# Patient Record
Sex: MALE | Race: WHITE | NOT HISPANIC OR LATINO | Employment: OTHER | URBAN - METROPOLITAN AREA
[De-identification: names, ages, dates, MRNs, and addresses within clinical notes are randomized per-mention and may not be internally consistent; named-entity substitution may affect disease eponyms.]

---

## 2017-01-02 ENCOUNTER — OFFICE VISIT (OUTPATIENT)
Dept: ORTHOPEDICS | Facility: CLINIC | Age: 67
End: 2017-01-02
Payer: COMMERCIAL

## 2017-01-02 VITALS — RESPIRATION RATE: 12 BRPM | BODY MASS INDEX: 23.82 KG/M2 | HEIGHT: 65 IN | WEIGHT: 143 LBS

## 2017-01-02 DIAGNOSIS — M54.50 CHRONIC LEFT-SIDED LOW BACK PAIN WITHOUT SCIATICA: ICD-10-CM

## 2017-01-02 DIAGNOSIS — M47.816 LUMBAR FACET ARTHROPATHY: Primary | ICD-10-CM

## 2017-01-02 DIAGNOSIS — G89.29 CHRONIC LEFT-SIDED LOW BACK PAIN WITHOUT SCIATICA: ICD-10-CM

## 2017-01-02 PROCEDURE — 99213 OFFICE O/P EST LOW 20 MIN: CPT | Performed by: PHYSICAL MEDICINE & REHABILITATION

## 2017-01-02 NOTE — Clinical Note
Dear Jose Lopez saw me at [facility] on Jan 2, 2017.  Please refer to the visit note at your convenience and feel free to contact me should you have any questions.  Sincerely,  Rodney Galvez DO, [facility]

## 2017-01-02 NOTE — PATIENT INSTRUCTIONS
We addressed the following today:    1. Left low back pain without radiation  2. Lumbar arthritis    Activity modification as discussed    Home exercise program as instructed    Topical Treatments: Ice/heat    Over the counter medication: Acetaminophen (Tylenol) 1000 mg every 6 hours with food (Maximum of 3000 mg/day)    Follow-up as needed for further evaluation/medical care (call direct clinic number [[phone]] at any time with questions or concerns)

## 2017-01-02 NOTE — PROGRESS NOTES
" [facility]   Follow-up Visit s Jan 2, 2017    Subjective:  Jose Moreno is a 66 year old male who is seen in follow up for evaluation of left low back pain without radiation.  Last visit was on 12/15/2016 with left L4-5 and L5-S1 lumbar facet joint corticosteroid injections completed with 95% improvement noted.  Has not been completing home exercise program as previously prescribed during formal physical therapy.    Denies any low back pain presently. Symptoms are generally worse with heavy lifting activities.  Denies any bowel/bladder dysfunction or saddle anesthesia.  Denies any weakness/numbness/tingling of the left lower extremity.  Denies any falls/trauma since last clinical visit.     Patient's past medical, surgical, social, and family histories are reviewed today    Objective:  Resp 12  Ht 5' 5\" (1.651 m)  Wt 143 lb (64.864 kg)  BMI 23.80 kg/m2  General: healthy, alert, and in no distress    Imaging:  No x-rays indicated during today's visit  Outside report from [facility] was reviewed today and results were discussed with the patient    ASSESSMENT:  1. Chronic left low back pain without radiation  2. Lumbar facet arthropathy  3. Lumbar degenerative disc disease    PLAN:  1. Acetaminophen/ice/heat as needed for improved pain control.  2. Activity modification as discussed, including limitation of activities that cause pain/discomfort.  3. Continue with pain-free home exercise program as previously prescribed from formal physical therapy.  4. Follow-up as needed for further evaluation/medical care.  Consider lumbar medial branch blocks with progression to lumbar radiofrequency ablation, spine surgery referral, etc as deemed appropriate moving forward.  Instructed to follow-up if change of symptoms arise.    Patient's conditions were thoroughly discussed during today's visit with greater than 50% of the visit spent counseling the patient with total time spent face-to-face with the " patient being 10 minutes.    Rodney Galvez DO, CAQSM  [facility]    Disclaimer: This note consists of symbols derived from keyboarding, dictation and/or voice recognition software. As a result, there may be errors in the script that have gone undetected. Please consider this when interpreting information found in this chart.    This document serves as a record of the services and decisions personally performed and made by Rodney Galvez DO. It was created on his behalf by Juan Rivas, a trained medical scribe. The creation of this document is based on the provider's statements to the medical scribe.  Juan Rivas January 2, 2017 4:50 PM

## 2017-01-02 NOTE — NURSING NOTE
"Chief Complaint   Patient presents with     Musculoskeletal Problem     low back pain     RECHECK       Initial Resp 12  Ht 5' 5\" (1.651 m)  Wt 143 lb (64.864 kg)  BMI 23.80 kg/m2 Estimated body mass index is 23.8 kg/(m^2) as calculated from the following:    Height as of this encounter: 5' 5\" (1.651 m).    Weight as of this encounter: 143 lb (64.864 kg).  BP completed using cuff size: cary Patterson  ATC/R      "

## 2017-02-10 ENCOUNTER — TELEPHONE (OUTPATIENT)
Dept: ORTHOPEDICS | Facility: CLINIC | Age: 67
End: 2017-02-10

## 2017-02-11 NOTE — TELEPHONE ENCOUNTER
Current symptoms: left sided low back pain, no radiation.  Same symptoms as before injection.    What makes it better:  Rest, getting off feet    What makes it worse: Standing while reaching such as at a counter or work bench, walking, end of the day    Numbness/tingling/weakness: none    Bowel/bladder changes: none    Percentage and duration of relief:  100% relief until the last week.  Rates current pain at worst 7/10, at the end of the day.    Interested in following up to discuss next steps and when a repeat injection will be appropriate.  Appointment scheduled for 2/13.    Sena Ramesh, ATC

## 2017-02-11 NOTE — TELEPHONE ENCOUNTER
Received telephone call from patient wishing to schedule an injection.  Please call patient to obtain additional information regarding current symptoms including location of pain, what makes better/worse, any numbness/tingling/weakness of the lower extremities, any bowel/bladder incontinence, etc to determine if another injection is appropriate as too soon to complete/repeat previous corticosteroid injection.  Please obtain information regarding what percentage and how long patient received improvement from previous injection.  Can also schedule a follow-up clinical visit to discuss further to determine what is the next most appropriate steps of care.    Rodney Galvez DO, CAQSM  [facility]

## 2017-02-13 ENCOUNTER — OFFICE VISIT (OUTPATIENT)
Dept: ORTHOPEDICS | Facility: CLINIC | Age: 67
End: 2017-02-13
Payer: COMMERCIAL

## 2017-02-13 ENCOUNTER — OFFICE VISIT (OUTPATIENT)
Dept: CARDIOLOGY | Facility: CLINIC | Age: 67
End: 2017-02-13
Attending: INTERNAL MEDICINE
Payer: COMMERCIAL

## 2017-02-13 ENCOUNTER — TRANSFERRED RECORDS (OUTPATIENT)
Dept: FAMILY MEDICINE | Facility: CLINIC | Age: 67
End: 2017-02-13

## 2017-02-13 VITALS
OXYGEN SATURATION: 97 % | DIASTOLIC BLOOD PRESSURE: 78 MMHG | SYSTOLIC BLOOD PRESSURE: 138 MMHG | HEIGHT: 65 IN | WEIGHT: 155 LBS | HEART RATE: 84 BPM | BODY MASS INDEX: 25.83 KG/M2

## 2017-02-13 VITALS
SYSTOLIC BLOOD PRESSURE: 138 MMHG | DIASTOLIC BLOOD PRESSURE: 78 MMHG | BODY MASS INDEX: 25.83 KG/M2 | WEIGHT: 155 LBS | HEIGHT: 65 IN

## 2017-02-13 DIAGNOSIS — E78.2 MIXED HYPERLIPIDEMIA: Primary | ICD-10-CM

## 2017-02-13 DIAGNOSIS — Z98.890 STATUS POST MITRAL VALVE REPAIR: ICD-10-CM

## 2017-02-13 DIAGNOSIS — G89.29 CHRONIC LEFT-SIDED LOW BACK PAIN WITHOUT SCIATICA: ICD-10-CM

## 2017-02-13 DIAGNOSIS — I25.10 CORONARY ARTERY DISEASE INVOLVING NATIVE CORONARY ARTERY OF NATIVE HEART WITHOUT ANGINA PECTORIS: ICD-10-CM

## 2017-02-13 DIAGNOSIS — M54.50 CHRONIC LEFT-SIDED LOW BACK PAIN WITHOUT SCIATICA: ICD-10-CM

## 2017-02-13 DIAGNOSIS — M47.816 LUMBAR FACET ARTHROPATHY: Primary | ICD-10-CM

## 2017-02-13 LAB
ANION GAP SERPL CALCULATED.3IONS-SCNC: 5 MMOL/L (ref 3–14)
BUN SERPL-MCNC: 20 MG/DL (ref 7–30)
CALCIUM SERPL-MCNC: 9.2 MG/DL (ref 8.5–10.1)
CHLORIDE SERPL-SCNC: 106 MMOL/L (ref 94–109)
CHOLEST SERPL-MCNC: 171 MG/DL
CO2 SERPL-SCNC: 28 MMOL/L (ref 20–32)
CREAT SERPL-MCNC: 0.89 MG/DL (ref 0.66–1.25)
GFR SERPL CREATININE-BSD FRML MDRD: 85 ML/MIN/1.7M2
GLUCOSE SERPL-MCNC: 90 MG/DL (ref 70–99)
HDLC SERPL-MCNC: 80 MG/DL
LDLC SERPL CALC-MCNC: 69 MG/DL
NONHDLC SERPL-MCNC: 91 MG/DL
POTASSIUM SERPL-SCNC: 4.3 MMOL/L (ref 3.4–5.3)
SODIUM SERPL-SCNC: 139 MMOL/L (ref 133–144)
TRIGL SERPL-MCNC: 112 MG/DL

## 2017-02-13 PROCEDURE — 36415 COLL VENOUS BLD VENIPUNCTURE: CPT | Performed by: INTERNAL MEDICINE

## 2017-02-13 PROCEDURE — 80048 BASIC METABOLIC PNL TOTAL CA: CPT | Performed by: INTERNAL MEDICINE

## 2017-02-13 PROCEDURE — 99213 OFFICE O/P EST LOW 20 MIN: CPT | Performed by: NURSE PRACTITIONER

## 2017-02-13 PROCEDURE — 99214 OFFICE O/P EST MOD 30 MIN: CPT | Performed by: PHYSICAL MEDICINE & REHABILITATION

## 2017-02-13 PROCEDURE — 80061 LIPID PANEL: CPT | Performed by: INTERNAL MEDICINE

## 2017-02-13 NOTE — PROGRESS NOTES
" [facility]   Follow-up Visit s Feb 13, 2017    Subjective:  Jose Moreno is a 66 year old male who is seen in follow up for evaluation of left sided low back pain without radiation.  Last visit was on 1/2/2017.  Symptoms had been improved by 100% from left L4-5 and L5-S1 lumbar facet joint corticosteroid injections on 12/15/2016, but returned over the last couple of weeks.  Has not been taking any oral pain medications for improvement of pain/discomfort.  Has not been completing home exercise program as previously prescribed during formal physical therapy.    Reports low back pain that is located left sided with radiation absent.  Symptoms are generally worse with prolonged standing activities and better with sitting activities.  Denies any weakness/numbness/tingling of the lower extremities.  Denies any falls/trauma since last clinical visit.     Patient's past medical, surgical, social, and family histories are reviewed today    This document serves as a record of the services and decisions personally performed and made by Rodney Galvez DO. It was created on his behalf by Juan Rivas, a trained medical scribe. The creation of this document is based on the provider's statements to the medical scribe.  Juan Rivas February 13, 2017 1:23 PM      Objective:  /78  Ht 1.651 m (5' 5\")  Wt 70.3 kg (155 lb)  BMI 25.79 kg/m2  General: healthy, alert, and in no distress    Imaging:  No x-rays indicated during today's visit  Outside films from [facility] were reviewed today and results were discussed with the patient     ASSESSMENT:  1. Lumbar facet arthropathy  2. Chronic left-sided low back pain without sciatica    PLAN:  1. Acetaminophen/ice as needed for improved pain control.  2. Activity modification as discussed, including limitation of activities that cause pain/discomfort.  3. Return to pain-free home exercise program as previously prescribed from formal physical therapy.  4. Discussed " potential side effects and complications of diagnostic left L4-5 and left L5-S1 lumbar facet nerve blocks including but not necessarily limited to infection, bleeding, allergic reaction, post-injection flare, local tissue breakdown, injury to soft tissue and/or nerves and seizure.  Patient indicated their understanding and agreed to proceed.    5. Follow-up for left sided lumbar medial branch blocks with progression to lumbar radiofrequency ablation as deemed appropriate moving forward.  Consider repeat left L4-5 and L5-S1 lumbar facet joint corticosteroid injections, etc as deemed appropriate moving forward.  Instructed to follow-up if change of symptoms arise.    Patient's conditions were thoroughly discussed during today's visit with greater than 50% of the visit spent counseling the patient with total time spent face-to-face with the patient being 15 minutes.    Disclaimer: This note consists of symbols derived from keyboarding, dictation and/or voice recognition software. As a result, there may be errors in the script that have gone undetected. Please consider this when interpreting information found in this chart.    The information in this document, created by a scribe for me, accurately reflects the services I personally performed and the decisions made by me.  I have reviewed and approved this document for accuracy.    Rodney Galvez DO, CAQSM  [facility]

## 2017-02-13 NOTE — PROGRESS NOTES
HPI and Plan:   See dictation  9:24 AM    [id number]  No orders of the defined types were placed in this encounter.      No orders of the defined types were placed in this encounter.      There are no discontinued medications.      Encounter Diagnoses   Name Primary?     Coronary artery disease involving native coronary artery of native heart without angina pectoris      Mixed hyperlipidemia Yes     Status post mitral valve repair        CURRENT MEDICATIONS:  Current Outpatient Prescriptions   Medication Sig Dispense Refill     FLUoxetine (PROZAC) 40 MG capsule Take 1 capsule (40 mg) by mouth daily 90 capsule 3     traZODone (DESYREL) 100 MG tablet Take 1.5 tablets (150 mg) by mouth At Bedtime 135 tablet 3     omeprazole (PRILOSEC) 20 MG capsule TAKE ONE CAPSULE BY MOUTH ONCE DAILY 90 capsule 3     atorvastatin (LIPITOR) 40 MG tablet Take 1 tablet (40 mg) by mouth daily 90 tablet 1     losartan (COZAAR) 25 MG tablet Take 1 tablet (25 mg) by mouth daily 90 tablet 1     levothyroxine (SYNTHROID, LEVOTHROID) 50 MCG tablet Take 1 tablet (50 mcg) by mouth daily 90 tablet 3     nitroglycerin (NITROSTAT) 0.4 MG SL tablet Place 1 tablet (0.4 mg) under the tongue every 5 minutes as needed for chest pain 25 tablet 3     aspirin EC 81 MG EC tablet Take 1 tablet (81 mg) by mouth daily 60 tablet 0       ALLERGIES     Allergies   Allergen Reactions     Ace Inhibitors Cough     Dry cough       PAST MEDICAL HISTORY:  Past Medical History   Diagnosis Date     ACS (acute coronary syndrome) (H) 01-23-15     ADHD (attention deficit hyperactivity disorder)      CAD (coronary artery disease)      cardiac cath 1/23/15: SHERRY to 1st diagonal, SHERRY x2 to LAD, cath 2009: no intervention     Esophageal reflux      History of hyperthyroidism 4/9/2014     Hyperlipidemia      Hypertension      Mitral regurgitation 2009     moderately severe MVP, s/p robotic repair at [place]     NSTEMI (non-ST elevated myocardial infarction) (H) 01-23-15     Pulmonary  nodules 2009     CT-recommend repeat in 6-12 months     Rotator cuff rupture 11/3/2011       PAST SURGICAL HISTORY:  Past Surgical History   Procedure Laterality Date     Removal of sperm duct(s)       Vasectomy     Removal of sperm duct(s)       reversal of vasectomy     Eye exam established pt  1/14/06     Hcl psa, diagnostic (tumor marker)  2003     Hc colonoscopy thru stoma, diagnostic  7/00     GI     Repair valve mitral  2009     [facility] robotic repair      Decompression lumbar minimally invasive one level  1/11/2012     Procedure:DECOMPRESSION LUMBAR MINIMALLY INVASIVE ONE LEVEL; L4-5 Decompression Minimally Invasive; Surgeon:MESSI HORAN; Location:[facility] OR     Heart cath right and left heart cath  01-23-15     See report, pt transfered to [facility] for complex bifurcation PCI of LAD diagonal vessel.      Heart cath right and left heart cath  01-26-15     PTCA and implantation of SHERRY to 1st diagonal, SHERRY to mid LAD, SHERRY to proximal LAD     Orthopedic surgery       Hx of rotator cuff rupture and repair       FAMILY HISTORY:  Family History   Problem Relation Age of Onset     CANCER Mother      breast, lung     Depression Father      alcohlism     DIABETES No family hx of      Cardiovascular No family hx of      Cancer - colorectal No family hx of      Prostate Cancer No family hx of        SOCIAL HISTORY:  Social History     Social History     Marital status:      Spouse name: Chastity     Number of children: 4     Years of education: 14     Occupational History      [employer]     RETIRED     Social History Main Topics     Smoking status: Never Smoker     Smokeless tobacco: Never Used     Alcohol use 4.2 oz/week     7 Standard drinks or equivalent per week      Comment: 1 beer daily     Drug use: No     Sexual activity: Yes     Partners: Female     Birth control/ protection: Condom     Other Topics Concern     Caffeine Concern No     2-3 daily     Sleep Concern No     Special Diet No      "low sodium     Exercise Yes     walking daily     Seat Belt Yes     Self-Exams No     Social History Narrative       Review of Systems:  Skin:  Negative       Eyes:  Positive for glasses    ENT:  Negative      Respiratory:  Negative       Cardiovascular:  Negative Positive for;chest pain;fatigue;lightheadedness;dizziness    Gastroenterology: Positive for reflux takes Omeprazole - under control , Hiatel Hernia  Genitourinary:  Negative      Musculoskeletal:  Positive for back pain;arthritis;joint pain;joint stiffness come and goes ,  arthritis of fingers  Neurologic:  Negative   has trouble with his memory yesterday 1/12/15 - trouble thinking of words and names  Psychiatric:  Negative sleep disturbances    Heme/Lymph/Imm:  Negative      Endocrine:  Positive for cold intolerance;thyroid disorder fingers     Physical Exam:  Vitals: /78  Pulse 84  Ht 1.651 m (5' 5\")  Wt 70.3 kg (155 lb)  SpO2 97%  BMI 25.79 kg/m2    Constitutional:           Skin:           Head:           Eyes:           ENT:           Neck:           Chest:             Cardiac:                    Abdomen:           Vascular:                                          Extremities and Back:                 Neurological:                 CC  Clint Alejandra MD  [facility]  [address]  [place] [id number]              "

## 2017-02-13 NOTE — LETTER
2/13/2017    Jerri Tavera MD  [facility]   [address]  [facility] [address]    RE: Jose Moreno       Dear Colleague,    I had the pleasure of seeing Jose Moreno in the [facility].    CHIEF COMPLAINT:  Followup.      Mr. Moreno is a pleasant 66-year-old patient who follows with Dr. Alejandra.  He has a history of mitral valve disease, having undergone robotic mitral valve repair at the [facility] in 2009.  He carries a diagnosis of mitral valve prolapse and severe mitral regurgitation.  A preoperative angiogram showed a 50% mid LAD stenosis.      In 01/2015, he presented with a non-ST elevation myocardial infarction.  Angiogram was done and he was found to have a chronically occluded right coronary artery that could not be crossed with a wire.  He was sent to [facility] where he underwent a complex LAD diagonal intervention with drug-eluting stents placed in both vessels.  He tolerated a year of dual antiplatelet therapy and at this time is on aspirin only.  He has been on beta blocker previously, but was complaining of cold hands and cold feet and hence that was discontinued.  He remains on a statin and ARB.  He underwent his last stress test in 01/2016.  Results showed a small to moderate-sized slightly reversible inferior/inferoseptal/inferolateral defect extending from the base towards the apex, felt likely consistent with diaphragm attenuation with bowel uptake artifact.  There were no significant areas of ischemia or infarct noted.  His ejection fraction was 62%.  His last echocardiogram was in 02/2016.  Results showed ejection fraction of 55-60%.  There was grade III left ventricular diastolic dysfunction.  There was trace to mild tricuspid regurgitation.  Right ventricular systolic pressure was approximated at 21 mmHg plus right atrial pressure.  There was no evidence of mitral valve prolapse.  There was trace mitral regurgitation.   There was no mitral valve stenosis.      Mr. Moreno visited with Dr. Alejandra a year ago.  He presents today in followup.  He tells me the last year he has been doing well.  He has been remodeling a house and has been quite active doing that.  He also walks a half hour a day.  He has been trying to incorporate more fruit into his diet.  His weight remains stable.  He has had no complaints of anginal chest pain, shortness of breath, lightheadedness, dizziness, palpitations, orthopnea or paroxysmal nocturnal dyspnea.  He tells me after a long day working on the house remodel job he will occasionally have a fleeting chest discomfort that lasts only seconds, likely not consistent with anginal pain.        LABORATORY DATA:  We looked at laboratories today including fasting lipid panel.  His total cholesterol was 171, his HDL is 80 which is up from 66 in 11/2016, LDL is 69 down from 81 and triglycerides are 112.  We did a basic metabolic panel.  His sodium is 139, potassium 4.3, BUN 20, creatinine 0.89, GFR 85.      PHYSICAL EXAMINATION:   GENERAL:  On exam, this is a well-developed, well-nourished male who appears his stated age.  He is cooperative and appropriate.   VITAL SIGNS:  Stable.   NEUROLOGIC:  Intact.   HEENT:  Normocephalic, atraumatic without tenderness or involuntary movements.  Face appears symmetric.  Visual fields are full.  EOMs intact.  Conjunctivae clear.  Oral mucosa is pink and moist.   NECK:  Supple, full range of motion, trachea appears midline, no JVD, no carotid bruit.   CARDIOVASCULAR:  S1, S2, regular rate and rhythm.   CHEST:  Chest expansion appears symmetric.  Breath sounds are clear.   ABDOMEN:  Soft.  Bowel sounds present.   EXTREMITIES:  Warm and dry without edema, cyanosis or clubbing.     Outpatient Encounter Prescriptions as of 2/13/2017   Medication Sig Dispense Refill     FLUoxetine (PROZAC) 40 MG capsule Take 1 capsule (40 mg) by mouth daily 90 capsule 3     traZODone (DESYREL) 100  MG tablet Take 1.5 tablets (150 mg) by mouth At Bedtime 135 tablet 3     omeprazole (PRILOSEC) 20 MG capsule TAKE ONE CAPSULE BY MOUTH ONCE DAILY 90 capsule 3     atorvastatin (LIPITOR) 40 MG tablet Take 1 tablet (40 mg) by mouth daily 90 tablet 1     losartan (COZAAR) 25 MG tablet Take 1 tablet (25 mg) by mouth daily 90 tablet 1     levothyroxine (SYNTHROID, LEVOTHROID) 50 MCG tablet Take 1 tablet (50 mcg) by mouth daily 90 tablet 3     nitroglycerin (NITROSTAT) 0.4 MG SL tablet Place 1 tablet (0.4 mg) under the tongue every 5 minutes as needed for chest pain 25 tablet 3     aspirin EC 81 MG EC tablet Take 1 tablet (81 mg) by mouth daily 60 tablet 0     No facility-administered encounter medications on file as of 2/13/2017.       IMPRESSION AND PLAN:   1.  History of coronary artery disease with stent placement to an LAD and diagonal bifurcation lesion.  Mr. Moreno continues to do well.  He is without anginal symptoms.  He is tolerating activity well.  He is on aspirin and statin therapy.  No beta blocker due to issues of cold hands and cold feet which he is not sure necessarily cleared up.  Regardless, I have asked him to continue his present medication management, continue with regular exercise and healthy dietary choices.   2.  History of mixed dyslipidemia.  Again, on atorvastatin with a well-controlled cholesterol.   3.  History of mitral valve repair.      Mr. Moreno will visit again with Dr. Alejandra in a year.  We will look at his cholesterol again at that time.  He is aware if there are questions or concerns prior to then, he can contact the office.      Thank you for allowing me to participate in the care of this patient.     Sincerely,    TACOS Humphries [facility]

## 2017-02-13 NOTE — MR AVS SNAPSHOT
After Visit Summary   2/13/2017    Jose Moreno    MRN: [id number]           Patient Information     Date Of Birth          1950        Visit Information        Provider Department      2/13/2017 8:50 AM Jerri Almonte APRN CNP [facility] [place]        Today's Diagnoses     Coronary artery disease involving native coronary artery of native heart without angina pectoris           Follow-ups after your visit        Your next 10 appointments already scheduled     Feb 13, 2017  1:00 PM CST   Return Visit with Rodney Galvez DO   [facility] ([facility])    [address]  [address]  [place] [address]   [phone]              Who to contact     If you have questions or need follow up information about today's clinic visit or your schedule please contact [facility] [place] directly at [phone].  Normal or non-critical lab and imaging results will be communicated to you by MyChart, letter or phone within 4 business days after the clinic has received the results. If you do not hear from us within 7 days, please contact the clinic through Wiz Mapshart or phone. If you have a critical or abnormal lab result, we will notify you by phone as soon as possible.  Submit refill requests through Zettaset or call your pharmacy and they will forward the refill request to us. Please allow 3 business days for your refill to be completed.          Additional Information About Your Visit        MyChart Information     Zettaset gives you secure access to your electronic health record. If you see a primary care provider, you can also send messages to your care team and make appointments. If you have questions, please call your primary care clinic.  If you do not have a primary care provider, please call [phone] and they will assist you.        Care EveryWhere ID     This is your Care  "EveryWhere ID. This could be used by other organizations to access your [place] medical records  [id number]        Your Vitals Were     Pulse Height Pulse Oximetry BMI (Body Mass Index)          84 1.651 m (5' 5\") 97% 25.79 kg/m2         Blood Pressure from Last 3 Encounters:   02/13/17 138/78   12/15/16 (!) 164/95   12/05/16 118/74    Weight from Last 3 Encounters:   02/13/17 70.3 kg (155 lb)   01/02/17 64.9 kg (143 lb)   12/05/16 69.4 kg (153 lb)              We Performed the Following     Follow-Up with Cardiac Advanced Practice Provider        Primary Care Provider Office Phone # Fax #    Jerri Tavera [phone]       [facility] [address] [facility] [address]  [facility] [address]        Thank you!     Thank you for choosing [facility] [place]  for your care. Our goal is always to provide you with excellent care. Hearing back from our patients is one way we can continue to improve our services. Please take a few minutes to complete the written survey that you may receive in the mail after your visit with us. Thank you!             Your Updated Medication List - Protect others around you: Learn how to safely use, store and throw away your medicines at www.disposemymeds.org.          This list is accurate as of: 2/13/17  9:23 AM.  Always use your most recent med list.                   Brand Name Dispense Instructions for use    aspirin 81 MG EC tablet     60 tablet    Take 1 tablet (81 mg) by mouth daily       atorvastatin 40 MG tablet    LIPITOR    90 tablet    Take 1 tablet (40 mg) by mouth daily       FLUoxetine 40 MG capsule    PROzac    90 capsule    Take 1 capsule (40 mg) by mouth daily       levothyroxine 50 MCG tablet    SYNTHROID/LEVOTHROID    90 tablet    Take 1 tablet (50 mcg) by mouth daily       losartan 25 MG tablet    COZAAR    90 tablet    Take 1 tablet (25 mg) by mouth daily       nitroglycerin 0.4 MG sublingual tablet    " NITROSTAT    25 tablet    Place 1 tablet (0.4 mg) under the tongue every 5 minutes as needed for chest pain       omeprazole 20 MG CR capsule    priLOSEC    90 capsule    TAKE ONE CAPSULE BY MOUTH ONCE DAILY       traZODone 100 MG tablet    DESYREL    135 tablet    Take 1.5 tablets (150 mg) by mouth At Bedtime

## 2017-02-13 NOTE — MR AVS SNAPSHOT
After Visit Summary   2/13/2017    Jose Moreno    MRN: [id number]           Patient Information     Date Of Birth          1950        Visit Information        Provider Department      2/13/2017 1:00 PM Rodney Galvez DO [facility]        Care Instructions    We addressed the following today:    1. Lumbar arthritis    Activity modification as discussed    Home exercises as instructed    Topical Treatments: Ice    Over the counter medication: Acetaminophen (Tylenol) 1000 mg every 6 hours with food (Maximum of 3000 mg/day)    Follow-up for left sided medial branch blocks as discussed (call direct clinic number [[phone]] at any time with questions or concerns)            Follow-ups after your visit        Future tests that were ordered for you today     Open Future Orders        Priority Expected Expires Ordered    Basic metabolic panel Routine 2/13/2018 3/20/2018 2/13/2017    Lipid Profile Routine 2/13/2018 3/20/2018 2/13/2017    ALT Routine 2/13/2018 3/20/2018 2/13/2017    Follow-Up with Cardiologist Routine 2/13/2018 6/28/2018 2/13/2017            Who to contact     If you have questions or need follow up information about today's clinic visit or your schedule please contact [facility] directly at [phone].  Normal or non-critical lab and imaging results will be communicated to you by MyChart, letter or phone within 4 business days after the clinic has received the results. If you do not hear from us within 7 days, please contact the clinic through Nextinithart or phone. If you have a critical or abnormal lab result, we will notify you by phone as soon as possible.  Submit refill requests through RivalSoft or call your pharmacy and they will forward the refill request to us. Please allow 3 business days for your refill to be completed.          Additional Information About Your Visit        NextinitharApprova Information     RivalSoft gives you secure access to  "your electronic health record. If you see a primary care provider, you can also send messages to your care team and make appointments. If you have questions, please call your primary care clinic.  If you do not have a primary care provider, please call [phone] and they will assist you.        Care EveryWhere ID     This is your Care EveryWhere ID. This could be used by other organizations to access your [place] medical records  [id number]        Your Vitals Were     Height BMI (Body Mass Index)                1.651 m (5' 5\") 25.79 kg/m2           Blood Pressure from Last 3 Encounters:   02/13/17 138/78   02/13/17 138/78   12/15/16 (!) 164/95    Weight from Last 3 Encounters:   02/13/17 70.3 kg (155 lb)   02/13/17 70.3 kg (155 lb)   01/02/17 64.9 kg (143 lb)              Today, you had the following     No orders found for display       Primary Care Provider Office Phone # Fax #    Jerri Tavera [phone]       [facility] [address]  [address]  [facility] [address]        Thank you!     Thank you for choosing [facility]  for your care. Our goal is always to provide you with excellent care. Hearing back from our patients is one way we can continue to improve our services. Please take a few minutes to complete the written survey that you may receive in the mail after your visit with us. Thank you!             Your Updated Medication List - Protect others around you: Learn how to safely use, store and throw away your medicines at www.disposemymeds.org.          This list is accurate as of: 2/13/17  1:39 PM.  Always use your most recent med list.                   Brand Name Dispense Instructions for use    aspirin 81 MG EC tablet     60 tablet    Take 1 tablet (81 mg) by mouth daily       atorvastatin 40 MG tablet    LIPITOR    90 tablet    Take 1 tablet (40 mg) by mouth daily       FLUoxetine 40 MG capsule    PROzac    90 capsule    Take 1 capsule (40 " mg) by mouth daily       levothyroxine 50 MCG tablet    SYNTHROID/LEVOTHROID    90 tablet    Take 1 tablet (50 mcg) by mouth daily       losartan 25 MG tablet    COZAAR    90 tablet    Take 1 tablet (25 mg) by mouth daily       nitroglycerin 0.4 MG sublingual tablet    NITROSTAT    25 tablet    Place 1 tablet (0.4 mg) under the tongue every 5 minutes as needed for chest pain       omeprazole 20 MG CR capsule    priLOSEC    90 capsule    TAKE ONE CAPSULE BY MOUTH ONCE DAILY       traZODone 100 MG tablet    DESYREL    135 tablet    Take 1.5 tablets (150 mg) by mouth At Bedtime

## 2017-02-13 NOTE — PROGRESS NOTES
CHIEF COMPLAINT:  Followup.      HISTORY OF PRESENT ILLNESS:  Mr. Moreno is a pleasant 66-year-old patient who follows with Dr. Alejandra.  He has a history of mitral valve disease, having undergone robotic mitral valve repair at the [facility] in 2009.  He carries a diagnosis of mitral valve prolapse and severe mitral regurgitation.  A preoperative angiogram showed a 50% mid LAD stenosis.      In 01/2015, he presented with a non-ST elevation myocardial infarction.  Angiogram was done and he was found to have a chronically occluded right coronary artery that could not be crossed with a wire.  He was sent to [facility] where he underwent a complex LAD diagonal intervention with drug-eluting stents placed in both vessels.  He tolerated a year of dual antiplatelet therapy and at this time is on aspirin only.  He has been on beta blocker previously, but was complaining of cold hands and cold feet and hence that was discontinued.  He remains on a statin and ARB.  He underwent his last stress test in 01/2016.  Results showed a small to moderate-sized slightly reversible inferior/inferoseptal/inferolateral defect extending from the base towards the apex, felt likely consistent with diaphragm attenuation with bowel uptake artifact.  There were no significant areas of ischemia or infarct noted.  His ejection fraction was 62%.  His last echocardiogram was in 02/2016.  Results showed ejection fraction of 55-60%.  There was grade III left ventricular diastolic dysfunction.  There was trace to mild tricuspid regurgitation.  Right ventricular systolic pressure was approximated at 21 mmHg plus right atrial pressure.  There was no evidence of mitral valve prolapse.  There was trace mitral regurgitation.  There was no mitral valve stenosis.      Mr. Moreno visited with Dr. Alejandra a year ago.  He presents today in followup.  He tells me the last year he has been doing well.  He has been remodeling a house and has been quite  active doing that.  He also walks a half hour a day.  He has been trying to incorporate more fruit into his diet.  His weight remains stable.  He has had no complaints of anginal chest pain, shortness of breath, lightheadedness, dizziness, palpitations, orthopnea or paroxysmal nocturnal dyspnea.  He tells me after a long day working on the house remodel job he will occasionally have a fleeting chest discomfort that lasts only seconds, likely not consistent with anginal pain.        LABORATORY DATA:  We looked at laboratories today including fasting lipid panel.  His total cholesterol was 171, his HDL is 80 which is up from 66 in 11/2016, LDL is 69 down from 81 and triglycerides are 112.  We did a basic metabolic panel.  His sodium is 139, potassium 4.3, BUN 20, creatinine 0.89, GFR 85.      PHYSICAL EXAMINATION:   GENERAL:  On exam, this is a well-developed, well-nourished male who appears his stated age.  He is cooperative and appropriate.   VITAL SIGNS:  Stable.   NEUROLOGIC:  Intact.   HEENT:  Normocephalic, atraumatic without tenderness or involuntary movements.  Face appears symmetric.  Visual fields are full.  EOMs intact.  Conjunctivae clear.  Oral mucosa is pink and moist.   NECK:  Supple, full range of motion, trachea appears midline, no JVD, no carotid bruit.   CARDIOVASCULAR:  S1, S2, regular rate and rhythm.   CHEST:  Chest expansion appears symmetric.  Breath sounds are clear.   ABDOMEN:  Soft.  Bowel sounds present.   EXTREMITIES:  Warm and dry without edema, cyanosis or clubbing.      IMPRESSION AND PLAN:   1.  History of coronary artery disease with stent placement to an LAD and diagonal bifurcation lesion.  Mr. Moreno continues to do well.  He is without anginal symptoms.  He is tolerating activity well.  He is on aspirin and statin therapy.  No beta blocker due to issues of cold hands and cold feet which he is not sure necessarily cleared up.  Regardless, I have asked him to continue his present  medication management, continue with regular exercise and healthy dietary choices.   2.  History of mixed dyslipidemia.  Again, on atorvastatin with a well-controlled cholesterol.   3.  History of mitral valve repair.      Mr. Ovalle will visit again with Dr. Alejandra in a year.  We will look at his cholesterol again at that time.  He is aware if there are questions or concerns prior to then, he can contact the office.      Thank you for allowing me to participate in the care of this patient.         TACOS SUMMERS CNP             D: 2017 09:30   T: 2017 11:31   MT: HERMAN      Name:     DANIELLA OVALLE   MRN:      [id number]        Account:      [id number]   :      1950           Service Date: 2017      Document: [id number]

## 2017-02-13 NOTE — Clinical Note
Dear Jose Lopez saw me at [facility] on Feb 13, 2017.  Please refer to the visit note at your convenience and feel free to contact me should you have any questions.  Sincerely,  Rodney Galvez DO, [facility]

## 2017-02-16 ENCOUNTER — TRANSFERRED RECORDS (OUTPATIENT)
Dept: FAMILY MEDICINE | Facility: CLINIC | Age: 67
End: 2017-02-16

## 2017-03-02 ENCOUNTER — OFFICE VISIT (OUTPATIENT)
Dept: ORTHOPEDICS | Facility: CLINIC | Age: 67
End: 2017-03-02
Payer: COMMERCIAL

## 2017-03-02 ENCOUNTER — RADIANT APPOINTMENT (OUTPATIENT)
Dept: GENERAL RADIOLOGY | Facility: CLINIC | Age: 67
End: 2017-03-02
Attending: PHYSICAL MEDICINE & REHABILITATION
Payer: COMMERCIAL

## 2017-03-02 VITALS — OXYGEN SATURATION: 96 % | DIASTOLIC BLOOD PRESSURE: 84 MMHG | HEART RATE: 64 BPM | SYSTOLIC BLOOD PRESSURE: 152 MMHG

## 2017-03-02 DIAGNOSIS — M47.816 LUMBAR FACET ARTHROPATHY: Primary | ICD-10-CM

## 2017-03-02 DIAGNOSIS — M47.816 LUMBAR FACET ARTHROPATHY: ICD-10-CM

## 2017-03-02 PROCEDURE — 64493 INJ PARAVERT F JNT L/S 1 LEV: CPT | Mod: LT | Performed by: PHYSICAL MEDICINE & REHABILITATION

## 2017-03-02 PROCEDURE — 64494 INJ PARAVERT F JNT L/S 2 LEV: CPT | Mod: LT | Performed by: PHYSICAL MEDICINE & REHABILITATION

## 2017-03-02 ASSESSMENT — PAIN SCALES - GENERAL: PAINLEVEL: MODERATE PAIN (4)

## 2017-03-02 NOTE — MR AVS SNAPSHOT
After Visit Summary   3/2/2017    Jose Moreno    MRN: [id number]           Patient Information     Date Of Birth          1950        Visit Information        Provider Department      3/2/2017 8:30 AM Rodney Galvez DO [facility]        Today's Diagnoses     Spondylosis of lumbar region without myelopathy or radiculopathy    -  1    Lumbar facet arthropathy (H)          Care Instructions    [facility]   Discharge Instructions  Provider: Dr. Rodney Galvez  Phone: [phone], option #2    Your Procedure: Lumbar Facet Joint Injection  Meds used:  Lidocaine (anesthetic)   DepoMedrol (steroid)   Omnipaque (contrast dye)         Avoid strenuous activity for the first 24 hours. You may resume your regular activities after that.     You may shower, however no swimming, tub baths, or hot tubs for 24 hours following your procedure    Be cautious with walking as numbness and/or weakness in the lower extremities  may occur up to 6-8 hours due to effect of local anesthetic    Mild to moderate increase in pain for one to several days following the injection is not uncommon    You may use ice packs 10-15 minutes three to four times a day at the injection site for comfort    You may use anti-inflammatory medications (such as Ibuprofen or Aleve or Advil) or Tylenol for pain control if necessary    Steroid injections may take several days to start working and you may see more effect from the procedure after 10-14 days      If you experience any of the following,  Call [facility] during work hours at [phone], option #2 or on call physician after hours at [phone], option #2:  -Fever over 100 degree F  -Swelling, bleeding, redness, drainage, warmth at the injection site  -Progressive weakness or numbness of your legs   -Loss of bowel or bladder function  -Unusual new onset of pain that is not improving          Follow-ups after  your visit        Who to contact     If you have questions or need follow up information about today's clinic visit or your schedule please contact [facility] directly at [phone].  Normal or non-critical lab and imaging results will be communicated to you by MyChart, letter or phone within 4 business days after the clinic has received the results. If you do not hear from us within 7 days, please contact the clinic through MyChart or phone. If you have a critical or abnormal lab result, we will notify you by phone as soon as possible.  Submit refill requests through Maven or call your pharmacy and they will forward the refill request to us. Please allow 3 business days for your refill to be completed.          Additional Information About Your Visit        MyChart Information     Maven gives you secure access to your electronic health record. If you see a primary care provider, you can also send messages to your care team and make appointments. If you have questions, please call your primary care clinic.  If you do not have a primary care provider, please call [phone] and they will assist you.        Care EveryWhere ID     This is your Care EveryWhere ID. This could be used by other organizations to access your [place] medical records  [id number]         Blood Pressure from Last 3 Encounters:   03/02/17 144/78   02/13/17 138/78   02/13/17 138/78    Weight from Last 3 Encounters:   02/13/17 70.3 kg (155 lb)   02/13/17 70.3 kg (155 lb)   01/02/17 64.9 kg (143 lb)              Today, you had the following     No orders found for display       Primary Care Provider Office Phone # Fax #    Jerri Tavera [phone]       [facility] [address]  [address]  [facility] [address]        Thank you!     Thank you for choosing [facility]  for your care. Our goal is always to provide you with excellent care. Hearing back from our  patients is one way we can continue to improve our services. Please take a few minutes to complete the written survey that you may receive in the mail after your visit with us. Thank you!             Your Updated Medication List - Protect others around you: Learn how to safely use, store and throw away your medicines at www.disposemymeds.org.          This list is accurate as of: 3/2/17  9:01 AM.  Always use your most recent med list.                   Brand Name Dispense Instructions for use    aspirin 81 MG EC tablet     60 tablet    Take 1 tablet (81 mg) by mouth daily       atorvastatin 40 MG tablet    LIPITOR    90 tablet    Take 1 tablet (40 mg) by mouth daily       FLUoxetine 40 MG capsule    PROzac    90 capsule    Take 1 capsule (40 mg) by mouth daily       levothyroxine 50 MCG tablet    SYNTHROID/LEVOTHROID    90 tablet    Take 1 tablet (50 mcg) by mouth daily       losartan 25 MG tablet    COZAAR    90 tablet    Take 1 tablet (25 mg) by mouth daily       nitroglycerin 0.4 MG sublingual tablet    NITROSTAT    25 tablet    Place 1 tablet (0.4 mg) under the tongue every 5 minutes as needed for chest pain       omeprazole 20 MG CR capsule    priLOSEC    90 capsule    TAKE ONE CAPSULE BY MOUTH ONCE DAILY       traZODone 100 MG tablet    DESYREL    135 tablet    Take 1.5 tablets (150 mg) by mouth At Bedtime

## 2017-03-02 NOTE — PATIENT INSTRUCTIONS
[facility]   Discharge Instructions  Provider: Dr. Rodney Galvez  Phone: [phone], option #2    Your Procedure: Lumbar Facet Joint Injection  Meds used:  Lidocaine (anesthetic)   DepoMedrol (steroid)   Omnipaque (contrast dye)         Avoid strenuous activity for the first 24 hours. You may resume your regular activities after that.     You may shower, however no swimming, tub baths, or hot tubs for 24 hours following your procedure    Be cautious with walking as numbness and/or weakness in the lower extremities  may occur up to 6-8 hours due to effect of local anesthetic    Mild to moderate increase in pain for one to several days following the injection is not uncommon    You may use ice packs 10-15 minutes three to four times a day at the injection site for comfort    You may use anti-inflammatory medications (such as Ibuprofen or Aleve or Advil) or Tylenol for pain control if necessary    Steroid injections may take several days to start working and you may see more effect from the procedure after 10-14 days      If you experience any of the following,  Call [facility] during work hours at [phone], option #2 or on call physician after hours at [phone], option #2:  -Fever over 100 degree F  -Swelling, bleeding, redness, drainage, warmth at the injection site  -Progressive weakness or numbness of your legs   -Loss of bowel or bladder function  -Unusual new onset of pain that is not improving

## 2017-03-02 NOTE — PROGRESS NOTES
Lumbar Facet Joint Corticosteroid Injections     Patient is a 66 year old male with a history of left low back pain with radiation secondary to lumbar facet arthropathy presents for repeat left L4-5 and L5-S1 lumbar facet joint corticosteroid injections. Patient has been suffering from pain of the left low back without radiation affecting most regular activities of daily living and interfering with active rehabilitation modalities.      PREOPERATIVE DIAGNOSIS: Lumbar facet arthropathy     POSTOPERATIVE DIAGNOSIS: Same     OPERATION PERFORMED: Left L4-5 and L5-S1 lumbar facet joint corticosteroid injections with fluoroscopic guidance and localization     ATTENDING INTERVENTIONALIST: Rodney Galvez DO, CAQSM     A pause for the cause was completed, verifying correct patient, procedure, site, positioning, and implants or special equipment. Written informed consent was obtained.      After the risks and benefits of the procedure were explained to the patient, a standard lumbar facet joint injection procedure supply package was utilized. Patient was brought to the procedure room and was sterilely prepped and draped in the usual fashion in the prone position. No preprocedure medication was utilized.      PROCEDURE:  1. 4.5 cc's of 1% Lidocaine and 0.5 cc's of 8.4% Sodium Bicarbonate was injected into the skin creating a wheal and into the underlying tissues in the direction of the facet joint at each level.  2. Under image intensifier control, a 22-gauge 3.5-inch spinal needle was successfully directed into the left L4-5 and L5-S1 lumbar facet joints employing a posterior oblique approach.  3. Instillation of 0.6 cc's of Omnipaque contrast medium demonstrated intra-articular flow pattern and no vascular uptake with 9.4 cc's of Omnipaque contrast medium discarded. Patient was monitored for any untoward reaction to contrast medium before proceeding onward.  4. Following needle position verification, a solution of 0.5 cc of 0.5%  Marcaine and 0.5 cc of Depo Medrol (80 milligrams per milliliter) was instilled at each level. Needles were then removed at each level.      MONITORING:  Vital signs and cardiac monitoring was performed at all times (see nurse's notes).  Patient was observed for 15 minutes following the procedure and was then discharged fully alert and oriented in good and stable condition.     TOTAL FLUOROSCOPY TIME: 16 seconds     PAIN RESPONSE: At the time of discharge, the patient noted 75% improvement of pain from 4/10 pre-procedure to 1/10 post-procedure.     PLAN:   1. Instructed to avoid baths and swimming activities for a 24 hour period of time.  2. Acetaminophen/ice as needed for improved pain control post-procedure.  3. Continue with pain-free home exercise program as previously prescribed from formal physical therapy.  4. Follow-up in 2 weeks if no improvement of symptoms for further evaluation/medical care.  Consider spine surgery referral, lumbar medial branch blocks (left L4-5 and L5-S1 lumbar facet nerve blocks) with progression to lumbar radiofrequency ablation, etc as deemed appropriate moving forward.  Instructed to follow-up if change of symptoms arise.     Rodney Galvez DO, [facility]  [facility]     Disclaimer: This note consists of symbols derived from keyboarding, dictation and/or voice recognition software. As a result, there may be errors in the script that have gone undetected. Please consider this when interpreting information found in this chart.

## 2017-03-02 NOTE — NURSING NOTE
Discharge Information    IV Discontiued Time:  NA    Amount of Fluid Infused:  NA    Discharge Criteria = When patient returns to baseline or as per MD order    Consciousness:  Pt is fully awake    Circulation:  BP +/- 20% of pre-procedure level    Respiration:  Patient is able to breathe deeply    O2 Sat:  Patient is able to maintain O2 Sat >92% on room air    Activity:  Moves 4 extremities on command    Ambulation:  Patient is able to stand and walk or stand     Dressing:  Clean/dry or No Dressing    Notes:   Discharge instructions and AVS given to patient    Patient meets criteria for discharge?  YES    Admitted to PCU?  No    Responsible adult present to accompany patient home?  Yes    Signature/Title:    Ermelinda Escobedo RN Care Coordinator  [facility]

## 2017-03-02 NOTE — NURSING NOTE
Injection intake:    If this procedure is requiring IV sedation has patient been NPO for 6  Hours? NA    Is patient on coumadin, plavix or other prescribed blood thinner?   No    If patient is on coumadin was it held for 5 days?   NA    If patient is on plavix was it held for 7 days?    NA     Does patient take aspirin?  Yes -   ASA    If this is for a cervical procedure and patient is on aspirin has it been held for 6 days?   NA    Any allergies to contrast dye, iodine, steroid and/or numbing medications?  NO    Is patient currently taking antibiotics or have an active infection?  NO    Does patient have a ? Yes       Is patient pregnant or breastfeeding?  Not Applicable    Are the vital signs normal?  Yes

## 2017-03-02 NOTE — Clinical Note
Dear Jose Lopez [place] [facility] [place].  Please refer to the visit note at your convenience and feel free to contact me should you have any questions.  Sincerely,  Rodney Galvez DO, [facility]

## 2017-03-23 ENCOUNTER — HOSPITAL ENCOUNTER (EMERGENCY)
Facility: CLINIC | Age: 67
Discharge: HOME | End: 2017-03-23
Attending: EMERGENCY MEDICINE | Admitting: EMERGENCY MEDICINE
Payer: COMMERCIAL

## 2017-03-23 VITALS
DIASTOLIC BLOOD PRESSURE: 96 MMHG | HEIGHT: 66 IN | BODY MASS INDEX: 24.11 KG/M2 | TEMPERATURE: 98 F | HEART RATE: 71 BPM | OXYGEN SATURATION: 98 % | SYSTOLIC BLOOD PRESSURE: 142 MMHG | WEIGHT: 150 LBS | RESPIRATION RATE: 18 BRPM

## 2017-03-23 DIAGNOSIS — S05.02XA CORNEAL ABRASION, LEFT, INITIAL ENCOUNTER: ICD-10-CM

## 2017-03-23 PROCEDURE — 99283 EMERGENCY DEPT VISIT LOW MDM: CPT

## 2017-03-23 RX ORDER — CYCLOPENTOLATE HYDROCHLORIDE 10 MG/ML
1 SOLUTION/ DROPS OPHTHALMIC ONCE
Qty: 1 BOTTLE | Refills: 0 | Status: SHIPPED | OUTPATIENT
Start: 2017-03-23 | End: 2017-03-23

## 2017-03-23 RX ORDER — OFLOXACIN 3 MG/ML
1 SOLUTION/ DROPS OPHTHALMIC EVERY 4 HOURS
Qty: 1 BOTTLE | Refills: 0 | Status: SHIPPED | OUTPATIENT
Start: 2017-03-23 | End: 2018-01-05

## 2017-03-23 RX ORDER — KETOROLAC TROMETHAMINE 5 MG/ML
1 SOLUTION OPHTHALMIC 4 TIMES DAILY
Qty: 5 ML | Refills: 0 | Status: SHIPPED | OUTPATIENT
Start: 2017-03-23 | End: 2018-01-05

## 2017-03-23 RX ORDER — PROPARACAINE HYDROCHLORIDE 5 MG/ML
SOLUTION/ DROPS OPHTHALMIC
Status: DISCONTINUED
Start: 2017-03-23 | End: 2017-03-23 | Stop reason: HOSPADM

## 2017-03-23 ASSESSMENT — ENCOUNTER SYMPTOMS
EYE PAIN: 1
EYE REDNESS: 1

## 2017-03-23 NOTE — ED AVS SNAPSHOT
[facility] Department    [address] [place] [address]    Phone:  [phone]    Fax:  [phone]                                       Jose Moreno   MRN: [id number]    Department:  [facility]   Date of Visit:  3/23/2017           Patient Information     Date Of Birth          1950        Your diagnoses for this visit were:     Corneal abrasion, left, initial encounter        You were seen by Abram Rosa MD.      Follow-up Information     Follow up with Opthamology, [facility] In 1 week.    Why:  As needed        Discharge Instructions         Corneal Abrasion    You have received a scratch or scrape (abrasion) to your cornea. The cornea is the clear part in the front of the eye. This sensitive area is very painful when injured. You may make tears frequently, and your vision may be blurry until the injury heals. You may be sensitive to light.  This part of the body heals quickly. You can expect the pain to go away within 24 to 48 hours. If the abrasion is large or deep, your doctor may apply an eye patch, although this is not always done. An antibiotic ointment or eye drops may also be used to prevent infection.  Numbing drops may be used to relieve the pain temporarily so that your eyes can be examined. However, these drops cannot be prescribed for home use because that would prevent healing and lead to more serious problems. Also, if you can t feel your eye, there is a chance of accidentally injuring it further without knowing it.  Home care     A cold pack (ice in a plastic bag, wrapped in a towel) may be applied over the eye (or eye patch) for 20 minutes at a time, to reduce pain.    You may use acetaminophen or ibuprofen to control pain, unless another pain medicine was prescribed. Note: If you have chronic liver or kidney disease or ever had a stomach ulcer or GI bleeding, talk with your doctor before using  these medicines.    Rest your eyes and do not read until symptoms are gone.    If you use contact lenses, do not wear them until all symptoms are gone.    If your vision is affected by the corneal abrasion or if an eye patch was applied, do not drive a motor vehicle or operate machinery until all symptoms are gone. You may have trouble judging distances using only one eye.    If your eyes are sensitive to light, try wearing sunglasses, or stay indoors until symptoms go away.  Follow-up care  Follow up with your health care provider, or as advised.    If no patch was put on your eye, and used but the pain continues for more than 48 hours, you should have another exam. Return to this facility or contact your health care provider to arrange this.    If your eye was patched and you were asked to remove the patch yourself, see your health care provider. You may also return to this facility if you still have pain after the patch is removed.    If you were given a return appointment for patch removal and re-examination, be sure to keep the appointment. Leaving the patch in place longer than advised could be harmful.  When to seek medical advice  Call your health care provider right away if any of these occur.    Increasing eye pain or pain that does not improve after 24 hours    Discharge from the eye    Increasing redness of the eye or swelling of the eyelids    Worsening vision     Symptoms that worsen after the abrasion has healed     3963-6043 [employer]. [address] [place] [address]. All rights reserved. This information is not intended as a substitute for professional medical care. Always follow your healthcare professional's instructions.          24 Hour Appointment Hotline       To make an appointment at any [facility], call [phone]). If you don't have a family doctor or clinic, we will help you find one. [facility] are conveniently located to serve the  needs of you and your family.             Review of your medicines      START taking        Dose / Directions Last dose taken    cyclopentolate 1 % ophthalmic solution   Commonly known as:  CYCLOGYL   Dose:  1 drop   Quantity:  1 Bottle        Place 1 drop into both eyes once for 1 dose   Refills:  0        ketorolac 0.5 % ophthalmic solution   Commonly known as:  ACULAR   Dose:  1 drop   Quantity:  5 mL        Place 1 drop Into the left eye 4 times daily To affected eye   Refills:  0        ofloxacin 0.3 % ophthalmic solution   Commonly known as:  OCUFLOX   Dose:  1 drop   Quantity:  1 Bottle        Apply 1 drop to eye every 4 hours   Refills:  0          Our records show that you are taking the medicines listed below. If these are incorrect, please call your family doctor or clinic.        Dose / Directions Last dose taken    aspirin 81 MG EC tablet   Dose:  81 mg   Quantity:  60 tablet        Take 1 tablet (81 mg) by mouth daily   Refills:  0        atorvastatin 40 MG tablet   Commonly known as:  LIPITOR   Dose:  40 mg   Quantity:  90 tablet        Take 1 tablet (40 mg) by mouth daily   Refills:  1        FLUoxetine 40 MG capsule   Commonly known as:  PROzac   Dose:  40 mg   Quantity:  90 capsule        Take 1 capsule (40 mg) by mouth daily   Refills:  3        levothyroxine 50 MCG tablet   Commonly known as:  SYNTHROID/LEVOTHROID   Dose:  50 mcg   Quantity:  90 tablet        Take 1 tablet (50 mcg) by mouth daily   Refills:  3        losartan 25 MG tablet   Commonly known as:  COZAAR   Dose:  25 mg   Quantity:  90 tablet        Take 1 tablet (25 mg) by mouth daily   Refills:  1        nitroglycerin 0.4 MG sublingual tablet   Commonly known as:  NITROSTAT   Dose:  0.4 mg   Quantity:  25 tablet        Place 1 tablet (0.4 mg) under the tongue every 5 minutes as needed for chest pain   Refills:  3        omeprazole 20 MG CR capsule   Commonly known as:  priLOSEC   Quantity:  90 capsule        TAKE ONE CAPSULE BY  MOUTH ONCE DAILY   Refills:  3        traZODone 100 MG tablet   Commonly known as:  DESYREL   Dose:  150 mg   Quantity:  135 tablet        Take 1.5 tablets (150 mg) by mouth At Bedtime   Refills:  3                Prescriptions were sent or printed at these locations (3 Prescriptions)                   Other Prescriptions                Printed at Department/Unit printer (3 of 3)         ketorolac (ACULAR) 0.5 % ophthalmic solution               ofloxacin (OCUFLOX) 0.3 % ophthalmic solution               cyclopentolate (CYCLOGYL) 1 % ophthalmic solution                Orders Needing Specimen Collection     None      Pending Results     No orders found from 3/21/2017 to 3/24/2017.            Pending Culture Results     No orders found from 3/21/2017 to 3/24/2017.             Test Results from your hospital stay            Clinical Quality Measure: Blood Pressure Screening     Your blood pressure was checked while you were in the emergency department today. The last reading we obtained was  BP: (!) 142/96 . Please read the guidelines below about what these numbers mean and what you should do about them.  If your systolic blood pressure (the top number) is less than 120 and your diastolic blood pressure (the bottom number) is less than 80, then your blood pressure is normal. There is nothing more that you need to do about it.  If your systolic blood pressure (the top number) is 120-139 or your diastolic blood pressure (the bottom number) is 80-89, your blood pressure may be higher than it should be. You should have your blood pressure rechecked within a year by a primary care provider.  If your systolic blood pressure (the top number) is 140 or greater or your diastolic blood pressure (the bottom number) is 90 or greater, you may have high blood pressure. High blood pressure is treatable, but if left untreated over time it can put you at risk for heart attack, stroke, or kidney failure. You should have your blood  pressure rechecked by a primary care provider within the next 4 weeks.  If your provider in the emergency department today gave you specific instructions to follow-up with your doctor or provider even sooner than that, you should follow that instruction and not wait for up to 4 weeks for your follow-up visit.        Thank you for choosing [facility]       Thank you for choosing [facility] for your care. Our goal is always to provide you with excellent care. Hearing back from our patients is one way we can continue to improve our services. Please take a few minutes to complete the written survey that you may receive in the mail after you visit with us. Thank you!        [facility] Information     [facility] gives you secure access to your electronic health record. If you see a primary care provider, you can also send messages to your care team and make appointments. If you have questions, please call your primary care clinic.  If you do not have a primary care provider, please call [phone] and they will assist you.        Care EveryWhere ID     This is your Care EveryWhere ID. This could be used by other organizations to access your [facility] medical records  [id number]        After Visit Summary       This is your record. Keep this with you and show to your community pharmacist(s) and doctor(s) at your next visit.

## 2017-03-23 NOTE — ED AVS SNAPSHOT
[facility] Emergency Department    [address]    [facility] [address]    Phone:  [phone]    Fax:  [phone]                                       Jose Moreno   MRN: [id number]    Department:  [facility] Emergency Department   Date of Visit:  3/23/2017           After Visit Summary Signature Page     I have received my discharge instructions, and my questions have been answered. I have discussed any challenges I see with this plan with the nurse or doctor.    ..........................................................................................................................................  Patient/Patient Representative Signature      ..........................................................................................................................................  Patient Representative Print Name and Relationship to Patient    ..................................................               ................................................  Date                                            Time    ..........................................................................................................................................  Reviewed by Signature/Title    ...................................................              ..............................................  Date                                                            Time

## 2017-03-24 NOTE — ED PROVIDER NOTES
"  History     Chief Complaint:  Foreign Body in Eye      HPI   Jose Moreno is a 66 year old male who presents to the emergency department today with foreign body in eye. Tonight the patient was at home installing a fiberglass panel ceiling and some of the fiberglass fell from the panel and he had acute onset of left eye pain and redness. He believes that a piece of the fiberglass became lodged in his eye. Denies visual disturbance. Tetanus is up to date.     Allergies:  Ace inhibitors       Medications:    Prozac  Desyrel   Prilosec   Lipitor   Cozaar  Aspirin   Synthroid   Nitrostat     Past Medical History:    ACS  ADHD  CAD  Esophageal reflux   Hyperthyroidism   Hyperlipidemia   Hypertension   Mitral regurgitation  NSTEMI   Pulmonary nodules     Past Surgical History:    Decompression lumbar   Colonoscopy   Heart cath   Rotator cuff repair   Vasectomy   Mitral valve repair     Family History:    Cancer - Mother   Depression - Father     Social History:  Smoking Status: Never smoker   Smokeless Tobacco: Never used  Alcohol Use: Yes - beer daily     Marital Status:      Review of Systems   Eyes: Positive for pain and redness. Negative for visual disturbance.   All other systems reviewed and are negative.    Physical Exam   First Vitals:  BP: (!) 142/96  Pulse: 71  Temp: 98  F (36.7  C)  Resp: 17  Height: 167.6 cm (5' 6\")  Weight: 68 kg (150 lb)  SpO2: 98 %    Physical Exam   Constitutional: He appears well-developed.   Eyes: Lids are normal. Left conjunctiva is injected.       Cardiovascular: Normal rate.    Pulmonary/Chest: Effort normal.   Nursing note and vitals reviewed.        Emergency Department Course     Interventions:  Proparacaine eye drop     Emergency Department Course:  Nursing notes and vitals reviewed.  I performed an exam of the patient as documented above.   I discussed the treatment plan with the patient. They expressed understanding of this plan and consented to discharge. They will be " discharged home with instructions for care and follow up. In addition, the patient will return to the emergency department if their symptoms persist, worsen, if new symptoms arise or if there is any concern.  All questions were answered.     Impression & Plan      Medical Decision Making:  Jose Moreno is a 66 year old male who presents to the emergency department today with something that fell into his eye. He has a punctate corneal abrasion on the left mid eye. Slit lamp exam shows no signs of retained foreign body. The patient was discharged with prophylactic antibiotics, NSAID, and follow up as needed.     Diagnosis:  1. (S05.02XA) Corneal abrasion, left, initial encounter    Discharge Medications:  Discharge Medication List as of 3/23/2017  9:16 PM      START taking these medications    Details   ketorolac (ACULAR) 0.5 % ophthalmic solution Place 1 drop Into the left eye 4 times daily To affected eye, Disp-5 mL, R-0, Local Print      ofloxacin (OCUFLOX) 0.3 % ophthalmic solution Apply 1 drop to eye every 4 hours, Disp-1 Bottle, R-0, Local Print      cyclopentolate (CYCLOGYL) 1 % ophthalmic solution Place 1 drop into both eyes once for 1 dose, Disp-1 Bottle, R-0, Local PrintFor pain , use uv protection             Scribe Disclosure:  I, Mann Hayward, am serving as a scribe at 8:35 PM on 3/23/2017 to document services personally performed by Abram Rosa MD, based on my observations and the provider's statements to me.   3/23/2017   [facility] EMERGENCY DEPARTMENT       Abram Rosa MD  03/25/17 1538

## 2017-03-24 NOTE — DISCHARGE INSTRUCTIONS
Corneal Abrasion    You have received a scratch or scrape (abrasion) to your cornea. The cornea is the clear part in the front of the eye. This sensitive area is very painful when injured. You may make tears frequently, and your vision may be blurry until the injury heals. You may be sensitive to light.  This part of the body heals quickly. You can expect the pain to go away within 24 to 48 hours. If the abrasion is large or deep, your doctor may apply an eye patch, although this is not always done. An antibiotic ointment or eye drops may also be used to prevent infection.  Numbing drops may be used to relieve the pain temporarily so that your eyes can be examined. However, these drops cannot be prescribed for home use because that would prevent healing and lead to more serious problems. Also, if you can t feel your eye, there is a chance of accidentally injuring it further without knowing it.  Home care     A cold pack (ice in a plastic bag, wrapped in a towel) may be applied over the eye (or eye patch) for 20 minutes at a time, to reduce pain.    You may use acetaminophen or ibuprofen to control pain, unless another pain medicine was prescribed. Note: If you have chronic liver or kidney disease or ever had a stomach ulcer or GI bleeding, talk with your doctor before using these medicines.    Rest your eyes and do not read until symptoms are gone.    If you use contact lenses, do not wear them until all symptoms are gone.    If your vision is affected by the corneal abrasion or if an eye patch was applied, do not drive a motor vehicle or operate machinery until all symptoms are gone. You may have trouble judging distances using only one eye.    If your eyes are sensitive to light, try wearing sunglasses, or stay indoors until symptoms go away.  Follow-up care  Follow up with your health care provider, or as advised.    If no patch was put on your eye, and used but the pain continues for more than 48 hours, you  should have another exam. Return to this facility or contact your health care provider to arrange this.    If your eye was patched and you were asked to remove the patch yourself, see your health care provider. You may also return to this facility if you still have pain after the patch is removed.    If you were given a return appointment for patch removal and re-examination, be sure to keep the appointment. Leaving the patch in place longer than advised could be harmful.  When to seek medical advice  Call your health care provider right away if any of these occur.    Increasing eye pain or pain that does not improve after 24 hours    Discharge from the eye    Increasing redness of the eye or swelling of the eyelids    Worsening vision     Symptoms that worsen after the abrasion has healed     [id number] [employer]. [address] [place] [address], [place] [address]. All rights reserved. This information is not intended as a substitute for professional medical care. Always follow your healthcare professional's instructions.

## 2017-03-24 NOTE — ED NOTES
At work today felt like he got fiberglass into left eye  Irritated  No blurred or double vision  Here for franca

## 2017-03-24 NOTE — ED NOTES
A/O. VSS. PIV removed. Pt verbalized understanding of d/c instructions and ambulated to lobby steady gait.   Script rx x3 given to pt at time of d/c

## 2017-04-26 ENCOUNTER — TELEPHONE (OUTPATIENT)
Dept: ORTHOPEDICS | Facility: CLINIC | Age: 67
End: 2017-04-26

## 2017-04-26 NOTE — TELEPHONE ENCOUNTER
Please call patient to obtain additional information as see that currently is scheduled for lumbar medial branch blocks on 5/4/2017. Recently completed lumbar facet joint corticosteroid injections in March 2017. Please obtain information regarding improvement of symptoms noted with completion of previous injections along with current symptoms to determine if appropriate to proceed with completion of lumbar medial branch blocks as previously discussed.    Rodney Galvez DO, CAM  [facility]

## 2017-05-02 NOTE — TELEPHONE ENCOUNTER
Spoke with patient, he feels he has had quite a bit of improvement with the last injection.  Pt states back pain is on the left side low back, denies having any radiating symptoms.  Pt states pain is not always present, often comes on later in the afternoon, evening.  Pt voiced concern that as he is scheduled for the morning he won't have symptoms at the time of procedure, he is wondering if it is still needed or if it should perhaps be delayed at this time as he is not having symptoms all the time.

## 2017-05-02 NOTE — TELEPHONE ENCOUNTER
Please call this patient today (5/2) to obtain information as requested below.    Rodney Galvez DO, LISA  [facility]

## 2017-05-02 NOTE — TELEPHONE ENCOUNTER
Please attempt to call the patient again to obtain the information requested below.    Rodney Galvez DO, RODDYM  [facility]

## 2017-05-03 NOTE — TELEPHONE ENCOUNTER
Spoke with patient, he is still feeling some relief from the cortisone injection that he had in March and is interested in canceling his branch blocks scheduled for tomorrow and will call back when symptoms worsen or if he desires having them completed in the afternoon.    Christopher Torres, ATC

## 2017-05-03 NOTE — TELEPHONE ENCOUNTER
Please inform patient that based on information below, would be best to schedule the lumbar medial branch blocks in the afternoon with another provider instead of with myself on 5/4.  Please cancel the patient's appointment on 5/4 for completion of lumbar medial branch blocks and have our pain management service reach out to the patient to reschedule this in the afternoon if the patient is interested in proceeding with this procedure presently, as because since he has noticed quite a bit of improvement with completion of previous corticosteroid injection he does not need to proceed necessarily with this at this time unless he would like to.    Rodney Galvez DO, CAQSM  [facility] [place]

## 2017-06-05 ENCOUNTER — RADIANT APPOINTMENT (OUTPATIENT)
Dept: GENERAL RADIOLOGY | Facility: CLINIC | Age: 67
End: 2017-06-05
Attending: PAIN MEDICINE

## 2017-06-05 ENCOUNTER — RADIOLOGY INJECTION OFFICE VISIT (OUTPATIENT)
Dept: PALLIATIVE MEDICINE | Facility: CLINIC | Age: 67
End: 2017-06-05
Payer: COMMERCIAL

## 2017-06-05 VITALS — DIASTOLIC BLOOD PRESSURE: 97 MMHG | SYSTOLIC BLOOD PRESSURE: 147 MMHG | OXYGEN SATURATION: 96 % | HEART RATE: 64 BPM

## 2017-06-05 DIAGNOSIS — M47.817 LUMBOSACRAL SPONDYLOSIS WITHOUT MYELOPATHY: ICD-10-CM

## 2017-06-05 DIAGNOSIS — M47.819 FACET ARTHROPATHY: Primary | ICD-10-CM

## 2017-06-05 DIAGNOSIS — M47.816 LUMBAR FACET ARTHROPATHY: ICD-10-CM

## 2017-06-05 PROCEDURE — 64493 INJ PARAVERT F JNT L/S 1 LEV: CPT | Mod: LT | Performed by: PAIN MEDICINE

## 2017-06-05 PROCEDURE — 64494 INJ PARAVERT F JNT L/S 2 LEV: CPT | Mod: LT | Performed by: PAIN MEDICINE

## 2017-06-05 NOTE — PATIENT INSTRUCTIONS
[facility] Procedure Discharge Instructions    Today you saw: Dr. Yudi Chappell    Your procedure:  Epidural steroid injection     Medications used:  Lidocaine (anesthetic) Kenalog (steroid)  Omnipaque (contrast)       Be cautious when walking as numbness and/or weakness in the legs may occur up to 6-8 hours after the procedure due to effect of the local anesthetic    Do not drive for 6 hours. The effect of the local anesthetic could slow your reflexes.     Avoid strenuous activity for the first 24 hours. You may resume your regular activities after that.     You may shower, however avoid swimming, tub baths or hot tubs for 24 hours following your procedure    If you were fasting, you can resume your regular diet and medications    You may have a mild to moderate increase in pain for several days following the injection.      You may use ice packs for 10-15 minutes, 3 to 4 times a day at the injection site for comfort    Do not use heat to painful areas for 6 to 8 hours. This will give the local anesthetic time to wear off and prevent you from accidentally burning your skin.    You may use anti-inflammatory medications (such as Ibuprofen/Advil or Aleve) or Tylenol for pain control if necessary    If you have diabetes, check your blood sugar more frequently than usual as your blood sugar may be higher than normal for 10-14 days following a steroid injection. Contact your doctor who manages your diabetes if your blood sugar is higher than usual    It may take up to 14 days for the steroid medication to start working although you may feel the effect as early as a few days after the procedure.       If you experience any of the following, call the pain center nursing line during work hours at [phone] or on-call physician after hours at [phone]:  -Fever over 100 degree F  -Swelling, bleeding, redness, drainage, warmth at the injection site  -Progressive weakness or numbness in your legs or  arms  -Loss of bowel or bladder function  -Unusual headache that is not relieved by Tylenol  -Unusual new onset of pain that is not improving    Phone #s:  Appointment scheduling line: [phone]

## 2017-06-05 NOTE — MR AVS SNAPSHOT
After Visit Summary   6/5/2017    Jose Moreno    MRN: [id number]           Patient Information     Date Of Birth          1950        Visit Information        Provider Department      6/5/2017 3:45 PM Yudi Chappell MD [facility]        Care Instructions    [facility] Procedure Discharge Instructions    Today you saw: Dr. Yudi Chappell    Your procedure:  Epidural steroid injection     Medications used:  Lidocaine (anesthetic) Kenalog (steroid)  Omnipaque (contrast)       Be cautious when walking as numbness and/or weakness in the legs may occur up to 6-8 hours after the procedure due to effect of the local anesthetic    Do not drive for 6 hours. The effect of the local anesthetic could slow your reflexes.     Avoid strenuous activity for the first 24 hours. You may resume your regular activities after that.     You may shower, however avoid swimming, tub baths or hot tubs for 24 hours following your procedure    If you were fasting, you can resume your regular diet and medications    You may have a mild to moderate increase in pain for several days following the injection.      You may use ice packs for 10-15 minutes, 3 to 4 times a day at the injection site for comfort    Do not use heat to painful areas for 6 to 8 hours. This will give the local anesthetic time to wear off and prevent you from accidentally burning your skin.    You may use anti-inflammatory medications (such as Ibuprofen/Advil or Aleve) or Tylenol for pain control if necessary    If you have diabetes, check your blood sugar more frequently than usual as your blood sugar may be higher than normal for 10-14 days following a steroid injection. Contact your doctor who manages your diabetes if your blood sugar is higher than usual    It may take up to 14 days for the steroid medication to start working although you may feel the effect as early as a few days after the procedure.       If you experience  any of the following, call the pain center nursing line during work hours at [phone] or on-call physician after hours at [phone]:  -Fever over 100 degree F  -Swelling, bleeding, redness, drainage, warmth at the injection site  -Progressive weakness or numbness in your legs or arms  -Loss of bowel or bladder function  -Unusual headache that is not relieved by Tylenol  -Unusual new onset of pain that is not improving    Phone #s:  Appointment scheduling line: [phone]            Follow-ups after your visit        Who to contact     If you have questions or need follow up information about today's clinic visit or your schedule please contact [facility] directly at [phone].  Normal or non-critical lab and imaging results will be communicated to you by [employer], letter or phone within 4 business days after the clinic has received the results. If you do not hear from us within 7 days, please contact the clinic through [employer] or phone. If you have a critical or abnormal lab result, we will notify you by phone as soon as possible.  Submit refill requests through [employer] or call your pharmacy and they will forward the refill request to us. Please allow 3 business days for your refill to be completed.          Additional Information About Your Visit        [employer] Information     [employer] gives you secure access to your electronic health record. If you see a primary care provider, you can also send messages to your care team and make appointments. If you have questions, please call your primary care clinic.  If you do not have a primary care provider, please call [phone] and they will assist you.        Care EveryWhere ID     This is your Care EveryWhere ID. This could be used by other organizations to access your [facility] medical records  [id number]         Blood Pressure from Last 3 Encounters:   03/23/17 (!) 142/96   03/02/17 152/84   02/13/17 138/78    Weight from Last 3  Encounters:   03/23/17 68 kg (150 lb)   02/13/17 70.3 kg (155 lb)   02/13/17 70.3 kg (155 lb)              Today, you had the following     No orders found for display       Primary Care Provider Office Phone # Fax #    Jerri Tavera [phone]       [facility] [address]  [facility] [address]        Thank you!     Thank you for choosing [facility]  for your care. Our goal is always to provide you with excellent care. Hearing back from our patients is one way we can continue to improve our services. Please take a few minutes to complete the written survey that you may receive in the mail after your visit with us. Thank you!             Your Updated Medication List - Protect others around you: Learn how to safely use, store and throw away your medicines at www.disposemymeds.org.          This list is accurate as of: 6/5/17  4:18 PM.  Always use your most recent med list.                   Brand Name Dispense Instructions for use    aspirin 81 MG EC tablet     60 tablet    Take 1 tablet (81 mg) by mouth daily       atorvastatin 40 MG tablet    LIPITOR    90 tablet    Take 1 tablet (40 mg) by mouth daily       FLUoxetine 40 MG capsule    PROzac    90 capsule    Take 1 capsule (40 mg) by mouth daily       ketorolac 0.5 % ophthalmic solution    ACULAR    5 mL    Place 1 drop Into the left eye 4 times daily To affected eye       levothyroxine 50 MCG tablet    SYNTHROID/LEVOTHROID    90 tablet    Take 1 tablet (50 mcg) by mouth daily       losartan 25 MG tablet    COZAAR    90 tablet    Take 1 tablet (25 mg) by mouth daily       nitroglycerin 0.4 MG sublingual tablet    NITROSTAT    25 tablet    Place 1 tablet (0.4 mg) under the tongue every 5 minutes as needed for chest pain       ofloxacin 0.3 % ophthalmic solution    OCUFLOX    1 Bottle    Apply 1 drop to eye every 4 hours       omeprazole 20 MG CR capsule    priLOSEC    90 capsule    TAKE ONE CAPSULE BY  MOUTH ONCE DAILY       traZODone 100 MG tablet    DESYREL    135 tablet    Take 1.5 tablets (150 mg) by mouth At Bedtime

## 2017-06-05 NOTE — NURSING NOTE
Injection intake:    If this procedure is requiring IV sedation has patient been NPO for 6  Hours? NA    Is patient on coumadin, plavix or other prescribed blood thinner?   No    If patient is on coumadin was it held for 5 days?   NA    If patient is on plavix was it held for 7 days?    NA     Does patient take aspirin?  Yes -   ASA    If this is for a cervical procedure and patient is on aspirin has it been held for 6 days?   NA    Any allergies to contrast dye, iodine, steroid and/or numbing medications?  NO    Is patient currently taking antibiotics or have an active infection?  NO    Does patient have a ? Yes       Is patient pregnant or breastfeeding?  Not Applicable    Are the vital signs normal?  No: 146/98

## 2017-06-18 NOTE — PROGRESS NOTES
[facility] - Procedure Note    Date of Visit: 2017    Indications: Jose Moreno is a 67-year-old male who is seen at the referral of Dr. Rodney Galvez for lumbar facet injection. The patient describes pain of the low back, just left of center. He denies significant radicular pain, numbness, or paresthesias. He denies focal weakness. He denies saddle anesthesia or bowel/bladder incontinence. He reports modest relief with previous left L4-5, L5-S1 intra-articular facet injection, last performed on 17. He was sent by his provider today for lumbar medial branch block, although the patient states that he prefers to repeat intra-articular injection, given the fact that he will be going on vacation in upcoming weeks. This was discussed with Dr. Galvez, who agrees with plan.    Electronic Chart Review: Patient history, pertinent diagnostic studies, vitals, allergies, and medications were reviewed.    Review of Systems: He reports use of Aspirin 81 mg daily. He reports recent sore throat, although he denies fever, chills, or use of antibiotics. No allergy to local anesthetic or steroid.    Physical Examination:  BP (!) 147/97  Pulse 64  SpO2 96%  GEN: Alert. Well developed, well nourished.  CV/Resp: Symmetric chest wall excursion. Non-labored breathing. No audible wheezing.  Skin: No rashes/lesions of posterior torso.   Extremities: Distal extremities warm, well perfused.  Neuro/MSK: Power 5/5 throughout bilateral hip flexors, hip abductors/adductors, knee flexors/extensors, ADF, APF, EHL.     Imagin/10/16 MRI lumbar spine (CDI): Comparison MRI lumbar spine 2011. Dextroscoliosis centered at L2-3 with straightened lumbar lordosis, 1 mm retrolisthesis of L2 on L3 and 1 to 2 mm retrolisthesis of L3 on L4. No acute fracture or spondylolysis, although edematous degenerative endplate changes are demonstrated at L5-S1 and L4-5. Conus is normal and terminates at L1. Imaged upper sacrum is  notable for mild right ventral sacroiliac joint osteophytosis. Imaged paraspinal soft tissue structures are unremarkable. Disc desiccation and annular bulging are demonstrated at L5-S1 through L1-2 and T11-12. Disc space narrowing is severe left laterally at L5-S1, severe right laterally at L4-5, severe left laterally at L2-3 and mild to moderate at L1-2. L5-S1: Unchanged annular bulging without central stenosis or traversing neural compression. Mild right facet arthrosis with chronic, mild to moderate right and moderate lateral left foraminal stenosis. There is unchanged chronic foraminal encroachment upon the exiting left L5 ganglion and post ganglionic nerve. L4-5: Interval left hemilaminectomy with unchanged annular bulging more prominent in the midline, but no central stenosis or traversing neural compression. Mild bilateral facet arthrosis with moderate right and mild left chronic foraminal narrowing and chronic foraminal encroachment upon the exiting right L4 ganglion. L3-4: New right posterolateral annular fissure with slightly progressed annular bulging more prominent in this distribution, mild right subarticular recess narrowing, but no traversing neural compression or central stenosis. Mild bilateral facet arthrosis with mild to moderate right and mild left chronic foraminal narrowing. L2-3: Unchanged annular bulging without central stenosis or traversing neural compression. Mild right facet arthrosis with mild left chronic foraminal narrowing. L1-2: Unchanged annular bulging without central stenosis or traversing neural compression. Mild left facet arthrosis and right facet hypertrophy with patent foramina. T12-L1: Normal posterior disc margins and facets. Foramina are patent. No significant central stenosis or cord compression is evident at the imaged portions of T11-12. Conclusion: Multilevel degenerative thoracolumbar spondylosis, dextroscoliosis centered at L2-3, interval left L4-5 hemilaminectomy  "and the following specific notable findings: 1. New right posterolateral L3-4 annular fissure without overlying disc herniation. 2. No central stenosis, acute fracture, spondylolysis or evidence of arachnoidal adhesive disease. 3. Notable chronic moderate lateral left L5-S1 and moderate right L4-5 foraminal stenosis with chronic foraminal encroachment upon the exiting left L5 and within/post ganglionic nerve and exiting right L4 ganglion. 4. Mild right L5-S1, bilateral L4-5 and L3-4, right L2-3 and left L1-2 facet arthrosis. 5. Edematous degenerative endplate changes are demonstrated at L5-S1 and L4-5.    Procedure Description:    Pre-procedure Diagnosis: Lumbar spondylosis; Lumbar facet arthropathy  Post-procedure Diagnosis: Lumbar spondylosis; Lumbar facet arthropathy  Procedure performed: Lumbar intra-articular facet injection  : Yudi Chappell MD    Side/Level: Left L4-5, L5-S1  Local anesthetic: 6 mL 1% lidocaine (preservative free)  Medication solution (injectate), total: 1.5 mL of 40 mg/mL triamcinolone + 1 mL of 1% lidocaine (preservative free)  Sterile prep/cleansing solution: ChloraPrep    The procedure and risks were explained, and informed written consent was obtained from the patient. Risks include but are not limited to: increased pain, infection, bleeding, damage to soft tissue, nerve, muscle, and vasculature structures, paralysis, and stroke. Risks of steroid include but are not limited to: medication reaction, mood/sleep disturbance, increased blood pressure, increased blood glucose, effects on eye conditions (glaucoma/cataracts), effects on bone/tissue structure/health. The procedure site was marked using a disposable pen. Immediately prior to the start of the procedure, a \"time out\" safety check was conducted to confirm correct patient, procedure, and laterality. The patient's heart rate and oxygen saturation were monitored throughout the procedure.    The patient was positioned prone on " the fluoroscopy table with a pillow under the abdomen to help reduce the lumbar lordosis. The appropriate vertebral levels were identified with use of fluoroscopy in AP view. The skin was prepped and draped in aseptic fashion. The C-arm was rotated obliquely to visualize the target facet joints. A 25-gauge, 1.5 inch needle was used to anesthetize the skin and subcutaneous tissue entry site with local anesthetic. Under fluoroscopic visualization, a 22-gauge, 5 inch Quincke spinal needle was advanced parallel to the x-ray beam towards the left L5-S1 facet joint. The needle was advanced slowly until the tip made contact with periosteum. The needle position was then adjusted to penetrate the facet joint capsule. The same technique was repeated for intra-articular needle placement at the left L4-5 facet joint. Lateral fluoroscopic imaging was taken to confirm appropriate needle placement and depth. After negative aspiration for heme and cerebrospinal fluid, 1.25 mL of medication solution was injected into each facet joint. The stylets were then reinserted, and the needles were removed.     The patient tolerated the procedure well, and there was no evidence of procedural complications. No new sensory or motor deficits were noted following the procedure. The patient was stable and able to ambulate on discharge home. Post-procedure instructions were provided.     Pre-procedure pain score: 5/10  Post-procedure pain score: 0/10    Assessment/Plan: Jose Moreno is a 67-year-old male s/p left L4-5, L5-S1 intra-articular facet joint injection today.     Following today's procedure, the patient was advised to contact the [facility] for any of the following:   Fever, chills, night sweats, or other signs of infection   New onset of pain, numbness, or weakness   Any questions/concerns regarding the procedure  If unable to contact the Pain Center, the patient was instructed to go to a local Emergency Room for  any complications.     Yudi Chappell MD  [facility] [place]

## 2017-06-22 ENCOUNTER — OFFICE VISIT (OUTPATIENT)
Dept: FAMILY MEDICINE | Facility: CLINIC | Age: 67
End: 2017-06-22

## 2017-06-22 VITALS
HEART RATE: 78 BPM | SYSTOLIC BLOOD PRESSURE: 136 MMHG | OXYGEN SATURATION: 97 % | DIASTOLIC BLOOD PRESSURE: 74 MMHG | HEIGHT: 65 IN | TEMPERATURE: 97.2 F | BODY MASS INDEX: 25.02 KG/M2 | WEIGHT: 150.2 LBS

## 2017-06-22 DIAGNOSIS — I21.4 NSTEMI (NON-ST ELEVATED MYOCARDIAL INFARCTION) (H): ICD-10-CM

## 2017-06-22 DIAGNOSIS — I24.9 ACS (ACUTE CORONARY SYNDROME) (H): ICD-10-CM

## 2017-06-22 DIAGNOSIS — E78.2 MIXED HYPERLIPIDEMIA: ICD-10-CM

## 2017-06-22 DIAGNOSIS — I10 ESSENTIAL HYPERTENSION, BENIGN: ICD-10-CM
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
## 2017-06-22 DIAGNOSIS — K21.9 GASTROESOPHAGEAL REFLUX DISEASE WITHOUT ESOPHAGITIS: ICD-10-CM

## 2017-06-22 PROCEDURE — 99213 OFFICE O/P EST LOW 20 MIN: CPT | Performed by: FAMILY MEDICINE

## 2017-06-22 RX ORDER — ATORVASTATIN CALCIUM 40 MG/1
40 TABLET, FILM COATED ORAL DAILY
Qty: 90 TABLET | Refills: 0 | Status: SHIPPED | OUTPATIENT
Start: 2017-06-22 | End: 2017-10-09

## 2017-06-22 RX ORDER — DOXYCYCLINE 100 MG/1
200 CAPSULE ORAL ONCE
Qty: 2 CAPSULE | Refills: 0 | Status: SHIPPED | OUTPATIENT
Start: 2017-06-22 | End: 2017-06-22

## 2017-06-22 RX ORDER — LOSARTAN POTASSIUM 25 MG/1
25 TABLET ORAL DAILY
Qty: 90 TABLET | Refills: 0 | Status: SHIPPED | OUTPATIENT
Start: 2017-06-22 | End: 2017-10-09

## 2017-06-22 RX ORDER — NITROGLYCERIN 0.4 MG/1
0.4 TABLET SUBLINGUAL EVERY 5 MIN PRN
Qty: 25 TABLET | Refills: 3 | Status: SHIPPED | OUTPATIENT
Start: 2017-06-22 | End: 2018-09-18

## 2017-06-22 ASSESSMENT — ANXIETY QUESTIONNAIRES
7. FEELING AFRAID AS IF SOMETHING AWFUL MIGHT HAPPEN: NOT AT ALL
IF YOU CHECKED OFF ANY PROBLEMS ON THIS QUESTIONNAIRE, HOW DIFFICULT HAVE THESE PROBLEMS MADE IT FOR YOU TO DO YOUR WORK, TAKE CARE OF THINGS AT HOME, OR GET ALONG WITH OTHER PEOPLE: NOT DIFFICULT AT ALL
3. WORRYING TOO MUCH ABOUT DIFFERENT THINGS: NOT AT ALL
6. BECOMING EASILY ANNOYED OR IRRITABLE: NOT AT ALL
2. NOT BEING ABLE TO STOP OR CONTROL WORRYING: NOT AT ALL
GAD7 TOTAL SCORE: 0
1. FEELING NERVOUS, ANXIOUS, OR ON EDGE: NOT AT ALL
5. BEING SO RESTLESS THAT IT IS HARD TO SIT STILL: NOT AT ALL

## 2017-06-22 ASSESSMENT — PATIENT HEALTH QUESTIONNAIRE - PHQ9: 5. POOR APPETITE OR OVEREATING: NOT AT ALL

## 2017-06-22 NOTE — PROGRESS NOTES
SUBJECTIVE:                                                    Jose Moreno is a 67 year old male who presents to clinic today for the following health issues:      Hypertension Follow-up      Outpatient blood pressures no recorded blood pressures to review     Vascular Disease Follow-up:  Coronary Artery Disease (CAD)      Chest pain or pressure, left side neck or arm pain: No    Shortness of breath/increased sweats/nausea with exertion: No    Pain in calves walking 1-2 blocks: No    Worsened or new symptoms since last visit: No    Nitroglycerin use: last used a couple of years ago on plane    Daily aspirin use: Yes       Amount of exercise or physical activity: 30 minutes of daily exercise encouraged    Problems taking medications regularly: No    Medication side effects: none      PROBLEMS TO ADD ON...    Erythematous rash on his right forearm- appears to be related to a bite of some sort  Tick exposure- he is concerned that it could be a tick bite- pos exposure    Problem list and histories reviewed & adjusted, as indicated.  Additional history: as documented    Patient Active Problem List   Diagnosis     Major depressive disorder, single episode, moderate (H)     Essential hypertension, benign     Nonspecific (abnormal) findings on radiological and other examination of lung field     Status post mitral valve repair     Pulmonary nodules     Mixed hyperlipidemia     Spinal stenosis, lumbar     Health Care Home     CAD (coronary artery disease)     NSTEMI (non-ST elevated myocardial infarction) (H)     Mitral regurgitation     ACP (advance care planning)     Hypothyroidism due to acquired atrophy of thyroid     Gastroesophageal reflux disease without esophagitis     Chronic left-sided low back pain without sciatica     Hiatal hernia     Past Surgical History:   Procedure Laterality Date     DECOMPRESSION LUMBAR MINIMALLY INVASIVE ONE LEVEL  1/11/2012    Procedure:DECOMPRESSION LUMBAR MINIMALLY INVASIVE ONE  LEVEL; L4-5 Decompression Minimally Invasive; Surgeon:MESSI HORAN; Location:[facility]     EYE EXAM ESTABLISHED PT  1/14/06     HC COLONOSCOPY THRU STOMA, DIAGNOSTIC  7/00    GI     HCL PSA, DIAGNOSTIC (TUMOR MARKER)  2003     HEART CATH RIGHT AND LEFT HEART CATH  01-23-15    See report, pt transfered to [facility] for complex bifurcation PCI of LAD diagonal vessel.      HEART CATH RIGHT AND LEFT HEART CATH  01-26-15    PTCA and implantation of SHERRY to 1st diagonal, SHERRY to mid LAD, SHERRY to proximal LAD     ORTHOPEDIC SURGERY      Hx of rotator cuff rupture and repair     REMOVAL OF SPERM DUCT(S)      Vasectomy     REMOVAL OF SPERM DUCT(S)      reversal of vasectomy     REPAIR VALVE MITRAL  2009    [facility] robotic repair        Social History   Substance Use Topics     Smoking status: Never Smoker     Smokeless tobacco: Never Used     Alcohol use 4.2 oz/week     7 Standard drinks or equivalent per week      Comment: 1 beer daily     Family History   Problem Relation Age of Onset     CANCER Mother      breast, lung     Depression Father      alcohlism     DIABETES No family hx of      Cardiovascular No family hx of      Cancer - colorectal No family hx of      Prostate Cancer No family hx of          Current Outpatient Prescriptions   Medication Sig Dispense Refill     losartan (COZAAR) 25 MG tablet Take 1 tablet (25 mg) by mouth daily 90 tablet 0     omeprazole (PRILOSEC) 20 MG CR capsule TAKE ONE CAPSULE BY MOUTH ONCE DAILY 90 capsule 0     atorvastatin (LIPITOR) 40 MG tablet Take 1 tablet (40 mg) by mouth daily 90 tablet 0     nitroglycerin (NITROSTAT) 0.4 MG sublingual tablet Place 1 tablet (0.4 mg) under the tongue every 5 minutes as needed for chest pain 25 tablet 3     ketorolac (ACULAR) 0.5 % ophthalmic solution Place 1 drop Into the left eye 4 times daily To affected eye 5 mL 0     ofloxacin (OCUFLOX) 0.3 % ophthalmic solution Apply 1 drop to eye every 4 hours 1 Bottle 0     FLUoxetine (PROZAC) 40 MG  "capsule Take 1 capsule (40 mg) by mouth daily 90 capsule 3     traZODone (DESYREL) 100 MG tablet Take 1.5 tablets (150 mg) by mouth At Bedtime 135 tablet 3     levothyroxine (SYNTHROID, LEVOTHROID) 50 MCG tablet Take 1 tablet (50 mcg) by mouth daily 90 tablet 3     aspirin EC 81 MG EC tablet Take 1 tablet (81 mg) by mouth daily 60 tablet 0       Reviewed and updated as needed this visit by clinical staff  Tobacco  Allergies  Meds  Problems       Reviewed and updated as needed this visit by Provider         ROS:  C: NEGATIVE for fever, chills, change in weight  E/M: NEGATIVE for ear, mouth and throat problems  R: NEGATIVE for significant cough or SOB  CV: NEGATIVE for chest pain, palpitations or peripheral edema    OBJECTIVE:                                                    /74 (BP Location: Right arm, Patient Position: Chair, Cuff Size: Adult Regular)  Pulse 78  Temp 97.2  F (36.2  C) (Oral)  Ht 1.657 m (5' 5.25\")  Wt 68.1 kg (150 lb 3.2 oz)  SpO2 97%  BMI 24.8 kg/m2  Body mass index is 24.8 kg/(m^2).  Regular rate and  rhythm.   no murmurs, clicks, gallops or rubs. No edema or JVD. Chest is clear; no wheezes or rales.  erythematous rash on forearm- central puncture- no target    Diagnostic Test Results:no odors  Results for orders placed or performed in visit on 06/05/17   XR Lumb/Sac Facet Anes/Ster Inj    Narrative    This exam was marked as non-reportable because it will not be read by a   radiologist or a [place] non-radiologist provider.                  ASSESSMENT/PLAN:                                                      Problem List Items Addressed This Visit     Essential hypertension, benign    Gastroesophageal reflux disease without esophagitis    Mixed hyperlipidemia    NSTEMI (non-ST elevated myocardial infarction) (H)      Other Visit Diagnoses     Tick bite, initial encounter    -  Primary           MEDICATIONS:  Continue current medications without change (blood pressure at " goal)  Prophylactic tx for lyme disease  Patient is going out of town- desires refills of his medications- no blood work today  FUTURE APPOINTMENTS:       - Follow-up - encouraged patient to follow up with Dr Tavera for a physical exam and fasting lab work       Ermelinda Rojas MD  [facility]

## 2017-06-22 NOTE — NURSING NOTE
Jose is here for a insect bite on his RT lower arm - and medication recheck.       Pre-Visit Screening :  Immunizations : up to date  Colon Screening : Postponed  Asthma Action Test/Plan : NA  PHQ9/GAD7 :  Done today    Pulse - regular  My Chart - accepts    CLASSIFICATION OF OVERWEIGHT AND OBESITY BY BMI                         Obesity Class           BMI(kg/m2)  Underweight                                    < 18.5  Normal                                         18.5-24.9  Overweight                                     25.0-29.9  OBESITY                     I                  30.0-34.9                              II                 35.0-39.9  EXTREME OBESITY             III                >40                             Patient's  BMI Body mass index is 24.8 kg/(m^2).  http://hin.nhlbi.nih.gov/menuplanner/menu.cgi  Questioned patient about current smoking habits.  Pt. has never smoked.    LAURA Cisneros ([facility])

## 2017-06-22 NOTE — MR AVS SNAPSHOT
After Visit Summary   6/22/2017    Jose Moreno    MRN: [id number]           Patient Information     Date Of Birth          1950        Visit Information        Provider Department      6/22/2017 12:00 PM Ermelinda Rojas MD [facility]        Today's Diagnoses     Tick bite, initial encounter    -  1    Essential hypertension, benign        NSTEMI (non-ST elevated myocardial infarction) (H)        Gastroesophageal reflux disease without esophagitis        Mixed hyperlipidemia        ACS (acute coronary syndrome) (H)           Follow-ups after your visit        Who to contact     If you have questions or need follow up information about today's clinic visit or your schedule please contact [facility] directly at [phone].  Normal or non-critical lab and imaging results will be communicated to you by DOOMOROhart, letter or phone within 4 business days after the clinic has received the results. If you do not hear from us within 7 days, please contact the clinic through DOOMOROhart or phone. If you have a critical or abnormal lab result, we will notify you by phone as soon as possible.  Submit refill requests through "[employer]" or call your pharmacy and they will forward the refill request to us. Please allow 3 business days for your refill to be completed.          Additional Information About Your Visit        MyChart Information     "[employer]" gives you secure access to your electronic health record. If you see a primary care provider, you can also send messages to your care team and make appointments. If you have questions, please call your primary care clinic.  If you do not have a primary care provider, please call [phone] and they will assist you.        Care EveryWhere ID     This is your Care EveryWhere ID. This could be used by other organizations to access your [place] medical records  [id number]        Your Vitals Were     Pulse Temperature  "Height Pulse Oximetry BMI (Body Mass Index)       78 97.2  F (36.2  C) (Oral) 1.657 m (5' 5.25\") 97% 24.8 kg/m2        Blood Pressure from Last 3 Encounters:   06/22/17 136/74   06/05/17 (!) 147/97   03/23/17 (!) 142/96    Weight from Last 3 Encounters:   06/22/17 68.1 kg (150 lb 3.2 oz)   03/23/17 68 kg (150 lb)   02/13/17 70.3 kg (155 lb)              Today, you had the following     No orders found for display         Today's Medication Changes          These changes are accurate as of: 6/22/17 11:59 PM.  If you have any questions, ask your nurse or doctor.               Start taking these medicines.        Dose/Directions    doxycycline 100 MG capsule   Commonly known as:  VIBRAMYCIN   Used for:  Tick bite, initial encounter   Started by:  Ermelinda Rojas MD        Dose:  200 mg   Take 2 capsules (200 mg) by mouth once for 1 dose   Quantity:  2 capsule   Refills:  0            Where to get your medicines      These medications were sent to [facility]/pharmacy #[id number] - [place], [place] - [address] [address]  [address] [address] [address]    Hours:  [facility] converted to [facility] Phone:  [phone]     atorvastatin 40 MG tablet    doxycycline 100 MG capsule    losartan 25 MG tablet    nitroGLYcerin 0.4 MG sublingual tablet    omeprazole 20 MG CR capsule                Primary Care Provider Office Phone # Fax #    Jerri Tavera [phone]       [place] [address]  [facility] [address]        Equal Access to Services     [facility] AH: Hadii akash friend hadasho Soomaali, waaxda luqadaha, qaybta kaalmada adeegyabebeto, jayjay valdes. So [facility] [phone].    ATENCIÓN: Si habla español, tiene a weber disposición servicios gratuitos de asistencia lingüística. Llame al [phone].    We comply with applicable federal civil rights laws and [place] laws. We do not discriminate on the basis of race, color, national origin, age, disability sex, " sexual orientation or gender identity.            Thank you!     Thank you for choosing [facility]  for your care. Our goal is always to provide you with excellent care. Hearing back from our patients is one way we can continue to improve our services. Please take a few minutes to complete the written survey that you may receive in the mail after your visit with us. Thank you!             Your Updated Medication List - Protect others around you: Learn how to safely use, store and throw away your medicines at www.disposemymeds.org.          This list is accurate as of: 6/22/17 11:59 PM.  Always use your most recent med list.                   Brand Name Dispense Instructions for use Diagnosis    aspirin 81 MG EC tablet     60 tablet    Take 1 tablet (81 mg) by mouth daily    ACS (acute coronary syndrome) (H)       atorvastatin 40 MG tablet    LIPITOR    90 tablet    Take 1 tablet (40 mg) by mouth daily    Mixed hyperlipidemia, NSTEMI (non-ST elevated myocardial infarction) (H)       doxycycline 100 MG capsule    VIBRAMYCIN    2 capsule    Take 2 capsules (200 mg) by mouth once for 1 dose    Tick bite, initial encounter       FLUoxetine 40 MG capsule    PROzac    90 capsule    Take 1 capsule (40 mg) by mouth daily    Major depressive disorder, single episode, moderate (H)       ketorolac 0.5 % ophthalmic solution    ACULAR    5 mL    Place 1 drop Into the left eye 4 times daily To affected eye        levothyroxine 50 MCG tablet    SYNTHROID/LEVOTHROID    90 tablet    Take 1 tablet (50 mcg) by mouth daily    Hypothyroidism due to acquired atrophy of thyroid       losartan 25 MG tablet    COZAAR    90 tablet    Take 1 tablet (25 mg) by mouth daily    Essential hypertension, benign       nitroGLYcerin 0.4 MG sublingual tablet    NITROSTAT    25 tablet    Place 1 tablet (0.4 mg) under the tongue every 5 minutes as needed for chest pain    ACS (acute coronary syndrome) (H)       ofloxacin 0.3 %  ophthalmic solution    OCUFLOX    1 Bottle    Apply 1 drop to eye every 4 hours        omeprazole 20 MG CR capsule    priLOSEC    90 capsule    TAKE ONE CAPSULE BY MOUTH ONCE DAILY    Gastroesophageal reflux disease without esophagitis       traZODone 100 MG tablet    DESYREL    135 tablet    Take 1.5 tablets (150 mg) by mouth At Bedtime    Major depressive disorder, single episode, moderate (H)

## 2017-06-23 ASSESSMENT — ANXIETY QUESTIONNAIRES: GAD7 TOTAL SCORE: 0

## 2017-07-31 ENCOUNTER — TELEPHONE (OUTPATIENT)
Dept: ORTHOPEDICS | Facility: CLINIC | Age: 67
End: 2017-07-31

## 2017-07-31 NOTE — TELEPHONE ENCOUNTER
I called and he already changed his mind to follow up with Dr. Galvez and made an appointment for tomorrow.    Tu Barr, ATC

## 2017-07-31 NOTE — TELEPHONE ENCOUNTER
He called and left voicemail that he was trying to schedule a medial branch block.    I called him back and he reports that on 6/5/17 he was supposed to have a medial branch block performed, however, since he was going on vacation, the injection was changed to a lumbar cortisone injection. He received about 7 weeks of 100% relief from that injection. He reports that the pain has returned over the last few days and he is in a lot of pain.     Is it too soon for a medical branch block? Is that still the best course of treatment for him?     Please advise.    Tu Barr, ATC

## 2017-07-31 NOTE — TELEPHONE ENCOUNTER
Please call patient to inform that would recommend follow-up clinical visit to discuss current symptoms/previous treatment to determine further medical care at this time.    Rodney Galvez DO, CAQSM  [facility]

## 2017-08-01 ENCOUNTER — OFFICE VISIT (OUTPATIENT)
Dept: ORTHOPEDICS | Facility: CLINIC | Age: 67
End: 2017-08-01
Payer: COMMERCIAL

## 2017-08-01 ENCOUNTER — TELEPHONE (OUTPATIENT)
Dept: PALLIATIVE MEDICINE | Facility: CLINIC | Age: 67
End: 2017-08-01

## 2017-08-01 VITALS
SYSTOLIC BLOOD PRESSURE: 142 MMHG | BODY MASS INDEX: 24.99 KG/M2 | DIASTOLIC BLOOD PRESSURE: 88 MMHG | HEIGHT: 65 IN | WEIGHT: 150 LBS

## 2017-08-01 DIAGNOSIS — M47.816 LUMBAR FACET ARTHROPATHY: Primary | ICD-10-CM

## 2017-08-01 PROCEDURE — 99213 OFFICE O/P EST LOW 20 MIN: CPT | Performed by: PHYSICAL MEDICINE & REHABILITATION

## 2017-08-01 NOTE — MR AVS SNAPSHOT
After Visit Summary   8/1/2017    Jose Moreno    MRN: [id number]           Patient Information     Date Of Birth          1950        Visit Information        Provider Department      8/1/2017 10:20 AM Rodney Galvez DO [facility]        Today's Diagnoses     Lumbar facet arthropathy    -  1      Care Instructions    We addressed the following today:    1. Lumbar facet arthritis    Activity modification as discussed  Home exercise program as instructed  Topical Treatments: Ice or heat  Over the counter medication: Acetaminophen (Tylenol) 1000 mg every 6 hours with food (Maximum of 3000 mg/day)  Initiate left-sided lumbar medial branch blocks with progression to lumbar radiofrequency ablation as deemed appropriate moving forward as discussed  Follow up as needed for further evaluation/medical care (sooner if needed; call direct clinic number [[phone]] at any time with questions or concerns)              Follow-ups after your visit        Additional Services     PAIN MANAGEMENT CENTER ([place]) REFERRAL       Your provider has referred you to the [facility].    Reason for Referral: Procedure or injection only - patient will be contacted within 24 hrs to schedule: Diagnostic Medial Branch Block: Lumbar - left L4-5 and L5-S1 lumbar facet nerve blocks  Please complete the following questions:    What is your diagnosis for the patient's pain? Lumbar facet arthropathy    Do you have any specific questions for the pain specialist? No    Are there any red flags that may impact the assessment or management of the patient? None    **ANY DIAGNOSTIC TESTS THAT ARE NOT IN EPIC SHOULD BE SENT TO THE PAIN CENTER**    Please note the Pre-Op Pain Consult must be scheduled 2-3 weeks prior to the patient's surgery.  Patient's trying to schedule within 2 weeks of surgery may not be accommodated.     Pre-Op Pain Consults are only good for 30 days.    REGARDING  OPIOID MEDICATIONS:  We will always address appropriateness of opioid pain medications, but we generally will not automatically take on a prescribing role. When we do take on prescribing of opioids for chronic pain, it is in collaboration with the referring physician for an intermediate period of time (months), with an expectation that the primary physician or provider will assume the prescribing role if medications are effective at stable doses with demonstrated compliance.  Therefore, please do not assume that your prescribing responsibilities end on the day of pain clinic consultation.  Is this agreeable to you? YES    For any questions, contact the [facility] at [phone].    Please be aware that coverage of these services is subject to the terms and limitations of your health insurance plan.  Call member services at your health plan with any benefit or coverage questions.      Please bring the following with you to your appointment:    (1) Any X-Rays, CTs or MRIs which have been performed.  Contact the facility where they were done to arrange for  prior to your scheduled appointment.    (2) List of current medications   (3) This referral request   (4) Any documents/labs given to you for this referral                  Follow-up notes from your care team     Return if symptoms worsen or fail to improve.      Who to contact     If you have questions or need follow up information about today's clinic visit or your schedule please contact [facility] directly at [phone].  Normal or non-critical lab and imaging results will be communicated to you by MyChart, letter or phone within 4 business days after the clinic has received the results. If you do not hear from us within 7 days, please contact the clinic through MyChart or phone. If you have a critical or abnormal lab result, we will notify you by phone as soon as possible.  Submit refill requests through  "MyChart or call your pharmacy and they will forward the refill request to us. Please allow 3 business days for your refill to be completed.          Additional Information About Your Visit        MyChart Information     [employer] gives you secure access to your electronic health record. If you see a primary care provider, you can also send messages to your care team and make appointments. If you have questions, please call your primary care clinic.  If you do not have a primary care provider, please call [phone] and they will assist you.        Care EveryWhere ID     This is your Care EveryWhere ID. This could be used by other organizations to access your [place] medical records  [id number]        Your Vitals Were     Height BMI (Body Mass Index)                5' 5.25\" (1.657 m) 24.77 kg/m2           Blood Pressure from Last 3 Encounters:   08/01/17 142/88   06/22/17 136/74   06/05/17 (!) 147/97    Weight from Last 3 Encounters:   08/01/17 150 lb (68 kg)   06/22/17 150 lb 3.2 oz (68.1 kg)   03/23/17 150 lb (68 kg)              We Performed the Following     PAIN MANAGEMENT CENTER ([place]) REFERRAL        Primary Care Provider Office Phone # Fax #    Jerri Tavera [phone]       [place] [address] [address]        Equal Access to Services     [facility] : Hadii akash friend hadkileyo Soashley, waaxda luqadaha, qaybta kaalmada jules, jayjay nicolas . So [facility] [phone].    ATENCIÓN: Si habla español, tiene a weber disposición servicios gratuitos de asistencia lingüística. Waqas cordon [phone].    We comply with applicable federal civil rights laws and [place] laws. We do not discriminate on the basis of race, color, national origin, age, disability sex, sexual orientation or gender identity.            Thank you!     Thank you for choosing [facility]  for your care. Our goal is always to provide " you with excellent care. Hearing back from our patients is one way we can continue to improve our services. Please take a few minutes to complete the written survey that you may receive in the mail after your visit with us. Thank you!             Your Updated Medication List - Protect others around you: Learn how to safely use, store and throw away your medicines at www.disposemymeds.org.          This list is accurate as of: 8/1/17 11:08 AM.  Always use your most recent med list.                   Brand Name Dispense Instructions for use Diagnosis    aspirin 81 MG EC tablet     60 tablet    Take 1 tablet (81 mg) by mouth daily    ACS (acute coronary syndrome) (H)       atorvastatin 40 MG tablet    LIPITOR    90 tablet    Take 1 tablet (40 mg) by mouth daily    Mixed hyperlipidemia, NSTEMI (non-ST elevated myocardial infarction) (H)       FLUoxetine 40 MG capsule    PROzac    90 capsule    Take 1 capsule (40 mg) by mouth daily    Major depressive disorder, single episode, moderate (H)       ketorolac 0.5 % ophthalmic solution    ACULAR    5 mL    Place 1 drop Into the left eye 4 times daily To affected eye        levothyroxine 50 MCG tablet    SYNTHROID/LEVOTHROID    90 tablet    Take 1 tablet (50 mcg) by mouth daily    Hypothyroidism due to acquired atrophy of thyroid       losartan 25 MG tablet    COZAAR    90 tablet    Take 1 tablet (25 mg) by mouth daily    Essential hypertension, benign       nitroGLYcerin 0.4 MG sublingual tablet    NITROSTAT    25 tablet    Place 1 tablet (0.4 mg) under the tongue every 5 minutes as needed for chest pain    ACS (acute coronary syndrome) (H)       ofloxacin 0.3 % ophthalmic solution    OCUFLOX    1 Bottle    Apply 1 drop to eye every 4 hours        omeprazole 20 MG CR capsule    priLOSEC    90 capsule    TAKE ONE CAPSULE BY MOUTH ONCE DAILY    Gastroesophageal reflux disease without esophagitis       traZODone 100 MG tablet    DESYREL    135 tablet    Take 1.5 tablets (150 mg)  by mouth At Bedtime    Major depressive disorder, single episode, moderate (H)

## 2017-08-01 NOTE — PROGRESS NOTES
"[facility]   Follow-up Visit s Aug 1, 2017    Subjective:  Jose Moreno is a 67 year old male who is seen in follow up for evaluation of chronic left-sided low back pain without radiation.  Last visit was on 3/2/2017 with left L4-5 and L5-S1 lumbar facet joint corticosteroid injections completed at that time with 100% relief that lasted for approximately 2.5 months. Had repeat left L4-5 and L5-S1 intra-articular facet joint injections on 6/5/2017 with 100% relief of symptoms that lasted for approximately 7 weeks. Has been completing his home exercises as previously prescribed during formal physical therapy. Been using Ibuprofen and Naproxen as needed for improvement of pain/discomfort.    Notes left low back pain without radiation. Symptoms are worse with activity and with prolonged standing activities. Denies any numbness/tingling/weakness of the left lower extremity. Denies any bowel/bladder incontinence. Denies any saddle anesthesia. Denies any trauma/falls since last clinical visit.    Patient's past medical, surgical, social, and family histories are reviewed today    Objective:  /88  Ht 5' 5.25\" (1.657 m)  Wt 150 lb (68 kg)  BMI 24.77 kg/m2  General: healthy, alert, and in no distress  Skin: no suspicious lesions or rashes  Neuro: sensory and motor exam grossly normal with no focal neurologic deficits appreciated    MSK:    THORACIC/LUMBAR SPINE  Range of Motion:     Lumbar extension within normal limits  Strength:    Quadriceps 5/5, hamstrings 5/5, gastrocsoleus 5/5, tibialis anterior 5/5, and extensor hallicus longus 5/5  Special Tests:    Positive: Facet compression test (left)    Negative: None    Imaging:  No x-rays indicated during today's visit  Outside report from [facility] was reviewed today    ASSESSMENT:  1. Lumbar facet arthropathy    PLAN:  1. Continue with pain-free home exercise program as previously prescribed during formal physical therapy.  2. Activity " modification as discussed, including limitation of activities that cause pain/discomfort.  3. Acetaminophen/ice/heat as needed for improved pain control.  4. Discussed potential side effects and complications of left L4-5 and L5-S1 lumbar facet nerve blocks with progression to lumbar radiofrequency ablation as deemed appropriate including but not necessarily limited to infection, bleeding, allergic reaction, post-injection flare, local tissue breakdown, injury to soft tissue and/or nerves and seizure.  Patient indicated their understanding and agreed to proceed.    5. Follow-up as needed for further evaluation/medical care.  Instructed to follow-up if change of symptoms arise.    Patient's conditions were thoroughly discussed during today's visit with greater than 50% of the visit spent counseling the patient with total time spent face-to-face with the patient being 15 minutes.    Rodney Galvez DO, [facility]  [facility]    Disclaimer: This note consists of symbols derived from keyboarding, dictation and/or voice recognition software. As a result, there may be errors in the script that have gone undetected. Please consider this when interpreting information found in this chart.

## 2017-08-01 NOTE — NURSING NOTE
"Chief Complaint   Patient presents with     RECHECK     chronic low back pain       Initial /88  Ht 5' 5.25\" (1.657 m)  Wt 150 lb (68 kg)  BMI 24.77 kg/m2 Estimated body mass index is 24.77 kg/(m^2) as calculated from the following:    Height as of this encounter: 5' 5.25\" (1.657 m).    Weight as of this encounter: 150 lb (68 kg).  Medication Reconciliation: complete     Christopher Torres, ATC    "

## 2017-08-01 NOTE — Clinical Note
Dear Jose Lopez saw me at [facility] on Aug 1, 2017.  Please refer to the visit note at your convenience and feel free to contact me should you have any questions.  Sincerely,  Rodney Galvez DO, [facility]

## 2017-08-01 NOTE — PATIENT INSTRUCTIONS
We addressed the following today:    1. Lumbar facet arthritis    Activity modification as discussed  Home exercise program as instructed  Topical Treatments: Ice or heat  Over the counter medication: Acetaminophen (Tylenol) 1000 mg every 6 hours with food (Maximum of 3000 mg/day)  Initiate left-sided lumbar medial branch blocks with progression to lumbar radiofrequency ablation as deemed appropriate moving forward as discussed  Follow up as needed for further evaluation/medical care (sooner if needed; call direct clinic number [[phone]] at any time with questions or concerns)

## 2017-08-02 NOTE — TELEPHONE ENCOUNTER
Pre-screening Questions for Radiology Injections:    Injection to be done at which interventional clinic site? [facility]    Procedure ordered by Dr. Yudi Chappell    Procedure ordered? Lumbar Epidural Steroid Injection    What insurance would patient like us to bill for this procedure? [employer]      Worker's comp-Any injection DO NOT SCHEDULE and route to Mare Tao.      [employer] insurance - For SI joint injections, DO NOT SCHEDULE and route Ursula Park.      [employer]- MBB's must be scheduled at LEAST two weeks apart      [employer] - Any injection besides hip/shoulder/knee joint DO NOT SCHEDULE and route to Ursula Park. She will obtain PA and call pt back to schedule procedure or notify pt of denial.     HP [employer]-PA REQUIRED FOR NON-RAMSES OR Joint injections    Any chance of pregnancy? Not Applicable   If YES, do NOT schedule and route to RN pool    Is an  needed? No     Patient has a drive home? (mandatory) YES: INFORMED    Is patient taking any blood thinners (plavix, coumadin, jantoven, warfarin, heparin, pradaxa or dabigatran )? No   If hold needed, do NOT schedule, route to RN pool     Is patient taking any aspirin products? YES, 81 MG, 0 DAY HOLD    If more than 325mg/day do NOT schedule; route to RN pool     For CERVICAL procedures, hold all aspirin products for 6 days.      Does the patient have a bleeding or clotting disorder? No     If yes, okay to schedule AND route to RN nurse pool    **For any patients with platelet count <100, must be forwarded to provider**    Is patient diabetic?  No  If YES, have them bring their glucometer.    Does patient have an active infection or treated for one within the past week? No     Is patient currently taking any antibiotics?  No     For patients on chronic, preventative, or prophylactic antibiotics, procedures may be scheduled.     For patients on antibiotics for active or recent infection:    Trinity Chen,  Diego Viera-antibiotic course must have been completed for 4 days    Giorgi Sharma-antibiotic course must have been completed for 7 days    Is patient currently taking any steroid medications? (i.e. Prednisone, Medrol)  No     For patients on steroid medications:    Trinity Chen [place], [place]-steroid course must have been completed for 4 days    Giorgi Sharma-steroid course must have been completed for 7 days    Reviewed with patient:  If you are started on any steroids or antibiotics between now and your appointment, you must contact us because it may affect our ability to perform your procedure.  Yes    Is patient actively being treated for cancer or immunocompromised, including the spleen having been removed? No    If YES, do NOT schedule and route to RN pool     **For Dr. Chappell patients without spleens should have the chart sent to her**    Are you able to get on and off an exam table with minimal or no assistance? Yes  If NO, do NOT schedule and route to RN pool    Are you able to roll over and lay on your stomach with minimal or no assistance? Yes  If NO, do NOT schedule and route to RN pool     Any allergies to contrast dye, iodine, shellfish, or numbing and steroid medications? No  If YES, route to RN pool AND add allergy information to appointment notes    Allergies: Ace inhibitors        Has the patient had a flu shot or any other vaccinations within 7 days before or after the procedure.  No       Does patient have an MRI/CT?  YES: 11/2016  (SI joint, hip injections, lumbar sympathetic blocks, and stellate ganglion blocks do not require an MRI)    Was the MRI done w/in the last 3 years?  Yes    Was MRI done at [place]? Yes      If not, where was it done? N/A       If MRI was not done at [place], [facility] or [facility] do NOT schedule and route to nursing.  If pt has an imaging disc, the injection may be scheduled but pt has to bring disc to appt. If they show up w/out  disc the injection cannot be done    Reminders (please tell patient if applicable):       Instructed pt to arrive 30 minutes early for IV start if this is for a cervical procedure, ALL sympathetic (stellate ganglion, hypogastric, or lumbar sympathetic block) and all sedation procedures (RFA, spinal cord stimulation trials).  Not Applicable    -IVs are not routinely placed for [place] and [place] cervical case       If NPO for sedation, informed patient that it is okay to take medications with sips of water (except if they are to hold blood thinners).  Not Applicable   *DO take blood pressure medication if it is prescribed*      If this is for a cervical RAMSES, informed patient that aspirin needs to be held for 6 days.   Not Applicable      For all patients not having spinal cord stimulator (SCS) trials or radiofrequency ablations (RFAs), informed patient:  IV sedation is not provided for this procedure.  If you feel that an oral anti-anxiety medication is needed, you can discuss this further with your referring provider or primary care provider.  The Pain Clinic provider will discuss specifics of what the procedure includes at your appointment.  Most procedures last 10-20 minutes.  We use numbing medications to help with any discomfort during the procedure.  NO      Do not schedule procedures requiring IV placement in the first appointment of the day or first appointment after lunch.       For patients 85 or older we recommend having an adult stay w/ them for the remainder of the day.       Does the patient have any questions?  NO  Jayla Ayers  [facility]

## 2017-10-06 ENCOUNTER — TELEPHONE (OUTPATIENT)
Dept: PALLIATIVE MEDICINE | Facility: CLINIC | Age: 67
End: 2017-10-06

## 2017-10-06 DIAGNOSIS — M47.816 LUMBAR FACET ARTHROPATHY: Primary | ICD-10-CM

## 2017-10-06 NOTE — TELEPHONE ENCOUNTER
Please call patient back to obtain information regarding symptomatic relief from completion of lumbar facet joint corticosteroid injections in June 2017 to determine further medical care if can proceed with additional corticosteroid injections or if will need to follow-up in clinic for further evaluation/medical care.    Rodney Galvez DO, CAQSM  [facility]

## 2017-10-06 NOTE — TELEPHONE ENCOUNTER
Routing to provider to review request below. Patient has no showed to several injection appointments.     Katia Medeiros  BSN-RN Care Coordinator  [facility]

## 2017-10-06 NOTE — TELEPHONE ENCOUNTER
Pt called to see if Dr. Galvez could approve another Lumbar injection.   He states he needs relief now and is not interested in doing anymore blocks to reach the RFA.       Jayla JENNINGS    [facility]

## 2017-10-09 DIAGNOSIS — K21.9 GASTROESOPHAGEAL REFLUX DISEASE WITHOUT ESOPHAGITIS: ICD-10-CM

## 2017-10-09 DIAGNOSIS — I21.4 NSTEMI (NON-ST ELEVATED MYOCARDIAL INFARCTION) (H): ICD-10-CM

## 2017-10-09 DIAGNOSIS — I10 ESSENTIAL HYPERTENSION, BENIGN: ICD-10-CM

## 2017-10-09 DIAGNOSIS — E78.2 MIXED HYPERLIPIDEMIA: ICD-10-CM

## 2017-10-09 RX ORDER — ATORVASTATIN CALCIUM 40 MG/1
40 TABLET, FILM COATED ORAL DAILY
Qty: 30 TABLET | Refills: 0 | Status: SHIPPED | OUTPATIENT
Start: 2017-10-09 | End: 2017-11-02

## 2017-10-09 RX ORDER — LOSARTAN POTASSIUM 25 MG/1
25 TABLET ORAL DAILY
Qty: 30 TABLET | Refills: 0 | Status: SHIPPED | OUTPATIENT
Start: 2017-10-09 | End: 2017-11-02

## 2017-10-09 NOTE — TELEPHONE ENCOUNTER
Dr. Tavera,     Pt is requesting a refill on the following prescriptions.     Pending Prescriptions:                       Disp   Refills    omeprazole (PRILOSEC) 20 MG CR capsule    90 cap*0            Sig: TAKE ONE CAPSULE BY MOUTH ONCE DAILY    atorvastatin (LIPITOR) 40 MG tablet       90 tab*0            Sig: Take 1 tablet (40 mg) by mouth daily    losartan (COZAAR) 25 MG tablet            90 tab*0            Sig: Take 1 tablet (25 mg) by mouth daily    Pt saw Dr. Rojas on 6/22/17.   Last set of Labs was done on:2/13/17    Please advise.     LAURA Cisneros ([facility])

## 2017-10-09 NOTE — TELEPHONE ENCOUNTER
Pt calling back again in regards to getting repeat Lumbar RAMSES.   Please advise.     Jayla JENNINGS    [facility]

## 2017-10-10 NOTE — TELEPHONE ENCOUNTER
Patient requests to have procedure done with [facility]. Please place appropriate order.     Patient requests to have cell phone number included in referral for scheduling...[phone].

## 2017-10-10 NOTE — TELEPHONE ENCOUNTER
Patient had 100% relief that lasted for 3 months after injection in June 2017.    Patient is currently experiencing lower left back pain without radiation. He is not able to stand for prolonged periods. He feels this is an urgent need; as he is not able to do much per report from patient.

## 2017-10-10 NOTE — TELEPHONE ENCOUNTER
Order placed for injection to be completed at [facility]. Please inform patient to follow-up 2 weeks after completion of injection if no improvement of symptoms for further evaluation/medical care.    Rodney Galvez DO, CAQSM  [facility]

## 2017-10-10 NOTE — TELEPHONE ENCOUNTER
Can order repeat left L4-5 and L5-S1 intra-articular facet joint injections by myself, another [place] pain management provider, or at [facility] per patient preference. Please call patient to inform.    Rodney Galvez DO, CAQSM  [facility]

## 2017-10-12 ENCOUNTER — TRANSFERRED RECORDS (OUTPATIENT)
Dept: HEALTH INFORMATION MANAGEMENT | Facility: CLINIC | Age: 67
End: 2017-10-12

## 2017-10-27 ENCOUNTER — OFFICE VISIT (OUTPATIENT)
Dept: FAMILY MEDICINE | Facility: CLINIC | Age: 67
End: 2017-10-27

## 2017-10-27 VITALS
TEMPERATURE: 98 F | DIASTOLIC BLOOD PRESSURE: 84 MMHG | OXYGEN SATURATION: 98 % | HEIGHT: 66 IN | WEIGHT: 146 LBS | RESPIRATION RATE: 14 BRPM | BODY MASS INDEX: 23.46 KG/M2 | SYSTOLIC BLOOD PRESSURE: 128 MMHG | HEART RATE: 76 BPM

## 2017-10-27 DIAGNOSIS — F33.42 RECURRENT MAJOR DEPRESSION IN COMPLETE REMISSION (H): Primary | ICD-10-CM

## 2017-10-27 DIAGNOSIS — Z76.0 ENCOUNTER FOR MEDICATION REFILL: ICD-10-CM

## 2017-10-27 DIAGNOSIS — E78.2 MIXED HYPERLIPIDEMIA: ICD-10-CM

## 2017-10-27 DIAGNOSIS — I10 ESSENTIAL HYPERTENSION, BENIGN: ICD-10-CM

## 2017-10-27 DIAGNOSIS — I21.4 NSTEMI (NON-ST ELEVATED MYOCARDIAL INFARCTION) (H): ICD-10-CM

## 2017-10-27 DIAGNOSIS — F32.1 MAJOR DEPRESSIVE DISORDER, SINGLE EPISODE, MODERATE (H): ICD-10-CM

## 2017-10-27 DIAGNOSIS — Z23 NEED FOR VACCINATION: ICD-10-CM

## 2017-10-27 DIAGNOSIS — E03.4 HYPOTHYROIDISM DUE TO ACQUIRED ATROPHY OF THYROID: ICD-10-CM

## 2017-10-27 DIAGNOSIS — K21.9 GASTROESOPHAGEAL REFLUX DISEASE WITHOUT ESOPHAGITIS: ICD-10-CM

## 2017-10-27 LAB
% GRANULOCYTES: 54.3 %
HCT VFR BLD AUTO: 47.1 % (ref 40–53)
HEMOGLOBIN: 15.6 G/DL (ref 13.3–17.7)
LYMPHOCYTES NFR BLD AUTO: 35.2 %
MCH RBC QN AUTO: 34.1 PG (ref 26–33)
MCHC RBC AUTO-ENTMCNC: 33.1 G/DL (ref 31–36)
MCV RBC AUTO: 103.1 FL (ref 78–100)
MONOCYTES NFR BLD AUTO: 10.5 %
PLATELET COUNT - QUEST: 278 10^9/L (ref 150–375)
RBC # BLD AUTO: 4.57 10*12/L (ref 4.4–5.9)
WBC # BLD AUTO: 5.3 10*9/L (ref 4–11)

## 2017-10-27 PROCEDURE — 80050 GENERAL HEALTH PANEL: CPT | Mod: 90 | Performed by: FAMILY MEDICINE

## 2017-10-27 PROCEDURE — 36415 COLL VENOUS BLD VENIPUNCTURE: CPT | Performed by: FAMILY MEDICINE

## 2017-10-27 PROCEDURE — 80061 LIPID PANEL: CPT | Mod: 90 | Performed by: FAMILY MEDICINE

## 2017-10-27 PROCEDURE — 90686 IIV4 VACC NO PRSV 0.5 ML IM: CPT | Performed by: FAMILY MEDICINE

## 2017-10-27 PROCEDURE — 99214 OFFICE O/P EST MOD 30 MIN: CPT | Mod: 25 | Performed by: FAMILY MEDICINE

## 2017-10-27 PROCEDURE — 90471 IMMUNIZATION ADMIN: CPT | Performed by: FAMILY MEDICINE

## 2017-10-27 RX ORDER — LEVOTHYROXINE SODIUM 50 UG/1
50 TABLET ORAL DAILY
Qty: 90 TABLET | Refills: 3 | Status: SHIPPED | OUTPATIENT
Start: 2017-10-27 | End: 2018-10-01

## 2017-10-27 RX ORDER — TRAZODONE HYDROCHLORIDE 100 MG/1
150 TABLET ORAL AT BEDTIME
Qty: 135 TABLET | Refills: 3 | Status: SHIPPED | OUTPATIENT
Start: 2017-10-27 | End: 2018-10-01 | Stop reason: DRUGHIGH

## 2017-10-27 RX ORDER — FLUOXETINE 40 MG/1
40 CAPSULE ORAL DAILY
Qty: 90 CAPSULE | Refills: 3 | Status: SHIPPED | OUTPATIENT
Start: 2017-10-27 | End: 2018-10-01 | Stop reason: DRUGHIGH

## 2017-10-27 ASSESSMENT — PATIENT HEALTH QUESTIONNAIRE - PHQ9: SUM OF ALL RESPONSES TO PHQ QUESTIONS 1-9: 0

## 2017-10-27 NOTE — NURSING NOTE
Patient is here for a recheck of their medication.  Pre-Visit Screening :  Immunizations : up to date    Colonoscopy : is due and to be scheduled by patient for later completion  Mammogram : na  Asthma Action Test/Plan : na  PHQ9/GAD7 :  PHQ9  Pulse - regular    Medication Reconciliation: complete      CLASSIFICATION OF OVERWEIGHT AND OBESITY BY BMI                         Obesity Class           BMI(kg/m2)  Underweight                                    < 18.5  Normal                                         18.5-24.9  Overweight                                     25.0-29.9  OBESITY                     I                  30.0-34.9                              II                 35.0-39.9  EXTREME OBESITY             III                >40                             Patient's  BMI Body mass index is 23.93 kg/(m^2).  http://hin.nhlbi.nih.gov/menuplanner/menu.cgi  Questioned patient about current smoking habits.  Pt. has never smoked.

## 2017-10-27 NOTE — PATIENT INSTRUCTIONS
1)  Medication: continue current medication regimen unchanged  2)  Low fat, low cholesterol diet  3)  Regular aerobic exercise  4)  Recheck in 6 months, sooner should new symptoms or   problems arise.    Patient Education: Reviewed risks of elevated lipids and principles   of treatment.

## 2017-10-27 NOTE — MR AVS SNAPSHOT
After Visit Summary   10/27/2017    Jose Moreno    MRN: [id number]           Patient Information     Date Of Birth          1950        Visit Information        Provider Department      10/27/2017 9:15 AM Jerri Tavera MD [facility]        Today's Diagnoses     Recurrent major depression in complete remission (H)    -  1    NSTEMI (non-ST elevated myocardial infarction) (H)        Mixed hyperlipidemia        Essential hypertension, benign        Hypothyroidism due to acquired atrophy of thyroid        Gastroesophageal reflux disease without esophagitis        Encounter for medication refill        Need for vaccination        Major depressive disorder, single episode, moderate (H)          Care Instructions    1)  Medication: continue current medication regimen unchanged  2)  Low fat, low cholesterol diet  3)  Regular aerobic exercise  4)  Recheck in 6 months, sooner should new symptoms or   problems arise.    Patient Education: Reviewed risks of elevated lipids and principles   of treatment.              Follow-ups after your visit        Follow-up notes from your care team     Return in about 6 months (around 4/27/2018).      Who to contact     If you have questions or need follow up information about today's clinic visit or your schedule please contact [facility] directly at [phone].  Normal or non-critical lab and imaging results will be communicated to you by MyChart, letter or phone within 4 business days after the clinic has received the results. If you do not hear from us within 7 days, please contact the clinic through MyChart or phone. If you have a critical or abnormal lab result, we will notify you by phone as soon as possible.  Submit refill requests through [email or web address] or call your pharmacy and they will forward the refill request to us. Please allow 3 business days for your refill to be completed.          Additional Information  "About Your Visit        MyChart Information     [employer] gives you secure access to your electronic health record. If you see a primary care provider, you can also send messages to your care team and make appointments. If you have questions, please call your primary care clinic.  If you do not have a primary care provider, please call [phone] and they will assist you.        Care EveryWhere ID     This is your Care EveryWhere ID. This could be used by other organizations to access your [facility] medical records  [id number]        Your Vitals Were     Pulse Temperature Respirations Height Pulse Oximetry BMI (Body Mass Index)    76 98  F (36.7  C) (Oral) 14 1.664 m (5' 5.5\") 98% 23.93 kg/m2       Blood Pressure from Last 3 Encounters:   10/27/17 128/84   08/01/17 142/88   06/22/17 136/74    Weight from Last 3 Encounters:   10/27/17 66.2 kg (146 lb)   08/01/17 68 kg (150 lb)   06/22/17 68.1 kg (150 lb 3.2 oz)              We Performed the Following     AUTO HEMOGRAM/PLATE/DIFF [16709.000]     COMPREHENSIVE METABOLIC PANEL     HC FLU VAC PRESRV FREE QUAD SPLIT VIR 3+YRS IM     LIPID PANEL     TSH     VACCINE ADMINISTRATION, INITIAL     VENIPUNC FNGR,HEEL,EAR [[id number]]          Where to get your medicines      These medications were sent to [facility]/pharmacy #[id number] - [place], [place] - [address]  [address] [address] [address]    Hours:  Old [facility] drug converted to [facility] Phone:  [phone]     FLUoxetine 40 MG capsule    levothyroxine 50 MCG tablet    traZODone 100 MG tablet          Primary Care Provider Office Phone # Fax #    Jerri Tavera [phone]       [facility] [address] [address]        Equal Access to Services     CHAPINCITO CAIN : Oli Clark, wacheri savage, qaybta kaaljayjay valle. So [facility] [phone].    ATENCIÓN: Si habla español, tiene a weber disposición servicios gratuitos de asistencia " lingüística. Waqas al [phone].    We comply with applicable federal civil rights laws and [place] laws. We do not discriminate on the basis of race, color, national origin, age, disability, sex, sexual orientation, or gender identity.            Thank you!     Thank you for choosing [facility]  for your care. Our goal is always to provide you with excellent care. Hearing back from our patients is one way we can continue to improve our services. Please take a few minutes to complete the written survey that you may receive in the mail after your visit with us. Thank you!             Your Updated Medication List - Protect others around you: Learn how to safely use, store and throw away your medicines at www.disposemymeds.org.          This list is accurate as of: 10/27/17  2:13 PM.  Always use your most recent med list.                   Brand Name Dispense Instructions for use Diagnosis    aspirin 81 MG EC tablet     60 tablet    Take 1 tablet (81 mg) by mouth daily    ACS (acute coronary syndrome) (H)       atorvastatin 40 MG tablet    LIPITOR    30 tablet    Take 1 tablet (40 mg) by mouth daily    Mixed hyperlipidemia, NSTEMI (non-ST elevated myocardial infarction) (H)       FLUoxetine 40 MG capsule    PROzac    90 capsule    Take 1 capsule (40 mg) by mouth daily    Major depressive disorder, single episode, moderate (H)       ketorolac 0.5 % ophthalmic solution    ACULAR    5 mL    Place 1 drop Into the left eye 4 times daily To affected eye        levothyroxine 50 MCG tablet    SYNTHROID/LEVOTHROID    90 tablet    Take 1 tablet (50 mcg) by mouth daily    Hypothyroidism due to acquired atrophy of thyroid       losartan 25 MG tablet    COZAAR    30 tablet    Take 1 tablet (25 mg) by mouth daily    Essential hypertension, benign       nitroGLYcerin 0.4 MG sublingual tablet    NITROSTAT    25 tablet    Place 1 tablet (0.4 mg) under the tongue every 5 minutes as needed for chest pain     ACS (acute coronary syndrome) (H)       ofloxacin 0.3 % ophthalmic solution    OCUFLOX    1 Bottle    Apply 1 drop to eye every 4 hours        omeprazole 20 MG CR capsule    priLOSEC    30 capsule    TAKE ONE CAPSULE BY MOUTH ONCE DAILY    Gastroesophageal reflux disease without esophagitis       traZODone 100 MG tablet    DESYREL    135 tablet    Take 1.5 tablets (150 mg) by mouth At Bedtime    Major depressive disorder, single episode, moderate (H)

## 2017-10-27 NOTE — PROGRESS NOTES
SUBJECTIVE:  Jose Moreno is an 67 year old male who presents for evaluation of   Hyperlipidemia/hypertension and chronic depression.See second note below.  Has been following with pain specialists with injections in his back/ considering an ablation.     He has been diagnosed in the past as having   combined hyperlipidemia. He indicates that he is feeling well   and denies any symptoms of cardiovascular disease. Specifically   denies chest pain, palpitations, dyspnea, orthopnea, PND,   claudication or peripheral edema. Treatment modalities employed to   this point include diet, regular aerobic exercise and Lipitor. Current medication   regimen is as listed below. Patient denies any side effects of   medication.    Family history: positive for hypertension  Age at diagnosis of hyperlipidemia: late 40's with hypertension  Cardiovascular risk factors: family history, lipids, hypertension, sedentary life style, previous Ischemic Heart Disease, previous angioplasty and previous MI, Mitral valve repair 2009    Current Outpatient Prescriptions   Medication     omeprazole (PRILOSEC) 20 MG CR capsule     atorvastatin (LIPITOR) 40 MG tablet     losartan (COZAAR) 25 MG tablet     nitroglycerin (NITROSTAT) 0.4 MG sublingual tablet     ketorolac (ACULAR) 0.5 % ophthalmic solution     ofloxacin (OCUFLOX) 0.3 % ophthalmic solution     FLUoxetine (PROZAC) 40 MG capsule     traZODone (DESYREL) 100 MG tablet     levothyroxine (SYNTHROID, LEVOTHROID) 50 MCG tablet     aspirin EC 81 MG EC tablet     No current facility-administered medications for this visit.      Allergies   Allergen Reactions     Ace Inhibitors Cough     Dry cough       Social History   Substance Use Topics     Smoking status: Never Smoker     Smokeless tobacco: Never Used     Alcohol use 4.2 oz/week     7 Standard drinks or equivalent per week      Comment: 1 beer daily       OBJECTIVE:  /84 (BP Location: Left arm, Cuff Size: Adult Large)  Pulse 76  Temp  "98  F (36.7  C) (Oral)  Resp 14  Ht 1.664 m (5' 5.5\")  Wt 66.2 kg (146 lb)  SpO2 98%  BMI 23.93 kg/m2  Repeat BP R arm seated = 128/84  with regular size cuff.  Skin: negative  Fundi: deferred  Lungs: negative, Percussion normal. Good diaphragmatic excursion. Lungs clear  Heart: negative, PMI normal. No lifts, heaves, or thrills. RRR. No murmurs, clicks gallops or rub  Peripheral pulses: radial=4/4, femoral=4/4, popliteal=4/4, dorsalis pedis=4/4,  Abd; The abdomen is soft without tenderness, guarding, mass or organomegaly. Bowel sounds are normal. No CVA tenderness or inguinal adenopathy noted.  BMI : Body mass index is 23.93 kg/(m^2).    ASSESSMENT:(F33.42) Recurrent major depression in complete remission (H)  (primary encounter diagnosis)  Plan: VENIPUNC FNGR,HEEL,EAR [85746], AUTO         HEMOGRAM/PLATE/DIFF [04171.000], TSH        I've explained to him that drugs of the SSRI class can have side effects such as weight gain, sexual dysfunction, insomnia, headache, nausea. These medications are generally effective at alleviating symptoms of anxiety and/or depression. Let me know if significant side effects do occur.  Recheck 1 year    (I21.4) NSTEMI (non-ST elevated myocardial infarction) (H)  Plan: VENIPUNC FNGR,HEEL,EAR [[id number]], COMPREHENSIVE         METABOLIC PANEL            (E78.2) Mixed hyperlipidemia  Plan: VENIPUNC FNGR,HEEL,EAR [[id number]], LIPID PANEL        1)  Medication: continue current medication regimen unchanged  2)  Low fat, low cholesterol diet  3)  Regular aerobic exercise  4)  Recheck in 6 months, sooner should new symptoms or   problems arise.    Patient Education: Reviewed risks of elevated lipids and principles   of treatment.        (I10) Essential hypertension, benign  Plan: VENIPUNC FNGR,HEEL,EAR [[id number]], COMPREHENSIVE         METABOLIC PANEL        1)  Medication: continue current medication regimen unchanged  2)  Dietary sodium restriction  3)  Regular aerobic exercise  4)  Recheck in " 6 months, sooner should new symptoms or   problems arise.    Patient Education: Reviewed risks of hypertension and principles of   treatment.        (E03.4) Hypothyroidism due to acquired atrophy of thyroid  Plan: VENIPUNC FNGR,HEEL,EAR [[id number]], TSH        Refilled one year    (K21.9) Gastroesophageal reflux disease without esophagitis  Plan: VENIPUNC FNGR,HEEL,EAR [[id number]], AUTO         HEMOGRAM/PLATE/DIFF [25040.000]        Potential medication side effects were discussed with the patient; let me know if any occur.      (Z76.0) Encounter for medication refill  Plan: VENIPUNC FNGR,HEEL,EAR [[id number]], AUTO         HEMOGRAM/PLATE/DIFF [83446.000], TSH,         COMPREHENSIVE METABOLIC PANEL, LIPID PANEL            (Z23) Need for vaccination  Plan: HC FLU VAC PRESRV FREE QUAD SPLIT VIR 3+YRS IM,        VACCINE ADMINISTRATION, INITIAL                              SUBJECTIVE:  Jose Moreno is an 67 year old male who presents for follow-up   of depressive symptoms.  Initially evaluated 50's.  Notes from that visit reviewed.  Current symptoms include   irritablility. Symptoms that have subjectively improved include depressed mood, hopelessness, diminished interest or pleasure in activities, psychomotor agitation (restless and /or feeling on edge), fatigue, feelings of worthlessness, feelings of guilt, difficulty with concentration, anxiety, sleep disturbance, difficulty falling asleep.  Previous and current treatment modalities   employed include individual therapy and medication(s) Prozac (fluoxetine) and Trazodone.     Organic causes of depression present: hypothyroidism    Current Outpatient Prescriptions   Medication     omeprazole (PRILOSEC) 20 MG CR capsule     atorvastatin (LIPITOR) 40 MG tablet     losartan (COZAAR) 25 MG tablet     nitroglycerin (NITROSTAT) 0.4 MG sublingual tablet     ketorolac (ACULAR) 0.5 % ophthalmic solution     ofloxacin (OCUFLOX) 0.3 % ophthalmic solution     FLUoxetine (PROZAC) 40 MG  "capsule     traZODone (DESYREL) 100 MG tablet     levothyroxine (SYNTHROID, LEVOTHROID) 50 MCG tablet     aspirin EC 81 MG EC tablet     No current facility-administered medications for this visit.      Allergies   Allergen Reactions     Ace Inhibitors Cough     Dry cough     Side effects of medication: none    Social History   Substance Use Topics     Smoking status: Never Smoker     Smokeless tobacco: Never Used     Alcohol use 4.2 oz/week     7 Standard drinks or equivalent per week      Comment: 1 beer daily         Neurologic: chronic back pain/trazodone helpful  Psychiatric: excessive stress-low back pain  Endocrine: thyroid disorder    OBJECTIVE:  /84 (BP Location: Left arm, Cuff Size: Adult Large)  Pulse 76  Temp 98  F (36.7  C) (Oral)  Resp 14  Ht 1.664 m (5' 5.5\")  Wt 66.2 kg (146 lb)  SpO2 98%  BMI 23.93 kg/m2  Mental Status Examination  Posture and motor behavior: negative  Dress, grooming, personal hygiene: negative  Facial expression: negative  Speech: negative  Mood: negative  Coherency and relevance of thought: negative  Thought content: negative  Perceptions: negative  Orientation: negative  Attention and concentration: negative  Memory: : negative  Information: negative  Vocabulary: negative  Abstract reasoning: negative  Judgment: negative    General appearance: healthy, alert, no distress, cooperative and smiling  Eyes: conjunctivae/corneas clear. PERRL, EOM's intact. Fundi benign  Ears: negative  Oropharynx: Lips, mucosa, and tongue normal. Teeth and gums normal.  Neck: Neck supple. No adenopathy. Thyroid symmetric, normal size,, Carotids without bruits.  Lungs: negative, Percussion normal. Good diaphragmatic excursion. Lungs clear  Heart: negative, PMI normal. No lifts, heaves, or thrills. RRR. No murmurs, clicks gallops or rub  Abdomen: Abdomen soft, non-tender. BS normal. No masses, organomegaly  Neuro: Gait normal. Reflexes normal and symmetric. Sensation grossly WNL.      "

## 2017-10-28 LAB
ALBUMIN SERPL-MCNC: 4.3 G/DL (ref 3.6–5.1)
ALBUMIN/GLOB SERPL: 1.4 (CALC) (ref 1–2.5)
ALP SERPL-CCNC: 56 U/L (ref 40–115)
ALT SERPL-CCNC: 30 U/L (ref 9–46)
AST SERPL-CCNC: 30 U/L (ref 10–35)
BILIRUB SERPL-MCNC: 0.6 MG/DL (ref 0.2–1.2)
BUN SERPL-MCNC: 18 MG/DL (ref 7–25)
BUN/CREATININE RATIO: NORMAL (CALC) (ref 6–22)
CALCIUM SERPL-MCNC: 9.8 MG/DL (ref 8.6–10.3)
CHLORIDE SERPLBLD-SCNC: 102 MMOL/L (ref 98–110)
CHOLEST SERPL-MCNC: 173 MG/DL
CHOLEST/HDLC SERPL: 2.1 (CALC)
CO2 SERPL-SCNC: 26 MMOL/L (ref 20–31)
CREAT SERPL-MCNC: 0.77 MG/DL (ref 0.7–1.25)
EGFR AFRICAN AMERICAN - QUEST: 107 ML/MIN/1.73M2
GFR SERPL CREATININE-BSD FRML MDRD: 93 ML/MIN/1.73M2
GLOBULIN, CALCULATED - QUEST: 3.1 G/DL (CALC) (ref 1.9–3.7)
GLUCOSE - QUEST: 87 MG/DL (ref 65–99)
HDLC SERPL-MCNC: 84 MG/DL
LDLC SERPL CALC-MCNC: 75 MG/DL (CALC)
NONHDLC SERPL-MCNC: 89 MG/DL (CALC)
POTASSIUM SERPL-SCNC: 4.4 MMOL/L (ref 3.5–5.3)
PROT SERPL-MCNC: 7.4 G/DL (ref 6.1–8.1)
SODIUM SERPL-SCNC: 139 MMOL/L (ref 135–146)
TRIGL SERPL-MCNC: 61 MG/DL
TSH SERPL-ACNC: 2.47 MIU/L (ref 0.4–4.5)

## 2017-11-02 DIAGNOSIS — K21.9 GASTROESOPHAGEAL REFLUX DISEASE WITHOUT ESOPHAGITIS: ICD-10-CM

## 2017-11-02 DIAGNOSIS — I21.4 NSTEMI (NON-ST ELEVATED MYOCARDIAL INFARCTION) (H): ICD-10-CM

## 2017-11-02 DIAGNOSIS — I10 ESSENTIAL HYPERTENSION, BENIGN: ICD-10-CM

## 2017-11-02 DIAGNOSIS — E78.2 MIXED HYPERLIPIDEMIA: ICD-10-CM

## 2017-11-02 RX ORDER — ATORVASTATIN CALCIUM 40 MG/1
40 TABLET, FILM COATED ORAL DAILY
Qty: 90 TABLET | Refills: 1 | Status: SHIPPED | OUTPATIENT
Start: 2017-11-02 | End: 2018-04-27

## 2017-11-02 RX ORDER — LOSARTAN POTASSIUM 25 MG/1
25 TABLET ORAL DAILY
Qty: 90 TABLET | Refills: 1 | Status: SHIPPED | OUTPATIENT
Start: 2017-11-02 | End: 2018-04-27

## 2017-11-02 NOTE — TELEPHONE ENCOUNTER
Patient had fasting labs done.    Pending Prescriptions:                       Disp   Refills    losartan (COZAAR) 25 MG tablet            90 tab*1            Sig: Take 1 tablet (25 mg) by mouth daily    atorvastatin (LIPITOR) 40 MG tablet       90 tab*1            Sig: Take 1 tablet (40 mg) by mouth daily    omeprazole (PRILOSEC) 20 MG CR capsule    90 cap*1            Sig: TAKE ONE CAPSULE BY MOUTH ONCE DAILY    Ready to be faxed when Rx is approved or denied.  Please close encounter when finished. Thanks, Aislinn

## 2017-11-06 ENCOUNTER — TELEPHONE (OUTPATIENT)
Dept: PALLIATIVE MEDICINE | Facility: CLINIC | Age: 67
End: 2017-11-06

## 2017-11-06 NOTE — TELEPHONE ENCOUNTER
Pre-screening Questions for Radiology Injections:     Injection to be done at which interventional clinic site? [facility]     Procedure ordered by Dr. Rodney Galvez     Procedure ordered? LMBB     What insurance would patient like us to bill for this procedure? [employer]       Worker's comp-Any injection DO NOT SCHEDULE and route to Mare Tao.       [employer] insurance - For SI joint injections, DO NOT SCHEDULE and route Ursula Park.       [employer]- MBB's must be scheduled at LEAST two weeks apart       [employer] - Any injection besides hip/shoulder/knee joint DO NOT SCHEDULE and route to Ursula Park. She will obtain PA and call pt back to schedule procedure or notify pt of denial.     HP [employer]-PA REQUIRED FOR NON-RAMSES OR Joint injections     Any chance of pregnancy? Not Applicable   If YES, do NOT schedule and route to RN pool     Is an  needed? No     Patient has a drive home? (mandatory) YES: INFORMED     Is patient taking any blood thinners (plavix, coumadin, jantoven, warfarin, heparin, pradaxa or dabigatran )? No   If hold needed, do NOT schedule, route to RN pool     Is patient taking any aspirin products? YES, 81 MG, 0 DAY HOLD    If more than 325mg/day do NOT schedule; route to RN pool     For CERVICAL procedures, hold all aspirin products for 6 days.      Does the patient have a bleeding or clotting disorder? No     If yes, okay to schedule AND route to RN nurse pool    **For any patients with platelet count <100, must be forwarded to provider**     Is patient diabetic?  No  If YES, have them bring their glucometer.     Does patient have an active infection or treated for one within the past week? No     Is patient currently taking any antibiotics?  No     For patients on chronic, preventative, or prophylactic antibiotics, procedures may be scheduled.     For patients on antibiotics for active or recent infection:    Trinity Chen, Diego Viera-antibiotic  course must have been completed for 4 days    Giorgi Sharma-antibiotic course must have been completed for 7 days     Is patient currently taking any steroid medications? (i.e. Prednisone, Medrol)  No     For patients on steroid medications:    Trinity Chen [place], [place]-steroid course must have been completed for 4 days    Giorgi Sharma-steroid course must have been completed for 7 days     Reviewed with patient:  If you are started on any steroids or antibiotics between now and your appointment, you must contact us because it may affect our ability to perform your procedure.  Yes     Is patient actively being treated for cancer or immunocompromised, including the spleen having been removed? No    If YES, do NOT schedule and route to RN pool     **For Dr. Chappell patients without spleens should have the chart sent to her**     Are you able to get on and off an exam table with minimal or no assistance? Yes  If NO, do NOT schedule and route to RN pool     Are you able to roll over and lay on your stomach with minimal or no assistance? Yes  If NO, do NOT schedule and route to RN pool     Any allergies to contrast dye, iodine, shellfish, or numbing and steroid medications? No  If YES, route to RN pool AND add allergy information to appointment notes     Allergies: Ace inhibitors         Has the patient had a flu shot or any other vaccinations within 7 days before or after the procedure.  No        Does patient have an MRI/CT?  YES: 11/2016  (SI joint, hip injections, lumbar sympathetic blocks, and stellate ganglion blocks do not require an MRI)    Was the MRI done w/in the last 3 years?  Yes    Was MRI done at [facility]? Yes      If not, where was it done? N/A        If MRI was not done at [facility], [facility] or [facility] do NOT schedule and route to nursing.  If pt has an imaging disc, the injection may be scheduled but pt has to bring disc to appt. If they show up w/out disc the injection  cannot be done     Reminders (please tell patient if applicable):       Instructed pt to arrive 30 minutes early for IV start if this is for a cervical procedure, ALL sympathetic (stellate ganglion, hypogastric, or lumbar sympathetic block) and all sedation procedures (RFA, spinal cord stimulation trials).  Not Applicable    -IVs are not routinely placed for [place] and [place] cervical case       If NPO for sedation, informed patient that it is okay to take medications with sips of water (except if they are to hold blood thinners).  Not Applicable                         *DO take blood pressure medication if it is prescribed*       If this is for a cervical RAMSES, informed patient that aspirin needs to be held for 6 days.   Not Applicable       For all patients not having spinal cord stimulator (SCS) trials or radiofrequency ablations (RFAs), informed patient:  IV sedation is not provided for this procedure.  If you feel that an oral anti-anxiety medication is needed, you can discuss this further with your referring provider or primary care provider.  The Pain Clinic provider will discuss specifics of what the procedure includes at your appointment.  Most procedures last 10-20 minutes.  We use numbing medications to help with any discomfort during the procedure.  NO       Do not schedule procedures requiring IV placement in the first appointment of the day or first appointment after lunch.        For patients 85 or older we recommend having an adult stay w/ them for the remainder of the day.        Does the patient have any questions?  NO

## 2017-11-14 NOTE — PROGRESS NOTES
[facility] - Procedure Note    Date of Service: 11/16/2017    Procedure performed: Left L3,4,5 medial branch blocks  Diagnosis: Lumbar spondylosis; Lumbar facet arthropathy  : Nancy Cortez MD & Arabella Taylor MD (pain fellow)     Indications: Jose Moreno is a 67 year old male who is seen at the request of Dr. Rodney Galvez for lumbar medial branch blocks. The patient describes pain with extension/rotation. The patient reports minimal improvement with conservative treatment, including previous injections, PT and medications.    The patient had Left L4-5 and L5-S1 facet joint injections on 6/05/2017 which provided good pain relief for 7 weeks.  This was a repeat injection.  Original injection on 3/2/2017 provided pain relief for 2.5 months.    MRI was done on 12/15/2016 at [facility] which showed         Allergies:      Allergies   Allergen Reactions     Ace Inhibitors Cough     Dry cough        Vitals:  BP (!) 133/93  Pulse 87  SpO2 96%    Review of Systems: The patient denies recent fever, chills, illness, use of antibiotics or anticoagulants. All other 10-point review of systems negative.     Procedure:   Options/alternatives, benefits and risks were discussed with the patient including bleeding, infection, tissue trauma, exposure to radiation, reaction to medications, spinal cord injury, weakness, numbness and paralysis.  Questions were answered to his satisfaction and he agrees to proceed. Voluntary informed consent was obtained and signed.     After getting informed consent, patient was brought into the procedure suite and was placed in a prone position on the procedure table.   A Pause for the Cause was performed.  Patient was prepped and draped in sterile fashion.     Under AP fluoroscopic guidance the L3, L4, L5 vertebral bodies were identified. The C-arm was rotated to the oblique view to afford optimal visualization the pedicles.  Lidocaine 1% was used to anesthetize the skin at each  level.  Under intermittent fluoroscopy, 22 gauge 3.5 inch Quinke spinal needles were positioned inferior and lateral to the intersection of the transverse process and pedicle at the Left L4 & L5 levels, as well as the corresponding sacral alar notch. The needle positions were verified and optimized from the AP and lateral views.    The anatomic targets for the L3 & L4 medial nerve and L5 dorsal ramus (which functionally incorporates the medial branch) were the  L4 & L5 transverse processes and sacral alar notch, with laterality as described above, resulting in blockade of the L4/5 and L5/S1 facet joints.    After negative aspiration, 1cc's of Omnipaque 300 was injected to rule out intravascular injection.  9cc's of omnipaque 300 was wasted.  Bupivacaine 0.5% 0.5 ml was injected at each location. The needles were removed. Hemostasis was achieved, the area was cleaned, and bandaids were placed when appropriate. The patient tolerated the procedure well, and was taken to the recovery room. Images were saved to PACS.    Assessment/Plan: Jose Moreno is a 67 year old male s/p Left L3,4,5 medial branch block today for lumbar spondylosis, facet arthropathy.     Pre procecedure pain score: 7/10   Post procedure pain score: 2/10.   The patient will continue to monitor progress, and they were given a pain diary to complete at home.  They will either fax or mail this back to us or bring it to their next appointment. We will determine the treatment plan after we review the diary.      1. The patient was advised to contact the [facility] for any of the following:   Fever, chills, or night sweats   New onset of pain, numbness, or weakness   Any questions/concerns regarding the procedure  If unable to contact the Pain Center, the patient was instructed to go to a local Emergency Room for any complications.   2. The patient will receive a follow-up call in 1 week.  3. We will await the pain diary to determent  next step of the treatment plan and call patient to schedule.    [facility]

## 2017-11-16 ENCOUNTER — RADIANT APPOINTMENT (OUTPATIENT)
Dept: GENERAL RADIOLOGY | Facility: CLINIC | Age: 67
End: 2017-11-16
Attending: ANESTHESIOLOGY

## 2017-11-16 ENCOUNTER — RADIOLOGY INJECTION OFFICE VISIT (OUTPATIENT)
Dept: PALLIATIVE MEDICINE | Facility: CLINIC | Age: 67
End: 2017-11-16
Payer: COMMERCIAL

## 2017-11-16 VITALS — HEART RATE: 87 BPM | OXYGEN SATURATION: 96 % | DIASTOLIC BLOOD PRESSURE: 90 MMHG | SYSTOLIC BLOOD PRESSURE: 142 MMHG

## 2017-11-16 DIAGNOSIS — M47.816 LUMBAR FACET ARTHROPATHY: ICD-10-CM

## 2017-11-16 DIAGNOSIS — M47.817 LUMBOSACRAL SPONDYLOSIS WITHOUT MYELOPATHY: Primary | ICD-10-CM

## 2017-11-16 PROCEDURE — 64493 INJ PARAVERT F JNT L/S 1 LEV: CPT | Mod: LT | Performed by: ANESTHESIOLOGY

## 2017-11-16 PROCEDURE — 64494 INJ PARAVERT F JNT L/S 2 LEV: CPT | Mod: LT | Performed by: ANESTHESIOLOGY

## 2017-11-16 NOTE — NURSING NOTE
Pre-procedure Intake    Have you been fasting? NA    If yes, for how long?     Are you taking a prescribed blood thinner such as coumadin, Plavix, Xarelto?    No    If yes, when did you take your last dose?     Do you take aspirin?  Yes -   ASA    If cervical procedure, have you held aspirin for 6 days?   NA    Do you have any allergies to contrast dye, iodine, steroid and/or numbing medications?  NO    Are you currently taking antibiotics or have an active infection?  NO    Have you had a fever/elevated temperature within the past week? NO    Are you currently taking oral steroids? NO    Do you have a ? Yes       Are you pregnant or breastfeeding?  Not Applicable    Are the vital signs normal?  No: /93

## 2017-11-16 NOTE — MR AVS SNAPSHOT
After Visit Summary   11/16/2017    Jose Moreno    MRN: [id number]           Patient Information     Date Of Birth          1950        Visit Information        Provider Department      11/16/2017 11:15 AM Nancy Cortez MD [facility]        Today's Diagnoses     Lumbosacral spondylosis without myelopathy    -  1      Care Instructions    [facility]   Medial Branch Block Discharge Instructions  Nurse line:  [phone]  Appointment line:  [phone]    Your procedure was performed by:  Dr. Nancy Cortez     Medications used: lidocaine (local anesthetic), bupivacaine (anesthetic) omnipaque    You will need to complete the Pain Scale Log form and return it to us as soon as possible.  Once we have received the form, we will review it and call you to determine the next steps.     The form can be faxed to [phone] or mailed to:   [facility] - [address], [facility] [address] [id number]      You may resume your regular activity after the injection.    Be cautious with walking since you may have numbness and/or weakness in the lower extremities for up to 6-8 hours after the procedure due to the effects of the local anesthetic.    Avoid driving for 6 hours. The local anesthetic could slow your reflexes    You may shower, however no swimming or tub baths or hot tubs for 24 hours following your procedure.    Your pain will return after the numbing medications have worn off.  You may use your current pain medications as needed.    You may use anti-inflammatory medications (such as ibuprofen, aleve, or advil) or Tylenol for pain control if necessary.  Some people find it helpful to alternate ibuprofen and Tylenol every 3 hours for a couple of days.    You may use ice packs 10-15 minutes three to four times a day at the injection site for comfort.     Do not use heat to painful areas for 6 to 8  hours. This will give the local anesthetic time to wear off and prevent you from accidentally burning your skin.     If you experience any of the following, call the pain center nursing line during work hours at [phone] or the on-call after hours physician line is [phone]:  -Fever over 100 degree F  -Swelling, bleeding, redness, drainage, warmth at the injection site  -Progressive weakness or numbness on your legs or arms  -If lumbar, call if you have a loss of bowel or bladder function  -If cervical, call if you have any unusual headache that is not relieved by Tylenol  -Unusual new onset of pain that is not improving              Follow-ups after your visit        Your next 10 appointments already scheduled     Dec 08, 2017  2:15 PM CST   New Patient Visit with Clint Blankenship MD   [facility] [place] ([facility] [place] [facility])    [address]  [address]  [facility] [address] [phone]              Who to contact     If you have questions or need follow up information about today's clinic visit or your schedule please contact [facility] directly at [phone].  Normal or non-critical lab and imaging results will be communicated to you by MyChart, letter or phone within 4 business days after the clinic has received the results. If you do not hear from us within 7 days, please contact the clinic through [employer] or phone. If you have a critical or abnormal lab result, we will notify you by phone as soon as possible.  Submit refill requests through [employer] or call your pharmacy and they will forward the refill request to us. Please allow 3 business days for your refill to be completed.          Additional Information About Your Visit        [employer] Information     [employer] gives you secure access to your electronic health record. If you see a primary care provider, you can also send messages to your care team and make  appointments. If you have questions, please call your primary care clinic.  If you do not have a primary care provider, please call [phone] and they will assist you.        Care EveryWhere ID     This is your Care EveryWhere ID. This could be used by other organizations to access your [place] medical records  [id number]        Your Vitals Were     Pulse Pulse Oximetry                87 96%           Blood Pressure from Last 3 Encounters:   11/16/17 (!) 133/93   10/27/17 128/84   08/01/17 142/88    Weight from Last 3 Encounters:   10/27/17 66.2 kg (146 lb)   08/01/17 68 kg (150 lb)   06/22/17 68.1 kg (150 lb 3.2 oz)              Today, you had the following     No orders found for display       Primary Care Provider Office Phone # Fax #    Jerri Tavera [phone] [address] [address]        Equal Access to Services     [facility]: Hadii aad ku hadasho Soomaali, waaxda luqadaha, qaybta kaalmada adeegyada, waxay idiin haypanfilon juwan nicolas . So [facility] [phone].    ATENCIÓN: Si habla español, tiene a weber disposición servicios gratuitos de asistencia lingüística. [facility] [phone].    We comply with applicable federal civil rights laws and [place] laws. We do not discriminate on the basis of race, color, national origin, age, disability, sex, sexual orientation, or gender identity.            Thank you!     Thank you for choosing [facility]  for your care. Our goal is always to provide you with excellent care. Hearing back from our patients is one way we can continue to improve our services. Please take a few minutes to complete the written survey that you may receive in the mail after your visit with us. Thank you!             Your Updated Medication List - Protect others around you: Learn how to safely use, store and throw away your medicines at www.disposemymeds.org.          This list is accurate as of: 11/16/17 11:34  AM.  Always use your most recent med list.                   Brand Name Dispense Instructions for use Diagnosis    aspirin 81 MG EC tablet     60 tablet    Take 1 tablet (81 mg) by mouth daily    ACS (acute coronary syndrome) (H)       atorvastatin 40 MG tablet    LIPITOR    90 tablet    Take 1 tablet (40 mg) by mouth daily    Mixed hyperlipidemia, NSTEMI (non-ST elevated myocardial infarction) (H)       FLUoxetine 40 MG capsule    PROzac    90 capsule    Take 1 capsule (40 mg) by mouth daily    Major depressive disorder, single episode, moderate (H)       ketorolac 0.5 % ophthalmic solution    ACULAR    5 mL    Place 1 drop Into the left eye 4 times daily To affected eye        levothyroxine 50 MCG tablet    SYNTHROID/LEVOTHROID    90 tablet    Take 1 tablet (50 mcg) by mouth daily    Hypothyroidism due to acquired atrophy of thyroid       losartan 25 MG tablet    COZAAR    90 tablet    Take 1 tablet (25 mg) by mouth daily    Essential hypertension, benign       nitroGLYcerin 0.4 MG sublingual tablet    NITROSTAT    25 tablet    Place 1 tablet (0.4 mg) under the tongue every 5 minutes as needed for chest pain    ACS (acute coronary syndrome) (H)       ofloxacin 0.3 % ophthalmic solution    OCUFLOX    1 Bottle    Apply 1 drop to eye every 4 hours        omeprazole 20 MG CR capsule    priLOSEC    90 capsule    TAKE ONE CAPSULE BY MOUTH ONCE DAILY    Gastroesophageal reflux disease without esophagitis       traZODone 100 MG tablet    DESYREL    135 tablet    Take 1.5 tablets (150 mg) by mouth At Bedtime    Major depressive disorder, single episode, moderate (H)

## 2017-11-16 NOTE — PATIENT INSTRUCTIONS
[facility]   Medial Branch Block Discharge Instructions  Nurse line:  [phone]  Appointment line:  [phone]    Your procedure was performed by:  Dr. Nancy Cortez     Medications used: lidocaine (local anesthetic), bupivacaine (anesthetic) omnipaque    You will need to complete the Pain Scale Log form and return it to us as soon as possible.  Once we have received the form, we will review it and call you to determine the next steps.     The form can be faxed to [phone] or mailed to:   [facility]    [address], [address] [address]      You may resume your regular activity after the injection.    Be cautious with walking since you may have numbness and/or weakness in the lower extremities for up to 6-8 hours after the procedure due to the effects of the local anesthetic.    Avoid driving for 6 hours. The local anesthetic could slow your reflexes    You may shower, however no swimming or tub baths or hot tubs for 24 hours following your procedure.    Your pain will return after the numbing medications have worn off.  You may use your current pain medications as needed.    You may use anti-inflammatory medications (such as ibuprofen, aleve, or advil) or Tylenol for pain control if necessary.  Some people find it helpful to alternate ibuprofen and Tylenol every 3 hours for a couple of days.    You may use ice packs 10-15 minutes three to four times a day at the injection site for comfort.     Do not use heat to painful areas for 6 to 8 hours. This will give the local anesthetic time to wear off and prevent you from accidentally burning your skin.     If you experience any of the following, call the pain center nursing line during work hours at [phone] or the on-call after hours physician line is [phone]:  -Fever over 100 degree F  -Swelling, bleeding, redness, drainage, warmth at the injection  site  -Progressive weakness or numbness on your legs or arms  -If lumbar, call if you have a loss of bowel or bladder function  -If cervical, call if you have any unusual headache that is not relieved by Tylenol  -Unusual new onset of pain that is not improving

## 2017-11-16 NOTE — NURSING NOTE
Discharge Information    IV Discontiued Time:  NA    Amount of Fluid Infused:  NA    Discharge Criteria = When patient returns to baseline or as per MD order    Consciousness:  Pt is fully awake    Circulation:  BP +/- 20% of pre-procedure level    Respiration:  Patient is able to breathe deeply    O2 Sat:  Patient is able to maintain O2 Sat >92% on room air    Activity:  Moves 4 extremities on command    Ambulation:  Patient is able to stand and walk or stand and pivot into wheelchair    Dressing:  Clean/dry or No Dressing    Notes:   Discharge instructions and AVS given to patient    Patient meets criteria for discharge?  YES    Admitted to PCU?  No    Responsible adult present to accompany patient home?  Yes    Signature/Title:    Katia Medeiros  RN Care Coordinator  [facility]

## 2017-11-20 ENCOUNTER — TELEPHONE (OUTPATIENT)
Dept: PALLIATIVE MEDICINE | Facility: CLINIC | Age: 67
End: 2017-11-20

## 2017-11-20 NOTE — TELEPHONE ENCOUNTER
Routing to  to reach out for scheduling of MBB#2    Katia Medeiros  BSN-RN Care Coordinator  [facility]

## 2017-11-20 NOTE — TELEPHONE ENCOUNTER
LMBB#1 pain data reviewed. Max relief obtained:     At rest:                    100%  Right facet load:      NA  Left facet load:        100%  Normal activity:       100%     Routing to provider to review and  to schedule LMBB#2.

## 2017-11-20 NOTE — TELEPHONE ENCOUNTER
Reviewed pre-screening questions. No changes. Scheduled 11/30.    Flori Valenzuela    [facility]

## 2017-11-22 ENCOUNTER — TRANSFERRED RECORDS (OUTPATIENT)
Dept: FAMILY MEDICINE | Facility: CLINIC | Age: 67
End: 2017-11-22

## 2017-12-05 ENCOUNTER — TRANSFERRED RECORDS (OUTPATIENT)
Dept: FAMILY MEDICINE | Facility: CLINIC | Age: 67
End: 2017-12-05

## 2017-12-08 ENCOUNTER — OFFICE VISIT (OUTPATIENT)
Dept: UROLOGY | Facility: CLINIC | Age: 67
End: 2017-12-08
Payer: COMMERCIAL

## 2017-12-08 VITALS — WEIGHT: 146 LBS | OXYGEN SATURATION: 97 % | BODY MASS INDEX: 24.32 KG/M2 | HEART RATE: 82 BPM | HEIGHT: 65 IN

## 2017-12-08 DIAGNOSIS — R97.20 ELEVATED PROSTATE SPECIFIC ANTIGEN (PSA): Primary | ICD-10-CM

## 2017-12-08 LAB
ALBUMIN UR-MCNC: 100 MG/DL
APPEARANCE UR: CLEAR
BILIRUB UR QL STRIP: NEGATIVE
COLOR UR AUTO: YELLOW
GLUCOSE UR STRIP-MCNC: NEGATIVE MG/DL
HGB UR QL STRIP: NEGATIVE
KETONES UR STRIP-MCNC: NEGATIVE MG/DL
LEUKOCYTE ESTERASE UR QL STRIP: NEGATIVE
NITRATE UR QL: NEGATIVE
PH UR STRIP: 6.5 PH (ref 5–7)
RESIDUAL VOLUME (RV) (EXTERNAL): 0
SOURCE: ABNORMAL
SP GR UR STRIP: 1.02 (ref 1–1.03)
UROBILINOGEN UR STRIP-ACNC: 0.2 EU/DL (ref 0.2–1)

## 2017-12-08 PROCEDURE — 81003 URINALYSIS AUTO W/O SCOPE: CPT | Mod: QW | Performed by: UROLOGY

## 2017-12-08 PROCEDURE — 99203 OFFICE O/P NEW LOW 30 MIN: CPT | Mod: 25 | Performed by: UROLOGY

## 2017-12-08 PROCEDURE — 51798 US URINE CAPACITY MEASURE: CPT | Performed by: UROLOGY

## 2017-12-08 ASSESSMENT — PAIN SCALES - GENERAL: PAINLEVEL: NO PAIN (0)

## 2017-12-08 NOTE — LETTER
12/8/2017       RE: Jose Moreno  [address] [facility] [address]     Dear Colleague,    Thank you for referring your patient, Jose Mroeno, to the [facility] [place] at [facility]. Please see a copy of my visit note below.    [facility]  Main Office: [address]  [address]  [place] [address]       CHIEF COMPLAINT:  Elevated PSA    HISTORY:   This is a 67-year-old gentleman with no prior urologic history who presents with an elevated PSA of 9.4. He does not know of any previous PSA values. He has no bothersome urinary symptoms. He does take saw palmetto daily. He has no history of gross hematuria or infections. No family history of prostate cancer. His mother did have breast cancer but he knows of no BRCA mutation history. He is here today for evaluation of the prostate.      PAST MEDICAL HISTORY:   Past Medical History:   Diagnosis Date     ACS (acute coronary syndrome) (H) 01-23-15     ADHD (attention deficit hyperactivity disorder)      CAD (coronary artery disease)     cardiac cath 1/23/15: SHERRY to 1st diagonal, SHERRY x2 to LAD, cath 2009: no intervention     Esophageal reflux      History of hyperthyroidism 4/9/2014     Hyperlipidemia      Hypertension      Mitral regurgitation 2009    moderately severe MVP, s/p robotic repair at [facility]     NSTEMI (non-ST elevated myocardial infarction) (H) 01-23-15     Pulmonary nodules 2009    CT-recommend repeat in 6-12 months     Rotator cuff rupture 11/3/2011       PAST SURGICAL HISTORY:   Past Surgical History:   Procedure Laterality Date     DECOMPRESSION LUMBAR MINIMALLY INVASIVE ONE LEVEL  1/11/2012    Procedure:DECOMPRESSION LUMBAR MINIMALLY INVASIVE ONE LEVEL; L4-5 Decompression Minimally Invasive; Surgeon:MESSI HORAN; Location:[facility] OR     EYE EXAM ESTABLISHED PT  1/14/06     HC COLONOSCOPY THRU STOMA, DIAGNOSTIC  7/00    GI     HCL PSA, DIAGNOSTIC (TUMOR MARKER)   2003     HEART CATH RIGHT AND LEFT HEART CATH  01-23-15    See report, pt transfered to [facility] for complex bifurcation PCI of LAD diagonal vessel.      HEART CATH RIGHT AND LEFT HEART CATH  01-26-15    PTCA and implantation of SHERRY to 1st diagonal, SHERRY to mid LAD, SHERRY to proximal LAD     HERNIA REPAIR       ORTHOPEDIC SURGERY      Hx of rotator cuff rupture and repair     REMOVAL OF SPERM DUCT(S)      Vasectomy     REMOVAL OF SPERM DUCT(S)      reversal of vasectomy     REPAIR VALVE MITRAL  2009    [facility] robotic repair      TESTICLE SURGERY       VASECTOMY       VASOVASOSTOMY         FAMILY HISTORY:   Family History   Problem Relation Age of Onset     CANCER Mother      breast, lung     Depression Father      alcohlism     DIABETES No family hx of      Cardiovascular No family hx of      Cancer - colorectal No family hx of      Prostate Cancer No family hx of        SOCIAL HISTORY:   Social History   Substance Use Topics     Smoking status: Never Smoker     Smokeless tobacco: Never Used     Alcohol use 4.2 oz/week     7 Standard drinks or equivalent per week      Comment: 1 beer daily          Allergies   Allergen Reactions     Ace Inhibitors Cough     Dry cough         Current Outpatient Prescriptions:      losartan (COZAAR) 25 MG tablet, Take 1 tablet (25 mg) by mouth daily, Disp: 90 tablet, Rfl: 1     atorvastatin (LIPITOR) 40 MG tablet, Take 1 tablet (40 mg) by mouth daily, Disp: 90 tablet, Rfl: 1     omeprazole (PRILOSEC) 20 MG CR capsule, TAKE ONE CAPSULE BY MOUTH ONCE DAILY, Disp: 90 capsule, Rfl: 3     FLUoxetine (PROZAC) 40 MG capsule, Take 1 capsule (40 mg) by mouth daily, Disp: 90 capsule, Rfl: 3     traZODone (DESYREL) 100 MG tablet, Take 1.5 tablets (150 mg) by mouth At Bedtime, Disp: 135 tablet, Rfl: 3     levothyroxine (SYNTHROID/LEVOTHROID) 50 MCG tablet, Take 1 tablet (50 mcg) by mouth daily, Disp: 90 tablet, Rfl: 3     aspirin EC 81 MG EC tablet, Take 1 tablet (81 mg) by mouth daily, Disp:  "60 tablet, Rfl: 0     nitroglycerin (NITROSTAT) 0.4 MG sublingual tablet, Place 1 tablet (0.4 mg) under the tongue every 5 minutes as needed for chest pain, Disp: 25 tablet, Rfl: 3     ketorolac (ACULAR) 0.5 % ophthalmic solution, Place 1 drop Into the left eye 4 times daily To affected eye (Patient not taking: Reported on 12/8/2017), Disp: 5 mL, Rfl: 0     ofloxacin (OCUFLOX) 0.3 % ophthalmic solution, Apply 1 drop to eye every 4 hours (Patient not taking: Reported on 12/8/2017), Disp: 1 Bottle, Rfl: 0    Review Of Systems:  Skin: negative  Eyes: negative  Ears/Nose/Throat: negative  Respiratory: No shortness of breath, dyspnea on exertion, cough, or hemoptysis  Cardiovascular: negative  Gastrointestinal: negative  Genitourinary: negative  Musculoskeletal: negative  Neurologic: negative  Psychiatric: negative  Hematologic/Lymphatic/Immunologic: negative  Endocrine: negative      PHYSICAL EXAM:    Pulse 82  Ht 1.651 m (5' 5\")  Wt 66.2 kg (146 lb)  SpO2 97%  BMI 24.3 kg/m2  General appearance: In NAD, conversant  HEENT: Normocephalic and atraumatic, anicteric sclera  Cardiovascular: Not examined  Respiratory: normal, non-labored breathing  Gastrointestinal: negative, Abdomen soft, non-tender, and non-distended.   Musculoskeletal: Not Examined  Peripheral Vascular/extremity: No peripheral edema  Skin: Normal temperature, turgor, and texture. No rash  Psychiatric: Appropriate affect, alert and oriented to person, place, and time    Penis: Normal  Scrotal skin: Normal, no lesions  Testicles: Normal to palpation bilaterally  Epididymis: Normal to palpation bilaterally  Lymphatic: Normal inguinal lymph nodes  Digital Rectal Exam: His prostate is moderately enlarged with benign and symmetric to palpation    Cystoscopy: Not done      PSA: 9.4    UA RESULTS:  Recent Labs   Lab Test  06/13/13   1307   COLOR  Yellow   URINEGLC  Neg   URINEBILI  Small*   URINEKETONE  Neg   SG  Other*   UBLD  Neg   UROBILINOGEN  0.2 "   NITRITE  Neg       Bladder Scan:     Other Labs:      Imaging Studies: None      CLINICAL IMPRESSION:   Elevated PSA    PLAN:   He has an elevated PSA. I think it is likely that he will require a prostate biopsy given the elevation in his PSA, but I would like to recheck the number first to confirm it before proceeding with biopsy. We did discuss biopsy in some detail today. I will have him come back to the clinic in the near future to recheck the PSA and we can schedule a biopsy if necessary from there.      Again, thank you for allowing me to participate in the care of your patient.      Sincerely,    Clint Blankenship MD

## 2017-12-08 NOTE — MR AVS SNAPSHOT
After Visit Summary   12/8/2017    Jose Moreno    MRN: [id number]           Patient Information     Date Of Birth          1950        Visit Information        Provider Department      12/8/2017 2:15 PM Clint Blankenship MD [facility]        Today's Diagnoses     Elevated prostate specific antigen (PSA)    -  1       Follow-ups after your visit        Follow-up notes from your care team     Return in about 1 month (around 1/8/2018) for PSA.      Your next 10 appointments already scheduled     Dec 13, 2017  2:15 PM CST   Radiology Injections with Nancy Cortez MD   [facility] ([facility])    [address] [place] [address]  [address]  [facility] [address]   [phone]            Jan 04, 2018 12:30 PM CST   Pre-Op physical with Maurice Briscoe MD   [facility] ([facility]    [address] [facility].  [address]  [facility] [id number] [phone]            Mynor 10, 2018  9:45 AM CST   Return Visit with Clint Blankenship MD   [facility] ([facility])    [address]  [address]  [facility] [address] [phone]              Future tests that were ordered for you today     Open Future Orders        Priority Expected Expires Ordered    PSA tumor marker Routine  12/8/2018 12/8/2017            Who to contact     If you have questions or need follow up information about today's clinic visit or your schedule please contact [facility] directly at [phone].  Normal or non-critical lab and imaging results will be communicated to you by MyChart, letter or phone within 4 business days after the clinic has received the results. If you do not hear from us within 7 days, please contact the clinic through MyChart or phone. If you have a critical  "or abnormal lab result, we will notify you by phone as soon as possible.  Submit refill requests through [employer] or call your pharmacy and they will forward the refill request to us. Please allow 3 business days for your refill to be completed.          Additional Information About Your Visit        [employer] Information     [employer] gives you secure access to your electronic health record. If you see a primary care provider, you can also send messages to your care team and make appointments. If you have questions, please call your primary care clinic.  If you do not have a primary care provider, please call [phone] and they will assist you.        Care EveryWhere ID     This is your Care EveryWhere ID. This could be used by other organizations to access your [place] medical records  [id number]        Your Vitals Were     Pulse Height Pulse Oximetry BMI (Body Mass Index)          82 1.651 m (5' 5\") 97% 24.3 kg/m2         Blood Pressure from Last 3 Encounters:   11/16/17 142/90   10/27/17 128/84   08/01/17 142/88    Weight from Last 3 Encounters:   12/08/17 66.2 kg (146 lb)   10/27/17 66.2 kg (146 lb)   08/01/17 68 kg (150 lb)              We Performed the Following     Bladder scan     UA without Microscopic        Primary Care Provider Office Phone # Fax #    Jerri Tavera [phone] [address] [address]        Equal Access to Services     [facility]: Hadii aad ku hadasho Soomaali, waaxda luqadaha, qaybta kaalmada adeegyada, jayjay nicolas . So [facility] [phone].    ATENCIÓN: Si habla español, tiene a weber disposición servicios gratuitos de asistencia lingüística. Llame al [phone].    We comply with applicable federal civil rights laws and [place] laws. We do not discriminate on the basis of race, color, national origin, age, disability, sex, sexual orientation, or gender identity.            Thank you!     Thank you for " choosing [facility] [place]  for your care. Our goal is always to provide you with excellent care. Hearing back from our patients is one way we can continue to improve our services. Please take a few minutes to complete the written survey that you may receive in the mail after your visit with us. Thank you!             Your Updated Medication List - Protect others around you: Learn how to safely use, store and throw away your medicines at www.disposemymeds.org.          This list is accurate as of: 12/8/17  3:30 PM.  Always use your most recent med list.                   Brand Name Dispense Instructions for use Diagnosis    aspirin 81 MG EC tablet     60 tablet    Take 1 tablet (81 mg) by mouth daily    ACS (acute coronary syndrome) (H)       atorvastatin 40 MG tablet    LIPITOR    90 tablet    Take 1 tablet (40 mg) by mouth daily    Mixed hyperlipidemia, NSTEMI (non-ST elevated myocardial infarction) (H)       FLUoxetine 40 MG capsule    PROzac    90 capsule    Take 1 capsule (40 mg) by mouth daily    Major depressive disorder, single episode, moderate (H)       ketorolac 0.5 % ophthalmic solution    ACULAR    5 mL    Place 1 drop Into the left eye 4 times daily To affected eye        levothyroxine 50 MCG tablet    SYNTHROID/LEVOTHROID    90 tablet    Take 1 tablet (50 mcg) by mouth daily    Hypothyroidism due to acquired atrophy of thyroid       losartan 25 MG tablet    COZAAR    90 tablet    Take 1 tablet (25 mg) by mouth daily    Essential hypertension, benign       nitroGLYcerin 0.4 MG sublingual tablet    NITROSTAT    25 tablet    Place 1 tablet (0.4 mg) under the tongue every 5 minutes as needed for chest pain    ACS (acute coronary syndrome) (H)       ofloxacin 0.3 % ophthalmic solution    OCUFLOX    1 Bottle    Apply 1 drop to eye every 4 hours        omeprazole 20 MG CR capsule    priLOSEC    90 capsule    TAKE ONE CAPSULE BY MOUTH ONCE DAILY    Gastroesophageal  reflux disease without esophagitis       traZODone 100 MG tablet    DESYREL    135 tablet    Take 1.5 tablets (150 mg) by mouth At Bedtime    Major depressive disorder, single episode, moderate (H)

## 2017-12-08 NOTE — PROGRESS NOTES
[facility]  Main Office: [address]  [address]  [place] [address]       CHIEF COMPLAINT:  Elevated PSA    HISTORY:   This is a 67-year-old gentleman with no prior urologic history who presents with an elevated PSA of 9.4. He does not know of any previous PSA values. He has no bothersome urinary symptoms. He does take saw palmetto daily. He has no history of gross hematuria or infections. No family history of prostate cancer. His mother did have breast cancer but he knows of no BRCA mutation history. He is here today for evaluation of the prostate.      PAST MEDICAL HISTORY:   Past Medical History:   Diagnosis Date     ACS (acute coronary syndrome) (H) 01-23-15     ADHD (attention deficit hyperactivity disorder)      CAD (coronary artery disease)     cardiac cath 1/23/15: SHERRY to 1st diagonal, SHERRY x2 to LAD, cath 2009: no intervention     Esophageal reflux      History of hyperthyroidism 4/9/2014     Hyperlipidemia      Hypertension      Mitral regurgitation 2009    moderately severe MVP, s/p robotic repair at [place]     NSTEMI (non-ST elevated myocardial infarction) (H) 01-23-15     Pulmonary nodules 2009    CT-recommend repeat in 6-12 months     Rotator cuff rupture 11/3/2011       PAST SURGICAL HISTORY:   Past Surgical History:   Procedure Laterality Date     DECOMPRESSION LUMBAR MINIMALLY INVASIVE ONE LEVEL  1/11/2012    Procedure:DECOMPRESSION LUMBAR MINIMALLY INVASIVE ONE LEVEL; L4-5 Decompression Minimally Invasive; Surgeon:MESSI HORAN; Location:[facility] OR     EYE EXAM ESTABLISHED PT  1/14/06     HC COLONOSCOPY THRU STOMA, DIAGNOSTIC  7/00    GI     HCL PSA, DIAGNOSTIC (TUMOR MARKER)  2003     HEART CATH RIGHT AND LEFT HEART CATH  01-23-15    See report, pt transfered to [facility] for complex bifurcation PCI of LAD diagonal vessel.      HEART CATH RIGHT AND LEFT HEART CATH  01-26-15    PTCA and implantation of SHERRY to 1st diagonal, SHERRY to mid LAD, SHERRY to proximal LAD     HERNIA REPAIR        ORTHOPEDIC SURGERY      Hx of rotator cuff rupture and repair     REMOVAL OF SPERM DUCT(S)      Vasectomy     REMOVAL OF SPERM DUCT(S)      reversal of vasectomy     REPAIR VALVE MITRAL  2009    [facility] robotic repair      TESTICLE SURGERY       VASECTOMY       VASOVASOSTOMY         FAMILY HISTORY:   Family History   Problem Relation Age of Onset     CANCER Mother      breast, lung     Depression Father      alcohlism     DIABETES No family hx of      Cardiovascular No family hx of      Cancer - colorectal No family hx of      Prostate Cancer No family hx of        SOCIAL HISTORY:   Social History   Substance Use Topics     Smoking status: Never Smoker     Smokeless tobacco: Never Used     Alcohol use 4.2 oz/week     7 Standard drinks or equivalent per week      Comment: 1 beer daily          Allergies   Allergen Reactions     Ace Inhibitors Cough     Dry cough         Current Outpatient Prescriptions:      losartan (COZAAR) 25 MG tablet, Take 1 tablet (25 mg) by mouth daily, Disp: 90 tablet, Rfl: 1     atorvastatin (LIPITOR) 40 MG tablet, Take 1 tablet (40 mg) by mouth daily, Disp: 90 tablet, Rfl: 1     omeprazole (PRILOSEC) 20 MG CR capsule, TAKE ONE CAPSULE BY MOUTH ONCE DAILY, Disp: 90 capsule, Rfl: 3     FLUoxetine (PROZAC) 40 MG capsule, Take 1 capsule (40 mg) by mouth daily, Disp: 90 capsule, Rfl: 3     traZODone (DESYREL) 100 MG tablet, Take 1.5 tablets (150 mg) by mouth At Bedtime, Disp: 135 tablet, Rfl: 3     levothyroxine (SYNTHROID/LEVOTHROID) 50 MCG tablet, Take 1 tablet (50 mcg) by mouth daily, Disp: 90 tablet, Rfl: 3     aspirin EC 81 MG EC tablet, Take 1 tablet (81 mg) by mouth daily, Disp: 60 tablet, Rfl: 0     nitroglycerin (NITROSTAT) 0.4 MG sublingual tablet, Place 1 tablet (0.4 mg) under the tongue every 5 minutes as needed for chest pain, Disp: 25 tablet, Rfl: 3     ketorolac (ACULAR) 0.5 % ophthalmic solution, Place 1 drop Into the left eye 4 times daily To affected eye (Patient not  "taking: Reported on 12/8/2017), Disp: 5 mL, Rfl: 0     ofloxacin (OCUFLOX) 0.3 % ophthalmic solution, Apply 1 drop to eye every 4 hours (Patient not taking: Reported on 12/8/2017), Disp: 1 Bottle, Rfl: 0    Review Of Systems:  Skin: negative  Eyes: negative  Ears/Nose/Throat: negative  Respiratory: No shortness of breath, dyspnea on exertion, cough, or hemoptysis  Cardiovascular: negative  Gastrointestinal: negative  Genitourinary: negative  Musculoskeletal: negative  Neurologic: negative  Psychiatric: negative  Hematologic/Lymphatic/Immunologic: negative  Endocrine: negative      PHYSICAL EXAM:    Pulse 82  Ht 1.651 m (5' 5\")  Wt 66.2 kg (146 lb)  SpO2 97%  BMI 24.3 kg/m2  General appearance: In NAD, conversant  HEENT: Normocephalic and atraumatic, anicteric sclera  Cardiovascular: Not examined  Respiratory: normal, non-labored breathing  Gastrointestinal: negative, Abdomen soft, non-tender, and non-distended.   Musculoskeletal: Not Examined  Peripheral Vascular/extremity: No peripheral edema  Skin: Normal temperature, turgor, and texture. No rash  Psychiatric: Appropriate affect, alert and oriented to person, place, and time    Penis: Normal  Scrotal skin: Normal, no lesions  Testicles: Normal to palpation bilaterally  Epididymis: Normal to palpation bilaterally  Lymphatic: Normal inguinal lymph nodes  Digital Rectal Exam: His prostate is moderately enlarged with benign and symmetric to palpation    Cystoscopy: Not done      PSA: 9.4    UA RESULTS:  Recent Labs   Lab Test  06/13/13   1307   COLOR  Yellow   URINEGLC  Neg   URINEBILI  Small*   URINEKETONE  Neg   SG  Other*   UBLD  Neg   UROBILINOGEN  0.2   NITRITE  Neg       Bladder Scan:     Other Labs:      Imaging Studies: None      CLINICAL IMPRESSION:   Elevated PSA    PLAN:   He has an elevated PSA. I think it is likely that he will require a prostate biopsy given the elevation in his PSA, but I would like to recheck the number first to confirm it before " proceeding with biopsy. We did discuss biopsy in some detail today. I will have him come back to the clinic in the near future to recheck the PSA and we can schedule a biopsy if necessary from there.      Clint Blankenship MD

## 2017-12-12 NOTE — PROGRESS NOTES
[facility] - Procedure Note    Date of Service: 12/13/2017    Procedure performed: Left L3,4,5 medial branch block #2 (1st done on 11/16/2017)  Diagnosis: Lumbar spondylosis; Lumbar facet arthropathy  : Nancy Cortez MD & Padma Bear MD (pain fellow)      Indications: Jose Moreno is a 67 year old male who is seen at the request of Dr. Rodney Galvez for lumbar medial branch blocks. The patient describes pain with extension/rotation. The patient reports minimal improvement with conservative treatment, including previous injections, PT and medications.    The patient had Left L4-5 and L5-S1 facet joint injections on 6/05/2017 which provided good pain relief for 7 weeks.      MRI was done on 12/15/2016 at [facility] which showed         Allergies:      Allergies   Allergen Reactions     Ace Inhibitors Cough     Dry cough        Vitals:  /88  Pulse 71  SpO2 96%    Review of Systems: The patient denies recent fever, chills, illness, use of antibiotics or anticoagulants. All other 10-point review of systems negative.     Procedure:   Options/alternatives, benefits and risks were discussed with the patient including bleeding, infection, tissue trauma, exposure to radiation, reaction to medications, spinal cord injury, weakness, numbness and paralysis.  Questions were answered to his satisfaction and he agrees to proceed. Voluntary informed consent was obtained and signed.     After getting informed consent, patient was brought into the procedure suite and was placed in a prone position on the procedure table.   A Pause for the Cause was performed.  Patient was prepped and draped in sterile fashion.     Under AP fluoroscopic guidance the L3, L4, L5 vertebral bodies were identified. The C-arm was rotated to the oblique view to afford optimal visualization the pedicles.  Lidocaine 1% was used to anesthetize the skin at each level.  Under intermittent fluoroscopy, 22 gauge 3.5 inch Quinke spinal  needles were positioned inferior and lateral to the intersection of the transverse process and pedicle at the Left L4 & L5 levels, as well as the corresponding sacral alar notch. The needle positions were verified and optimized from the AP and lateral views.    The anatomic targets for the L3 & L4 medial nerve and L5 dorsal ramus (which functionally incorporates the medial branch) were the  L4 & L5 transverse processes and sacral alar notch, with laterality as described above, resulting in blockade of the L4/5 and L5/S1 facet joints.    After negative aspiration, 1cc's of Omnipaque 300 was injected to rule out intravascular injection.  9cc's of omnipaque 300 was wasted.  Bupivacaine 0.5% 0.5 ml was injected at each location. The needles were removed. Hemostasis was achieved, the area was cleaned, and bandaids were placed when appropriate. The patient tolerated the procedure well, and was taken to the recovery room. Images were saved to PACS.    Assessment/Plan: Jose Moreno is a 67 year old male s/p Left L3,4,5 medial branch block today for lumbar spondylosis, facet arthropathy.     Pre procecedure pain score: 7/10   Post procedure pain score: 2/10.   The patient will continue to monitor progress, and they were given a pain diary to complete at home.  They will either fax or mail this back to us or bring it to their next appointment. We will determine the treatment plan after we review the diary.      1. The patient was advised to contact the [facility] for any of the following:   Fever, chills, or night sweats   New onset of pain, numbness, or weakness   Any questions/concerns regarding the procedure  If unable to contact the Pain Center, the patient was instructed to go to a local Emergency Room for any complications.   2. The patient will receive a follow-up call in 1 week.  3. We will await the pain diary to determent next step of the treatment plan and call patient to schedule.    Nancy  [facility]

## 2017-12-13 ENCOUNTER — RADIANT APPOINTMENT (OUTPATIENT)
Dept: GENERAL RADIOLOGY | Facility: CLINIC | Age: 67
End: 2017-12-13
Attending: ANESTHESIOLOGY
Payer: COMMERCIAL

## 2017-12-13 ENCOUNTER — RADIOLOGY INJECTION OFFICE VISIT (OUTPATIENT)
Dept: PALLIATIVE MEDICINE | Facility: CLINIC | Age: 67
End: 2017-12-13
Payer: COMMERCIAL

## 2017-12-13 VITALS — SYSTOLIC BLOOD PRESSURE: 169 MMHG | DIASTOLIC BLOOD PRESSURE: 96 MMHG | OXYGEN SATURATION: 96 % | HEART RATE: 63 BPM

## 2017-12-13 DIAGNOSIS — M47.816 LUMBAR FACET ARTHROPATHY: ICD-10-CM

## 2017-12-13 DIAGNOSIS — M47.817 LUMBOSACRAL SPONDYLOSIS WITHOUT MYELOPATHY: Primary | ICD-10-CM

## 2017-12-13 PROCEDURE — 64493 INJ PARAVERT F JNT L/S 1 LEV: CPT | Mod: LT | Performed by: ANESTHESIOLOGY

## 2017-12-13 PROCEDURE — 64494 INJ PARAVERT F JNT L/S 2 LEV: CPT | Mod: LT | Performed by: ANESTHESIOLOGY

## 2017-12-13 NOTE — PATIENT INSTRUCTIONS
[facility]   Medial Branch Block Discharge Instructions  Nurse line:  [phone]  Appointment line:  [phone]    Your procedure was performed by:  Dr. Nancy Cortez     Medications used: lidocaine    You will need to complete the Pain Scale Log form and return it to us as soon as possible.  Once we have received the form, we will review it and call you to determine the next steps.     The form can be faxed to [phone] or mailed to:   [facility] - [facility]    [address] [address] [address] [id number]      You may resume your regular activity after the injection.    Be cautious with walking since you may have numbness and/or weakness in the lower extremities for up to 6-8 hours after the procedure due to the effects of the local anesthetic.    Avoid driving for 6 hours. The local anesthetic could slow your reflexes    You may shower, however no swimming or tub baths or hot tubs for 24 hours following your procedure.    Your pain will return after the numbing medications have worn off.  You may use your current pain medications as needed.    You may use anti-inflammatory medications (such as ibuprofen, aleve, or advil) or Tylenol for pain control if necessary.  Some people find it helpful to alternate ibuprofen and Tylenol every 3 hours for a couple of days.    You may use ice packs 10-15 minutes three to four times a day at the injection site for comfort.     Do not use heat to painful areas for 6 to 8 hours. This will give the local anesthetic time to wear off and prevent you from accidentally burning your skin.     If you experience any of the following, call the pain center nursing line during work hours at [phone] or the on-call after hours physician line is [phone]:  -Fever over 100 degree F  -Swelling, bleeding, redness, drainage, warmth at the injection site  -Progressive weakness or numbness on your legs or  arms  -If lumbar, call if you have a loss of bowel or bladder function  -If cervical, call if you have any unusual headache that is not relieved by Tylenol  -Unusual new onset of pain that is not improving

## 2017-12-13 NOTE — MR AVS SNAPSHOT
After Visit Summary   12/13/2017    Jose Moreno    MRN: [id number]           Patient Information     Date Of Birth          1950        Visit Information        Provider Department      12/13/2017 2:15 PM Nancy Cortez MD [facility]        Care Instructions    [facility]   Medial Branch Block Discharge Instructions  Nurse line:  [phone]  Appointment line:  [phone]    Your procedure was performed by:  Dr. Nancy Cortez     Medications used: lidocaine    You will need to complete the Pain Scale Log form and return it to us as soon as possible.  Once we have received the form, we will review it and call you to determine the next steps.     The form can be faxed to [phone] or mailed to:   [facility] - [address], [facility] [address] [id number]      You may resume your regular activity after the injection.    Be cautious with walking since you may have numbness and/or weakness in the lower extremities for up to 6-8 hours after the procedure due to the effects of the local anesthetic.    Avoid driving for 6 hours. The local anesthetic could slow your reflexes    You may shower, however no swimming or tub baths or hot tubs for 24 hours following your procedure.    Your pain will return after the numbing medications have worn off.  You may use your current pain medications as needed.    You may use anti-inflammatory medications (such as ibuprofen, aleve, or advil) or Tylenol for pain control if necessary.  Some people find it helpful to alternate ibuprofen and Tylenol every 3 hours for a couple of days.    You may use ice packs 10-15 minutes three to four times a day at the injection site for comfort.     Do not use heat to painful areas for 6 to 8 hours. This will give the local anesthetic time to wear off and prevent you from accidentally burning your skin.     If you experience  any of the following, call the pain center nursing line during work hours at [phone] or the on-call after hours physician line is [phone]:  -Fever over 100 degree F  -Swelling, bleeding, redness, drainage, warmth at the injection site  -Progressive weakness or numbness on your legs or arms  -If lumbar, call if you have a loss of bowel or bladder function  -If cervical, call if you have any unusual headache that is not relieved by Tylenol  -Unusual new onset of pain that is not improving              Follow-ups after your visit        Your next 10 appointments already scheduled     Jan 04, 2018 12:30 PM CST   Pre-Op physical with Maurice Briscoe MD   [facility] ([facility])    [address]  [address]  [facility] [address] [phone]            Mynor 10, 2018  9:45 AM CST   Return Visit with Clint Blankenship MD   [facility] ([facility])    [address]  [address]  [facility] [address] [phone]              Who to contact     If you have questions or need follow up information about today's clinic visit or your schedule please contact [facility] directly at [phone].  Normal or non-critical lab and imaging results will be communicated to you by Sambazonhart, letter or phone within 4 business days after the clinic has received the results. If you do not hear from us within 7 days, please contact the clinic through MyChart or phone. If you have a critical or abnormal lab result, we will notify you by phone as soon as possible.  Submit refill requests through [employer] or call your pharmacy and they will forward the refill request to us. Please allow 3 business days for your refill to be completed.          Additional Information About Your Visit        SambazonharSNTMNT Information     [employer] gives you secure access to your electronic health record. If  you see a primary care provider, you can also send messages to your care team and make appointments. If you have questions, please call your primary care clinic.  If you do not have a primary care provider, please call [phone] and they will assist you.        Care EveryWhere ID     This is your Care EveryWhere ID. This could be used by other organizations to access your [facility] medical records  [id number]        Your Vitals Were     Pulse Pulse Oximetry                71 96%           Blood Pressure from Last 3 Encounters:   12/13/17 136/88   11/16/17 142/90   10/27/17 128/84    Weight from Last 3 Encounters:   12/08/17 66.2 kg (146 lb)   10/27/17 66.2 kg (146 lb)   08/01/17 68 kg (150 lb)              Today, you had the following     No orders found for display       Primary Care Provider Office Phone # Fax #    Jerri Tavera [phone] [address] [address]        Equal Access to Services     [facility]: Hadii aad ku hadasho Soomaali, waaxda luqadaha, qaybta kaalmada adeegyada, waxay limain hayhenry nicolas . So [facility] [phone].    ATENCIÓN: Si habla español, tiene a weber disposición servicios gratuitos de asistencia lingüística. [facility] [phone].    We comply with applicable federal civil rights laws and [place] laws. We do not discriminate on the basis of race, color, national origin, age, disability, sex, sexual orientation, or gender identity.            Thank you!     Thank you for choosing [facility]  for your care. Our goal is always to provide you with excellent care. Hearing back from our patients is one way we can continue to improve our services. Please take a few minutes to complete the written survey that you may receive in the mail after your visit with us. Thank you!             Your Updated Medication List - Protect others around you: Learn how to safely use, store and throw away your medicines at  www.disposemymeds.org.          This list is accurate as of: 12/13/17  3:01 PM.  Always use your most recent med list.                   Brand Name Dispense Instructions for use Diagnosis    aspirin 81 MG EC tablet     60 tablet    Take 1 tablet (81 mg) by mouth daily    ACS (acute coronary syndrome) (H)       atorvastatin 40 MG tablet    LIPITOR    90 tablet    Take 1 tablet (40 mg) by mouth daily    Mixed hyperlipidemia, NSTEMI (non-ST elevated myocardial infarction) (H)       FLUoxetine 40 MG capsule    PROzac    90 capsule    Take 1 capsule (40 mg) by mouth daily    Major depressive disorder, single episode, moderate (H)       ketorolac 0.5 % ophthalmic solution    ACULAR    5 mL    Place 1 drop Into the left eye 4 times daily To affected eye        levothyroxine 50 MCG tablet    SYNTHROID/LEVOTHROID    90 tablet    Take 1 tablet (50 mcg) by mouth daily    Hypothyroidism due to acquired atrophy of thyroid       losartan 25 MG tablet    COZAAR    90 tablet    Take 1 tablet (25 mg) by mouth daily    Essential hypertension, benign       nitroGLYcerin 0.4 MG sublingual tablet    NITROSTAT    25 tablet    Place 1 tablet (0.4 mg) under the tongue every 5 minutes as needed for chest pain    ACS (acute coronary syndrome) (H)       ofloxacin 0.3 % ophthalmic solution    OCUFLOX    1 Bottle    Apply 1 drop to eye every 4 hours        omeprazole 20 MG CR capsule    priLOSEC    90 capsule    TAKE ONE CAPSULE BY MOUTH ONCE DAILY    Gastroesophageal reflux disease without esophagitis       traZODone 100 MG tablet    DESYREL    135 tablet    Take 1.5 tablets (150 mg) by mouth At Bedtime    Major depressive disorder, single episode, moderate (H)

## 2017-12-15 ENCOUNTER — TELEPHONE (OUTPATIENT)
Dept: PALLIATIVE MEDICINE | Facility: CLINIC | Age: 67
End: 2017-12-15

## 2017-12-15 ENCOUNTER — MYC MEDICAL ADVICE (OUTPATIENT)
Dept: PALLIATIVE MEDICINE | Facility: CLINIC | Age: 67
End: 2017-12-15

## 2017-12-15 NOTE — TELEPHONE ENCOUNTER
Lumbar MBB#2 pain data reviewed. Max relief obtained:100%     At rest:                    66% for first ten minutes, no other data collected  Right facet load:        NA  Left facet load:        86% for first 10,no other data collected  Normal activity:       100% for hour 1-2, then 86% for hour 3     Routing to provider to review if OK to proceed with RFA    PT completed one year ago and physicans neck and back, approx 25 sessions.      Katia Medeiros  BSN-RN Care Coordinator  [facility]

## 2017-12-19 NOTE — TELEPHONE ENCOUNTER
Faxed completed PA form and supporting documentation for [facility] to [employer]. Right fax confirmation 12/19 at  7:10 am.          Ursula HUFFMAN    [facility]

## 2017-12-27 NOTE — TELEPHONE ENCOUNTER
PA approved.  Effective date: 12/19/17-6/18/18  PA reference #: [id number]  Pt. notified:   Yes   Pt. informed directly.        Ursula HUFFMAN    [facility]

## 2017-12-27 NOTE — TELEPHONE ENCOUNTER
Pre-screening Questions for RFA Procedure      Procedure ordered? Lumbar RFA    What insurance are we billing for this procedure?  [employer]    Has patient had this injection before? No  Any chance of pregnancy? Not Applicable   If YES, do NOT schedule and route to RN pool    Is  Needed?: No  Will patient have a ?  Yes   If pt is given sedation meds, no driving for 24 hours.  Is pt taking a cab or transportation service? NO        If so will need to be accompanied by an adult too (friend/family member) in order for IV sedation to be given.      Per [facility] Policy:  Outpatients are to have responsible adult or family member to accompany them at discharge and drive them home. A service providing medically trained drivers or attendants would be acceptable. Public transportation would not be acceptable unless the patient is accompanied by a responsible adult or family member.    Is patient taking any blood thinners (plavix, coumadin, jantoven, warfarin, heparin, pradaxa or dabigatran )? No   If YES, do NOT schedule, and route to RN pool    Is patient taking any aspirin products? Yes - Pt takes 81 mg daily; instructed to hold 0 day(s) prior to procedure.      If more than 325mg/day do NOT schedule and route to RN pool     For CERVICAL procedures, hold all aspirin products for 6 days.     Does the patient have a bleeding or clotting disorder? No     If YES, it it OKAY to schedule AND route to RN pool    **For any patients with platelet count <100, must be forwarded to provider**    Does patient have an active infection or treated for one within the past week? No   If YES, do NOT schedule and route to RN nurse pool     Is patient currently taking any antibiotics?  No    For patients on chronic, preventative, or prophylactic antibiotics, procedures may be scheduled.     For patients on antibiotics for active or recent infection:    Aylin Chen Burton, Snitzer-antibiotic course must have  been completed for 4 days    Giorgi Galvez-antibiotic course must have been completed for 7 days    Is patient currently taking any steroid medications? (i.e. Prednisone, Medrol)  No     For patients on steroid medications:    Aylin Chen Burton, Snitzer-steroid course must have been completed for 4 days    Dr. Galvez-steroid course must have been completed for 7 days    Reviewed with patient:  If you are started on any steroids or antibiotics between now and your appointment, you must contact us because it may affect our ability to perform your procedure.  Yes    Is patient actively being treated for cancer or immunocompromised, including the spleen having been removed? No  If YES, do NOT schedule and route to RN pool     Any history of complications with sedation medications?  NO   If YES, OK to schedule AND route to RN pool     Any history of sleep apnea?  NO   If YES, OK to schedule AND route to RN pool     Any cardiac history?  YES, mild heart attack 3 years ago with some stints   If YES, OK to schedule AND route to RN pool     Do you have an implanted pacemaker, ICD (implanted cardiac device) or AICD (automatic implanted cardiac device)?  NO    If YES, do NOT schedule AND route to RN pool.     Obtain name of device :       Obtain name of cardiologist:       Do you have an implanted stimulator?  NO    If YES, OK to schedule AND route to nursing.     Instruct patient to bring in the remote to the appointment and it will need to be turned off.  reviewed      Does patient have an allergy to contrast dye, iodine or shellfish?  No   If YES, OK to schedule. Route to RN pool AND add allergy information to appointment notes    Are you able to get on and off an exam table with minimal or no assistance? Yes   If NO, do NOT schedule and route to RN pool    Are you able to roll over and lay on your stomach with minimal or no assistance? Yes   If NO, do NOT schedule and route to RN pool    Informed patient  that s/he needs to be NPO for 6 hours before procedure?  YES   Informed patient that it is OK to take normal medications with sips of water, especially blood pressure medications, before the procedure and must hold blood thinners as instructed.  Yes  Informed patient to arrive 30 minutes before procedure time to have an IV inserted.  reviewed   Do NOT schedule at 0745, 0815 or 1245.  reviewed   All radiofrequency ablations are in a 90 minute time slot except genicular radiofrequency ablation those are 60 minute time slot.  reviewed     When you schedule an RFA for [place], send an e-mail to Sunny Wright ([email or web address]) with the type of RFA (cervical, lumbar, etc), provider, scheduled date and time of the procedure, and location (ie. [facility]. Also Cc DAVID Matos RN and the provider.     In [place] there are only 6 probes for each area (lumbar and cervical) with a 72 hour autoclave.  Genicular and TON RFA s use the cervical probes.  Make sure that there are at least 3 days between LRFA s and 3 days between procedures needing cervical probes.       Jayla JENNINGS    [facility]

## 2017-12-27 NOTE — TELEPHONE ENCOUNTER
Left voicemail for patient to schedule Lumbar RFA.        Jayla JENNINGS    [facility] [place]

## 2018-01-05 ENCOUNTER — OFFICE VISIT (OUTPATIENT)
Dept: FAMILY MEDICINE | Facility: CLINIC | Age: 68
End: 2018-01-05

## 2018-01-05 VITALS
TEMPERATURE: 97.5 F | BODY MASS INDEX: 24.14 KG/M2 | SYSTOLIC BLOOD PRESSURE: 126 MMHG | DIASTOLIC BLOOD PRESSURE: 80 MMHG | OXYGEN SATURATION: 97 % | HEIGHT: 66 IN | WEIGHT: 150.2 LBS | HEART RATE: 77 BPM

## 2018-01-05 DIAGNOSIS — E78.2 MIXED HYPERLIPIDEMIA: ICD-10-CM

## 2018-01-05 DIAGNOSIS — Z01.818 PRE-OP EXAM: Primary | ICD-10-CM

## 2018-01-05 DIAGNOSIS — I25.2 HISTORY OF NON-ST ELEVATION MYOCARDIAL INFARCTION (NSTEMI): ICD-10-CM

## 2018-01-05 DIAGNOSIS — Z71.89 ACP (ADVANCE CARE PLANNING): ICD-10-CM

## 2018-01-05 LAB
ERYTHROCYTE [DISTWIDTH] IN BLOOD BY AUTOMATED COUNT: 11.4 %
HCT VFR BLD AUTO: 47.2 % (ref 40–53)
HEMOGLOBIN: 15.2 G/DL (ref 13.3–17.7)
MCH RBC QN AUTO: 33.1 PG (ref 26–33)
MCHC RBC AUTO-ENTMCNC: 32.2 G/DL (ref 31–36)
MCV RBC AUTO: 102.9 FL (ref 78–100)
PLATELET COUNT - QUEST: 282 10^9/L (ref 150–375)
RBC # BLD AUTO: 4.59 10*12/L (ref 4.4–5.9)
WBC # BLD AUTO: 5.4 10*9/L (ref 4–11)

## 2018-01-05 PROCEDURE — 93000 ELECTROCARDIOGRAM COMPLETE: CPT | Performed by: PHYSICIAN ASSISTANT

## 2018-01-05 PROCEDURE — 80053 COMPREHEN METABOLIC PANEL: CPT | Mod: 90 | Performed by: PHYSICIAN ASSISTANT

## 2018-01-05 PROCEDURE — 85027 COMPLETE CBC AUTOMATED: CPT | Performed by: PHYSICIAN ASSISTANT

## 2018-01-05 PROCEDURE — 99214 OFFICE O/P EST MOD 30 MIN: CPT | Performed by: PHYSICIAN ASSISTANT

## 2018-01-05 PROCEDURE — 36415 COLL VENOUS BLD VENIPUNCTURE: CPT | Performed by: PHYSICIAN ASSISTANT

## 2018-01-05 NOTE — MR AVS SNAPSHOT
After Visit Summary   1/5/2018    Jose Moreno    MRN: [id number]           Patient Information     Date Of Birth          1950        Visit Information        Provider Department      1/5/2018 11:00 AM Raysa Singh PA-C [facility]        Today's Diagnoses     Pre-op exam    -  1    History of non-ST elevation myocardial infarction (NSTEMI)        Mixed hyperlipidemia        ACP (advance care planning)           Follow-ups after your visit        Follow-up notes from your care team     Return if symptoms worsen or fail to improve.      Your next 10 appointments already scheduled     Mynor 10, 2018  9:45 AM CST   Return Visit with Clint Blankenship MD   [facility] [place] ([facility])    [address]  [address]  [facility] [address] [phone]            Jan 11, 2018  9:30 AM CST   (Arrive by 9:15 AM)   XR LUMBAR RADIOFREQUENCY ABLATION with BUPAINCARM1   [facility] ([facility])    [address]  [address]  [facility] [address] [phone]           For nerve root injection, please send or bring copies of any MRIs or other scans you have had.  Bring a list of your current medicines to your exam. (Include vitamins, minerals and over-the-counter medicines.) Leave your valuables at home.  Plan to have someone drive you home afterward.  Stop taking the following medicines (but talk to your doctor first):   If you take blood thinners, you may need to stop taking them a few days before treatment. Talk to your doctor before stopping these medicines.Stop taking Coumadin (warfarin) 3 days before treatment. Restart the day after treatment.   If you take Plavix, Ticlid, Pletal or Persantine, please ask your doctor if you should stop these medicines. You may need extra tests on the morning of your scan.   If you take Xarelto (Rivaroxaban), you  may need to stop taking it 24 hours before treatment. Talk to your doctor before stopping this medicine. Restart the day after treatment.  You may take your other medicines as normal.  Stop all food and drink (including water) 3 hours before your test or treatment.  Please tell the doctor:   If you are allergic to X-ray dye (contrast fluid).   If you may be pregnant.  Injections take about 30 to 45 minutes. Most people spend up to 2 hours in the clinic or hospital.  Please call the Imaging Department at your exam site with any questions.            Jan 11, 2018  9:45 AM CST   Radiology Injections with Nancy Cortez MD   [facility] ([facility])    [address]  [address]  [facility] [address]   [phone]              Future tests that were ordered for you today     Open Future Orders        Priority Expected Expires Ordered    XR Lumbar Radiofrequency Ablation Routine 1/8/2018 1/8/2019 1/8/2018            Who to contact     If you have questions or need follow up information about today's clinic visit or your schedule please contact [facility] directly at [phone].  Normal or non-critical lab and imaging results will be communicated to you by 121nexushart, letter or phone within 4 business days after the clinic has received the results. If you do not hear from us within 7 days, please contact the clinic through PANOSOLt or phone. If you have a critical or abnormal lab result, we will notify you by phone as soon as possible.  Submit refill requests through Clari or call your pharmacy and they will forward the refill request to us. Please allow 3 business days for your refill to be completed.          Additional Information About Your Visit        Clari Information     Clari gives you secure access to your electronic health record. If you see a primary care provider, you can also send messages to your care team and make appointments. If  "you have questions, please call your primary care clinic.  If you do not have a primary care provider, please call [phone] and they will assist you.        Care EveryWhere ID     This is your Care EveryWhere ID. This could be used by other organizations to access your [facility] medical records  [id number]        Your Vitals Were     Pulse Temperature Height Pulse Oximetry BMI (Body Mass Index)       77 97.5  F (36.4  C) (Oral) 1.664 m (5' 5.5\") 97% 24.61 kg/m2        Blood Pressure from Last 3 Encounters:   01/05/18 126/80   12/13/17 (!) 169/96   11/16/17 142/90    Weight from Last 3 Encounters:   01/05/18 68.1 kg (150 lb 3.2 oz)   12/08/17 66.2 kg (146 lb)   10/27/17 66.2 kg (146 lb)              We Performed the Following     Comprehensive metabolic panel     EKG 12-lead complete w/read - Clinics     HEMOGRAM/PLATELET (BFP)     VENOUS COLLECTION        Primary Care Provider Office Phone # Fax #    Jerri Tavera [phone]       [address] [address]        Equal Access to Services     DEON CAIN : Hadii aksah friend hadasho Soomaali, waaxda luqadaha, qaybta kaalmada adeegyada, jayjay valdes. So [facility] [phone].    ATENCIÓN: Si habla español, tiene a weber disposición servicios gratuitos de asistencia lingüística. Llame al [phone].    We comply with applicable federal civil rights laws and [place] laws. We do not discriminate on the basis of race, color, national origin, age, disability, sex, sexual orientation, or gender identity.            Thank you!     Thank you for choosing [facility]  for your care. Our goal is always to provide you with excellent care. Hearing back from our patients is one way we can continue to improve our services. Please take a few minutes to complete the written survey that you may receive in the mail after your visit with us. Thank you!             Your Updated Medication List - " Protect others around you: Learn how to safely use, store and throw away your medicines at www.disposemymeds.org.          This list is accurate as of: 1/5/18 11:59 PM.  Always use your most recent med list.                   Brand Name Dispense Instructions for use Diagnosis    aspirin 81 MG EC tablet     60 tablet    Take 1 tablet (81 mg) by mouth daily    ACS (acute coronary syndrome) (H)       atorvastatin 40 MG tablet    LIPITOR    90 tablet    Take 1 tablet (40 mg) by mouth daily    Mixed hyperlipidemia, NSTEMI (non-ST elevated myocardial infarction) (H)       FLUoxetine 40 MG capsule    PROzac    90 capsule    Take 1 capsule (40 mg) by mouth daily    Major depressive disorder, single episode, moderate (H)       levothyroxine 50 MCG tablet    SYNTHROID/LEVOTHROID    90 tablet    Take 1 tablet (50 mcg) by mouth daily    Hypothyroidism due to acquired atrophy of thyroid       losartan 25 MG tablet    COZAAR    90 tablet    Take 1 tablet (25 mg) by mouth daily    Essential hypertension, benign       nitroGLYcerin 0.4 MG sublingual tablet    NITROSTAT    25 tablet    Place 1 tablet (0.4 mg) under the tongue every 5 minutes as needed for chest pain    ACS (acute coronary syndrome) (H)       omeprazole 20 MG CR capsule    priLOSEC    90 capsule    TAKE ONE CAPSULE BY MOUTH ONCE DAILY    Gastroesophageal reflux disease without esophagitis       traZODone 100 MG tablet    DESYREL    135 tablet    Take 1.5 tablets (150 mg) by mouth At Bedtime    Major depressive disorder, single episode, moderate (H)

## 2018-01-06 LAB
ALBUMIN SERPL-MCNC: 4.2 G/DL (ref 3.6–5.1)
ALBUMIN/GLOB SERPL: 1.5 (CALC) (ref 1–2.5)
ALP SERPL-CCNC: 66 U/L (ref 40–115)
ALT SERPL-CCNC: 26 U/L (ref 9–46)
AST SERPL-CCNC: 24 U/L (ref 10–35)
BILIRUB SERPL-MCNC: 0.5 MG/DL (ref 0.2–1.2)
BUN SERPL-MCNC: 17 MG/DL (ref 7–25)
BUN/CREATININE RATIO: NORMAL (CALC) (ref 6–22)
CALCIUM SERPL-MCNC: 9.5 MG/DL (ref 8.6–10.3)
CHLORIDE SERPLBLD-SCNC: 101 MMOL/L (ref 98–110)
CO2 SERPL-SCNC: 25 MMOL/L (ref 20–31)
CREAT SERPL-MCNC: 0.83 MG/DL (ref 0.7–1.25)
EGFR AFRICAN AMERICAN - QUEST: 104 ML/MIN/1.73M2
GFR SERPL CREATININE-BSD FRML MDRD: 90 ML/MIN/1.73M2
GLOBULIN, CALCULATED - QUEST: 2.8 G/DL (CALC) (ref 1.9–3.7)
GLUCOSE - QUEST: 91 MG/DL (ref 65–99)
POTASSIUM SERPL-SCNC: 4.2 MMOL/L (ref 3.5–5.3)
PROT SERPL-MCNC: 7 G/DL (ref 6.1–8.1)
SODIUM SERPL-SCNC: 137 MMOL/L (ref 135–146)

## 2018-01-08 ENCOUNTER — TELEPHONE (OUTPATIENT)
Dept: FAMILY MEDICINE | Facility: CLINIC | Age: 68
End: 2018-01-08

## 2018-01-08 PROBLEM — I25.2 HISTORY OF NON-ST ELEVATION MYOCARDIAL INFARCTION (NSTEMI): Status: ACTIVE | Noted: 2018-01-08

## 2018-01-08 NOTE — TELEPHONE ENCOUNTER
Michelle from [facility] is waiting for the pre-op can you please sign so we can faxt to them at [phone]

## 2018-01-10 ENCOUNTER — OFFICE VISIT (OUTPATIENT)
Dept: UROLOGY | Facility: CLINIC | Age: 68
End: 2018-01-10
Payer: COMMERCIAL

## 2018-01-10 VITALS — HEIGHT: 65 IN | OXYGEN SATURATION: 96 % | HEART RATE: 78 BPM | WEIGHT: 150 LBS | BODY MASS INDEX: 24.99 KG/M2

## 2018-01-10 DIAGNOSIS — R97.20 ELEVATED PROSTATE SPECIFIC ANTIGEN (PSA): ICD-10-CM

## 2018-01-10 DIAGNOSIS — R97.20 ELEVATED PROSTATE SPECIFIC ANTIGEN (PSA): Primary | ICD-10-CM

## 2018-01-10 PROCEDURE — 36415 COLL VENOUS BLD VENIPUNCTURE: CPT | Performed by: UROLOGY

## 2018-01-10 PROCEDURE — 99213 OFFICE O/P EST LOW 20 MIN: CPT | Performed by: UROLOGY

## 2018-01-10 PROCEDURE — 84153 ASSAY OF PSA TOTAL: CPT | Performed by: UROLOGY

## 2018-01-10 ASSESSMENT — PAIN SCALES - GENERAL: PAINLEVEL: NO PAIN (0)

## 2018-01-10 NOTE — MR AVS SNAPSHOT
After Visit Summary   1/10/2018    Jose Moreno    MRN: [id number]           Patient Information     Date Of Birth          1950        Visit Information        Provider Department      1/10/2018 9:45 AM Clint Blankenship MD [facility] [place]        Care Instructions    Get PSA with one month appt          Follow-ups after your visit        Your next 10 appointments already scheduled     Mynor 10, 2018 10:30 AM CST   LAB with RI LAB   [facility] ([facility])    [address]  [facility] [id number] [phone]           Please do not eat 10-12 hours before your appointment if you are coming in fasting for labs on lipids, cholesterol, or glucose (sugar). This does not apply to pregnant women. Water, hot tea and black coffee (with nothing added) are okay. Do not drink other fluids, diet soda or chew gum.            Jan 11, 2018  9:30 AM CST   (Arrive by 9:15 AM)   XR LUMBAR RADIOFREQUENCY ABLATION with BUPAINCARM1   [facility] ([facility])    [address]  [address]  [facility] [address]   [phone]           For nerve root injection, please send or bring copies of any MRIs or other scans you have had.  Bring a list of your current medicines to your exam. (Include vitamins, minerals and over-the-counter medicines.) Leave your valuables at home.  Plan to have someone drive you home afterward.  Stop taking the following medicines (but talk to your doctor first):   If you take blood thinners, you may need to stop taking them a few days before treatment. Talk to your doctor before stopping these medicines.Stop taking Coumadin (warfarin) 3 days before treatment. Restart the day after treatment.   If you take Plavix, Ticlid, Pletal or Persantine, please ask your doctor if you should stop these medicines. You may need extra tests on the morning of your scan.    If you take Xarelto (Rivaroxaban), you may need to stop taking it 24 hours before treatment. Talk to your doctor before stopping this medicine. Restart the day after treatment.  You may take your other medicines as normal.  Stop all food and drink (including water) 3 hours before your test or treatment.  Please tell the doctor:   If you are allergic to X-ray dye (contrast fluid).   If you may be pregnant.  Injections take about 30 to 45 minutes. Most people spend up to 2 hours in the clinic or hospital.  Please call the Imaging Department at your exam site with any questions.            Jan 11, 2018  9:45 AM CST   Radiology Injections with Nancy Cortez MD   [facility] ([facility])    [address]  [address]  [place] [address]   [phone]              Who to contact     If you have questions or need follow up information about today's clinic visit or your schedule please contact [facility] [place] directly at [phone].  Normal or non-critical lab and imaging results will be communicated to you by Stumpwisehart, letter or phone within 4 business days after the clinic has received the results. If you do not hear from us within 7 days, please contact the clinic through Spreedlyt or phone. If you have a critical or abnormal lab result, we will notify you by phone as soon as possible.  Submit refill requests through Given.to or call your pharmacy and they will forward the refill request to us. Please allow 3 business days for your refill to be completed.          Additional Information About Your Visit        Given.to Information     Given.to gives you secure access to your electronic health record. If you see a primary care provider, you can also send messages to your care team and make appointments. If you have questions, please call your primary care clinic.  If you do not have a primary care provider, please call [phone]  "and they will assist you.        Care EveryWhere ID     This is your Care EveryWhere ID. This could be used by other organizations to access your [place] medical records  [id number]        Your Vitals Were     Pulse Height Pulse Oximetry BMI (Body Mass Index)          78 1.651 m (5' 5\") 96% 24.96 kg/m2         Blood Pressure from Last 3 Encounters:   01/05/18 126/80   12/13/17 (!) 169/96   11/16/17 142/90    Weight from Last 3 Encounters:   01/10/18 68 kg (150 lb)   01/05/18 68.1 kg (150 lb 3.2 oz)   12/08/17 66.2 kg (146 lb)              Today, you had the following     No orders found for display       Primary Care Provider Office Phone # Fax #    Jerri Tavera [phone] [address] [address]        Equal Access to Services     DEON CAIN : Hadii aad ku hadasho Soomaali, waaxda luqadaha, qaybta kaalmada adeegyada, waxay limain haypanfilon juwan nicolas . So [facility] [phone].    ATENCIÓN: Si karley espkirsten, tiene a weber disposición servicios gratuitos de asistencia lingüística. Waqas al [phone].    We comply with applicable federal civil rights laws and [place] laws. We do not discriminate on the basis of race, color, national origin, age, disability, sex, sexual orientation, or gender identity.            Thank you!     Thank you for choosing [facility] [place]  for your care. Our goal is always to provide you with excellent care. Hearing back from our patients is one way we can continue to improve our services. Please take a few minutes to complete the written survey that you may receive in the mail after your visit with us. Thank you!             Your Updated Medication List - Protect others around you: Learn how to safely use, store and throw away your medicines at www.disposemymeds.org.          This list is accurate as of: 1/10/18 10:02 AM.  Always use your most recent med list.                   Brand " Name Dispense Instructions for use Diagnosis    aspirin 81 MG EC tablet     60 tablet    Take 1 tablet (81 mg) by mouth daily    ACS (acute coronary syndrome) (H)       atorvastatin 40 MG tablet    LIPITOR    90 tablet    Take 1 tablet (40 mg) by mouth daily    Mixed hyperlipidemia, NSTEMI (non-ST elevated myocardial infarction) (H)       FLUoxetine 40 MG capsule    PROzac    90 capsule    Take 1 capsule (40 mg) by mouth daily    Major depressive disorder, single episode, moderate (H)       levothyroxine 50 MCG tablet    SYNTHROID/LEVOTHROID    90 tablet    Take 1 tablet (50 mcg) by mouth daily    Hypothyroidism due to acquired atrophy of thyroid       losartan 25 MG tablet    COZAAR    90 tablet    Take 1 tablet (25 mg) by mouth daily    Essential hypertension, benign       nitroGLYcerin 0.4 MG sublingual tablet    NITROSTAT    25 tablet    Place 1 tablet (0.4 mg) under the tongue every 5 minutes as needed for chest pain    ACS (acute coronary syndrome) (H)       omeprazole 20 MG CR capsule    priLOSEC    90 capsule    TAKE ONE CAPSULE BY MOUTH ONCE DAILY    Gastroesophageal reflux disease without esophagitis       traZODone 100 MG tablet    DESYREL    135 tablet    Take 1.5 tablets (150 mg) by mouth At Bedtime    Major depressive disorder, single episode, moderate (H)

## 2018-01-10 NOTE — LETTER
1/10/2018       RE: Jose Moreno  [id number] [facility] [address]     Dear Colleague,    Thank you for referring your patient, Jose Moreno, to the [facility] [place] at [facility]. Please see a copy of my visit note below.    Office Visit Note  [facility]  [phone]    UROLOGIC DIAGNOSES:   Elevated PSA    CURRENT INTERVENTIONS:       HISTORY:   Jose returns to clinic today for PSA recheck. The PSA is higher on recheck, now at 12.2      PAST MEDICAL HISTORY:   Past Medical History:   Diagnosis Date     ACS (acute coronary syndrome) (H) 01-23-15     ADHD (attention deficit hyperactivity disorder)      CAD (coronary artery disease)     cardiac cath 1/23/15: SHERRY to 1st diagonal, SHERRY x2 to LAD, cath 2009: no intervention     Esophageal reflux      History of hyperthyroidism 4/9/2014     Hyperlipidemia      Hypertension      Mitral regurgitation 2009    moderately severe MVP, s/p robotic repair at [facility]     NSTEMI (non-ST elevated myocardial infarction) (H) 01-23-15     Pulmonary nodules 2009    CT-recommend repeat in 6-12 months     Rotator cuff rupture 11/3/2011       PAST SURGICAL HISTORY:   Past Surgical History:   Procedure Laterality Date     DECOMPRESSION LUMBAR MINIMALLY INVASIVE ONE LEVEL  1/11/2012    Procedure:DECOMPRESSION LUMBAR MINIMALLY INVASIVE ONE LEVEL; L4-5 Decompression Minimally Invasive; Surgeon:MESSI HORAN; Location:[facility] OR     EYE EXAM ESTABLISHED PT  1/14/06     HC COLONOSCOPY THRU STOMA, DIAGNOSTIC  7/00    GI     HCL PSA, DIAGNOSTIC (TUMOR MARKER)  2003     HEART CATH RIGHT AND LEFT HEART CATH  01-23-15    See report, pt transfered to [facility] for complex bifurcation PCI of LAD diagonal vessel.      HEART CATH RIGHT AND LEFT HEART CATH  01-26-15    PTCA and implantation of SHERRY to 1st diagonal, SHERRY to mid LAD, SHERRY to proximal LAD     HERNIA REPAIR       ORTHOPEDIC SURGERY      Hx of  "rotator cuff rupture and repair     REMOVAL OF SPERM DUCT(S)      Vasectomy     REMOVAL OF SPERM DUCT(S)      reversal of vasectomy     REPAIR VALVE MITRAL  2009    [facility] robotic repair      TESTICLE SURGERY       VASECTOMY       VASOVASOSTOMY         FAMILY HISTORY:   Family History   Problem Relation Age of Onset     CANCER Mother      breast, lung     Depression Father      alcohlism     DIABETES No family hx of      Cardiovascular No family hx of      Cancer - colorectal No family hx of      Prostate Cancer No family hx of        SOCIAL HISTORY:   Social History   Substance Use Topics     Smoking status: Never Smoker     Smokeless tobacco: Never Used     Alcohol use 4.2 oz/week     7 Standard drinks or equivalent per week      Comment: 1 beer daily       Current Outpatient Prescriptions   Medication     losartan (COZAAR) 25 MG tablet     atorvastatin (LIPITOR) 40 MG tablet     omeprazole (PRILOSEC) 20 MG CR capsule     FLUoxetine (PROZAC) 40 MG capsule     traZODone (DESYREL) 100 MG tablet     levothyroxine (SYNTHROID/LEVOTHROID) 50 MCG tablet     aspirin EC 81 MG EC tablet     nitroglycerin (NITROSTAT) 0.4 MG sublingual tablet     No current facility-administered medications for this visit.          PHYSICAL EXAM:    Pulse 78  Ht 1.651 m (5' 5\")  Wt 68 kg (150 lb)  SpO2 96%  BMI 24.96 kg/m2    HEENT: Normocephalic and atraumatic   Cardiac: Not done  Back/Flank: Not done  CNS/PNS: Not done  Respiratory: Normal non-labored breathing  Abdomen: Soft nontender and nondistended  Peripheral Vascular: Not done  Mental Status: Not done    Penis: Not done  Scrotal Skin: Not done  Testicles: Not done  Epididymis: Not done  Digital Rectal Exam:     Cystoscopy: Not done    Imaging: None    Urinalysis: UA RESULTS:  Recent Labs   Lab Test  12/08/17   1512   COLOR  Yellow   APPEARANCE  Clear   URINEGLC  Negative   URINEBILI  Negative   URINEKETONE  Negative   SG  1.020   UBLD  Negative   URINEPH  6.5   PROTEIN  100* "   UROBILINOGEN  0.2   NITRITE  Negative   LEUKEST  Negative       PSA: 12.2    Post Void Residual:     Other labs: None today      IMPRESSION:  Elevated PSA    PLAN:  He has a persistently elevated PSA. I recommended prostate biops. We discussed the procedure in detail today along with its risks, including bleeding and infection. He wishes to proceed. He will be scheduled for prostate biopsy in the office in the near future    Total Time: 15 min                                      Total in Consultation: 15 min    Again, thank you for allowing me to participate in the care of your patient.      Sincerely,    Clint Blankenship MD

## 2018-01-11 LAB — PSA SERPL-MCNC: 12.2 UG/L (ref 0–4)

## 2018-01-11 RX ORDER — CIPROFLOXACIN 500 MG/1
500 TABLET, FILM COATED ORAL 2 TIMES DAILY
Qty: 6 TABLET | Refills: 0 | Status: SHIPPED | OUTPATIENT
Start: 2018-01-11 | End: 2018-05-02

## 2018-01-11 NOTE — PROGRESS NOTES
Office Visit Note  [facility]  [phone]    UROLOGIC DIAGNOSES:   Elevated PSA    CURRENT INTERVENTIONS:       HISTORY:   Jose returns to clinic today for PSA recheck. The PSA is higher on recheck, now at 12.2      PAST MEDICAL HISTORY:   Past Medical History:   Diagnosis Date     ACS (acute coronary syndrome) (H) 01-23-15     ADHD (attention deficit hyperactivity disorder)      CAD (coronary artery disease)     cardiac cath 1/23/15: SHERRY to 1st diagonal, SHERRY x2 to LAD, cath 2009: no intervention     Esophageal reflux      History of hyperthyroidism 4/9/2014     Hyperlipidemia      Hypertension      Mitral regurgitation 2009    moderately severe MVP, s/p robotic repair at [facility]     NSTEMI (non-ST elevated myocardial infarction) (H) 01-23-15     Pulmonary nodules 2009    CT-recommend repeat in 6-12 months     Rotator cuff rupture 11/3/2011       PAST SURGICAL HISTORY:   Past Surgical History:   Procedure Laterality Date     DECOMPRESSION LUMBAR MINIMALLY INVASIVE ONE LEVEL  1/11/2012    Procedure:DECOMPRESSION LUMBAR MINIMALLY INVASIVE ONE LEVEL; L4-5 Decompression Minimally Invasive; Surgeon:MESSI HORAN; Location:[facility] OR     EYE EXAM ESTABLISHED PT  1/14/06     HC COLONOSCOPY THRU STOMA, DIAGNOSTIC  7/00    GI     HCL PSA, DIAGNOSTIC (TUMOR MARKER)  2003     HEART CATH RIGHT AND LEFT HEART CATH  01-23-15    See report, pt transfered to [facility] for complex bifurcation PCI of LAD diagonal vessel.      HEART CATH RIGHT AND LEFT HEART CATH  01-26-15    PTCA and implantation of SHERRY to 1st diagonal, SHERRY to mid LAD, SHERRY to proximal LAD     HERNIA REPAIR       ORTHOPEDIC SURGERY      Hx of rotator cuff rupture and repair     REMOVAL OF SPERM DUCT(S)      Vasectomy     REMOVAL OF SPERM DUCT(S)      reversal of vasectomy     REPAIR VALVE MITRAL  2009    [facility] robotic repair      TESTICLE SURGERY       VASECTOMY       VASOVASOSTOMY         FAMILY HISTORY:   Family History   Problem Relation Age  "of Onset     CANCER Mother      breast, lung     Depression Father      alcohlism     DIABETES No family hx of      Cardiovascular No family hx of      Cancer - colorectal No family hx of      Prostate Cancer No family hx of        SOCIAL HISTORY:   Social History   Substance Use Topics     Smoking status: Never Smoker     Smokeless tobacco: Never Used     Alcohol use 4.2 oz/week     7 Standard drinks or equivalent per week      Comment: 1 beer daily       Current Outpatient Prescriptions   Medication     losartan (COZAAR) 25 MG tablet     atorvastatin (LIPITOR) 40 MG tablet     omeprazole (PRILOSEC) 20 MG CR capsule     FLUoxetine (PROZAC) 40 MG capsule     traZODone (DESYREL) 100 MG tablet     levothyroxine (SYNTHROID/LEVOTHROID) 50 MCG tablet     aspirin EC 81 MG EC tablet     nitroglycerin (NITROSTAT) 0.4 MG sublingual tablet     No current facility-administered medications for this visit.          PHYSICAL EXAM:    Pulse 78  Ht 1.651 m (5' 5\")  Wt 68 kg (150 lb)  SpO2 96%  BMI 24.96 kg/m2    HEENT: Normocephalic and atraumatic   Cardiac: Not done  Back/Flank: Not done  CNS/PNS: Not done  Respiratory: Normal non-labored breathing  Abdomen: Soft nontender and nondistended  Peripheral Vascular: Not done  Mental Status: Not done    Penis: Not done  Scrotal Skin: Not done  Testicles: Not done  Epididymis: Not done  Digital Rectal Exam:     Cystoscopy: Not done    Imaging: None    Urinalysis: UA RESULTS:  Recent Labs   Lab Test  12/08/17   1512   COLOR  Yellow   APPEARANCE  Clear   URINEGLC  Negative   URINEBILI  Negative   URINEKETONE  Negative   SG  1.020   UBLD  Negative   URINEPH  6.5   PROTEIN  100*   UROBILINOGEN  0.2   NITRITE  Negative   LEUKEST  Negative       PSA: 12.2    Post Void Residual:     Other labs: None today      IMPRESSION:  Elevated PSA    PLAN:  He has a persistently elevated PSA. I recommended prostate biops. We discussed the procedure in detail today along with its risks, including " bleeding and infection. He wishes to proceed. He will be scheduled for prostate biopsy in the office in the near future    Total Time: 15 min                                      Total in Consultation: 15 min      Clint Blankenship M.D.

## 2018-01-15 NOTE — PROGRESS NOTES
[facility] - Procedure Note    Date of Visit: 1/17/2018    Pre procedure Diagnosis: facet arthropathy   Post procedure Diagnosis: Same  Procedure performed: Left L3,4,5 radiofrequency ablation   Anesthesia: moderate sedation with 2mg versed & 100mcg fentanyl  Complications: NONE  Operators: Nancy Cortez MD & Padma Bear MD (pain fellow)     Indications:   Jose Moreno is a 67 year old male was sent by Dr. Rodney Galvez for Left L3,4,5 radiofrequency ablation.  They have a history of low back pain that does not radiate.  Exam shows increased discomfort with extension and rotation and they have tried conservative treatment including previous injections, PT and medications.    Physical Exam:   Resp: CTA bilaterally  CV: RRR no murmurs, rubs or gallops  Airway:  Mallampati Class 1    Jose Moreno had medial branch blocks showing appropriate pain relief on 12/13/2017 and 11/16/2017, therefore radiofrequency ablation will be done.     Options/alternatives, benefits and risks were discussed with the patient including bleeding, infection, no pain relief, tissue trauma, exposure to radiation, reaction to medications including seizure, spinal cord injury,increased pain after the procedure, weakness, numbness or sensory changes and headache.   We also discussed risks of sedation, including reaction to medications and cardiovascular collapse.    Questions were answered to his satisfaction and he agrees to proceed. Voluntary informed consent was obtained and signed.     Vitals were reviewed: Yes  Allergies were reviewed:  Yes   Medications were reviewed:  Yes   Pre-procedure pain score: 4/10    MRI was done on 12/15/2016 at [facility] which showed            Procedure:  After getting informed consent, patient was brought into the procedure suite and was placed in a prone position on the procedure table.   A Pause for the Cause was performed.  Patient was prepped and draped in sterile fashion.     Jose Moreno  had an IV line placed prior the procedure.  The C-arm was positioned in the 2 oblique view to afford optimal view of the L4-L5 vertebral bodies. Lidocaine 1% was used to anesthetize the skin at each level.  At each level, a 150mm, 20G curved radiofrequency cannula with a 10mm active tip was positioned, overlying the intersection of the transverse process and pedicle at L4 & L5, and was advanced under intermittent fluoroscopy until it contacted the transverse process and notch, and the tip slightly overran that process, just lateral to the mamillary process.  The position of each cannula was verified and optimized in the oblique view and AP views.    In the AP view, another cannula was placed at the sacral alar notch.      Each position was tested for motor and sensory stimulation, and was positioned so that stimulation was negative for stimuli outside the immediate area of the desired lesion.  Sensory stimulation was completed at 50 Hz, with max stimulation up to 0.8V.  Motor stimulation was completed at 2Hz, up to 2.5V.  Lidocaine 1% 0.5 ml was injected at each level, and a 90 second, 80 degree Centigrade lesion was generated.    The needles were then rotated within the pathway of the medial branch, and locations were evaluated with repeat imaging.  Motor testing was again completed, and showed appropriate stimulation.  A second lesion was then generated at each location.    The needles were withdrawn. Hemostasis was achieved, the area was cleaned, and bandaids were placed when appropriate.  The patient tolerated the procedure well, and was taken to the recovery room.    Images were saved to PACS.    Start sedation time: 0923  End sedation time: 1010    Post-procedure pain score: 1/10  Follow-up includes:   -f/u phone call in one week  -post-procedure pain medications: none  -f/u with Dr. Cortez in 2 weeks

## 2018-01-16 ENCOUNTER — TELEPHONE (OUTPATIENT)
Dept: PALLIATIVE MEDICINE | Facility: CLINIC | Age: 68
End: 2018-01-16

## 2018-01-16 NOTE — TELEPHONE ENCOUNTER
Information noted. Closing.     Katia ARCOSN-RN Care Coordinator  [facility]

## 2018-01-16 NOTE — TELEPHONE ENCOUNTER
Pt has additional questions about their injection scheduled for tomorrow. Pt is requesting a call back. # [phone]

## 2018-01-16 NOTE — TELEPHONE ENCOUNTER
Received call from patient stating that his questions were in regards to a shoulder surgery that he had done last week. Patient states that he spoke with his surgeon and his surgeon told him he could proceed with the RFA tomorrow. Patent states he doesn't have any other questions and doesn't need a call back.      Flori Valenzuela    [facility]

## 2018-01-17 ENCOUNTER — RADIOLOGY INJECTION OFFICE VISIT (OUTPATIENT)
Dept: PALLIATIVE MEDICINE | Facility: CLINIC | Age: 68
End: 2018-01-17
Payer: COMMERCIAL

## 2018-01-17 ENCOUNTER — RADIANT APPOINTMENT (OUTPATIENT)
Dept: GENERAL RADIOLOGY | Facility: CLINIC | Age: 68
End: 2018-01-17
Attending: ANESTHESIOLOGY
Payer: COMMERCIAL

## 2018-01-17 VITALS — SYSTOLIC BLOOD PRESSURE: 142 MMHG | HEART RATE: 80 BPM | OXYGEN SATURATION: 94 % | DIASTOLIC BLOOD PRESSURE: 84 MMHG

## 2018-01-17 DIAGNOSIS — M47.817 LUMBOSACRAL SPONDYLOSIS WITHOUT MYELOPATHY: Primary | ICD-10-CM

## 2018-01-17 DIAGNOSIS — M47.817 FACET ARTHROPATHY, LUMBOSACRAL: ICD-10-CM

## 2018-01-17 DIAGNOSIS — M47.816 LUMBAR FACET ARTHROPATHY: ICD-10-CM

## 2018-01-17 PROCEDURE — 99152 MOD SED SAME PHYS/QHP 5/>YRS: CPT | Performed by: ANESTHESIOLOGY

## 2018-01-17 PROCEDURE — 64635 DESTROY LUMB/SAC FACET JNT: CPT | Mod: LT | Performed by: ANESTHESIOLOGY

## 2018-01-17 PROCEDURE — 64636 DESTROY L/S FACET JNT ADDL: CPT | Mod: LT | Performed by: ANESTHESIOLOGY

## 2018-01-17 PROCEDURE — 99153 MOD SED SAME PHYS/QHP EA: CPT | Performed by: ANESTHESIOLOGY

## 2018-01-17 NOTE — MR AVS SNAPSHOT
After Visit Summary   1/17/2018    Jose Moreno    MRN: [id number]           Patient Information     Date Of Birth          1950        Visit Information        Provider Department      1/17/2018 8:45 AM Nancy Cortez MD [facility]        Care Instructions    [facility]   Radiofrequency Ablation (RFA) Discharge Instructions     Anticipate procedural pain for up to 2 weeks.   It may take 4 to 6 weeks to receive relief from the RFA  If you received sedation before, during or after your procedure, for the next 24 hours you shall NOT:    -Drive    -Operate machinery    -Drink alcohol    -Sign any legal documents   You may resume your normal diet   You may resume your regular medications after the procedure   Be cautious with walking. Numbness and/or weakness in the lower extremities may occur for up to 6-8 hours due to effect of local anesthetic  Avoid strenuous activity for the first 24 hours   You may resume your regular activities after 24 hours   You may shower, however no swimming, tub baths or hot tubs for 24 hours following your procedure   You may use ice packs 10-15 minutes three to four times a day at the injection site for comfort   Do not use heat to painful areas for 6 to 8 hours. This will give the local anesthetic time to wear off and prevent you from accidentally burning your skin.   You may use anti-inflammatory medications (such as Ibuprofen or Aleve or Advil) or Tylenol for pain control if necessary   If you experience any of the following, call the pain center nursing line during work hours at [phone] or call our after hours provider line at at [phone]:   -Fever over 100 degree F    -Swelling, bleeding, redness, drainage, warmth at the injection site    -Progressive weakness or numbness on your legs    -Loss of bowel or bladder function    -Unusual headache that is not relieved by Tylenol or other pain reliever   -Unusual new  onset of pain that is not improving              Follow-ups after your visit        Your next 10 appointments already scheduled     Jan 23, 2018  4:00 PM CST   Sonography/Biopsy with Clint Blankenship MD, UA BX ROOM   [facility] [place] ([facility])    [address]  [address]  [facility] [id number] [phone]            Feb 07, 2018  9:45 AM CST   Return Visit with Clint Blankenship MD   [facility] [place] ([facility])    [address]  [address]  [facility] [address] [phone]              Who to contact     If you have questions or need follow up information about today's clinic visit or your schedule please contact [facility] directly at [phone].  Normal or non-critical lab and imaging results will be communicated to you by MyChart, letter or phone within 4 business days after the clinic has received the results. If you do not hear from us within 7 days, please contact the clinic through [employer] or phone. If you have a critical or abnormal lab result, we will notify you by phone as soon as possible.  Submit refill requests through [employer] or call your pharmacy and they will forward the refill request to us. Please allow 3 business days for your refill to be completed.          Additional Information About Your Visit        MyChart Information     [employer] gives you secure access to your electronic health record. If you see a primary care provider, you can also send messages to your care team and make appointments. If you have questions, please call your primary care clinic.  If you do not have a primary care provider, please call [phone] and they will assist you.        Care EveryWhere ID     This is your Care EveryWhere ID. This could be used by other organizations to access your [facility] medical records  [id number]        Your Vitals Were     Pulse  Pulse Oximetry                80 94%           Blood Pressure from Last 3 Encounters:   01/17/18 142/84   01/05/18 126/80   12/13/17 (!) 169/96    Weight from Last 3 Encounters:   01/10/18 68 kg (150 lb)   01/05/18 68.1 kg (150 lb 3.2 oz)   12/08/17 66.2 kg (146 lb)              Today, you had the following     No orders found for display       Primary Care Provider Office Phone # Fax #    Jerri Tavera [phone]       [address] [facility]  [address]  [facility] [address]        Equal Access to Services     [facility]: Hadii akash Clark, waparveenda luelver, qaybta kaalmada jules, jayjay nicolas . So [facility] [phone].    ATENCIÓN: Si habla español, tiene a weber disposición servicios gratuitos de asistencia lingüística. Ll[facility] [phone].    We comply with applicable federal civil rights laws and [place] laws. We do not discriminate on the basis of race, color, national origin, age, disability, sex, sexual orientation, or gender identity.            Thank you!     Thank you for choosing [facility]  for your care. Our goal is always to provide you with excellent care. Hearing back from our patients is one way we can continue to improve our services. Please take a few minutes to complete the written survey that you may receive in the mail after your visit with us. Thank you!             Your Updated Medication List - Protect others around you: Learn how to safely use, store and throw away your medicines at www.disposemymeds.org.          This list is accurate as of: 1/17/18 10:07 AM.  Always use your most recent med list.                   Brand Name Dispense Instructions for use Diagnosis    aspirin 81 MG EC tablet     60 tablet    Take 1 tablet (81 mg) by mouth daily    ACS (acute coronary syndrome) (H)       atorvastatin 40 MG tablet    LIPITOR    90 tablet    Take 1 tablet (40 mg) by mouth daily    Mixed hyperlipidemia, NSTEMI (non-ST  elevated myocardial infarction) (H)       ciprofloxacin 500 MG tablet    CIPRO    6 tablet    Take 1 tablet (500 mg) by mouth 2 times daily    Elevated prostate specific antigen (PSA)       FLUoxetine 40 MG capsule    PROzac    90 capsule    Take 1 capsule (40 mg) by mouth daily    Major depressive disorder, single episode, moderate (H)       levothyroxine 50 MCG tablet    SYNTHROID/LEVOTHROID    90 tablet    Take 1 tablet (50 mcg) by mouth daily    Hypothyroidism due to acquired atrophy of thyroid       losartan 25 MG tablet    COZAAR    90 tablet    Take 1 tablet (25 mg) by mouth daily    Essential hypertension, benign       nitroGLYcerin 0.4 MG sublingual tablet    NITROSTAT    25 tablet    Place 1 tablet (0.4 mg) under the tongue every 5 minutes as needed for chest pain    ACS (acute coronary syndrome) (H)       omeprazole 20 MG CR capsule    priLOSEC    90 capsule    TAKE ONE CAPSULE BY MOUTH ONCE DAILY    Gastroesophageal reflux disease without esophagitis       traZODone 100 MG tablet    DESYREL    135 tablet    Take 1.5 tablets (150 mg) by mouth At Bedtime    Major depressive disorder, single episode, moderate (H)

## 2018-01-17 NOTE — NURSING NOTE
Per patient, he received #60 oxycodone 5 mg tabs for his shoulder surgery. States that he is not taking the medications everyday; mostly at night; takes 2 tabs at night because of the pain with positioning. States that he may have only used 12-14 total.  Informed provider.      ISRRAEL AsifN, RN-BC  Patient Care Supervisor/Care Coordinator  [facility] [place]

## 2018-01-17 NOTE — NURSING NOTE
Pre-procedure Intake    Have you been fasting? No had coffee    If yes, for how long? 6 hours with coffee this morning    Are you taking a prescribed blood thinner such as coumadin, Plavix, Xarelto?    No    If yes, when did you take your last dose?     Do you take aspirin?  Yes -   ASA    If cervical procedure, have you held aspirin for 6 days?   NA    Do you have any allergies to contrast dye, iodine, steroid and/or numbing medications?  NO    Are you currently taking antibiotics or have an active infection?  NO    Have you had a fever/elevated temperature within the past week? NO    Are you currently taking oral steroids? NO    Do you have a ? Yes       Are you pregnant or breastfeeding?  Not Applicable    Are the vital signs normal?  No

## 2018-01-17 NOTE — NURSING NOTE
22 gauge Peripheral IV inserted into right arm - attempts: 2    ISRRAEL AsifN, RN-BC  Care Coordinator  [facility]

## 2018-01-17 NOTE — NURSING NOTE
Discharge Information    IV Discontiued Time:  1015    Amount of Fluid Infused:  200 ccs    Discharge Criteria = When patient returns to baseline or as per MD order    Consciousness:  Pt is fully awake    Circulation:  BP +/- 20% of pre-procedure level    Respiration:  Patient is able to breathe deeply    O2 Sat:  Patient is able to maintain O2 Sat >92% on room air    Activity:  Moves 4 extremities on command    Ambulation:  Patient is able to stand and walk or stand and pivot into wheelchair    Dressing:  Clean/dry or No Dressing    Notes:   Discharge instructions and AVS given to patient    Patient meets criteria for discharge?  YES    Admitted to PCU?  No    Responsible adult present to accompany patient home?  Yes    Signature/Title:    LEONEL CHRISTOPHER RN Care Coordinator  [facility]

## 2018-01-17 NOTE — NURSING NOTE
MD Time IN:0920  Sedation start time:  0922  MD Time OUT:      Provider aware of high blood pressure during procedure.  It was determined that it was related to the arm position during the procedure; arm was below the heart    O2 at 2L started at 0920    Medications given:   0923: fentanyl 50 mcg IV;   0941 fentanyl 50 mcg IV  0922: versed 1 mg IV  0940 versed 1 mg IV    Total Fentanyl 100 mcg  Total Versed 2 mg    Intravenous fluids were administered, normal saline 200 cc's  Sedation Level Achieved:  Minimal sedation    EVELINE Asif, RN-BC  Patient Care Supervisor/Care Coordinator  [facility]

## 2018-01-17 NOTE — PATIENT INSTRUCTIONS
[facility]   Radiofrequency Ablation (RFA) Discharge Instructions     Anticipate procedural pain for up to 2 weeks.   It may take 4 to 6 weeks to receive relief from the RFA  If you received sedation before, during or after your procedure, for the next 24 hours you shall NOT:    -Drive    -Operate machinery    -Drink alcohol    -Sign any legal documents   You may resume your normal diet   You may resume your regular medications after the procedure   Be cautious with walking. Numbness and/or weakness in the lower extremities may occur for up to 6-8 hours due to effect of local anesthetic  Avoid strenuous activity for the first 24 hours   You may resume your regular activities after 24 hours   You may shower, however no swimming, tub baths or hot tubs for 24 hours following your procedure   You may use ice packs 10-15 minutes three to four times a day at the injection site for comfort   Do not use heat to painful areas for 6 to 8 hours. This will give the local anesthetic time to wear off and prevent you from accidentally burning your skin.   You may use anti-inflammatory medications (such as Ibuprofen or Aleve or Advil) or Tylenol for pain control if necessary   If you experience any of the following, call the pain center nursing line during work hours at [phone] or call our after hours provider line at at [phone]:   -Fever over 100 degree F    -Swelling, bleeding, redness, drainage, warmth at the injection site    -Progressive weakness or numbness on your legs    -Loss of bowel or bladder function    -Unusual headache that is not relieved by Tylenol or other pain reliever   -Unusual new onset of pain that is not improving

## 2018-01-23 ENCOUNTER — OFFICE VISIT (OUTPATIENT)
Dept: UROLOGY | Facility: CLINIC | Age: 68
End: 2018-01-23
Payer: COMMERCIAL

## 2018-01-23 VITALS
HEART RATE: 92 BPM | HEIGHT: 65 IN | BODY MASS INDEX: 24.16 KG/M2 | SYSTOLIC BLOOD PRESSURE: 126 MMHG | DIASTOLIC BLOOD PRESSURE: 82 MMHG | WEIGHT: 145 LBS | OXYGEN SATURATION: 97 %

## 2018-01-23 DIAGNOSIS — R97.20 ELEVATED PROSTATE SPECIFIC ANTIGEN (PSA): Primary | ICD-10-CM

## 2018-01-23 PROCEDURE — 76872 US TRANSRECTAL: CPT | Performed by: UROLOGY

## 2018-01-23 PROCEDURE — 55700 ZZHC BIOPSY PROSTATE NEEDLE/PUNCH: CPT | Performed by: UROLOGY

## 2018-01-23 PROCEDURE — 88305 TISSUE EXAM BY PATHOLOGIST: CPT | Performed by: UROLOGY

## 2018-01-23 ASSESSMENT — PAIN SCALES - GENERAL
PAINLEVEL: NO PAIN (0)
PAINLEVEL: NO PAIN (0)

## 2018-01-23 NOTE — NURSING NOTE
Pre-Operative    Consent read and signed: Yes     Allergies   Allergen Reactions     Ace Inhibitors Cough     Dry cough     Pre-operative antibiotics taken: Yes  Aspirin or other blood thinning medications not taken in 7-10 days:  Yes  Time of Fleet's enema: Patient didn't take his Fleet's Enema    Talk to Dr Blankenship he is ok with it and would like to do the TRUS with BX    Margaret Holley MA

## 2018-01-23 NOTE — MR AVS SNAPSHOT
After Visit Summary   1/23/2018    Jose Moreno    MRN: [id number]           Patient Information     Date Of Birth          1950        Visit Information        Provider Department      1/23/2018 4:00 PM Clint Blankenship MD; UA BX ROOM [facility]        Today's Diagnoses     Elevated prostate specific antigen (PSA)    -  1      Care Instructions         [facility]  Transrectal Ultrasound  Post Operative Information    The physician who performed your Transrectal Ultrasound is  (telephone number [phone]).  Please contact this doctor if you have any problems or questions.  If unable to reach your doctor, please return to the Emergency Department.      Take one antibiotic the evening of the procedure and then as directed on your prescription.    Drink at least 6-8 glasses of fluids for the first 48 hours.    Avoid heavy lifting and strenuous activity for 48 hours.    Avoid sexual intercourse for the first 24 hours.    No aspirin or ibuprofen products (Motrin, Advil, Nuprin, ect.) for one week.  You may take acetaminophen (Tylenol) for pain.    You may notice a small amount of blood on the tissue after a bowel movement.    You may pass blood with clots in your urine following the procedure.  The amount will decrease with time but may be visible for up to two weeks.     You make have blood in your semen for 4 weeks after the procedure.    You may experience mild perineal (groin area) discomfort after the procedure.    Please call you doctor if you have any of the follow symptoms:  Fever  Increase in the amount of blood passed  Severe discomfort or pain            Follow-ups after your visit        Your next 10 appointments already scheduled     Feb 07, 2018  9:45 AM CST   Return Visit with Clint Blankenship MD   [facility] [place] ([facility])    [address]  [address]  [facility] [address] [phone]              Who to contact     If you have questions or need follow up information about today's clinic visit or your schedule please contact [facility] directly at [phone].  Normal or non-critical lab and imaging results will be communicated to you by MyChart, letter or phone within 4 business days after the clinic has received the results. If you do not hear from us within 7 days, please contact the clinic through [employer] or phone. If you have a critical or abnormal lab result, we will notify you by phone as soon as possible.  Submit refill requests through [employer] or call your pharmacy and they will forward the refill request to us. Please allow 3 business days for your refill to be completed.          Additional Information About Your Visit        MyChart Information     [employer] gives you secure access to your electronic health record. If you see a primary care provider, you can also send messages to your care team and make appointments. If you have questions, please call your primary care clinic.  If you do not have a primary care provider, please call [phone] and they will assist you.        Care EveryWhere ID     This is your Care EveryWhere ID. This could be used by other organizations to access your [place] medical records  [id number]        Your Vitals Were     Pulse Height Pulse Oximetry BMI (Body Mass Index)          92 1.651 m (5' 5\") 97% 24.13 kg/m2         Blood Pressure from Last 3 Encounters:   01/23/18 126/82   01/17/18 142/84   01/05/18 126/80    Weight from Last 3 Encounters:   01/23/18 65.8 kg (145 lb)   01/10/18 68 kg (150 lb)   01/05/18 68.1 kg (150 lb 3.2 oz)              We Performed the Following     Surgical pathology exam [[id number]]        Primary Care Provider Office Phone # Fax #    Jerri Tavera [phone]       [address]  [facility] " [address]        Equal Access to Services     CHAPINCITO ANT : Hadii aad ku hadkileybeto Alessioali, waparveenda carloschanningha, qadenista karlbenijayjay husain. So [facility] [phone].    ATENCIÓN: Si habla español, tiene a weber disposición servicios gratuitos de asistencia lingüística. Coltame al [phone].    We comply with applicable federal civil rights laws and [place] laws. We do not discriminate on the basis of race, color, national origin, age, disability, sex, sexual orientation, or gender identity.            Thank you!     Thank you for choosing [facility] [place]  for your care. Our goal is always to provide you with excellent care. Hearing back from our patients is one way we can continue to improve our services. Please take a few minutes to complete the written survey that you may receive in the mail after your visit with us. Thank you!             Your Updated Medication List - Protect others around you: Learn how to safely use, store and throw away your medicines at www.disposemymeds.org.          This list is accurate as of: 1/23/18  4:46 PM.  Always use your most recent med list.                   Brand Name Dispense Instructions for use Diagnosis    aspirin 81 MG EC tablet     60 tablet    Take 1 tablet (81 mg) by mouth daily    ACS (acute coronary syndrome) (H)       atorvastatin 40 MG tablet    LIPITOR    90 tablet    Take 1 tablet (40 mg) by mouth daily    Mixed hyperlipidemia, NSTEMI (non-ST elevated myocardial infarction) (H)       ciprofloxacin 500 MG tablet    CIPRO    6 tablet    Take 1 tablet (500 mg) by mouth 2 times daily    Elevated prostate specific antigen (PSA)       FLUoxetine 40 MG capsule    PROzac    90 capsule    Take 1 capsule (40 mg) by mouth daily    Major depressive disorder, single episode, moderate (H)       levothyroxine 50 MCG tablet    SYNTHROID/LEVOTHROID    90 tablet    Take 1 tablet (50 mcg) by mouth daily     Hypothyroidism due to acquired atrophy of thyroid       losartan 25 MG tablet    COZAAR    90 tablet    Take 1 tablet (25 mg) by mouth daily    Essential hypertension, benign       nitroGLYcerin 0.4 MG sublingual tablet    NITROSTAT    25 tablet    Place 1 tablet (0.4 mg) under the tongue every 5 minutes as needed for chest pain    ACS (acute coronary syndrome) (H)       omeprazole 20 MG CR capsule    priLOSEC    90 capsule    TAKE ONE CAPSULE BY MOUTH ONCE DAILY    Gastroesophageal reflux disease without esophagitis       oxyCODONE HCl 5 MG Taba    OXECTA     Take 1-2 tablets by mouth every 4 hours as needed        traZODone 100 MG tablet    DESYREL    135 tablet    Take 1.5 tablets (150 mg) by mouth At Bedtime    Major depressive disorder, single episode, moderate (H)

## 2018-01-23 NOTE — PATIENT INSTRUCTIONS
[facility]  Transrectal Ultrasound  Post Operative Information    The physician who performed your Transrectal Ultrasound is  (telephone number [phone]).  Please contact this doctor if you have any problems or questions.  If unable to reach your doctor, please return to the Emergency Department.      Take one antibiotic the evening of the procedure and then as directed on your prescription.    Drink at least 6-8 glasses of fluids for the first 48 hours.    Avoid heavy lifting and strenuous activity for 48 hours.    Avoid sexual intercourse for the first 24 hours.    No aspirin or ibuprofen products (Motrin, Advil, Nuprin, ect.) for one week.  You may take acetaminophen (Tylenol) for pain.    You may notice a small amount of blood on the tissue after a bowel movement.    You may pass blood with clots in your urine following the procedure.  The amount will decrease with time but may be visible for up to two weeks.     You make have blood in your semen for 4 weeks after the procedure.    You may experience mild perineal (groin area) discomfort after the procedure.    Please call you doctor if you have any of the follow symptoms:  Fever  Increase in the amount of blood passed  Severe discomfort or pain

## 2018-01-23 NOTE — PROGRESS NOTES
Jose Moreno is here for a transrectal altrasound guided needle biopsy of the prostate for a significant risk of potentially lethal prostate cancer.    The risks, benefits, of the procudure were discussed.  All questions were answered.  A written informed consent was obtained.      Ron PSA   Date Value Ref Range Status   06/13/2013 4.0 < OR = 4.0 ng/mL Final     Comment:        This test was performed using the Siemens  chemiluminescent method. Values obtained from  different assay methods cannot be used  interchangeably. PSA levels, regardless of  value, should not be interpreted as absolute  evidence of the presence or absence of disease.      06/16/2011 2.9 < OR = 4.0 ng/mL Final     Comment:        This test was performed using the Siemens  chemiluminescent method. Values obtained from  different assay methods cannot be used  interchangeably. PSA levels, regardless of  value, should not be interpreted as absolute  evidence of the presence or absence of disease.     NO COLLECTION DATE RECEIVED. WE HAVE USED  THE DATE THE SPECIMEN WAS RECEIVED BY THIS  LABORATORY AS THE COLLECTION DATE. IF THIS  IS INCORRECT, PLEASE CONTACT CLIENT SERVICES.  PHONE NUMBER: [phone]  Test Performed at:  [facility]  [address] [place]  [address]  TIFFANI CROWE MD   05/27/2010 2.5 < OR = 4.0 ng/mL Final     Comment:        This test was performed using the Siemens  chemiluminescent method. Values obtained from  different assay methods cannot be used  interchangeably. PSA levels, regardless of  value, should not be interpreted as absolute  evidence of the presence or absence of disease.     Test Performed at:  [facility]  [address] [place]  [address]  TIFFANI CROWE MD   02/17/2009 2.6 < OR = 4.0 ng/mL Final     Comment:     THIS TEST WAS PERFORMED USING THE SIEMENS ([employer])  CHEMILUMINESCENT METHOD. VALUES OBTAINED FROM  DIFFERENT ASSAY METHODS CANNOT  BE USED  INTERCHANGEABLY. PSA LEVELS, REGARDLESS OF  VALUE, SHOULD NOT BE INTERPRETED AS ABSOLUTE  EVIDENCE OF THE PRESENCE OR ABSENCE OF DISEASE.     NO COLLECTION DATE RECEIVED. WE HAVE USED  THE DATE THE SPECIMEN WAS RECEIVED BY THIS  LABORATORY AS THE COLLECTION DATE. IF THIS  IS INCORRECT, PLEASE CONTACT CLIENT SERVICES.  PHONE NUMBER: [phone]     Test performed at [facility] [address]  [address]  Director: TIFFANI CROWE M.D.     PSA   Date Value Ref Range Status   01/10/2018 12.20 (H) 0 - 4 ug/L Final     Comment:     Assay Method:  Chemiluminescence using Siemens Vista analyzer       An enema was completed and 500 mg of Cipro twice daily was started prior to the biopsy.  After obtaining informed consent patient was placed in lateral decubitus position.  The ultrasound probe was placed in the rectum.  The prostate was numbed using ultrasound guidance with 1% lidocaine 5 mls along each nerve bundle.      The volume was measured and estimated to be 22 cubic centimeters.      US images were used to guide the biopsies of the prostate.  12 cores were taken with 6 on each side, 2 at the base,  2 at the midgland and  2 at the apex.  The patient tolerated the procedure well.      We will follow up with the results in 7-10 days and contact patient with these results.

## 2018-01-24 ENCOUNTER — TELEPHONE (OUTPATIENT)
Dept: PALLIATIVE MEDICINE | Facility: CLINIC | Age: 68
End: 2018-01-24

## 2018-01-24 NOTE — TELEPHONE ENCOUNTER
Patient had a Left L3,4,5 radiofrequency ablation on  1/17/18.  Called patient for an update.      Pt reported the following details:  He estimates his pain has improved 30-40%. Pt aware that it can take some time for RFA benefits to be fully achieved.   Told patient that the information will be forwarded to Dr. Cortez, and if pt has any further questions or concerns pt should call the nurse line at [phone].

## 2018-01-25 ENCOUNTER — TRANSFERRED RECORDS (OUTPATIENT)
Dept: FAMILY MEDICINE | Facility: CLINIC | Age: 68
End: 2018-01-25

## 2018-01-25 LAB — COPATH REPORT: NORMAL

## 2018-01-26 ENCOUNTER — TELEPHONE (OUTPATIENT)
Dept: UROLOGY | Facility: CLINIC | Age: 68
End: 2018-01-26

## 2018-01-26 DIAGNOSIS — R97.20 ELEVATED PROSTATE SPECIFIC ANTIGEN (PSA): Primary | ICD-10-CM

## 2018-01-26 NOTE — TELEPHONE ENCOUNTER
Spoke on phone re: negative prostate biopsy results  He will come to see me in 6 months for a PSA and exam

## 2018-02-06 NOTE — TELEPHONE ENCOUNTER
Pt has additional concerns and would like a call back. Pt states they have felt no relief. Ph # [phone]

## 2018-02-07 NOTE — TELEPHONE ENCOUNTER
Called patient and he reports he is feeling significantly better then before his procedure. He had no further questions.    ISRRAEL GrimaldoN, RN  Care Coordinator  [facility]

## 2018-02-28 ENCOUNTER — TELEPHONE (OUTPATIENT)
Dept: UROLOGY | Facility: CLINIC | Age: 68
End: 2018-02-28

## 2018-02-28 NOTE — TELEPHONE ENCOUNTER
Jose would like to have an MRI of the prostate done . He just had recent negative biopsy however a friend had cancer dx found on MRI recently. He is requesting to talk to you about if he can get this set up if his insurance will cover it. Annabelle Corey LPN

## 2018-03-01 ENCOUNTER — TELEPHONE (OUTPATIENT)
Dept: UROLOGY | Facility: CLINIC | Age: 68
End: 2018-03-01

## 2018-03-01 NOTE — TELEPHONE ENCOUNTER
Jose is calling with questions. He had a TRUS with prostate biopsy about a month ago and this am he noticed some hematuria at beginning of stream then cleared. Instructed him to drink water and hold ASA today . If urine would be total gross hematuria or dose not clear he is to call back. If urine clears most likely still related to the biopsy . He is having no other symptoms. He will keep us updated. Annabelle Corey LPN

## 2018-03-02 ENCOUNTER — TRANSFERRED RECORDS (OUTPATIENT)
Dept: FAMILY MEDICINE | Facility: CLINIC | Age: 68
End: 2018-03-02

## 2018-03-02 DIAGNOSIS — R97.20 ELEVATED PROSTATE SPECIFIC ANTIGEN (PSA): Primary | ICD-10-CM

## 2018-03-14 ENCOUNTER — TELEPHONE (OUTPATIENT)
Dept: PALLIATIVE MEDICINE | Facility: CLINIC | Age: 68
End: 2018-03-14

## 2018-03-14 NOTE — TELEPHONE ENCOUNTER
Patient states his last RFA wore off and wants to know what steps he cant take to get another one.           Jayla JENNINGS    [facility]

## 2018-03-15 NOTE — TELEPHONE ENCOUNTER
"Called patient to address concerns below:    Patient doesn't necessarily want another RFA and he knows one can't be done this soon. (last RFA was 1/17/18) He is just looking for other options he may have. Patient states that his pain is back to where it was before the RFA. Still on the left side. Pt used to see Dr. Galvez in [facility] referred here for injection. Patient states that he rarely sees his primary and with his insurance \"he doesn't need a referral\" Advised patient that our providers work both in clinic and injections and if they don't follow you clinically, then they need a referral/order for injection from an outside source because out providers don't know about your pain/medical history. Encouraged patient to touch base with primary to discuss this and if needed PCP can write and interventional eval to see patient and discuss other options.     Naomi ARCOSN-RN Care Coordinator  [facility]        "

## 2018-04-11 ENCOUNTER — TRANSFERRED RECORDS (OUTPATIENT)
Dept: FAMILY MEDICINE | Facility: CLINIC | Age: 68
End: 2018-04-11

## 2018-04-17 ENCOUNTER — TELEPHONE (OUTPATIENT)
Dept: ORTHOPEDICS | Facility: CLINIC | Age: 68
End: 2018-04-17

## 2018-04-17 DIAGNOSIS — M54.50 CHRONIC MIDLINE LOW BACK PAIN WITHOUT SCIATICA: Primary | ICD-10-CM

## 2018-04-17 DIAGNOSIS — G89.29 CHRONIC MIDLINE LOW BACK PAIN WITHOUT SCIATICA: Primary | ICD-10-CM

## 2018-04-17 DIAGNOSIS — M47.819 FACET ARTHROPATHY OF SPINE: ICD-10-CM

## 2018-04-17 NOTE — TELEPHONE ENCOUNTER
Received call from patient who is requesting a repeat RFA or other RAMSES. Advised patient that since his last order came from Dr. Galvez in [facility] that the order for a repeat would need to come from his PCP or he could see someone in [facility] to reassess his pain as he was seeing Dr. Galvez there.       Flori Valenzuela    [facility]

## 2018-04-18 NOTE — TELEPHONE ENCOUNTER
Chart reviewed  He is patient of Dr. Galvez's with history of chronic back pain and has received MBB and RFA ×1 done by pain management.  Has had a resumption in his pain.    Given the chronic nature will place referral to neurosurgery/[facility] for evaluation and management.  They can subsequently determine if RAMSES versus repeat RFA versus surgery is indicated    Aston August DO, CAM  [facility]

## 2018-04-27 ENCOUNTER — TELEPHONE (OUTPATIENT)
Dept: FAMILY MEDICINE | Facility: CLINIC | Age: 68
End: 2018-04-27

## 2018-04-27 DIAGNOSIS — I21.4 NSTEMI (NON-ST ELEVATED MYOCARDIAL INFARCTION) (H): ICD-10-CM

## 2018-04-27 DIAGNOSIS — E78.2 MIXED HYPERLIPIDEMIA: ICD-10-CM

## 2018-04-27 DIAGNOSIS — I10 ESSENTIAL HYPERTENSION, BENIGN: ICD-10-CM

## 2018-04-27 RX ORDER — ATORVASTATIN CALCIUM 40 MG/1
40 TABLET, FILM COATED ORAL DAILY
Qty: 90 TABLET | Refills: 1 | COMMUNITY
Start: 2018-04-27 | End: 2018-05-02

## 2018-04-27 RX ORDER — LOSARTAN POTASSIUM 25 MG/1
25 TABLET ORAL DAILY
Qty: 30 TABLET | Refills: 0 | COMMUNITY
Start: 2018-04-27 | End: 2018-05-02

## 2018-04-27 NOTE — TELEPHONE ENCOUNTER
Ok refill of losartan and atorvastatin for one month only called into [facility]. Pt needs fating OV for further refills.     Thanks,Paty

## 2018-04-30 ENCOUNTER — OFFICE VISIT (OUTPATIENT)
Dept: NEUROSURGERY | Facility: CLINIC | Age: 68
End: 2018-04-30
Attending: NURSE PRACTITIONER
Payer: COMMERCIAL

## 2018-04-30 ENCOUNTER — TELEPHONE (OUTPATIENT)
Dept: PALLIATIVE MEDICINE | Facility: CLINIC | Age: 68
End: 2018-04-30

## 2018-04-30 VITALS
BODY MASS INDEX: 24.16 KG/M2 | HEART RATE: 71 BPM | SYSTOLIC BLOOD PRESSURE: 134 MMHG | HEIGHT: 65 IN | WEIGHT: 145 LBS | DIASTOLIC BLOOD PRESSURE: 81 MMHG | OXYGEN SATURATION: 96 % | TEMPERATURE: 97 F

## 2018-04-30 DIAGNOSIS — M47.819 FACET ARTHROPATHY: Primary | ICD-10-CM

## 2018-04-30 PROCEDURE — 99203 OFFICE O/P NEW LOW 30 MIN: CPT | Performed by: NURSE PRACTITIONER

## 2018-04-30 ASSESSMENT — PAIN SCALES - GENERAL: PAINLEVEL: MILD PAIN (3)

## 2018-04-30 NOTE — NURSING NOTE
"Jose Moreno is a 68 year old male who presents for:  Chief Complaint   Patient presents with     Neurologic Problem     Chronic midline low back pain without sciatica,Facet arthropathy of spine/ PERES, SIATTA LESLI/ XR/ HP/ Fv Con (xr lumbar radiofrequency ablations, medial branch blocks, facet injections 2017/18, MRI 2016 LBS 2012 in [facility] No recent PT JM)        Initial Vitals:  /81 (BP Location: Right arm, Patient Position: Chair, Cuff Size: Adult Large)  Pulse 71  Temp 97  F (36.1  C) (Oral)  Ht 5' 5\" (1.651 m)  Wt 145 lb (65.8 kg)  SpO2 96%  BMI 24.13 kg/m2 Estimated body mass index is 24.13 kg/(m^2) as calculated from the following:    Height as of this encounter: 5' 5\" (1.651 m).    Weight as of this encounter: 145 lb (65.8 kg).. Body surface area is 1.74 meters squared. BP completed using cuff size: large  Mild Pain (3)    Do you feel safe in your environment?  Yes  Do you need any refills today? No    Nursing Comments: Chronic midline low back pain.  Patient rates his pain today as 3      5 min. nursing intake time  Jasmyne Peters CMA      Discharge plan: 1. Please schedule your injection. Someone will contact you from the pain clinic within 24 hours to schedule.      2. Please contact the clinic if pain persists at [phone]. Then we will order an update lumbar MRI     2 min. nursing discharge time  Jasmyne Peters CMA       "

## 2018-04-30 NOTE — PATIENT INSTRUCTIONS
1. Please schedule your injection. Someone will contact you from the pain clinic within 24 hours to schedule.      2. Please contact the clinic if pain persists at [phone]. Then we will order an update lumbar MRI

## 2018-04-30 NOTE — PROGRESS NOTES
Dr. Willy Cruz  [facility]  Neurosurgery Clinic Visit        CC:  Low back pain     Primary care Provider: Jerri Tavera      Reason For Visit:   I was asked by Dr. Aston August to consult on the patient for low back pain.      HPI: Jose Moreno is a 68 year old male with low back pain. He denies any radicular symptoms or leg weakness.  He reports that he has had this pain for over 50 years.  He states that he was seeing Dr. Rodney Galvez but he is now gone. He had a RFA with Dr. Cortez in January of 2018 which was very helpful. He states that the pain is now slowly returning. He states that he has been pain free for 3 months.  He states that the pain is gradual and worse at the end of the day.  He states that standing and walking can make it worse.  He works as contractor.     Pain at its worst 10  Pain right now:  3    Past Medical History:   Diagnosis Date     ACS (acute coronary syndrome) (H) 01-23-15     ADHD (attention deficit hyperactivity disorder)      CAD (coronary artery disease)     cardiac cath 1/23/15: SHERRY to 1st diagonal, SHERRY x2 to LAD, cath 2009: no intervention     Esophageal reflux      History of hyperthyroidism 4/9/2014     Hyperlipidemia      Hypertension      Mitral regurgitation 2009    moderately severe MVP, s/p robotic repair at [facility]     NSTEMI (non-ST elevated myocardial infarction) (H) 01-23-15     Pulmonary nodules 2009    CT-recommend repeat in 6-12 months     Rotator cuff rupture 11/3/2011       Past Medical History reviewed with patient during visit.    Past Surgical History:   Procedure Laterality Date     DECOMPRESSION LUMBAR MINIMALLY INVASIVE ONE LEVEL  1/11/2012    Procedure:DECOMPRESSION LUMBAR MINIMALLY INVASIVE ONE LEVEL; L4-5 Decompression Minimally Invasive; Surgeon:MESSI HORAN; Location:[facility] OR     EYE EXAM ESTABLISHED PT  1/14/06     HC COLONOSCOPY THRU STOMA, DIAGNOSTIC  7/00    GI     HCL PSA, DIAGNOSTIC (TUMOR MARKER)  2003     HEART CATH  RIGHT AND LEFT HEART CATH  01-23-15    See report, pt transfered to [facility] for complex bifurcation PCI of LAD diagonal vessel.      HEART CATH RIGHT AND LEFT HEART CATH  01-26-15    PTCA and implantation of SHERRY to 1st diagonal, SHERRY to mid LAD, SHERRY to proximal LAD     HERNIA REPAIR       ORTHOPEDIC SURGERY      Hx of rotator cuff rupture and repair     REMOVAL OF SPERM DUCT(S)      Vasectomy     REMOVAL OF SPERM DUCT(S)      reversal of vasectomy     REPAIR VALVE MITRAL  2009    [facility] robotic repair      TESTICLE SURGERY       VASECTOMY       VASOVASOSTOMY       Past Surgical History reviewed with patient during visit.    Current Outpatient Prescriptions   Medication     aspirin EC 81 MG EC tablet     atorvastatin (LIPITOR) 40 MG tablet     ciprofloxacin (CIPRO) 500 MG tablet     FLUoxetine (PROZAC) 40 MG capsule     levothyroxine (SYNTHROID/LEVOTHROID) 50 MCG tablet     losartan (COZAAR) 25 MG tablet     nitroglycerin (NITROSTAT) 0.4 MG sublingual tablet     omeprazole (PRILOSEC) 20 MG CR capsule     oxyCODONE HCl (OXECTA) 5 MG TABA     traZODone (DESYREL) 100 MG tablet     No current facility-administered medications for this visit.        Allergies   Allergen Reactions     Ace Inhibitors Cough     Dry cough       Social History     Social History     Marital status:      Spouse name: Chastity     Number of children: 4     Years of education: 14     Occupational History      [employer]     RETIRED     Social History Main Topics     Smoking status: Never Smoker     Smokeless tobacco: Never Used     Alcohol use 4.2 oz/week     7 Standard drinks or equivalent per week      Comment: 1 beer daily     Drug use: No     Sexual activity: Yes     Partners: Female     Birth control/ protection: Condom, Post-menopausal     Other Topics Concern     Caffeine Concern No     2-3 daily     Sleep Concern No     Special Diet No     low sodium     Exercise Yes     walking daily     Seat Belt Yes     Self-Exams  "No     Social History Narrative       Family History   Problem Relation Age of Onset     CANCER Mother      breast, lung     Depression Father      alcohlism     DIABETES No family hx of      Cardiovascular No family hx of      Cancer - colorectal No family hx of      Prostate Cancer No family hx of          Review Of Systems  Skin: negative  Eyes: negative  Ears/Nose/Throat: negative  Respiratory: No shortness of breath, dyspnea on exertion, cough, or hemoptysis  Cardiovascular: HTN/HLD/ CAD  Gastrointestinal: reflux  Genitourinary: negative  Musculoskeletal: back pain  Neurologic: negative  Psychiatric: negative  Hematologic/Lymphatic/Immunologic: negative  Endocrine: negative     ROS: 10 point ROS neg other than the symptoms noted above in the HPI.      Vital Signs: /81 (BP Location: Right arm, Patient Position: Chair, Cuff Size: Adult Large)  Pulse 71  Temp 97  F (36.1  C) (Oral)  Ht 5' 5\" (1.651 m)  Wt 145 lb (65.8 kg)  SpO2 96%  BMI 24.13 kg/m2    Examination:  Constitutional:  Alert, well nourished, NAD.  Memory: recent and remote memory intact  HEENT: Normocephalic, atraumatic.   Pulm:  Without shortness of breath   CV:  No pitting edema of BLE.    Neurological:  Awake  Alert  Oriented x 3  Speech clear  Cranial nerves II - XII intact  PERRL  EOMI  Face symmetric  Tongue midline  Motor exam   Shoulder Abduction:  Right:  5/5   Left:  5/5  Biceps:                      Right:  5/5   Left:  5/5  Triceps:                     Right:  5/5   Left:  5/5  Wrist Extensors:       Right:  5/5   Left:  5/5  Wrist Flexors:           Right:  5/5   Left:  5/5  Intrinsics:                   Right:  5/5   Left:  5/5   Hip Flexor:                Right: 5/5  Left:  5/5  Hip Adductor:             Right:  5/5  Left:  5/5  Hip Abductor:             Right:  5/5  Left:  5/5  Gastroc Soleus:        Right:  5/5  Left:  5/5  Tib/Ant:                      Right:  5/5  Left:  5/5  EHL:                          Right:  5/5  " Left:  5/5   Sensation normal to bilateral upper and lower extremities  Muscle tone to bilateral upper and lower extremities normal   Gait: Able to stand from a seated position. Normal non-antalgic, non-myelopathic gait.      Lumbar examination reveals tenderness of the spine and  paraspinous muscles. Pain with lumbar extension and palpation of the lumbar facet joints.  Hip height is symmetrical. Negative SI joint, sciatic notch or greater trochanteric tenderness to palpation bilaterally.      Imaging:  No new imaging.     Assessment/Plan:   Jose Moreno is a 68 year old male with low back pain. He denies any radicular symptoms or leg weakness.  He reports that he has had this pain for over 50 years.  He states that he was seeing Dr. Rodney Galvez but he is now gone. He had a RFA with Dr. Cortez in January of 2018 which was very helpful. He states that the pain is now slowly returning. He states that he has been pain free for 3 months.  He states that the pain is gradual and worse at the end of the day.  He states that standing and walking can make it worse.  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 He had excellent relief with the RFA and was pain free.  It was explained that he may benefit from a repeat RFA. He is open to this.  If the pain persists then I would recommend he undergo an updated MRI.  He agreed.     Patient Instructions   1. Please schedule your injection. Someone will contact you from the pain clinic within 24 hours to schedule.      2. Please contact the clinic if pain persists at [phone]. Then we will order an update lumbar MRI      Zenobia An [facility]  Spine and Brain Clinic  [address] [address]    Tel [phone]  Pager [phone]

## 2018-04-30 NOTE — TELEPHONE ENCOUNTER
Received order from Zenobia An requesting repeat Lumbar RFA with Dr. Cortez. Routing to check insurance coverage.       Flori Valenzuela    [facility]

## 2018-04-30 NOTE — LETTER
4/30/2018         RE: Jose Moreno  [address] [facility] [address]        Dear Colleague,    Thank you for referring your patient, Jose Moreno, to the [facility]. Please see a copy of my visit note below.    Dr. Willy Cruz  [facility]  Neurosurgery Clinic Visit        CC:  Low back pain     Primary care Provider: Jerri Tavera      Reason For Visit:   I was asked by Dr. Aston August to consult on the patient for low back pain.      HPI: Jose Moreno is a 68 year old male with low back pain. He denies any radicular symptoms or leg weakness.  He reports that he has had this pain for over 50 years.  He states that he was seeing Dr. Rodney Galvez but he is now gone. He had a RFA with Dr. Cortez in January of 2018 which was very helpful. He states that the pain is now slowly returning. He states that he has been pain free for 3 months.  He states that the pain is gradual and worse at the end of the day.  He states that standing and walking can make it worse.  He works as contractor.     Pain at its worst 10  Pain right now:  3    Past Medical History:   Diagnosis Date     ACS (acute coronary syndrome) (H) 01-23-15     ADHD (attention deficit hyperactivity disorder)      CAD (coronary artery disease)     cardiac cath 1/23/15: SHERRY to 1st diagonal, SHERRY x2 to LAD, cath 2009: no intervention     Esophageal reflux      History of hyperthyroidism 4/9/2014     Hyperlipidemia      Hypertension      Mitral regurgitation 2009    moderately severe MVP, s/p robotic repair at [place]     NSTEMI (non-ST elevated myocardial infarction) (H) 01-23-15     Pulmonary nodules 2009    CT-recommend repeat in 6-12 months     Rotator cuff rupture 11/3/2011       Past Medical History reviewed with patient during visit.    Past Surgical History:   Procedure Laterality Date     DECOMPRESSION LUMBAR MINIMALLY INVASIVE ONE LEVEL  1/11/2012    Procedure:DECOMPRESSION LUMBAR MINIMALLY  INVASIVE ONE LEVEL; L4-5 Decompression Minimally Invasive; Surgeon:MESSI HORAN; Location:[facility] OR     EYE EXAM ESTABLISHED PT  1/14/06     HC COLONOSCOPY THRU STOMA, DIAGNOSTIC  7/00    GI     HCL PSA, DIAGNOSTIC (TUMOR MARKER)  2003     HEART CATH RIGHT AND LEFT HEART CATH  01-23-15    See report, pt transfered to [facility] for complex bifurcation PCI of LAD diagonal vessel.      HEART CATH RIGHT AND LEFT HEART CATH  01-26-15    PTCA and implantation of SHERRY to 1st diagonal, SHERRY to mid LAD, SHERRY to proximal LAD     HERNIA REPAIR       ORTHOPEDIC SURGERY      Hx of rotator cuff rupture and repair     REMOVAL OF SPERM DUCT(S)      Vasectomy     REMOVAL OF SPERM DUCT(S)      reversal of vasectomy     REPAIR VALVE MITRAL  2009    [facility] robotic repair      TESTICLE SURGERY       VASECTOMY       VASOVASOSTOMY       Past Surgical History reviewed with patient during visit.    Current Outpatient Prescriptions   Medication     aspirin EC 81 MG EC tablet     atorvastatin (LIPITOR) 40 MG tablet     ciprofloxacin (CIPRO) 500 MG tablet     FLUoxetine (PROZAC) 40 MG capsule     levothyroxine (SYNTHROID/LEVOTHROID) 50 MCG tablet     losartan (COZAAR) 25 MG tablet     nitroglycerin (NITROSTAT) 0.4 MG sublingual tablet     omeprazole (PRILOSEC) 20 MG CR capsule     oxyCODONE HCl (OXECTA) 5 MG TABA     traZODone (DESYREL) 100 MG tablet     No current facility-administered medications for this visit.        Allergies   Allergen Reactions     Ace Inhibitors Cough     Dry cough       Social History     Social History     Marital status:      Spouse name: Chastity     Number of children: 4     Years of education: 14     Occupational History      [employer]     RETIRED     Social History Main Topics     Smoking status: Never Smoker     Smokeless tobacco: Never Used     Alcohol use 4.2 oz/week     7 Standard drinks or equivalent per week      Comment: 1 beer daily     Drug use: No     Sexual activity: Yes      "Partners: Female     Birth control/ protection: Condom, Post-menopausal     Other Topics Concern     Caffeine Concern No     2-3 daily     Sleep Concern No     Special Diet No     low sodium     Exercise Yes     walking daily     Seat Belt Yes     Self-Exams No     Social History Narrative       Family History   Problem Relation Age of Onset     CANCER Mother      breast, lung     Depression Father      alcohlism     DIABETES No family hx of      Cardiovascular No family hx of      Cancer - colorectal No family hx of      Prostate Cancer No family hx of          Review Of Systems  Skin: negative  Eyes: negative  Ears/Nose/Throat: negative  Respiratory: No shortness of breath, dyspnea on exertion, cough, or hemoptysis  Cardiovascular: HTN/HLD/ CAD  Gastrointestinal: reflux  Genitourinary: negative  Musculoskeletal: back pain  Neurologic: negative  Psychiatric: negative  Hematologic/Lymphatic/Immunologic: negative  Endocrine: negative     ROS: 10 point ROS neg other than the symptoms noted above in the HPI.      Vital Signs: /81 (BP Location: Right arm, Patient Position: Chair, Cuff Size: Adult Large)  Pulse 71  Temp 97  F (36.1  C) (Oral)  Ht 5' 5\" (1.651 m)  Wt 145 lb (65.8 kg)  SpO2 96%  BMI 24.13 kg/m2    Examination:  Constitutional:  Alert, well nourished, NAD.  Memory: recent and remote memory intact  HEENT: Normocephalic, atraumatic.   Pulm:  Without shortness of breath   CV:  No pitting edema of BLE.    Neurological:  Awake  Alert  Oriented x 3  Speech clear  Cranial nerves II - XII intact  PERRL  EOMI  Face symmetric  Tongue midline  Motor exam   Shoulder Abduction:  Right:  5/5   Left:  5/5  Biceps:                      Right:  5/5   Left:  5/5  Triceps:                     Right:  5/5   Left:  5/5  Wrist Extensors:       Right:  5/5   Left:  5/5  Wrist Flexors:           Right:  5/5   Left:  5/5  Intrinsics:                   Right:  5/5   Left:  5/5   Hip Flexor:                Right: 5/5  " Left:  5/5  Hip Adductor:             Right:  5/5  Left:  5/5  Hip Abductor:             Right:  5/5  Left:  5/5  Gastroc Soleus:        Right:  5/5  Left:  5/5  Tib/Ant:                      Right:  5/5  Left:  5/5  EHL:                          Right:  5/5  Left:  5/5   Sensation normal to bilateral upper and lower extremities  Muscle tone to bilateral upper and lower extremities normal   Gait: Able to stand from a seated position. Normal non-antalgic, non-myelopathic gait.      Lumbar examination reveals tenderness of the spine and  paraspinous muscles. Pain with lumbar extension and palpation of the lumbar facet joints.  Hip height is symmetrical. Negative SI joint, sciatic notch or greater trochanteric tenderness to palpation bilaterally.      Imaging:  No new imaging.     Assessment/Plan:   Jose Moreno is a 68 year old male with low back pain. He denies any radicular symptoms or leg weakness.  He reports that he has had this pain for over 50 years.  He states that he was seeing Dr. Rodney Galvez but he is now gone. He had a RFA with Dr. Cortez in January of 2018 which was very helpful. He states that the pain is now slowly returning. He states that he has been pain free for 3 months.  He states that the pain is gradual and worse at the end of the day.  He states that standing and walking can make it worse.  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 He had excellent relief with the RFA and was pain free.  It was explained that he may benefit from a repeat RFA. He is open to this.  If the pain persists then I would recommend he undergo an updated MRI.  He agreed.     Patient Instructions   1. Please schedule your injection. Someone will contact you from the pain clinic within 24 hours to schedule.      2. Please contact the clinic if pain persists at [phone]. Then we will order an update lumbar MRI      Zenobia An [facility]  Spine and Brain Clinic  [facility]  [address] [facility]  [address] [address]    Tel [phone]  Pager [phone]      Again, thank you for allowing me to participate in the care of your patient.        Sincerely,        TACOS Rogers CNP

## 2018-04-30 NOTE — MR AVS SNAPSHOT
After Visit Summary   4/30/2018    Jose Moreno    MRN: [id number]           Patient Information     Date Of Birth          1950        Visit Information        Provider Department      4/30/2018 10:40 AM Zenobia An APRN CNP [facility]        Today's Diagnoses     Facet arthropathy    -  1      Care Instructions    1. Please schedule your injection. Someone will contact you from the pain clinic within 24 hours to schedule.      2. Please contact the clinic if pain persists at [phone]. Then we will order an update lumbar MRI           Follow-ups after your visit        Additional Services     PAIN MANAGEMENT REFERRAL       Your provider has referred you to: [facility]: [facility] -  Dr. Cortez  Reason for Referral: Procedure Order Facet Procedure:  RFA:  Lumbar - Nerve Blocks may be required. Repeat. Had 100% pain relief since last one. Now pain coming back      What is your diagnosis for the patient's pain? Lumbar facet pain       For any questions, contact the [facility] at [phone].     **ANY DIAGNOSTIC TESTS THAT ARE NOT IN EPIC SHOULD BE SENT TO THE PAIN CENTER**    REGARDING OPIOID MEDICATIONS:  The discussion of opioids management, appropriateness of therapy, and dosing will be discussed in patients being seen for evaluation.  The pain management clinics are not long-term prescribing clinics, with transition of prescribing of medications ultimately going back to the referring provider/PCP.  If prescribing is taken over at the pain clinic, it is in actively involved patients whom are appropriate for opioids, urine drug screening is completed, and long-term prescribing plan has been determined.  Therefore, we will not be automatically taking over prescribing at the patient's first visit.  Is this agreeable to you? agrees.     Please be aware that coverage of these services is subject to the terms and limitations of  your health insurance plan.  Call member services at your health plan with any benefit or coverage questions.      Please bring the following with you to your appointment:    (1) Any X-Rays, CTs or MRIs which have been performed.  Contact the facility where they were done to arrange for  prior to your scheduled appointment.    (2) List of current medications   (3) This referral request   (4) Any documents/labs given to you for this referral                  Your next 10 appointments already scheduled     May 02, 2018  8:50 AM CDT   Office Visit with Jerri Tavera MD   [facility] ([facility])    [address]  [address]  [facility] [address]   [phone]            Jun 06, 2018  9:30 AM CDT   Return Visit with Clint Alejandra MD   [facility] ([facility])    [address]  [facility] [id number] [phone]            Jul 26, 2018 10:00 AM CDT   Return Visit with Clint Blankenship MD   [facility] ([facility])    [address]  [address]  [facility] [id number] [phone]              Who to contact     If you have questions or need follow up information about today's clinic visit or your schedule please contact [facility] directly at [phone].  Normal or non-critical lab and imaging results will be communicated to you by MyChart, letter or phone within 4 business days after the clinic has received the results. If you do not hear from us within 7 days, please contact the clinic through MyChart or phone. If you have a critical or abnormal lab result, we will notify you by phone as soon as possible.  Submit refill requests through [employer] or call your pharmacy and they will forward the refill request to us. Please allow 3 business days for your refill to be completed.          Additional  "Information About Your Visit        MyChart Information     [employer] gives you secure access to your electronic health record. If you see a primary care provider, you can also send messages to your care team and make appointments. If you have questions, please call your primary care clinic.  If you do not have a primary care provider, please call [phone] and they will assist you.        Care EveryWhere ID     This is your Care EveryWhere ID. This could be used by other organizations to access your [facility] medical records  [id number]        Your Vitals Were     Pulse Temperature Height Pulse Oximetry BMI (Body Mass Index)       71 97  F (36.1  C) (Oral) 5' 5\" (1.651 m) 96% 24.13 kg/m2        Blood Pressure from Last 3 Encounters:   04/30/18 134/81   01/23/18 126/82   01/17/18 142/84    Weight from Last 3 Encounters:   04/30/18 145 lb (65.8 kg)   01/23/18 145 lb (65.8 kg)   01/10/18 150 lb (68 kg)              We Performed the Following     PAIN MANAGEMENT REFERRAL        Primary Care Provider Office Phone # Fax #    Jerri Tavera [phone]       [facility] [address] [address]        Equal Access to Services     CHAPINCITO CAIN : Hadii aad ku hadasho Soomaali, waaxda luqadaha, qaybta kaalmada adeegyada, waxay idiin haypanfilon juwan carrizales lahawa . So [facility] [phone].    ATENCIÓN: Si habla español, tiene a weber disposición servicios gratuitos de asistencia lingüística. Llame al [phone].    We comply with applicable federal civil rights laws and [place] laws. We do not discriminate on the basis of race, color, national origin, age, disability, sex, sexual orientation, or gender identity.            Thank you!     Thank you for choosing [facility]  for your care. Our goal is always to provide you with excellent care. Hearing back from our patients is one way we can continue to improve our services. Please take a few minutes to complete the written " survey that you may receive in the mail after your visit with us. Thank you!             Your Updated Medication List - Protect others around you: Learn how to safely use, store and throw away your medicines at www.disposemymeds.org.          This list is accurate as of 4/30/18 11:01 AM.  Always use your most recent med list.                   Brand Name Dispense Instructions for use Diagnosis    aspirin 81 MG EC tablet     60 tablet    Take 1 tablet (81 mg) by mouth daily    ACS (acute coronary syndrome) (H)       atorvastatin 40 MG tablet    LIPITOR    90 tablet    Take 1 tablet (40 mg) by mouth daily    Mixed hyperlipidemia, NSTEMI (non-ST elevated myocardial infarction) (H)       ciprofloxacin 500 MG tablet    CIPRO    6 tablet    Take 1 tablet (500 mg) by mouth 2 times daily    Elevated prostate specific antigen (PSA)       FLUoxetine 40 MG capsule    PROzac    90 capsule    Take 1 capsule (40 mg) by mouth daily    Major depressive disorder, single episode, moderate (H)       levothyroxine 50 MCG tablet    SYNTHROID/LEVOTHROID    90 tablet    Take 1 tablet (50 mcg) by mouth daily    Hypothyroidism due to acquired atrophy of thyroid       losartan 25 MG tablet    COZAAR    30 tablet    Take 1 tablet (25 mg) by mouth daily    Essential hypertension, benign       nitroGLYcerin 0.4 MG sublingual tablet    NITROSTAT    25 tablet    Place 1 tablet (0.4 mg) under the tongue every 5 minutes as needed for chest pain    ACS (acute coronary syndrome) (H)       omeprazole 20 MG CR capsule    priLOSEC    90 capsule    TAKE ONE CAPSULE BY MOUTH ONCE DAILY    Gastroesophageal reflux disease without esophagitis       oxyCODONE HCl 5 MG Taba    OXAYDO     Take 1-2 tablets by mouth every 4 hours as needed        traZODone 100 MG tablet    DESYREL    135 tablet    Take 1.5 tablets (150 mg) by mouth At Bedtime    Major depressive disorder, single episode, moderate (H)

## 2018-05-02 ENCOUNTER — OFFICE VISIT (OUTPATIENT)
Dept: FAMILY MEDICINE | Facility: CLINIC | Age: 68
End: 2018-05-02

## 2018-05-02 VITALS
RESPIRATION RATE: 12 BRPM | DIASTOLIC BLOOD PRESSURE: 80 MMHG | WEIGHT: 147.6 LBS | SYSTOLIC BLOOD PRESSURE: 150 MMHG | BODY MASS INDEX: 23.72 KG/M2 | HEART RATE: 59 BPM | TEMPERATURE: 97.9 F | OXYGEN SATURATION: 97 % | HEIGHT: 66 IN

## 2018-05-02 DIAGNOSIS — I10 ESSENTIAL HYPERTENSION, BENIGN: ICD-10-CM

## 2018-05-02 DIAGNOSIS — E78.2 MIXED HYPERLIPIDEMIA: Primary | ICD-10-CM

## 2018-05-02 DIAGNOSIS — F33.42 MAJOR DEPRESSIVE DISORDER, RECURRENT EPISODE, IN FULL REMISSION (H): ICD-10-CM

## 2018-05-02 DIAGNOSIS — I25.2 HISTORY OF NON-ST ELEVATION MYOCARDIAL INFARCTION (NSTEMI): ICD-10-CM

## 2018-05-02 DIAGNOSIS — Z13.6 SCREENING FOR AAA (ABDOMINAL AORTIC ANEURYSM): ICD-10-CM

## 2018-05-02 DIAGNOSIS — Z12.11 SPECIAL SCREENING FOR MALIGNANT NEOPLASMS, COLON: ICD-10-CM

## 2018-05-02 DIAGNOSIS — Z76.0 ENCOUNTER FOR MEDICATION REFILL: ICD-10-CM

## 2018-05-02 DIAGNOSIS — Z98.890 STATUS POST MITRAL VALVE REPAIR: ICD-10-CM

## 2018-05-02 PROCEDURE — 80053 COMPREHEN METABOLIC PANEL: CPT | Mod: 90 | Performed by: FAMILY MEDICINE

## 2018-05-02 PROCEDURE — 80061 LIPID PANEL: CPT | Mod: 90 | Performed by: FAMILY MEDICINE

## 2018-05-02 PROCEDURE — 99214 OFFICE O/P EST MOD 30 MIN: CPT | Performed by: FAMILY MEDICINE

## 2018-05-02 PROCEDURE — 36415 COLL VENOUS BLD VENIPUNCTURE: CPT | Performed by: FAMILY MEDICINE

## 2018-05-02 RX ORDER — LOSARTAN POTASSIUM 25 MG/1
50 TABLET ORAL DAILY
Qty: 180 TABLET | Refills: 0 | Status: SHIPPED | OUTPATIENT
Start: 2018-05-02 | End: 2018-07-19 | Stop reason: DRUGHIGH

## 2018-05-02 RX ORDER — ATORVASTATIN CALCIUM 40 MG/1
40 TABLET, FILM COATED ORAL DAILY
Qty: 90 TABLET | Status: CANCELLED | OUTPATIENT
Start: 2018-05-02

## 2018-05-02 RX ORDER — ATORVASTATIN CALCIUM 40 MG/1
40 TABLET, FILM COATED ORAL DAILY
Qty: 90 TABLET | Refills: 1 | Status: SHIPPED | OUTPATIENT
Start: 2018-05-02 | End: 2018-10-01

## 2018-05-02 RX ORDER — LOSARTAN POTASSIUM 25 MG/1
25 TABLET ORAL DAILY
Qty: 90 TABLET | Refills: 1 | Status: SHIPPED | OUTPATIENT
Start: 2018-05-02 | End: 2018-05-02

## 2018-05-02 RX ORDER — LOSARTAN POTASSIUM 25 MG/1
25 TABLET ORAL DAILY
Qty: 90 TABLET | Status: CANCELLED | OUTPATIENT
Start: 2018-05-02

## 2018-05-02 NOTE — TELEPHONE ENCOUNTER
[facility] RE[facility]      [facility]  o REPEAT RFA  o 1 successful workup resulting in >70% pain relief.  Repeat Radiofrequency ablative denervation (RFA) at the same level is covered when the following criteria are met:  o A minimum of six months and no more than 12 months has elapsed since the initial RFA; AND  o The initial RFA relieved at least 70% of the pain within 3 months of the procedure date as reported by the patient; AND  o Severe pain limiting activities of daily living for at least 3 months despite conservative treatments (structured exercise, physical therapy, activity modification, pharmacological management), - these must be documented on the request for prior authorization;      Routing to nursing to review, it has not been 6 months

## 2018-05-02 NOTE — PROGRESS NOTES
"SUBJECTIVE:  Jose Moreno is an 68 year old male who presents for evaluation of   Hyperlipidemia and hypertension. He has been diagnosed in the past as having   combined hyperlipidemia. He indicates that he is feeling well   and denies any symptoms of cardiovascular disease. Specifically   denies chest pain, palpitations, dyspnea, orthopnea, PND,   claudication or peripheral edema. Treatment modalities employed to   this point include diet, regular aerobic exercise and Lipitor. Current medication   regimen is as listed below. Patient denies any side effects of   medication.    Family history: positive for hypertension  Age at diagnosis of hyperlipidemia: late 40's with both conditions  Cardiovascular risk factors: family history, lipids, hypertension, sedentary life style, previous Ischemic Heart Disease, previous angioplasty, previous MI and mitral valve repair/ chronic low back issues with lumbar decompression surgery 2012    Current Outpatient Prescriptions   Medication     aspirin EC 81 MG EC tablet     atorvastatin (LIPITOR) 40 MG tablet     FLUoxetine (PROZAC) 40 MG capsule     levothyroxine (SYNTHROID/LEVOTHROID) 50 MCG tablet     losartan (COZAAR) 25 MG tablet     omeprazole (PRILOSEC) 20 MG CR capsule     traZODone (DESYREL) 100 MG tablet     nitroglycerin (NITROSTAT) 0.4 MG sublingual tablet     No current facility-administered medications for this visit.      Allergies   Allergen Reactions     Ace Inhibitors Cough     Dry cough       Social History   Substance Use Topics     Smoking status: Never Smoker     Smokeless tobacco: Never Used     Alcohol use 4.2 oz/week     7 Standard drinks or equivalent per week      Comment: 1 beer daily       OBJECTIVE:  /80 (BP Location: Right arm, Patient Position: Chair, Cuff Size: Adult Large)  Pulse 59  Temp 97.9  F (36.6  C) (Oral)  Resp 12  Ht 1.67 m (5' 5.75\")  Wt 67 kg (147 lb 9.6 oz)  SpO2 97%  BMI 24 kg/m2  Repeat BP R arm seated = 150/78 repeated " with regular size cuff.  Skin: negative  Fundi: deferred  Lungs: negative, Percussion normal. Good diaphragmatic excursion. Lungs clear  Heart: negative, PMI normal. No lifts, heaves, or thrills. RRR. No murmurs, clicks gallops or rub  Peripheral pulses: radial=4/4, femoral=4/4, popliteal=4/4, dorsalis pedis=4/4,  Abd; The abdomen is soft without tenderness, guarding, mass or organomegaly. Bowel sounds are normal. No CVA tenderness or inguinal adenopathy noted.  BMI : Body mass index is 24 kg/(m^2).    ASSESSMENT:(E78.2) Mixed hyperlipidemia  (primary encounter diagnosis)  Plan: VENIPUNC FNGR,HEEL,EAR [77049], LIPID PANEL,         atorvastatin (LIPITOR) 40 MG tablet        1)  Medication: continue same dose  2)  Low fat, low cholesterol diet  3)  Regular aerobic exercise  4)  Recheck in  month, sooner should new symptoms or   problems arise.    Patient Education: Reviewed risks of elevated lipids and principles   of treatment.        (I10) Essential hypertension, benign  Plan: VENIPUNC FNGR,HEEL,EAR [04229], LIPID PANEL,         COMPREHENSIVE METABOLIC PANEL, losartan         (COZAAR) 25 MG tablet, DISCONTINUED: losartan         (COZAAR) 25 MG tablet        1)  Medication: dosage change: 25 to 50 mgm losartan/ monitor BP  2)  Dietary sodium restriction  3)  Regular aerobic exercise  4)  Recheck in 2 months with cardiology appointment and recheck, sooner should new symptoms or   problems arise.    Patient Education: Reviewed risks of hypertension and principles of   treatment.        (I25.2) History of non-ST elevation myocardial infarction (NSTEMI)  Plan: VENIPUNC FNGR,HEEL,EAR [11034], LIPID PANEL,         COMPREHENSIVE METABOLIC PANEL, atorvastatin         (LIPITOR) 40 MG tablet, CARDIOLOGY EVAL ADULT         REFERRAL, losartan (COZAAR) 25 MG tablet,         DISCONTINUED: losartan (COZAAR) 25 MG tablet        I have reviewed the patient's medical history in detail and updated the computerized patient  record.      (Z98.890) Status post mitral valve repair  Plan: CARDIOLOGY EVAL ADULT REFERRAL        I have reviewed the patient's medical history in detail and updated the computerized patient record.      (F33.42) Major depressive disorder, recurrent episode, in full remission (H)  Plan: stable/ see PHQ9    (Z76.0) Encounter for medication refill  Plan: VENIPUNC FNGR,HEEL,EAR [74713], LIPID PANEL,         COMPREHENSIVE METABOLIC PANEL            (Z13.6) Screening for AAA (abdominal aortic aneurysm)  Plan: Abdominal Aortic Aneurysm Screening/Tracking        Await chart screen    (Z12.11) Special screening for malignant neoplasms, colon  Plan: GASTROENTEROLOGY ADULT REF PROCEDURE ONLY         Other; [facility] [phone]        Schedule colonoscopy

## 2018-05-02 NOTE — MR AVS SNAPSHOT
After Visit Summary   5/2/2018    Jose Moreno    MRN: [id number]           Patient Information     Date Of Birth          1950        Visit Information        Provider Department      5/2/2018 8:50 AM Jerri Tavera MD [facility]        Today's Diagnoses     Mixed hyperlipidemia    -  1    Essential hypertension, benign        History of non-ST elevation myocardial infarction (NSTEMI)        Status post mitral valve repair        Major depressive disorder, recurrent episode, in full remission (H)        Encounter for medication refill        Screening for AAA (abdominal aortic aneurysm)        Special screening for malignant neoplasms, colon          Care Instructions    Colonoscopy    Schedule cardiology consultation    Increase losartan to 50 mgm dose/ monitor BP          Follow-ups after your visit        Additional Services     CARDIOLOGY EVAL ADULT REFERRAL       Preferred location:  [facility] [phone]   [email or web address]    Please be aware that coverage of these services is subject to the terms and limitations of your health insurance plan.  Call member services at your health plan with any benefit or coverage questions.      Please bring the following to your appointment:  Any x-rays, CTs or MRIs which have been performed. Contact the facility where they were done to arrange for  prior to your scheduled appointment.    List of current medications  This referral request   Any documents/labs given to you for this referral            GASTROENTEROLOGY ADULT REF PROCEDURE ONLY Other; [facility] [phone]       Last Lab Result: Creatinine (mg/dL)       Date                     Value                 01/05/2018               0.83             ----------  Body mass index is 24 kg/(m^2).     Needed:  No  Language:  English    Patient will be contacted to schedule  procedure.     Please be aware that coverage of these services is subject to the terms and limitations of your health insurance plan.  Call member services at your health plan with any benefit or coverage questions.  Any procedures must be performed at a [facility] facility OR coordinated by your clinic's referral office.    Please bring the following with you to your appointment:    (1) Any X-Rays, CTs or MRIs which have been performed.  Contact the facility where they were done to arrange for  prior to your scheduled appointment.    (2) List of current medications   (3) This referral request   (4) Any documents/labs given to you for this referral                  Follow-up notes from your care team     Return in about 6 months (around 11/2/2018), or if symptoms worsen or fail to improve.      Your next 10 appointments already scheduled     Jun 06, 2018  9:30 AM CDT   Return Visit with Clint Alejandra MD   [facility] ([facility])    [address]  [place] [id number] [phone]            Jul 26, 2018 10:00 AM CDT   Return Visit with Clint Blankenship MD   [facility] [place] ([facility])    [address]  [address]  [place] [address] [phone]              Who to contact     If you have questions or need follow up information about today's clinic visit or your schedule please contact [facility] directly at [phone].  Normal or non-critical lab and imaging results will be communicated to you by MyChart, letter or phone within 4 business days after the clinic has received the results. If you do not hear from us within 7 days, please contact the clinic through MyChart or phone. If you have a critical or abnormal lab result, we will notify you by phone as soon as possible.  Submit refill requests through [employer] or call your pharmacy and they  "will forward the refill request to us. Please allow 3 business days for your refill to be completed.          Additional Information About Your Visit        [employer] Information     [facility] gives you secure access to your electronic health record. If you see a primary care provider, you can also send messages to your care team and make appointments. If you have questions, please call your primary care clinic.  If you do not have a primary care provider, please call [phone] and they will assist you.        Care EveryWhere ID     This is your Care EveryWhere ID. This could be used by other organizations to access your [facility] medical records  [id number]        Your Vitals Were     Pulse Temperature Respirations Height Pulse Oximetry BMI (Body Mass Index)    59 97.9  F (36.6  C) (Oral) 12 1.67 m (5' 5.75\") 97% 24 kg/m2       Blood Pressure from Last 3 Encounters:   05/02/18 150/80   04/30/18 134/81   01/23/18 126/82    Weight from Last 3 Encounters:   05/02/18 67 kg (147 lb 9.6 oz)   04/30/18 65.8 kg (145 lb)   01/23/18 65.8 kg (145 lb)              We Performed the Following     Abdominal Aortic Aneurysm Screening/Tracking     CARDIOLOGY EVAL ADULT REFERRAL     COMPREHENSIVE METABOLIC PANEL     GASTROENTEROLOGY ADULT REF PROCEDURE ONLY Other; [facility] [phone]     LIPID PANEL     VENIPUNC FNGR,HEEL,EAR [33142]          Today's Medication Changes          These changes are accurate as of 5/2/18  9:58 AM.  If you have any questions, ask your nurse or doctor.               Start taking these medicines.        Dose/Directions    losartan 25 MG tablet   Commonly known as:  COZAAR   Used for:  Essential hypertension, benign, History of non-ST elevation myocardial infarction (NSTEMI)   Started by:  Jerri Tavera MD        Dose:  50 mg   Take 2 tablets (50 mg) by mouth daily   Quantity:  180 tablet   Refills:  0            Where to get your medicines      These medications were sent to [facility]/pharmacy #[id number] - " [place], [place] - [address]  [address] [place] [address]    Hours:  [facility] Phone:  [phone]     atorvastatin 40 MG tablet    losartan 25 MG tablet                Primary Care Provider Office Phone # Fax #    Jerri Tavera [phone] [address]  [facility] [address]        Equal Access to Services     DEON CAIN : Hadii aad ku hadasho Soomaali, waaxda luqadaha, qaybta kaalmada adeegyada, waxay idiin hayaan adeeg kharash la'panfilon . So [facility] [phone].    ATENCIÓN: Si habla español, tiene a weber disposición servicios gratuitos de asistencia lingüística. Llame al [phone].    We comply with applicable federal civil rights laws and [place] laws. We do not discriminate on the basis of race, color, national origin, age, disability, sex, sexual orientation, or gender identity.            Thank you!     Thank you for choosing [facility]  for your care. Our goal is always to provide you with excellent care. Hearing back from our patients is one way we can continue to improve our services. Please take a few minutes to complete the written survey that you may receive in the mail after your visit with us. Thank you!             Your Updated Medication List - Protect others around you: Learn how to safely use, store and throw away your medicines at www.disposemymeds.org.          This list is accurate as of 5/2/18  9:58 AM.  Always use your most recent med list.                   Brand Name Dispense Instructions for use Diagnosis    aspirin 81 MG EC tablet     60 tablet    Take 1 tablet (81 mg) by mouth daily    ACS (acute coronary syndrome) (H)       atorvastatin 40 MG tablet    LIPITOR    90 tablet    Take 1 tablet (40 mg) by mouth daily    Mixed hyperlipidemia, History of non-ST elevation myocardial infarction (NSTEMI)       FLUoxetine 40 MG capsule    PROzac    90 capsule    Take 1 capsule (40 mg) by mouth  daily    Major depressive disorder, single episode, moderate (H)       levothyroxine 50 MCG tablet    SYNTHROID/LEVOTHROID    90 tablet    Take 1 tablet (50 mcg) by mouth daily    Hypothyroidism due to acquired atrophy of thyroid       losartan 25 MG tablet    COZAAR    180 tablet    Take 2 tablets (50 mg) by mouth daily    Essential hypertension, benign, History of non-ST elevation myocardial infarction (NSTEMI)       nitroGLYcerin 0.4 MG sublingual tablet    NITROSTAT    25 tablet    Place 1 tablet (0.4 mg) under the tongue every 5 minutes as needed for chest pain    ACS (acute coronary syndrome) (H)       omeprazole 20 MG CR capsule    priLOSEC    90 capsule    TAKE ONE CAPSULE BY MOUTH ONCE DAILY    Gastroesophageal reflux disease without esophagitis       traZODone 100 MG tablet    DESYREL    135 tablet    Take 1.5 tablets (150 mg) by mouth At Bedtime    Major depressive disorder, single episode, moderate (H)

## 2018-05-02 NOTE — NURSING NOTE
Jose Moreno is here today for a fasting recheck of his medications.    Pre-visit Screening:    Immunizations:  up to date  Colonoscopy:  is due and ordered today  Asthma Action Test/Plan:  NA  PHQ9:  is completed today  GAD7:  is completed today    Questioned patient about current smoking habits Pt. has never smoked.    Is it ok to leave a detailed message on home phone's voice mail for today's visit only? Yes     Phone # [phone] (home)      Sanjana Padilla CMA

## 2018-05-03 LAB
ALBUMIN SERPL-MCNC: 4.1 G/DL (ref 3.6–5.1)
ALBUMIN/GLOB SERPL: 1.4 (CALC) (ref 1–2.5)
ALP SERPL-CCNC: 59 U/L (ref 40–115)
ALT SERPL-CCNC: 22 U/L (ref 9–46)
AST SERPL-CCNC: 23 U/L (ref 10–35)
BILIRUB SERPL-MCNC: 0.5 MG/DL (ref 0.2–1.2)
BUN SERPL-MCNC: 17 MG/DL (ref 7–25)
BUN/CREATININE RATIO: NORMAL (CALC) (ref 6–22)
CALCIUM SERPL-MCNC: 9.3 MG/DL (ref 8.6–10.3)
CHLORIDE SERPLBLD-SCNC: 105 MMOL/L (ref 98–110)
CHOLEST SERPL-MCNC: 167 MG/DL
CHOLEST/HDLC SERPL: 2.2 (CALC)
CO2 SERPL-SCNC: 25 MMOL/L (ref 20–31)
CREAT SERPL-MCNC: 0.78 MG/DL (ref 0.7–1.18)
EGFR AFRICAN AMERICAN - QUEST: 106 ML/MIN/1.73M2
GFR SERPL CREATININE-BSD FRML MDRD: 91 ML/MIN/1.73M2
GLOBULIN, CALCULATED - QUEST: 2.9 G/DL (CALC) (ref 1.9–3.7)
GLUCOSE - QUEST: 95 MG/DL (ref 65–99)
HDLC SERPL-MCNC: 76 MG/DL
LDLC SERPL CALC-MCNC: 76 MG/DL (CALC)
NONHDLC SERPL-MCNC: 91 MG/DL (CALC)
POTASSIUM SERPL-SCNC: 4.3 MMOL/L (ref 3.5–5.3)
PROT SERPL-MCNC: 7 G/DL (ref 6.1–8.1)
SODIUM SERPL-SCNC: 139 MMOL/L (ref 135–146)
TRIGL SERPL-MCNC: 69 MG/DL

## 2018-05-03 ASSESSMENT — PATIENT HEALTH QUESTIONNAIRE - PHQ9: SUM OF ALL RESPONSES TO PHQ QUESTIONS 1-9: 0

## 2018-05-04 ENCOUNTER — TELEPHONE (OUTPATIENT)
Dept: FAMILY MEDICINE | Facility: CLINIC | Age: 68
End: 2018-05-04

## 2018-05-04 NOTE — TELEPHONE ENCOUNTER
Called patient to discuss the timeline between RFAs. No answer, LVM for patient to call triage with a good time to reach him.    Naomi Carrera   BSN-RN Care Coordinator  [facility] [place]

## 2018-05-04 NOTE — TELEPHONE ENCOUNTER
He needs to coordinate pain medications and spinal injections/procedures through his neurosurgereon office and [facility].

## 2018-05-04 NOTE — TELEPHONE ENCOUNTER
Spoke with patient and let him know about the 6 months in between RFAs. Pt wondering if he can get a steroid shot in the mean time. Pt not followed by us clinically advised him to follow up with PCP or referring provider. Pt agreeable to plan.    Naomi ARCOSN-RN Care Coordinator  [facility] [place]

## 2018-05-04 NOTE — TELEPHONE ENCOUNTER
Jose called clinic support and LM stating that he was just in and said he was going to get a injection RFA from his spinal Dr but when he went in they said that they only do those every 6 months so he still has a month left to wait. He said that he needs some kind of relief from the pain so he was requesting that Dr Tavera order a cortisone injection for him.    Please advise,  Pt can be reached at [phone]

## 2018-05-07 ENCOUNTER — TELEPHONE (OUTPATIENT)
Dept: NEUROSURGERY | Facility: CLINIC | Age: 68
End: 2018-05-07

## 2018-05-07 DIAGNOSIS — M47.819 FACET ARTHROPATHY: Primary | ICD-10-CM

## 2018-05-07 NOTE — TELEPHONE ENCOUNTER
Spoke with patient. Pain Management is recommending 6 months between RFA. Last RFA performed in Jan 2018. Patient would like to get RAMSES in the mean time. Will send to patient's care team for approval.

## 2018-05-07 NOTE — TELEPHONE ENCOUNTER
Jose called in and left a message inquiring on getting the ok from Dr. Tavera to have a cortisone injection done through [facility]. He was called back and given message from Dr. Tavera. Patient states that he has not had a neurosurgeon in ten years or so but that he will call the [facility] again to try and get it figured out. He was told to call us back with any further questions or concerns.

## 2018-05-07 NOTE — TELEPHONE ENCOUNTER
REASON FOR CALL:  Pt would like new order placed for a cortisone injection. Cannot do prev recommended procedure due to insurance coverage.     Detailed message can be left:  YES

## 2018-05-08 NOTE — TELEPHONE ENCOUNTER
Per Anjelica HERNANDEZ, Dr. Cruz approves RAMSES for now since pain management is recommending 6 months between RFA. Placed order. Patient notified. Advised to call back with further questions.

## 2018-05-09 ENCOUNTER — TELEPHONE (OUTPATIENT)
Dept: PALLIATIVE MEDICINE | Facility: CLINIC | Age: 68
End: 2018-05-09

## 2018-05-09 NOTE — TELEPHONE ENCOUNTER
Called patient to schedule, no answer and no VM. Mychart letter sent to patient to call and schedule amy HUFFMAN    [facility]

## 2018-05-10 NOTE — TELEPHONE ENCOUNTER
Pre-screening Questions for Radiology Injections:    Injection to be done at which interventional clinic site? [facility], instruct patient to arrive 30 minutes before the scheduled appointment time.     Procedure ordered by Dr. Cruz    Procedure ordered? Lumbar Epidural Steroid Injection    What insurance would patient like us to bill for this procedure? [employer]      Worker's comp or MVA (motor vehicle accident) -Any injection DO NOT SCHEDULE and route to Jayla Ayers.      [employer] insurance - For SI joint injections, DO NOT SCHEDULE and route Ursula Xavier. [employer] FREEDOM NO PA REQUIRED EFFECTIVE 11/1/2017      [employer]- MBB's must be scheduled at LEAST two weeks apart      [employer] - Any injection besides hip/shoulder/knee joint DO NOT SCHEDULE and route to Ursula Xavier. She will obtain PA and call pt back to schedule procedure or notify pt of denial.       [employer]-Route to Ursula for review    Any chance of pregnancy? NO   If YES, do NOT schedule and route to RN pool    Is an  needed? No     Patient has a drive home? (mandatory) YES: INFORMED    Is patient taking any blood thinners (plavix, coumadin, jantoven, warfarin, heparin, pradaxa or dabigatran )? No   If hold needed, do NOT schedule, route to RN pool     Is patient taking any aspirin products? Yes - Pt takes 81mg daily; instructed to hold 0 day(s) prior to procedure.      If more than 325mg/day do NOT schedule; route to RN pool     For CERVICAL procedures, hold all aspirin products for 6 days.      Does the patient have a bleeding or clotting disorder? No     If YES, okay to schedule AND route to RN nurse pool    **For any patients with platelet count <100, must be forwarded to provider**    Is patient diabetic?  No  If YES, have them bring their glucometer.    Does patient have an active infection or treated for one within the past week? No     Is patient currently taking any antibiotics?   No     For patients on chronic, preventative, or prophylactic antibiotics, procedures may be scheduled.     For patients on antibiotics for active or recent infection:    Aylin Chen Burton, Snitzer-antibiotic course must have been completed for 4 days    Is patient currently taking any steroid medications? (i.e. Prednisone, Medrol)  No     For patients on steroid medications:    Aylin Chen Burton, Snitzer-steroid course must have been completed for 4 days    Reviewed with patient:  If you are started on any steroids or antibiotics between now and your appointment, you must contact us because it may affect our ability to perform your procedure.  Yes    Is patient actively being treated for cancer or immunocompromised? No  If YES, do NOT schedule and route to RN pool     Are you able to get on and off an exam table with minimal or no assistance? Yes  If NO, do NOT schedule and route to RN pool    Are you able to roll over and lay on your stomach with minimal or no assistance? Yes  If NO, do NOT schedule and route to RN pool     Any allergies to contrast dye, iodine, shellfish, or numbing and steroid medications? No  If YES, route to RN pool AND add allergy information to appointment notes    Allergies: Ace inhibitors      Has the patient had a flu shot or any other vaccinations within 7 days before or after the procedure.  No     Does patient have an MRI/CT?  YES: MRI  (SI joint, hip injections, lumbar sympathetic blocks, and stellate ganglion blocks do not require an MRI)    Was the MRI done w/in the last 3 years?  Yes    Was MRI done at [facility]? No      If not, [facility] it [facility]       If MRI was not done at [facility], [facility] or [facility] do NOT schedule and route to nursing.  If pt has an imaging disc, the injection may be scheduled but pt has to bring disc to appt. If they show up w/out disc the injection cannot be done    Reminders (please tell patient if applicable):        Instructed pt to arrive 30 minutes early for IV start if this is for a cervical procedure, ALL sympathetic (stellate ganglion, hypogastric, or lumbar sympathetic block) and all sedation procedures (RFA, spinal cord stimulation trials).  Not Applicable   -IVs are not routinely placed for Dr. Cortez cervical cases   -Dr. Loyola: IVs for cervical ESIs and cervical TBDs (not CMBBs/facet inj)      If NPO for sedation, informed patient that it is okay to take medications with sips of water (except if they are to hold blood thinners).  Not Applicable   *DO take blood pressure medication if it is prescribed*      If this is for a cervical RAMSES, informed patient that aspirin needs to be held for 6 days.   Not Applicable      For all patients not having spinal cord stimulator (SCS) trials or radiofrequency ablations (RFAs), informed patient:    IV sedation is not provided for this procedure.  If you feel that an oral anti-anxiety medication is needed, you can discuss this further with your referring provider or primary care provider.  The Pain Clinic provider will discuss specifics of what the procedure includes at your appointment.  Most procedures last 10-20 minutes.  We use numbing medications to help with any discomfort during the procedure.  Not Applicable      Do not schedule procedures requiring IV placement in the first appointment of the day or first appointment after lunch. Do NOT schedule at 0745, 0815 or 1245. N/A      For patients 85 or older we recommend having an adult stay w/ them for the remainder of the day.   N/A    Does the patient have any questions?  NO  Flori Valenzuela  [facility]

## 2018-05-15 ENCOUNTER — RADIOLOGY INJECTION OFFICE VISIT (OUTPATIENT)
Dept: PALLIATIVE MEDICINE | Facility: CLINIC | Age: 68
End: 2018-05-15
Payer: COMMERCIAL

## 2018-05-15 ENCOUNTER — RADIANT APPOINTMENT (OUTPATIENT)
Dept: GENERAL RADIOLOGY | Facility: CLINIC | Age: 68
End: 2018-05-15
Attending: PAIN MEDICINE
Payer: COMMERCIAL

## 2018-05-15 VITALS — HEART RATE: 65 BPM | DIASTOLIC BLOOD PRESSURE: 89 MMHG | SYSTOLIC BLOOD PRESSURE: 153 MMHG | OXYGEN SATURATION: 100 %

## 2018-05-15 DIAGNOSIS — M47.819 FACET ARTHROPATHY: ICD-10-CM

## 2018-05-15 DIAGNOSIS — M47.817 LUMBOSACRAL SPONDYLOSIS WITHOUT MYELOPATHY: Primary | ICD-10-CM

## 2018-05-15 PROCEDURE — 64494 INJ PARAVERT F JNT L/S 2 LEV: CPT | Mod: LT | Performed by: PAIN MEDICINE

## 2018-05-15 PROCEDURE — 64493 INJ PARAVERT F JNT L/S 1 LEV: CPT | Mod: LT | Performed by: PAIN MEDICINE

## 2018-05-15 NOTE — MR AVS SNAPSHOT
After Visit Summary   5/15/2018    Jose Moreno    MRN: [id number]           Patient Information     Date Of Birth          1950        Visit Information        Provider Department      5/15/2018 11:15 AM Deonte Loyola MD [facility]        Care Instructions    [facility] Procedure Discharge Instructions    Today you saw:    Dr. Deonte Loyola    Your procedure:   Facet joint injection     Medications used:  Lidocaine (anesthetic)  Bupivacaine (anesthetic) Dexamethasone (steroid) Omnipaque (contrast)            Be cautious when walking as numbness and/or weakness in the legs may occur up to 6-8 hours after the procedure due to effect of the local anesthetic    Do not drive for 6 hours. The effect of the local anesthetic could slow your reflexes.     Avoid strenuous activity for the first 24 hours. You may resume your regular activities after that.     You may shower, however avoid swimming, tub baths or hot tubs for 24 hours following your procedure    You may have a mild to moderate increase in pain for several days following the injection.      You may use ice packs for 10-15 minutes, 3 to 4 times a day at the injection site for comfort    Do not use heat to painful areas for 6 to 8 hours. This will give the local anesthetic time to wear off and prevent you from accidentally burning your skin.    You may use anti-inflammatory medications (such as Ibuprofen/Advil or Aleve) or Tylenol for pain control if necessary    With diabetes, check your blood sugar more frequently than usual as your blood sugar may be higher than normal for 10-14 days following a steroid injection. Contact your doctor who manages your diabetes if your blood sugar is higher than usual    It may take up to 14 days for the steroid medication to start working although you may feel the effect as early as a few days after the procedure.     Follow up with your referring provider in 2-3  weeks      If you experience any of the following, call the pain center line during work hours at [phone] or on-call physician after hours at [phone]:  -Fever over 100 degree F  -Swelling, bleeding, redness, drainage, warmth at the injection site  -Progressive weakness or numbness in your legs or arms  -Loss of bowel or bladder function  -Unusual headache that is not relieved by Tylenol or your regular headache medication  -Unusual new onset of pain that is not improving    Phone #s:  Nurse triage line for general questions: [phone]            Follow-ups after your visit        Your next 10 appointments already scheduled     Jun 06, 2018  9:30 AM CDT   Return Visit with Clint Alejandra MD   [facility]    [address]  [facility] [address] [phone]            Jul 26, 2018 10:00 AM CDT   Return Visit with Clint Blankenship MD   [facility] ([facility])    [address]  [address]  [facility] [address] [phone]              Who to contact     If you have questions or need follow up information about today's clinic visit or your schedule please contact [facility] directly at [phone].  Normal or non-critical lab and imaging results will be communicated to you by [employer], letter or phone within 4 business days after the clinic has received the results. If you do not hear from us within 7 days, please contact the clinic through Vungle[employer] or phone. If you have a critical or abnormal lab result, we will notify you by phone as soon as possible.  Submit refill requests through [employer] or call your pharmacy and they will forward the refill request to us. Please allow 3 business days for your refill to be completed.          Additional Information About Your Visit        [employer] Information     [employer] gives you secure access to your  electronic health record. If you see a primary care provider, you can also send messages to your care team and make appointments. If you have questions, please call your primary care clinic.  If you do not have a primary care provider, please call [phone] and they will assist you.        Care EveryWhere ID     This is your Care EveryWhere ID. This could be used by other organizations to access your [facility] medical records  [id number]        Your Vitals Were     Pulse Pulse Oximetry                65 96%           Blood Pressure from Last 3 Encounters:   05/15/18 153/89   05/02/18 150/80   04/30/18 134/81    Weight from Last 3 Encounters:   05/02/18 67 kg (147 lb 9.6 oz)   04/30/18 65.8 kg (145 lb)   01/23/18 65.8 kg (145 lb)              Today, you had the following     No orders found for display       Primary Care Provider Office Phone # Fax #    Jerri Tavera [phone] [address]  [address] [place] [address]        Equal Access to Services     [facility]: Hadii aad ku hadasho Soomaali, waaxda luqadaha, qaybta kaalmada adeegyada, jayjay nicolas . So [facility] [phone].    ATENCIÓN: Si habla español, tiene a weber disposición servicios gratuitos de asistencia lingüística. [facility] [phone].    We comply with applicable federal civil rights laws and [place] laws. We do not discriminate on the basis of race, color, national origin, age, disability, sex, sexual orientation, or gender identity.            Thank you!     Thank you for choosing [facility]  for your care. Our goal is always to provide you with excellent care. Hearing back from our patients is one way we can continue to improve our services. Please take a few minutes to complete the written survey that you may receive in the mail after your visit with us. Thank you!             Your Updated Medication List - Protect others around you: Learn how to safely use, store  and throw away your medicines at www.disposemymeds.org.          This list is accurate as of 5/15/18 11:32 AM.  Always use your most recent med list.                   Brand Name Dispense Instructions for use Diagnosis    aspirin 81 MG EC tablet     60 tablet    Take 1 tablet (81 mg) by mouth daily    ACS (acute coronary syndrome) (H)       atorvastatin 40 MG tablet    LIPITOR    90 tablet    Take 1 tablet (40 mg) by mouth daily    Mixed hyperlipidemia, History of non-ST elevation myocardial infarction (NSTEMI)       FLUoxetine 40 MG capsule    PROzac    90 capsule    Take 1 capsule (40 mg) by mouth daily    Major depressive disorder, single episode, moderate (H)       levothyroxine 50 MCG tablet    SYNTHROID/LEVOTHROID    90 tablet    Take 1 tablet (50 mcg) by mouth daily    Hypothyroidism due to acquired atrophy of thyroid       losartan 25 MG tablet    COZAAR    180 tablet    Take 2 tablets (50 mg) by mouth daily    Essential hypertension, benign, History of non-ST elevation myocardial infarction (NSTEMI)       nitroGLYcerin 0.4 MG sublingual tablet    NITROSTAT    25 tablet    Place 1 tablet (0.4 mg) under the tongue every 5 minutes as needed for chest pain    ACS (acute coronary syndrome) (H)       omeprazole 20 MG CR capsule    priLOSEC    90 capsule    TAKE ONE CAPSULE BY MOUTH ONCE DAILY    Gastroesophageal reflux disease without esophagitis       traZODone 100 MG tablet    DESYREL    135 tablet    Take 1.5 tablets (150 mg) by mouth At Bedtime    Major depressive disorder, single episode, moderate (H)

## 2018-05-15 NOTE — NURSING NOTE
Pre-procedure Intake    Have you been fasting? No     If yes, for how long?     Are you taking a prescribed blood thinner such as coumadin, Plavix, Xarelto?    No    If yes, when did you take your last dose?     Do you take aspirin?  No    If cervical procedure, have you held aspirin for 6 days?   NA    Do you have any allergies to contrast dye, iodine, steroid and/or numbing medications?  NO    Are you currently taking antibiotics or have an active infection?  NO    Have you had a fever/elevated temperature within the past week? NO    Are you currently taking oral steroids? NO    Do you have a ? Yes       Are you pregnant or breastfeeding?  Not Applicable    Are the vital signs normal?  Yes

## 2018-05-15 NOTE — NURSING NOTE
Discharge Information    IV Discontiued Time:  NA    Amount of Fluid Infused:  NA    Discharge Criteria = When patient returns to baseline or as per MD order    Consciousness:  Pt is fully awake    Circulation:  BP +/- 20% of pre-procedure level    Respiration:  Patient is able to breathe deeply    O2 Sat:  Patient is able to maintain O2 Sat >92% on room air    Activity:  Moves 4 extremities on command    Ambulation:  Patient is able to stand and walk or stand and pivot into wheelchair    Dressing:  Clean/dry or No Dressing    Notes:   Discharge instructions and AVS given to patient    Patient meets criteria for discharge?  YES    Admitted to PCU?  No    Responsible adult present to accompany patient home?  Yes    Signature/Title:    Katia Medeiros  RN Care Coordinator  [facility]

## 2018-05-15 NOTE — PATIENT INSTRUCTIONS
[facility] Procedure Discharge Instructions    Today you saw:    Dr. Deonte Loyola    Your procedure:   Facet joint injection     Medications used:  Lidocaine (anesthetic)  Bupivacaine (anesthetic) Dexamethasone (steroid) Omnipaque (contrast)            Be cautious when walking as numbness and/or weakness in the legs may occur up to 6-8 hours after the procedure due to effect of the local anesthetic    Do not drive for 6 hours. The effect of the local anesthetic could slow your reflexes.     Avoid strenuous activity for the first 24 hours. You may resume your regular activities after that.     You may shower, however avoid swimming, tub baths or hot tubs for 24 hours following your procedure    You may have a mild to moderate increase in pain for several days following the injection.      You may use ice packs for 10-15 minutes, 3 to 4 times a day at the injection site for comfort    Do not use heat to painful areas for 6 to 8 hours. This will give the local anesthetic time to wear off and prevent you from accidentally burning your skin.    You may use anti-inflammatory medications (such as Ibuprofen/Advil or Aleve) or Tylenol for pain control if necessary    With diabetes, check your blood sugar more frequently than usual as your blood sugar may be higher than normal for 10-14 days following a steroid injection. Contact your doctor who manages your diabetes if your blood sugar is higher than usual    It may take up to 14 days for the steroid medication to start working although you may feel the effect as early as a few days after the procedure.     Follow up with your referring provider in 2-3 weeks      If you experience any of the following, call the pain center line during work hours at [phone] or on-call physician after hours at [phone]:  -Fever over 100 degree F  -Swelling, bleeding, redness, drainage, warmth at the injection site  -Progressive weakness or numbness in your legs or  arms  -Loss of bowel or bladder function  -Unusual headache that is not relieved by Tylenol or your regular headache medication  -Unusual new onset of pain that is not improving    Phone #s:  Nurse triage line for general questions: [phone]

## 2018-05-15 NOTE — PROGRESS NOTES
Pre procedure Diagnosis: lumbar facet arthropathy,  spondylosis   Post procedure Diagnosis: Same  Procedure performed: Left L4-5 and L5-S1 facet joint injections, fluoroscopically guided, contrast controlled  Indication:  Therapeutic for pain  Anesthesia: none  Complication:  none  Operators: Deonte Loyola MD    Indications:   Jose Moreno is a 68 year old male.  The patient has a history of Lumbar spondylosis. The pt s/p RFA with cont to relief. He has noticed over the last couple of weeks a flair in his pain.  Exam shows reproduction of pain with extension and lateral rotation.  They have tried conservative treatment including pt/meds/rfa.    Options/alternatives, benefits and risks were discussed with the patient including bleeding, infection, flared pain, tissue trauma, exposure to radiation, reaction to medications including seizure, spinal cord injury, paralysis, weakness, numbness and headache.     Questions were answered to his satisfaction and he wishes to proceed. Voluntary informed consent was obtained and signed.     Vitals were reviewed: Yes  Allergies were reviewed:  Yes   Medications were reviewed:  Yes   Pre-procedure pain score: 5/10    Procedure:  After obtaining signed informed consent, the patient was brought into the procedure suite and was placed in a prone position on the procedure table.   A Pause for the Cause was performed.  The patient was prepped and draped in the usual sterile fashion.     Under AP fluoroscopic guidance the Left L4-5, L5-S1 facet joints on the Left side were identified, and the C-arm was rotated obliquely to the affected side to open the joint space. A total of 5 ml of 1% lidocaine was injected at the needle entry point and needle tract. Then a 22 gauge 3.5 inch spinal needle was inserted and advanced under fluoroscopic guidance targeting the superior articular pillar of each joint. Once the needle made a contact with SAP, it was rotated and was then advanced into  the joint.    AP fluoroscopic views were obtained to confirm the needle placement. Then,  Omnipaque 0.5 contrast dye was injected after negative aspiration for heme and CSF in each joint, confirming appropriate placement.  A total of 9ml of Omnipaque was used.  Omnipaque wasted:  9 ml.    Then, each joint was injected with 1.5 ml of a combination of kenalog 40 mg and 0.25% bupivacaine 1 ml and the needles were flushed with local anesthetic as they were withdrawn.       Hemostasis was achieved, the area was cleaned, and bandaids were placed when appropriate.  The patient tolerated the procedure well, and was taken to the recovery room.    Images were saved to PACS.    Post-procedure pain score: 0/10  Follow-up includes:   -f/u phone call in one week  -f/u with referring provider    Deonte Loyola MD  [facility] [place]

## 2018-06-05 ENCOUNTER — PRE VISIT (OUTPATIENT)
Dept: CARDIOLOGY | Facility: CLINIC | Age: 68
End: 2018-06-05

## 2018-07-04 ENCOUNTER — NURSE TRIAGE (OUTPATIENT)
Dept: NURSING | Facility: CLINIC | Age: 68
End: 2018-07-04

## 2018-07-04 NOTE — TELEPHONE ENCOUNTER
Patient calling reporting 15 minutes ago he went for a walk. Stating he went from air conditioning walking for 2 minutes when he became lightheaded and dizzy.   Patient stating he went back inside and took a nitro due to previous history of heart attack. Patient denied having any chest pain or difficulty breathing.   Denies other symptoms. Dizziness improved. Ongoing lightheaded feeling. Patient is laying down, reporting mild headache that started after Ibuprofen. Stating he did not feel symptoms were heat related as symptoms started with in 2 minutes of going outside.  Encouraged fluids, laying down and elevating legs over next hour. Advised if symptoms continue after 1 hour or new symptoms develop prior to call back or be seen in the Emergency Room. Reviewed for any chest pain, difficulty breathing to call 911. Caller verbalized understanding. Denies further questions.  Spouse present with patient.    Flori Simmons RN  [facility]      Reason for Disposition    [1] MODERATE dizziness (e.g., interferes with normal activities) AND [2] has NOT been evaluated by physician for this  (Exception: dizziness caused by heat exposure, sudden standing, or poor fluid intake)    Additional Information    Negative: Severe difficulty breathing (e.g., struggling for each breath, speaks in single words)    Negative: [1] Difficulty breathing or swallowing AND [2] started suddenly after medicine, an allergic food or bee sting    Negative: Shock suspected (e.g., cold/pale/clammy skin, too weak to stand, low BP, rapid pulse)    Negative: Difficult to awaken or acting confused  (e.g., disoriented, slurred speech)    Negative: [1] Weakness (i.e., paralysis, loss of muscle strength) of the face, arm or leg on one side of the body AND [2] sudden onset AND [3] present now    Negative: [1] Numbness (i.e., loss of sensation) of the face, arm or leg on one side of the body AND [2] sudden onset AND [3] present now    Negative: [1]  "Loss of speech or garbled speech AND [2] sudden onset AND [3] present now    Negative: Overdose (accidental or intentional) of medications    Negative: [1] Fainted > 15 minutes ago AND [2] still feels too weak or dizzy to stand    Negative: Heart beating < 50 beats per minute OR > 140 beats per minute    Negative: Sounds like a life-threatening emergency to the triager    Negative: Chest pain    Negative: Rectal bleeding, bloody stool, or tarry-black stool    Negative: [1] Vomiting AND [2] contains red blood or black (\"coffee ground\") material    Negative: Vomiting is main symptom    Negative: Diarrhea is main symptom    Negative: Headache is main symptom    Negative: Patient states that he/she is having an anxiety/panic attack    Negative: Dizziness from low blood sugar (i.e., < 60 mg/dl or 3.5 mmol/l)    Negative: Dizziness is described as a spinning sensation (i.e., vertigo)    Negative: Heat exhaustion suspected    Negative: Difficulty breathing    Negative: SEVERE dizziness (e.g., unable to stand, requires support to walk, feels like passing out now)    Negative: Extra heart beats OR irregular heart beating  (i.e., \"palpitations\")    Negative: [1] Drinking very little AND [2] dehydration suspected (e.g., no urine > 12 hours, very dry mouth, very lightheaded)    Negative: Patient sounds very sick or weak to the triager    Negative: [1] Dizziness caused by heat exposure, sudden standing, or poor fluid intake AND [2] no improvement after 2 hours of rest and fluids    Negative: [1] Fever > 103 F (39.4 C) AND [2] not able to get the fever down using Fever Care Advice    Negative: [1] Fever > 101 F (38.3 C) AND [2] age > 60    Negative: [1] Fever > 101 F (38.3 C) AND [2] bedridden (e.g., nursing home patient, CVA, chronic illness, recovering from surgery)    Negative: [1] Fever > 100.5 F (38.1 C) AND [2] diabetes mellitus or weak immune system (e.g., HIV positive, cancer chemo, splenectomy, organ transplant, chronic " steroids)    Protocols used: DIZZINESS - LIGHTHEADEDNESS-ADULT-AH

## 2018-07-10 ENCOUNTER — OFFICE VISIT (OUTPATIENT)
Dept: CARDIOLOGY | Facility: CLINIC | Age: 68
End: 2018-07-10
Payer: COMMERCIAL

## 2018-07-10 VITALS
BODY MASS INDEX: 24.43 KG/M2 | WEIGHT: 146.6 LBS | SYSTOLIC BLOOD PRESSURE: 132 MMHG | HEIGHT: 65 IN | HEART RATE: 64 BPM | DIASTOLIC BLOOD PRESSURE: 80 MMHG

## 2018-07-10 DIAGNOSIS — I10 ESSENTIAL HYPERTENSION, BENIGN: Primary | ICD-10-CM

## 2018-07-10 DIAGNOSIS — I25.10 CORONARY ARTERY DISEASE INVOLVING NATIVE CORONARY ARTERY OF NATIVE HEART WITHOUT ANGINA PECTORIS: Chronic | ICD-10-CM

## 2018-07-10 DIAGNOSIS — E78.2 MIXED HYPERLIPIDEMIA: ICD-10-CM

## 2018-07-10 DIAGNOSIS — I34.0 NON-RHEUMATIC MITRAL REGURGITATION: ICD-10-CM

## 2018-07-10 DIAGNOSIS — R06.09 DOE (DYSPNEA ON EXERTION): ICD-10-CM

## 2018-07-10 DIAGNOSIS — Z98.890 STATUS POST MITRAL VALVE REPAIR: ICD-10-CM

## 2018-07-10 DIAGNOSIS — R53.82 CHRONIC FATIGUE: ICD-10-CM

## 2018-07-10 PROCEDURE — 99214 OFFICE O/P EST MOD 30 MIN: CPT | Performed by: INTERNAL MEDICINE

## 2018-07-10 NOTE — LETTER
7/10/2018    Jerri Tavera MD  [address] [facility]  [address]  [facility] [address]    RE: Jose Moreno       Dear Colleague,    I had the pleasure of seeing Jose Moreno in the [facility].    HPI and Plan:   See dictation    Orders Placed This Encounter   Procedures     Basic metabolic panel     Lipid Profile     ALT     Follow-Up with Cardiac Advanced Practice Provider     Follow-Up with Cardiologist     Exercise Stress Echocardiogram     Echocardiogram       No orders of the defined types were placed in this encounter.      There are no discontinued medications.      Encounter Diagnoses   Name Primary?     Essential hypertension, benign Yes     Mixed hyperlipidemia      Coronary artery disease involving native coronary artery of native heart without angina pectoris      FAUTSIN (dyspnea on exertion)      Status post mitral valve repair      Non-rheumatic mitral regurgitation      Chronic fatigue        CURRENT MEDICATIONS:  Current Outpatient Prescriptions   Medication Sig Dispense Refill     aspirin EC 81 MG EC tablet Take 1 tablet (81 mg) by mouth daily 60 tablet 0     atorvastatin (LIPITOR) 40 MG tablet Take 1 tablet (40 mg) by mouth daily 90 tablet 1     FLUoxetine (PROZAC) 40 MG capsule Take 1 capsule (40 mg) by mouth daily 90 capsule 3     levothyroxine (SYNTHROID/LEVOTHROID) 50 MCG tablet Take 1 tablet (50 mcg) by mouth daily 90 tablet 3     losartan (COZAAR) 25 MG tablet Take 2 tablets (50 mg) by mouth daily 180 tablet 0     nitroglycerin (NITROSTAT) 0.4 MG sublingual tablet Place 1 tablet (0.4 mg) under the tongue every 5 minutes as needed for chest pain 25 tablet 3     omeprazole (PRILOSEC) 20 MG CR capsule TAKE ONE CAPSULE BY MOUTH ONCE DAILY 90 capsule 3     traZODone (DESYREL) 100 MG tablet Take 1.5 tablets (150 mg) by mouth At Bedtime 135 tablet 3       ALLERGIES     Allergies   Allergen Reactions     Ace Inhibitors Cough     Dry cough       PAST MEDICAL  HISTORY:  Past Medical History:   Diagnosis Date     ACS (acute coronary syndrome) (H) 01-23-15     ADHD (attention deficit hyperactivity disorder)      CAD (coronary artery disease)     cardiac cath 1/23/15: SHERRY to 1st diagonal, SHERRY x2 to LAD, cath 2009: no intervention     Esophageal reflux      History of hyperthyroidism 4/9/2014     Hyperlipidemia      Hypertension      Mitral regurgitation 2009    moderately severe MVP, s/p robotic repair at [place]     NSTEMI (non-ST elevated myocardial infarction) (H) 01-23-15     Pulmonary nodules 2009    CT-recommend repeat in 6-12 months     Rotator cuff rupture 11/3/2011       PAST SURGICAL HISTORY:  Past Surgical History:   Procedure Laterality Date     DECOMPRESSION LUMBAR MINIMALLY INVASIVE ONE LEVEL  1/11/2012    Procedure:DECOMPRESSION LUMBAR MINIMALLY INVASIVE ONE LEVEL; L4-5 Decompression Minimally Invasive; Surgeon:MESSI HORAN; Location:[facility]     EYE EXAM ESTABLISHED PT  1/14/06     HC COLONOSCOPY THRU STOMA, DIAGNOSTIC  7/00    GI     HCL PSA, DIAGNOSTIC (TUMOR MARKER)  2003     HEART CATH RIGHT AND LEFT HEART CATH  01-23-15    See report, pt transfered to [facility] for complex bifurcation PCI of LAD diagonal vessel.      HEART CATH RIGHT AND LEFT HEART CATH  01-26-15    PTCA and implantation of SHERRY to 1st diagonal, SHERRY to mid LAD, SHERRY to proximal LAD     HERNIA REPAIR       ORTHOPEDIC SURGERY      Hx of rotator cuff rupture and repair     REMOVAL OF SPERM DUCT(S)      Vasectomy     REMOVAL OF SPERM DUCT(S)      reversal of vasectomy     REPAIR VALVE MITRAL  2009    [facility] robotic repair      TESTICLE SURGERY       VASECTOMY       VASOVASOSTOMY       XR LUMBAR RADIOFREQ ABLATION UNILATERAL  01/11/2018    lumbar area/ ? level       FAMILY HISTORY:  Family History   Problem Relation Age of Onset     Cancer Mother      breast, lung     Depression Father      alcohlism     Diabetes No family hx of      Cardiovascular No family hx of      Cancer - colorectal  "No family hx of      Prostate Cancer No family hx of        SOCIAL HISTORY:  Social History     Social History     Marital status:      Spouse name: Chastity     Number of children: 4     Years of education: 14     Occupational History      [employer]     RETIRED     Social History Main Topics     Smoking status: Never Smoker     Smokeless tobacco: Never Used     Alcohol use 4.2 oz/week     7 Standard drinks or equivalent per week      Comment: 1 beer daily     Drug use: No     Sexual activity: Yes     Partners: Female     Birth control/ protection: Condom, Post-menopausal     Other Topics Concern     Caffeine Concern No     2-3 daily     Sleep Concern No     Special Diet No     low sodium     Exercise Yes     walking daily     Seat Belt Yes     Self-Exams No     Social History Narrative       Review of Systems:  Skin:  Negative       Eyes:  Positive for glasses    ENT:  Negative      Respiratory:  Negative       Cardiovascular:    Positive for;dizziness since increasing the Losartan  Gastroenterology: Positive for heartburn treated  Genitourinary:  Negative      Musculoskeletal:  Positive for arthritis    Neurologic:  Negative      Psychiatric:  Positive for depression treated  Heme/Lymph/Imm:  Negative      Endocrine:  Positive for thyroid disorder      Physical Exam:  Vitals: /80  Pulse 64  Ht 1.651 m (5' 5\")  Wt 66.5 kg (146 lb 9.6 oz)  BMI 24.4 kg/m2    Constitutional:  cooperative, alert and oriented, well developed, well nourished, in no acute distress        Skin:  warm and dry to the touch, no apparent skin lesions or masses noted          Head:  normocephalic, no masses or lesions        Eyes:  pupils equal and round, conjunctivae and lids unremarkable, sclera white, no xanthalasma, EOMS intact, no nystagmus        Lymph:      ENT:  no pallor or cyanosis, dentition good        Neck:  carotid pulses are full and equal bilaterally;no carotid bruit        Respiratory:  normal breath " sounds, clear to auscultation, normal A-P diameter, normal symmetry, normal respiratory excursion, no use of accessory muscles         Cardiac: regular rhythm;normal S1 and S2;no murmurs, gallops or rubs detected                pulses full and equal                                   right radial site well healed, good pulse    GI:           Extremities and Muscular Skeletal:  no edema;no spinal abnormalities noted;normal muscle strength and tone              Neurological:  no gross motor deficits        Psych:  affect appropriate, oriented to time, person and place        CC  No referring provider defined for this encounter.                Thank you for allowing me to participate in the care of your patient.      Sincerely,     Clint Alejandra MD     [facility]    cc:   No referring provider defined for this encounter.

## 2018-07-10 NOTE — LETTER
"7/10/2018      Jerri Tavera MD  [address] [address]      RE: Jose Moreno       Dear Colleague,    I had the pleasure of seeing Jose Moreno in the [facility].    Service Date: 07/10/2018      HISTORY OF PRESENT ILLNESS:  Mr. Moreno is a very nice 68-year-old gentleman with past medical history significant for a robotic mitral valve repair at the [facility] in 2009 for mitral valve prolapse and severe mitral regurgitation.  Preoperative angiography demonstrated only a 50% mid LAD stenosis.      I met Mr. Moreno in 01/2015 when he presented with a non-ST segment elevation myocardial infarction to the [facility].  We took him to Cath Lab where he was found to have what appeared to be a chronically occluded right coronary artery that could not be crossed with a wire.  He subsequently was transferred to [facility], underwent complex LAD diagonal intervention with drug-eluting stents placed in both vessels.  He tolerated the procedure quite well.      He was subsequently seen in our clinic because he woke up with a \"spell.\"  He states he was not thinking clearly, he has trouble getting his words out.  He did not seek medical attention, and ultimately we referred him back to his primary care doctor for neurologic evaluation.      Subsequent followup echocardiogram demonstrated a structurally normal heart.  His valve repair appeared to be functioning appropriately.  He has a normal ejection fraction.  A ZIO Patch showed some brief runs of SVT lasting 4 beats, but no atrial fibrillation.  Stress nuclear scan appeared to be consistent with a small to moderate size reversible inferior, inferoseptal and inferolateral defect consistent with his known anatomy.      In followup, he appeared to be doing well from a cardiac standpoint without exertional chest, arm, neck, jaw or shoulder discomfort.  He did have a low-grade discomfort that was " there all the time, did not change with exercise and we felt this was not cardiac in origin.  We talked about options including medical management versus coronary artery bypass grafting versus a  and decided to continue with medical management.  He was having problems with fatigue, tiredness, cold hands, cold feet and we ultimately discontinued his metoprolol, of which he was only on 12.5 mg at that time.      Jose now returns to clinic with his wife and has many questions and she accompanies him because when he comes back for his visits, he reportedly just says everything is fine without details and she wants to know details.  She is also concerned because of her recent trip to [place], where there was quite a bit of walking.  He was not able to keep up, he became fatigued, he needed to take naps and she thinks he is just not functioning as well as he should be.        Jose states he only had to take nitroglycerin once in the last year and it was not for any exertional discomfort; it was for a vague discomfort.  He does exercise intermittently and states he does not think he has any limitations, although his wife does think he was limited in his trip to [place].  It appeared that their they hotel was about a mile from the .  They walked it several times a day, but in the course a day, he would become pooped out and would not go on later in the day excursions.  He did not precipitate any clear symptoms, but did have a generalized decreased exercise tolerance, shortness of breath.  It is unclear whether this is a new finding over the last 2 weeks, or just became noticeable because he was walking in the [place] area.      ASSESSMENT AND PLAN:  Jose his wife and I reviewed his coronary angiogram results, his echocardiogram of 2016.  He has missed followup appointments and so was late coming back.  He had no evaluation at all last year.  We talked about the fact that stress test accuracy is about 85%, but I would  start there.  I would do a stress echocardiogram.  This all about allow us to look structurally at his heart, we can look at his valve.  We may need a full echocardiogram to better evaluate the valve, but also see what his exercise tolerance is, heart rate and blood pressure response to exercise.      He states his primary care doctor increased his losartan from 25 mg to 50 mg a day.  He has had 1 episode of lightheadedness since that time and he is concerned about that.      Of note, Jose does appear to be either forgetful or in denial.  He clearly has a different view of what is going out of his health compared to his wife.  I have told him the stress echo will help sort that out.  Depending on the results of the stress echo, we will decide whether he is a full echo to reevaluate his valve to see if there is any pathology going out of the valve or whether we need a coronary angiogram to relook at his coronaries.  We again talked about , coronary bypass grafting, restenosis rates of stents, stent thrombosis rates and a wide variety of topics.  At this time, the plan is to proceed with stress echo and we will see where it leads us.      Blood pressure is well controlled at 132/80.  Again, I told them we will get some insight to his blood pressure control.  At this time, I will continue him on his losartan.      Fasting lipid profile is okay at this time.  Total cholesterol is 167.  His HDL is excellent at 76, LDL is 76 and triglycerides are 69.  We will continue his atorvastatin 40 mg daily.  Electrolytes are within normal limits.      I emphasized the importance of a regular exercise regimen and a healthy diet.  His weight is 146 pounds, giving a body mass index of 24.5.      He and his wife both state he sleeps well at nighttime.  He does not have snoring problems.  He is not on a beta blocker as we have already stopped that because of fatigue.  He does have a history of depression and psychiatric issues,  which he states he thinks he is doing well right now, but this could also be a factor.      Thank you for allowing me to participate in his care.        Over 45 minutes was spent with a Daniella and his wife today sorting out and reviewing his issues.         CLINT MORA MD, [facility]             D: 07/10/2018   T: 07/10/2018   MT: JH      Name:     DANIELLA OVALLE   MRN:      [id number]        Account:      [id number]   :      1950           Service Date: 07/10/2018      Document: [id number]         Outpatient Encounter Prescriptions as of 7/10/2018   Medication Sig Dispense Refill     aspirin EC 81 MG EC tablet Take 1 tablet (81 mg) by mouth daily 60 tablet 0     atorvastatin (LIPITOR) 40 MG tablet Take 1 tablet (40 mg) by mouth daily 90 tablet 1     FLUoxetine (PROZAC) 40 MG capsule Take 1 capsule (40 mg) by mouth daily 90 capsule 3     levothyroxine (SYNTHROID/LEVOTHROID) 50 MCG tablet Take 1 tablet (50 mcg) by mouth daily 90 tablet 3     losartan (COZAAR) 25 MG tablet Take 2 tablets (50 mg) by mouth daily 180 tablet 0     nitroglycerin (NITROSTAT) 0.4 MG sublingual tablet Place 1 tablet (0.4 mg) under the tongue every 5 minutes as needed for chest pain 25 tablet 3     omeprazole (PRILOSEC) 20 MG CR capsule TAKE ONE CAPSULE BY MOUTH ONCE DAILY 90 capsule 3     traZODone (DESYREL) 100 MG tablet Take 1.5 tablets (150 mg) by mouth At Bedtime 135 tablet 3     No facility-administered encounter medications on file as of 7/10/2018.        Again, thank you for allowing me to participate in the care of your patient.      Sincerely,    Clint Mora MD     [facility]

## 2018-07-10 NOTE — PROGRESS NOTES
HPI and Plan:   See dictation    Orders Placed This Encounter   Procedures     Basic metabolic panel     Lipid Profile     ALT     Follow-Up with Cardiac Advanced Practice Provider     Follow-Up with Cardiologist     Exercise Stress Echocardiogram     Echocardiogram       No orders of the defined types were placed in this encounter.      There are no discontinued medications.      Encounter Diagnoses   Name Primary?     Essential hypertension, benign Yes     Mixed hyperlipidemia      Coronary artery disease involving native coronary artery of native heart without angina pectoris      FAUSTIN (dyspnea on exertion)      Status post mitral valve repair      Non-rheumatic mitral regurgitation      Chronic fatigue        CURRENT MEDICATIONS:  Current Outpatient Prescriptions   Medication Sig Dispense Refill     aspirin EC 81 MG EC tablet Take 1 tablet (81 mg) by mouth daily 60 tablet 0     atorvastatin (LIPITOR) 40 MG tablet Take 1 tablet (40 mg) by mouth daily 90 tablet 1     FLUoxetine (PROZAC) 40 MG capsule Take 1 capsule (40 mg) by mouth daily 90 capsule 3     levothyroxine (SYNTHROID/LEVOTHROID) 50 MCG tablet Take 1 tablet (50 mcg) by mouth daily 90 tablet 3     losartan (COZAAR) 25 MG tablet Take 2 tablets (50 mg) by mouth daily 180 tablet 0     nitroglycerin (NITROSTAT) 0.4 MG sublingual tablet Place 1 tablet (0.4 mg) under the tongue every 5 minutes as needed for chest pain 25 tablet 3     omeprazole (PRILOSEC) 20 MG CR capsule TAKE ONE CAPSULE BY MOUTH ONCE DAILY 90 capsule 3     traZODone (DESYREL) 100 MG tablet Take 1.5 tablets (150 mg) by mouth At Bedtime 135 tablet 3       ALLERGIES     Allergies   Allergen Reactions     Ace Inhibitors Cough     Dry cough       PAST MEDICAL HISTORY:  Past Medical History:   Diagnosis Date     ACS (acute coronary syndrome) (H) 01-23-15     ADHD (attention deficit hyperactivity disorder)      CAD (coronary artery disease)     cardiac cath 1/23/15: SHERRY to 1st diagonal, SHERRY x2 to  LAD, cath 2009: no intervention     Esophageal reflux      History of hyperthyroidism 4/9/2014     Hyperlipidemia      Hypertension      Mitral regurgitation 2009    moderately severe MVP, s/p robotic repair at [place]     NSTEMI (non-ST elevated myocardial infarction) (H) 01-23-15     Pulmonary nodules 2009    CT-recommend repeat in 6-12 months     Rotator cuff rupture 11/3/2011       PAST SURGICAL HISTORY:  Past Surgical History:   Procedure Laterality Date     DECOMPRESSION LUMBAR MINIMALLY INVASIVE ONE LEVEL  1/11/2012    Procedure:DECOMPRESSION LUMBAR MINIMALLY INVASIVE ONE LEVEL; L4-5 Decompression Minimally Invasive; Surgeon:MESSI HORAN; Location:[facility]     EYE EXAM ESTABLISHED PT  1/14/06     HC COLONOSCOPY THRU STOMA, DIAGNOSTIC  7/00    GI     HCL PSA, DIAGNOSTIC (TUMOR MARKER)  2003     HEART CATH RIGHT AND LEFT HEART CATH  01-23-15    See report, pt transfered to [facility] for complex bifurcation PCI of LAD diagonal vessel.      HEART CATH RIGHT AND LEFT HEART CATH  01-26-15    PTCA and implantation of SHERRY to 1st diagonal, SHERRY to mid LAD, SHERRY to proximal LAD     HERNIA REPAIR       ORTHOPEDIC SURGERY      Hx of rotator cuff rupture and repair     REMOVAL OF SPERM DUCT(S)      Vasectomy     REMOVAL OF SPERM DUCT(S)      reversal of vasectomy     REPAIR VALVE MITRAL  2009    [facility] robotic repair      TESTICLE SURGERY       VASECTOMY       VASOVASOSTOMY       XR LUMBAR RADIOFREQ ABLATION UNILATERAL  01/11/2018    lumbar area/ ? level       FAMILY HISTORY:  Family History   Problem Relation Age of Onset     Cancer Mother      breast, lung     Depression Father      alcohlism     Diabetes No family hx of      Cardiovascular No family hx of      Cancer - colorectal No family hx of      Prostate Cancer No family hx of        SOCIAL HISTORY:  Social History     Social History     Marital status:      Spouse name: Chastity     Number of children: 4     Years of education: 14     Occupational History  "     [employer]     RETIRED     Social History Main Topics     Smoking status: Never Smoker     Smokeless tobacco: Never Used     Alcohol use 4.2 oz/week     7 Standard drinks or equivalent per week      Comment: 1 beer daily     Drug use: No     Sexual activity: Yes     Partners: Female     Birth control/ protection: Condom, Post-menopausal     Other Topics Concern     Caffeine Concern No     2-3 daily     Sleep Concern No     Special Diet No     low sodium     Exercise Yes     walking daily     Seat Belt Yes     Self-Exams No     Social History Narrative       Review of Systems:  Skin:  Negative       Eyes:  Positive for glasses    ENT:  Negative      Respiratory:  Negative       Cardiovascular:    Positive for;dizziness since increasing the Losartan  Gastroenterology: Positive for heartburn treated  Genitourinary:  Negative      Musculoskeletal:  Positive for arthritis    Neurologic:  Negative      Psychiatric:  Positive for depression treated  Heme/Lymph/Imm:  Negative      Endocrine:  Positive for thyroid disorder      Physical Exam:  Vitals: /80  Pulse 64  Ht 1.651 m (5' 5\")  Wt 66.5 kg (146 lb 9.6 oz)  BMI 24.4 kg/m2    Constitutional:  cooperative, alert and oriented, well developed, well nourished, in no acute distress        Skin:  warm and dry to the touch, no apparent skin lesions or masses noted          Head:  normocephalic, no masses or lesions        Eyes:  pupils equal and round, conjunctivae and lids unremarkable, sclera white, no xanthalasma, EOMS intact, no nystagmus        Lymph:      ENT:  no pallor or cyanosis, dentition good        Neck:  carotid pulses are full and equal bilaterally;no carotid bruit        Respiratory:  normal breath sounds, clear to auscultation, normal A-P diameter, normal symmetry, normal respiratory excursion, no use of accessory muscles         Cardiac: regular rhythm;normal S1 and S2;no murmurs, gallops or rubs detected                pulses full " and equal                                   right radial site well healed, good pulse    GI:           Extremities and Muscular Skeletal:  no edema;no spinal abnormalities noted;normal muscle strength and tone              Neurological:  no gross motor deficits        Psych:  affect appropriate, oriented to time, person and place        CC  No referring provider defined for this encounter.

## 2018-07-10 NOTE — PROGRESS NOTES
"Service Date: 07/10/2018      HISTORY OF PRESENT ILLNESS:  Mr. Moreno is a very nice 68-year-old gentleman with past medical history significant for a robotic mitral valve repair at the [facility] in 2009 for mitral valve prolapse and severe mitral regurgitation.  Preoperative angiography demonstrated only a 50% mid LAD stenosis.      I met Mr. Moreno in 01/2015 when he presented with a non-ST segment elevation myocardial infarction to the [facility].  We took him to Cath Lab where he was found to have what appeared to be a chronically occluded right coronary artery that could not be crossed with a wire.  He subsequently was transferred to [facility], underwent complex LAD diagonal intervention with drug-eluting stents placed in both vessels.  He tolerated the procedure quite well.      He was subsequently seen in our clinic because he woke up with a \"spell.\"  He states he was not thinking clearly, he has trouble getting his words out.  He did not seek medical attention, and ultimately we referred him back to his primary care doctor for neurologic evaluation.      Subsequent followup echocardiogram demonstrated a structurally normal heart.  His valve repair appeared to be functioning appropriately.  He has a normal ejection fraction.  A ZIO Patch showed some brief runs of SVT lasting 4 beats, but no atrial fibrillation.  Stress nuclear scan appeared to be consistent with a small to moderate size reversible inferior, inferoseptal and inferolateral defect consistent with his known anatomy.      In followup, he appeared to be doing well from a cardiac standpoint without exertional chest, arm, neck, jaw or shoulder discomfort.  He did have a low-grade discomfort that was there all the time, did not change with exercise and we felt this was not cardiac in origin.  We talked about options including medical management versus coronary artery bypass grafting versus a  and decided to continue with " medical management.  He was having problems with fatigue, tiredness, cold hands, cold feet and we ultimately discontinued his metoprolol, of which he was only on 12.5 mg at that time.      Jose now returns to clinic with his wife and has many questions and she accompanies him because when he comes back for his visits, he reportedly just says everything is fine without details and she wants to know details.  She is also concerned because of her recent trip to [place], where there was quite a bit of walking.  He was not able to keep up, he became fatigued, he needed to take naps and she thinks he is just not functioning as well as he should be.        Jose states he only had to take nitroglycerin once in the last year and it was not for any exertional discomfort; it was for a vague discomfort.  He does exercise intermittently and states he does not think he has any limitations, although his wife does think he was limited in his trip to [place].  It appeared that their they hotel was about a mile from the .  They walked it several times a day, but in the course a day, he would become pooped out and would not go on later in the day excursions.  He did not precipitate any clear symptoms, but did have a generalized decreased exercise tolerance, shortness of breath.  It is unclear whether this is a new finding over the last 2 weeks, or just became noticeable because he was walking in the [place] area.      ASSESSMENT AND PLAN:  Jose his wife and I reviewed his coronary angiogram results, his echocardiogram of 2016.  He has missed followup appointments and so was late coming back.  He had no evaluation at all last year.  We talked about the fact that stress test accuracy is about 85%, but I would start there.  I would do a stress echocardiogram.  This all about allow us to look structurally at his heart, we can look at his valve.  We may need a full echocardiogram to better evaluate the valve, but also see what his  exercise tolerance is, heart rate and blood pressure response to exercise.      He states his primary care doctor increased his losartan from 25 mg to 50 mg a day.  He has had 1 episode of lightheadedness since that time and he is concerned about that.      Of note, Jose does appear to be either forgetful or in denial.  He clearly has a different view of what is going out of his health compared to his wife.  I have told him the stress echo will help sort that out.  Depending on the results of the stress echo, we will decide whether he is a full echo to reevaluate his valve to see if there is any pathology going out of the valve or whether we need a coronary angiogram to relook at his coronaries.  We again talked about , coronary bypass grafting, restenosis rates of stents, stent thrombosis rates and a wide variety of topics.  At this time, the plan is to proceed with stress echo and we will see where it leads us.      Blood pressure is well controlled at 132/80.  Again, I told them we will get some insight to his blood pressure control.  At this time, I will continue him on his losartan.      Fasting lipid profile is okay at this time.  Total cholesterol is 167.  His HDL is excellent at 76, LDL is 76 and triglycerides are 69.  We will continue his atorvastatin 40 mg daily.  Electrolytes are within normal limits.      I emphasized the importance of a regular exercise regimen and a healthy diet.  His weight is 146 pounds, giving a body mass index of 24.5.      He and his wife both state he sleeps well at nighttime.  He does not have snoring problems.  He is not on a beta blocker as we have already stopped that because of fatigue.  He does have a history of depression and psychiatric issues, which he states he thinks he is doing well right now, but this could also be a factor.      Thank you for allowing me to participate in his care.        Over 45 minutes was spent with amy Garcia and his wife today sorting out  and reviewing his issues.         IDALIA MORA MD, [facility]             D: 07/10/2018   T: 07/10/2018   MT: CYDNEY      Name:     DANIELLA OVALLE   MRN:      [id number]        Account:      [id number]   :      1950           Service Date: 07/10/2018      Document: [id number]

## 2018-07-10 NOTE — MR AVS SNAPSHOT
After Visit Summary   7/10/2018    Jose Moreno    MRN: [id number]           Patient Information     Date Of Birth          1950        Visit Information        Provider Department      7/10/2018 9:15 AM Clint Alejandra MD [facility]        Today's Diagnoses     Essential hypertension, benign    -  1    Mixed hyperlipidemia        Coronary artery disease involving native coronary artery of native heart without angina pectoris        FAUSTIN (dyspnea on exertion)        Status post mitral valve repair        Non-rheumatic mitral regurgitation        Chronic fatigue           Follow-ups after your visit        Additional Services     Follow-Up with Cardiac Advanced Practice Provider           Follow-Up with Cardiologist       Echocardiogram/FLP/ALT/BMP for coronary artery disease and mitral valve disease                  Your next 10 appointments already scheduled     Jul 26, 2018  9:45 AM CDT   Return Visit with Clint Blankenship MD   [facility] ([facility])    [address]  [address]  [place] [place] [address]   [phone]              Future tests that were ordered for you today     Open Future Orders        Priority Expected Expires Ordered    Echocardiogram Routine 7/10/2019 7/11/2019 7/10/2018    Basic metabolic panel Routine 7/10/2019 7/11/2019 7/10/2018    Lipid Profile Routine 7/10/2019 7/10/2019 7/10/2018    ALT Routine 7/10/2019 7/10/2019 7/10/2018    Follow-Up with Cardiac Advanced Practice Provider Routine 7/10/2019 7/11/2019 7/10/2018    Follow-Up with Cardiologist Routine 7/9/2020 7/29/2020 7/10/2018    Exercise Stress Echocardiogram Routine 7/17/2018 7/10/2019 7/10/2018            Who to contact     If you have questions or need follow up information about today's clinic visit or your schedule please contact [facility]   JOSEPH directly at  "[phone].  Normal or non-critical lab and imaging results will be communicated to you by MyChart, letter or phone within 4 business days after the clinic has received the results. If you do not hear from us within 7 days, please contact the clinic through [employer]hart or phone. If you have a critical or abnormal lab result, we will notify you by phone as soon as possible.  Submit refill requests through [employer] or call your pharmacy and they will forward the refill request to us. Please allow 3 business days for your refill to be completed.          Additional Information About Your Visit        [employer] Information     [employer] gives you secure access to your electronic health record. If you see a primary care provider, you can also send messages to your care team and make appointments. If you have questions, please call your primary care clinic.  If you do not have a primary care provider, please call [phone] and they will assist you.        Care EveryWhere ID     This is your Care EveryWhere ID. This could be used by other organizations to access your [place] medical records  [id number]        Your Vitals Were     Pulse Height BMI (Body Mass Index)             64 1.651 m (5' 5\") 24.4 kg/m2          Blood Pressure from Last 3 Encounters:   07/10/18 132/80   05/15/18 153/89   05/02/18 150/80    Weight from Last 3 Encounters:   07/10/18 66.5 kg (146 lb 9.6 oz)   05/02/18 67 kg (147 lb 9.6 oz)   04/30/18 65.8 kg (145 lb)               Primary Care Provider Office Phone # Fax #    Jerri Tavera [phone]       [address] [address]        Equal Access to Services     [place] Valley[place] AH: Hadii akash Clark, waaxda luqadaha, qaybta kaalmada jules, jayjay valdes. So [facility] [phone].    ATENCIÓN: Si habla español, tiene a weber disposición servicios gratuitos de asistencia lingüística. Llame al [phone].    We comply with " applicable federal civil rights laws and [place] laws. We do not discriminate on the basis of race, color, national origin, age, disability, sex, sexual orientation, or gender identity.            Thank you!     Thank you for choosing [facility]  for your care. Our goal is always to provide you with excellent care. Hearing back from our patients is one way we can continue to improve our services. Please take a few minutes to complete the written survey that you may receive in the mail after your visit with us. Thank you!             Your Updated Medication List - Protect others around you: Learn how to safely use, store and throw away your medicines at www.disposemymeds.org.          This list is accurate as of 7/10/18 10:40 AM.  Always use your most recent med list.                   Brand Name Dispense Instructions for use Diagnosis    aspirin 81 MG EC tablet     60 tablet    Take 1 tablet (81 mg) by mouth daily    ACS (acute coronary syndrome) (H)       atorvastatin 40 MG tablet    LIPITOR    90 tablet    Take 1 tablet (40 mg) by mouth daily    Mixed hyperlipidemia, History of non-ST elevation myocardial infarction (NSTEMI)       FLUoxetine 40 MG capsule    PROzac    90 capsule    Take 1 capsule (40 mg) by mouth daily    Major depressive disorder, single episode, moderate (H)       levothyroxine 50 MCG tablet    SYNTHROID/LEVOTHROID    90 tablet    Take 1 tablet (50 mcg) by mouth daily    Hypothyroidism due to acquired atrophy of thyroid       losartan 25 MG tablet    COZAAR    180 tablet    Take 2 tablets (50 mg) by mouth daily    Essential hypertension, benign, History of non-ST elevation myocardial infarction (NSTEMI)       nitroGLYcerin 0.4 MG sublingual tablet    NITROSTAT    25 tablet    Place 1 tablet (0.4 mg) under the tongue every 5 minutes as needed for chest pain    ACS (acute coronary syndrome) (H)       omeprazole 20 MG CR capsule    priLOSEC    90 capsule     TAKE ONE CAPSULE BY MOUTH ONCE DAILY    Gastroesophageal reflux disease without esophagitis       traZODone 100 MG tablet    DESYREL    135 tablet    Take 1.5 tablets (150 mg) by mouth At Bedtime    Major depressive disorder, single episode, moderate (H)

## 2018-07-16 ENCOUNTER — DOCUMENTATION ONLY (OUTPATIENT)
Dept: VASCULAR SURGERY | Facility: CLINIC | Age: 68
End: 2018-07-16

## 2018-07-17 ENCOUNTER — TELEPHONE (OUTPATIENT)
Dept: NEUROSURGERY | Facility: CLINIC | Age: 68
End: 2018-07-17

## 2018-07-17 ENCOUNTER — TELEPHONE (OUTPATIENT)
Dept: PALLIATIVE MEDICINE | Facility: CLINIC | Age: 68
End: 2018-07-17

## 2018-07-17 DIAGNOSIS — M47.819 FACET ARTHROPATHY: Primary | ICD-10-CM

## 2018-07-17 NOTE — TELEPHONE ENCOUNTER
Pre-screening Questions for Radiology Injections:    Injection to be done at which interventional clinic site? [facility], instruct patient to arrive 30 minutes before the scheduled appointment time.     Procedure ordered by Zenobia An    Procedure ordered? Lumbar Epidural Steroid Injection    What insurance would patient like us to bill for this procedure? [employer]      Worker's comp or MVA (motor vehicle accident) -Any injection DO NOT SCHEDULE and route to Jayla Ayers.      [employer] insurance - For SI joint injections, DO NOT SCHEDULE and route Ursula Park. [employer] NO PA REQUIRED EFFECTIVE 11/1/2017      [employer]- MBB's must be scheduled at LEAST two weeks apart      [employer] - Any injection besides hip/shoulder/knee joint DO NOT SCHEDULE and route to Ursula Park. She will obtain PA and call pt back to schedule procedure or notify pt of denial.       [employer]-Route to Ursula for review    Any chance of pregnancy? NO   If YES, do NOT schedule and route to RN pool    Is an  needed? No     Patient has a drive home? (mandatory) YES: INFORMED    Is patient taking any blood thinners (plavix, coumadin, jantoven, warfarin, heparin, pradaxa or dabigatran )? No   If hold needed, do NOT schedule, route to RN pool     Is patient taking any aspirin products? Yes - Pt takes 81mg daily; instructed to hold 0 day(s) prior to procedure.      If more than 325mg/day do NOT schedule; route to RN pool     For CERVICAL procedures, hold all aspirin products for 6 days.      Does the patient have a bleeding or clotting disorder? No     If YES, okay to schedule AND route to RN nurse pool    **For any patients with platelet count <100, must be forwarded to provider**    Is patient diabetic?  No  If YES, have them bring their glucometer.    Does patient have an active infection or treated for one within the past week? No     Is patient currently taking any  antibiotics?  No     For patients on chronic, preventative, or prophylactic antibiotics, procedures may be scheduled.     For patients on antibiotics for active or recent infection:    Aylin Chen Burton, Snitzer-antibiotic course must have been completed for 4 days    Is patient currently taking any steroid medications? (i.e. Prednisone, Medrol)  No     For patients on steroid medications:    Aylin Chen Burton, Snitzer-steroid course must have been completed for 4 days    Reviewed with patient:  If you are started on any steroids or antibiotics between now and your appointment, you must contact us because it may affect our ability to perform your procedure.  Yes    Is patient actively being treated for cancer or immunocompromised? No  If YES, do NOT schedule and route to RN pool     Are you able to get on and off an exam table with minimal or no assistance? Yes  If NO, do NOT schedule and route to RN pool    Are you able to roll over and lay on your stomach with minimal or no assistance? Yes  If NO, do NOT schedule and route to RN pool     Any allergies to contrast dye, iodine, shellfish, or numbing and steroid medications? No  If YES, route to RN pool AND add allergy information to appointment notes    Allergies: Ace inhibitors      Has the patient had a flu shot or any other vaccinations within 7 days before or after the procedure.  No     Does patient have an MRI/CT?  YES: MRI  (SI joint, hip injections, lumbar sympathetic blocks, and stellate ganglion blocks do not require an MRI)    Was the MRI done w/in the last 3 years?  Yes    Was MRI done at [facility]? No      If not, [facility] was it done[facility]       [facility] do NOT schedule and route to nursing.  If pt has an imaging disc, the injection may be scheduled but pt has to bring disc to appt. If they show up w/out disc the injection cannot be done    Reminders (please tell patient if  applicable):       Instructed pt to arrive 30 minutes early for IV start if this is for a cervical procedure, ALL sympathetic (stellate ganglion, hypogastric, or lumbar sympathetic block) and all sedation procedures (RFA, spinal cord stimulation trials).  Not Applicable   -IVs are not routinely placed for Dr. Cortez cervical cases   -Dr. Loyola: IVs for cervical ESIs and cervical TBDs (not CMBBs/facet inj)      If NPO for sedation, informed patient that it is okay to take medications with sips of water (except if they are to hold blood thinners).  Not Applicable   *DO take blood pressure medication if it is prescribed*      If this is for a cervical RAMSES, informed patient that aspirin needs to be held for 6 days.   Not Applicable      For all patients not having spinal cord stimulator (SCS) trials or radiofrequency ablations (RFAs), informed patient:    IV sedation is not provided for this procedure.  If you feel that an oral anti-anxiety medication is needed, you can discuss this further with your referring provider or primary care provider.  The Pain Clinic provider will discuss specifics of what the procedure includes at your appointment.  Most procedures last 10-20 minutes.  We use numbing medications to help with any discomfort during the procedure.  Not Applicable      Do not schedule procedures requiring IV placement in the first appointment of the day or first appointment after lunch. Do NOT schedule at 0745, 0815 or 1245. N/A      For patients 85 or older we recommend having an adult stay w/ them for the remainder of the day.   N/A    Does the patient have any questions?  NO  Flori Valenzuela  [facility]

## 2018-07-17 NOTE — TELEPHONE ENCOUNTER
Returned call to patient. He's requesting repeat RAMSES at this time. Recently had RAMSES on 5/15/18 that provide relief for almost 2 months. Same symptoms as prior. He had RFA in Jan 2018 as well. Last RAMSES 6/5/17.    Will send to Zenobia PANG for approval of repeat RAMSES. Patient would like order sent to  Pain Mgmt in [place].

## 2018-07-17 NOTE — TELEPHONE ENCOUNTER
REASON FOR CALL:  Pt requesting injection order for [facility] in [place].    Detailed message can be left:  YES

## 2018-07-18 ENCOUNTER — DOCUMENTATION ONLY (OUTPATIENT)
Dept: CARDIOLOGY | Facility: CLINIC | Age: 68
End: 2018-07-18

## 2018-07-18 ENCOUNTER — HOSPITAL ENCOUNTER (OUTPATIENT)
Dept: CARDIOLOGY | Facility: CLINIC | Age: 68
Discharge: HOME | End: 2018-07-18
Attending: INTERNAL MEDICINE | Admitting: INTERNAL MEDICINE
Payer: COMMERCIAL

## 2018-07-18 DIAGNOSIS — I25.10 CORONARY ARTERY DISEASE INVOLVING NATIVE CORONARY ARTERY OF NATIVE HEART WITHOUT ANGINA PECTORIS: Chronic | ICD-10-CM

## 2018-07-18 DIAGNOSIS — I25.10 CORONARY ARTERY DISEASE INVOLVING NATIVE CORONARY ARTERY OF NATIVE HEART WITHOUT ANGINA PECTORIS: Primary | ICD-10-CM

## 2018-07-18 DIAGNOSIS — R06.09 DOE (DYSPNEA ON EXERTION): ICD-10-CM

## 2018-07-18 PROCEDURE — 93018 CV STRESS TEST I&R ONLY: CPT | Performed by: INTERNAL MEDICINE

## 2018-07-18 PROCEDURE — 93016 CV STRESS TEST SUPVJ ONLY: CPT | Performed by: INTERNAL MEDICINE

## 2018-07-18 PROCEDURE — 93325 DOPPLER ECHO COLOR FLOW MAPG: CPT | Mod: 26 | Performed by: INTERNAL MEDICINE

## 2018-07-18 PROCEDURE — 93350 STRESS TTE ONLY: CPT | Mod: 26 | Performed by: INTERNAL MEDICINE

## 2018-07-18 PROCEDURE — 93325 DOPPLER ECHO COLOR FLOW MAPG: CPT | Mod: TC

## 2018-07-18 PROCEDURE — 93321 DOPPLER ECHO F-UP/LMTD STD: CPT | Mod: 26 | Performed by: INTERNAL MEDICINE

## 2018-07-18 NOTE — PROGRESS NOTES
"Stress echo 7/18/18 noted. Ordered post-OV for f/u of c/o decreased energy. Hx CAD and MVR.  Will message Dr. Alejandra to review    Per Dr. Alejandra's review of stress echo \"Stress test looks good. He exercised to an acceptable level. Heart rate response was normal. BP a little high. Valve repair looks good. Pulmonary pressures look good.     He needs to exercise daily and get into better shape. I would increase losartan to 100 mgs daily and have an MOHSEN apt in one month with BMP. \"    Spoke with patient to discuss Dr. Alejandra's review of stress echo and recommendation for losartan increase to 100mg daily and to be seen in 1 month for MOHSEN visit and bmp. Patient verbalized understanding and agreed with plan. Connected patient to scheduling team. Orders place. Rx escripted.   "

## 2018-07-19 RX ORDER — LOSARTAN POTASSIUM 100 MG/1
100 TABLET ORAL DAILY
Qty: 90 TABLET | Refills: 3 | Status: SHIPPED | OUTPATIENT
Start: 2018-07-19 | End: 2018-10-01

## 2018-07-19 NOTE — TELEPHONE ENCOUNTER
Stress test looks good. He exercised to an acceptable level. Heart rate response was normal. BP a little high. Valve repair looks good. Pulmonary pressures look good.    He needs to exercise daily and get into better shape. I would increase losartan to 100 mgs daily and have an MOHSEN apt in one month with BMP.

## 2018-07-20 DIAGNOSIS — R97.20 ELEVATED PROSTATE SPECIFIC ANTIGEN (PSA): Primary | ICD-10-CM

## 2018-07-24 NOTE — PROGRESS NOTES
[facility] - Procedure Note    Date of Visit: 7/25/2018    Pre procedure Diagnosis: facet arthropathy   Post procedure Diagnosis: Same  Procedure performed: Left L4-5, L5-S1 facet joint injections  Anesthesia: none  Complications: none  Operators: Nancy Cortez MD & Sarah Whitt MD (pain fellow)     Indications:   Jose Moreno is a 68 year old male was sent by Zenobia An CNP for lumbar facet joint injections.  They have a history of left sided low back pain.  Exam shows pain with extension and rotation and they have tried conservative treatment including ongoing physical therapy, previous injections and medications.    He is S/P lumbar RFA Left L3,4,5 1/17/2018 and Left L4-5, L5-S1 facet joint injections on 5/15/2018    Options/alternatives, benefits and risks were discussed with the patient including bleeding, infection, flared pain, tissue trauma, exposure to radiation, reaction to medications including seizure, spinal cord injury, paralysis, weakness, numbness and headache.    Questions were answered to his satisfaction and he agrees to proceed. Voluntary informed consent was obtained and signed.     Vitals were reviewed: Yes  Allergies were reviewed:  Yes   Medications were reviewed:  Yes   Pre-procedure pain score: 2/10    Imaging:  Lumbar MRI done at [facility] on 12/15/2016      Procedure:  After getting informed consent, patient was brought into the procedure suite and was placed in a prone position on the procedure table.   A Pause for the Cause was performed.  Patient was prepped and draped in sterile fashion.     Under AP fluoroscopic guidance the L4-5 & L5-S1 facet joints on the left side were identified, and the C-arm was rotated obliquely to the affected side to open the joint space. A total of 4 ml of 1% lidocaine was injected at the needle entry point and needle tract. Then a 22 gauge 3.5 inch quincke type spinal needle was inserted and advanced under fluoroscopic guidance  targeting the superior articular pillar of each joint. Once the needle made a contact with SAP, it was rotated and was then advanced into the joint.    AP fluoroscopic views were obtained to confirm the needle placement. Then,  Omnipaque 300 contrast dye was injected after negative aspiration for heme and CSF in each joint, confirming appropriate placement.  A total of 1mL of Omnipaque was used and 9mL was wasted.    The injection was then accomplished using a solution containing 1ml of 0.5% bupivacaine mixed with 1ml of 1% Lidocaine and 40mg of kenolog, divided between the 2 joints. The needles were removed..     Hemostasis was achieved, the area was cleaned, and bandaids were placed when appropriate.  The patient tolerated the procedure well, and was taken to the recovery room.    Images were saved to PACS.    Post-procedure pain score: 0/10  Follow-up includes:   -f/u phone call in one week  -f/u with the referring provider      Nacny Cortez MD   [facility]

## 2018-07-25 ENCOUNTER — TELEPHONE (OUTPATIENT)
Dept: CARDIOLOGY | Facility: CLINIC | Age: 68
End: 2018-07-25

## 2018-07-25 ENCOUNTER — RADIANT APPOINTMENT (OUTPATIENT)
Dept: GENERAL RADIOLOGY | Facility: CLINIC | Age: 68
End: 2018-07-25
Attending: ANESTHESIOLOGY
Payer: COMMERCIAL

## 2018-07-25 ENCOUNTER — RADIOLOGY INJECTION OFFICE VISIT (OUTPATIENT)
Dept: PALLIATIVE MEDICINE | Facility: CLINIC | Age: 68
End: 2018-07-25
Payer: COMMERCIAL

## 2018-07-25 VITALS — OXYGEN SATURATION: 98 % | HEART RATE: 60 BPM | SYSTOLIC BLOOD PRESSURE: 149 MMHG | DIASTOLIC BLOOD PRESSURE: 87 MMHG

## 2018-07-25 DIAGNOSIS — M47.816 FACET ARTHROPATHY, LUMBAR: Primary | ICD-10-CM

## 2018-07-25 DIAGNOSIS — M47.819 FACET ARTHROPATHY: ICD-10-CM

## 2018-07-25 PROCEDURE — 64494 INJ PARAVERT F JNT L/S 2 LEV: CPT | Mod: LT | Performed by: ANESTHESIOLOGY

## 2018-07-25 PROCEDURE — 64493 INJ PARAVERT F JNT L/S 1 LEV: CPT | Mod: LT | Performed by: ANESTHESIOLOGY

## 2018-07-25 NOTE — TELEPHONE ENCOUNTER
Note received from [facility]. Requesting clearance to participate in strength building program.   Note in Dr. Alejandra's in box.

## 2018-07-25 NOTE — MR AVS SNAPSHOT
After Visit Summary   7/25/2018    Jose Moreno    MRN: [id number]           Patient Information     Date Of Birth          1950        Visit Information        Provider Department      7/25/2018 7:45 AM Nancy Cortez MD [facility]        Care Instructions    [facility] Procedure Discharge Instructions    Today you saw:   Dr. Nancy Cortez     Your procedure:  Facet joint injection     Medications used:  Lidocaine (anesthetic)  Bupivacaine (anesthetic)  Kenalog (steroid)  Omnipaque (contrast)            Be cautious when walking as numbness and/or weakness in the legs may occur up to 6-8 hours after the procedure due to effect of the local anesthetic         The effect of the local anesthetic could slow your reflexes.     Avoid strenuous activity for the first 24 hours. You may resume your regular activities after that.     You may shower, however avoid swimming, tub baths or hot tubs for 24 hours following your procedure    You may have a mild to moderate increase in pain for several days following the injection.      You may use ice packs for 10-15 minutes, 3 to 4 times a day at the injection site for comfort    Do not use heat to painful areas for 6 to 8 hours. This will give the local anesthetic time to wear off and prevent you from accidentally burning your skin.    You may use anti-inflammatory medications (such as Ibuprofen/Advil or Aleve) or Tylenol for pain control if necessary    With diabetes, check your blood sugar more frequently than usual as your blood sugar may be higher than normal for 10-14 days following a steroid injection. Contact your doctor who manages your diabetes if your blood sugar is higher than usual    It may take up to 14 days for the steroid medication to start working although you may feel the effect as early as a few days after the procedure.     Follow up with your referring provider in 2-3 weeks      If you experience any of the  following, call the pain center line during work hours at [phone] or on-call physician after hours at [phone]:  -Fever over 100 degree F  -Swelling, bleeding, redness, drainage, warmth at the injection site  -Progressive weakness or numbness in your legs   -Unusual new onset of pain that is not improving    Phone #s:  Nurse triage line for general questions: [phone]          Follow-ups after your visit        Your next 10 appointments already scheduled     Jul 26, 2018  9:45 AM CDT   Return Visit with Clint Blankenship MD   [facility] ([facility])    [address]  [address]  [facility] [address] [phone]            Aug 17, 2018 10:00 AM CDT   LAB with RU LAB   [facility] ([facility])    [address]  [facility] [id number] [phone]           Please do not eat 10-12 hours before your appointment if you are coming in fasting for labs on lipids, cholesterol, or glucose (sugar). This does not apply to pregnant women. Water, hot tea and black coffee (with nothing added) are okay. Do not drink other fluids, diet soda or chew gum.            Aug 17, 2018 11:00 AM CDT   Return Visit with Cornell De Anda PA-C   [facility] ([facility])    [address]  [facility] [place] [id number] [phone]              Who to contact     If you have questions or need follow up information about today's clinic visit or your schedule please contact [facility] directly at [phone].  Normal or non-critical lab and imaging results will be communicated to you by MyChart, letter or phone within 4 business days after the clinic has received the results. If you do not hear from us within 7 days, please contact the clinic through MyChart or phone. If you have a critical or abnormal lab result, we will notify  you by phone as soon as possible.  Submit refill requests through [employer] or call your pharmacy and they will forward the refill request to us. Please allow 3 business days for your refill to be completed.          Additional Information About Your Visit        [employer] Information     [employer] gives you secure access to your electronic health record. If you see a primary care provider, you can also send messages to your care team and make appointments. If you have questions, please call your primary care clinic.  If you do not have a primary care provider, please call [phone] and they will assist you.        Care EveryWhere ID     This is your Care EveryWhere ID. This could be used by other organizations to access your [facility] medical records  [id number]        Your Vitals Were     Pulse Pulse Oximetry                62 97%           Blood Pressure from Last 3 Encounters:   07/25/18 145/89   07/10/18 132/80   05/15/18 153/89    Weight from Last 3 Encounters:   07/10/18 66.5 kg (146 lb 9.6 oz)   05/02/18 67 kg (147 lb 9.6 oz)   04/30/18 65.8 kg (145 lb)              Today, you had the following     No orders found for display       Primary Care Provider Office Phone # Fax #    Jerri Tavera [phone]       [address]  [address] [place] [id number]        Equal Access to Services     [facility] : Hadii aad ku hadasho Soomaali, waaxda luqadaha, qaybta kaalmada adeegyada, jayjay nicolas . So [facility] [phone].    ATENCIÓN: Si habla español, tiene a weber disposición servicios gratuitos de asistencia lingüística. Waqas al [phone].    We comply with applicable federal civil rights laws and [place] laws. We do not discriminate on the basis of race, color, national origin, age, disability, sex, sexual orientation, or gender identity.            Thank you!     Thank you for choosing [facility]  for your care. Our goal is always to provide  you with excellent care. Hearing back from our patients is one way we can continue to improve our services. Please take a few minutes to complete the written survey that you may receive in the mail after your visit with us. Thank you!             Your Updated Medication List - Protect others around you: Learn how to safely use, store and throw away your medicines at www.disposemymeds.org.          This list is accurate as of 7/25/18  8:11 AM.  Always use your most recent med list.                   Brand Name Dispense Instructions for use Diagnosis    aspirin 81 MG EC tablet     60 tablet    Take 1 tablet (81 mg) by mouth daily    ACS (acute coronary syndrome) (H)       atorvastatin 40 MG tablet    LIPITOR    90 tablet    Take 1 tablet (40 mg) by mouth daily    Mixed hyperlipidemia, History of non-ST elevation myocardial infarction (NSTEMI)       FLUoxetine 40 MG capsule    PROzac    90 capsule    Take 1 capsule (40 mg) by mouth daily    Major depressive disorder, single episode, moderate (H)       levothyroxine 50 MCG tablet    SYNTHROID/LEVOTHROID    90 tablet    Take 1 tablet (50 mcg) by mouth daily    Hypothyroidism due to acquired atrophy of thyroid       losartan 100 MG tablet    COZAAR    90 tablet    Take 1 tablet (100 mg) by mouth daily    Coronary artery disease involving native coronary artery of native heart without angina pectoris       nitroGLYcerin 0.4 MG sublingual tablet    NITROSTAT    25 tablet    Place 1 tablet (0.4 mg) under the tongue every 5 minutes as needed for chest pain    ACS (acute coronary syndrome) (H)       omeprazole 20 MG CR capsule    priLOSEC    90 capsule    TAKE ONE CAPSULE BY MOUTH ONCE DAILY    Gastroesophageal reflux disease without esophagitis       traZODone 100 MG tablet    DESYREL    135 tablet    Take 1.5 tablets (150 mg) by mouth At Bedtime    Major depressive disorder, single episode, moderate (H)

## 2018-07-25 NOTE — PATIENT INSTRUCTIONS
[facility] Procedure Discharge Instructions    Today you saw:   Dr. Nancy Cortez     Your procedure:  Facet joint injection     Medications used:  Lidocaine (anesthetic)  Bupivacaine (anesthetic)  Kenalog (steroid)  Omnipaque (contrast)            Be cautious when walking as numbness and/or weakness in the legs may occur up to 6-8 hours after the procedure due to effect of the local anesthetic         The effect of the local anesthetic could slow your reflexes.     Avoid strenuous activity for the first 24 hours. You may resume your regular activities after that.     You may shower, however avoid swimming, tub baths or hot tubs for 24 hours following your procedure    You may have a mild to moderate increase in pain for several days following the injection.      You may use ice packs for 10-15 minutes, 3 to 4 times a day at the injection site for comfort    Do not use heat to painful areas for 6 to 8 hours. This will give the local anesthetic time to wear off and prevent you from accidentally burning your skin.    You may use anti-inflammatory medications (such as Ibuprofen/Advil or Aleve) or Tylenol for pain control if necessary    With diabetes, check your blood sugar more frequently than usual as your blood sugar may be higher than normal for 10-14 days following a steroid injection. Contact your doctor who manages your diabetes if your blood sugar is higher than usual    It may take up to 14 days for the steroid medication to start working although you may feel the effect as early as a few days after the procedure.     Follow up with your referring provider in 2-3 weeks      If you experience any of the following, call the pain center line during work hours at [phone] or on-call physician after hours at [phone]:  -Fever over 100 degree F  -Swelling, bleeding, redness, drainage, warmth at the injection site  -Progressive weakness or numbness in your legs   -Unusual new onset of pain that is  not improving    Phone #s:  Nurse triage line for general questions: [phone]

## 2018-07-25 NOTE — NURSING NOTE
Discharge Information    IV Discontiued Time:  NA    Amount of Fluid Infused:  NA    Discharge Criteria = When patient returns to baseline or as per MD order    Consciousness:  Pt is fully awake    Circulation:  BP +/- 20% of pre-procedure level    Respiration:  Patient is able to breathe deeply    O2 Sat:  Patient is able to maintain O2 Sat >92% on room air    Activity:  Moves 4 extremities on command    Ambulation:  Patient is able to stand and walk or stand and pivot into wheelchair    Dressing:  Clean/dry or No Dressing    Notes:   Discharge instructions and AVS given to patient    Patient meets criteria for discharge?  YES    Admitted to PCU?  No    Responsible adult present to accompany patient home?  Yes    Signature/Title:    Naomi Carrera RN Care Coordinator  [facility] [place]

## 2018-07-26 ENCOUNTER — OFFICE VISIT (OUTPATIENT)
Dept: UROLOGY | Facility: CLINIC | Age: 68
End: 2018-07-26
Payer: COMMERCIAL

## 2018-07-26 ENCOUNTER — TRANSFERRED RECORDS (OUTPATIENT)
Dept: FAMILY MEDICINE | Facility: CLINIC | Age: 68
End: 2018-07-26

## 2018-07-26 VITALS
OXYGEN SATURATION: 97 % | HEIGHT: 66 IN | BODY MASS INDEX: 23.3 KG/M2 | HEART RATE: 58 BPM | WEIGHT: 145 LBS | SYSTOLIC BLOOD PRESSURE: 138 MMHG | DIASTOLIC BLOOD PRESSURE: 86 MMHG

## 2018-07-26 DIAGNOSIS — R97.20 ELEVATED PROSTATE SPECIFIC ANTIGEN (PSA): ICD-10-CM

## 2018-07-26 LAB
ALBUMIN UR-MCNC: 100 MG/DL
APPEARANCE UR: CLEAR
BILIRUB UR QL STRIP: NEGATIVE
COLOR UR AUTO: YELLOW
GLUCOSE UR STRIP-MCNC: NEGATIVE MG/DL
HGB UR QL STRIP: NEGATIVE
KETONES UR STRIP-MCNC: NEGATIVE MG/DL
LEUKOCYTE ESTERASE UR QL STRIP: NEGATIVE
NITRATE UR QL: NEGATIVE
PH UR STRIP: 7 PH (ref 5–7)
PSA SERPL-MCNC: 12.3 NG/ML (ref 0–4)
SOURCE: ABNORMAL
SP GR UR STRIP: 1.02 (ref 1–1.03)
UROBILINOGEN UR STRIP-ACNC: 0.2 EU/DL (ref 0.2–1)

## 2018-07-26 PROCEDURE — 81003 URINALYSIS AUTO W/O SCOPE: CPT | Performed by: UROLOGY

## 2018-07-26 PROCEDURE — 36415 COLL VENOUS BLD VENIPUNCTURE: CPT | Performed by: UROLOGY

## 2018-07-26 PROCEDURE — 84153 ASSAY OF PSA TOTAL: CPT | Performed by: UROLOGY

## 2018-07-26 PROCEDURE — 99213 OFFICE O/P EST LOW 20 MIN: CPT | Performed by: UROLOGY

## 2018-07-26 ASSESSMENT — PAIN SCALES - GENERAL: PAINLEVEL: NO PAIN (0)

## 2018-07-26 NOTE — PROGRESS NOTES
Office Visit Note  [facility]  [phone]    UROLOGIC DIAGNOSES:   Elevated PSA    CURRENT INTERVENTIONS:   Negative prostate biopsy January 2018    HISTORY:   Jose returns to clinic today for PSA recheck. His prostate biopsy in January was negative, prostate was not enlarged at 22cc. His PSA is stable at 12.3. He continues to have no urinary complaints.       PAST MEDICAL HISTORY:   Past Medical History:   Diagnosis Date     ACS (acute coronary syndrome) (H) 01-23-15     ADHD (attention deficit hyperactivity disorder)      CAD (coronary artery disease)     cardiac cath 1/23/15: SHERRY to 1st diagonal, SHERRY x2 to LAD, cath 2009: no intervention     Esophageal reflux      History of hyperthyroidism 4/9/2014     Hyperlipidemia      Hypertension      Mitral regurgitation 2009    moderately severe MVP, s/p robotic repair at [place]     NSTEMI (non-ST elevated myocardial infarction) (H) 01-23-15     Pulmonary nodules 2009    CT-recommend repeat in 6-12 months     Rotator cuff rupture 11/3/2011       PAST SURGICAL HISTORY:   Past Surgical History:   Procedure Laterality Date     DECOMPRESSION LUMBAR MINIMALLY INVASIVE ONE LEVEL  1/11/2012    Procedure:DECOMPRESSION LUMBAR MINIMALLY INVASIVE ONE LEVEL; L4-5 Decompression Minimally Invasive; Surgeon:MESSI HORAN; Location:[facility] OR     EYE EXAM ESTABLISHED PT  1/14/06     HC COLONOSCOPY THRU STOMA, DIAGNOSTIC  7/00    GI     HCL PSA, DIAGNOSTIC (TUMOR MARKER)  2003     HEART CATH RIGHT AND LEFT HEART CATH  01-23-15    See report, pt transfered to [facility] for complex bifurcation PCI of LAD diagonal vessel.      HEART CATH RIGHT AND LEFT HEART CATH  01-26-15    PTCA and implantation of SHERRY to 1st diagonal, SHERRY to mid LAD, SHERRY to proximal LAD     HERNIA REPAIR       ORTHOPEDIC SURGERY      Hx of rotator cuff rupture and repair     REMOVAL OF SPERM DUCT(S)      Vasectomy     REMOVAL OF SPERM DUCT(S)      reversal of vasectomy     REPAIR VALVE MITRAL  2009     "[facility] robotic repair      TESTICLE SURGERY       VASECTOMY       VASOVASOSTOMY       XR LUMBAR RADIOFREQ ABLATION UNILATERAL  01/11/2018    lumbar area/ ? level       FAMILY HISTORY:   Family History   Problem Relation Age of Onset     Cancer Mother      breast, lung     Depression Father      alcohlism     Diabetes No family hx of      Cardiovascular No family hx of      Cancer - colorectal No family hx of      Prostate Cancer No family hx of        SOCIAL HISTORY:   Social History   Substance Use Topics     Smoking status: Never Smoker     Smokeless tobacco: Never Used     Alcohol use 4.2 oz/week     7 Standard drinks or equivalent per week      Comment: 1 beer daily       Current Outpatient Prescriptions   Medication     aspirin EC 81 MG EC tablet     atorvastatin (LIPITOR) 40 MG tablet     FLUoxetine (PROZAC) 40 MG capsule     levothyroxine (SYNTHROID/LEVOTHROID) 50 MCG tablet     losartan (COZAAR) 100 MG tablet     nitroglycerin (NITROSTAT) 0.4 MG sublingual tablet     omeprazole (PRILOSEC) 20 MG CR capsule     traZODone (DESYREL) 100 MG tablet     No current facility-administered medications for this visit.          PHYSICAL EXAM:    /86 (BP Location: Left arm, Patient Position: Sitting, Cuff Size: Adult Regular)  Pulse 58  Ht 1.676 m (5' 6\")  Wt 65.8 kg (145 lb)  SpO2 97%  BMI 23.4 kg/m2    HEENT: Normocephalic and atraumatic   Cardiac: Not done  Back/Flank: Not done  CNS/PNS: Not done  Respiratory: Normal non-labored breathing  Abdomen: Soft nontender and nondistended  Peripheral Vascular: Not done  Mental Status: Not done    Penis: Not done  Scrotal Skin: Not done  Testicles: Not done  Epididymis: Not done  Digital Rectal Exam:     Cystoscopy: Not done    Imaging: None    Urinalysis: UA RESULTS:  Recent Labs   Lab Test  12/08/17   1512   COLOR  Yellow   APPEARANCE  Clear   URINEGLC  Negative   URINEBILI  Negative   URINEKETONE  Negative   SG  1.020   UBLD  Negative   URINEPH  6.5   PROTEIN  " 100*   UROBILINOGEN  0.2   NITRITE  Negative   LEUKEST  Negative       PSA: 12.3    Post Void Residual:     Other labs: None today      IMPRESSION:  Elevated PSA    PLAN:  His PSA remains significantly elevated. His PSA density is quite high at 0.56. I discussed my concerns. There is a risk of false negative with his prostate biopsy. I recommended he consider further evaluation with an MRI of the prostate. He agrees to this. MRI prostate ordered for him and I will call him when the results are available.    Total Time: 15 min                                      Total in Consultation: 15 min      Clint Blankenship M.D.

## 2018-07-26 NOTE — LETTER
7/26/2018       RE: Jose Moreno  [address] [place] [facility] [address]     Dear Colleague,    Thank you for referring your patient, Jose Moreno, to the [facility]. Please see a copy of my visit note below.    Office Visit Note  [facility]  [phone]    UROLOGIC DIAGNOSES:   Elevated PSA    CURRENT INTERVENTIONS:   Negative prostate biopsy January 2018    HISTORY:   Jose returns to clinic today for PSA recheck. His prostate biopsy in January was negative, prostate was not enlarged at 22cc. His PSA is stable at 12.3. He continues to have no urinary complaints.       PAST MEDICAL HISTORY:   Past Medical History:   Diagnosis Date     ACS (acute coronary syndrome) (H) 01-23-15     ADHD (attention deficit hyperactivity disorder)      CAD (coronary artery disease)     cardiac cath 1/23/15: SHERRY to 1st diagonal, SHERRY x2 to LAD, cath 2009: no intervention     Esophageal reflux      History of hyperthyroidism 4/9/2014     Hyperlipidemia      Hypertension      Mitral regurgitation 2009    moderately severe MVP, s/p robotic repair at [place]     NSTEMI (non-ST elevated myocardial infarction) (H) 01-23-15     Pulmonary nodules 2009    CT-recommend repeat in 6-12 months     Rotator cuff rupture 11/3/2011       PAST SURGICAL HISTORY:   Past Surgical History:   Procedure Laterality Date     DECOMPRESSION LUMBAR MINIMALLY INVASIVE ONE LEVEL  1/11/2012    Procedure:DECOMPRESSION LUMBAR MINIMALLY INVASIVE ONE LEVEL; L4-5 Decompression Minimally Invasive; Surgeon:MESSI HORAN; Location:[facility] OR     EYE EXAM ESTABLISHED PT  1/14/06     HC COLONOSCOPY THRU STOMA, DIAGNOSTIC  7/00    GI     HCL PSA, DIAGNOSTIC (TUMOR MARKER)  2003     HEART CATH RIGHT AND LEFT HEART CATH  01-23-15    See report, pt transfered to [facility] for complex bifurcation PCI of LAD diagonal vessel.      HEART CATH RIGHT AND LEFT HEART CATH   "01-26-15    PTCA and implantation of SHERRY to 1st diagonal, SHERRY to mid LAD, SHERRY to proximal LAD     HERNIA REPAIR       ORTHOPEDIC SURGERY      Hx of rotator cuff rupture and repair     REMOVAL OF SPERM DUCT(S)      Vasectomy     REMOVAL OF SPERM DUCT(S)      reversal of vasectomy     REPAIR VALVE MITRAL  2009    [facility] robotic repair      TESTICLE SURGERY       VASECTOMY       VASOVASOSTOMY       XR LUMBAR RADIOFREQ ABLATION UNILATERAL  01/11/2018    lumbar area/ ? level       FAMILY HISTORY:   Family History   Problem Relation Age of Onset     Cancer Mother      breast, lung     Depression Father      alcohlism     Diabetes No family hx of      Cardiovascular No family hx of      Cancer - colorectal No family hx of      Prostate Cancer No family hx of        SOCIAL HISTORY:   Social History   Substance Use Topics     Smoking status: Never Smoker     Smokeless tobacco: Never Used     Alcohol use 4.2 oz/week     7 Standard drinks or equivalent per week      Comment: 1 beer daily       Current Outpatient Prescriptions   Medication     aspirin EC 81 MG EC tablet     atorvastatin (LIPITOR) 40 MG tablet     FLUoxetine (PROZAC) 40 MG capsule     levothyroxine (SYNTHROID/LEVOTHROID) 50 MCG tablet     losartan (COZAAR) 100 MG tablet     nitroglycerin (NITROSTAT) 0.4 MG sublingual tablet     omeprazole (PRILOSEC) 20 MG CR capsule     traZODone (DESYREL) 100 MG tablet     No current facility-administered medications for this visit.          PHYSICAL EXAM:    /86 (BP Location: Left arm, Patient Position: Sitting, Cuff Size: Adult Regular)  Pulse 58  Ht 1.676 m (5' 6\")  Wt 65.8 kg (145 lb)  SpO2 97%  BMI 23.4 kg/m2    HEENT: Normocephalic and atraumatic   Cardiac: Not done  Back/Flank: Not done  CNS/PNS: Not done  Respiratory: Normal non-labored breathing  Abdomen: Soft nontender and nondistended  Peripheral Vascular: Not done  Mental Status: Not done    Penis: Not done  Scrotal Skin: Not done  Testicles: Not " done  Epididymis: Not done  Digital Rectal Exam:     Cystoscopy: Not done    Imaging: None    Urinalysis: UA RESULTS:  Recent Labs   Lab Test  12/08/17   1512   COLOR  Yellow   APPEARANCE  Clear   URINEGLC  Negative   URINEBILI  Negative   URINEKETONE  Negative   SG  1.020   UBLD  Negative   URINEPH  6.5   PROTEIN  100*   UROBILINOGEN  0.2   NITRITE  Negative   LEUKEST  Negative       PSA: 12.3    Post Void Residual:     Other labs: None today      IMPRESSION:  Elevated PSA    PLAN:  His PSA remains significantly elevated. His PSA density is quite high at 0.56. I discussed my concerns. There is a risk of false negative with his prostate biopsy. I recommended he consider further evaluation with an MRI of the prostate. He agrees to this. MRI prostate ordered for him and I will call him when the results are available.    Total Time: 15 min                                      Total in Consultation: 15 min        Again, thank you for allowing me to participate in the care of your patient.      Sincerely,    Clint Blankenship MD

## 2018-07-26 NOTE — MR AVS SNAPSHOT
After Visit Summary   7/26/2018    Jose Moreno    MRN: [id number]           Patient Information     Date Of Birth          1950        Visit Information        Provider Department      7/26/2018 9:45 AM Clint Blankenship MD [facility]        Today's Diagnoses     Elevated prostate specific antigen (PSA)           Follow-ups after your visit        Follow-up notes from your care team     Return for MRI prostate, I will call with results.      Your next 10 appointments already scheduled     Aug 01, 2018  7:00 PM CDT   MR PROSTATE  WO & W CONTRAST with [id number]   [facility] MRI ([facility])    [address] [address] [phone]           Take your medicines as usual, unless your doctor tells you not to. Bring a list of your current medicines to your exam (including vitamins, minerals and over-the-counter drugs).  You may or may not receive intravenous (IV) contrast for this exam pending the discretion of the Radiologist.  You do not need to do anything special to prepare.  The MRI machine uses a strong magnet. Please wear clothes without metal (snaps, zippers). A sweatsuit works well, or we may give you a hospital gown.  Please remove any body piercings and hair extensions before you arrive. You will also remove watches, jewelry, hairpins, wallets, dentures, partial dental plates and hearing aids. You may wear contact lenses, and you may be able to wear your rings. We have a safe place to keep your personal items, but it is safer to leave them at home.  **IMPORTANT** THE INSTRUCTIONS BELOW ARE ONLY FOR THOSE PATIENTS WHO HAVE BEEN PRESCRIBED SEDATION OR GENERAL ANESTHESIA DURING THEIR MRI PROCEDURE:  IF YOUR DOCTOR PRESCRIBED ORAL SEDATION (take medicine to help you relax during your exam):   You must get the medicine from your doctor (oral medication) before you arrive. Bring  the medicine to the exam. Do not take it at home. You ll be told when to take it upon arriving for your exam.   Arrive one hour early. Bring someone who can take you home after the test. Your medicine will make you sleepy. After the exam, you may not drive, take a bus or take a taxi by yourself.  IF YOUR DOCTOR PRESCRIBED IV SEDATION:   Arrive one hour early. Bring someone who can take you home after the test. Your medicine will make you sleepy. After the exam, you may not drive, take a bus or take a taxi by yourself.   No eating 6 hours before your exam. You may have clear liquids up until 4 hours before your exam. (Clear liquids include water, clear tea, black coffee and fruit juice without pulp.)  IF YOUR DOCTOR PRESCRIBED ANESTHESIA (be asleep for your exam):   Arrive 1 1/2 hours early. Bring someone who can take you home after the test. You may not drive, take a bus or take a taxi by yourself.   No eating 8 hours before your exam. You may have clear liquids up until 4 hours before your exam. (Clear liquids include water, clear tea, black coffee and fruit juice without pulp.)   You will spend four to five hours in the recovery room.  Please call the Imaging Department at your exam site with any questions.            Aug 17, 2018 10:00 AM CDT   LAB with RU LAB   [facility] ([facility])    [address] [place] [address] [phone]           Please do not eat 10-12 hours before your appointment if you are coming in fasting for labs on lipids, cholesterol, or glucose (sugar). This does not apply to pregnant women. Water, hot tea and black coffee (with nothing added) are okay. Do not drink other fluids, diet soda or chew gum.            Aug 17, 2018 11:00 AM CDT   Return Visit with Cornell De Anda PA-C   [facility] ([facility])    [address] [place] [address] [address]  [facility] [address]  "  [phone]              Future tests that were ordered for you today     Open Future Orders        Priority Expected Expires Ordered    MR Prostate wo & w Conrast Routine  7/26/2019 7/26/2018            Who to contact     If you have questions or need follow up information about today's clinic visit or your schedule please contact [facility] directly at [phone].  Normal or non-critical lab and imaging results will be communicated to you by Alignent Softwarehart, letter or phone within 4 business days after the clinic has received the results. If you do not hear from us within 7 days, please contact the clinic through Apmetrixt or phone. If you have a critical or abnormal lab result, we will notify you by phone as soon as possible.  Submit refill requests through Luxola or call your pharmacy and they will forward the refill request to us. Please allow 3 business days for your refill to be completed.          Additional Information About Your Visit        Alignent SoftwareharPennant Information     Luxola gives you secure access to your electronic health record. If you see a primary care provider, you can also send messages to your care team and make appointments. If you have questions, please call your primary care clinic.  If you do not have a primary care provider, please call [phone] and they will assist you.        Care EveryWhere ID     This is your Care EveryWhere ID. This could be used by other organizations to access your [facility] medical records  [id number]        Your Vitals Were     Pulse Height Pulse Oximetry BMI (Body Mass Index)          58 1.676 m (5' 6\") 97% 23.4 kg/m2         Blood Pressure from Last 3 Encounters:   07/26/18 138/86   07/25/18 149/87   07/10/18 132/80    Weight from Last 3 Encounters:   07/26/18 65.8 kg (145 lb)   07/10/18 66.5 kg (146 lb 9.6 oz)   05/02/18 67 kg (147 lb 9.6 oz)              We Performed the Following     PSA Diag Urologic Phys     UA without " [facility]        Primary Care Provider Office Phone # Fax #    Jerri Tavera [phone] [address] [address]        Equal Access to Services     DEON CAIN : Oli friend alvino Soernieali, waaxda luqadaha, qaybta kaalmada adeegyada, jayjay birdchris juwan carrizales fidencio valdes. So [facility] [phone].    ATENCIÓN: Si habla español, tiene a weber disposición servicios gratuitos de asistencia lingüística. Llame al [phone].    We comply with applicable federal civil rights laws and [place] laws. We do not discriminate on the basis of race, color, national origin, age, disability, sex, sexual orientation, or gender identity.            Thank you!     Thank you for choosing [facility] [place]  for your care. Our goal is always to provide you with excellent care. Hearing back from our patients is one way we can continue to improve our services. Please take a few minutes to complete the written survey that you may receive in the mail after your visit with us. Thank you!             Your Updated Medication List - Protect others around you: Learn how to safely use, store and throw away your medicines at www.disposemymeds.org.          This list is accurate as of 7/26/18 11:19 AM.  Always use your most recent med list.                   Brand Name Dispense Instructions for use Diagnosis    aspirin 81 MG EC tablet     60 tablet    Take 1 tablet (81 mg) by mouth daily    ACS (acute coronary syndrome) (H)       atorvastatin 40 MG tablet    LIPITOR    90 tablet    Take 1 tablet (40 mg) by mouth daily    Mixed hyperlipidemia, History of non-ST elevation myocardial infarction (NSTEMI)       FLUoxetine 40 MG capsule    PROzac    90 capsule    Take 1 capsule (40 mg) by mouth daily    Major depressive disorder, single episode, moderate (H)       levothyroxine 50 MCG tablet    SYNTHROID/LEVOTHROID    90 tablet    Take 1 tablet (50 mcg) by mouth  daily    Hypothyroidism due to acquired atrophy of thyroid       losartan 100 MG tablet    COZAAR    90 tablet    Take 1 tablet (100 mg) by mouth daily    Coronary artery disease involving native coronary artery of native heart without angina pectoris       nitroGLYcerin 0.4 MG sublingual tablet    NITROSTAT    25 tablet    Place 1 tablet (0.4 mg) under the tongue every 5 minutes as needed for chest pain    ACS (acute coronary syndrome) (H)       omeprazole 20 MG CR capsule    priLOSEC    90 capsule    TAKE ONE CAPSULE BY MOUTH ONCE DAILY    Gastroesophageal reflux disease without esophagitis       traZODone 100 MG tablet    DESYREL    135 tablet    Take 1.5 tablets (150 mg) by mouth At Bedtime    Major depressive disorder, single episode, moderate (H)

## 2018-07-26 NOTE — NURSING NOTE
Chief Complaint   Patient presents with     Clinic Care Coordination - Follow-up     Pt here for same day PSA     Melissa Winslow, LAURA

## 2018-08-01 ENCOUNTER — RADIANT APPOINTMENT (OUTPATIENT)
Dept: MRI IMAGING | Facility: CLINIC | Age: 68
End: 2018-08-01
Attending: UROLOGY
Payer: COMMERCIAL

## 2018-08-01 DIAGNOSIS — R97.20 ELEVATED PROSTATE SPECIFIC ANTIGEN (PSA): ICD-10-CM

## 2018-08-01 RX ORDER — GADOBUTROL 604.72 MG/ML
7.5 INJECTION INTRAVENOUS ONCE
Status: COMPLETED | OUTPATIENT
Start: 2018-08-01 | End: 2018-08-01

## 2018-08-01 RX ADMIN — GADOBUTROL 7.5 ML: 604.72 INJECTION INTRAVENOUS at 19:19

## 2018-08-03 DIAGNOSIS — I25.2 HISTORY OF NON-ST ELEVATION MYOCARDIAL INFARCTION (NSTEMI): ICD-10-CM

## 2018-08-03 DIAGNOSIS — I10 ESSENTIAL HYPERTENSION, BENIGN: ICD-10-CM

## 2018-08-03 RX ORDER — LOSARTAN POTASSIUM 25 MG/1
TABLET ORAL
Qty: 180 TABLET | OUTPATIENT
Start: 2018-08-03

## 2018-08-03 NOTE — TELEPHONE ENCOUNTER
Pending Prescriptions:                       Disp   Refills    losartan (COZAAR) 25 MG tablet [Pharmacy *180 ta*             Sig: TAKE 2 TABLETS BY MOUTH EVERY DAY    LES please review:    Pt request above is for 25mg  In pt chart it is ordered losartan (COZAAR) 100 MG tablet 7- 90 3 refills by Dr. Alejandra  Please deny and close encounter if appropriate   Thank you  Yovsany  [phone] (home)

## 2018-08-06 ENCOUNTER — TELEPHONE (OUTPATIENT)
Dept: UROLOGY | Facility: CLINIC | Age: 68
End: 2018-08-06

## 2018-08-06 ENCOUNTER — TELEPHONE (OUTPATIENT)
Dept: FAMILY MEDICINE | Facility: CLINIC | Age: 68
End: 2018-08-06

## 2018-08-06 ENCOUNTER — MYC MEDICAL ADVICE (OUTPATIENT)
Dept: FAMILY MEDICINE | Facility: CLINIC | Age: 68
End: 2018-08-06

## 2018-08-06 DIAGNOSIS — R97.20 ELEVATED PROSTATE SPECIFIC ANTIGEN (PSA): Primary | ICD-10-CM

## 2018-08-06 RX ORDER — CIPROFLOXACIN 500 MG/1
500 TABLET, FILM COATED ORAL 2 TIMES DAILY
Qty: 6 TABLET | Refills: 0 | Status: SHIPPED | OUTPATIENT
Start: 2018-08-06 | End: 2018-10-01

## 2018-08-06 NOTE — TELEPHONE ENCOUNTER
Patient called in and left a message returning a call. I returned his call and confirmed that he is now taking the 100 mg tablets so we can cancel the request for the 25 mg. He had no further questions or concerns.

## 2018-08-08 NOTE — PATIENT INSTRUCTIONS
[facility]  Transrectal Ultrasound  Post Operative Information    The physician who performed your Transrectal Ultrasound is Dr. Blankenship (telephone number [phone]).  Please contact this doctor if you have any problems or questions.  If unable to reach your doctor, please return to the Emergency Department.      Take one antibiotic the evening of the procedure and then as directed on your prescription.    Drink at least 6-8 glasses of fluids for the first 48 hours.    Avoid heavy lifting and strenuous activity for 48 hours.    Avoid sexual intercourse for the first 24 hours.    No aspirin or ibuprofen products (Motrin, Advil, Nuprin, ect.) for one week.  You may take acetaminophen (Tylenol) for pain.    You may notice a small amount of blood on the tissue after a bowel movement.    You may pass blood with clots in your urine following the procedure.  The amount will decrease with time but may be visible for up to two weeks.     You make have blood in your semen for 4 weeks after the procedure.    You may experience mild perineal (groin area) discomfort after the procedure.    Please call you doctor if you have any of the follow symptoms:  Fever  Increase in the amount of blood passed  Severe discomfort or pain

## 2018-08-15 ENCOUNTER — TELEPHONE (OUTPATIENT)
Dept: FAMILY MEDICINE | Facility: CLINIC | Age: 68
End: 2018-08-15

## 2018-08-15 ENCOUNTER — TELEPHONE (OUTPATIENT)
Dept: CARDIOLOGY | Facility: CLINIC | Age: 68
End: 2018-08-15

## 2018-08-15 NOTE — TELEPHONE ENCOUNTER
Jose Moreno called the clinic support line with the following:    States that she spoke to Dr Tavera's assistant and he is wondering if Dr Tavera is willing to write him a note.    Please advise - [phone] (home)

## 2018-08-15 NOTE — TELEPHONE ENCOUNTER
Dr. Tavera the patient is requesting that you review his EKG and Echo and write him a letter stating that they are both normal. He had his DOT Physical yesterday and would like to get working as soon as possible.    Please Review and Advise    Patient Call Back Info:  [phone] (home)     Thank You,  Sanjana Padilla CMA

## 2018-08-15 NOTE — TELEPHONE ENCOUNTER
Call from patient, he is hoping he can get his DOT approval letter from Dr. Alejandra by Friday if not sooner. 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  Will message Dr. Alejandra to review

## 2018-08-15 NOTE — TELEPHONE ENCOUNTER
Notify the patient that his results are not NORMAL but do not seem to be a problem for a DOT exam. However cardiology will have to write this letter due to the changes seen.

## 2018-08-15 NOTE — LETTER
August 16, 2018       RE: Jose Moreno  [id number] [facility] [address]       To Whom It May Concern:    Mr. Moreno was seen for cardiac follow-up on July 10, 2018. Mr. Moreno is seen annually for a history of coronary artery disease and mitral valve replacement. He completed a stress echo on 7/18/18 showing a normal stress echocardiogram with no evidence of stress-induced ischemia. The visual ejection fraction is estimated at 65-70%.  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      Thank you,        Clint Alejandra MD, [facility]

## 2018-08-15 NOTE — TELEPHONE ENCOUNTER
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  Patient had normal stress echo 7-18-18.   Last Dr. Alejandra OV 7-10-18.   Reviewed call with Dr. Alejandra..DOT letter can possible be wrote by Dr. Alejandra after MOHSEN OV   Patient bringing letter from DOT to MOHSEN OV.

## 2018-08-16 NOTE — TELEPHONE ENCOUNTER
Preliminary letter to Dr. Alejandra for review    8/17/18 faxed signed letter to BV office for visit today with MOHSEN Cornell Fang.

## 2018-08-17 ENCOUNTER — OFFICE VISIT (OUTPATIENT)
Dept: CARDIOLOGY | Facility: CLINIC | Age: 68
End: 2018-08-17
Payer: COMMERCIAL

## 2018-08-17 VITALS
SYSTOLIC BLOOD PRESSURE: 132 MMHG | WEIGHT: 142.2 LBS | OXYGEN SATURATION: 99 % | DIASTOLIC BLOOD PRESSURE: 72 MMHG | HEART RATE: 72 BPM | HEIGHT: 66 IN | BODY MASS INDEX: 22.85 KG/M2

## 2018-08-17 DIAGNOSIS — I25.10 CORONARY ARTERY DISEASE INVOLVING NATIVE CORONARY ARTERY OF NATIVE HEART WITHOUT ANGINA PECTORIS: ICD-10-CM

## 2018-08-17 LAB
ANION GAP SERPL CALCULATED.3IONS-SCNC: 6 MMOL/L (ref 3–14)
BUN SERPL-MCNC: 15 MG/DL (ref 7–30)
CALCIUM SERPL-MCNC: 9 MG/DL (ref 8.5–10.1)
CHLORIDE SERPL-SCNC: 103 MMOL/L (ref 94–109)
CO2 SERPL-SCNC: 30 MMOL/L (ref 20–32)
CREAT SERPL-MCNC: 0.86 MG/DL (ref 0.66–1.25)
GFR SERPL CREATININE-BSD FRML MDRD: 88 ML/MIN/1.7M2
GLUCOSE SERPL-MCNC: 101 MG/DL (ref 70–99)
POTASSIUM SERPL-SCNC: 4.2 MMOL/L (ref 3.4–5.3)
SODIUM SERPL-SCNC: 139 MMOL/L (ref 133–144)

## 2018-08-17 PROCEDURE — 80048 BASIC METABOLIC PNL TOTAL CA: CPT | Performed by: INTERNAL MEDICINE

## 2018-08-17 PROCEDURE — 99214 OFFICE O/P EST MOD 30 MIN: CPT | Performed by: PHYSICIAN ASSISTANT

## 2018-08-17 PROCEDURE — 36415 COLL VENOUS BLD VENIPUNCTURE: CPT | Performed by: INTERNAL MEDICINE

## 2018-08-17 NOTE — MR AVS SNAPSHOT
After Visit Summary   8/17/2018    Jose Moreno    MRN: [id number]           Patient Information     Date Of Birth          1950        Visit Information        Provider Department      8/17/2018 11:00 AM Cornell De Anda PA-C [facility]        Today's Diagnoses     Coronary artery disease involving native coronary artery of native heart without angina pectoris           Follow-ups after your visit        Your next 10 appointments already scheduled     Aug 28, 2018  1:30 PM CDT   Nurse Visit with UA NURSE   [facility] ([facility])    [address]  [address]  [facility] [address] [phone]            Aug 28, 2018  2:00 PM CDT   (Arrive by 1:30 PM)   Sonography/Biopsy with Clint Blankenship MD,  BX ROOM   [facility] ([facility])    [address]  [address]  [facility] [address] [phone]            Sep 05, 2018  9:00 AM CDT   Return Visit with Clint Blankenship MD   [facility] ([facility])    [address]  [address]  [facility] [address] [phone]              Who to contact     If you have questions or need follow up information about today's clinic visit or your schedule please contact [facility] directly at [phone].  Normal or non-critical lab and imaging results will be communicated to you by MyChart, letter or phone within 4 business days after the clinic has received the results. If you do not hear from us within 7 days, please contact the clinic through MyChart or phone. If you have a critical or abnormal lab result, we will notify you by phone as soon as possible.  Submit refill requests through Infusion Resource or call your pharmacy and they will forward the refill request to us. Please  "allow 3 business days for your refill to be completed.          Additional Information About Your Visit        MyChart Information     CareinSynchart gives you secure access to your electronic health record. If you see a primary care provider, you can also send messages to your care team and make appointments. If you have questions, please call your primary care clinic.  If you do not have a primary care provider, please call [phone] and they will assist you.        Care EveryWhere ID     This is your Care EveryWhere ID. This could be used by other organizations to access your [facility] medical records  [id number]        Your Vitals Were     Pulse Height Pulse Oximetry BMI (Body Mass Index)          72 1.676 m (5' 6\") 99% 22.95 kg/m2         Blood Pressure from Last 3 Encounters:   08/17/18 132/72   07/26/18 138/86   07/25/18 149/87    Weight from Last 3 Encounters:   08/17/18 64.5 kg (142 lb 3.2 oz)   07/26/18 65.8 kg (145 lb)   07/10/18 66.5 kg (146 lb 9.6 oz)              We Performed the Following     Follow-Up with Cardiac Advanced Practice Provider        Primary Care Provider Office Phone # Fax #    Jerri Tavera [phone]       [facility] [address] [address]        Equal Access to Services     DEON CAIN : Hadii aad ku hadasho Soomaali, waaxda luqadaha, qaybta kaalmada adeegyada, waxay galo hayhenry nicolas . So [facility] [phone].    ATENCIÓN: Si habla español, tiene a weber disposición servicios gratuitos de asistencia lingüística. Llame al [phone].    We comply with applicable federal civil rights laws and [place] laws. We do not discriminate on the basis of race, color, national origin, age, disability, sex, sexual orientation, or gender identity.            Thank you!     Thank you for choosing [facility]  for your care. Our goal is always to provide you with excellent care. Hearing back from our " patients is one way we can continue to improve our services. Please take a few minutes to complete the written survey that you may receive in the mail after your visit with us. Thank you!             Your Updated Medication List - Protect others around you: Learn how to safely use, store and throw away your medicines at www.disposemymeds.org.          This list is accurate as of 8/17/18 11:34 AM.  Always use your most recent med list.                   Brand Name Dispense Instructions for use Diagnosis    aspirin 81 MG EC tablet     60 tablet    Take 1 tablet (81 mg) by mouth daily    ACS (acute coronary syndrome) (H)       atorvastatin 40 MG tablet    LIPITOR    90 tablet    Take 1 tablet (40 mg) by mouth daily    Mixed hyperlipidemia, History of non-ST elevation myocardial infarction (NSTEMI)       ciprofloxacin 500 MG tablet    CIPRO    6 tablet    Take 1 tablet (500 mg) by mouth 2 times daily    Elevated prostate specific antigen (PSA)       FLUoxetine 40 MG capsule    PROzac    90 capsule    Take 1 capsule (40 mg) by mouth daily    Major depressive disorder, single episode, moderate (H)       levothyroxine 50 MCG tablet    SYNTHROID/LEVOTHROID    90 tablet    Take 1 tablet (50 mcg) by mouth daily    Hypothyroidism due to acquired atrophy of thyroid       losartan 100 MG tablet    COZAAR    90 tablet    Take 1 tablet (100 mg) by mouth daily    Coronary artery disease involving native coronary artery of native heart without angina pectoris       nitroGLYcerin 0.4 MG sublingual tablet    NITROSTAT    25 tablet    Place 1 tablet (0.4 mg) under the tongue every 5 minutes as needed for chest pain    ACS (acute coronary syndrome) (H)       omeprazole 20 MG CR capsule    priLOSEC    90 capsule    TAKE ONE CAPSULE BY MOUTH ONCE DAILY    Gastroesophageal reflux disease without esophagitis       traZODone 100 MG tablet    DESYREL    135 tablet    Take 1.5 tablets (150 mg) by mouth At Bedtime    Major depressive disorder,  single episode, moderate (H)

## 2018-08-17 NOTE — PROGRESS NOTES
"CARDIOLOGY CLINIC PROGRESS NOTE    DOS: 2018      Jose Moreno  : 1950, 68 year old  MRN: [id number]      History:  I had the pleasure of meeting Jose Moreno today in the cardiology clinic for follow up.  He follows with Dr. Alejandra.     Jose is a pleasant 68-year-old gentleman with past medical history significant for a robotic mitral valve repair at the [facility] in  for mitral valve prolapse and severe mitral regurgitation.  Also CAD, HTN, dyslipidemia, rare and brief runs of SVT.      Preoperative coronary angiography  demonstrated only a 50% mid LAD stenosis.       2015 Jose presented with a NSTEMI. He was brought to the cath lab where he was found to have what appeared to be a chronically occluded right coronary artery that could not be crossed with a wire.  He subsequently was transferred to [facility], underwent complex LAD diagonal intervention with drug-eluting stents placed in both vessels.  He tolerated the procedure quite well.       He was subsequently seen in our clinic because he woke up with a \"spell.\"  He was sent back to his primary care doctor for neurologic evaluation.  Subsequent echocardiogram 2/15/16 demonstrated a structurally normal heart.  His valve repair appeared to be functioning appropriately.  He has a normal ejection fraction.  A ZIO Patch showed some brief runs of SVT lasting 4 beats, but no atrial fibrillation.  Stress nuclear scan appeared to be consistent with a small to moderate size reversible inferior, inferoseptal and inferolateral defect consistent with his known anatomy.       In followup, he appeared to be doing well from a cardiac standpoint without exertional chest, arm, neck, jaw or shoulder discomfort.  He did have a low-grade discomfort that was there all the time, did not change with exercise and we felt this was not cardiac in origin.  Options were discussed, including medical management versus coronary artery " bypass grafting versus a  and it was decided to continue with medical management.  He was having problems with fatigue, tiredness, cold hands, cold feet and we ultimately discontinued his metoprolol, of which he was only on 12.5 mg at that time.       Jose saw Dr. Alejandra 7/10/18.  He had some more fatigue with walking.  Dr. Alejandra ordered a stress echo.  This was done 7/18/18.  He exercised to an acceptable level.  Heart rate response was normal.  BP was a little high.  LVEF at rest was 55-60%.  There was no evidence of stress-induced ischemia.    Dr. Alejandra recommended he increase losartan from 50 mg to 100 mg.   He presents today for follow up.   He is tolerating the increase in losartan.  The first day he had some LH but this has resolved.  His BP is improved at home and here in the clinic.  His BMP today is WNL.   No chest pain.   He is exercising more, and actually feeling better.  He is also working on dietary changes - more fruits and vegetables. Fewer bad carbs.     He had an elevated PSA earlier this year and had prostate biopsy which was negative.  Repeat PSA still elevated but stable. He underwent a prostate MRI that was abnormal.  He is scheduled for repeat biopsy 8/28/18.       ROS:  Skin:  Negative     Eyes:  Positive for glasses  ENT:  Negative    Respiratory:  Negative    Cardiovascular:  Negative    Gastroenterology: Positive for heartburn  Genitourinary:  Negative    Musculoskeletal:  Positive for arthritis  Neurologic:  Negative    Psychiatric:  Positive for depression  Heme/Lymph/Imm:  Negative    Endocrine:  Positive for thyroid disorder    PAST MEDICAL HISTORY:  Past Medical History:   Diagnosis Date     ACS (acute coronary syndrome) (H) 01-23-15     ADHD (attention deficit hyperactivity disorder)      CAD (coronary artery disease)     cardiac cath 1/23/15: SHERRY to 1st diagonal, SHERRY x2 to LAD, cath 2009: no intervention     Esophageal reflux      History of hyperthyroidism 4/9/2014      Hyperlipidemia      Hypertension      Mitral regurgitation 2009    moderately severe MVP, s/p robotic repair at [place]     NSTEMI (non-ST elevated myocardial infarction) (H) 01-23-15     Pulmonary nodules 2009    CT-recommend repeat in 6-12 months     Rotator cuff rupture 11/3/2011       PAST SURGICAL HISTORY:  Past Surgical History:   Procedure Laterality Date     DECOMPRESSION LUMBAR MINIMALLY INVASIVE ONE LEVEL  1/11/2012    Procedure:DECOMPRESSION LUMBAR MINIMALLY INVASIVE ONE LEVEL; L4-5 Decompression Minimally Invasive; Surgeon:MESSI HORAN; Location:[facility]     EYE EXAM ESTABLISHED PT  1/14/06     HC COLONOSCOPY THRU STOMA, DIAGNOSTIC  7/00    GI     HCL PSA, DIAGNOSTIC (TUMOR MARKER)  2003     HEART CATH RIGHT AND LEFT HEART CATH  01-23-15    See report, pt transfered to [facility] for complex bifurcation PCI of LAD diagonal vessel.      HEART CATH RIGHT AND LEFT HEART CATH  01-26-15    PTCA and implantation of SHERRY to 1st diagonal, SHERRY to mid LAD, HSERRY to proximal LAD     HERNIA REPAIR       ORTHOPEDIC SURGERY      Hx of rotator cuff rupture and repair     REMOVAL OF SPERM DUCT(S)      Vasectomy     REMOVAL OF SPERM DUCT(S)      reversal of vasectomy     REPAIR VALVE MITRAL  2009    [facility] robotic repair      TESTICLE SURGERY       VASECTOMY       VASOVASOSTOMY       XR LUMBAR RADIOFREQ ABLATION UNILATERAL  01/11/2018    lumbar area/ ? level       SOCIAL HISTORY:  Social History     Social History     Marital status:      Spouse name: Chastity     Number of children: 4     Years of education: 14     Occupational History      [employer]     RETIRED     Social History Main Topics     Smoking status: Never Smoker     Smokeless tobacco: Never Used     Alcohol use 4.2 oz/week     7 Standard drinks or equivalent per week      Comment: 1 beer daily     Drug use: No     Sexual activity: Yes     Partners: Female     Birth control/ protection: Condom, Post-menopausal     Other Topics Concern  "    Caffeine Concern No     2-3 daily     Sleep Concern No     Special Diet No     low sodium     Exercise Yes     walking daily     Seat Belt Yes     Self-Exams No     Social History Narrative       FAMILY HISTORY:  Family History   Problem Relation Age of Onset     Cancer Mother      breast, lung     Depression Father      alcohlism     Diabetes No family hx of      Cardiovascular No family hx of      Cancer - colorectal No family hx of      Prostate Cancer No family hx of        MEDS:   Current Outpatient Prescriptions on File Prior to Visit:  aspirin EC 81 MG EC tablet Take 1 tablet (81 mg) by mouth daily   atorvastatin (LIPITOR) 40 MG tablet Take 1 tablet (40 mg) by mouth daily   FLUoxetine (PROZAC) 40 MG capsule Take 1 capsule (40 mg) by mouth daily   levothyroxine (SYNTHROID/LEVOTHROID) 50 MCG tablet Take 1 tablet (50 mcg) by mouth daily   losartan (COZAAR) 100 MG tablet Take 1 tablet (100 mg) by mouth daily   nitroglycerin (NITROSTAT) 0.4 MG sublingual tablet Place 1 tablet (0.4 mg) under the tongue every 5 minutes as needed for chest pain   omeprazole (PRILOSEC) 20 MG CR capsule TAKE ONE CAPSULE BY MOUTH ONCE DAILY   traZODone (DESYREL) 100 MG tablet Take 1.5 tablets (150 mg) by mouth At Bedtime   ciprofloxacin (CIPRO) 500 MG tablet Take 1 tablet (500 mg) by mouth 2 times daily (Patient not taking: Reported on 8/17/2018)     No current facility-administered medications on file prior to visit.     ALLERGIES:   Allergies   Allergen Reactions     Ace Inhibitors Cough     Dry cough       PHYSICAL EXAM:  Vitals: /72 (BP Location: Right arm, Patient Position: Chair, Cuff Size: Adult Regular)  Pulse 72  Ht 1.676 m (5' 6\")  Wt 64.5 kg (142 lb 3.2 oz)  SpO2 99%  BMI 22.95 kg/m2  Constitutional:           Skin:           Head:           Eyes:           ENT:           Neck:           Respiratory:           Cardiac:                    GI:           Vascular:                                        Extremities " and Musculoskeletal:           Neurological:             LABS/DATA:  I reviewed the following:  Stress echo 7/18/18:  Interpretation Summary  1. Average exercise capacity, target HR achieved.  2. The patient exhibited no chest pain during exercise.  3. This was a normal stress EKG with no evidence of stress-induced ischemia.  4. Rest echo: Normal left ventricular function and wall motion at rest. The  visual ejection fraction is estimated at 55-60%.  5. Stress echo: This was a normal stress echocardiogram with no evidence of  stress-induced ischemia. The visual ejection fraction is estimated at 65-70%.     Limited TTE views:  1. s/p posterior mitral leaflet repair with some restricted motion. Mean  5mmHg.  2. There is mild (1+) mitral regurgitation.  3. There is mild (1+) aortic regurgitation.     Stress echo was normal in 1/2013.  No changes on TTE views compared to 2/2016.      Component      Latest Ref Rng & Units 8/17/2018   Sodium      133 - 144 mmol/L 139   Potassium      3.4 - 5.3 mmol/L 4.2   Chloride      94 - 109 mmol/L 103   Carbon Dioxide      20 - 32 mmol/L 30   Anion Gap      3 - 14 mmol/L 6   Glucose      70 - 99 mg/dL 101 (H)   Urea Nitrogen      7 - 30 mg/dL 15   Creatinine      0.66 - 1.25 mg/dL 0.86   GFR Estimate      >60 mL/min/1.7m2 88   GFR Estimate If Black      >60 mL/min/1.7m2 >90   Calcium      8.5 - 10.1 mg/dL 9.0         ASSESSMENT/PLAN:  Mitral valve prolapse and severe mitral regurgitation s/p robotic mitral valve repair at the [facility] in 2009  - Stress echo 7/2018 shows valve working well, MG 5 mmHg, mild MR    CAD  - Preoperative coronary angiography 2009 demonstrated only a 50% mid LAD stenosis.   - 1/2015 Jose presented with a NSTEMI.  Cardiac cath:  RCA, underwent complex LAD diagonal intervention with drug-eluting stents placed in both vessels.   - Negative stress echo 7/2018  - Continue ASA, statin. Off BB due to side effects.     HTN  - Better controlled with increase  in losartan from 50 mg to 100 mg    Dyslipidemia  - On atorvastatin 40 mg, LDL 5/2/18: 76     Rare and brief runs of SVT  - Asymptomatic     Elevated PSA  - Initial prostate biopsy earlier in 2018 negative  - Repeat PSA elevated but stable. Prostate MRI abnormal. Repeat biopsy planned 8/28/18      Follow up:  MOHSEN in 7/2019 with labs, sooner if concerns        Cornell De Anda PA-C

## 2018-08-17 NOTE — LETTER
"2018    Jerri Tavera MD  [address] [address]    RE: Jose Moreno       Dear Colleague,    I had the pleasure of seeing Jose Moreno in the [facility].    CARDIOLOGY CLINIC PROGRESS NOTE    DOS: 2018      Jose Moreno  : 1950, 68 year old  MRN: [id number]      History:  I had the pleasure of meeting Jose Moreno today in the cardiology clinic for follow up.  He follows with Dr. Alejandra.     Jose is a pleasant 68-year-old gentleman with past medical history significant for a robotic mitral valve repair at the [facility] in  for mitral valve prolapse and severe mitral regurgitation.  Also CAD, HTN, dyslipidemia, rare and brief runs of SVT.      Preoperative coronary angiography  demonstrated only a 50% mid LAD stenosis.       2015 Jose presented with a NSTEMI. He was brought to the cath lab where he was found to have what appeared to be a chronically occluded right coronary artery that could not be crossed with a wire.  He subsequently was transferred to [facility], underwent complex LAD diagonal intervention with drug-eluting stents placed in both vessels.  He tolerated the procedure quite well.       He was subsequently seen in our clinic because he woke up with a \"spell.\"   He was sent back to his primary care doctor for neurologic evaluation.  Subsequent echocardiogram 2/15/16 demonstrated a structurally normal heart.  His valve repair appeared to be functioning appropriately.  He has a normal ejection fraction.  A ZIO Patch showed some brief runs of SVT lasting 4 beats, but no atrial fibrillation.  Stress nuclear scan appeared to be consistent with a small to moderate size reversible inferior, inferoseptal and inferolateral defect consistent with his known anatomy.       In followup, he appeared to be doing well from a cardiac standpoint without exertional chest, arm, neck, jaw or shoulder " discomfort.  He did have a low-grade discomfort that was there all the time, did not change with exercise and we felt this was not cardiac in origin.   Options were discussed, including medical management versus coronary artery bypass grafting versus a  and it was decided to continue with medical management.  He was having problems with fatigue, tiredness, cold hands, cold feet and we ultimately discontinued his metoprolol, of which he was only on 12.5 mg at that time.       Jose saw Dr. Alejandra 7/10/18.  He had some more fatigue with walking.  Dr. Alejandra ordered a stress echo.  This was done 7/18/18.  He exercised to an acceptable level.  Heart rate response was normal.  BP was a little high.  LVEF at rest was 55-60%.  There was no evidence of stress-induced ischemia.    Dr. Alejandra recommended he increase losartan from 50 mg to 100 mg.   He presents today for follow up.   He is tolerating the increase in losartan.  The first day he had some LH but this has resolved.  His BP is improved at home and here in the clinic.  His BMP today is WNL.   No chest pain.   He is exercising more, and actually feeling better.  He is also working on dietary changes - more fruits and vegetables. Fewer bad carbs.     He had an elevated PSA earlier this year and had prostate biopsy which was negative.  Repeat PSA still elevated but stable. He underwent a prostate MRI that was abnormal.  He is scheduled for repeat biopsy 8/28/18.       ROS:  Skin:  Negative     Eyes:  Positive for glasses  ENT:  Negative    Respiratory:  Negative    Cardiovascular:  Negative    Gastroenterology: Positive for heartburn  Genitourinary:  Negative    Musculoskeletal:  Positive for arthritis  Neurologic:  Negative    Psychiatric:  Positive for depression  Heme/Lymph/Imm:  Negative    Endocrine:  Positive for thyroid disorder    PAST MEDICAL HISTORY:  Past Medical History:   Diagnosis Date     ACS (acute coronary syndrome) (H) 01-23-15     ADHD  (attention deficit hyperactivity disorder)      CAD (coronary artery disease)     cardiac cath 1/23/15: SHERRY to 1st diagonal, SHERRY x2 to LAD, cath 2009: no intervention     Esophageal reflux      History of hyperthyroidism 4/9/2014     Hyperlipidemia      Hypertension      Mitral regurgitation 2009    moderately severe MVP, s/p robotic repair at [place]     NSTEMI (non-ST elevated myocardial infarction) (H) 01-23-15     Pulmonary nodules 2009    CT-recommend repeat in 6-12 months     Rotator cuff rupture 11/3/2011       PAST SURGICAL HISTORY:  Past Surgical History:   Procedure Laterality Date     DECOMPRESSION LUMBAR MINIMALLY INVASIVE ONE LEVEL  1/11/2012    Procedure:DECOMPRESSION LUMBAR MINIMALLY INVASIVE ONE LEVEL; L4-5 Decompression Minimally Invasive; Surgeon:MESSI HORAN; Location:[facility]     EYE EXAM ESTABLISHED PT  1/14/06     HC COLONOSCOPY THRU STOMA, DIAGNOSTIC  7/00    GI     HCL PSA, DIAGNOSTIC (TUMOR MARKER)  2003     HEART CATH RIGHT AND LEFT HEART CATH  01-23-15    See report, pt transfered to [facility] for complex bifurcation PCI of LAD diagonal vessel.      HEART CATH RIGHT AND LEFT HEART CATH  01-26-15    PTCA and implantation of SHERRY to 1st diagonal, SHERRY to mid LAD, SHERRY to proximal LAD     HERNIA REPAIR       ORTHOPEDIC SURGERY      Hx of rotator cuff rupture and repair     REMOVAL OF SPERM DUCT(S)      Vasectomy     REMOVAL OF SPERM DUCT(S)      reversal of vasectomy     REPAIR VALVE MITRAL  2009    [facility] robotic repair      TESTICLE SURGERY       VASECTOMY       VASOVASOSTOMY       XR LUMBAR RADIOFREQ ABLATION UNILATERAL  01/11/2018    lumbar area/ ? level       SOCIAL HISTORY:  Social History     Social History     Marital status:      Spouse name: Chastity     Number of children: 4     Years of education: 14     Occupational History      [employer]     RETIRED     Social History Main Topics     Smoking status: Never Smoker     Smokeless tobacco: Never Used     Alcohol use  "4.2 oz/week     7 Standard drinks or equivalent per week      Comment: 1 beer daily     Drug use: No     Sexual activity: Yes     Partners: Female     Birth control/ protection: Condom, Post-menopausal     Other Topics Concern     Caffeine Concern No     2-3 daily     Sleep Concern No     Special Diet No     low sodium     Exercise Yes     walking daily     Seat Belt Yes     Self-Exams No     Social History Narrative       FAMILY HISTORY:  Family History   Problem Relation Age of Onset     Cancer Mother      breast, lung     Depression Father      alcohlism     Diabetes No family hx of      Cardiovascular No family hx of      Cancer - colorectal No family hx of      Prostate Cancer No family hx of        MEDS:   Current Outpatient Prescriptions on File Prior to Visit:  aspirin EC 81 MG EC tablet Take 1 tablet (81 mg) by mouth daily   atorvastatin (LIPITOR) 40 MG tablet Take 1 tablet (40 mg) by mouth daily   FLUoxetine (PROZAC) 40 MG capsule Take 1 capsule (40 mg) by mouth daily   levothyroxine (SYNTHROID/LEVOTHROID) 50 MCG tablet Take 1 tablet (50 mcg) by mouth daily   losartan (COZAAR) 100 MG tablet Take 1 tablet (100 mg) by mouth daily   nitroglycerin (NITROSTAT) 0.4 MG sublingual tablet Place 1 tablet (0.4 mg) under the tongue every 5 minutes as needed for chest pain   omeprazole (PRILOSEC) 20 MG CR capsule TAKE ONE CAPSULE BY MOUTH ONCE DAILY   traZODone (DESYREL) 100 MG tablet Take 1.5 tablets (150 mg) by mouth At Bedtime   ciprofloxacin (CIPRO) 500 MG tablet Take 1 tablet (500 mg) by mouth 2 times daily (Patient not taking: Reported on 8/17/2018)     No current facility-administered medications on file prior to visit.     ALLERGIES:   Allergies   Allergen Reactions     Ace Inhibitors Cough     Dry cough       PHYSICAL EXAM:  Vitals: /72 (BP Location: Right arm, Patient Position: Chair, Cuff Size: Adult Regular)  Pulse 72  Ht 1.676 m (5' 6\")  Wt 64.5 kg (142 lb 3.2 oz)  SpO2 99%  BMI 22.95 " kg/m2  Constitutional:           Skin:           Head:           Eyes:           ENT:           Neck:           Respiratory:           Cardiac:                    GI:           Vascular:                                        Extremities and Musculoskeletal:           Neurological:             LABS/DATA:  I reviewed the following:  Stress echo 7/18/18:  Interpretation Summary  1. Average exercise capacity, target HR achieved.  2. The patient exhibited no chest pain during exercise.  3. This was a normal stress EKG with no evidence of stress-induced ischemia.  4. Rest echo: Normal left ventricular function and wall motion at rest. The  visual ejection fraction is estimated at 55-60%.  5. Stress echo: This was a normal stress echocardiogram with no evidence of  stress-induced ischemia. The visual ejection fraction is estimated at 65-70%.     Limited TTE views:  1. s/p posterior mitral leaflet repair with some restricted motion. Mean  5mmHg.  2. There is mild (1+) mitral regurgitation.  3. There is mild (1+) aortic regurgitation.     Stress echo was normal in 1/2013.  No changes on TTE views compared to 2/2016.      Component      Latest Ref Rng & Units 8/17/2018   Sodium      133 - 144 mmol/L 139   Potassium      3.4 - 5.3 mmol/L 4.2   Chloride      94 - 109 mmol/L 103   Carbon Dioxide      20 - 32 mmol/L 30   Anion Gap      3 - 14 mmol/L 6   Glucose      70 - 99 mg/dL 101 (H)   Urea Nitrogen      7 - 30 mg/dL 15   Creatinine      0.66 - 1.25 mg/dL 0.86   GFR Estimate      >60 mL/min/1.7m2 88   GFR Estimate If Black      >60 mL/min/1.7m2 >90   Calcium      8.5 - 10.1 mg/dL 9.0         ASSESSMENT/PLAN:  Mitral valve prolapse and severe mitral regurgitation s/p robotic mitral valve repair at the [facility] in 2009  - Stress echo 7/2018 shows valve working well, MG 5 mmHg, mild MR    CAD  - Preoperative coronary angiography 2009 demonstrated only a 50% mid LAD stenosis.   - 1/2015 Jose presented with a NSTEMI.   Cardiac cath:  RCA, underwent complex LAD diagonal intervention with drug-eluting stents placed in both vessels.   - Negative stress echo 7/2018  - Continue ASA, statin. Off BB due to side effects.     HTN  - Better controlled with increase in losartan from 50 mg to 100 mg    Dyslipidemia  - On atorvastatin 40 mg, LDL 5/2/18: 76     Rare and brief runs of SVT  - Asymptomatic     Elevated PSA  - Initial prostate biopsy earlier in 2018 negative  - Repeat PSA elevated but stable. Prostate MRI abnormal. Repeat biopsy planned 8/28/18      Follow up:  MOHSEN in 7/2019 with labs, sooner if concerns        Cornell De Anda PA-C    Thank you for allowing me to participate in the care of your patient.      Sincerely,     Cornell De Anda PA-C     [facility]

## 2018-08-28 ENCOUNTER — OFFICE VISIT (OUTPATIENT)
Dept: UROLOGY | Facility: CLINIC | Age: 68
End: 2018-08-28
Payer: COMMERCIAL

## 2018-08-28 ENCOUNTER — ALLIED HEALTH/NURSE VISIT (OUTPATIENT)
Dept: UROLOGY | Facility: CLINIC | Age: 68
End: 2018-08-28
Payer: COMMERCIAL

## 2018-08-28 VITALS
DIASTOLIC BLOOD PRESSURE: 80 MMHG | BODY MASS INDEX: 22.82 KG/M2 | HEIGHT: 66 IN | OXYGEN SATURATION: 97 % | SYSTOLIC BLOOD PRESSURE: 130 MMHG | HEART RATE: 70 BPM | WEIGHT: 142 LBS

## 2018-08-28 DIAGNOSIS — R97.20 ELEVATED PROSTATE SPECIFIC ANTIGEN (PSA): Primary | ICD-10-CM

## 2018-08-28 PROCEDURE — 55700 ZZHC BIOPSY PROSTATE NEEDLE/PUNCH: CPT | Performed by: UROLOGY

## 2018-08-28 PROCEDURE — 88305 TISSUE EXAM BY PATHOLOGIST: CPT | Performed by: UROLOGY

## 2018-08-28 PROCEDURE — 76872 US TRANSRECTAL: CPT | Performed by: UROLOGY

## 2018-08-28 ASSESSMENT — PAIN SCALES - GENERAL
PAINLEVEL: NO PAIN (0)
PAINLEVEL: NO PAIN (0)

## 2018-08-28 NOTE — NURSING NOTE
Chief Complaint   Patient presents with     Elevated PSA     Patient her today for second Trus with Bx         Pre-Operative    Consent read and signed: Yes     Allergies   Allergen Reactions     Ace Inhibitors Cough     Dry cough     Pre-operative antibiotics taken: Yes  Aspirin or other blood thinning medications not taken in 7-10 days:  Yes  Time of Fleet's enema: 12:00PM      The following medication was given:     MEDICATION:  Lidocaine 2% Soln  ROUTE: Rectal  SITE: Prostate  DOSE: 20ml  LOT #: [id number]  : Hospira  EXPIRATION DATE: 04/01/2020  NDC#: 4972-8637-34  Was there drug waste? No  Multi-dose vial: No    The following medication was given:     The following medication was given:     MEDICATION:  Gentamicin  ROUTE: IM  SITE: RUQ - Gluteus  DOSE: 2ml  LOT #: [id number]  : SHADO  EXPIRATION DATE: 09/19  NDC#: 90792-371-58  Was there drug waste? No  Multi-dose vial: No    Margaret Holley CMA  August 28, 2018

## 2018-08-28 NOTE — PROGRESS NOTES
Jose Moreno is here for a transrectal altrasound guided needle biopsy of the prostate for a significant risk of potentially lethal prostate cancer.    The risks, benefits, of the procudure were discussed.  All questions were answered.  A written informed consent was obtained.      Ron PSA   Date Value Ref Range Status   06/13/2013 4.0 < OR = 4.0 ng/mL Final     Comment:        This test was performed using the Siemens  chemiluminescent method. Values obtained from  different assay methods cannot be used  interchangeably. PSA levels, regardless of  value, should not be interpreted as absolute  evidence of the presence or absence of disease.      06/16/2011 2.9 < OR = 4.0 ng/mL Final     Comment:        This test was performed using the Siemens  chemiluminescent method. Values obtained from  different assay methods cannot be used  interchangeably. PSA levels, regardless of  value, should not be interpreted as absolute  evidence of the presence or absence of disease.     NO COLLECTION DATE RECEIVED. WE HAVE USED  THE DATE THE SPECIMEN WAS RECEIVED BY THIS  LABORATORY AS THE COLLECTION DATE. IF THIS  IS INCORRECT, PLEASE CONTACT CLIENT SERVICES.  PHONE NUMBER: [phone]  Test Performed at:  [facility]  [address] [place]  [address]  TIFFANI CROWE MD   05/27/2010 2.5 < OR = 4.0 ng/mL Final     Comment:        This test was performed using the Siemens  chemiluminescent method. Values obtained from  different assay methods cannot be used  interchangeably. PSA levels, regardless of  value, should not be interpreted as absolute  evidence of the presence or absence of disease.     Test Performed at:  [facility]  [address] [place]  [address]  TIFFANI CROWE MD   02/17/2009 2.6 < OR = 4.0 ng/mL Final     Comment:     THIS TEST WAS PERFORMED USING THE SIEMENS ([facility])  CHEMILUMINESCENT METHOD. VALUES OBTAINED FROM  DIFFERENT ASSAY METHODS CANNOT  BE USED  INTERCHANGEABLY. PSA LEVELS, REGARDLESS OF  VALUE, SHOULD NOT BE INTERPRETED AS ABSOLUTE  EVIDENCE OF THE PRESENCE OR ABSENCE OF DISEASE.     NO COLLECTION DATE RECEIVED. WE HAVE USED  THE DATE THE SPECIMEN WAS RECEIVED BY THIS  LABORATORY AS THE COLLECTION DATE. IF THIS  IS INCORRECT, PLEASE CONTACT CLIENT SERVICES.  PHONE NUMBER: [phone]     Test performed at [facility] [address]  [address]  Director: TIFFANI CROWE M.D.     PSA   Date Value Ref Range Status   01/10/2018 12.20 (H) 0 - 4 ug/L Final     Comment:     Assay Method:  Chemiluminescence using Siemens Vista analyzer       An enema was completed and 500 mg of Cipro twice daily was started prior to the biopsy.  After obtaining informed consent patient was placed in lateral decubitus position.  The ultrasound probe was placed in the rectum.  The prostate was numbed using ultrasound guidance with 1% lidocaine 5 mls along each nerve bundle.      The volume had [previously been measured on MRI and estimated to be 38 cubic centimeters.      US images were used to guide the biopsies of the prostate.  12 cores were taken with 6 on each side, 2 at the base,  2 at the midgland and  2 at the apex. I took additional cores from the area of concern seen on the MRI,, which was located midline at the apex of the prostate anteriorly. The patient tolerated the procedure well.      We will follow up with the results in 7-10 days and contact patient with these results.

## 2018-08-28 NOTE — LETTER
8/28/2018       RE: Jose Moreno  [address] [facility] [address]     Dear Colleague,    Thank you for referring your patient, Jose Moreno, to the [facility] [place] at [facility]. Please see a copy of my visit note below.    Jose Moreno is here for a transrectal altrasound guided needle biopsy of the prostate for a significant risk of potentially lethal prostate cancer.    The risks, benefits, of the procudure were discussed.  All questions were answered.  A written informed consent was obtained.      Ron PSA   Date Value Ref Range Status   06/13/2013 4.0 < OR = 4.0 ng/mL Final     Comment:        This test was performed using the Siemens  chemiluminescent method. Values obtained from  different assay methods cannot be used  interchangeably. PSA levels, regardless of  value, should not be interpreted as absolute  evidence of the presence or absence of disease.      06/16/2011 2.9 < OR = 4.0 ng/mL Final     Comment:        This test was performed using the Siemens  chemiluminescent method. Values obtained from  different assay methods cannot be used  interchangeably. PSA levels, regardless of  value, should not be interpreted as absolute  evidence of the presence or absence of disease.     NO COLLECTION DATE RECEIVED. WE HAVE USED  THE DATE THE SPECIMEN WAS RECEIVED BY THIS  LABORATORY AS THE COLLECTION DATE. IF THIS  IS INCORRECT, PLEASE CONTACT CLIENT SERVICES.  PHONE NUMBER: [phone]  Test Performed at:  [facility] [address]  [address]  TIFFANI CROWE MD   05/27/2010 2.5 < OR = 4.0 ng/mL Final     Comment:        This test was performed using the Siemens  chemiluminescent method. Values obtained from  different assay methods cannot be used  interchangeably. PSA levels, regardless of  value, should not be interpreted as absolute  evidence of the presence or absence of  disease.     Test Performed at:  [facility] [address]  [address]  TIFFANI CROWE MD   02/17/2009 2.6 < OR = 4.0 ng/mL Final     Comment:     THIS TEST WAS PERFORMED USING THE SIEMENS ([facility])  CHEMILUMINESCENT METHOD. VALUES OBTAINED FROM  DIFFERENT ASSAY METHODS CANNOT BE USED  INTERCHANGEABLY. PSA LEVELS, REGARDLESS OF  VALUE, SHOULD NOT BE INTERPRETED AS ABSOLUTE  EVIDENCE OF THE PRESENCE OR ABSENCE OF DISEASE.     NO COLLECTION DATE RECEIVED. WE HAVE USED  THE DATE THE SPECIMEN WAS RECEIVED BY THIS  LABORATORY AS THE COLLECTION DATE. IF THIS  IS INCORRECT, PLEASE CONTACT CLIENT SERVICES.  PHONE NUMBER: [phone]     Test performed at [facility] [address]  [address]  Director: TIFFANI CROWE M.D.     PSA   Date Value Ref Range Status   01/10/2018 12.20 (H) 0 - 4 ug/L Final     Comment:     Assay Method:  Chemiluminescence using Siemens Vista analyzer       An enema was completed and 500 mg of Cipro twice daily was started prior to the biopsy.  After obtaining informed consent patient was placed in lateral decubitus position.  The ultrasound probe was placed in the rectum.  The prostate was numbed using ultrasound guidance with 1% lidocaine 5 mls along each nerve bundle.      The volume had [previously been measured on MRI and estimated to be 38 cubic centimeters.      US images were used to guide the biopsies of the prostate.  12 cores were taken with 6 on each side, 2 at the base,  2 at the midgland and  2 at the apex. I took additional cores from the area of concern seen on the MRI,, which was located midline at the apex of the prostate anteriorly. The patient tolerated the procedure well.      We will follow up with the results in 7-10 days and contact patient with these results.        Again, thank you for allowing me to participate in the care of your patient.      Sincerely,    Clint Blankenship MD

## 2018-08-28 NOTE — MR AVS SNAPSHOT
After Visit Summary   8/28/2018    Jose Moreno    MRN: [id number]           Patient Information     Date Of Birth          1950        Visit Information        Provider Department      8/28/2018 2:00 PM Clint Blankenship MD; UA BX ROOM [facility] [place]        Today's Diagnoses     Elevated prostate specific antigen (PSA)    -  1      Care Instructions    [facility]  Transrectal Ultrasound  Post Operative Information    The physician who performed your Transrectal Ultrasound is Dr. Blankenship (telephone number [phone]).  Please contact this doctor if you have any problems or questions.  If unable to reach your doctor, please return to the Emergency Department.      Take one antibiotic the evening of the procedure and then as directed on your prescription.    Drink at least 6-8 glasses of fluids for the first 48 hours.    Avoid heavy lifting and strenuous activity for 48 hours.    Avoid sexual intercourse for the first 24 hours.    No aspirin or ibuprofen products (Motrin, Advil, Nuprin, ect.) for one week.  You may take acetaminophen (Tylenol) for pain.    You may notice a small amount of blood on the tissue after a bowel movement.    You may pass blood with clots in your urine following the procedure.  The amount will decrease with time but may be visible for up to two weeks.     You make have blood in your semen for 4 weeks after the procedure.    You may experience mild perineal (groin area) discomfort after the procedure.    Please call you doctor if you have any of the follow symptoms:  Fever  Increase in the amount of blood passed  Severe discomfort or pain            Follow-ups after your visit        Your next 10 appointments already scheduled     Sep 05, 2018  9:00 AM CDT   Return Visit with Clint Blankenship MD   [facility] [place] ([facility])    [address]  [address]  [facility] [address] [phone]              Who to contact     If you have questions or need follow up information about today's clinic visit or your schedule please contact [facility] [place] directly at [phone].  Normal or non-critical lab and imaging results will be communicated to you by MyChart, letter or phone within 4 business days after the clinic has received the results. If you do not hear from us within 7 days, please contact the clinic through [employer] or phone. If you have a critical or abnormal lab result, we will notify you by phone as soon as possible.  Submit refill requests through [employer] or call your pharmacy and they will forward the refill request to us. Please allow 3 business days for your refill to be completed.          Additional Information About Your Visit        MyChart Information     [employer] gives you secure access to your electronic health record. If you see a primary care provider, you can also send messages to your care team and make appointments. If you have questions, please call your primary care clinic.  If you do not have a primary care provider, please call [phone] and they will assist you.        Care EveryWhere ID     This is your Care EveryWhere ID. This could be used by other organizations to access your [facility] medical records  [id number]        Your Vitals Were     Pulse Pulse Oximetry                66 96%           Blood Pressure from Last 3 Encounters:   08/28/18 156/84   08/17/18 132/72   07/26/18 138/86    Weight from Last 3 Encounters:   08/17/18 64.5 kg (142 lb 3.2 oz)   07/26/18 65.8 kg (145 lb)   07/10/18 66.5 kg (146 lb 9.6 oz)              Today, you had the following     No orders found for display       Primary Care Provider Office Phone # Fax #    Jerri Tavera [phone]       [address] [facility]  [address]  [facility] [address]        Equal Access to Services      DEON CAIN : Hadii aad ku pipo Clark, waaxda luqadaha, qaybta kaalmada adediegobebeto, jayjay galo hayhenry sowevelinsamson nicolas . So [facility] [phone].    ATENCIÓN: Si habla earnest, tiene a weber disposición servicios gratuitos de asistencia lingüística. Llame al [phone].    We comply with applicable federal civil rights laws and [place] laws. We do not discriminate on the basis of race, color, national origin, age, disability, sex, sexual orientation, or gender identity.            Thank you!     Thank you for choosing [facility] [place]  for your care. Our goal is always to provide you with excellent care. Hearing back from our patients is one way we can continue to improve our services. Please take a few minutes to complete the written survey that you may receive in the mail after your visit with us. Thank you!             Your Updated Medication List - Protect others around you: Learn how to safely use, store and throw away your medicines at www.disposemymeds.org.          This list is accurate as of 8/28/18  2:31 PM.  Always use your most recent med list.                   Brand Name Dispense Instructions for use Diagnosis    aspirin 81 MG EC tablet     60 tablet    Take 1 tablet (81 mg) by mouth daily    ACS (acute coronary syndrome) (H)       atorvastatin 40 MG tablet    LIPITOR    90 tablet    Take 1 tablet (40 mg) by mouth daily    Mixed hyperlipidemia, History of non-ST elevation myocardial infarction (NSTEMI)       ciprofloxacin 500 MG tablet    CIPRO    6 tablet    Take 1 tablet (500 mg) by mouth 2 times daily    Elevated prostate specific antigen (PSA)       FLUoxetine 40 MG capsule    PROzac    90 capsule    Take 1 capsule (40 mg) by mouth daily    Major depressive disorder, single episode, moderate (H)       levothyroxine 50 MCG tablet    SYNTHROID/LEVOTHROID    90 tablet    Take 1 tablet (50 mcg) by mouth daily    Hypothyroidism due to acquired atrophy of  thyroid       losartan 100 MG tablet    COZAAR    90 tablet    Take 1 tablet (100 mg) by mouth daily    Coronary artery disease involving native coronary artery of native heart without angina pectoris       nitroGLYcerin 0.4 MG sublingual tablet    NITROSTAT    25 tablet    Place 1 tablet (0.4 mg) under the tongue every 5 minutes as needed for chest pain    ACS (acute coronary syndrome) (H)       omeprazole 20 MG CR capsule    priLOSEC    90 capsule    TAKE ONE CAPSULE BY MOUTH ONCE DAILY    Gastroesophageal reflux disease without esophagitis       traZODone 100 MG tablet    DESYREL    135 tablet    Take 1.5 tablets (150 mg) by mouth At Bedtime    Major depressive disorder, single episode, moderate (H)

## 2018-08-30 LAB — COPATH REPORT: NORMAL

## 2018-08-31 NOTE — TELEPHONE ENCOUNTER
Spoke on phone re: prostate biopsy results  Will get CT and bone scan after his appointment with me next week

## 2018-09-05 ENCOUNTER — OFFICE VISIT (OUTPATIENT)
Dept: UROLOGY | Facility: CLINIC | Age: 68
End: 2018-09-05
Payer: COMMERCIAL

## 2018-09-05 VITALS — HEIGHT: 66 IN | WEIGHT: 142 LBS | HEART RATE: 74 BPM | BODY MASS INDEX: 22.82 KG/M2 | OXYGEN SATURATION: 98 %

## 2018-09-05 DIAGNOSIS — C61 MALIGNANT NEOPLASM OF PROSTATE (H): Primary | ICD-10-CM

## 2018-09-05 PROCEDURE — 99215 OFFICE O/P EST HI 40 MIN: CPT | Performed by: UROLOGY

## 2018-09-05 RX ORDER — CEFAZOLIN SODIUM 1 G
1 VIAL (EA) INJECTION SEE ADMIN INSTRUCTIONS
Status: CANCELLED | OUTPATIENT
Start: 2018-09-05 | End: 2019-09-05

## 2018-09-05 ASSESSMENT — PAIN SCALES - GENERAL: PAINLEVEL: NO PAIN (0)

## 2018-09-05 NOTE — LETTER
9/5/2018     RE: Jose Moreno  [address] [facility] [address]     Dear Colleague,    Thank you for referring your patient, Jose Moreno, to the [facility] [place] at [facility]. Please see a copy of my visit note below.    Office Visit Note  [facility]  [phone]    UROLOGIC DIAGNOSES:   T1C Shane 4+5=9 prostate cancer    CURRENT INTERVENTIONS:       HISTORY:   Morgan returns to clinic today to discuss his recent prostate biopsy results. A single core at the left apex showed [place] 4+5 = 9 prostate cancer. This also corresponds to the area of concern on the MRI. The other additional cores taken from this area of concern showed a [place] 4+3 = 7 prostate cancer. For other cores on the standard template biopsy were positive showing [place] 3+4 = 7 in 2 and Shane grade 6 in 2 more. He has felt well since his biopsy. He has no urinary complaints. He has had no prior abdominal surgery. He has had prior coronary artery stenting but is now stable from a cardiac standpoint. He does not take any blood thinning medications beyond aspirin daily. He is here today along with his wife in order to discuss his prostate cancer treatment options.      PAST MEDICAL HISTORY:   Past Medical History:   Diagnosis Date     ACS (acute coronary syndrome) (H) 01-23-15     ADHD (attention deficit hyperactivity disorder)      CAD (coronary artery disease)     cardiac cath 1/23/15: SHERRY to 1st diagonal, SHERRY x2 to LAD, cath 2009: no intervention     Esophageal reflux      History of hyperthyroidism 4/9/2014     Hyperlipidemia      Hypertension      Mitral regurgitation 2009    moderately severe MVP, s/p robotic repair at [facility]     NSTEMI (non-ST elevated myocardial infarction) (H) 01-23-15     Pulmonary nodules 2009    CT-recommend repeat in 6-12 months     Rotator cuff rupture 11/3/2011       PAST SURGICAL HISTORY:   Past Surgical  History:   Procedure Laterality Date     DECOMPRESSION LUMBAR MINIMALLY INVASIVE ONE LEVEL  1/11/2012    Procedure:DECOMPRESSION LUMBAR MINIMALLY INVASIVE ONE LEVEL; L4-5 Decompression Minimally Invasive; Surgeon:MESSI HORAN; Location:[facility] OR     EYE EXAM ESTABLISHED PT  1/14/06     HC COLONOSCOPY THRU STOMA, DIAGNOSTIC  7/00    GI     HCL PSA, DIAGNOSTIC (TUMOR MARKER)  2003     HEART CATH RIGHT AND LEFT HEART CATH  01-23-15    See report, pt transfered to [facility] for complex bifurcation PCI of LAD diagonal vessel.      HEART CATH RIGHT AND LEFT HEART CATH  01-26-15    PTCA and implantation of SHERRY to 1st diagonal, SHERRY to mid LAD, SHERRY to proximal LAD     HERNIA REPAIR       ORTHOPEDIC SURGERY      Hx of rotator cuff rupture and repair     REMOVAL OF SPERM DUCT(S)      Vasectomy     REMOVAL OF SPERM DUCT(S)      reversal of vasectomy     REPAIR VALVE MITRAL  2009    [facility] robotic repair      TESTICLE SURGERY       VASECTOMY       VASOVASOSTOMY       XR LUMBAR RADIOFREQ ABLATION UNILATERAL  01/11/2018    lumbar area/ ? level       FAMILY HISTORY:   Family History   Problem Relation Age of Onset     Cancer Mother      breast, lung     Depression Father      alcohlism     Diabetes No family hx of      Cardiovascular No family hx of      Cancer - colorectal No family hx of      Prostate Cancer No family hx of        SOCIAL HISTORY:   Social History   Substance Use Topics     Smoking status: Never Smoker     Smokeless tobacco: Never Used     Alcohol use 4.2 oz/week     7 Standard drinks or equivalent per week      Comment: 1 beer daily       Current Outpatient Prescriptions   Medication     aspirin EC 81 MG EC tablet     atorvastatin (LIPITOR) 40 MG tablet     ciprofloxacin (CIPRO) 500 MG tablet     FLUoxetine (PROZAC) 40 MG capsule     levothyroxine (SYNTHROID/LEVOTHROID) 50 MCG tablet     losartan (COZAAR) 100 MG tablet     nitroglycerin (NITROSTAT) 0.4 MG sublingual tablet     omeprazole (PRILOSEC) 20 MG  CR capsule     traZODone (DESYREL) 100 MG tablet     No current facility-administered medications for this visit.          PHYSICAL EXAM:    There were no vitals taken for this visit.    HEENT: Normocephalic and atraumatic   Cardiac: Not done  Back/Flank: Not done  CNS/PNS: Not done  Respiratory: Normal non-labored breathing  Abdomen: Soft nontender and nondistended  Peripheral Vascular: Not done  Mental Status: Not done    Penis: Not done  Scrotal Skin: Not done  Testicles: Not done  Epididymis: Not done  Digital Rectal Exam:     Cystoscopy: Not done    Imaging: None    Urinalysis: UA RESULTS:  Recent Labs   Lab Test  07/26/18   1103   COLOR  Yellow   APPEARANCE  Clear   URINEGLC  Negative   URINEBILI  Negative   URINEKETONE  Negative   SG  1.020   UBLD  Negative   URINEPH  7.0   PROTEIN  100*   UROBILINOGEN  0.2   NITRITE  Negative   LEUKEST  Negative       PSA: 12.2    Post Void Residual:     Other labs: None today      IMPRESSION:  High risk prostate cancer    PLAN:  We had a long discussion today about his prostate biopsy results and treatment options. He has been diagnosed with a high-grade prostate cancer. He does need to have a CT scan and bone scan performed in order to rule out metastatic disease. If these scans are negative he was still counseled on the risks of micrometastatic disease. His metastatic workup is negative we discussed potential localized treatment options. In particular, for his high-grade cancer we discussed radical prostatectomy surgery versus radiotherapy combined with hormonal therapy. We discussed both options in detail today. He will like to proceed with robotic-assisted laparoscopic radical prostatectomy with bilateral pelvic lymph node node dissection if possible. We discussed the surgery in detail today along with its risks and expected recovery. We also discussed the risks of requiring radiotherapy or further therapies after surgery, especially given his high-grade cancer. He will  have a CT scan and bone scan performed next week. If these are negative we will get him scheduled for surgery.    Total Time: 45 minutes                                      Total in Consultation: 45 minutes      Clint Blankenship M.D.

## 2018-09-05 NOTE — NURSING NOTE
Pt here for CTRU.  Pt sometimes still getting some blood in his urine.  Pt states doing well after Bxs.  ANGÉLICA Ledesma, CMA

## 2018-09-05 NOTE — PROGRESS NOTES
Office Visit Note  [facility]  [phone]    UROLOGIC DIAGNOSES:   T1C Shane 4+5=9 prostate cancer    CURRENT INTERVENTIONS:       HISTORY:   Morgan returns to clinic today to discuss his recent prostate biopsy results. A single core at the left apex showed [place] 4+5 = 9 prostate cancer. This also corresponds to the area of concern on the MRI. The other additional cores taken from this area of concern showed a Shane 4+3 = 7 prostate cancer. For other cores on the standard template biopsy were positive showing [place] 3+4 = 7 in 2 and Shane grade 6 in 2 more. He has felt well since his biopsy. He has no urinary complaints. He has had no prior abdominal surgery. He has had prior coronary artery stenting but is now stable from a cardiac standpoint. He does not take any blood thinning medications beyond aspirin daily. He is here today along with his wife in order to discuss his prostate cancer treatment options.      PAST MEDICAL HISTORY:   Past Medical History:   Diagnosis Date     ACS (acute coronary syndrome) (H) 01-23-15     ADHD (attention deficit hyperactivity disorder)      CAD (coronary artery disease)     cardiac cath 1/23/15: SHERRY to 1st diagonal, SHERRY x2 to LAD, cath 2009: no intervention     Esophageal reflux      History of hyperthyroidism 4/9/2014     Hyperlipidemia      Hypertension      Mitral regurgitation 2009    moderately severe MVP, s/p robotic repair at [place]     NSTEMI (non-ST elevated myocardial infarction) (H) 01-23-15     Pulmonary nodules 2009    CT-recommend repeat in 6-12 months     Rotator cuff rupture 11/3/2011       PAST SURGICAL HISTORY:   Past Surgical History:   Procedure Laterality Date     DECOMPRESSION LUMBAR MINIMALLY INVASIVE ONE LEVEL  1/11/2012    Procedure:DECOMPRESSION LUMBAR MINIMALLY INVASIVE ONE LEVEL; L4-5 Decompression Minimally Invasive; Surgeon:MESSI HORAN; Location:[facility] OR     EYE EXAM ESTABLISHED PT  1/14/06     HC COLONOSCOPY THRU STOMA,  DIAGNOSTIC  7/00    GI     HCL PSA, DIAGNOSTIC (TUMOR MARKER)  2003     HEART CATH RIGHT AND LEFT HEART CATH  01-23-15    See report, pt transfered to [facility] for complex bifurcation PCI of LAD diagonal vessel.      HEART CATH RIGHT AND LEFT HEART CATH  01-26-15    PTCA and implantation of SHERRY to 1st diagonal, SHERRY to mid LAD, SHERRY to proximal LAD     HERNIA REPAIR       ORTHOPEDIC SURGERY      Hx of rotator cuff rupture and repair     REMOVAL OF SPERM DUCT(S)      Vasectomy     REMOVAL OF SPERM DUCT(S)      reversal of vasectomy     REPAIR VALVE MITRAL  2009    [facility] robotic repair      TESTICLE SURGERY       VASECTOMY       VASOVASOSTOMY       XR LUMBAR RADIOFREQ ABLATION UNILATERAL  01/11/2018    lumbar area/ ? level       FAMILY HISTORY:   Family History   Problem Relation Age of Onset     Cancer Mother      breast, lung     Depression Father      alcohlism     Diabetes No family hx of      Cardiovascular No family hx of      Cancer - colorectal No family hx of      Prostate Cancer No family hx of        SOCIAL HISTORY:   Social History   Substance Use Topics     Smoking status: Never Smoker     Smokeless tobacco: Never Used     Alcohol use 4.2 oz/week     7 Standard drinks or equivalent per week      Comment: 1 beer daily       Current Outpatient Prescriptions   Medication     aspirin EC 81 MG EC tablet     atorvastatin (LIPITOR) 40 MG tablet     ciprofloxacin (CIPRO) 500 MG tablet     FLUoxetine (PROZAC) 40 MG capsule     levothyroxine (SYNTHROID/LEVOTHROID) 50 MCG tablet     losartan (COZAAR) 100 MG tablet     nitroglycerin (NITROSTAT) 0.4 MG sublingual tablet     omeprazole (PRILOSEC) 20 MG CR capsule     traZODone (DESYREL) 100 MG tablet     No current facility-administered medications for this visit.          PHYSICAL EXAM:    There were no vitals taken for this visit.    HEENT: Normocephalic and atraumatic   Cardiac: Not done  Back/Flank: Not done  CNS/PNS: Not done  Respiratory: Normal non-labored  breathing  Abdomen: Soft nontender and nondistended  Peripheral Vascular: Not done  Mental Status: Not done    Penis: Not done  Scrotal Skin: Not done  Testicles: Not done  Epididymis: Not done  Digital Rectal Exam:     Cystoscopy: Not done    Imaging: None    Urinalysis: UA RESULTS:  Recent Labs   Lab Test  07/26/18   1103   COLOR  Yellow   APPEARANCE  Clear   URINEGLC  Negative   URINEBILI  Negative   URINEKETONE  Negative   SG  1.020   UBLD  Negative   URINEPH  7.0   PROTEIN  100*   UROBILINOGEN  0.2   NITRITE  Negative   LEUKEST  Negative       PSA: 12.2    Post Void Residual:     Other labs: None today      IMPRESSION:  High risk prostate cancer    PLAN:  We had a long discussion today about his prostate biopsy results and treatment options. He has been diagnosed with a high-grade prostate cancer. He does need to have a CT scan and bone scan performed in order to rule out metastatic disease. If these scans are negative he was still counseled on the risks of micrometastatic disease. His metastatic workup is negative we discussed potential localized treatment options. In particular, for his high-grade cancer we discussed radical prostatectomy surgery versus radiotherapy combined with hormonal therapy. We discussed both options in detail today. He will like to proceed with robotic-assisted laparoscopic radical prostatectomy with bilateral pelvic lymph node node dissection if possible. We discussed the surgery in detail today along with its risks and expected recovery. We also discussed the risks of requiring radiotherapy or further therapies after surgery, especially given his high-grade cancer. He will have a CT scan and bone scan performed next week. If these are negative we will get him scheduled for surgery.    Total Time: 45 minutes                                      Total in Consultation: 45 minutes      Clint Blankenship M.D.

## 2018-09-05 NOTE — MR AVS SNAPSHOT
After Visit Summary   9/5/2018    Jose Moreno    MRN: [id number]           Patient Information     Date Of Birth          1950        Visit Information        Provider Department      9/5/2018 10:00 AM Clint Blankenship MD [facility] [place]        Today's Diagnoses     Malignant neoplasm of prostate (H)    -  1       Follow-ups after your visit        Follow-up notes from your care team     Return for CT and bone scan, also schedule surgery.      Your next 10 appointments already scheduled     Sep 13, 2018  9:00 AM CDT   NM INJECTION with RSCCINJ   [facility] ([facility])    [address]  [place] [address]   [phone]            Sep 13, 2018  9:15 AM CDT   CT ABDOMEN PELVIS W CONTRAST with [address] ([facility])    [address]  [place] [address]   [phone]           Please bring any scans or X-rays taken at other hospitals, if similar tests were done. Also bring a list of your medicines, including vitamins, minerals and over-the-counter drugs. It is safest to leave personal items at home.  Be sure to tell your doctor:   If you have any allergies.   If there s any chance you are pregnant.   If you are breastfeeding.  How to prepare:   Do not eat or drink for 2 hours before your exam. If you need to take medicine, you may take it with small sips of water. (We may ask you to take liquid medicine as well.)   Please wear loose clothing, such as a sweat suit or jogging clothes. Avoid snaps, zippers and other metal. We may ask you to undress and put on a hospital gown.  Please arrive 30 minutes early for your CT. Once in the department you might be asked to drink water 15-20 minutes prior to your exam.  If indicated you may be asked to drink an oral contrast in advance of your CT.  If this is the  case, the imaging team will let you know or be in contact with you prior to your appointment  Patients over 70 or patients with diabetes or kidney problems:   If you haven t had a blood test (creatinine test) within the last 30 days, the Cardiologist/Radiologist may require you to get this test prior to your exam.  If you have diabetes:   Continue to take your metformin medication on the day of your exam  If you have any questions, please call the Imaging Department where you will have your exam.            Sep 13, 2018 11:00 AM CDT   NM BONE SCAN WHOLE BODY with RSCCNM1   [facility] [place] ([facility])    [address]  [place] [address] [phone]           Please bring a list of your medicines to the exam. (Include vitamins, minerals and over-the-counter drugs.) You should wear comfortable clothes. Leave your valuables at home. Please bring related prior results and films.  Tell your doctor:   If you are breastfeeding or may be pregnant.   If you have had a barium test within the past 48 hours. Barium may change the results of certain exams.   If you think you may need sedation (medicine to help you relax).  You may eat and drink as normal.  Please call your Imaging Department at your exam site with any questions.              Future tests that were ordered for you today     Open Future Orders        Priority Expected Expires Ordered    NM Bone Scan Whole Body Routine  9/5/2019 9/5/2018    CT Abdomen Pelvis w Contrast Routine  9/5/2019 9/5/2018            Who to contact     If you have questions or need follow up information about today's clinic visit or your schedule please contact [facility] [place] directly at [phone].  Normal or non-critical lab and imaging results will be communicated to you by MyChart, letter or phone within 4 business days after the clinic has received the results. If you do not  "hear from us within 7 days, please contact the clinic through ItsMyURLs or phone. If you have a critical or abnormal lab result, we will notify you by phone as soon as possible.  Submit refill requests through ItsMyURLs or call your pharmacy and they will forward the refill request to us. Please allow 3 business days for your refill to be completed.          Additional Information About Your Visit        Fanbasehart Information     ItsMyURLs gives you secure access to your electronic health record. If you see a primary care provider, you can also send messages to your care team and make appointments. If you have questions, please call your primary care clinic.  If you do not have a primary care provider, please call [phone] and they will assist you.        Care EveryWhere ID     This is your Care EveryWhere ID. This could be used by other organizations to access your [place] medical records  [id number]        Your Vitals Were     Pulse Height Pulse Oximetry BMI (Body Mass Index)          74 1.676 m (5' 6\") 98% 22.92 kg/m2         Blood Pressure from Last 3 Encounters:   08/28/18 130/80   08/17/18 132/72   07/26/18 138/86    Weight from Last 3 Encounters:   09/05/18 64.4 kg (142 lb)   08/28/18 64.4 kg (142 lb)   08/17/18 64.5 kg (142 lb 3.2 oz)              We Performed the Following     Darlene-Operative Worksheet  (Urology General)        Primary Care Provider Office Phone # Fax #    Jerri Tavera [phone]       [facility] [address] [address]        Equal Access to Services     [facility]: Hadii aad ku hadasho Soomaali, waaxda luqadaha, qaybta kaalmada adeegyabebeto, jayjay nicolas . So [facility] [phone].    ATENCIÓN: Si habla español, tiene a weber disposición servicios gratuitos de asistencia lingüística. Llame al [phone].    We comply with applicable federal civil rights laws and [place] laws. We do not discriminate on the basis of race, color, " national origin, age, disability, sex, sexual orientation, or gender identity.            Thank you!     Thank you for choosing [facility] [place]  for your care. Our goal is always to provide you with excellent care. Hearing back from our patients is one way we can continue to improve our services. Please take a few minutes to complete the written survey that you may receive in the mail after your visit with us. Thank you!             Your Updated Medication List - Protect others around you: Learn how to safely use, store and throw away your medicines at www.disposemymeds.org.          This list is accurate as of 9/5/18 10:48 AM.  Always use your most recent med list.                   Brand Name Dispense Instructions for use Diagnosis    aspirin 81 MG EC tablet     60 tablet    Take 1 tablet (81 mg) by mouth daily    ACS (acute coronary syndrome) (H)       atorvastatin 40 MG tablet    LIPITOR    90 tablet    Take 1 tablet (40 mg) by mouth daily    Mixed hyperlipidemia, History of non-ST elevation myocardial infarction (NSTEMI)       ciprofloxacin 500 MG tablet    CIPRO    6 tablet    Take 1 tablet (500 mg) by mouth 2 times daily    Elevated prostate specific antigen (PSA)       FLUoxetine 40 MG capsule    PROzac    90 capsule    Take 1 capsule (40 mg) by mouth daily    Major depressive disorder, single episode, moderate (H)       levothyroxine 50 MCG tablet    SYNTHROID/LEVOTHROID    90 tablet    Take 1 tablet (50 mcg) by mouth daily    Hypothyroidism due to acquired atrophy of thyroid       losartan 100 MG tablet    COZAAR    90 tablet    Take 1 tablet (100 mg) by mouth daily    Coronary artery disease involving native coronary artery of native heart without angina pectoris       nitroGLYcerin 0.4 MG sublingual tablet    NITROSTAT    25 tablet    Place 1 tablet (0.4 mg) under the tongue every 5 minutes as needed for chest pain    ACS (acute coronary syndrome) (H)        omeprazole 20 MG CR capsule    priLOSEC    90 capsule    TAKE ONE CAPSULE BY MOUTH ONCE DAILY    Gastroesophageal reflux disease without esophagitis       traZODone 100 MG tablet    DESYREL    135 tablet    Take 1.5 tablets (150 mg) by mouth At Bedtime    Major depressive disorder, single episode, moderate (H)

## 2018-09-13 ENCOUNTER — HOSPITAL ENCOUNTER (OUTPATIENT)
Dept: NUCLEAR MEDICINE | Facility: CLINIC | Age: 68
Setting detail: NUCLEAR MEDICINE
End: 2018-09-13
Attending: UROLOGY
Payer: COMMERCIAL

## 2018-09-13 ENCOUNTER — HOSPITAL ENCOUNTER (OUTPATIENT)
Dept: CT IMAGING | Facility: CLINIC | Age: 68
Discharge: HOME | End: 2018-09-13
Attending: UROLOGY | Admitting: UROLOGY
Payer: COMMERCIAL

## 2018-09-13 ENCOUNTER — HOSPITAL ENCOUNTER (OUTPATIENT)
Dept: NUCLEAR MEDICINE | Facility: CLINIC | Age: 68
Setting detail: NUCLEAR MEDICINE
Discharge: HOME | End: 2018-09-13
Attending: UROLOGY | Admitting: UROLOGY
Payer: COMMERCIAL

## 2018-09-13 DIAGNOSIS — C61 MALIGNANT NEOPLASM OF PROSTATE (H): ICD-10-CM

## 2018-09-13 PROCEDURE — 74177 CT ABD & PELVIS W/CONTRAST: CPT

## 2018-09-13 PROCEDURE — 34300033 ZZH RX 343: Performed by: UROLOGY

## 2018-09-13 PROCEDURE — A9561 TC99M OXIDRONATE: HCPCS | Performed by: UROLOGY

## 2018-09-13 PROCEDURE — 78306 BONE IMAGING WHOLE BODY: CPT

## 2018-09-13 PROCEDURE — 25000128 H RX IP 250 OP 636: Performed by: RADIOLOGY

## 2018-09-13 RX ORDER — IOPAMIDOL 755 MG/ML
500 INJECTION, SOLUTION INTRAVASCULAR ONCE
Status: COMPLETED | OUTPATIENT
Start: 2018-09-13 | End: 2018-09-13

## 2018-09-13 RX ADMIN — SODIUM CHLORIDE 48 ML: 900 INJECTION, SOLUTION INTRAVENOUS at 09:31

## 2018-09-13 RX ADMIN — Medication 25 MCI.: at 09:00

## 2018-09-13 RX ADMIN — IOPAMIDOL 71 ML: 755 INJECTION, SOLUTION INTRAVENOUS at 09:31

## 2018-09-18 DIAGNOSIS — I24.9 ACS (ACUTE CORONARY SYNDROME) (H): ICD-10-CM

## 2018-09-18 RX ORDER — NITROGLYCERIN 0.4 MG/1
0.4 TABLET SUBLINGUAL EVERY 5 MIN PRN
Qty: 25 TABLET | Refills: 1 | COMMUNITY
Start: 2018-09-18 | End: 2019-01-08

## 2018-09-18 NOTE — TELEPHONE ENCOUNTER
Refilled Nitroglycerin tabs today for 25 tabs with 1 refill and patient needs to know he is due in November for recheck.

## 2018-10-01 ENCOUNTER — OFFICE VISIT (OUTPATIENT)
Dept: FAMILY MEDICINE | Facility: CLINIC | Age: 68
End: 2018-10-01

## 2018-10-01 VITALS
OXYGEN SATURATION: 97 % | WEIGHT: 142.6 LBS | SYSTOLIC BLOOD PRESSURE: 124 MMHG | HEIGHT: 65 IN | RESPIRATION RATE: 16 BRPM | TEMPERATURE: 96.4 F | HEART RATE: 52 BPM | BODY MASS INDEX: 23.76 KG/M2 | DIASTOLIC BLOOD PRESSURE: 80 MMHG

## 2018-10-01 DIAGNOSIS — I10 ESSENTIAL HYPERTENSION, BENIGN: ICD-10-CM

## 2018-10-01 DIAGNOSIS — I25.2 HISTORY OF NON-ST ELEVATION MYOCARDIAL INFARCTION (NSTEMI): ICD-10-CM

## 2018-10-01 DIAGNOSIS — Z23 NEED FOR VACCINATION: ICD-10-CM

## 2018-10-01 DIAGNOSIS — Z00.00 ENCOUNTER FOR ROUTINE HISTORY AND PHYSICAL EXAMINATION OF ADULT: Primary | ICD-10-CM

## 2018-10-01 DIAGNOSIS — E03.4 HYPOTHYROIDISM DUE TO ACQUIRED ATROPHY OF THYROID: ICD-10-CM

## 2018-10-01 DIAGNOSIS — Z76.0 ENCOUNTER FOR MEDICATION REFILL: ICD-10-CM

## 2018-10-01 DIAGNOSIS — E78.2 MIXED HYPERLIPIDEMIA: ICD-10-CM

## 2018-10-01 DIAGNOSIS — F33.42 MAJOR DEPRESSIVE DISORDER, RECURRENT EPISODE, IN FULL REMISSION (H): ICD-10-CM

## 2018-10-01 DIAGNOSIS — I25.10 CORONARY ARTERY DISEASE INVOLVING NATIVE CORONARY ARTERY OF NATIVE HEART WITHOUT ANGINA PECTORIS: Chronic | ICD-10-CM

## 2018-10-01 DIAGNOSIS — K21.9 GASTROESOPHAGEAL REFLUX DISEASE WITHOUT ESOPHAGITIS: ICD-10-CM

## 2018-10-01 LAB
% GRANULOCYTES: 57.9 %
HCT VFR BLD AUTO: 43.6 % (ref 40–53)
HEMOGLOBIN: 14.8 G/DL (ref 13.3–17.7)
LYMPHOCYTES NFR BLD AUTO: 31.9 %
MCH RBC QN AUTO: 33.9 PG (ref 26–33)
MCHC RBC AUTO-ENTMCNC: 33.9 G/DL (ref 31–36)
MCV RBC AUTO: 99.9 FL (ref 78–100)
MONOCYTES NFR BLD AUTO: 10.2 %
PLATELET COUNT - QUEST: 245 10^9/L (ref 150–375)
RBC # BLD AUTO: 4.36 10*12/L (ref 4.4–5.9)
WBC # BLD AUTO: 5 10*9/L (ref 4–11)

## 2018-10-01 PROCEDURE — 90471 IMMUNIZATION ADMIN: CPT | Performed by: FAMILY MEDICINE

## 2018-10-01 PROCEDURE — 90686 IIV4 VACC NO PRSV 0.5 ML IM: CPT | Performed by: FAMILY MEDICINE

## 2018-10-01 PROCEDURE — 36415 COLL VENOUS BLD VENIPUNCTURE: CPT | Performed by: FAMILY MEDICINE

## 2018-10-01 PROCEDURE — 80050 GENERAL HEALTH PANEL: CPT | Mod: 90 | Performed by: FAMILY MEDICINE

## 2018-10-01 PROCEDURE — 99397 PER PM REEVAL EST PAT 65+ YR: CPT | Mod: 25 | Performed by: FAMILY MEDICINE

## 2018-10-01 PROCEDURE — 99213 OFFICE O/P EST LOW 20 MIN: CPT | Mod: 25 | Performed by: FAMILY MEDICINE

## 2018-10-01 PROCEDURE — 90472 IMMUNIZATION ADMIN EACH ADD: CPT | Performed by: FAMILY MEDICINE

## 2018-10-01 PROCEDURE — 80061 LIPID PANEL: CPT | Mod: 90 | Performed by: FAMILY MEDICINE

## 2018-10-01 PROCEDURE — 90750 HZV VACC RECOMBINANT IM: CPT | Performed by: FAMILY MEDICINE

## 2018-10-01 RX ORDER — LEVOTHYROXINE SODIUM 50 UG/1
50 TABLET ORAL DAILY
Qty: 90 TABLET | Refills: 3 | Status: SHIPPED | OUTPATIENT
Start: 2018-10-01 | End: 2019-09-19

## 2018-10-01 RX ORDER — TRAZODONE HYDROCHLORIDE 100 MG/1
100 TABLET ORAL
Qty: 90 TABLET | Refills: 0 | Status: SHIPPED | OUTPATIENT
Start: 2018-10-01 | End: 2019-01-16

## 2018-10-01 RX ORDER — LOSARTAN POTASSIUM 100 MG/1
100 TABLET ORAL DAILY
Qty: 90 TABLET | Refills: 3 | Status: SHIPPED | OUTPATIENT
Start: 2018-10-01 | End: 2019-08-02

## 2018-10-01 RX ORDER — ATORVASTATIN CALCIUM 40 MG/1
40 TABLET, FILM COATED ORAL DAILY
Qty: 90 TABLET | Refills: 1 | Status: SHIPPED | OUTPATIENT
Start: 2018-10-01 | End: 2019-03-25 | Stop reason: DRUGHIGH

## 2018-10-01 RX ORDER — MULTIPLE VITAMINS W/ MINERALS TAB 9MG-400MCG
1 TAB ORAL DAILY
COMMUNITY
Start: 2018-10-01

## 2018-10-01 ASSESSMENT — ANXIETY QUESTIONNAIRES
IF YOU CHECKED OFF ANY PROBLEMS ON THIS QUESTIONNAIRE, HOW DIFFICULT HAVE THESE PROBLEMS MADE IT FOR YOU TO DO YOUR WORK, TAKE CARE OF THINGS AT HOME, OR GET ALONG WITH OTHER PEOPLE: NOT DIFFICULT AT ALL
7. FEELING AFRAID AS IF SOMETHING AWFUL MIGHT HAPPEN: NOT AT ALL
3. WORRYING TOO MUCH ABOUT DIFFERENT THINGS: NOT AT ALL
2. NOT BEING ABLE TO STOP OR CONTROL WORRYING: NOT AT ALL
5. BEING SO RESTLESS THAT IT IS HARD TO SIT STILL: NOT AT ALL
6. BECOMING EASILY ANNOYED OR IRRITABLE: NOT AT ALL
1. FEELING NERVOUS, ANXIOUS, OR ON EDGE: NOT AT ALL
GAD7 TOTAL SCORE: 0

## 2018-10-01 ASSESSMENT — PATIENT HEALTH QUESTIONNAIRE - PHQ9: 5. POOR APPETITE OR OVEREATING: NOT AT ALL

## 2018-10-01 NOTE — NURSING NOTE
Jose Moreno is here for a full physical exam.     FASTING: Yes HOURS: 12+    Pre-Visit Screening :    Immunizations : up to date - Flu Shot Today  Colon Screening : is up to date  Asthma Action Test/Plan : NA  PHQ9 :  is completed today  GAD7 :  is completed today    Patient's  BMI Body mass index is 23.73 kg/(m^2).    Questioned patient about current smoking habits.  Pt. has never smoked.    ETOH screening:    Questions:  1-How often do you have a drink containing alcohol?                             7 times per week(s)  2-How many drinks containing alcohol do you have on a typical day when you are         Drinking?                              1   3- How often do you have 5 or more drinks on one occasion?                              Never       Is it okay to leave a detailed message on home phone's voicemail regarding today's visit? Yes    Phone # [phone] (home)        Roomed By: Sanjana Padilla CMA

## 2018-10-01 NOTE — PATIENT INSTRUCTIONS
Await labs    Start multivitamin, B12 and Vitamin D    Cut back fluoxetine to 20 mgm daily, Trazodone to 100 and use as needed  Monitor response    Recheck 6 months fasting

## 2018-10-01 NOTE — PROGRESS NOTES
SUBJECTIVE:  The patient is in for an annual physical.  Patient is complaining of the following:  Wants to address his depression, hypothyroidism, GERD, hyperlipidemia and hypertension knowing that this is not a part of a preventative exam. See 2 separate notes below    PAST MEDICAL HISTORY:  Past Medical History:  01-23-15: ACS (acute coronary syndrome) (H)  No date: ADHD (attention deficit hyperactivity disorder)  No date: CAD (coronary artery disease)     Comment: cardiac cath 1/23/15: SHERRY to 1st diagonal, SHERRY              x2 to LAD, cath 2009: no intervention  No date: Esophageal reflux  4/9/2014: History of hyperthyroidism  No date: Hyperlipidemia  No date: Hypertension  2009: Mitral regurgitation     Comment: moderately severe MVP, s/p robotic repair at               [place]  01-23-15: NSTEMI (non-ST elevated myocardial infarction)*  2009: Pulmonary nodules     Comment: CT-recommend repeat in 6-12 months  11/3/2011: Rotator cuff rupture    CURRENT MEDICATIONS:  Current Outpatient Prescriptions:    aspirin EC 81 MG EC tablet, Take 1 tablet (81 mg) by mouth daily, Disp: 60 tablet, Rfl: 0   atorvastatin (LIPITOR) 40 MG tablet, Take 1 tablet (40 mg) by mouth daily, Disp: 90 tablet, Rfl: 1   FLUoxetine (PROZAC) 40 MG capsule, Take 1 capsule (40 mg) by mouth daily, Disp: 90 capsule, Rfl: 3   levothyroxine (SYNTHROID/LEVOTHROID) 50 MCG tablet, Take 1 tablet (50 mcg) by mouth daily, Disp: 90 tablet, Rfl: 3   losartan (COZAAR) 100 MG tablet, Take 1 tablet (100 mg) by mouth daily, Disp: 90 tablet, Rfl: 3   omeprazole (PRILOSEC) 20 MG CR capsule, TAKE ONE CAPSULE BY MOUTH ONCE DAILY, Disp: 90 capsule, Rfl: 3   traZODone (DESYREL) 100 MG tablet, Take 1.5 tablets (150 mg) by mouth At Bedtime, Disp: 135 tablet, Rfl: 3   nitroGLYcerin (NITROSTAT) 0.4 MG sublingual tablet, Place 1 tablet (0.4 mg) under the tongue every 5 minutes as needed for chest pain, Disp: 25 tablet, Rfl: 1    DRUG ALLERGIES:  Ace inhibitors    FAMILY  HISTORY:    Family History    Cancer Mother     Comment: breast, lung    Depression Father     Comment: alcohlism    Diabetes No family hx of     Cardiovascular No family hx of     Cancer - colorectal No family hx of     Prostate Cancer No family hx of        SOCIAL HISTORY:  Social History   Marital status:   Spouse name: Chastity    Years of education: 14  Number of children: 4   Occupational History   [employer] RETIRED   Social History Main Topics   Smoking status: Never Smoker    Smokeless tobacco: Never Used    Alcohol use Yes  4.2 oz/week    7 Standard drinks or equivalent per week         Comment: 1 beer daily    Drug use: No    Sexual activity: Yes    Partners: Female    Birth control/ protection: Condom, Post-menopausal   Other Topics Concern    Caffeine Concern No    Comment: 2-3 daily    Sleep Concern No    Special Diet No    Comment: low sodium    Exercise Yes    Comment: walking daily    Seat Belt Yes    Self-Exams No   Social History Narrative       IMMUNIZATIONS:    Immunization History  Administered            Date(s) Administered   Influenza (IIV3) PF   12/18/2002  09/01/2003  10/03/2007                           09/29/2009  01/12/2012     Influenza Vaccine IM 3yrs+ 4 Valent IIV4                         10/03/2013  11/05/2014  10/20/2015                           10/27/2017     Pneumo Conj 13-V (2010&after)                         07/01/2015     Pneumococcal 23 valent                         01/12/2012  10/03/2013     TD (ADULT, 7+)        01/21/1997 06/13/2016     Tdap (Adacel,Boostrix)                         08/03/2006     Varicella             08/03/2006     Zoster vaccine, live  06/14/2012      REVIEW OF SYSTEMS:   Ears/Nose/Throat: negative   Eyes: Negative   Respiratory: negative   Cardiovascular: ASCVD with MI/ cholesterol and BP medications   Gastrointestinal: heartburn on daily omeprazole   Genitourinary: erectile dysfunction   Musculoskeletal: negative     Neurologic:  "negative    Skin: negative    Psychiatric: negative    Hematologic/Lymphatic/Immunologic:  negative     Endocrine:  negative     OBJECTIVE:  VITALS:  /80 (BP Location: Left arm, Patient Position: Chair, Cuff Size: Adult Regular)  Pulse 52  Temp 96.4  F (35.8  C) (Oral)  Ht 1.651 m (5' 5\")  Wt 64.7 kg (142 lb 9.6 oz)  SpO2 97%  BMI 23.73 kg/m2  In general, this is a well developed, well nourished appearing male. Animated.  HEENT:  Unremarkable; fundi are within normal limits.  NECK: Supple; no adenopathy or thyromegaly.   LUNGS:  Clear to auscultation bilaterally.  HEART:  Regular rhythm and rate, normal S1 and S2, no evidence of a murmur.  ABDOMEN:  Bowel sounds are present throughout. The abdomen is soft and nontender, no hepatosplenomegaly or masses appreciated.  GENITALIA:  Deferred/ see Urology evaluation  NEUROLOGICAL:  The exam is nonfocal. Deep tendon reflexes are symmetric.    LAB: See Orders      ASSESSMENT:(Z00.00) Encounter for routine history and physical examination of adult  (primary encounter diagnosis)  Plan: VENIPUNC FNGR,HEEL,EAR [[id number]], AUTO         HEMOGRAM/PLATE/DIFF [50397.000]        ASA daily recommended  Exercise encouraged  Fasting lipid profile obtained  Flu shot recommended  High fiber, low fat diet encouraged  Increase fluid intake  Seat belt use encouraged  Sunscreen recommended      (E78.2) Mixed hyperlipidemia  Plan: VENIPUNC FNGR,HEEL,EAR [[id number]], LIPID PANEL,         atorvastatin (LIPITOR) 40 MG tablet,         OFFICE/OUTPT VISIT,EST,LEVL III        1)  Medication: continue current medication regimen unchanged  2)  Low fat, low cholesterol diet  3)  Regular aerobic exercise  4)  Recheck in 6 months, sooner should new symptoms or   problems arise.    Patient Education: Reviewed risks of elevated lipids and principles   of treatment.        (I10) Essential hypertension, benign  Plan: VENIPUNC FNGR,HEEL,EAR [[id number]], COMPREHENSIVE         METABOLIC PANEL, losartan (COZAAR) " 100 MG         tablet, OFFICE/OUTPT VISIT,EST,LEVL III        1)  Medication: continue current medication regimen unchanged  2)  Dietary sodium restriction  3)  Regular aerobic exercise  4)  Recheck in 6 months, sooner should new symptoms or   problems arise.    Patient Education: Reviewed risks of hypertension and principles of   treatment.        (I25.10) Coronary artery disease involving native coronary artery of native heart without angina pectoris  Plan: VENIPUNC FNGR,HEEL,EAR [[id number]], atorvastatin         (LIPITOR) 40 MG tablet, losartan (COZAAR) 100         MG tablet, OFFICE/OUTPT VISIT,EST,LEVL III        I have reviewed the patient's medical history in detail and updated the computerized patient record.      (I25.2) History of non-ST elevation myocardial infarction (NSTEMI)  Plan: atorvastatin (LIPITOR) 40 MG tablet, losartan         (COZAAR) 100 MG tablet, OFFICE/OUTPT         VISIT,EST,LEVL III        I have reviewed the patient's medical history in detail and updated the computerized patient record.      (E03.4) Hypothyroidism due to acquired atrophy of thyroid  Comment: await labs  Plan: VENIPUNC FNGR,HEEL,EAR [37589], TSH,         levothyroxine (SYNTHROID/LEVOTHROID) 50 MCG         tablet, OFFICE/OUTPT VISIT,EST,LEVL III        refilled    (K21.9) Gastroesophageal reflux disease without esophagitis  Comment: Potential medication side effects were discussed with the patient; let me know if any occur.    Plan: VENIPUNC FNGR,HEEL,EAR [[id number]], AUTO         HEMOGRAM/PLATE/DIFF [84504.000], multivitamin,         therapeutic with minerals (MULTI-VITAMIN) TABS         tablet, Cyanocobalamin (B-12) 1000 MCG TBCR,         cholecalciferol (VITAMIN D3) 1000 UNIT tablet,         omeprazole (PRILOSEC) 20 MG CR capsule,         OFFICE/OUTPT VISIT,EST,LEVL III        Add vitamins and monitor    (F33.42) Major depressive disorder, recurrent episode, in full remission (H)  Plan: FLUoxetine (PROZAC) 20 MG capsule,  traZODone         (DESYREL) 100 MG tablet, OFFICE/OUTPT         VISIT,EST,LEVL III        See below. I've explained to him that drugs of the SSRI class can have side effects such as weight gain, sexual dysfunction, insomnia, headache, nausea. These medications are generally effective at alleviating symptoms of anxiety and/or depression. Let me know if significant side effects do occur.  Cut back to 20 mgm dosing and monitor    (Z76.0) Encounter for medication refill  Plan: VENIPUNC FNGR,HEEL,EAR [[id number]], AUTO         HEMOGRAM/PLATE/DIFF [87211.000], LIPID PANEL,         COMPREHENSIVE METABOLIC PANEL, TSH,         OFFICE/OUTPT VISIT,EST,LEVL III            (Z23) Need for vaccination  Plan: VACCINE ADMINISTRATION, INITIAL, HC FLU VAC         PRESRV FREE QUAD SPLIT VIR 3+YRS IM                  2.   SUBJECTIVE:  Jose Moreno is an 68 year old male who presents for follow-up   of depressive symptoms.  Initially evaluated age47.  Notes from that visit reviewed.  Current symptoms include   none. Symptoms that have subjectively improved include depressed mood, hopelessness, diminished interest or pleasure in activities, insomnia, psychomotor agitation (restless and /or feeling on edge), feelings of guilt, difficulty with concentration, anxiety, irritablility, sleep disturbance, difficulty falling asleep.  Previous and current treatment modalities   employed include individual therapy and medication(s) Prozac (fluoxetine) and Trazodone. Ready to lower dosing    Organic causes of depression present: hypothyroidism    Current Outpatient Prescriptions   Medication     aspirin EC 81 MG EC tablet     atorvastatin (LIPITOR) 40 MG tablet     FLUoxetine (PROZAC) 20 MG capsule     levothyroxine (SYNTHROID/LEVOTHROID) 50 MCG tablet     losartan (COZAAR) 100 MG tablet     omeprazole (PRILOSEC) 20 MG CR capsule     traZODone (DESYREL) 100 MG tablet     nitroGLYcerin (NITROSTAT) 0.4 MG sublingual tablet     [DISCONTINUED] atorvastatin  "(LIPITOR) 40 MG tablet     [DISCONTINUED] FLUoxetine (PROZAC) 40 MG capsule     [DISCONTINUED] levothyroxine (SYNTHROID/LEVOTHROID) 50 MCG tablet     [DISCONTINUED] losartan (COZAAR) 100 MG tablet     [DISCONTINUED] traZODone (DESYREL) 100 MG tablet     No current facility-administered medications for this visit.      Allergies   Allergen Reactions     Ace Inhibitors Cough     Dry cough     Side effects of medication: none    Social History   Substance Use Topics     Smoking status: Never Smoker     Smokeless tobacco: Never Used     Alcohol use 4.2 oz/week     7 Standard drinks or equivalent per week      Comment: 1 beer daily         Neurologic: chronic back pain having had surgery  Psychiatric: excessive stress-upcoming prostate surgery  Endocrine: thyroid disorder    OBJECTIVE:  /80 (BP Location: Left arm, Patient Position: Chair, Cuff Size: Adult Regular)  Pulse 52  Temp 96.4  F (35.8  C) (Oral)  Resp 16  Ht 1.651 m (5' 5\")  Wt 64.7 kg (142 lb 9.6 oz)  SpO2 97%  BMI 23.73 kg/m2  Mental Status Examination  Posture and motor behavior: negative  Dress, grooming, personal hygiene: negative  Facial expression: negative  Speech: negative  Mood: negative  Coherency and relevance of thought: negative  Thought content: negative  Perceptions: negative  Orientation: negative  Attention and concentration: negative  Memory: : negative  Information: negative  Vocabulary: negative  Abstract reasoning: negative  Judgment: negative    General appearance: healthy, alert, no distress, cooperative and smiling  Eyes: conjunctivae/corneas clear. PERRL, EOM's intact. Fundi benign  Ears: negative  Oropharynx: Lips, mucosa, and tongue normal. Teeth and gums normal.  Neck: Neck supple. No adenopathy. Thyroid symmetric, normal size,, Carotids without bruits.  Lungs: negative, Percussion normal. Good diaphragmatic excursion. Lungs clear  Heart: negative, PMI normal. No lifts, heaves, or thrills. RRR. No murmurs, clicks gallops " or rub  Abdomen: Abdomen soft, non-tender. BS normal. No masses, organomegaly  Neuro: Gait normal. Reflexes normal and symmetric. Sensation grossly WNL.    3.  SUBJECTIVE:  Jose Moreno is an 68 year old male who presents for evaluation of   Hyperlipidemia and hypertension. He has been diagnosed in the past as having   combined hyperlipidemia. He indicates that he is feeling well   and denies any symptoms of cardiovascular disease. Specifically   denies chest pain, palpitations, dyspnea, orthopnea, PND,   claudication or peripheral edema. Treatment modalities employed to   this point include diet, regular aerobic exercise and Lipitor. Current medication   regimen is as listed below. Patient denies any side effects of   medication.    Family history: positive for hypertension  Age at diagnosis of hyperlipidemia: late 40's along with hypertension  Cardiovascular risk factors: family history, lipids, hypertension, sedentary life style, previous Ischemic Heart Disease, previous angioplasty, previous MI and Mitral valve repair    Current Outpatient Prescriptions   Medication     aspirin EC 81 MG EC tablet     atorvastatin (LIPITOR) 40 MG tablet     cholecalciferol (VITAMIN D3) 1000 UNIT tablet     Cyanocobalamin (B-12) 1000 MCG TBCR     FLUoxetine (PROZAC) 20 MG capsule     levothyroxine (SYNTHROID/LEVOTHROID) 50 MCG tablet     losartan (COZAAR) 100 MG tablet     multivitamin, therapeutic with minerals (MULTI-VITAMIN) TABS tablet     omeprazole (PRILOSEC) 20 MG CR capsule     traZODone (DESYREL) 100 MG tablet     nitroGLYcerin (NITROSTAT) 0.4 MG sublingual tablet     [DISCONTINUED] atorvastatin (LIPITOR) 40 MG tablet     [DISCONTINUED] FLUoxetine (PROZAC) 40 MG capsule     [DISCONTINUED] levothyroxine (SYNTHROID/LEVOTHROID) 50 MCG tablet     [DISCONTINUED] losartan (COZAAR) 100 MG tablet     [DISCONTINUED] omeprazole (PRILOSEC) 20 MG CR capsule     [DISCONTINUED] traZODone (DESYREL) 100 MG tablet     No current  "facility-administered medications for this visit.      Allergies   Allergen Reactions     Ace Inhibitors Cough     Dry cough       Social History   Substance Use Topics     Smoking status: Never Smoker     Smokeless tobacco: Never Used     Alcohol use 4.2 oz/week     7 Standard drinks or equivalent per week      Comment: 1 beer daily       OBJECTIVE:  /80 (BP Location: Left arm, Patient Position: Chair, Cuff Size: Adult Regular)  Pulse 52  Temp 96.4  F (35.8  C) (Oral)  Resp 16  Ht 1.651 m (5' 5\")  Wt 64.7 kg (142 lb 9.6 oz)  SpO2 97%  BMI 23.73 kg/m2  Repeat BP R arm seated = 124/80  with regular size cuff.  Skin: negative  Fundi: deferred  Lungs: negative, Percussion normal. Good diaphragmatic excursion. Lungs clear  Heart: negative, PMI normal. No lifts, heaves, or thrills. RRR. No murmurs, clicks gallops or rub  Peripheral pulses: radial=4/4, femoral=4/4, popliteal=4/4, dorsalis pedis=4/4,      "

## 2018-10-01 NOTE — MR AVS SNAPSHOT
After Visit Summary   10/1/2018    Jose Moreno    MRN: [id number]           Patient Information     Date Of Birth          1950        Visit Information        Provider Department      10/1/2018 10:30 AM Jerri Tavera MD [facility]        Today's Diagnoses     Encounter for routine history and physical examination of adult    -  1    Mixed hyperlipidemia        Essential hypertension, benign        Coronary artery disease involving native coronary artery of native heart without angina pectoris        History of non-ST elevation myocardial infarction (NSTEMI)        Hypothyroidism due to acquired atrophy of thyroid        Gastroesophageal reflux disease without esophagitis        Major depressive disorder, recurrent episode, in full remission (H)        Encounter for medication refill        Need for vaccination          Care Instructions    Await labs    Start multivitamin, B12 and Vitamin D    Cut back fluoxetine to 20 mgm daily, Trazodone to 100 and use as needed  Monitor response    Recheck 6 months fasting              Follow-ups after your visit        Follow-up notes from your care team     Return in about 6 months (around 4/1/2019), or if symptoms worsen or fail to improve.      Your next 10 appointments already scheduled     Oct 22, 2018 10:30 AM CDT   Pre-Op physical with Jerri Tavera MD   [facility] ([facility])    [address]  [address]  [facility] [address]   [phone]            Nov 01, 2018   Procedure with Clint Blankenship MD   [facility] (--)    [address]  [facility] [address]   [phone]            Nov 14, 2018  1:30 PM CST   Return Visit with Summer Dover PA-C   [facility] ([facility])    [address] [facility]  [address]  [facility] [id number] [phone]             "  Who to contact     If you have questions or need follow up information about today's clinic visit or your schedule please contact [facility] directly at [phone].  Normal or non-critical lab and imaging results will be communicated to you by MyChart, letter or phone within 4 business days after the clinic has received the results. If you do not hear from us within 7 days, please contact the clinic through MyChart or phone. If you have a critical or abnormal lab result, we will notify you by phone as soon as possible.  Submit refill requests through [employer] or call your pharmacy and they will forward the refill request to us. Please allow 3 business days for your refill to be completed.          Additional Information About Your Visit        [employer] Information     [employer] gives you secure access to your electronic health record. If you see a primary care provider, you can also send messages to your care team and make appointments. If you have questions, please call your primary care clinic.  If you do not have a primary care provider, please call [phone] and they will assist you.        Care EveryWhere ID     This is your Care EveryWhere ID. This could be used by other organizations to access your [place] medical records  [id number]        Your Vitals Were     Pulse Temperature Respirations Height Pulse Oximetry BMI (Body Mass Index)    52 96.4  F (35.8  C) (Oral) 16 1.651 m (5' 5\") 97% 23.73 kg/m2       Blood Pressure from Last 3 Encounters:   10/01/18 124/80   08/28/18 130/80   08/17/18 132/72    Weight from Last 3 Encounters:   10/01/18 64.7 kg (142 lb 9.6 oz)   09/05/18 64.4 kg (142 lb)   08/28/18 64.4 kg (142 lb)              We Performed the Following     AUTO HEMOGRAM/PLATE/DIFF [93132.000]     COMPREHENSIVE METABOLIC PANEL     HC FLU VAC PRESRV FREE QUAD SPLIT VIR 3+YRS IM     LIPID PANEL     OFFICE/OUTPT VISIT,EST,LEVL III     TSH     VACCINE ADMINISTRATION, INITIAL     " CHRIS FNGR,HEEL,EAR [85071]          Today's Medication Changes          These changes are accurate as of 10/1/18 11:19 AM.  If you have any questions, ask your nurse or doctor.               Start taking these medicines.        Dose/Directions    B-12 1000 MCG Tbcr   Used for:  Gastroesophageal reflux disease without esophagitis   Started by:  Jerri Tavera MD        Dose:  1000 mcg   Take 1,000 mcg by mouth daily   Refills:  0       cholecalciferol 1000 UNIT tablet   Commonly known as:  vitamin D3   Used for:  Gastroesophageal reflux disease without esophagitis   Started by:  Jerri Tavera MD        Dose:  1000 Units   Take 1 tablet (1,000 Units) by mouth daily   Refills:  0       multivitamin, therapeutic with minerals Tabs tablet   Used for:  Gastroesophageal reflux disease without esophagitis   Started by:  Jerri Tavera MD        Dose:  1 tablet   Take 1 tablet by mouth daily   Refills:  0         These medicines have changed or have updated prescriptions.        Dose/Directions    FLUoxetine 20 MG capsule   Commonly known as:  PROzac   This may have changed:    - medication strength  - how much to take   Used for:  Major depressive disorder, recurrent episode, in full remission (H)   Changed by:  Jerri Tavera MD        Dose:  20 mg   Take 1 capsule (20 mg) by mouth daily   Quantity:  90 capsule   Refills:  1       traZODone 100 MG tablet   Commonly known as:  DESYREL   This may have changed:    - how much to take  - when to take this  - reasons to take this   Used for:  Major depressive disorder, recurrent episode, in full remission (H)   Changed by:  Jerri Tavera MD        Dose:  100 mg   Take 1 tablet (100 mg) by mouth nightly as needed for sleep   Quantity:  90 tablet   Refills:  0            Where to get your medicines      These medications were sent to [facility]/pharmacy #[id number] - [place], [place] - [address] [facility]  [address] [facility] [address]    Hours:  Old [facility] drug converted to  [facility] Phone:  [phone]     atorvastatin 40 MG tablet    FLUoxetine 20 MG capsule    levothyroxine 50 MCG tablet    losartan 100 MG tablet    omeprazole 20 MG CR capsule    traZODone 100 MG tablet         Some of these will need a paper prescription and others can be bought over the counter.  Ask your nurse if you have questions.     You don't need a prescription for these medications     B-12 1000 MCG Tbcr    cholecalciferol 1000 UNIT tablet    multivitamin, therapeutic with minerals Tabs tablet                Primary Care Provider Office Phone # Fax #    Jerri Tavera [phone] [address] [facility]  [address]  [facility] [address]        Equal Access to Services     [facility] Center[facility] : Hadii akash Clark, waparveenda hussein, qadenista kaalmabebeto vicente, jayjay nicolas . So [facility] [phone].    ATENCIÓN: Si habla español, tiene a weber disposición servicios gratuitos de asistencia lingüística. LlK[facility] [phone].    We comply with applicable federal civil rights laws and [place] laws. We do not discriminate on the basis of race, color, national origin, age, disability, sex, sexual orientation, or gender identity.            Thank you!     Thank you for choosing [facility]  for your care. Our goal is always to provide you with excellent care. Hearing back from our patients is one way we can continue to improve our services. Please take a few minutes to complete the written survey that you may receive in the mail after your visit with us. Thank you!             Your Updated Medication List - Protect others around you: Learn how to safely use, store and throw away your medicines at www.disposemymeds.org.          This list is accurate as of 10/1/18 11:19 AM.  Always use your most recent med list.                   Brand Name Dispense Instructions for use Diagnosis    aspirin 81 MG EC tablet     60 tablet    Take 1 tablet (81 mg) by mouth  daily    ACS (acute coronary syndrome) (H)       atorvastatin 40 MG tablet    LIPITOR    90 tablet    Take 1 tablet (40 mg) by mouth daily    Mixed hyperlipidemia, History of non-ST elevation myocardial infarction (NSTEMI), Coronary artery disease involving native coronary artery of native heart without angina pectoris       B-12 1000 MCG Tbcr      Take 1,000 mcg by mouth daily    Gastroesophageal reflux disease without esophagitis       cholecalciferol 1000 UNIT tablet    vitamin D3     Take 1 tablet (1,000 Units) by mouth daily    Gastroesophageal reflux disease without esophagitis       FLUoxetine 20 MG capsule    PROzac    90 capsule    Take 1 capsule (20 mg) by mouth daily    Major depressive disorder, recurrent episode, in full remission (H)       levothyroxine 50 MCG tablet    SYNTHROID/LEVOTHROID    90 tablet    Take 1 tablet (50 mcg) by mouth daily    Hypothyroidism due to acquired atrophy of thyroid       losartan 100 MG tablet    COZAAR    90 tablet    Take 1 tablet (100 mg) by mouth daily    Coronary artery disease involving native coronary artery of native heart without angina pectoris, Essential hypertension, benign, History of non-ST elevation myocardial infarction (NSTEMI)       multivitamin, therapeutic with minerals Tabs tablet      Take 1 tablet by mouth daily    Gastroesophageal reflux disease without esophagitis       nitroGLYcerin 0.4 MG sublingual tablet    NITROSTAT    25 tablet    Place 1 tablet (0.4 mg) under the tongue every 5 minutes as needed for chest pain    ACS (acute coronary syndrome) (H)       omeprazole 20 MG CR capsule    priLOSEC    90 capsule    TAKE ONE CAPSULE BY MOUTH ONCE DAILY    Gastroesophageal reflux disease without esophagitis       traZODone 100 MG tablet    DESYREL    90 tablet    Take 1 tablet (100 mg) by mouth nightly as needed for sleep    Major depressive disorder, recurrent episode, in full remission (H)

## 2018-10-02 LAB
ALBUMIN SERPL-MCNC: 4.4 G/DL (ref 3.6–5.1)
ALBUMIN/GLOB SERPL: 1.5 (CALC) (ref 1–2.5)
ALP SERPL-CCNC: 51 U/L (ref 40–115)
ALT SERPL-CCNC: 22 U/L (ref 9–46)
AST SERPL-CCNC: 24 U/L (ref 10–35)
BILIRUB SERPL-MCNC: 0.5 MG/DL (ref 0.2–1.2)
BUN SERPL-MCNC: 20 MG/DL (ref 7–25)
BUN/CREATININE RATIO: 30 (CALC) (ref 6–22)
CALCIUM SERPL-MCNC: 9.5 MG/DL (ref 8.6–10.3)
CHLORIDE SERPLBLD-SCNC: 103 MMOL/L (ref 98–110)
CHOLEST SERPL-MCNC: 196 MG/DL
CHOLEST/HDLC SERPL: 2.2 (CALC)
CO2 SERPL-SCNC: 27 MMOL/L (ref 20–32)
CREAT SERPL-MCNC: 0.66 MG/DL (ref 0.7–1.18)
EGFR AFRICAN AMERICAN - QUEST: 114 ML/MIN/1.73M2
GFR SERPL CREATININE-BSD FRML MDRD: 98 ML/MIN/1.73M2
GLOBULIN, CALCULATED - QUEST: 2.9 G/DL (CALC) (ref 1.9–3.7)
GLUCOSE - QUEST: 96 MG/DL (ref 65–99)
HDLC SERPL-MCNC: 89 MG/DL
LDLC SERPL CALC-MCNC: 91 MG/DL (CALC)
NONHDLC SERPL-MCNC: 107 MG/DL (CALC)
POTASSIUM SERPL-SCNC: 4.2 MMOL/L (ref 3.5–5.3)
PROT SERPL-MCNC: 7.3 G/DL (ref 6.1–8.1)
SODIUM SERPL-SCNC: 140 MMOL/L (ref 135–146)
TRIGL SERPL-MCNC: 72 MG/DL
TSH SERPL-ACNC: 2.56 MIU/L (ref 0.4–4.5)

## 2018-10-02 ASSESSMENT — PATIENT HEALTH QUESTIONNAIRE - PHQ9: SUM OF ALL RESPONSES TO PHQ QUESTIONS 1-9: 0

## 2018-10-02 ASSESSMENT — ANXIETY QUESTIONNAIRES: GAD7 TOTAL SCORE: 0

## 2018-10-22 ENCOUNTER — OFFICE VISIT (OUTPATIENT)
Dept: FAMILY MEDICINE | Facility: CLINIC | Age: 68
End: 2018-10-22

## 2018-10-22 VITALS
HEIGHT: 65 IN | WEIGHT: 140 LBS | HEART RATE: 72 BPM | RESPIRATION RATE: 16 BRPM | BODY MASS INDEX: 23.32 KG/M2 | OXYGEN SATURATION: 96 % | TEMPERATURE: 98.4 F | DIASTOLIC BLOOD PRESSURE: 80 MMHG | SYSTOLIC BLOOD PRESSURE: 130 MMHG

## 2018-10-22 DIAGNOSIS — C61 PROSTATE CANCER (H): Primary | ICD-10-CM

## 2018-10-22 DIAGNOSIS — Z01.818 PRE-OP EXAM: ICD-10-CM

## 2018-10-22 PROCEDURE — 99214 OFFICE O/P EST MOD 30 MIN: CPT | Performed by: FAMILY MEDICINE

## 2018-10-22 NOTE — PROGRESS NOTES
[facility]  [address]  [address]  [facility] [address]  [phone]  Dept: [phone]    PRE-OP EVALUATION:  Today's date: 10/22/2018    Jose Moreno (: 1950) presents for pre-operative evaluation assessment as requested by Dr. Blankenship.  He requires evaluation and anesthesia risk assessment prior to undergoing surgery/procedure for treatment of removal of prostate .    Proposed Surgery/ Procedure: Prostatectomy  Date of Surgery/ Procedure: 18  Time of Surgery/ Procedure: AM  Hospital/Surgical Facility: [facility]  Primary Physician: Jerri Tavera  Type of Anesthesia Anticipated: to be determined    Patient has a Health Care Directive or Living Will:  YES     1. NO - Do you have a history of heart attack, stroke, stent, bypass or surgery on an artery in the head, neck, heart or legs?  2. NO - Do you ever have any pain or discomfort in your chest?  3. NO - Do you have a history of  Heart Failure?  4. NO - Are you troubled by shortness of breath when: walking on the level, up a slight hill or at night?  5. NO - Do you currently have a cold, bronchitis or other respiratory infection?  6. NO - Do you have a cough, shortness of breath or wheezing?  7. NO - Do you sometimes get pains in the calves of your legs when you walk?  8. NO - Do you or anyone in your family have previous history of blood clots?  9. NO - Do you or does anyone in your family have a serious bleeding problem such as prolonged bleeding following surgeries or cuts?  10. NO - Have you ever had problems with anemia or been told to take iron pills?  11. NO - Have you had any abnormal blood loss such as black, tarry or bloody stools, or abnormal vaginal bleeding?  12. NO - Have you ever had a blood transfusion?  13. NO - Have you or any of your relatives ever had problems with anesthesia?  14. NO - Do you have sleep apnea, excessive snoring or daytime drowsiness?  15. NO - Do you have any  prosthetic heart valves?  16. NO - Do you have prosthetic joints?  17. NO - Is there any chance that you may be pregnant?      HPI:     HPI related to upcoming procedure: Proctectomy      See problem list for active medical problems.  Problems all longstanding and stable, except as noted/documented.  See ROS for pertinent symptoms related to these conditions.                                                                                                                                                          .    MEDICAL HISTORY:     Patient Active Problem List    Diagnosis Date Noted     FAUSTIN (dyspnea on exertion) 07/10/2018     Priority: Medium     Chronic fatigue 07/10/2018     Priority: Medium     Major depressive disorder, recurrent episode, in full remission (H) 05/02/2018     Priority: Medium     Lumbosacral spondylosis without myelopathy 01/17/2018     Priority: Medium     Facet arthropathy, lumbosacral 01/17/2018     Priority: Medium     History of non-ST elevation myocardial infarction (NSTEMI) 01/08/2018     Priority: Medium     Hiatal hernia 11/23/2016     Priority: Medium     Gastroesophageal reflux disease without esophagitis 11/02/2015     Priority: Medium     ACP (advance care planning) 10/20/2015     Priority: Medium     Advance Care Planning 10/20/2015: ACP Review and Resources Provided:  Reviewed chart for advance care plan.  Jose Moreno has no plan or code status on file. Discussed available resources and provided with information. Confirmed code status reflects current choices pending further ACP discussions.  Confirmed/documented legally designated decision maker(s). Added by Paty Downing    Advance Care Planning 1/5/2018: ACP Review of Chart / Resources Provided:  Reviewed chart for advance care plan.  Jose Moreno has been provided information and resources to begin or update their advance care plan.  Added by Sanjana Padilla             Hypothyroidism due to acquired atrophy of  thyroid 10/20/2015     Priority: Medium     Was hyperthyroid first, then got better on his own, was not radiated, and eventually gland pooped out        Non-rheumatic mitral regurgitation      Priority: Medium     Overview:   Moderately severe MVP, S/P robotic repair at [place] 2009  moderately severe MVP, s/p robotic repair at [place] [facility]       Coronary artery disease involving native coronary artery of native heart without angina pectoris      Priority: Medium     cardiac cath 1/23/15: SHERRY to 1st diagonal, SHERRY x2 to LAD, cath 2009: no intervention       Spinal stenosis, lumbar 12/29/2011     Priority: Medium     Mixed hyperlipidemia 10/27/2009     Priority: Medium     Pulmonary nodules      Priority: Medium     CT-recommend repeat in 6-12 months       Status post mitral valve repair 05/27/2009     Priority: Medium     Heart: hx of mitral valve repair, rather sudden chordae tendonae tear, fixed at [place], not symptomatic,        Nonspecific (abnormal) findings on radiological and other examination of lung field 12/04/2006     Priority: Medium     Problem list name updated by automated process. Provider to review and confirm       Essential hypertension, benign 07/01/2004     Priority: Medium     Health Care Home 12/11/2012     Priority: Low     State Tier Level:  Tier 2  Status: na  Care Coordinator:     See Letters for [facility] Care Plan            Past Medical History:   Diagnosis Date     ACS (acute coronary syndrome) (H) 01-23-15     ADHD (attention deficit hyperactivity disorder)      CAD (coronary artery disease)     cardiac cath 1/23/15: SHERRY to 1st diagonal, SHERRY x2 to LAD, cath 2009: no intervention     Esophageal reflux      History of hyperthyroidism 4/9/2014     Hyperlipidemia      Hypertension      Mitral regurgitation 2009    moderately severe MVP, s/p robotic repair at [place]     NSTEMI (non-ST elevated myocardial infarction) (H) 01-23-15     Pulmonary nodules 2009    CT-recommend repeat in 6-12 months     Rotator cuff  rupture 11/3/2011     Past Surgical History:   Procedure Laterality Date     DECOMPRESSION LUMBAR MINIMALLY INVASIVE ONE LEVEL  1/11/2012    Procedure:DECOMPRESSION LUMBAR MINIMALLY INVASIVE ONE LEVEL; L4-5 Decompression Minimally Invasive; Surgeon:MESSI HORAN; Location:[facility]     EYE EXAM ESTABLISHED PT  1/14/06     HC COLONOSCOPY THRU STOMA, DIAGNOSTIC  7/00    GI     HCL PSA, DIAGNOSTIC (TUMOR MARKER)  2003     HEART CATH RIGHT AND LEFT HEART CATH  01-23-15    See report, pt transfered to [facility] for complex bifurcation PCI of LAD diagonal vessel.      HEART CATH RIGHT AND LEFT HEART CATH  01-26-15    PTCA and implantation of SHERRY to 1st diagonal, SHERRY to mid LAD, SHERRY to proximal LAD     ORTHOPEDIC SURGERY      Hx of rotator cuff rupture and repair     REMOVAL OF SPERM DUCT(S)      Vasectomy     REMOVAL OF SPERM DUCT(S)      reversal of vasectomy     REPAIR VALVE MITRAL  2009    [facility] robotic repair      XR LUMBAR RADIOFREQ ABLATION UNILATERAL  01/11/2018    lumbar area/ ? level     Current Outpatient Prescriptions   Medication Sig Dispense Refill     aspirin EC 81 MG EC tablet Take 1 tablet (81 mg) by mouth daily 60 tablet 0     atorvastatin (LIPITOR) 40 MG tablet Take 1 tablet (40 mg) by mouth daily 90 tablet 1     cholecalciferol (VITAMIN D3) 1000 UNIT tablet Take 1 tablet (1,000 Units) by mouth daily       Cyanocobalamin (B-12) 1000 MCG TBCR Take 1,000 mcg by mouth daily       FLUoxetine (PROZAC) 20 MG capsule Take 1 capsule (20 mg) by mouth daily 90 capsule 1     levothyroxine (SYNTHROID/LEVOTHROID) 50 MCG tablet Take 1 tablet (50 mcg) by mouth daily 90 tablet 3     losartan (COZAAR) 100 MG tablet Take 1 tablet (100 mg) by mouth daily 90 tablet 3     multivitamin, therapeutic with minerals (MULTI-VITAMIN) TABS tablet Take 1 tablet by mouth daily       nitroGLYcerin (NITROSTAT) 0.4 MG sublingual tablet Place 1 tablet (0.4 mg) under the tongue every 5 minutes as needed for chest pain 25 tablet 1  "    omeprazole (PRILOSEC) 20 MG CR capsule TAKE ONE CAPSULE BY MOUTH ONCE DAILY 90 capsule 3     traZODone (DESYREL) 100 MG tablet Take 1 tablet (100 mg) by mouth nightly as needed for sleep 90 tablet 0     OTC products: None, except as noted above    Allergies   Allergen Reactions     Ace Inhibitors Cough     Dry cough      Latex Allergy: NO    Social History   Substance Use Topics     Smoking status: Never Smoker     Smokeless tobacco: Never Used     Alcohol use 4.2 oz/week     7 Standard drinks or equivalent per week      Comment: 1 beer daily     History   Drug Use No       REVIEW OF SYSTEMS:   CONSTITUTIONAL: NEGATIVE for fever, chills, change in weight  ENT/MOUTH: NEGATIVE for ear, mouth and throat problems  RESP: NEGATIVE for significant cough or SOB  CV: NEGATIVE for chest pain, palpitations or peripheral edema    EXAM:   /80 (BP Location: Right arm, Patient Position: Sitting, Cuff Size: Adult Large)  Pulse 72  Temp 98.4  F (36.9  C) (Oral)  Resp 16  Ht 1.651 m (5' 5\")  Wt 63.5 kg (140 lb)  SpO2 96%  BMI 23.3 kg/m2  GENERAL APPEARANCE: healthy, alert and no distress  HENT: ear canals and TM's normal and nose and mouth without ulcers or lesions  RESP: lungs clear to auscultation - no rales, rhonchi or wheezes  CV: regular rate and rhythm, normal S1 S2, no S3 or S4 and no murmur, click or rub   ABDOMEN: soft, nontender, no HSM or masses and bowel sounds normal  NEURO: Normal strength and tone, sensory exam grossly normal, mentation intact and speech normal    DIAGNOSTICS:   See recent labs and stress ECHO    Recent Labs   Lab Test  10/01/18   1131  10/01/18   1108  08/17/18   0953   01/05/18   1149   01/23/15   0018   01/09/13   1600   HGB   --   14.8   --    --   15.2   < >  15.9   < >  12.2*   PLT   --   245   --    --   282   < >  259   < >  358   INR   --    --    --    --    --    --   1.00   --   1.09   NA  140   --   139   < >   --    < >  136   < >  136   POTASSIUM  4.2   --   4.2   < >   " --    < >  3.8   < >  4.2   CR  0.66*   --   0.86   < >   --    < >  1.00   < >  0.79    < > = values in this interval not displayed.        IMPRESSION:   Diagnosis/reason for consult: (C61) Prostate cancer (H)  (primary encounter diagnosis)    (Z01.818) Pre-op exam        The proposed surgical procedure is considered LOW risk.    REVISED CARDIAC RISK INDEX  The patient has the following serious cardiovascular risks for perioperative complications such as (MI, PE, VFib and 3  AV Block):  Coronary Artery Disease (previous MI, 3+ years ago)  INTERPRETATION: 1 risks: Class II (low risk - 0.9% complication rate)    The patient has the following additional risks for perioperative complications:  No identified additional risks        RECOMMENDATIONS:     --Consult hospital rounder / IM to assist post-op medical management    --Patient is to take all scheduled medications on the day of surgery EXCEPT for modifications listed below.    Anticoagulant or Antiplatelet Medication Use  ASPIRIN: Discontinue ASA 7-10 days prior to procedure to reduce bleeding risk.  It should be resumed post-operatively.        APPROVAL GIVEN to proceed with proposed procedure, without further diagnostic evaluation       Signed Electronically by: Jerri Tavera MD    Copy of this evaluation report is provided to requesting physician.

## 2018-10-22 NOTE — MR AVS SNAPSHOT
After Visit Summary   10/22/2018    Jose Moreno    MRN: [id number]           Patient Information     Date Of Birth          1950        Visit Information        Provider Department      10/22/2018 10:30 AM Jerri Tavera MD [facility]        Today's Diagnoses     Prostate cancer (H)    -  1    Pre-op exam          Care Instructions    Stop aspirin          Follow-ups after your visit        Follow-up notes from your care team     Return if symptoms worsen or fail to improve.      Your next 10 appointments already scheduled     Nov 01, 2018   Procedure with Clint Blankenship MD   [facility]    [address]  [facility] [id number] [phone]            Nov 14, 2018  1:30 PM CST   Return Visit with Summer Dover PA-C   [facility] ([facility])    [address] [facility] [address]  [address]  [facility] [address] [phone]              Who to contact     If you have questions or need follow up information about today's clinic visit or your schedule please contact [facility] directly at [phone].  Normal or non-critical lab and imaging results will be communicated to you by MyChart, letter or phone within 4 business days after the clinic has received the results. If you do not hear from us within 7 days, please contact the clinic through Simtrolhart or phone. If you have a critical or abnormal lab result, we will notify you by phone as soon as possible.  Submit refill requests through [email or web address] or call your pharmacy and they will forward the refill request to us. Please allow 3 business days for your refill to be completed.          Additional Information About Your Visit        Simtrolhart Information     [email or web address] gives you secure access to your electronic health record. If you see a primary care provider, you can also send messages to your  "care team and make appointments. If you have questions, please call your primary care clinic.  If you do not have a primary care provider, please call [phone] and they will assist you.        Care EveryWhere ID     This is your Care EveryWhere ID. This could be used by other organizations to access your [place] medical records  [id number]        Your Vitals Were     Pulse Temperature Respirations Height Pulse Oximetry BMI (Body Mass Index)    72 98.4  F (36.9  C) (Oral) 16 1.651 m (5' 5\") 96% 23.3 kg/m2       Blood Pressure from Last 3 Encounters:   10/22/18 130/80   10/01/18 124/80   08/28/18 130/80    Weight from Last 3 Encounters:   10/22/18 63.5 kg (140 lb)   10/01/18 64.7 kg (142 lb 9.6 oz)   09/05/18 64.4 kg (142 lb)              Today, you had the following     No orders found for display       Primary Care Provider Office Phone # Fax #    Jerri Tavera [phone]       [address] [address]        Equal Access to Services     [facility] : Hadii aad ku hadasho Soernieali, waaxda luqadaha, qaybta kaalmada adeegyada, jayjay nicolas . So [facility] [phone].    ATENCIÓN: Si habla español, tiene a weber disposición servicios gratuitos de asistencia lingüística. [facility] [phone].    We comply with applicable federal civil rights laws and [place] laws. We do not discriminate on the basis of race, color, national origin, age, disability, sex, sexual orientation, or gender identity.            Thank you!     Thank you for choosing [facility]  for your care. Our goal is always to provide you with excellent care. Hearing back from our patients is one way we can continue to improve our services. Please take a few minutes to complete the written survey that you may receive in the mail after your visit with us. Thank you!             Your Updated Medication List - Protect others around you: Learn how to safely use, " store and throw away your medicines at www.disposemymeds.org.          This list is accurate as of 10/22/18 11:09 AM.  Always use your most recent med list.                   Brand Name Dispense Instructions for use Diagnosis    aspirin 81 MG EC tablet     60 tablet    Take 1 tablet (81 mg) by mouth daily    ACS (acute coronary syndrome) (H)       atorvastatin 40 MG tablet    LIPITOR    90 tablet    Take 1 tablet (40 mg) by mouth daily    Mixed hyperlipidemia, History of non-ST elevation myocardial infarction (NSTEMI), Coronary artery disease involving native coronary artery of native heart without angina pectoris       B-12 1000 MCG Tbcr      Take 1,000 mcg by mouth daily    Gastroesophageal reflux disease without esophagitis       cholecalciferol 1000 UNIT tablet    vitamin D3     Take 1 tablet (1,000 Units) by mouth daily    Gastroesophageal reflux disease without esophagitis       FLUoxetine 20 MG capsule    PROzac    90 capsule    Take 1 capsule (20 mg) by mouth daily    Major depressive disorder, recurrent episode, in full remission (H)       levothyroxine 50 MCG tablet    SYNTHROID/LEVOTHROID    90 tablet    Take 1 tablet (50 mcg) by mouth daily    Hypothyroidism due to acquired atrophy of thyroid       losartan 100 MG tablet    COZAAR    90 tablet    Take 1 tablet (100 mg) by mouth daily    Coronary artery disease involving native coronary artery of native heart without angina pectoris, Essential hypertension, benign, History of non-ST elevation myocardial infarction (NSTEMI)       multivitamin, therapeutic with minerals Tabs tablet      Take 1 tablet by mouth daily    Gastroesophageal reflux disease without esophagitis       nitroGLYcerin 0.4 MG sublingual tablet    NITROSTAT    25 tablet    Place 1 tablet (0.4 mg) under the tongue every 5 minutes as needed for chest pain    ACS (acute coronary syndrome) (H)       omeprazole 20 MG CR capsule    priLOSEC    90 capsule    TAKE ONE CAPSULE BY MOUTH ONCE DAILY     Gastroesophageal reflux disease without esophagitis       traZODone 100 MG tablet    DESYREL    90 tablet    Take 1 tablet (100 mg) by mouth nightly as needed for sleep    Major depressive disorder, recurrent episode, in full remission (H)

## 2018-10-24 ENCOUNTER — TELEPHONE (OUTPATIENT)
Dept: UROLOGY | Facility: CLINIC | Age: 68
End: 2018-10-24

## 2018-10-24 ENCOUNTER — TELEPHONE (OUTPATIENT)
Dept: NEUROSURGERY | Facility: CLINIC | Age: 68
End: 2018-10-24

## 2018-10-24 DIAGNOSIS — M54.50 LEFT-SIDED LOW BACK PAIN WITHOUT SCIATICA: Primary | ICD-10-CM

## 2018-10-24 NOTE — TELEPHONE ENCOUNTER
Jose left a message requesting to talk to Dr Blankenship regarding his upcoming surgery. He wants to  Discuss different anesthesia options. Jose requested to speak to Dr Balnkenship himself and not a triage nurse. Message sent to Dr Blankenship. Annabelle Corey LPN

## 2018-10-24 NOTE — TELEPHONE ENCOUNTER
REASON FOR CALL: Pt states that he would like for another RFA INJ to be ordered. He would like to get it done before November, says that he is in a lot of pain.

## 2018-10-26 ENCOUNTER — TELEPHONE (OUTPATIENT)
Dept: UROLOGY | Facility: CLINIC | Age: 68
End: 2018-10-26

## 2018-10-26 NOTE — TELEPHONE ENCOUNTER
Spoke to patient he has left sided low back pain denies any radicular pain. Rates pain at worst 8-9/10. Hurts standing and walking long distances. He had previous RFA back in January 2018. He has had a few ESIs in between then and now. He would like to get an other RFA since its been more than 6 months. Spoke with Zenobia An, POLY and ok to order MBB/RFA. Per Zenobia no need for updated MRI since he has no radicular pain will order if Pain management needs one though.

## 2018-10-29 ENCOUNTER — TELEPHONE (OUTPATIENT)
Dept: NEUROSURGERY | Facility: CLINIC | Age: 68
End: 2018-10-29

## 2018-10-29 DIAGNOSIS — M47.819 FACET ARTHROPATHY: Primary | ICD-10-CM

## 2018-10-29 NOTE — TELEPHONE ENCOUNTER
Spoke to patient. Patient is leaving for out of town in November and he said he did not have enough time to get the two MBBs done for workup for RFA. He said he will do that in the near future but would rather have an RAMSES to address his pain more immediate. Ok per Zenobia An CNP to place order for RAMSES.

## 2018-10-29 NOTE — H&P (VIEW-ONLY)
[facility]  [address]  [address]  [facility] [address]  [phone]  Dept: [phone]    PRE-OP EVALUATION:  Today's date: 10/22/2018    Jose Moreno (: 1950) presents for pre-operative evaluation assessment as requested by Dr. Blankenship.  He requires evaluation and anesthesia risk assessment prior to undergoing surgery/procedure for treatment of removal of prostate .    Proposed Surgery/ Procedure: Prostatectomy  Date of Surgery/ Procedure: 18  Time of Surgery/ Procedure: AM  Hospital/Surgical Facility: [facility]  Primary Physician: Jerri Tavera  Type of Anesthesia Anticipated: to be determined    Patient has a Health Care Directive or Living Will:  YES     1. NO - Do you have a history of heart attack, stroke, stent, bypass or surgery on an artery in the head, neck, heart or legs?  2. NO - Do you ever have any pain or discomfort in your chest?  3. NO - Do you have a history of  Heart Failure?  4. NO - Are you troubled by shortness of breath when: walking on the level, up a slight hill or at night?  5. NO - Do you currently have a cold, bronchitis or other respiratory infection?  6. NO - Do you have a cough, shortness of breath or wheezing?  7. NO - Do you sometimes get pains in the calves of your legs when you walk?  8. NO - Do you or anyone in your family have previous history of blood clots?  9. NO - Do you or does anyone in your family have a serious bleeding problem such as prolonged bleeding following surgeries or cuts?  10. NO - Have you ever had problems with anemia or been told to take iron pills?  11. NO - Have you had any abnormal blood loss such as black, tarry or bloody stools, or abnormal vaginal bleeding?  12. NO - Have you ever had a blood transfusion?  13. NO - Have you or any of your relatives ever had problems with anesthesia?  14. NO - Do you have sleep apnea, excessive snoring or daytime drowsiness?  15. NO - Do you have any  prosthetic heart valves?  16. NO - Do you have prosthetic joints?  17. NO - Is there any chance that you may be pregnant?      HPI:     HPI related to upcoming procedure: Proctectomy      See problem list for active medical problems.  Problems all longstanding and stable, except as noted/documented.  See ROS for pertinent symptoms related to these conditions.                                                                                                                                                          .    MEDICAL HISTORY:     Patient Active Problem List    Diagnosis Date Noted     FAUSTIN (dyspnea on exertion) 07/10/2018     Priority: Medium     Chronic fatigue 07/10/2018     Priority: Medium     Major depressive disorder, recurrent episode, in full remission (H) 05/02/2018     Priority: Medium     Lumbosacral spondylosis without myelopathy 01/17/2018     Priority: Medium     Facet arthropathy, lumbosacral 01/17/2018     Priority: Medium     History of non-ST elevation myocardial infarction (NSTEMI) 01/08/2018     Priority: Medium     Hiatal hernia 11/23/2016     Priority: Medium     Gastroesophageal reflux disease without esophagitis 11/02/2015     Priority: Medium     ACP (advance care planning) 10/20/2015     Priority: Medium     Advance Care Planning 10/20/2015: ACP Review and Resources Provided:  Reviewed chart for advance care plan.  Jose Moreno has no plan or code status on file. Discussed available resources and provided with information. Confirmed code status reflects current choices pending further ACP discussions.  Confirmed/documented legally designated decision maker(s). Added by Paty Downing    Advance Care Planning 1/5/2018: ACP Review of Chart / Resources Provided:  Reviewed chart for advance care plan.  Jose Moreno has been provided information and resources to begin or update their advance care plan.  Added by Sanjana Padilla             Hypothyroidism due to acquired atrophy of  thyroid 10/20/2015     Priority: Medium     Was hyperthyroid first, then got better on his own, was not radiated, and eventually gland pooped out        Non-rheumatic mitral regurgitation      Priority: Medium     Overview:   Moderately severe MVP, S/P robotic repair at [place] 2009  moderately severe MVP, s/p robotic repair at [place]       Coronary artery disease involving native coronary artery of native heart without angina pectoris      Priority: Medium     cardiac cath 1/23/15: SHERRY to 1st diagonal, SHERRY x2 to LAD, cath 2009: no intervention       Spinal stenosis, lumbar 12/29/2011     Priority: Medium     Mixed hyperlipidemia 10/27/2009     Priority: Medium     Pulmonary nodules      Priority: Medium     CT-recommend repeat in 6-12 months       Status post mitral valve repair 05/27/2009     Priority: Medium     Heart: hx of mitral valve repair, rather sudden chordae tendonae tear, fixed at [place], not symptomatic,        Nonspecific (abnormal) findings on radiological and other examination of lung field 12/04/2006     Priority: Medium     Problem list name updated by automated process. Provider to review and confirm       Essential hypertension, benign 07/01/2004     Priority: Medium     Health Care Home 12/11/2012     Priority: Low     State Tier Level:  Tier 2  Status: na  Care Coordinator:     See Letters for [facility] Care Plan            Past Medical History:   Diagnosis Date     ACS (acute coronary syndrome) (H) 01-23-15     ADHD (attention deficit hyperactivity disorder)      CAD (coronary artery disease)     cardiac cath 1/23/15: SHERRY to 1st diagonal, SHERRY x2 to LAD, cath 2009: no intervention     Esophageal reflux      History of hyperthyroidism 4/9/2014     Hyperlipidemia      Hypertension      Mitral regurgitation 2009    moderately severe MVP, s/p robotic repair at [place]     NSTEMI (non-ST elevated myocardial infarction) (H) 01-23-15     Pulmonary nodules 2009    CT-recommend repeat in 6-12 months     Rotator cuff  rupture 11/3/2011     Past Surgical History:   Procedure Laterality Date     DECOMPRESSION LUMBAR MINIMALLY INVASIVE ONE LEVEL  1/11/2012    Procedure:DECOMPRESSION LUMBAR MINIMALLY INVASIVE ONE LEVEL; L4-5 Decompression Minimally Invasive; Surgeon:MESSI HORAN; Location:[facility]     EYE EXAM ESTABLISHED PT  1/14/06     HC COLONOSCOPY THRU STOMA, DIAGNOSTIC  7/00    GI     HCL PSA, DIAGNOSTIC (TUMOR MARKER)  2003     HEART CATH RIGHT AND LEFT HEART CATH  01-23-15    See report, pt transfered to [facility] for complex bifurcation PCI of LAD diagonal vessel.      HEART CATH RIGHT AND LEFT HEART CATH  01-26-15    PTCA and implantation of SHERRY to 1st diagonal, SHERRY to mid LAD, SHERRY to proximal LAD     ORTHOPEDIC SURGERY      Hx of rotator cuff rupture and repair     REMOVAL OF SPERM DUCT(S)      Vasectomy     REMOVAL OF SPERM DUCT(S)      reversal of vasectomy     REPAIR VALVE MITRAL  2009    [facility] robotic repair      XR LUMBAR RADIOFREQ ABLATION UNILATERAL  01/11/2018    lumbar area/ ? level     Current Outpatient Prescriptions   Medication Sig Dispense Refill     aspirin EC 81 MG EC tablet Take 1 tablet (81 mg) by mouth daily 60 tablet 0     atorvastatin (LIPITOR) 40 MG tablet Take 1 tablet (40 mg) by mouth daily 90 tablet 1     cholecalciferol (VITAMIN D3) 1000 UNIT tablet Take 1 tablet (1,000 Units) by mouth daily       Cyanocobalamin (B-12) 1000 MCG TBCR Take 1,000 mcg by mouth daily       FLUoxetine (PROZAC) 20 MG capsule Take 1 capsule (20 mg) by mouth daily 90 capsule 1     levothyroxine (SYNTHROID/LEVOTHROID) 50 MCG tablet Take 1 tablet (50 mcg) by mouth daily 90 tablet 3     losartan (COZAAR) 100 MG tablet Take 1 tablet (100 mg) by mouth daily 90 tablet 3     multivitamin, therapeutic with minerals (MULTI-VITAMIN) TABS tablet Take 1 tablet by mouth daily       nitroGLYcerin (NITROSTAT) 0.4 MG sublingual tablet Place 1 tablet (0.4 mg) under the tongue every 5 minutes as needed for chest pain 25 tablet 1  "    omeprazole (PRILOSEC) 20 MG CR capsule TAKE ONE CAPSULE BY MOUTH ONCE DAILY 90 capsule 3     traZODone (DESYREL) 100 MG tablet Take 1 tablet (100 mg) by mouth nightly as needed for sleep 90 tablet 0     OTC products: None, except as noted above    Allergies   Allergen Reactions     Ace Inhibitors Cough     Dry cough      Latex Allergy: NO    Social History   Substance Use Topics     Smoking status: Never Smoker     Smokeless tobacco: Never Used     Alcohol use 4.2 oz/week     7 Standard drinks or equivalent per week      Comment: 1 beer daily     History   Drug Use No       REVIEW OF SYSTEMS:   CONSTITUTIONAL: NEGATIVE for fever, chills, change in weight  ENT/MOUTH: NEGATIVE for ear, mouth and throat problems  RESP: NEGATIVE for significant cough or SOB  CV: NEGATIVE for chest pain, palpitations or peripheral edema    EXAM:   /80 (BP Location: Right arm, Patient Position: Sitting, Cuff Size: Adult Large)  Pulse 72  Temp 98.4  F (36.9  C) (Oral)  Resp 16  Ht 1.651 m (5' 5\")  Wt 63.5 kg (140 lb)  SpO2 96%  BMI 23.3 kg/m2  GENERAL APPEARANCE: healthy, alert and no distress  HENT: ear canals and TM's normal and nose and mouth without ulcers or lesions  RESP: lungs clear to auscultation - no rales, rhonchi or wheezes  CV: regular rate and rhythm, normal S1 S2, no S3 or S4 and no murmur, click or rub   ABDOMEN: soft, nontender, no HSM or masses and bowel sounds normal  NEURO: Normal strength and tone, sensory exam grossly normal, mentation intact and speech normal    DIAGNOSTICS:   See recent labs and stress ECHO    Recent Labs   Lab Test  10/01/18   1131  10/01/18   1108  08/17/18   0953   01/05/18   1149   01/23/15   0018   01/09/13   1600   HGB   --   14.8   --    --   15.2   < >  15.9   < >  12.2*   PLT   --   245   --    --   282   < >  259   < >  358   INR   --    --    --    --    --    --   1.00   --   1.09   NA  140   --   139   < >   --    < >  136   < >  136   POTASSIUM  4.2   --   4.2   < >   " --    < >  3.8   < >  4.2   CR  0.66*   --   0.86   < >   --    < >  1.00   < >  0.79    < > = values in this interval not displayed.        IMPRESSION:   Diagnosis/reason for consult: (C61) Prostate cancer (H)  (primary encounter diagnosis)    (Z01.818) Pre-op exam        The proposed surgical procedure is considered LOW risk.    REVISED CARDIAC RISK INDEX  The patient has the following serious cardiovascular risks for perioperative complications such as (MI, PE, VFib and 3  AV Block):  Coronary Artery Disease (previous MI, 3+ years ago)  INTERPRETATION: 1 risks: Class II (low risk - 0.9% complication rate)    The patient has the following additional risks for perioperative complications:  No identified additional risks        RECOMMENDATIONS:     --Consult hospital rounder / IM to assist post-op medical management    --Patient is to take all scheduled medications on the day of surgery EXCEPT for modifications listed below.    Anticoagulant or Antiplatelet Medication Use  ASPIRIN: Discontinue ASA 7-10 days prior to procedure to reduce bleeding risk.  It should be resumed post-operatively.        APPROVAL GIVEN to proceed with proposed procedure, without further diagnostic evaluation       Signed Electronically by: Jerri Tavera MD    Copy of this evaluation report is provided to requesting physician.

## 2018-11-01 ENCOUNTER — TELEPHONE (OUTPATIENT)
Dept: FAMILY MEDICINE | Facility: CLINIC | Age: 68
End: 2018-11-01

## 2018-11-01 ENCOUNTER — HOSPITAL ENCOUNTER (OUTPATIENT)
Facility: CLINIC | Age: 68
Discharge: HOME | End: 2018-11-03
Attending: UROLOGY | Admitting: UROLOGY
Payer: COMMERCIAL

## 2018-11-01 ENCOUNTER — ANESTHESIA (OUTPATIENT)
Dept: SURGERY | Facility: CLINIC | Age: 68
End: 2018-11-01
Payer: COMMERCIAL

## 2018-11-01 ENCOUNTER — SURGERY (OUTPATIENT)
Age: 68
End: 2018-11-01

## 2018-11-01 ENCOUNTER — ANESTHESIA EVENT (OUTPATIENT)
Dept: SURGERY | Facility: CLINIC | Age: 68
End: 2018-11-01
Payer: COMMERCIAL

## 2018-11-01 DIAGNOSIS — C61 PROSTATE CANCER (H): Primary | ICD-10-CM

## 2018-11-01 DIAGNOSIS — K31.7 GASTRIC POLYP: Primary | ICD-10-CM

## 2018-11-01 LAB
ANION GAP SERPL CALCULATED.3IONS-SCNC: 6 MMOL/L (ref 3–14)
BUN SERPL-MCNC: 13 MG/DL (ref 7–30)
CALCIUM SERPL-MCNC: 8.7 MG/DL (ref 8.5–10.1)
CHLORIDE SERPL-SCNC: 105 MMOL/L (ref 94–109)
CO2 SERPL-SCNC: 27 MMOL/L (ref 20–32)
CREAT SERPL-MCNC: 0.76 MG/DL (ref 0.66–1.25)
ERYTHROCYTE [DISTWIDTH] IN BLOOD BY AUTOMATED COUNT: 12.5 % (ref 10–15)
GFR SERPL CREATININE-BSD FRML MDRD: >90 ML/MIN/1.7M2
GLUCOSE SERPL-MCNC: 153 MG/DL (ref 70–99)
HCT VFR BLD AUTO: 40.9 % (ref 40–53)
HGB BLD-MCNC: 13.6 G/DL (ref 13.3–17.7)
MCH RBC QN AUTO: 32.7 PG (ref 26.5–33)
MCHC RBC AUTO-ENTMCNC: 33.3 G/DL (ref 31.5–36.5)
MCV RBC AUTO: 98 FL (ref 78–100)
PLATELET # BLD AUTO: 235 10E9/L (ref 150–450)
POTASSIUM SERPL-SCNC: 4 MMOL/L (ref 3.4–5.3)
RBC # BLD AUTO: 4.16 10E12/L (ref 4.4–5.9)
SODIUM SERPL-SCNC: 138 MMOL/L (ref 133–144)
WBC # BLD AUTO: 10.5 10E9/L (ref 4–11)

## 2018-11-01 PROCEDURE — 25000128 H RX IP 250 OP 636: Performed by: STUDENT IN AN ORGANIZED HEALTH CARE EDUCATION/TRAINING PROGRAM

## 2018-11-01 PROCEDURE — 25000128 H RX IP 250 OP 636: Performed by: NURSE ANESTHETIST, CERTIFIED REGISTERED

## 2018-11-01 PROCEDURE — 25800025 ZZH RX 258: Performed by: UROLOGY

## 2018-11-01 PROCEDURE — 71000013 ZZH RECOVERY PHASE 1 LEVEL 1 EA ADDTL HR: Performed by: UROLOGY

## 2018-11-01 PROCEDURE — 25000128 H RX IP 250 OP 636: Performed by: UROLOGY

## 2018-11-01 PROCEDURE — 36000085 ZZH SURGERY LEVEL 8 1ST 30 MIN: Performed by: UROLOGY

## 2018-11-01 PROCEDURE — 25000132 ZZH RX MED GY IP 250 OP 250 PS 637: Performed by: STUDENT IN AN ORGANIZED HEALTH CARE EDUCATION/TRAINING PROGRAM

## 2018-11-01 PROCEDURE — 93010 ELECTROCARDIOGRAM REPORT: CPT | Performed by: INTERNAL MEDICINE

## 2018-11-01 PROCEDURE — 37000009 ZZH ANESTHESIA TECHNICAL FEE, EACH ADDTL 15 MIN: Performed by: UROLOGY

## 2018-11-01 PROCEDURE — 71000012 ZZH RECOVERY PHASE 1 LEVEL 1 FIRST HR: Performed by: UROLOGY

## 2018-11-01 PROCEDURE — 38571 LAPAROSCOPY LYMPHADENECTOMY: CPT | Mod: 51 | Performed by: UROLOGY

## 2018-11-01 PROCEDURE — 37000008 ZZH ANESTHESIA TECHNICAL FEE, 1ST 30 MIN: Performed by: UROLOGY

## 2018-11-01 PROCEDURE — 25000128 H RX IP 250 OP 636: Performed by: ANESTHESIOLOGY

## 2018-11-01 PROCEDURE — 93005 ELECTROCARDIOGRAM TRACING: CPT

## 2018-11-01 PROCEDURE — 25000566 ZZH SEVOFLURANE, EA 15 MIN: Performed by: UROLOGY

## 2018-11-01 PROCEDURE — 25000125 ZZHC RX 250: Performed by: NURSE ANESTHETIST, CERTIFIED REGISTERED

## 2018-11-01 PROCEDURE — 88307 TISSUE EXAM BY PATHOLOGIST: CPT | Mod: 26 | Performed by: UROLOGY

## 2018-11-01 PROCEDURE — 88309 TISSUE EXAM BY PATHOLOGIST: CPT | Mod: 26 | Performed by: UROLOGY

## 2018-11-01 PROCEDURE — 25000125 ZZHC RX 250: Performed by: UROLOGY

## 2018-11-01 PROCEDURE — 40000170 ZZH STATISTIC PRE-PROCEDURE ASSESSMENT II: Performed by: UROLOGY

## 2018-11-01 PROCEDURE — 88309 TISSUE EXAM BY PATHOLOGIST: CPT | Performed by: UROLOGY

## 2018-11-01 PROCEDURE — 88307 TISSUE EXAM BY PATHOLOGIST: CPT | Performed by: UROLOGY

## 2018-11-01 PROCEDURE — 36415 COLL VENOUS BLD VENIPUNCTURE: CPT | Performed by: STUDENT IN AN ORGANIZED HEALTH CARE EDUCATION/TRAINING PROGRAM

## 2018-11-01 PROCEDURE — 40000065 ZZH STATISTIC EKG NON-CHARGEABLE

## 2018-11-01 PROCEDURE — 36000087 ZZH SURGERY LEVEL 8 EA 15 ADDTL MIN: Performed by: UROLOGY

## 2018-11-01 PROCEDURE — 55866 LAPS SURG PRST8ECT RPBIC RAD: CPT | Performed by: UROLOGY

## 2018-11-01 PROCEDURE — 27210794 ZZH OR GENERAL SUPPLY STERILE: Performed by: UROLOGY

## 2018-11-01 PROCEDURE — 85027 COMPLETE CBC AUTOMATED: CPT | Performed by: STUDENT IN AN ORGANIZED HEALTH CARE EDUCATION/TRAINING PROGRAM

## 2018-11-01 PROCEDURE — 80048 BASIC METABOLIC PNL TOTAL CA: CPT | Performed by: STUDENT IN AN ORGANIZED HEALTH CARE EDUCATION/TRAINING PROGRAM

## 2018-11-01 RX ORDER — DEXAMETHASONE SODIUM PHOSPHATE 4 MG/ML
INJECTION, SOLUTION INTRA-ARTICULAR; INTRALESIONAL; INTRAMUSCULAR; INTRAVENOUS; SOFT TISSUE PRN
Status: DISCONTINUED | OUTPATIENT
Start: 2018-11-01 | End: 2018-11-01

## 2018-11-01 RX ORDER — MAGNESIUM HYDROXIDE 1200 MG/15ML
LIQUID ORAL PRN
Status: DISCONTINUED | OUTPATIENT
Start: 2018-11-01 | End: 2018-11-01 | Stop reason: HOSPADM

## 2018-11-01 RX ORDER — TRAZODONE HYDROCHLORIDE 50 MG/1
100 TABLET, FILM COATED ORAL
Status: DISCONTINUED | OUTPATIENT
Start: 2018-11-01 | End: 2018-11-03 | Stop reason: HOSPADM

## 2018-11-01 RX ORDER — FENTANYL CITRATE 50 UG/ML
25-50 INJECTION, SOLUTION INTRAMUSCULAR; INTRAVENOUS
Status: DISCONTINUED | OUTPATIENT
Start: 2018-11-01 | End: 2018-11-01 | Stop reason: HOSPADM

## 2018-11-01 RX ORDER — ATORVASTATIN CALCIUM 40 MG/1
40 TABLET, FILM COATED ORAL DAILY
Status: DISCONTINUED | OUTPATIENT
Start: 2018-11-02 | End: 2018-11-03 | Stop reason: HOSPADM

## 2018-11-01 RX ORDER — PROPOFOL 10 MG/ML
INJECTION, EMULSION INTRAVENOUS CONTINUOUS PRN
Status: DISCONTINUED | OUTPATIENT
Start: 2018-11-01 | End: 2018-11-01

## 2018-11-01 RX ORDER — NALOXONE HYDROCHLORIDE 0.4 MG/ML
.1-.4 INJECTION, SOLUTION INTRAMUSCULAR; INTRAVENOUS; SUBCUTANEOUS
Status: DISCONTINUED | OUTPATIENT
Start: 2018-11-01 | End: 2018-11-01

## 2018-11-01 RX ORDER — PROCHLORPERAZINE MALEATE 5 MG
5 TABLET ORAL EVERY 6 HOURS PRN
Status: DISCONTINUED | OUTPATIENT
Start: 2018-11-01 | End: 2018-11-03 | Stop reason: HOSPADM

## 2018-11-01 RX ORDER — ONDANSETRON 4 MG/1
4 TABLET, ORALLY DISINTEGRATING ORAL EVERY 30 MIN PRN
Status: DISCONTINUED | OUTPATIENT
Start: 2018-11-01 | End: 2018-11-01 | Stop reason: HOSPADM

## 2018-11-01 RX ORDER — EPHEDRINE SULFATE 50 MG/ML
INJECTION, SOLUTION INTRAMUSCULAR; INTRAVENOUS; SUBCUTANEOUS PRN
Status: DISCONTINUED | OUTPATIENT
Start: 2018-11-01 | End: 2018-11-01

## 2018-11-01 RX ORDER — ONDANSETRON 4 MG/1
4 TABLET, ORALLY DISINTEGRATING ORAL EVERY 6 HOURS PRN
Status: DISCONTINUED | OUTPATIENT
Start: 2018-11-01 | End: 2018-11-03 | Stop reason: HOSPADM

## 2018-11-01 RX ORDER — LIDOCAINE HYDROCHLORIDE 20 MG/ML
INJECTION, SOLUTION INFILTRATION; PERINEURAL PRN
Status: DISCONTINUED | OUTPATIENT
Start: 2018-11-01 | End: 2018-11-01

## 2018-11-01 RX ORDER — HYDRALAZINE HYDROCHLORIDE 20 MG/ML
2.5-5 INJECTION INTRAMUSCULAR; INTRAVENOUS EVERY 10 MIN PRN
Status: DISCONTINUED | OUTPATIENT
Start: 2018-11-01 | End: 2018-11-01 | Stop reason: HOSPADM

## 2018-11-01 RX ORDER — FENTANYL CITRATE 50 UG/ML
INJECTION, SOLUTION INTRAMUSCULAR; INTRAVENOUS PRN
Status: DISCONTINUED | OUTPATIENT
Start: 2018-11-01 | End: 2018-11-01

## 2018-11-01 RX ORDER — GLYCOPYRROLATE 0.2 MG/ML
INJECTION, SOLUTION INTRAMUSCULAR; INTRAVENOUS PRN
Status: DISCONTINUED | OUTPATIENT
Start: 2018-11-01 | End: 2018-11-01

## 2018-11-01 RX ORDER — SODIUM CHLORIDE, SODIUM LACTATE, POTASSIUM CHLORIDE, CALCIUM CHLORIDE 600; 310; 30; 20 MG/100ML; MG/100ML; MG/100ML; MG/100ML
INJECTION, SOLUTION INTRAVENOUS CONTINUOUS
Status: DISCONTINUED | OUTPATIENT
Start: 2018-11-01 | End: 2018-11-03 | Stop reason: HOSPADM

## 2018-11-01 RX ORDER — ALBUTEROL SULFATE 0.83 MG/ML
2.5 SOLUTION RESPIRATORY (INHALATION) EVERY 4 HOURS PRN
Status: DISCONTINUED | OUTPATIENT
Start: 2018-11-01 | End: 2018-11-01 | Stop reason: HOSPADM

## 2018-11-01 RX ORDER — ACETAMINOPHEN 325 MG/1
975 TABLET ORAL EVERY 8 HOURS
Status: DISCONTINUED | OUTPATIENT
Start: 2018-11-01 | End: 2018-11-03 | Stop reason: HOSPADM

## 2018-11-01 RX ORDER — NEOSTIGMINE METHYLSULFATE 1 MG/ML
VIAL (ML) INJECTION PRN
Status: DISCONTINUED | OUTPATIENT
Start: 2018-11-01 | End: 2018-11-01

## 2018-11-01 RX ORDER — LABETALOL HYDROCHLORIDE 5 MG/ML
10 INJECTION, SOLUTION INTRAVENOUS
Status: DISCONTINUED | OUTPATIENT
Start: 2018-11-01 | End: 2018-11-01 | Stop reason: HOSPADM

## 2018-11-01 RX ORDER — SODIUM CHLORIDE, SODIUM LACTATE, POTASSIUM CHLORIDE, CALCIUM CHLORIDE 600; 310; 30; 20 MG/100ML; MG/100ML; MG/100ML; MG/100ML
INJECTION, SOLUTION INTRAVENOUS CONTINUOUS
Status: DISCONTINUED | OUTPATIENT
Start: 2018-11-01 | End: 2018-11-01 | Stop reason: HOSPADM

## 2018-11-01 RX ORDER — HEPARIN SODIUM 5000 [USP'U]/.5ML
5000 INJECTION, SOLUTION INTRAVENOUS; SUBCUTANEOUS EVERY 8 HOURS
Status: DISCONTINUED | OUTPATIENT
Start: 2018-11-01 | End: 2018-11-03 | Stop reason: HOSPADM

## 2018-11-01 RX ORDER — HYDROMORPHONE HYDROCHLORIDE 1 MG/ML
.3-.5 INJECTION, SOLUTION INTRAMUSCULAR; INTRAVENOUS; SUBCUTANEOUS EVERY 5 MIN PRN
Status: DISCONTINUED | OUTPATIENT
Start: 2018-11-01 | End: 2018-11-01 | Stop reason: HOSPADM

## 2018-11-01 RX ORDER — ONDANSETRON 2 MG/ML
INJECTION INTRAMUSCULAR; INTRAVENOUS PRN
Status: DISCONTINUED | OUTPATIENT
Start: 2018-11-01 | End: 2018-11-01

## 2018-11-01 RX ORDER — SODIUM CHLORIDE, SODIUM LACTATE, POTASSIUM CHLORIDE, CALCIUM CHLORIDE 600; 310; 30; 20 MG/100ML; MG/100ML; MG/100ML; MG/100ML
INJECTION, SOLUTION INTRAVENOUS CONTINUOUS PRN
Status: DISCONTINUED | OUTPATIENT
Start: 2018-11-01 | End: 2018-11-01

## 2018-11-01 RX ORDER — CEFAZOLIN SODIUM 1 G/3ML
1 INJECTION, POWDER, FOR SOLUTION INTRAMUSCULAR; INTRAVENOUS SEE ADMIN INSTRUCTIONS
Status: DISCONTINUED | OUTPATIENT
Start: 2018-11-01 | End: 2018-11-01 | Stop reason: HOSPADM

## 2018-11-01 RX ORDER — CEFAZOLIN SODIUM 2 G/100ML
2 INJECTION, SOLUTION INTRAVENOUS
Status: COMPLETED | OUTPATIENT
Start: 2018-11-01 | End: 2018-11-01

## 2018-11-01 RX ORDER — LEVOTHYROXINE SODIUM 50 UG/1
50 TABLET ORAL DAILY
Status: DISCONTINUED | OUTPATIENT
Start: 2018-11-02 | End: 2018-11-03 | Stop reason: HOSPADM

## 2018-11-01 RX ORDER — OXYCODONE HYDROCHLORIDE 5 MG/1
5-10 TABLET ORAL
Status: DISCONTINUED | OUTPATIENT
Start: 2018-11-01 | End: 2018-11-03 | Stop reason: HOSPADM

## 2018-11-01 RX ORDER — ONDANSETRON 2 MG/ML
4 INJECTION INTRAMUSCULAR; INTRAVENOUS EVERY 30 MIN PRN
Status: DISCONTINUED | OUTPATIENT
Start: 2018-11-01 | End: 2018-11-01 | Stop reason: HOSPADM

## 2018-11-01 RX ORDER — LIDOCAINE 40 MG/G
CREAM TOPICAL
Status: DISCONTINUED | OUTPATIENT
Start: 2018-11-01 | End: 2018-11-03 | Stop reason: HOSPADM

## 2018-11-01 RX ORDER — NALOXONE HYDROCHLORIDE 0.4 MG/ML
.1-.4 INJECTION, SOLUTION INTRAMUSCULAR; INTRAVENOUS; SUBCUTANEOUS
Status: DISCONTINUED | OUTPATIENT
Start: 2018-11-01 | End: 2018-11-03 | Stop reason: HOSPADM

## 2018-11-01 RX ORDER — VECURONIUM BROMIDE 1 MG/ML
INJECTION, POWDER, LYOPHILIZED, FOR SOLUTION INTRAVENOUS PRN
Status: DISCONTINUED | OUTPATIENT
Start: 2018-11-01 | End: 2018-11-01

## 2018-11-01 RX ORDER — ONDANSETRON 2 MG/ML
4 INJECTION INTRAMUSCULAR; INTRAVENOUS EVERY 6 HOURS PRN
Status: DISCONTINUED | OUTPATIENT
Start: 2018-11-01 | End: 2018-11-03 | Stop reason: HOSPADM

## 2018-11-01 RX ORDER — BUPIVACAINE HYDROCHLORIDE 2.5 MG/ML
INJECTION, SOLUTION INFILTRATION; PERINEURAL PRN
Status: DISCONTINUED | OUTPATIENT
Start: 2018-11-01 | End: 2018-11-01 | Stop reason: HOSPADM

## 2018-11-01 RX ORDER — PROPOFOL 10 MG/ML
INJECTION, EMULSION INTRAVENOUS PRN
Status: DISCONTINUED | OUTPATIENT
Start: 2018-11-01 | End: 2018-11-01

## 2018-11-01 RX ADMIN — LIDOCAINE HYDROCHLORIDE 100 MG: 20 INJECTION, SOLUTION INFILTRATION; PERINEURAL at 07:53

## 2018-11-01 RX ADMIN — ROCURONIUM BROMIDE 50 MG: 10 INJECTION INTRAVENOUS at 07:53

## 2018-11-01 RX ADMIN — NEOSTIGMINE METHYLSULFATE 5 MG: 1 INJECTION, SOLUTION INTRAVENOUS at 11:12

## 2018-11-01 RX ADMIN — Medication 5 MG: at 08:13

## 2018-11-01 RX ADMIN — VECURONIUM BROMIDE 2 MG: 1 INJECTION, POWDER, LYOPHILIZED, FOR SOLUTION INTRAVENOUS at 08:46

## 2018-11-01 RX ADMIN — PROPOFOL 30 MCG/KG/MIN: 10 INJECTION, EMULSION INTRAVENOUS at 08:18

## 2018-11-01 RX ADMIN — Medication 5 MG: at 08:15

## 2018-11-01 RX ADMIN — BUPIVACAINE HYDROCHLORIDE 30 ML: 2.5 INJECTION, SOLUTION EPIDURAL; INFILTRATION; INTRACAUDAL; PERINEURAL at 11:11

## 2018-11-01 RX ADMIN — HEPARIN SODIUM 5000 UNITS: 5000 INJECTION, SOLUTION INTRAVENOUS; SUBCUTANEOUS at 14:40

## 2018-11-01 RX ADMIN — VECURONIUM BROMIDE 2 MG: 1 INJECTION, POWDER, LYOPHILIZED, FOR SOLUTION INTRAVENOUS at 09:34

## 2018-11-01 RX ADMIN — PROPOFOL 200 MG: 10 INJECTION, EMULSION INTRAVENOUS at 07:53

## 2018-11-01 RX ADMIN — HEPARIN SODIUM 5000 UNITS: 5000 INJECTION, SOLUTION INTRAVENOUS; SUBCUTANEOUS at 21:35

## 2018-11-01 RX ADMIN — ACETAMINOPHEN 975 MG: 325 TABLET, FILM COATED ORAL at 14:34

## 2018-11-01 RX ADMIN — CEFAZOLIN SODIUM 1 G: 2 INJECTION, SOLUTION INTRAVENOUS at 10:00

## 2018-11-01 RX ADMIN — FENTANYL CITRATE 50 MCG: 50 INJECTION, SOLUTION INTRAMUSCULAR; INTRAVENOUS at 08:25

## 2018-11-01 RX ADMIN — ACETAMINOPHEN 975 MG: 325 TABLET, FILM COATED ORAL at 21:35

## 2018-11-01 RX ADMIN — TRAZODONE HYDROCHLORIDE 100 MG: 50 TABLET ORAL at 21:53

## 2018-11-01 RX ADMIN — HYDROMORPHONE HYDROCHLORIDE 0.3 MG: 1 INJECTION, SOLUTION INTRAMUSCULAR; INTRAVENOUS; SUBCUTANEOUS at 16:40

## 2018-11-01 RX ADMIN — Medication 5 MG: at 08:17

## 2018-11-01 RX ADMIN — FENTANYL CITRATE 50 MCG: 50 INJECTION, SOLUTION INTRAMUSCULAR; INTRAVENOUS at 07:58

## 2018-11-01 RX ADMIN — SODIUM CHLORIDE, POTASSIUM CHLORIDE, SODIUM LACTATE AND CALCIUM CHLORIDE: 600; 310; 30; 20 INJECTION, SOLUTION INTRAVENOUS at 07:45

## 2018-11-01 RX ADMIN — SODIUM CHLORIDE 1000 ML: 900 IRRIGANT IRRIGATION at 08:19

## 2018-11-01 RX ADMIN — HYDROMORPHONE HYDROCHLORIDE 0.5 MG: 1 INJECTION, SOLUTION INTRAMUSCULAR; INTRAVENOUS; SUBCUTANEOUS at 12:46

## 2018-11-01 RX ADMIN — VECURONIUM BROMIDE 2 MG: 1 INJECTION, POWDER, LYOPHILIZED, FOR SOLUTION INTRAVENOUS at 10:00

## 2018-11-01 RX ADMIN — OXYCODONE HYDROCHLORIDE 5 MG: 5 TABLET ORAL at 18:50

## 2018-11-01 RX ADMIN — FENTANYL CITRATE 50 MCG: 50 INJECTION, SOLUTION INTRAMUSCULAR; INTRAVENOUS at 08:23

## 2018-11-01 RX ADMIN — GLYCOPYRROLATE 0.6 MG: 0.2 INJECTION, SOLUTION INTRAMUSCULAR; INTRAVENOUS at 11:12

## 2018-11-01 RX ADMIN — FENTANYL CITRATE 50 MCG: 50 INJECTION, SOLUTION INTRAMUSCULAR; INTRAVENOUS at 07:53

## 2018-11-01 RX ADMIN — HYDROMORPHONE HYDROCHLORIDE 0.5 MG: 1 INJECTION, SOLUTION INTRAMUSCULAR; INTRAVENOUS; SUBCUTANEOUS at 12:03

## 2018-11-01 RX ADMIN — ONDANSETRON 4 MG: 2 INJECTION INTRAMUSCULAR; INTRAVENOUS at 11:00

## 2018-11-01 RX ADMIN — CEFAZOLIN SODIUM 2 G: 2 INJECTION, SOLUTION INTRAVENOUS at 08:00

## 2018-11-01 RX ADMIN — DEXAMETHASONE SODIUM PHOSPHATE 4 MG: 4 INJECTION, SOLUTION INTRA-ARTICULAR; INTRALESIONAL; INTRAMUSCULAR; INTRAVENOUS; SOFT TISSUE at 08:30

## 2018-11-01 RX ADMIN — GLYCOPYRROLATE 0.2 MG: 0.2 INJECTION, SOLUTION INTRAMUSCULAR; INTRAVENOUS at 08:13

## 2018-11-01 RX ADMIN — VECURONIUM BROMIDE 2 MG: 1 INJECTION, POWDER, LYOPHILIZED, FOR SOLUTION INTRAVENOUS at 10:32

## 2018-11-01 RX ADMIN — HYDROMORPHONE HYDROCHLORIDE 0.5 MG: 1 INJECTION, SOLUTION INTRAMUSCULAR; INTRAVENOUS; SUBCUTANEOUS at 12:37

## 2018-11-01 RX ADMIN — FENTANYL CITRATE 50 MCG: 50 INJECTION, SOLUTION INTRAMUSCULAR; INTRAVENOUS at 12:37

## 2018-11-01 RX ADMIN — DEXMEDETOMIDINE HYDROCHLORIDE 0.3 MCG/KG/HR: 100 INJECTION, SOLUTION INTRAVENOUS at 08:02

## 2018-11-01 RX ADMIN — OXYCODONE HYDROCHLORIDE 5 MG: 5 TABLET ORAL at 21:58

## 2018-11-01 RX ADMIN — PROPOFOL 50 MG: 10 INJECTION, EMULSION INTRAVENOUS at 08:23

## 2018-11-01 RX ADMIN — DEXMEDETOMIDINE HYDROCHLORIDE 8 MCG: 100 INJECTION, SOLUTION INTRAVENOUS at 07:47

## 2018-11-01 RX ADMIN — SODIUM CHLORIDE, POTASSIUM CHLORIDE, SODIUM LACTATE AND CALCIUM CHLORIDE: 600; 310; 30; 20 INJECTION, SOLUTION INTRAVENOUS at 13:52

## 2018-11-01 RX ADMIN — FENTANYL CITRATE 50 MCG: 50 INJECTION, SOLUTION INTRAMUSCULAR; INTRAVENOUS at 12:44

## 2018-11-01 RX ADMIN — HYDROMORPHONE HYDROCHLORIDE 0.5 MG: 1 INJECTION, SOLUTION INTRAMUSCULAR; INTRAVENOUS; SUBCUTANEOUS at 08:43

## 2018-11-01 RX ADMIN — PROPOFOL 50 MG: 10 INJECTION, EMULSION INTRAVENOUS at 08:25

## 2018-11-01 RX ADMIN — SODIUM CHLORIDE, POTASSIUM CHLORIDE, SODIUM LACTATE AND CALCIUM CHLORIDE: 600; 310; 30; 20 INJECTION, SOLUTION INTRAVENOUS at 10:18

## 2018-11-01 ASSESSMENT — ENCOUNTER SYMPTOMS
SEIZURES: 0
DYSRHYTHMIAS: 0

## 2018-11-01 ASSESSMENT — COPD QUESTIONNAIRES: COPD: 0

## 2018-11-01 ASSESSMENT — PAIN DESCRIPTION - DESCRIPTORS: DESCRIPTORS: CONSTANT

## 2018-11-01 ASSESSMENT — LIFESTYLE VARIABLES: TOBACCO_USE: 1

## 2018-11-01 NOTE — ANESTHESIA CARE TRANSFER NOTE
Patient: Jose Moreno    Procedure(s):  ROBOTIC ASSISTED RADICAL PROSTATECTOMY WITH BILATERAL PELVIC LYMPH NODE DISSECTION    Diagnosis: PROSTATE CANCER  Diagnosis Additional Information: No value filed.    Anesthesia Type:   General, ETT     Note:  Airway :Face Mask  Patient transferred to:PACU  Comments: Transferred to PACU   arousable breathing adequately be self  Report to Makayla HERNANDEZHandoff Report: Identifed the Patient, Identified the Reponsible Provider, Reviewed the pertinent medical history, Discussed the surgical course, Reviewed Intra-OP anesthesia mangement and issues during anesthesia, Set expectations for post-procedure period and Allowed opportunity for questions and acknowledgement of understanding      Vitals: (Last set prior to Anesthesia Care Transfer)    CRNA VITALS  11/1/2018 1105 - 11/1/2018 1143      11/1/2018             Pulse: 70    Ht Rate: 70    SpO2: 99 %    Resp Rate (observed): (!)  6                Electronically Signed By: TACOS Christine CRNA  November 1, 2018  11:43 AM

## 2018-11-01 NOTE — PLAN OF CARE
Problem: Patient Care Overview  Goal: Plan of Care/Patient Progress Review  Outcome: No Change  Pt Ox4, lethargic. VSS on 2L, Capno IPI=10. C/o mild pain, declined PRN pain meds. Lap incisions WNL, SANA. IZABELA dressing CDI, bulb draining moderate serous output. Clear liq diet, tolerating well. Sen with dark pink output. Upper BS active, lower BS hypo, not yet passing gas/stool. LR infu @100 mL/hr. Has not yet ambulated.

## 2018-11-01 NOTE — TELEPHONE ENCOUNTER
----- Message from Jerri Tavera MD sent at 10/27/2018 11:17 AM CDT -----  Can you help Dr Blankenship with this request.  Not sure the patient knows yet?    LES  ----- Message -----     From: Clint Blankenship MD     Sent: 10/26/2018   2:13 PM       To: MD Dr Liang Matta    [facility], can you please make a referral for this patient to see a gastroenterologist for upper GI endoscopy?  He had an incidental finding of a nodule/polyp seen in his stomach on CT scan  I let him know the results    Thank you!  Tu Blankenship

## 2018-11-01 NOTE — PROGRESS NOTES
Admission medication history interview status for the 11/1/2018  admission is complete. See EPIC admission navigator for prior to admission medications     Medication history source reliability:Good    Medication history interview source(s):Patient    Medication history resources (including written lists, pill bottles, clinic record):None    Primary pharmacy.[facility]    Additional medication history information not noted on PTA med list :None    Time spent in this activity: 45 minutes    Prior to Admission medications    Medication Sig Last Dose Taking? Auth Provider   Acetaminophen (TYLENOL PO) Take 325-650 mg by mouth 2 times daily as needed for mild pain or fever 10/29/2018 Yes Reported, Patient   aspirin EC 81 MG EC tablet Take 1 tablet (81 mg) by mouth daily 10/22/2018 Yes Jose Nunez MD   atorvastatin (LIPITOR) 40 MG tablet Take 1 tablet (40 mg) by mouth daily 11/1/2018 at 0500 Yes Jerri Tavera MD   cholecalciferol (VITAMIN D3) 1000 UNIT tablet Take 1 tablet (1,000 Units) by mouth daily 10/29/2018 Yes Jerri Tavera MD   Cyanocobalamin (B-12) 1000 MCG TBCR Take 1,000 mcg by mouth daily 10/29/2018 Yes Jerri Tavera MD   FLUOXETINE HCL PO Take 40 mg by mouth every other day 10/31/2018 at am Yes Reported, Patient   levothyroxine (SYNTHROID/LEVOTHROID) 50 MCG tablet Take 1 tablet (50 mcg) by mouth daily 11/1/2018 at 0500 Yes Jerri Tavera MD   losartan (COZAAR) 100 MG tablet Take 1 tablet (100 mg) by mouth daily 11/1/2018 at 0500 Yes Jerri Tavera MD   multivitamin, therapeutic with minerals (MULTI-VITAMIN) TABS tablet Take 1 tablet by mouth daily 10/29/2018 Yes Jerri Tavera MD   nitroGLYcerin (NITROSTAT) 0.4 MG sublingual tablet Place 1 tablet (0.4 mg) under the tongue every 5 minutes as needed for chest pain more than a month at prn Yes Jerri Tavera MD   Omega-3 Fatty Acids (FISH OIL PO) Take 1 capsule by mouth daily 10/22/2018 Yes Reported, Patient   omeprazole (PRILOSEC) 20 MG CR  capsule TAKE ONE CAPSULE BY MOUTH ONCE DAILY  Patient taking differently: Take 20 mg by mouth daily TAKE ONE CAPSULE BY MOUTH ONCE DAILY 10/30/2018 Yes Jerri Tavera MD   traZODone (DESYREL) 100 MG tablet Take 1 tablet (100 mg) by mouth nightly as needed for sleep  Patient taking differently: Take 100 mg by mouth At Bedtime  10/31/2018 at pm Yes Jerri Tavera MD

## 2018-11-01 NOTE — TELEPHONE ENCOUNTER
On line referral issued to [facility] for the upper GI    I left a message for patient to call me RE this referral. Will let him know that [facility] will be calling to schedule his upper GI

## 2018-11-01 NOTE — IP AVS SNAPSHOT
[address]    [facility] [address]    Phone:  [phone]                                       After Visit Summary   11/1/2018    Jose Moreno    MRN: [id number]           After Visit Summary Signature Page     I have received my discharge instructions, and my questions have been answered. I have discussed any challenges I see with this plan with the nurse or doctor.    ..........................................................................................................................................  Patient/Patient Representative Signature      ..........................................................................................................................................  Patient Representative Print Name and Relationship to Patient    ..................................................               ................................................  Date                                   Time    ..........................................................................................................................................  Reviewed by Signature/Title    ...................................................              ..............................................  Date                                               Time          22EPIC Rev 08/18

## 2018-11-01 NOTE — TELEPHONE ENCOUNTER
Dr. Tavera please complete / sign the pending order for [facility] - upper GI.     I will issue the referral to [facility]. They will call patient to make his appt.     Thank you

## 2018-11-01 NOTE — ANESTHESIA PREPROCEDURE EVALUATION
Procedure: Procedure(s):  ROBOTIC ASSISTED RADICAL PROSTATECTOMY WITH BILATERAL PELVICE LYMPH NODE DISSECTION  Preop diagnosis: PROSTATE CANCER    Allergies   Allergen Reactions     Ace Inhibitors Cough     Dry cough     Past Medical History:   Diagnosis Date     ACS (acute coronary syndrome) (H) 01-23-15     ADHD (attention deficit hyperactivity disorder)      CAD (coronary artery disease)     cardiac cath 1/23/15: SHERRY to 1st diagonal, SHERRY x2 to LAD, cath 2009: no intervention     Esophageal reflux      History of hyperthyroidism 4/9/2014     Hyperlipidemia      Hypertension      Mitral regurgitation 2009    moderately severe MVP, s/p robotic repair at [place]     NSTEMI (non-ST elevated myocardial infarction) (H) 01-23-15     Pulmonary nodules 2009    CT-recommend repeat in 6-12 months     Rotator cuff rupture 11/3/2011     Past Surgical History:   Procedure Laterality Date     DECOMPRESSION LUMBAR MINIMALLY INVASIVE ONE LEVEL  1/11/2012    Procedure:DECOMPRESSION LUMBAR MINIMALLY INVASIVE ONE LEVEL; L4-5 Decompression Minimally Invasive; Surgeon:MESSI HORAN; Location:[facility]     EYE EXAM ESTABLISHED PT  1/14/06     HC COLONOSCOPY THRU STOMA, DIAGNOSTIC  7/00    GI     HCL PSA, DIAGNOSTIC (TUMOR MARKER)  2003     HEART CATH RIGHT AND LEFT HEART CATH  01-23-15    See report, pt transfered to [facility] for complex bifurcation PCI of LAD diagonal vessel.      HEART CATH RIGHT AND LEFT HEART CATH  01-26-15    PTCA and implantation of SHERRY to 1st diagonal, SHERRY to mid LAD, SHERRY to proximal LAD     ORTHOPEDIC SURGERY      Hx of rotator cuff rupture and repair     REMOVAL OF SPERM DUCT(S)      Vasectomy     REMOVAL OF SPERM DUCT(S)      reversal of vasectomy     REPAIR VALVE MITRAL  2009    [facility] robotic repair      XR LUMBAR RADIOFREQ ABLATION UNILATERAL  01/11/2018    lumbar area/ ? level     Prior to Admission medications    Medication Sig Start Date End Date Taking? Authorizing Provider   Acetaminophen (TYLENOL  PO) Take 325-650 mg by mouth 2 times daily as needed for mild pain or fever   Yes Reported, Patient   aspirin EC 81 MG EC tablet Take 1 tablet (81 mg) by mouth daily 1/27/15  Yes Jose Nunez MD   atorvastatin (LIPITOR) 40 MG tablet Take 1 tablet (40 mg) by mouth daily 10/1/18  Yes Jerri Tavera MD   cholecalciferol (VITAMIN D3) 1000 UNIT tablet Take 1 tablet (1,000 Units) by mouth daily 10/1/18  Yes Jerri Tavera MD   Cyanocobalamin (B-12) 1000 MCG TBCR Take 1,000 mcg by mouth daily 10/1/18  Yes Jerri Tavera MD   FLUOXETINE HCL PO Take 40 mg by mouth every other day   Yes Reported, Patient   levothyroxine (SYNTHROID/LEVOTHROID) 50 MCG tablet Take 1 tablet (50 mcg) by mouth daily 10/1/18  Yes Jerri Tavera MD   losartan (COZAAR) 100 MG tablet Take 1 tablet (100 mg) by mouth daily 10/1/18  Yes Jerri Tavera MD   multivitamin, therapeutic with minerals (MULTI-VITAMIN) TABS tablet Take 1 tablet by mouth daily 10/1/18  Yes Jerri Tavera MD   nitroGLYcerin (NITROSTAT) 0.4 MG sublingual tablet Place 1 tablet (0.4 mg) under the tongue every 5 minutes as needed for chest pain 9/18/18  Yes Jerri Tavera MD   Omega-3 Fatty Acids (FISH OIL PO) Take 1 capsule by mouth daily   Yes Reported, Patient   omeprazole (PRILOSEC) 20 MG CR capsule TAKE ONE CAPSULE BY MOUTH ONCE DAILY  Patient taking differently: Take 20 mg by mouth daily TAKE ONE CAPSULE BY MOUTH ONCE DAILY 10/1/18  Yes Jerri Tavera MD   traZODone (DESYREL) 100 MG tablet Take 1 tablet (100 mg) by mouth nightly as needed for sleep  Patient taking differently: Take 100 mg by mouth At Bedtime  10/1/18  Yes Jerri Tavera MD     Current Facility-Administered Medications Ordered in Epic   Medication Dose Route Frequency Last Rate Last Dose     ceFAZolin (ANCEF) 1 g vial to attach to  ml bag for ADULT or 50 ml bag for PEDS  1 g Intravenous See Admin Instructions         ceFAZolin (ANCEF) intermittent infusion 2 g in 100 mL dextrose  PRE-MIX  2 g Intravenous Pre-Op/Pre-procedure x 1 dose         No current Epic-ordered outpatient prescriptions on file.     Wt Readings from Last 1 Encounters:   10/22/18 63.5 kg (140 lb)     Temp Readings from Last 1 Encounters:   10/22/18 36.9  C (98.4  F) (Oral)     BP Readings from Last 6 Encounters:   10/22/18 130/80   10/01/18 124/80   08/28/18 130/80   08/17/18 132/72   07/26/18 138/86   07/25/18 149/87     Pulse Readings from Last 4 Encounters:   10/22/18 72   10/01/18 52   09/05/18 74   08/28/18 70     Resp Readings from Last 1 Encounters:   10/22/18 16     SpO2 Readings from Last 1 Encounters:   10/22/18 96%     Recent Labs   Lab Test  10/01/18   1131  08/17/18   0953   02/13/17   0752   NA  140  139   < >  139   POTASSIUM  4.2  4.2   < >  4.3   CHLORIDE  103  103   < >  106   CO2  27  30   < >  28   ANIONGAP   --   6   --   5   GLC  96  101*   < >  90   BUN  20  30*  15   < >  20   CR  0.66*  0.86   < >  0.89   MIRYAM  9.5  9.0   < >  9.2    < > = values in this interval not displayed.     Recent Labs   Lab Test  10/01/18   1108  01/05/18   1149   WBC  5.0  5.4   HGB  14.8  15.2   PLT  245  282     Recent Labs   Lab Test  01/23/15   0018  01/09/13   1600   INR  1.00  1.09      RECENT LABS:   ECG: Sinus  Rhythm   -Left atrial enlargement.     BORDERLINE  ECHO: Interpretation Summary  1. Average exercise capacity, target HR achieved.  2. The patient exhibited no chest pain during exercise.  3. This was a normal stress EKG with no evidence of stress-induced ischemia.  4. Rest echo: Normal left ventricular function and wall motion at rest. The  visual ejection fraction is estimated at 55-60%.  5. Stress echo: This was a normal stress echocardiogram with no evidence of  stress-induced ischemia. The visual ejection fraction is estimated at 65-70%.     Limited TTE views:  1. s/p posterior mitral leaflet repair with some restricted motion. Mean  5mmHg.  2. There is mild (1+) mitral regurgitation.  3. There is mild  (1+) aortic regurgitation.     Stress echo was normal in 1/2013.  No changes on TTE views compared to 2/2016.    Anesthesia Evaluation     . Pt has had prior anesthetic. Type: General    No history of anesthetic complications          ROS/MED HX    ENT/Pulmonary:     (+)tobacco use, Past use , . .   (-) asthma, COPD and sleep apnea   Neurologic:      (-) seizures, CVA and migraines   Cardiovascular:     (+) Dyslipidemia, hypertension--CAD, --stent,2 Drug Eluting Stent .. : . . . :. valvular problems/murmurs type: MR .      (-) FAUSTIN and arrhythmias   METS/Exercise Tolerance:  >4 METS   Hematologic:        (-) history of blood clots, anemia and other hematologic disorder   Musculoskeletal:        (-) arthritis   GI/Hepatic:     (+) GERD hiatal hernia,      (-) liver disease   Renal/Genitourinary:      (-) renal disease and nephrolithiasis   Endo:     (+) thyroid problem hypothyroidism, .   (-) Type I DM, Type II DM and obesity   Psychiatric:     (+) psychiatric history depression      Infectious Disease:        (-) Recent Fever   Malignancy:   (+) Malignancy History of Prostate          Other:                     Physical Exam  Normal systems: cardiovascular, pulmonary and dental    Airway   Mallampati: II  TM distance: >3 FB  Neck ROM: full    Dental     Cardiovascular   Rhythm and rate: regular and normal  (-) no murmur    Pulmonary    breath sounds clear to auscultation                    Anesthesia Plan      History & Physical Review      ASA Status:  3 .    NPO Status:  > 8 hours    Plan for General and ETT with Propofol induction. Maintenance will be Balanced.    PONV prophylaxis:  Ondansetron (or other 5HT-3) and Dexamethasone or Solumedrol  Background precedex      Postoperative Care  Postoperative pain management:  Multi-modal analgesia.      Consents  Anesthetic plan, risks, benefits and alternatives discussed with:  Patient..                          .

## 2018-11-01 NOTE — ANESTHESIA POSTPROCEDURE EVALUATION
Patient: Jose Moreno    Procedure(s):  ROBOTIC ASSISTED RADICAL PROSTATECTOMY WITH BILATERAL PELVIC LYMPH NODE DISSECTION    Diagnosis:PROSTATE CANCER  Diagnosis Additional Information: No value filed.    Anesthesia Type:  General, ETT    Note:  Anesthesia Post Evaluation    Patient location during evaluation: PACU  Patient participation: Able to fully participate in evaluation  Level of consciousness: awake and alert  Pain management: adequate  Airway patency: patent  Cardiovascular status: acceptable  Respiratory status: acceptable and nasal cannula  Hydration status: acceptable  PONV: none     Anesthetic complications: None          Last vitals:  Vitals:    11/01/18 1237 11/01/18 1245 11/01/18 1300   BP:  135/84 121/74   Pulse:      Resp: 20 9 12   Temp:  36  C (96.8  F) 36  C (96.8  F)   SpO2:  98% 94%         Electronically Signed By: Michaela Meyers MD  November 1, 2018  1:36 PM

## 2018-11-01 NOTE — BRIEF OP NOTE
[facility] Brief Operative Note    Pre-operative diagnosis: PROSTATE CANCER   Post-operative diagnosis Same   Procedure: Procedure(s):  ROBOTIC ASSISTED RADICAL PROSTATECTOMY WITH BILATERAL PELVIC LYMPH NODE DISSECTION   Surgeon(s): Surgeon(s) and Role:     * Clint Blankenship MD - Primary     * Shayne Win MD - Assistant    Estimated blood loss: 100 mL    Specimens:   ID Type Source Tests Collected by Time Destination   A : BILATERAL PELVIC LYMPH NODES Tissue Lymph Node SURGICAL PATHOLOGY EXAM Clint Blankenship MD 11/1/2018 11:04 AM    B : PROSTATE Tissue Prostate SURGICAL PATHOLOGY EXAM Clint Blankenship MD 11/1/2018 11:12 AM       Findings: Leak-free anastomosis.

## 2018-11-01 NOTE — IP AVS SNAPSHOT
MRN:[id number]                      After Visit Summary   11/1/2018    Jose Moreno    MRN: [id number]           Thank you!     Thank you for choosing [facility] for your care. Our goal is always to provide you with excellent care. Hearing back from our patients is one way we can continue to improve our services. Please take a few minutes to complete the written survey that you may receive in the mail after you visit with us. Thank you!        Patient Information     Date Of Birth          1950        Designated Caregiver       Most Recent Value    Caregiver    Will someone help with your care after discharge? yes    Name of designated caregiver Chastity Farris, spouse    Phone number of caregiver [phone]    Caregiver address [address] [address] [place] [address]      About your hospital stay     You were admitted on:  November 1, 2018 You last received care in the:  [place] [address] [facility]    You were discharged on:  November 3, 2018        Reason for your hospital stay       You were in the hospital for recovery following surgery for prostate cancer                  Who to Call     For medical emergencies, please call 911.  For non-urgent questions about your medical care, please call your primary care provider or clinic, [phone]  For questions related to your surgery, please call your surgery clinic        Attending Provider     Provider Specialty    Clint Blankenship MD Urology       Primary Care Provider Office Phone # Fax #    Jerri Tavera [phone]      After Care Instructions     Discharge Instructions       Discharge Diet:   -Regular    Activity:   - No strenuous exercise for 6 weeks.   - No lifting, pushing, pulling more than 10 pounds for 6 weeks. Take care when pushing with your arms to stand up.  - Do not strain your belly area.  When you bend, sit up or twice, you could strain the area around your incision.    - Do not strain with  "bowel movements.    - Do not drive until you can press the brake pedal quickly and fully without pain.   - Do not operate a motor vehicle while taking narcotic pain medications.     Medications:   1) PAIN: Oxycodone is a narcotic medication that has been prescribed for pain.  Narcotics will cause sleepiness and constipation, therefore it is best to stop or reduce them as soon as you can and switch to using acetaminophen (Tylenol) and/or ibuprofen (Advil/Motrin), taken as directed on the packaging.  Keep in mind that certain narcotics can contain acetaminophen, also called \"APAP\" on prescription bottles.  Do not take more than 4,000mg of Tylenol (acetaminophen/ APAP) from all sources in any 24 hour period since this can cause liver damage.  Never drive, operate machinery or drink alcoholic beverages while you are taking narcotic pain medications.     2) CONSTIPATION: Pericolace (senna/docusate sodium) can be taken twice daily for prevention of constipation since surgery, pain medications and bladder spasm medications can all make you constipated.  Please reduce or stop pericolace if you develop loose stools. Other over the counter solutions such as prune juice, miralax, fiber products, senna, and dulcolax can also be used. If you are taking the pericolace but still have not had a bowel movement in 3 days, start over-the-counter Milk of Magnesia taken twice daily until you have a nice bowel movement.  Call the office with any concerns.     3) ANTIBIOTICS  - Ciprofloxacin 500mg #1 tablet should be taken one hour prior to your followup appointment with Dr. Blankenship to remove your Sen catheter    Wound Care:   - You may shower and get incisions wet starting 48 hrs after surgery.  - Do not scrub incisions or submerge wounds (aka, bath, pool, hot tub, etc.) for 2 weeks or until wounds have healed and catheter is removed.  - Remove wound dressing 48 hours after surgery if already not removed.   - If purple dermabond glue " was used, avoid applying any lotions or ointments.   - Leave incision open to air.  Cover with gauze only if needed for comfort or to protect clothing from drainage.     Drains:  1) Restrepo Catheter: You are going home with a Restrepo catheter which will remain in place until your follow-up appointment. Your nurse or  will provide written catheter care instructions for you to take home.  - Protect the catheter and treat it like an extension of your body - keep it secured to your leg and do not let it get caught, snagged, or tugged.  - Should the catheter somehow come out of position, do not let anyone but a urologist who is aware of your recent surgery try to replace a catheter.  Best yet, contact our urology office right away with any concerns.   - Apply bacitracin twice daily to the tip of the penis/catheter insertion point to keep the area lubricated and more comfortable.     Follow-Up:   - Call your primary care provider to touch base regarding your recent procedure and admission.    - Follow up with Dr. Blankenship as scheduled  - Call or return sooner than your regularly scheduled visit if you develop any of the following:  Fever (greater than 101.3F), uncontrolled pain, uncontrolled nausea or vomiting, as well as increased redness, swelling, or drainage from your wound.    Phone numbers:  - Monday through Friday 8am to 4:30pm: call [phone].    - Nights or weekends, call [phone] and ask the  to page [facility] on call.   - For emergencies, always call [phone]            Tubes and drains       You are going home with the following tubes or drains: restrepo catheter.  Tube cares per hospital or home care instructions                  Follow-up Appointments     Follow-up and recommended labs and tests        Follow up with Dr. Blankenship as scheduled for catheter removal and review of pathology                  Your next 10 appointments already scheduled     Nov 14, 2018  1:30 PM CST   Return  "Visit with Summer Dover PA-C   [facility] ([facility])    [address]  [address]  [facility] [id number] [phone]            Nov 15, 2018  2:15 PM CST   Radiology Injections with Nancy Cortez MD   [facility] ([facility])    [address] [place] [address]  [address]  [facility] [address]   [phone]              Pending Results     Date and Time Order Name Status Description    11/1/2018 1105 Surgical pathology exam In process             Statement of Approval     Ordered          11/03/18 0908  I have reviewed and agree with all the recommendations and orders detailed in this document.  EFFECTIVE NOW     Approved and electronically signed by:  Rafi Altamirano MD             Admission Information     Date & Time Provider Department Dept. Phone    11/1/2018 Clint Blankenship MD [place] [address] [facility] [phone]      Your Vitals Were     Blood Pressure Pulse Temperature Respirations Height Weight    143/79 (BP Location: Left arm) 71 98.5  F (36.9  C) (Oral) 16 1.651 m (5' 5\") 62.6 kg (138 lb)    Pulse Oximetry BMI (Body Mass Index)                93% 22.96 kg/m2          [employer] Information     [employer] gives you secure access to your electronic health record. If you see a primary care provider, you can also send messages to your care team and make appointments. If you have questions, please call your primary care clinic.  If you do not have a primary care provider, please call [phone] and they will assist you.        Care EveryWhere ID     This is your Care EveryWhere ID. This could be used by other organizations to access your [facility] medical records  [id number]        Equal Access to Services     CHAPINCITO CAIN : Oli Clark, robin savage, qajayjay vera. So [facility] [phone].    ATENCIÓN: Si " karley tinoco, tiene a weber disposición servicios gratuitos de asistencia lingüística. Waqas cordon [phone].    We comply with applicable federal civil rights laws and [place] laws. We do not discriminate on the basis of race, color, national origin, age, disability, sex, sexual orientation, or gender identity.               Review of your medicines      UNREVIEWED medicines. Ask your doctor about these medicines        Dose / Directions    * ciprofloxacin 500 MG tablet   Commonly known as:  CIPRO   Ask about: Should I take this medication?        Dose:  500 mg   Take 1 tablet (500 mg) by mouth once for 1 dose For catheter removal   Quantity:  1 tablet   Refills:  0       * Notice:  This list has 1 medication(s) that are the same as other medications prescribed for you. Read the directions carefully, and ask your doctor or other care provider to review them with you.      START taking        Dose / Directions    * ciprofloxacin 250 MG tablet   Commonly known as:  CIPRO        Dose:  250 mg   Take 1 tablet (250 mg) by mouth 2 times daily for 2 doses   Quantity:  2 tablet   Refills:  0       ketorolac 10 MG tablet   Commonly known as:  TORADOL        Dose:  10 mg   Take 1 tablet (10 mg) by mouth every 6 hours as needed for moderate pain   Quantity:  20 tablet   Refills:  0       oxyCODONE IR 5 MG tablet   Commonly known as:  ROXICODONE        Dose:  5-10 mg   Take 1-2 tablets (5-10 mg) by mouth every 3 hours as needed   Quantity:  15 tablet   Refills:  0       senna-docusate 8.6-50 MG per tablet   Commonly known as:  SENOKOT-S;PERICOLACE        Dose:  1 tablet   Take 1 tablet by mouth 2 times daily To be taken with opioid (narcotic) pain medication to prevent constipation.   Quantity:  60 tablet   Refills:  1       * Notice:  This list has 1 medication(s) that are the same as other medications prescribed for you. Read the directions carefully, and ask your doctor or other care provider to review them with you.       CONTINUE these medicines which may have CHANGED, or have new prescriptions. If we are uncertain of the size of tablets/capsules you have at home, strength may be listed as something that might have changed.        Dose / Directions    omeprazole 20 MG CR capsule   Commonly known as:  priLOSEC   This may have changed:    - how much to take  - how to take this  - when to take this  - additional instructions   Used for:  Gastroesophageal reflux disease without esophagitis        TAKE ONE CAPSULE BY MOUTH ONCE DAILY   Quantity:  90 capsule   Refills:  3       traZODone 100 MG tablet   Commonly known as:  DESYREL   This may have changed:  when to take this   Used for:  Major depressive disorder, recurrent episode, in full remission (H)        Dose:  100 mg   Take 1 tablet (100 mg) by mouth nightly as needed for sleep   Quantity:  90 tablet   Refills:  0         CONTINUE these medicines which have NOT CHANGED        Dose / Directions    aspirin 81 MG EC tablet   Used for:  ACS (acute coronary syndrome) (H)        Dose:  81 mg   Take 1 tablet (81 mg) by mouth daily   Quantity:  60 tablet   Refills:  0       atorvastatin 40 MG tablet   Commonly known as:  LIPITOR   Used for:  Mixed hyperlipidemia, History of non-ST elevation myocardial infarction (NSTEMI), Coronary artery disease involving native coronary artery of native heart without angina pectoris        Dose:  40 mg   Take 1 tablet (40 mg) by mouth daily   Quantity:  90 tablet   Refills:  1       B-12 1000 MCG Tbcr   Used for:  Gastroesophageal reflux disease without esophagitis        Dose:  1000 mcg   Take 1,000 mcg by mouth daily   Refills:  0       cholecalciferol 1000 UNIT tablet   Commonly known as:  vitamin D3   Used for:  Gastroesophageal reflux disease without esophagitis        Dose:  1000 Units   Take 1 tablet (1,000 Units) by mouth daily   Refills:  0       FISH OIL PO        Dose:  1 capsule   Take 1 capsule by mouth daily   Refills:  0       FLUOXETINE  HCL PO        Dose:  40 mg   Take 40 mg by mouth every other day (Patient taking differently than prescribed, finishing up old RX; Current RX is 20mg once daily)   Refills:  0       levothyroxine 50 MCG tablet   Commonly known as:  SYNTHROID/LEVOTHROID   Used for:  Hypothyroidism due to acquired atrophy of thyroid        Dose:  50 mcg   Take 1 tablet (50 mcg) by mouth daily   Quantity:  90 tablet   Refills:  3       losartan 100 MG tablet   Commonly known as:  COZAAR   Used for:  Coronary artery disease involving native coronary artery of native heart without angina pectoris, Essential hypertension, benign, History of non-ST elevation myocardial infarction (NSTEMI)        Dose:  100 mg   Take 1 tablet (100 mg) by mouth daily   Quantity:  90 tablet   Refills:  3       multivitamin, therapeutic with minerals Tabs tablet   Used for:  Gastroesophageal reflux disease without esophagitis        Dose:  1 tablet   Take 1 tablet by mouth daily   Refills:  0       nitroGLYcerin 0.4 MG sublingual tablet   Commonly known as:  NITROSTAT   Used for:  ACS (acute coronary syndrome) (H)        Dose:  0.4 mg   Place 1 tablet (0.4 mg) under the tongue every 5 minutes as needed for chest pain   Quantity:  25 tablet   Refills:  1       TYLENOL PO        Dose:  325-650 mg   Take 325-650 mg by mouth 2 times daily as needed for mild pain or fever   Refills:  0            Where to get your medicines      These medications were sent to [facility] - [address]  [address] [facility] [address] [address]     Phone:  [phone]     ciprofloxacin 250 MG tablet    ciprofloxacin 500 MG tablet    ketorolac 10 MG tablet    senna-docusate 8.6-50 MG per tablet         Some of these will need a paper prescription and others can be bought over the counter. Ask your nurse if you have questions.     Bring a paper prescription for each of these medications     oxyCODONE IR 5 MG tablet                Protect others  around you: Learn how to safely use, store and throw away your medicines at www.disposemymeds.org.        ANTIBIOTIC INSTRUCTION     You've Been Prescribed an Antibiotic - Now What?  Your healthcare team thinks that you or your loved one might have an infection. Some infections can be treated with antibiotics, which are powerful, life-saving drugs. Like all medications, antibiotics have side effects and should only be used when necessary. There are some important things you should know about your antibiotic treatment.      Your healthcare team may run tests before you start taking an antibiotic.    Your team may take samples (e.g., from your blood, urine or other areas) to run tests to look for bacteria. These test can be important to determine if you need an antibiotic at all and, if you do, which antibiotic will work best.      Within a few days, your healthcare team might change or even stop your antibiotic.    Your team may start you on an antibiotic while they are working to find out what is making you sick.    Your team might change your antibiotic because test results show that a different antibiotic would be better to treat your infection.    In some cases, once your team has more information, they learn that you do not need an antibiotic at all. They may find out that you don't have an infection, or that the antibiotic you're taking won't work against your infection. For example, an infection caused by a virus can't be treated with antibiotics. Staying on an antibiotic when you don't need it is more likely to be harmful than helpful.      You may experience side effects from your antibiotic.    Like all medications, antibiotics have side effects. Some of these can be serious.    Let you healthcare team know if you have any known allergies when you are admitted to the hospital.    One significant side effect of nearly all antibiotics is the risk of severe and sometimes deadly diarrhea caused by Clostridium  difficile (C. Difficile). This occurs when a person takes antibiotics because some good germs are destroyed. Antibiotic use allows C. diificile to take over, putting patients at high risk for this serious infection.    As a patient or caregiver, it is important to understand your or your loved one's antibiotic treatment. It is especially important for caregivers to speak up when patients can't speak for themselves. Here are some important questions to ask your healthcare team.    What infection is this antibiotic treating and how do you know I have that infection?    What side effects might occur from this antibiotic?    How long will I need to take this antibiotic?    Is it safe to take this antibiotic with other medications or supplements (e.g., vitamins) that I am taking?     Are there any special directions I need to know about taking this antibiotic? For example, should I take it with food?    How will I be monitored to know whether my infection is responding to the antibiotic?    What tests may help to make sure the right antibiotic is prescribed for me?      Information provided by:  www.cdc.gov/getsmart  U.S. Department of Health and Human Services  Centers for disease Control and Prevention  National Center for Emerging and Zoonotic Infectious Diseases  Division of Healthcare Quality Promotion        Information about OPIOIDS     PRESCRIPTION OPIOIDS: WHAT YOU NEED TO KNOW   We gave you an opioid (narcotic) pain medicine. It is important to manage your pain, but opioids are not always the best choice. You should first try all the other options your care team gave you. Take this medicine for as short a time (and as few doses) as possible.    Some activities can increase your pain, such as bandage changes or therapy sessions. It may help to take your pain medicine 30 to 60 minutes before these activities. Reduce your stress by getting enough sleep, working on hobbies you enjoy and practicing relaxation or  meditation. Talk to your care team about ways to manage your pain beyond prescription opioids.    These medicines have risks:    DO NOT drive when on new or higher doses of pain medicine. These medicines can affect your alertness and reaction times, and you could be arrested for driving under the influence (DUI). If you need to use opioids long-term, talk to your care team about driving.    DO NOT operate heavy machinery    DO NOT do any other dangerous activities while taking these medicines.    DO NOT drink any alcohol while taking these medicines.     If the opioid prescribed includes acetaminophen, DO NOT take with any other medicines that contain acetaminophen. Read all labels carefully. Look for the word  acetaminophen  or  Tylenol.  Ask your pharmacist if you have questions or are unsure.    You can get addicted to pain medicines, especially if you have a history of addiction (chemical, alcohol or substance dependence). Talk to your care team about ways to reduce this risk.    All opioids tend to cause constipation. Drink plenty of water and eat foods that have a lot of fiber, such as fruits, vegetables, prune juice, apple juice and high-fiber cereal. Take a laxative (Miralax, milk of magnesia, Colace, Senna) if you don t move your bowels at least every other day. Other side effects include upset stomach, sleepiness, dizziness, throwing up, tolerance (needing more of the medicine to have the same effect), physical dependence and slowed breathing.    Store your pills in a secure place, locked if possible. We will not replace any lost or stolen medicine. If you don t finish your medicine, please throw away (dispose) as directed by your pharmacist. The [place] Pollution Control Agency has more information about safe disposal: https://www.pca.[facility].[place].us/living-green/managing-unwanted-medications             Medication List: This is a list of all your medications and when to take them. Check marks below  indicate your daily home schedule. Keep this list as a reference.      Medications           Morning Afternoon Evening Bedtime As Needed    aspirin 81 MG EC tablet   Take 1 tablet (81 mg) by mouth daily                                atorvastatin 40 MG tablet   Commonly known as:  LIPITOR   Take 1 tablet (40 mg) by mouth daily   Last time this was given:  40 mg on 11/3/2018  8:34 AM                                B-12 1000 MCG Tbcr   Take 1,000 mcg by mouth daily                                cholecalciferol 1000 UNIT tablet   Commonly known as:  vitamin D3   Take 1 tablet (1,000 Units) by mouth daily                                * ciprofloxacin 250 MG tablet   Commonly known as:  CIPRO   Take 1 tablet (250 mg) by mouth 2 times daily for 2 doses                                FISH OIL PO   Take 1 capsule by mouth daily                                FLUOXETINE HCL PO   Take 40 mg by mouth every other day (Patient taking differently than prescribed, finishing up old RX; Current RX is 20mg once daily)   Last time this was given:  40 mg on 11/2/2018  8:11 AM                                ketorolac 10 MG tablet   Commonly known as:  TORADOL   Take 1 tablet (10 mg) by mouth every 6 hours as needed for moderate pain                                levothyroxine 50 MCG tablet   Commonly known as:  SYNTHROID/LEVOTHROID   Take 1 tablet (50 mcg) by mouth daily   Last time this was given:  50 mcg on 11/3/2018  8:34 AM                                losartan 100 MG tablet   Commonly known as:  COZAAR   Take 1 tablet (100 mg) by mouth daily                                multivitamin, therapeutic with minerals Tabs tablet   Take 1 tablet by mouth daily                                nitroGLYcerin 0.4 MG sublingual tablet   Commonly known as:  NITROSTAT   Place 1 tablet (0.4 mg) under the tongue every 5 minutes as needed for chest pain                                omeprazole 20 MG CR capsule   Commonly known as:  priLOSEC    TAKE ONE CAPSULE BY MOUTH ONCE DAILY   Last time this was given:  20 mg on 11/3/2018  8:34 AM                                oxyCODONE IR 5 MG tablet   Commonly known as:  ROXICODONE   Take 1-2 tablets (5-10 mg) by mouth every 3 hours as needed   Last time this was given:  10 mg on 11/3/2018 11:44 AM                                senna-docusate 8.6-50 MG per tablet   Commonly known as:  SENOKOT-S;PERICOLACE   Take 1 tablet by mouth 2 times daily To be taken with opioid (narcotic) pain medication to prevent constipation.                                traZODone 100 MG tablet   Commonly known as:  DESYREL   Take 1 tablet (100 mg) by mouth nightly as needed for sleep   Last time this was given:  100 mg on 11/2/2018  9:00 PM                                TYLENOL PO   Take 325-650 mg by mouth 2 times daily as needed for mild pain or fever   Last time this was given:  975 mg on 11/3/2018  6:35 AM                                * Notice:  This list has 1 medication(s) that are the same as other medications prescribed for you. Read the directions carefully, and ask your doctor or other care provider to review them with you.      ASK your doctor about these medications           Morning Afternoon Evening Bedtime As Needed    * ciprofloxacin 500 MG tablet   Commonly known as:  CIPRO   Take 1 tablet (500 mg) by mouth once for 1 dose For catheter removal   Ask about: Should I take this medication?                                * Notice:  This list has 1 medication(s) that are the same as other medications prescribed for you. Read the directions carefully, and ask your doctor or other care provider to review them with you.

## 2018-11-02 LAB
ANION GAP SERPL CALCULATED.3IONS-SCNC: 6 MMOL/L (ref 3–14)
BUN SERPL-MCNC: 10 MG/DL (ref 7–30)
CALCIUM SERPL-MCNC: 8 MG/DL (ref 8.5–10.1)
CHLORIDE SERPL-SCNC: 103 MMOL/L (ref 94–109)
CO2 SERPL-SCNC: 27 MMOL/L (ref 20–32)
CREAT FLD-MCNC: 0.8 MG/DL
CREAT SERPL-MCNC: 0.74 MG/DL (ref 0.66–1.25)
ERYTHROCYTE [DISTWIDTH] IN BLOOD BY AUTOMATED COUNT: 12.6 % (ref 10–15)
GFR SERPL CREATININE-BSD FRML MDRD: >90 ML/MIN/1.7M2
GLUCOSE BLDC GLUCOMTR-MCNC: 101 MG/DL (ref 70–99)
GLUCOSE SERPL-MCNC: 95 MG/DL (ref 70–99)
HCT VFR BLD AUTO: 37.1 % (ref 40–53)
HGB BLD-MCNC: 12.6 G/DL (ref 13.3–17.7)
INTERPRETATION ECG - MUSE: NORMAL
MCH RBC QN AUTO: 33.5 PG (ref 26.5–33)
MCHC RBC AUTO-ENTMCNC: 34 G/DL (ref 31.5–36.5)
MCV RBC AUTO: 99 FL (ref 78–100)
PLATELET # BLD AUTO: 195 10E9/L (ref 150–450)
POTASSIUM SERPL-SCNC: 4 MMOL/L (ref 3.4–5.3)
RBC # BLD AUTO: 3.76 10E12/L (ref 4.4–5.9)
SODIUM SERPL-SCNC: 136 MMOL/L (ref 133–144)
SPECIMEN SOURCE FLD: NORMAL
WBC # BLD AUTO: 9.2 10E9/L (ref 4–11)

## 2018-11-02 PROCEDURE — 25000132 ZZH RX MED GY IP 250 OP 250 PS 637: Performed by: STUDENT IN AN ORGANIZED HEALTH CARE EDUCATION/TRAINING PROGRAM

## 2018-11-02 PROCEDURE — 80048 BASIC METABOLIC PNL TOTAL CA: CPT | Performed by: STUDENT IN AN ORGANIZED HEALTH CARE EDUCATION/TRAINING PROGRAM

## 2018-11-02 PROCEDURE — 25000128 H RX IP 250 OP 636: Performed by: UROLOGY

## 2018-11-02 PROCEDURE — 25000125 ZZHC RX 250: Performed by: UROLOGY

## 2018-11-02 PROCEDURE — 36415 COLL VENOUS BLD VENIPUNCTURE: CPT | Performed by: STUDENT IN AN ORGANIZED HEALTH CARE EDUCATION/TRAINING PROGRAM

## 2018-11-02 PROCEDURE — 85027 COMPLETE CBC AUTOMATED: CPT | Performed by: STUDENT IN AN ORGANIZED HEALTH CARE EDUCATION/TRAINING PROGRAM

## 2018-11-02 PROCEDURE — 82962 GLUCOSE BLOOD TEST: CPT

## 2018-11-02 PROCEDURE — 25000128 H RX IP 250 OP 636: Performed by: STUDENT IN AN ORGANIZED HEALTH CARE EDUCATION/TRAINING PROGRAM

## 2018-11-02 PROCEDURE — 82570 ASSAY OF URINE CREATININE: CPT | Performed by: STUDENT IN AN ORGANIZED HEALTH CARE EDUCATION/TRAINING PROGRAM

## 2018-11-02 RX ORDER — AMOXICILLIN 250 MG
1 CAPSULE ORAL 2 TIMES DAILY
Qty: 60 TABLET | Refills: 1 | Status: SHIPPED | OUTPATIENT
Start: 2018-11-02 | End: 2019-04-01

## 2018-11-02 RX ORDER — CIPROFLOXACIN 500 MG/1
500 TABLET, FILM COATED ORAL ONCE
Qty: 1 TABLET | Refills: 0 | Status: SHIPPED | OUTPATIENT
Start: 2018-11-02 | End: 2019-04-01

## 2018-11-02 RX ORDER — OXYCODONE HYDROCHLORIDE 5 MG/1
5-10 TABLET ORAL
Qty: 15 TABLET | Refills: 0 | Status: SHIPPED | OUTPATIENT
Start: 2018-11-02 | End: 2019-04-01

## 2018-11-02 RX ORDER — GINSENG 100 MG
CAPSULE ORAL 3 TIMES DAILY
Status: DISCONTINUED | OUTPATIENT
Start: 2018-11-02 | End: 2018-11-03 | Stop reason: HOSPADM

## 2018-11-02 RX ADMIN — ACETAMINOPHEN 975 MG: 325 TABLET, FILM COATED ORAL at 13:23

## 2018-11-02 RX ADMIN — ACETAMINOPHEN 975 MG: 325 TABLET, FILM COATED ORAL at 05:57

## 2018-11-02 RX ADMIN — FLUOXETINE 40 MG: 20 CAPSULE ORAL at 08:11

## 2018-11-02 RX ADMIN — HEPARIN SODIUM 5000 UNITS: 5000 INJECTION, SOLUTION INTRAVENOUS; SUBCUTANEOUS at 21:00

## 2018-11-02 RX ADMIN — OXYCODONE HYDROCHLORIDE 10 MG: 5 TABLET ORAL at 03:25

## 2018-11-02 RX ADMIN — BACITRACIN: 500 OINTMENT TOPICAL at 17:39

## 2018-11-02 RX ADMIN — HYDROMORPHONE HYDROCHLORIDE 0.3 MG: 1 INJECTION, SOLUTION INTRAMUSCULAR; INTRAVENOUS; SUBCUTANEOUS at 15:30

## 2018-11-02 RX ADMIN — BACITRACIN: 500 OINTMENT TOPICAL at 21:00

## 2018-11-02 RX ADMIN — LEVOTHYROXINE SODIUM 50 MCG: 50 TABLET ORAL at 08:11

## 2018-11-02 RX ADMIN — HEPARIN SODIUM 5000 UNITS: 5000 INJECTION, SOLUTION INTRAVENOUS; SUBCUTANEOUS at 13:23

## 2018-11-02 RX ADMIN — OXYCODONE HYDROCHLORIDE 10 MG: 5 TABLET ORAL at 16:49

## 2018-11-02 RX ADMIN — BACITRACIN: 500 OINTMENT TOPICAL at 10:44

## 2018-11-02 RX ADMIN — SODIUM CHLORIDE, POTASSIUM CHLORIDE, SODIUM LACTATE AND CALCIUM CHLORIDE: 600; 310; 30; 20 INJECTION, SOLUTION INTRAVENOUS at 08:55

## 2018-11-02 RX ADMIN — OMEPRAZOLE 20 MG: 20 CAPSULE, DELAYED RELEASE ORAL at 08:11

## 2018-11-02 RX ADMIN — OXYCODONE HYDROCHLORIDE 10 MG: 5 TABLET ORAL at 13:30

## 2018-11-02 RX ADMIN — TRAZODONE HYDROCHLORIDE 100 MG: 50 TABLET ORAL at 21:00

## 2018-11-02 RX ADMIN — ATORVASTATIN CALCIUM 40 MG: 40 TABLET, FILM COATED ORAL at 08:11

## 2018-11-02 RX ADMIN — OXYCODONE HYDROCHLORIDE 10 MG: 5 TABLET ORAL at 21:00

## 2018-11-02 RX ADMIN — HEPARIN SODIUM 5000 UNITS: 5000 INJECTION, SOLUTION INTRAVENOUS; SUBCUTANEOUS at 05:57

## 2018-11-02 RX ADMIN — OXYCODONE HYDROCHLORIDE 10 MG: 5 TABLET ORAL at 08:11

## 2018-11-02 RX ADMIN — ACETAMINOPHEN 975 MG: 325 TABLET, FILM COATED ORAL at 21:00

## 2018-11-02 NOTE — PROGRESS NOTES
Urology Daily Progress Note  11/2/2018    24 hour events/Subjective:     - No acute events overnight   - No complaints on AM rounds   - Denies nausea or emesis   - Has been ambulating   - Tolerating clears    O:  Temp:  [96  F (35.6  C)-98.6  F (37  C)] 96  F (35.6  C)  Pulse:  [62-71] 71  Heart Rate:  [61-76] 69  Resp:  [8-20] 16  BP: (121-165)/(74-94) 165/85  SpO2:  [94 %-100 %] 96 %  General: Alert, interactive, & in NAD; resting in bed  Resp: Breathing is non-labored  Cardiac: Regular rate; extremities warm  Abdomen: Soft, appropriately tender to palpation, nondistended. Incision sites are C/D/I and covered with dermabond.  Bulb drain with serosanguinous output.  : Sen catheter in place draining clear urine; no clots.  Extremities: No LE edema or obvious joint abnormalities  Skin: Warm and dry, no jaundice or rash     Ins & Outs (24 hours/since MN)  UOP  800/2300  Drain  95/20    Labs/Imaging  BMP    Recent Labs  Lab 11/01/18  1201      POTASSIUM 4.0   CHLORIDE 105   MIRYAM 8.7   CO2 27   BUN 13   CR 0.76   *     CBC  Recent Labs  Lab 11/01/18  1201   WBC 10.5   RBC 4.16*   HGB 13.6   HCT 40.9   MCV 98   MCH 32.7   MCHC 33.3   RDW 12.5        INRNo lab results found in last 7 days.     Assessment/Plan  68 year old y/o male POD #1 s/p RALP for prostate cancer. Doing well.    PLAN:  Neuro/Pain: Continue current regimen  CV/PULM: No acute issues. Encourage IS, ambulation  GI/FEN:    - Advance diet as tolerated   - Electrolyte replacement PRN   - Decrease mIVF  : UOP appropriate; Sen catheter to remain.  Will check drain creatinine this AM and likely remove prior to discharge if no evidence of urine leak.  Heme: No active issues; f/u AM labs  ID: No active issues   Endocrine: No active issues   Activity: Up ad joão  Ppx: SCDs, ambulation, IS, heparin  Dispo: Likely home today.    Will discuss with Dr. Blankenship.    --    Don Win MD   Urology Chief Resident    6:08 AM, 11/2/2018

## 2018-11-02 NOTE — PLAN OF CARE
Problem: Patient Care Overview  Goal: Plan of Care/Patient Progress Review  Outcome: Improving  AO x 4. VSS on RA. Capno on. C/o pain 3-4, oxy given with moderate relief and heat. Lap sites CDI. Moderate bloody output in IZABELA. Sen with AUO, pink colored. BS active, + flatus. Advanced to full liquid diet this morning, AAT. Ambulated halls x 2 over shift, SBA. IV infusing, fluids decreased this morning.

## 2018-11-02 NOTE — PLAN OF CARE
Problem: Patient Care Overview  Goal: Plan of Care/Patient Progress Review  Outcome: No Change  Pt Ox4, lethargic at times. VSS on RA. C/o pain 4-5 and pain, given PRN oxy x2 and heat packs with moderate relief. Lap sites CDI. IZABELA with moderate sanguinous outout. Sen with good clear yellow output. BS active but faint, +flatus, no BM. Advanced to low fiber diet in PM. Ambulated halls x 2 with SBA. IV infusing LR @50 mL/hr.

## 2018-11-02 NOTE — PLAN OF CARE
Problem: Patient Care Overview  Goal: Plan of Care/Patient Progress Review  Outcome: Improving   Alert and oriented.  Vitals stable on room air, Capno IPI 9-10.  C/o 7/10 pain with movement and ambulation.  Gave IV Dilaudid 0.3mg x1 and Oxycodone 5mg x1.  Lap sites CDI.  IZABELA drain 20cc blood-tinged output.  300cc caprice colored urine, minimal blood streaks.  BS hypoactive and not passing gas.  Tolerating clear liquid diet well.  Ambulated halls x1 this shift with stand by assistance.  LR infusing 100cc/hr.

## 2018-11-02 NOTE — PLAN OF CARE
Problem: Patient Care Overview  Goal: Plan of Care/Patient Progress Review  Pt is alert and oriented.  VSS currently on RA.  Pt complained of 7/10 abdominal pain, IV dilaudid 0.3mg given X1 (1530) with some relief.  Oxycodone 10 mg given X1 (1649).  Hot pack also used.  Low Fiber diet.  Ambulated X1 in capps with SBA.  Sen intact.  IZABELA intact moderate sangunious output.  LR infusing at 50 ml/hr.  Lap sites CDI.  BS hypoactive, +gas, no BM yet.  Pt did complain of feeling feverish, but temp WNL-offered ice pack/wash clothe and declined. Fan placed in room for comfort.  Will continue to monitor. Plan to discharge tomorrow.

## 2018-11-03 VITALS
RESPIRATION RATE: 16 BRPM | BODY MASS INDEX: 22.99 KG/M2 | OXYGEN SATURATION: 93 % | HEART RATE: 71 BPM | DIASTOLIC BLOOD PRESSURE: 79 MMHG | TEMPERATURE: 98.5 F | SYSTOLIC BLOOD PRESSURE: 143 MMHG | HEIGHT: 65 IN | WEIGHT: 138 LBS

## 2018-11-03 LAB
ANION GAP SERPL CALCULATED.3IONS-SCNC: 4 MMOL/L (ref 3–14)
BUN SERPL-MCNC: 13 MG/DL (ref 7–30)
CALCIUM SERPL-MCNC: 8.8 MG/DL (ref 8.5–10.1)
CHLORIDE SERPL-SCNC: 103 MMOL/L (ref 94–109)
CO2 SERPL-SCNC: 30 MMOL/L (ref 20–32)
CREAT SERPL-MCNC: 0.78 MG/DL (ref 0.66–1.25)
ERYTHROCYTE [DISTWIDTH] IN BLOOD BY AUTOMATED COUNT: 12.7 % (ref 10–15)
GFR SERPL CREATININE-BSD FRML MDRD: >90 ML/MIN/1.7M2
GLUCOSE SERPL-MCNC: 96 MG/DL (ref 70–99)
HCT VFR BLD AUTO: 39.4 % (ref 40–53)
HGB BLD-MCNC: 13.2 G/DL (ref 13.3–17.7)
MCH RBC QN AUTO: 33.8 PG (ref 26.5–33)
MCHC RBC AUTO-ENTMCNC: 33.5 G/DL (ref 31.5–36.5)
MCV RBC AUTO: 101 FL (ref 78–100)
PLATELET # BLD AUTO: 184 10E9/L (ref 150–450)
POTASSIUM SERPL-SCNC: 4.3 MMOL/L (ref 3.4–5.3)
RBC # BLD AUTO: 3.91 10E12/L (ref 4.4–5.9)
SODIUM SERPL-SCNC: 137 MMOL/L (ref 133–144)
WBC # BLD AUTO: 8.6 10E9/L (ref 4–11)

## 2018-11-03 PROCEDURE — 25000132 ZZH RX MED GY IP 250 OP 250 PS 637: Performed by: STUDENT IN AN ORGANIZED HEALTH CARE EDUCATION/TRAINING PROGRAM

## 2018-11-03 PROCEDURE — 36415 COLL VENOUS BLD VENIPUNCTURE: CPT | Performed by: STUDENT IN AN ORGANIZED HEALTH CARE EDUCATION/TRAINING PROGRAM

## 2018-11-03 PROCEDURE — 25000128 H RX IP 250 OP 636: Performed by: STUDENT IN AN ORGANIZED HEALTH CARE EDUCATION/TRAINING PROGRAM

## 2018-11-03 PROCEDURE — 85027 COMPLETE CBC AUTOMATED: CPT | Performed by: STUDENT IN AN ORGANIZED HEALTH CARE EDUCATION/TRAINING PROGRAM

## 2018-11-03 PROCEDURE — 80048 BASIC METABOLIC PNL TOTAL CA: CPT | Performed by: STUDENT IN AN ORGANIZED HEALTH CARE EDUCATION/TRAINING PROGRAM

## 2018-11-03 RX ORDER — KETOROLAC TROMETHAMINE 10 MG/1
10 TABLET, FILM COATED ORAL EVERY 6 HOURS PRN
Qty: 20 TABLET | Refills: 0 | Status: SHIPPED | OUTPATIENT
Start: 2018-11-03 | End: 2019-01-14

## 2018-11-03 RX ORDER — CIPROFLOXACIN 250 MG/1
250 TABLET, FILM COATED ORAL 2 TIMES DAILY
Qty: 2 TABLET | Refills: 0 | Status: SHIPPED | OUTPATIENT
Start: 2018-11-03 | End: 2019-04-01

## 2018-11-03 RX ADMIN — LEVOTHYROXINE SODIUM 50 MCG: 50 TABLET ORAL at 08:34

## 2018-11-03 RX ADMIN — SODIUM CHLORIDE, POTASSIUM CHLORIDE, SODIUM LACTATE AND CALCIUM CHLORIDE: 600; 310; 30; 20 INJECTION, SOLUTION INTRAVENOUS at 06:36

## 2018-11-03 RX ADMIN — OMEPRAZOLE 20 MG: 20 CAPSULE, DELAYED RELEASE ORAL at 08:34

## 2018-11-03 RX ADMIN — ATORVASTATIN CALCIUM 40 MG: 40 TABLET, FILM COATED ORAL at 08:34

## 2018-11-03 RX ADMIN — HEPARIN SODIUM 5000 UNITS: 5000 INJECTION, SOLUTION INTRAVENOUS; SUBCUTANEOUS at 06:35

## 2018-11-03 RX ADMIN — ACETAMINOPHEN 975 MG: 325 TABLET, FILM COATED ORAL at 06:35

## 2018-11-03 RX ADMIN — BACITRACIN: 500 OINTMENT TOPICAL at 08:43

## 2018-11-03 RX ADMIN — OXYCODONE HYDROCHLORIDE 10 MG: 5 TABLET ORAL at 11:44

## 2018-11-03 NOTE — PLAN OF CARE
Problem: Pain, Acute (Adult)  Goal: Acceptable Pain Control/Comfort Level  Patient will demonstrate the desired outcomes by discharge/transition of care.  Outcome: Improving  A/Ox4. VSS on RA. C/o abdominal pain, PRN oxycodone given x1, effective. Lap sites WDL. Left IZABELA drain, moderate output. Restrepo patent with yellow output. BS faint. No BM yet. PIV infusing LR 50ml/hr. SBA. Plan for discharge with restrepo today. Continue to monitor.

## 2018-11-03 NOTE — PLAN OF CARE
Problem: Patient Care Overview  Goal: Plan of Care/Patient Progress Review  Outcome: Adequate for Discharge Date Met: 11/03/18  A/Ox4. VSS on RA. C/o abdominal pain, PRN oxycodone given x1, effective. Lap sites WDL. Left IZABELA drain removed without difficulties per Urology order.  Restrepo patent with yellow output. Restrepo demonstration  and education for home care completed and all questions answered. Restrepo care supplies provided to patient.  BS faint. No BM yet. Fluids discontinued. SBA.  Pt ready for discharge.  All discharge education completed and all questions answered. Pt educated about need to follow up with primary care provider and with Urology post hospitalization for Restrepo Removal. Pt instructed on how to take antibiotics before restrepo removal. Video was offered but pt stated he already has seen it yesterday. Medications filled here and given to pt. All belongings to pt. Pt in stable conditions at time of discharge. Wife to give pt ride to home.Pt discharged, transported by transport NA in stable conditions.

## 2018-11-03 NOTE — PROVIDER NOTIFICATION
MD Notification:    Person notified: Urology    Person Name: Dr. Altamirano    Date/Time: 11/3/18 at 0900    Interaction: face to face    Purpose of Notification: Pt catheter is bypassing urine ( dripping on the sides)  while ambulating. Pt catheter is patent and urine draining to restrepo bag    Orders Received: Per Dr. Altamirano, bypassing is normal due to bladder spasms. No need to irrigate catheter given that the catheter is draining and patent.     Comments: Pt was informed that dripping of urine was normal and to be expected due to bladder spasms.

## 2018-11-03 NOTE — PROGRESS NOTES
"/79 (BP Location: Left arm)  Pulse 71  Temp 98.5  F (36.9  C) (Oral)  Resp 16  Ht 1.651 m (5' 5\")  Wt 62.6 kg (138 lb)  SpO2 93%  BMI 22.96 kg/m2    Lab Results   Component Value Date    HGB 13.2 11/03/2018     Patient is making excellent progress.  Tolerating regular diet.  Catheter tolerated well.  Drainage declined.  Drainage consistent with serous fluid  Discussed Kegel exercises.  Patient can be discharged today with catheter in.  We will follow-up with Dr. Blankenship in the office in 2 weeks for removal of catheter.  "

## 2018-11-03 NOTE — PROVIDER NOTIFICATION
MD Notification:    Person notified: Urology    Person Name:  Dr. Altamirano    Date/Time: 11/3/18 at 1215    Interaction: pager    Purpose of Notification:  Need clarification for antibiotics order. There are same medication prescribed at different dosages. One states when to take it and one dosage does not.    Orders Received:order to give only the  500 mg antibiotics ( one time dose 1 hr before the removal of Urinary catheter) . Md wanted the other order discontinued.     Comments:

## 2018-11-04 NOTE — OP NOTE
Procedure Date: 11/01/2018      DATE OF PROCEDURE:  11/01/2018      SURGEON:  Clint Blankenship MD      ASSISTANT:  Dr. Shayne Win; Amaya Feldman.      PREOPERATIVE DIAGNOSIS:  Prostate cancer.      POSTOPERATIVE DIAGNOSIS:  Prostate cancer.      PROCEDURE PERFORMED:  Robotic-assisted laparoscopic radical prostatectomy with bilateral pelvic lymph node dissection. Bilateral wide excision procedure     ANESTHESIA:  General.      COMPLICATIONS:  None.      ESTIMATED BLOOD LOSS:  100 mL.      INDICATIONS FOR PROCEDURE:  Jose Moreno is a 68-year-old gentleman who presents with an elevated PSA and is found to have a high-grade Shane 4+5=9 prostate cancer.  He now presents for a robotic-assisted laparoscopic radical prostatectomy.  Due to the high-grade nature of his cancer, we will not be performing a nerve-sparing procedure today on either side.      DETAILS OF THE PROCEDURE:  The risks and benefits of the procedure were explained in detail with the patient and informed consent was obtained.  The patient was brought to the operating room and placed supine on the table where he underwent general endotracheal anesthetic.  The legs were then moved into the stirrups and the abdomen was shaved and prepped and draped in sterile fashion.  The patient was secured to the table.        After appropriate timeout, I inserted a Sen catheter through the urethra and then tented up the umbilicus.  I inserted the Veress needle at the umbilicus and achieved 15 mmHg pressure.  I then made an incision above the umbilicus and inserted a camera port at this site.  I inserted the camera.  No injury had occurred during access.  Under direct visualization I then placed 2 right-sided robotic arm ports and one on the left side.  I also placed a 12 mm AirSeal assistant port off to the left side of the camera port.  The patient was then brought to the 25-degree Trendelenburg position and the robot was brought in and docked.      At the  beginning of dissection I took down a few adhesions of the sigmoid colon to gain access to the pelvis.  I incised the reflection of the peritoneum anteriorly to identify vas deferens and seminal vesicles bilaterally.  All 4 structures were cleared of the surrounding tissues and vascular supply was taken down with monopolar electrocautery.  Once this was completed, I incised Denonvilliers' fascia posteriorly and just developed the posterior plane of the prostate.      I lifted off my retraction of the sigmoid colon and then I incised the medial umbilical ligament on each side to develop the space of Retzius.  This was developed until I reached the pubic arch.  The endopelvic fascia was cleared of its overlying fat.  The superficial dorsal vein of the prostate was taken down with bipolar electrocautery.  I then incised endopelvic fascia on each side to identify the lateral limits of the prostate.  A 2-0 V-Loc suture was placed twice around the dorsal venous complex for hemostasis purposes and then the needle was cut and removed from the body.  I next pulled on the Sen catheter to identify the prostatovesical junction and incised anteriorly into the bladder neck to dissect the anterior bladder neck off from the anterior base of the prostate.  Dissection was carried through until I reached my Sen catheter.  The Sen catheter balloon was brought down and then brought through for retraction purposes.  I then dissected the posterior bladder neck free from the posterior base of the prostate.  This dissection was carried through until I reached my original plane of dissection and identified the vas deferens and seminal vesicles.  I then took down the pedicles of the prostate on each side with a series of Weck clips and nerve-sparing procedure was not performed on each side.  I developed the posterior plane between the rectum and prostate, and then also took down the neurovascular bundle with a series of Weck clips.  I  did this until I reached the apex of the prostate.  I then looked anteriorly and incised the dorsal venous complex with minimal bleeding.  I developed the urethra and then cut in 5 degrees sharply with the scissors.  The prostate was freed and was sent in the EndoCatch bag.      I then placed a bilateral pelvic lymph node dissection by dissecting free the lymph node packet between the external iliac vein and obturator nerve on each side.  This was taken down with a series of Weck clips on each side as well.  The vesicourethral reanastomosis was then performed with a double-armed 3-0 V-Loc suture at the 6 o'clock and ending at the 12 o'clock position on each side.  A new Sen catheter was placed and the bladder was irrigated until the output was clear.  I then removed the left-sided instrument to place a Josue-Balderas drain at this site.  This was then tacked in the skin with 2-0 silk suture.  The robot was then undocked and the patient was brought out of Trendelenburg position.  I increased the supraumbilical incision such that I could remove the EndoCatch specimen intact.  I reclosed the fascia at this site with interrupted #1 PDS sutures.  Skin incisions were all closed with 4-0 Monocryl subcuticular stitches and then covered with Dermabond.  The procedure was concluded at this point.      The patient tolerated the procedure without complications.  He went to the post-anesthetic care in good condition.  He will stay overnight in the hospital from there.         IDALIA JONES MD             D: 2018   T: 2018   MT: CARLOS      Name:     DANIELLA OVALLE   MRN:      [id number]        Account:        [id number]   :      1950           Procedure Date: 2018      Document: [id number]

## 2018-11-05 LAB — COPATH REPORT: NORMAL

## 2018-11-08 DIAGNOSIS — F32.1 MAJOR DEPRESSIVE DISORDER, SINGLE EPISODE, MODERATE (H): ICD-10-CM

## 2018-11-08 RX ORDER — TRAZODONE HYDROCHLORIDE 100 MG/1
TABLET ORAL
Qty: 135 TABLET | Refills: 3 | OUTPATIENT
Start: 2018-11-08

## 2018-11-11 DIAGNOSIS — F32.1 MAJOR DEPRESSIVE DISORDER, SINGLE EPISODE, MODERATE (H): ICD-10-CM

## 2018-11-12 ENCOUNTER — TELEPHONE (OUTPATIENT)
Dept: UROLOGY | Facility: CLINIC | Age: 68
End: 2018-11-12

## 2018-11-12 RX ORDER — FLUOXETINE 40 MG/1
CAPSULE ORAL
Qty: 90 CAPSULE | Refills: 2 | OUTPATIENT
Start: 2018-11-12

## 2018-11-12 NOTE — TELEPHONE ENCOUNTER
"Jose is S/P radical prostatectomy on 11-1 . He still has his restrepo in place and is scheduled to get this out on Wed this week. He this am c/o pain in the penis and some rectal pain . He states the pain is constant and slight burning sensation in the penis. Urine is draining ok and he  states his BM's are 'urgent but small and soft\" He has been drinking caffeine mainly coffee in am. Suggested he hold off on coffee as these symptoms maybe bladder spasms and the caffeine could increase the spasms. He is willing to put up with the symptoms until Wed if need be. Informed him I would send this note to Dr Blankenship to see if there is anything else we can offer him to help . Annabelle Corey LPN  "

## 2018-11-13 ENCOUNTER — HOSPITAL ENCOUNTER (EMERGENCY)
Facility: CLINIC | Age: 68
Discharge: HOME | End: 2018-11-14
Attending: EMERGENCY MEDICINE | Admitting: EMERGENCY MEDICINE
Payer: COMMERCIAL

## 2018-11-13 ENCOUNTER — NURSE TRIAGE (OUTPATIENT)
Dept: NURSING | Facility: CLINIC | Age: 68
End: 2018-11-13

## 2018-11-13 ENCOUNTER — APPOINTMENT (OUTPATIENT)
Dept: GENERAL RADIOLOGY | Facility: CLINIC | Age: 68
End: 2018-11-13
Attending: EMERGENCY MEDICINE
Payer: COMMERCIAL

## 2018-11-13 DIAGNOSIS — T83.511A URINARY TRACT INFECTION ASSOCIATED WITH INDWELLING URETHRAL CATHETER, INITIAL ENCOUNTER (H): ICD-10-CM

## 2018-11-13 DIAGNOSIS — N39.0 URINARY TRACT INFECTION ASSOCIATED WITH INDWELLING URETHRAL CATHETER, INITIAL ENCOUNTER (H): ICD-10-CM

## 2018-11-13 LAB
ANION GAP SERPL CALCULATED.3IONS-SCNC: 6 MMOL/L (ref 3–14)
BASOPHILS # BLD AUTO: 0 10E9/L (ref 0–0.2)
BASOPHILS NFR BLD AUTO: 0.3 %
BUN SERPL-MCNC: 24 MG/DL (ref 7–30)
CALCIUM SERPL-MCNC: 8.8 MG/DL (ref 8.5–10.1)
CHLORIDE SERPL-SCNC: 105 MMOL/L (ref 94–109)
CO2 SERPL-SCNC: 26 MMOL/L (ref 20–32)
CREAT SERPL-MCNC: 0.91 MG/DL (ref 0.66–1.25)
DIFFERENTIAL METHOD BLD: ABNORMAL
EOSINOPHIL # BLD AUTO: 0.7 10E9/L (ref 0–0.7)
EOSINOPHIL NFR BLD AUTO: 7.8 %
ERYTHROCYTE [DISTWIDTH] IN BLOOD BY AUTOMATED COUNT: 12 % (ref 10–15)
GFR SERPL CREATININE-BSD FRML MDRD: 82 ML/MIN/1.7M2
GLUCOSE SERPL-MCNC: 100 MG/DL (ref 70–99)
HCT VFR BLD AUTO: 35 % (ref 40–53)
HGB BLD-MCNC: 11.9 G/DL (ref 13.3–17.7)
IMM GRANULOCYTES # BLD: 0 10E9/L (ref 0–0.4)
IMM GRANULOCYTES NFR BLD: 0.5 %
LYMPHOCYTES # BLD AUTO: 2.2 10E9/L (ref 0.8–5.3)
LYMPHOCYTES NFR BLD AUTO: 24.7 %
MCH RBC QN AUTO: 34 PG (ref 26.5–33)
MCHC RBC AUTO-ENTMCNC: 34 G/DL (ref 31.5–36.5)
MCV RBC AUTO: 100 FL (ref 78–100)
MONOCYTES # BLD AUTO: 0.6 10E9/L (ref 0–1.3)
MONOCYTES NFR BLD AUTO: 6.8 %
NEUTROPHILS # BLD AUTO: 5.3 10E9/L (ref 1.6–8.3)
NEUTROPHILS NFR BLD AUTO: 59.9 %
NRBC # BLD AUTO: 0 10*3/UL
NRBC BLD AUTO-RTO: 0 /100
PLATELET # BLD AUTO: 416 10E9/L (ref 150–450)
POTASSIUM SERPL-SCNC: 4.1 MMOL/L (ref 3.4–5.3)
RBC # BLD AUTO: 3.5 10E12/L (ref 4.4–5.9)
SODIUM SERPL-SCNC: 137 MMOL/L (ref 133–144)
WBC # BLD AUTO: 8.8 10E9/L (ref 4–11)

## 2018-11-13 PROCEDURE — 87088 URINE BACTERIA CULTURE: CPT | Performed by: EMERGENCY MEDICINE

## 2018-11-13 PROCEDURE — 87086 URINE CULTURE/COLONY COUNT: CPT | Performed by: EMERGENCY MEDICINE

## 2018-11-13 PROCEDURE — 85025 COMPLETE CBC W/AUTO DIFF WBC: CPT | Performed by: EMERGENCY MEDICINE

## 2018-11-13 PROCEDURE — 99283 EMERGENCY DEPT VISIT LOW MDM: CPT

## 2018-11-13 PROCEDURE — 87186 SC STD MICRODIL/AGAR DIL: CPT | Performed by: EMERGENCY MEDICINE

## 2018-11-13 PROCEDURE — 74019 RADEX ABDOMEN 2 VIEWS: CPT

## 2018-11-13 PROCEDURE — 81001 URINALYSIS AUTO W/SCOPE: CPT | Performed by: EMERGENCY MEDICINE

## 2018-11-13 PROCEDURE — 80048 BASIC METABOLIC PNL TOTAL CA: CPT | Performed by: EMERGENCY MEDICINE

## 2018-11-13 ASSESSMENT — ENCOUNTER SYMPTOMS
DIFFICULTY URINATING: 0
CONSTIPATION: 1
FEVER: 0
DYSURIA: 0
FREQUENCY: 0
HEMATURIA: 1

## 2018-11-13 NOTE — ED AVS SNAPSHOT
[facility] Emergency Department    [address]    [facility] [address]    Phone:  [phone]    Fax:  [phone]                                       Jose Moreno   MRN: [id number]    Department:  [facility] Emergency Department   Date of Visit:  11/13/2018           After Visit Summary Signature Page     I have received my discharge instructions, and my questions have been answered. I have discussed any challenges I see with this plan with the nurse or doctor.    ..........................................................................................................................................  Patient/Patient Representative Signature      ..........................................................................................................................................  Patient Representative Print Name and Relationship to Patient    ..................................................               ................................................  Date                                   Time    ..........................................................................................................................................  Reviewed by Signature/Title    ...................................................              ..............................................  Date                                               Time          22EPIC Rev 08/18

## 2018-11-13 NOTE — TELEPHONE ENCOUNTER
"Morgan 2 weeks s/p prostatectomy.  Has Sen catheter in, has had bloody urine \"all afternoon\".  No known trauma but cath is not adhered to prevent being pulled on.  Did empty leg bag, urine continues to be bright red with clots.  Is scheduled to have removed tomorrow in clinic.  Does also report abdominal pain, \"it hurts to sit on the couch\" and believes he may have an \"obstructed bowel\".  Does report normal bm's after surgery, and for the past few days has had small amounts of stool out with an urgent nature.      Reason for Disposition    [1] Catheter was accidentally pulled-out AND [2] bright red continuous bleeding    Protocols used: URINARY CATHETER SYMPTOMS AND QUESTIONS-ADULT-    "

## 2018-11-13 NOTE — ED AVS SNAPSHOT
[facility]    [address]    [facility] [address]    Phone:  [phone]    Fax:  [phone]                                       Jose Moreno   MRN: [id number]    Department:  [facility]   Date of Visit:  11/13/2018           Patient Information     Date Of Birth          1950        Your diagnoses for this visit were:     Urinary tract infection associated with indwelling urethral catheter, initial encounter (H)        You were seen by Annabelle Blankenship MD and Rafi Munoz MD.      Follow-up Information     Follow up with Urology tomorrow. .        Discharge Instructions         Catheter-Associated Urinary Tract Infections     A small balloon keeps the catheter in place inside the bladder.   A catheter-associated urinary tract infection (CAUTI) is an infection of the urinary system. CAUTI is caused by bacteria that enter the urinary tract when a urinary catheter is used. This is a tube that s placed into the bladder to drain urine.  The urinary system  This system includes the kidneys, ureters, bladder, and urethra. The kidneys filter blood and make urine. The ureters carry urine from the kidneys to the bladder. The bladder stores urine. The urethra carries urine from the bladder to the outside of the body.  What is a urinary catheter?  A urinary catheter is a thin, flexible tube. It is placed in the bladder to drain urine. Urine flows through the tube into a collecting bag outside of the body. There are different types of urinary catheters. The most common type is an indwelling catheter. This is also known as a urethral catheter. This is because it s placed into the bladder through the urethra. This catheter is also called a Sen catheter.  Why is a urinary catheter needed?  A urinary catheter is needed for any of the following:    You can t get up to use the toilet because your mobility is limited. This may be due  to surgery, an injury, or illness.    You have a blockage in your urinary system.    Your healthcare provider needs to measure the amount of urine you pass.    The function of your kidneys and bladder is being tested.    You re not able to control your bladder (incontinence).  In most cases, the urinary catheter is temporary. You'll need it only until the problem that requires it is resolved.   How does a CAUTI develop?  Bacteria can enter the urinary tract as the catheter is put into the urethra. Bacteria can also get into the urinary tract while the catheter is in place. The common bacteria that cause a CAUTI are ones that live in the intestine. These bacteria don t normally cause problems in the intestine. But when they get into the urinary tract, a CAUTI can result.  Why is a CAUTI of concern?  Left untreated, a CAUTI can lead to health problems. These problems may include bladder infection, prostate infection, and kidney infection. A CAUTI can prolong your hospital stay. If the infection is not treated in time, serious health complications may occur.  What are the symptoms of a CAUTI?    A burning feeling, pressure, or pain in your lower abdomen    Fever or chills    Urine in the collecting bag is cloudy or bloody (pink or red)    Burning feeling in the urethra or genital area    Aching in the back (kidney area)    Nausea and vomiting    Person is confused, or is not alert, or has a change in behavior (mainly affects older patients)    Note that sometimes a person won t have any symptoms but may still have a CAUTI.  Tell a healthcare provider right away if you or your loved one has any of these symptoms.  How is CAUTI diagnosed?  If you have symptoms of CAUTI your healthcare provider will order tests. These include a urine test, blood tests, and other tests as needed.  How is CAUTI treated?   Treatment may involve any of the following:    Antibiotics. Your healthcare provider will likely prescribe antibiotics  if you have symptoms. Be aware that if you don t have symptoms, you may not be given antibiotics. This is to prevent an increase in bacteria that resist (can t be killed by) certain antibiotics.    Removing the catheter. The catheter will be removed when your healthcare provider decides it s no longer needed. This usually helps stop the infection.    Changing the catheter. If you still need a catheter, the old one will be removed. A new one will be put in. This may help stop the infection.  How do hospital and long-term facility staff prevent CAUTI?  To keep patients from getting a CAUTI, the staff follow certain procedures:    Prescribe a catheter only when it s needed. It is removed as soon as it s no longer needed.    Use sterile (clean) technique when placing the catheter into the urinary tract. This means before putting the catheter in, the caregiver washes his or her hands with soap and water. He or she then puts on sterile gloves. A sterile catheter kit that has cleansers is used to cleanse the patient s genital area.    Before performing catheter care, caregivers also wash their hands or use an alcohol-based hand cleanser.    Hang the bag lower than your bladder. This prevents urine from flowing back into your bladder.    Ensure that the bag is emptied regularly.  What you can do as a patient to prevent CAUTI  You can help prevent yourself from getting a CAUTI by doing the following:    Every day ask your healthcare provider how long you need to have the catheter. The longer you have a catheter, the higher your chance of getting a CAUTI.    If a caregiver doesn t clean his or her hands and put on gloves before touching your catheter, ask them to do so.    If you ve been taught how to care for your catheter, be sure to wash your hands before and after each session.    Make sure your bag is lower than your bladder. If it s not, tell your caregiver.    Don t disconnect the catheter and drain tube. Doing so  allows germs to get into the catheter.    Cleansing of the genital and perineal areas is very important to help decrease bacteria in areas surrounding the catheter. Ask your doctor what you should use and how often to clean these areas.  If you are discharged with an indwelling catheter    Before you leave the hospital, make sure you understand the instructions on how to care for your catheter at home.    Ask your healthcare provider how long you need the catheter. Also ask if you need to make a follow-up appointment to have the catheter removed.    Always use sterile (clean) technique when caring for your catheter. Wash your hands before and after doing any catheter care.    Call your healthcare provider right away if you develop symptoms of a CAUTI (see above).   Date Last Reviewed: 1/1/2017 2000-2018 [employer]. [address] [place] [address]. All rights reserved. This information is not intended as a substitute for professional medical care. Always follow your healthcare professional's instructions.          Your next 10 appointments already scheduled     Nov 14, 2018  1:30 PM CST   Return Visit with Summer Dover PA-C   [facility] ([facility])    [address]  [address]  [facility] [id number] [phone]            Nov 15, 2018  2:15 PM CST   Radiology Injections with Nancy Cortez MD   [facility] ([facility])    [address]  [address]  [facility] [address]   [phone]            Nov 15, 2018  2:15 PM CST   XR LUMBAR SACRAL FACET INJ LEFT with [email or web address]   [facility] ([facility])    [address]  [address]  [facility] [address]   [phone]           How do I prepare for my exam? (Food and drink instructions) Stop all food and drink (including water) 3 hours before your test or treatment.   How do I prepare for my exam? (Other instructions) Stop taking the following medicines (but talk to your doctor first): * If you take blood thinners, you may need to stop taking them a few days before treatment. Talk to your doctor before stopping these medicines. Stop taking Coumadin (warfarin) 3 days before treatment. Restart the day after treatment. * If you take Plavix, Ticlid, Pletal or Persantine, please ask your doctor if you should stop these medicines. You may need extra tests on the morning of your scan. * If you take Xarelto (Rivaroxaban), you may need to stop taking it 24 hours before treatment. Talk to your doctor before stopping this medicine. Restart the day after treatment.  You may take your other medicines as normal.  What should I wear: Wear comfortable clothes.  How long does the exam take: Injections take about 30 to 45 minutes. Most people spend up to 2 hours in the clinic or hospital.  What should I bring: For nerve root injection, please send or bring copies of any MRIs or other scans you have had. Please bring a list of your current medicines to your exam. Include vitamins, minerals and over-the-counter medicines.  Do I need a :  Plan to have someone drive you home afterward.  What do I need to tell my doctor: Tell your doctor if: * You have ever had an allergic reaction to X-ray dye (contrast fluid). * If there s any chance you are pregnant.  What should I do after the exam: We may ask you to lie down for a short time before you go home. If you had a nerve shot and your arm or leg feels numb, you will stay for up to two hours until the numbness wears off. You may return to your normal activities the next day.  What is this test: A spinal shot is done in or near the spine. You may receive steroid medicine (to reduce swelling) or contrast fluid (dye that makes the area show up more clearly on X-rays).  Who should I call with questions: If you have any questions, please call the Imaging  Department where you will have your exam. Directions, parking instructions, and other information is available on our website, [email or web address].              24 Hour Appointment Hotline       To make an appointment at any [facility] clinic, call [phone] ([phone]). If you don't have a family doctor or clinic, we will help you find one. [facility] clinics are conveniently located to serve the needs of you and your family.             Review of your medicines      START taking        Dose / Directions Last dose taken    ciprofloxacin 500 MG tablet   Commonly known as:  CIPRO   Dose:  500 mg   Quantity:  14 tablet        Take 1 tablet (500 mg) by mouth 2 times daily for 7 days   Refills:  0          Our records show that you are taking the medicines listed below. If these are incorrect, please call your family doctor or clinic.        Dose / Directions Last dose taken    aspirin 81 MG EC tablet   Dose:  81 mg   Quantity:  60 tablet        Take 1 tablet (81 mg) by mouth daily   Refills:  0        atorvastatin 40 MG tablet   Commonly known as:  LIPITOR   Dose:  40 mg   Quantity:  90 tablet        Take 1 tablet (40 mg) by mouth daily   Refills:  1        B-12 1000 MCG Tbcr   Dose:  1000 mcg        Take 1,000 mcg by mouth daily   Refills:  0        cholecalciferol 1000 UNIT tablet   Commonly known as:  vitamin D3   Dose:  1000 Units        Take 1 tablet (1,000 Units) by mouth daily   Refills:  0        FISH OIL PO   Dose:  1 capsule        Take 1 capsule by mouth daily   Refills:  0        FLUOXETINE HCL PO   Dose:  40 mg        Take 40 mg by mouth every other day (Patient taking differently than prescribed, finishing up old RX; Current RX is 20mg once daily)   Refills:  0        ketorolac 10 MG tablet   Commonly known as:  TORADOL   Dose:  10 mg   Quantity:  20 tablet        Take 1 tablet (10 mg) by mouth every 6 hours as needed for moderate pain   Refills:  0        levothyroxine 50 MCG tablet   Commonly  known as:  SYNTHROID/LEVOTHROID   Dose:  50 mcg   Quantity:  90 tablet        Take 1 tablet (50 mcg) by mouth daily   Refills:  3        losartan 100 MG tablet   Commonly known as:  COZAAR   Dose:  100 mg   Quantity:  90 tablet        Take 1 tablet (100 mg) by mouth daily   Refills:  3        multivitamin, therapeutic with minerals Tabs tablet   Dose:  1 tablet        Take 1 tablet by mouth daily   Refills:  0        nitroGLYcerin 0.4 MG sublingual tablet   Commonly known as:  NITROSTAT   Dose:  0.4 mg   Quantity:  25 tablet        Place 1 tablet (0.4 mg) under the tongue every 5 minutes as needed for chest pain   Refills:  1        omeprazole 20 MG CR capsule   Commonly known as:  priLOSEC   Quantity:  90 capsule        TAKE ONE CAPSULE BY MOUTH ONCE DAILY   Refills:  3        oxyCODONE IR 5 MG tablet   Commonly known as:  ROXICODONE   Dose:  5-10 mg   Quantity:  15 tablet        Take 1-2 tablets (5-10 mg) by mouth every 3 hours as needed   Refills:  0        senna-docusate 8.6-50 MG per tablet   Commonly known as:  SENOKOT-S;PERICOLACE   Dose:  1 tablet   Quantity:  60 tablet        Take 1 tablet by mouth 2 times daily To be taken with opioid (narcotic) pain medication to prevent constipation.   Refills:  1        traZODone 100 MG tablet   Commonly known as:  DESYREL   Dose:  100 mg   Quantity:  90 tablet        Take 1 tablet (100 mg) by mouth nightly as needed for sleep   Refills:  0        TYLENOL PO   Dose:  325-650 mg        Take 325-650 mg by mouth 2 times daily as needed for mild pain or fever   Refills:  0                Prescriptions were sent or printed at these locations (1 Prescription)                   Other Prescriptions                Printed at Department/Unit printer (1 of 1)         ciprofloxacin (CIPRO) 500 MG tablet                Procedures and tests performed during your visit     Basic metabolic panel    CBC with platelets differential    UA with Microscopic    Urine Culture Aerobic  Bacterial    XR Abdomen 2 Views      Orders Needing Specimen Collection     None      Pending Results     Date and Time Order Name Status Description    11/13/2018 2236 XR Abdomen 2 Views Preliminary     11/13/2018 2236 Urine Culture Aerobic Bacterial In process             Pending Culture Results     Date and Time Order Name Status Description    11/13/2018 2236 Urine Culture Aerobic Bacterial In process             Pending Results Instructions     If you had any lab results that were not finalized at the time of your Discharge, you can call the ED Lab Result RN at [phone]. You will be contacted by this team for any positive Lab results or changes in treatment. The nurses are available 7 days a week from 10A to 6:30P.  You can leave a message 24 hours per day and they will return your call.        Test Results From Your Hospital Stay        11/13/2018 10:41 PM      Component Results     Component Value Ref Range & Units Status    WBC 8.8 4.0 - 11.0 10e9/L Final    RBC Count 3.50 (L) 4.4 - 5.9 10e12/L Final    Hemoglobin 11.9 (L) 13.3 - 17.7 g/dL Final    Hematocrit 35.0 (L) 40.0 - 53.0 % Final     78 - 100 fl Final    MCH 34.0 (H) 26.5 - 33.0 pg Final    MCHC 34.0 31.5 - 36.5 g/dL Final    RDW 12.0 10.0 - 15.0 % Final    Platelet Count 416 150 - 450 10e9/L Final    Diff Method Automated Method  Final    % Neutrophils 59.9 % Final    % Lymphocytes 24.7 % Final    % Monocytes 6.8 % Final    % Eosinophils 7.8 % Final    % Basophils 0.3 % Final    % Immature Granulocytes 0.5 % Final    Nucleated RBCs 0 0 /100 Final    Absolute Neutrophil 5.3 1.6 - 8.3 10e9/L Final    Absolute Lymphocytes 2.2 0.8 - 5.3 10e9/L Final    Absolute Monocytes 0.6 0.0 - 1.3 10e9/L Final    Absolute Eosinophils 0.7 0.0 - 0.7 10e9/L Final    Absolute Basophils 0.0 0.0 - 0.2 10e9/L Final    Abs Immature Granulocytes 0.0 0 - 0.4 10e9/L Final    Absolute Nucleated RBC 0.0  Final         11/13/2018 10:56 PM      Component Results      Component Value Ref Range & Units Status    Sodium 137 133 - 144 mmol/L Final    Potassium 4.1 3.4 - 5.3 mmol/L Final    Chloride 105 94 - 109 mmol/L Final    Carbon Dioxide 26 20 - 32 mmol/L Final    Anion Gap 6 3 - 14 mmol/L Final    Glucose 100 (H) 70 - 99 mg/dL Final    Urea Nitrogen 24 7 - 30 mg/dL Final    Creatinine 0.91 0.66 - 1.25 mg/dL Final    GFR Estimate 82 >60 mL/min/1.7m2 Final    Non  GFR Calc    GFR Estimate If Black >90 >60 mL/min/1.7m2 Final    African American GFR Calc    Calcium 8.8 8.5 - 10.1 mg/dL Final         11/14/2018 12:36 AM      Component Results     Component Value Ref Range & Units Status    Color Urine Yellow  Final    Appearance Urine Cloudy  Final    Glucose Urine Negative NEG^Negative mg/dL Final    Bilirubin Urine Negative NEG^Negative Final    Ketones Urine Negative NEG^Negative mg/dL Final    Specific Gravity Urine 1.031 1.003 - 1.035 Final    Blood Urine Large (A) NEG^Negative Final    pH Urine 5.0 5.0 - 7.0 pH Final    Protein Albumin Urine 100 (A) NEG^Negative mg/dL Final    Urobilinogen mg/dL 0.0 0.0 - 2.0 mg/dL Final    Nitrite Urine Positive (A) NEG^Negative Final    Leukocyte Esterase Urine Large (A) NEG^Negative Final    Source Catheterized Urine  Final    WBC Urine >182 (H) 0 - 5 /HPF Final    RBC Urine >182 (H) 0 - 2 /HPF Final    WBC Clumps Present (A) NEG^Negative /HPF Final    Bacteria Urine Few (A) NEG^Negative /HPF Final    Squamous Epithelial /HPF Urine 1 0 - 1 /HPF Final    Mucous Urine Present (A) NEG^Negative /LPF Final    Hyaline Casts 3 (H) 0 - 2 /LPF Final         11/14/2018 12:02 AM         11/13/2018 11:09 PM      Narrative     XR ABDOMEN 2 VW  11/13/2018 11:01 PM     HISTORY: Concern for constipation.    COMPARISON: None.         Impression     IMPRESSION: Supine and upright views. The bowel gas pattern is  unremarkable. No free air or air-fluid levels. Moderate amount of  stool in the ascending colon. Small amount of stool in the  remainder  of the colon.                Clinical Quality Measure: Blood Pressure Screening     Your blood pressure was checked while you were in the emergency department today. The last reading we obtained was  BP: (!) 152/92 . Please read the guidelines below about what these numbers mean and what you should do about them.  If your systolic blood pressure (the top number) is less than 120 and your diastolic blood pressure (the bottom number) is less than 80, then your blood pressure is normal. There is nothing more that you need to do about it.  If your systolic blood pressure (the top number) is 120-139 or your diastolic blood pressure (the bottom number) is 80-89, your blood pressure may be higher than it should be. You should have your blood pressure rechecked within a year by a primary care provider.  If your systolic blood pressure (the top number) is 140 or greater or your diastolic blood pressure (the bottom number) is 90 or greater, you may have high blood pressure. High blood pressure is treatable, but if left untreated over time it can put you at risk for heart attack, stroke, or kidney failure. You should have your blood pressure rechecked by a primary care provider within the next 4 weeks.  If your provider in the emergency department today gave you specific instructions to follow-up with your doctor or provider even sooner than that, you should follow that instruction and not wait for up to 4 weeks for your follow-up visit.        Thank you for choosing [facility]       Thank you for choosing [facility] for your care. Our goal is always to provide you with excellent care. Hearing back from our patients is one way we can continue to improve our services. Please take a few minutes to complete the written survey that you may receive in the mail after you visit with us. Thank you!        [employer] Information     [employer] gives you secure access to your electronic health record. If you see a primary care  provider, you can also send messages to your care team and make appointments. If you have questions, please call your primary care clinic.  If you do not have a primary care provider, please call [phone] and they will assist you.        Care EveryWhere ID     This is your Care EveryWhere ID. This could be used by other organizations to access your [facility] medical records  [id number]        Equal Access to Services     DEON CAIN : Oli Clark, robin savage, [address], jayjay valdes. So [facility] [phone].    ATENCIÓN: Si habla español, tiene a weber disposición servicios gratuitos de asistencia lingüística. Llame al [phone].    We comply with applicable federal civil rights laws and [place] laws. We do not discriminate on the basis of race, color, national origin, age, disability, sex, sexual orientation, or gender identity.            After Visit Summary       This is your record. Keep this with you and show to your community pharmacist(s) and doctor(s) at your next visit.

## 2018-11-14 ENCOUNTER — OFFICE VISIT (OUTPATIENT)
Dept: UROLOGY | Facility: CLINIC | Age: 68
End: 2018-11-14
Payer: COMMERCIAL

## 2018-11-14 VITALS — HEART RATE: 76 BPM | BODY MASS INDEX: 22.99 KG/M2 | HEIGHT: 65 IN | WEIGHT: 138 LBS | OXYGEN SATURATION: 97 %

## 2018-11-14 VITALS
TEMPERATURE: 98.9 F | SYSTOLIC BLOOD PRESSURE: 152 MMHG | OXYGEN SATURATION: 97 % | RESPIRATION RATE: 18 BRPM | DIASTOLIC BLOOD PRESSURE: 92 MMHG

## 2018-11-14 DIAGNOSIS — C61 PROSTATE CANCER (H): Primary | ICD-10-CM

## 2018-11-14 LAB
ALBUMIN UR-MCNC: 100 MG/DL
APPEARANCE UR: ABNORMAL
BACTERIA #/AREA URNS HPF: ABNORMAL /HPF
BILIRUB UR QL STRIP: NEGATIVE
COLOR UR AUTO: YELLOW
GLUCOSE UR STRIP-MCNC: NEGATIVE MG/DL
HGB UR QL STRIP: ABNORMAL
HYALINE CASTS #/AREA URNS LPF: 3 /LPF (ref 0–2)
KETONES UR STRIP-MCNC: NEGATIVE MG/DL
LEUKOCYTE ESTERASE UR QL STRIP: ABNORMAL
MUCOUS THREADS #/AREA URNS LPF: PRESENT /LPF
NITRATE UR QL: POSITIVE
PH UR STRIP: 5 PH (ref 5–7)
RBC #/AREA URNS AUTO: >182 /HPF (ref 0–2)
SOURCE: ABNORMAL
SP GR UR STRIP: 1.03 (ref 1–1.03)
SQUAMOUS #/AREA URNS AUTO: 1 /HPF (ref 0–1)
UROBILINOGEN UR STRIP-MCNC: 0 MG/DL (ref 0–2)
WBC #/AREA URNS AUTO: >182 /HPF (ref 0–5)
WBC CLUMPS #/AREA URNS HPF: PRESENT /HPF

## 2018-11-14 PROCEDURE — 99024 POSTOP FOLLOW-UP VISIT: CPT | Performed by: PHYSICIAN ASSISTANT

## 2018-11-14 PROCEDURE — 25000132 ZZH RX MED GY IP 250 OP 250 PS 637: Performed by: EMERGENCY MEDICINE

## 2018-11-14 RX ORDER — CIPROFLOXACIN 500 MG/1
500 TABLET, FILM COATED ORAL 2 TIMES DAILY
Qty: 14 TABLET | Refills: 0 | Status: SHIPPED | OUTPATIENT
Start: 2018-11-14 | End: 2019-04-01

## 2018-11-14 RX ORDER — CIPROFLOXACIN 500 MG/1
500 TABLET, FILM COATED ORAL ONCE
Status: COMPLETED | OUTPATIENT
Start: 2018-11-14 | End: 2018-11-14

## 2018-11-14 RX ADMIN — CIPROFLOXACIN 500 MG: 500 TABLET, FILM COATED ORAL at 00:58

## 2018-11-14 ASSESSMENT — PAIN SCALES - GENERAL: PAINLEVEL: NO PAIN (1)

## 2018-11-14 NOTE — ED TRIAGE NOTES
Patient has prostatectomy 11/1, has restrepo cath in place. It is draining but he started seeing bright red blood today. States he has discomfort, penis is sore. Patient also c/o pressure in rectum with very small BMs.

## 2018-11-14 NOTE — MR AVS SNAPSHOT
After Visit Summary   11/14/2018    Jose Moreno    MRN: [id number]           Patient Information     Date Of Birth          1950        Visit Information        Provider Department      11/14/2018 1:30 PM Summer Dover PA-C [facility] [place]        Today's Diagnoses     Prostate cancer (H)    -  1       Follow-ups after your visit        Your next 10 appointments already scheduled     Nov 15, 2018  2:15 PM CST   Radiology Injections with Nancy Cortez MD   [facility] ([facility])    [address]  [address]  [facility] [address]   [phone]            Nov 15, 2018  2:15 PM CST   XR LUMBAR SACRAL FACET INJ LEFT with [email or web address]   [facility] ([facility])    [address]  [address] [place] [address]   [phone]           How do I prepare for my exam? (Food and drink instructions) Stop all food and drink (including water) 3 hours before your test or treatment.  How do I prepare for my exam? (Other instructions) Stop taking the following medicines (but talk to your doctor first): * If you take blood thinners, you may need to stop taking them a few days before treatment. Talk to your doctor before stopping these medicines. Stop taking Coumadin (warfarin) 3 days before treatment. Restart the day after treatment. * If you take Plavix, Ticlid, Pletal or Persantine, please ask your doctor if you should stop these medicines. You may need extra tests on the morning of your scan. * If you take Xarelto (Rivaroxaban), you may need to stop taking it 24 hours before treatment. Talk to your doctor before stopping this medicine. Restart the day after treatment.  You may take your other medicines as normal.  What should I wear: Wear comfortable clothes.  How long does the exam take: Injections take about 30 to 45 minutes. Most people spend up to 2 hours in  the clinic or hospital.  What should I bring: For nerve root injection, please send or bring copies of any MRIs or other scans you have had. Please bring a list of your current medicines to your exam. Include vitamins, minerals and over-the-counter medicines.  Do I need a :  Plan to have someone drive you home afterward.  What do I need to tell my doctor: Tell your doctor if: * You have ever had an allergic reaction to X-ray dye (contrast fluid). * If there s any chance you are pregnant.  What should I do after the exam: We may ask you to lie down for a short time before you go home. If you had a nerve shot and your arm or leg feels numb, you will stay for up to two hours until the numbness wears off. You may return to your normal activities the next day.  What is this test: A spinal shot is done in or near the spine. You may receive steroid medicine (to reduce swelling) or contrast fluid (dye that makes the area show up more clearly on X-rays).  Who should I call with questions: If you have any questions, please call the Imaging Department where you will have your exam. Directions, parking instructions, and other information is available on our website, [email or web address].            Dec 26, 2018 10:30 AM CST   Return Visit with Clint Blankenship MD   [facility] [place] ([facility])    [address]  [address]  [facility] [address] [phone]              Future tests that were ordered for you today     Open Future Orders        Priority Expected Expires Ordered    PSA tumor marker [[id number]] Routine  11/14/2019 11/14/2018    XR Lumbar Sacral Facet Inj Left Routine 11/13/2018 11/13/2019 11/13/2018            Who to contact     If you have questions or need follow up information about today's clinic visit or your schedule please contact [facility] [place] directly at [phone].  Normal or  "non-critical lab and imaging results will be communicated to you by MyChart, letter or phone within 4 business days after the clinic has received the results. If you do not hear from us within 7 days, please contact the clinic through [employer] or phone. If you have a critical or abnormal lab result, we will notify you by phone as soon as possible.  Submit refill requests through [employer] or call your pharmacy and they will forward the refill request to us. Please allow 3 business days for your refill to be completed.          Additional Information About Your Visit        [employer] Information     [employer] gives you secure access to your electronic health record. If you see a primary care provider, you can also send messages to your care team and make appointments. If you have questions, please call your primary care clinic.  If you do not have a primary care provider, please call [phone] and they will assist you.        Care EveryWhere ID     This is your Care EveryWhere ID. This could be used by other organizations to access your [place] medical records  [id number]        Your Vitals Were     Pulse Height Pulse Oximetry BMI (Body Mass Index)          76 1.651 m (5' 5\") 97% 22.96 kg/m2         Blood Pressure from Last 3 Encounters:   11/13/18 (!) 152/92   11/03/18 143/79   10/22/18 130/80    Weight from Last 3 Encounters:   11/14/18 62.6 kg (138 lb)   11/01/18 62.6 kg (138 lb)   10/22/18 63.5 kg (140 lb)                 Today's Medication Changes          These changes are accurate as of 11/14/18  1:52 PM.  If you have any questions, ask your nurse or doctor.               These medicines have changed or have updated prescriptions.        Dose/Directions    omeprazole 20 MG CR capsule   Commonly known as:  priLOSEC   This may have changed:    - how much to take  - how to take this  - when to take this  - additional instructions   Used for:  Gastroesophageal reflux disease without esophagitis        TAKE ONE " CAPSULE BY MOUTH ONCE DAILY   Quantity:  90 capsule   Refills:  3       traZODone 100 MG tablet   Commonly known as:  DESYREL   This may have changed:  when to take this   Used for:  Major depressive disorder, recurrent episode, in full remission (H)        Dose:  100 mg   Take 1 tablet (100 mg) by mouth nightly as needed for sleep   Quantity:  90 tablet   Refills:  0                Primary Care Provider Office Phone # Fax #    Jerri Tavera [phone] [address] [facility]  [address]  [facility] [address]        Equal Access to Services     [facility]: Hadii akash friend hadasho Soomaali, waaxda luqadaha, qaybta kaalmada adeegyabebeto, jayjay nicolas . So [facility] [phone].    ATENCIÓN: Si habla español, tiene a weber disposición servicios gratuitos de asistencia lingüística. [facility] [phone].    We comply with applicable federal civil rights laws and [place] laws. We do not discriminate on the basis of race, color, national origin, age, disability, sex, sexual orientation, or gender identity.            Thank you!     Thank you for choosing [facility] [place]  for your care. Our goal is always to provide you with excellent care. Hearing back from our patients is one way we can continue to improve our services. Please take a few minutes to complete the written survey that you may receive in the mail after your visit with us. Thank you!             Your Updated Medication List - Protect others around you: Learn how to safely use, store and throw away your medicines at www.disposemymeds.org.          This list is accurate as of 11/14/18  1:52 PM.  Always use your most recent med list.                   Brand Name Dispense Instructions for use Diagnosis    aspirin 81 MG EC tablet     60 tablet    Take 1 tablet (81 mg) by mouth daily    ACS (acute coronary syndrome) (H)       atorvastatin 40 MG tablet    LIPITOR    90 tablet    Take 1  tablet (40 mg) by mouth daily    Mixed hyperlipidemia, History of non-ST elevation myocardial infarction (NSTEMI), Coronary artery disease involving native coronary artery of native heart without angina pectoris       B-12 1000 MCG Tbcr      Take 1,000 mcg by mouth daily    Gastroesophageal reflux disease without esophagitis       cholecalciferol 1000 UNIT tablet    vitamin D3     Take 1 tablet (1,000 Units) by mouth daily    Gastroesophageal reflux disease without esophagitis       ciprofloxacin 500 MG tablet    CIPRO    14 tablet    Take 1 tablet (500 mg) by mouth 2 times daily for 7 days        FISH OIL PO      Take 1 capsule by mouth daily        FLUOXETINE HCL PO      Take 40 mg by mouth every other day (Patient taking differently than prescribed, finishing up old RX; Current RX is 20mg once daily)        ketorolac 10 MG tablet    TORADOL    20 tablet    Take 1 tablet (10 mg) by mouth every 6 hours as needed for moderate pain    Prostate cancer (H)       levothyroxine 50 MCG tablet    SYNTHROID/LEVOTHROID    90 tablet    Take 1 tablet (50 mcg) by mouth daily    Hypothyroidism due to acquired atrophy of thyroid       losartan 100 MG tablet    COZAAR    90 tablet    Take 1 tablet (100 mg) by mouth daily    Coronary artery disease involving native coronary artery of native heart without angina pectoris, Essential hypertension, benign, History of non-ST elevation myocardial infarction (NSTEMI)       multivitamin, therapeutic with minerals Tabs tablet      Take 1 tablet by mouth daily    Gastroesophageal reflux disease without esophagitis       nitroGLYcerin 0.4 MG sublingual tablet    NITROSTAT    25 tablet    Place 1 tablet (0.4 mg) under the tongue every 5 minutes as needed for chest pain    ACS (acute coronary syndrome) (H)       omeprazole 20 MG CR capsule    priLOSEC    90 capsule    TAKE ONE CAPSULE BY MOUTH ONCE DAILY    Gastroesophageal reflux disease without esophagitis       oxyCODONE IR 5 MG tablet     ROXICODONE    15 tablet    Take 1-2 tablets (5-10 mg) by mouth every 3 hours as needed    Prostate cancer (H)       senna-docusate 8.6-50 MG per tablet    SENOKOT-S;PERICOLACE    60 tablet    Take 1 tablet by mouth 2 times daily To be taken with opioid (narcotic) pain medication to prevent constipation.    Prostate cancer (H)       traZODone 100 MG tablet    DESYREL    90 tablet    Take 1 tablet (100 mg) by mouth nightly as needed for sleep    Major depressive disorder, recurrent episode, in full remission (H)       TYLENOL PO      Take 325-650 mg by mouth 2 times daily as needed for mild pain or fever

## 2018-11-14 NOTE — NURSING NOTE
Pt here for cath out from prostectomy.  Pt having some discomfort from the cath.  Pt went to ED last nt and has UTI and was started on cipro.    ANGÉLICA Ledesma, CMA

## 2018-11-14 NOTE — LETTER
11/14/2018       RE: Jose Moreno  [address] [facility] [address]     Dear Colleague,    Thank you for referring your patient, Jose Moreno, to the [facility] [place] at [facility]. Please see a copy of my visit note below.    Office Visit Note  [facility]  [phone]    UROLOGIC DIAGNOSES:   T1C Rew 4+5=9 prostate cancer    CURRENT INTERVENTIONS:   S/p Robotic-assisted laparoscopic radical prostatectomy with bilateral pelvic lymph node dissection. Bilateral wide excision procedure 11/1/18    HISTORY:   Morgan returns to clinic today for 2 week post op and UCO. Previous prostate biopsy showed a single core at the left apex showed Shane 4+5 = 9 prostate cancer. This also corresponds to the area of concern on the MRI. The other additional cores taken from this area of concern showed a Rew 4+3 = 7 prostate cancer. Four other cores on the standard template biopsy were positive showing Shane 3+4 = 7 in 2 and Rew grade 6 in 2 more.     Has done ok with the exception of gross hematuria yesterday. Went to ER and thought to be UTI related. Started on Cipro.       PAST MEDICAL HISTORY:   Past Medical History:   Diagnosis Date     ADHD (attention deficit hyperactivity disorder)      CAD (coronary artery disease)     cardiac cath 1/23/15: SHERRY to 1st diagonal, SHERRY x2 to LAD, cath 2009: no intervention     Esophageal reflux      History of hyperthyroidism 4/9/2014     Hyperlipidemia      Hypertension      Mitral regurgitation 2009    moderately severe MVP, s/p robotic repair at [place]     Prostate cancer      Pulmonary nodules 2009    CT-recommend repeat in 6-12 months     Rotator cuff rupture 11/3/2011       PAST SURGICAL HISTORY:   Past Surgical History:   Procedure Laterality Date     COLONOSCOPY  7/00    GI     DAVINCI PROSTATECTOMY, LYMPHADENECTOMY N/A 11/1/2018    Procedure: ROBOTIC ASSISTED RADICAL  PROSTATECTOMY WITH BILATERAL PELVIC LYMPH NODE DISSECTION;  Surgeon: Clint Blankenship MD;  Location: [facility] OR     DECOMPRESSION LUMBAR MINIMALLY INVASIVE ONE LEVEL  1/11/2012    Procedure:DECOMPRESSION LUMBAR MINIMALLY INVASIVE ONE LEVEL; L4-5 Decompression Minimally Invasive; Surgeon:MESSI HORAN; Location:[facility] OR     HEART CATH RIGHT AND LEFT HEART CATH  01-23-15    See report, pt transfered to [facility] for complex bifurcation PCI of LAD diagonal vessel.      HEART CATH RIGHT AND LEFT HEART CATH  01-26-15    PTCA and implantation of SHERRY to 1st diagonal, SHERRY to mid LAD, SHERRY to proximal LAD     Hx of rotator cuff rupture and repair       LAPAROSCOPIC PROSTATECTOMY       REPAIR VALVE MITRAL  2009    [facility] robotic repair      Reversal of vasectomy       VASECTOMY       XR LUMBAR RADIOFREQ ABLATION UNILATERAL  01/11/2018    lumbar area/ ? level       FAMILY HISTORY:   Family History   Problem Relation Age of Onset     Cancer Mother      breast, lung     Depression Father      alcohlism     Diabetes No family hx of      Cardiovascular No family hx of      Cancer - colorectal No family hx of      Prostate Cancer No family hx of        SOCIAL HISTORY:   Social History   Substance Use Topics     Smoking status: Never Smoker     Smokeless tobacco: Never Used     Alcohol use 4.2 oz/week     7 Standard drinks or equivalent per week      Comment: 1 beer daily       Current Outpatient Prescriptions   Medication     Acetaminophen (TYLENOL PO)     aspirin EC 81 MG EC tablet     atorvastatin (LIPITOR) 40 MG tablet     cholecalciferol (VITAMIN D3) 1000 UNIT tablet     ciprofloxacin (CIPRO) 500 MG tablet     Cyanocobalamin (B-12) 1000 MCG TBCR     FLUOXETINE HCL PO     ketorolac (TORADOL) 10 MG tablet     levothyroxine (SYNTHROID/LEVOTHROID) 50 MCG tablet     losartan (COZAAR) 100 MG tablet     multivitamin, therapeutic with minerals (MULTI-VITAMIN) TABS tablet     Omega-3 Fatty Acids (FISH OIL PO)     omeprazole  "(PRILOSEC) 20 MG CR capsule     senna-docusate (SENOKOT-S;PERICOLACE) 8.6-50 MG per tablet     traZODone (DESYREL) 100 MG tablet     nitroGLYcerin (NITROSTAT) 0.4 MG sublingual tablet     oxyCODONE IR (ROXICODONE) 5 MG tablet     No current facility-administered medications for this visit.          PHYSICAL EXAM:    Pulse 76  Ht 1.651 m (5' 5\")  Wt 62.6 kg (138 lb)  SpO2 97%  BMI 22.96 kg/m2  GEN: NAD  EYES: EOMI  MOUTH: MMM  NECK: Supple  RESP: Unlabored breathing  CARDIAC: No LE edema  SKIN: Warm  ABD: soft  NEURO: AAO  : Watermelon colored urine. Hand irrigated with 180cc until clear. No clots    Cystoscopy: Not done    Imaging: None    Urinalysis: UA RESULTS:  Recent Labs   Lab Test  07/26/18   1103   COLOR  Yellow   APPEARANCE  Clear   URINEGLC  Negative   URINEBILI  Negative   URINEKETONE  Negative   SG  1.020   UBLD  Negative   URINEPH  7.0   PROTEIN  100*   UROBILINOGEN  0.2   NITRITE  Negative   LEUKEST  Negative       PSA: 12.2 pre-op    Other labs: None today      IMPRESSION:  High risk prostate cancer    PLAN:  -Luís  -On Cipro, UC pend  -RTO 6 wks with PSA prior.    Summer Dover PA-C  [facility]    "

## 2018-11-14 NOTE — DISCHARGE INSTRUCTIONS
Catheter-Associated Urinary Tract Infections     A small balloon keeps the catheter in place inside the bladder.   A catheter-associated urinary tract infection (CAUTI) is an infection of the urinary system. CAUTI is caused by bacteria that enter the urinary tract when a urinary catheter is used. This is a tube that s placed into the bladder to drain urine.  The urinary system  This system includes the kidneys, ureters, bladder, and urethra. The kidneys filter blood and make urine. The ureters carry urine from the kidneys to the bladder. The bladder stores urine. The urethra carries urine from the bladder to the outside of the body.  What is a urinary catheter?  A urinary catheter is a thin, flexible tube. It is placed in the bladder to drain urine. Urine flows through the tube into a collecting bag outside of the body. There are different types of urinary catheters. The most common type is an indwelling catheter. This is also known as a urethral catheter. This is because it s placed into the bladder through the urethra. This catheter is also called a Sen catheter.  Why is a urinary catheter needed?  A urinary catheter is needed for any of the following:    You can t get up to use the toilet because your mobility is limited. This may be due to surgery, an injury, or illness.    You have a blockage in your urinary system.    Your healthcare provider needs to measure the amount of urine you pass.    The function of your kidneys and bladder is being tested.    You re not able to control your bladder (incontinence).  In most cases, the urinary catheter is temporary. You'll need it only until the problem that requires it is resolved.   How does a CAUTI develop?  Bacteria can enter the urinary tract as the catheter is put into the urethra. Bacteria can also get into the urinary tract while the catheter is in place. The common bacteria that cause a CAUTI are ones that live in the intestine. These bacteria don t  normally cause problems in the intestine. But when they get into the urinary tract, a CAUTI can result.  Why is a CAUTI of concern?  Left untreated, a CAUTI can lead to health problems. These problems may include bladder infection, prostate infection, and kidney infection. A CAUTI can prolong your hospital stay. If the infection is not treated in time, serious health complications may occur.  What are the symptoms of a CAUTI?    A burning feeling, pressure, or pain in your lower abdomen    Fever or chills    Urine in the collecting bag is cloudy or bloody (pink or red)    Burning feeling in the urethra or genital area    Aching in the back (kidney area)    Nausea and vomiting    Person is confused, or is not alert, or has a change in behavior (mainly affects older patients)    Note that sometimes a person won t have any symptoms but may still have a CAUTI.  Tell a healthcare provider right away if you or your loved one has any of these symptoms.  How is CAUTI diagnosed?  If you have symptoms of CAUTI your healthcare provider will order tests. These include a urine test, blood tests, and other tests as needed.  How is CAUTI treated?   Treatment may involve any of the following:    Antibiotics. Your healthcare provider will likely prescribe antibiotics if you have symptoms. Be aware that if you don t have symptoms, you may not be given antibiotics. This is to prevent an increase in bacteria that resist (can t be killed by) certain antibiotics.    Removing the catheter. The catheter will be removed when your healthcare provider decides it s no longer needed. This usually helps stop the infection.    Changing the catheter. If you still need a catheter, the old one will be removed. A new one will be put in. This may help stop the infection.  How do hospital and long-term facility staff prevent CAUTI?  To keep patients from getting a CAUTI, the staff follow certain procedures:    Prescribe a catheter only when it s  needed. It is removed as soon as it s no longer needed.    Use sterile (clean) technique when placing the catheter into the urinary tract. This means before putting the catheter in, the caregiver washes his or her hands with soap and water. He or she then puts on sterile gloves. A sterile catheter kit that has cleansers is used to cleanse the patient s genital area.    Before performing catheter care, caregivers also wash their hands or use an alcohol-based hand cleanser.    Hang the bag lower than your bladder. This prevents urine from flowing back into your bladder.    Ensure that the bag is emptied regularly.  What you can do as a patient to prevent CAUTI  You can help prevent yourself from getting a CAUTI by doing the following:    Every day ask your healthcare provider how long you need to have the catheter. The longer you have a catheter, the higher your chance of getting a CAUTI.    If a caregiver doesn t clean his or her hands and put on gloves before touching your catheter, ask them to do so.    If you ve been taught how to care for your catheter, be sure to wash your hands before and after each session.    Make sure your bag is lower than your bladder. If it s not, tell your caregiver.    Don t disconnect the catheter and drain tube. Doing so allows germs to get into the catheter.    Cleansing of the genital and perineal areas is very important to help decrease bacteria in areas surrounding the catheter. Ask your doctor what you should use and how often to clean these areas.  If you are discharged with an indwelling catheter    Before you leave the hospital, make sure you understand the instructions on how to care for your catheter at home.    Ask your healthcare provider how long you need the catheter. Also ask if you need to make a follow-up appointment to have the catheter removed.    Always use sterile (clean) technique when caring for your catheter. Wash your hands before and after doing any catheter  care.    Call your healthcare provider right away if you develop symptoms of a CAUTI (see above).   Date Last Reviewed: 1/1/2017 2000-2018 [employer]. [address] [place] [address], [place] [address]. All rights reserved. This information is not intended as a substitute for professional medical care. Always follow your healthcare professional's instructions.

## 2018-11-14 NOTE — PROGRESS NOTES
Office Visit Note  [facility]  [phone]    UROLOGIC DIAGNOSES:   T1C Shane 4+5=9 prostate cancer    CURRENT INTERVENTIONS:   S/p Robotic-assisted laparoscopic radical prostatectomy with bilateral pelvic lymph node dissection. Bilateral wide excision procedure 11/1/18    HISTORY:   Morgan returns to clinic today for 2 week post op and UCO. Previous prostate biopsy showed a single core at the left apex showed Homer 4+5 = 9 prostate cancer. This also corresponds to the area of concern on the MRI. The other additional cores taken from this area of concern showed a Homer 4+3 = 7 prostate cancer. Four other cores on the standard template biopsy were positive showing Homer 3+4 = 7 in 2 and Shane grade 6 in 2 more.     Has done ok with the exception of gross hematuria yesterday. Went to ER and thought to be UTI related. Started on Cipro.       PAST MEDICAL HISTORY:   Past Medical History:   Diagnosis Date     ADHD (attention deficit hyperactivity disorder)      CAD (coronary artery disease)     cardiac cath 1/23/15: SHERRY to 1st diagonal, SHERRY x2 to LAD, cath 2009: no intervention     Esophageal reflux      History of hyperthyroidism 4/9/2014     Hyperlipidemia      Hypertension      Mitral regurgitation 2009    moderately severe MVP, s/p robotic repair at [place]     Prostate cancer      Pulmonary nodules 2009    CT-recommend repeat in 6-12 months     Rotator cuff rupture 11/3/2011       PAST SURGICAL HISTORY:   Past Surgical History:   Procedure Laterality Date     COLONOSCOPY  7/00    GI     DAVINCI PROSTATECTOMY, LYMPHADENECTOMY N/A 11/1/2018    Procedure: ROBOTIC ASSISTED RADICAL PROSTATECTOMY WITH BILATERAL PELVIC LYMPH NODE DISSECTION;  Surgeon: Clint Blankenship MD;  Location: [facility] OR     DECOMPRESSION LUMBAR MINIMALLY INVASIVE ONE LEVEL  1/11/2012    Procedure:DECOMPRESSION LUMBAR MINIMALLY INVASIVE ONE LEVEL; L4-5 Decompression Minimally Invasive; Surgeon:MESSI HORAN; Location:  OR     HEART CATH RIGHT AND LEFT HEART CATH  01-23-15    See report, pt transfered to [facility] for complex bifurcation PCI of LAD diagonal vessel.      HEART CATH RIGHT AND LEFT HEART CATH  01-26-15    PTCA and implantation of SHERRY to 1st diagonal, SHERRY to mid LAD, SHERRY to proximal LAD     Hx of rotator cuff rupture and repair       LAPAROSCOPIC PROSTATECTOMY       REPAIR VALVE MITRAL  2009    [facility] robotic repair      Reversal of vasectomy       VASECTOMY       XR LUMBAR RADIOFREQ ABLATION UNILATERAL  01/11/2018    lumbar area/ ? level       FAMILY HISTORY:   Family History   Problem Relation Age of Onset     Cancer Mother      breast, lung     Depression Father      alcohlism     Diabetes No family hx of      Cardiovascular No family hx of      Cancer - colorectal No family hx of      Prostate Cancer No family hx of        SOCIAL HISTORY:   Social History   Substance Use Topics     Smoking status: Never Smoker     Smokeless tobacco: Never Used     Alcohol use 4.2 oz/week     7 Standard drinks or equivalent per week      Comment: 1 beer daily       Current Outpatient Prescriptions   Medication     Acetaminophen (TYLENOL PO)     aspirin EC 81 MG EC tablet     atorvastatin (LIPITOR) 40 MG tablet     cholecalciferol (VITAMIN D3) 1000 UNIT tablet     ciprofloxacin (CIPRO) 500 MG tablet     Cyanocobalamin (B-12) 1000 MCG TBCR     FLUOXETINE HCL PO     ketorolac (TORADOL) 10 MG tablet     levothyroxine (SYNTHROID/LEVOTHROID) 50 MCG tablet     losartan (COZAAR) 100 MG tablet     multivitamin, therapeutic with minerals (MULTI-VITAMIN) TABS tablet     Omega-3 Fatty Acids (FISH OIL PO)     omeprazole (PRILOSEC) 20 MG CR capsule     senna-docusate (SENOKOT-S;PERICOLACE) 8.6-50 MG per tablet     traZODone (DESYREL) 100 MG tablet     nitroGLYcerin (NITROSTAT) 0.4 MG sublingual tablet     oxyCODONE IR (ROXICODONE) 5 MG tablet     No current facility-administered medications for this visit.          PHYSICAL EXAM:    Pulse 76  " Ht 1.651 m (5' 5\")  Wt 62.6 kg (138 lb)  SpO2 97%  BMI 22.96 kg/m2  GEN: NAD  EYES: EOMI  MOUTH: MMM  NECK: Supple  RESP: Unlabored breathing  CARDIAC: No LE edema  SKIN: Warm  ABD: soft  NEURO: AAO  : Watermelon colored urine. Hand irrigated with 180cc until clear. No clots    Cystoscopy: Not done    Imaging: None    Urinalysis: UA RESULTS:  Recent Labs   Lab Test  07/26/18   1103   COLOR  Yellow   APPEARANCE  Clear   URINEGLC  Negative   URINEBILI  Negative   URINEKETONE  Negative   SG  1.020   UBLD  Negative   URINEPH  7.0   PROTEIN  100*   UROBILINOGEN  0.2   NITRITE  Negative   LEUKEST  Negative       PSA: 12.2 pre-op    Other labs: None today      IMPRESSION:  High risk prostate cancer    PLAN:  -Luís  -On Cipro, UC pend  -RTO 6 wks with PSA prior.    JOHANA Morales [facility]    "

## 2018-11-14 NOTE — ED PROVIDER NOTES
History     Chief Complaint:  Hematuria    HPI   Jose Moreno is a 68 year old male with a complex medical history pertinent for prostate cancer s/p prostatectomy on 11/1 with Dr. Blankenship who presents to the emergency department today for evaluation of hematuria in catheter bag. The patient reports his surgery went well and has been at baseline since the surgery. He has been able to have bowel movements, but these have been hard and minimal output. He is scheduled to have the catheter taken out tomorrow. Tonight, he noticed discolored bright, red urine with what he thought were clots in his catheter bag with mild penile pain and discomfort around the catheter. He called his urologist's office who suggested he come into the emergency department prompting his visit. He denies fever and other urinary symptoms.    Allergies:  Ace Inhibitors    Medications:    aspirin EC 81 MG EC tablet  atorvastatin (LIPITOR) 40 MG tablet  cholecalciferol (VITAMIN D3) 1000 UNIT tablet  Cyanocobalamin (B-12) 1000 MCG TBCR  FLUOXETINE HCL PO  ketorolac (TORADOL) 10 MG tablet  levothyroxine (SYNTHROID/LEVOTHROID) 50 MCG tablet  losartan (COZAAR) 100 MG tablet  multivitamin, therapeutic with minerals (MULTI-VITAMIN) TABS tablet  nitroGLYcerin (NITROSTAT) 0.4 MG sublingual tablet  Omega-3 Fatty Acids (FISH OIL PO)  omeprazole (PRILOSEC) 20 MG CR capsule  oxyCODONE IR (ROXICODONE) 5 MG tablet  senna-docusate (SENOKOT-S;PERICOLACE) 8.6-50 MG per tablet  traZODone (DESYREL) 100 MG tablet    Past Medical History:    Diagnosis   ADHD (attention deficit hyperactivity disorder)   CAD (coronary artery disease)   Esophageal reflux   History of hyperthyroidism   Hyperlipidemia   Hypertension   Mitral regurgitation   Prostate cancer   Pulmonary nodules   Rotator cuff rupture         Past Surgical History:    Davinci prostatectomy, lymphadenodectomy  Decompression lumbar minimally invasive one level  Colonoscopy  Heart cath right and left heart cath  x2  Rotator cuff rupture and repair  Removal sperm duct  Vasectomy  Repair mitral valve  Lumbar ablation    Family History:    Breast cancer  Lung cancer  Depression  Alcoholism    Social History:  The patient was accompanied to the ED by wife.  Smoking Status: Never  Smokeless Tobacco: Never  Alcohol Use: Yes, 1 beer/day  Marital Status:       Review of Systems   Constitutional: Negative for fever.   Gastrointestinal: Positive for constipation.   Genitourinary: Positive for hematuria and penile pain. Negative for decreased urine volume, difficulty urinating, dysuria, frequency and urgency.   All other systems reviewed and are negative.    Physical Exam     Patient Vitals for the past 24 hrs:   BP Temp Temp src Heart Rate Resp SpO2   11/14/18 0045 - - - - - 97 %   11/14/18 0023 - - - - - 98 %   11/13/18 2336 (!) 152/92 - - - - 97 %   11/13/18 2225 - - - - - 99 %   11/13/18 2224 141/89 - - - - -   11/13/18 2115 136/86 98.9  F (37.2  C) Temporal 69 18 99 %         Physical Exam  Nursing note and vitals reviewed.  Constitutional: Cooperative.   HENT:   Mouth/Throat: Mucous membranes are normal.   Cardiovascular: Normal rate, regular rhythm and normal heart sounds.  No murmur.  Pulmonary/Chest: Effort normal and breath sounds normal. No respiratory distress. No wheezes. No rales.   Abdominal: Soft. Normal appearance and bowel sounds are normal. No distension. There is no tenderness. There is no rigidity and no guarding.   : Sen catheter in place  Neurological: Alert.   Skin: Skin is warm and dry. No rash noted. Abdominal surgical incision clean, dry, and intact. No evidence of bleeding or infection.   Psychiatric: Normal mood and affect.     Emergency Department Course   Imaging:  Radiology findings were communicated with the patient and family who voiced understanding of the findings.  XR Abdomen 2 views  IMPRESSION: Supine and upright views. The bowel gas pattern is  unremarkable. No free air or air-fluid  levels. Moderate amount of  stool in the ascending colon. Small amount of stool in the remainder  of the colon.  Report per radiology     Laboratory:  Laboratory findings were communicated with the patient and family who voiced understanding of the findings.    CBC: HGB 11.9 (L) o/w WNL. (WBC 8.8, )   BMP: glucose 100 (H) o/w WNL (Creatinine 0.91)    UA: cloudy, yellow urine, blood large, nitrite positive, leukocyte esterase large, protein albumin 100, WBC >182 (H), RBC >182 (H), WBC clumps present, bacteria few, mucous present, hyaline casts 3 (H) o/w WNL    Urine Culture Aerobic Bacterial: Pending    Interventions:  0058 Cipro 500 mg PO    Emergency Department Course:  Nursing notes and vitals reviewed.  IV was inserted and blood was drawn for laboratory testing, results above.  The patient provided a urine sample here in the emergency department. This was sent for laboratory testing, findings above.  The patient was sent for a XR Abdomen 2 views while in the emergency department, results above.   0051: I performed an exam of the patient as documented above.   0049: I spoke with Dr. Rubio of the urology service regarding patient's presentation, findings, and plan of care. They recommended to keep the catheter in until tomorrow's clinic visit.  0052: Patient rechecked and updated.   Findings and plan explained to the Patient and spouse. Patient discharged home with instructions regarding supportive care, medications, and reasons to return. The importance of close follow-up was reviewed. The patient was prescribed Cipro.   I personally reviewed the laboratory and imaging results with the Patient and spouse and answered all related questions prior to discharge.    Impression & Plan    Medical Decision Making:  Jose Moreno is a 68 year old male who is approximately two weeks status post prostatectomy with indwelling restrepo catheter who presents with concern for hematuria. Urinalysis shows hematuria as well as  large leukocytes and nitrite positivity consistent with probably infection. He is afebrile with a reassuring white count. Renal function is normal. Given his post-operative state, I have discussed the case with urology who has recommended we leave the catheter in until tomorrow where he can be reassessed in clinic and for him to be started on Ciprofloxacin for antibiotics. In regards to the patient's concern for constipation, he is having bowel movements. X-ray does not show a large amount of stool that would necessitate aggressive attempts to remove. He has senna at home which he is not using. I have encouraged him to start this as well as add Miralax, and he will be discharged home in stable condition.     Diagnosis:    ICD-10-CM    1. Urinary tract infection associated with indwelling urethral catheter, initial encounter (H) T83.511A     N39.0        Disposition:  discharged to home    Discharge Medications:  New Prescriptions    CIPROFLOXACIN (CIPRO) 500 MG TABLET    Take 1 tablet (500 mg) by mouth 2 times daily for 7 days       Scribe Disclosure:  Hayden LOVE, am serving as a scribe at 1:01 AM on 11/14/2018 to document services personally performed by Rafi Munoz MD based on my observations and the provider's statements to me.     11/13/2018   [facility] EMERGENCY DEPARTMENT       Rafi Munoz MD  11/14/18 0527

## 2018-11-15 NOTE — PROGRESS NOTES
[facility] - Procedure Note    Date of Visit: 11/15/2018    Pre procedure Diagnosis: facet arthropathy   Post procedure Diagnosis: Same  Procedure performed: Left L4-5, L5-S1 facet joint injections  Anesthesia: none  Complications: none  Operators: Nancy Cortez MD     Indications:   Jose Moreno is a 68 year old male was sent by Zenobia An CNP for lumbar facet joint injections.  They have a history of left sided low back pain.  Exam shows pain with extension and rotation and they have tried conservative treatment including ongoing physical therapy, previous injections and medications.    He is S/P lumbar RFA Left L3,4,5 1/17/2018 and Left L4-5, L5-S1 facet joint injections on 5/15/2018 & 7/25/2018    Options/alternatives, benefits and risks were discussed with the patient including bleeding, infection, flared pain, tissue trauma, exposure to radiation, reaction to medications including seizure, spinal cord injury, paralysis, weakness, numbness and headache.    Questions were answered to his satisfaction and he agrees to proceed. Voluntary informed consent was obtained and signed.     Vitals were reviewed: Yes  Allergies were reviewed:  Yes   Medications were reviewed:  Yes   Pre-procedure pain score: 5/10    Imaging:  Lumbar MRI done at [facility] on 12/15/2016      Procedure:  After getting informed consent, patient was brought into the procedure suite and was placed in a prone position on the procedure table.   A Pause for the Cause was performed.  Patient was prepped and draped in sterile fashion.     Under AP fluoroscopic guidance the L4-5 & L5-S1 facet joints on the left side were identified, and the C-arm was rotated obliquely to the affected side to open the joint space. A total of 4 ml of 1% lidocaine was injected at the needle entry point and needle tract. Then a 22 gauge 3.5 inch quincke type spinal needle was inserted and advanced under fluoroscopic guidance targeting the superior  articular pillar of each joint. Once the needle made a contact with SAP, it was rotated and was then advanced into the joint.    AP fluoroscopic views were obtained to confirm the needle placement. Then,  Omnipaque 300 contrast dye was injected after negative aspiration for heme and CSF in each joint, confirming appropriate placement.  A total of 1mL of Omnipaque was used and 9mL was wasted.    The injection was then accomplished using a solution containing 1ml of 0.5% bupivacaine mixed with 1ml of 1% Lidocaine and 40mg of kenolog, divided between the 2 joints. The needles were removed..     Hemostasis was achieved, the area was cleaned, and bandaids were placed when appropriate.  The patient tolerated the procedure well, and was taken to the recovery room.    Images were saved to PACS.    Post-procedure pain score: 1/10  Follow-up includes:   -f/u phone call in one week  -f/u with the referring provider      Nancy Cortez MD   [facility]

## 2018-11-17 LAB
BACTERIA SPEC CULT: ABNORMAL
BACTERIA SPEC CULT: ABNORMAL
Lab: ABNORMAL
SPECIMEN SOURCE: ABNORMAL

## 2018-11-19 ENCOUNTER — TELEPHONE (OUTPATIENT)
Dept: FAMILY MEDICINE | Facility: CLINIC | Age: 68
End: 2018-11-19

## 2018-11-19 NOTE — TELEPHONE ENCOUNTER
Left voicemail with pt stating his surgeon would need to be the one writing the note allowing him back to work. Advised him if he has any questions give us a call.

## 2018-11-19 NOTE — TELEPHONE ENCOUNTER
I am unable to write this note. Have him call the surgeon who did his procedure. Make sure they agree with his limits and request. They will need to write the letter to return with limits and also when those limits are lifted.

## 2018-11-19 NOTE — TELEPHONE ENCOUNTER
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 He states he is feeling good since his surgery, no pain.    He would like to go back as soon as possible.  Please advise #[phone]

## 2018-11-21 ENCOUNTER — DOCUMENTATION ONLY (OUTPATIENT)
Dept: UROLOGY | Facility: CLINIC | Age: 68
End: 2018-11-21

## 2018-11-21 NOTE — PROGRESS NOTES
Urology pt of [facility] PA-C requests letter for limited return to work. Letter faxed to pt as requested. Also mailed a copy to his home.

## 2018-11-23 ENCOUNTER — TRANSFERRED RECORDS (OUTPATIENT)
Dept: FAMILY MEDICINE | Facility: CLINIC | Age: 68
End: 2018-11-23

## 2018-11-28 ENCOUNTER — RADIOLOGY INJECTION OFFICE VISIT (OUTPATIENT)
Dept: PALLIATIVE MEDICINE | Facility: CLINIC | Age: 68
End: 2018-11-28
Payer: COMMERCIAL

## 2018-11-28 ENCOUNTER — RADIANT APPOINTMENT (OUTPATIENT)
Dept: GENERAL RADIOLOGY | Facility: CLINIC | Age: 68
End: 2018-11-28
Attending: ANESTHESIOLOGY
Payer: COMMERCIAL

## 2018-11-28 VITALS — OXYGEN SATURATION: 96 % | HEART RATE: 68 BPM | SYSTOLIC BLOOD PRESSURE: 136 MMHG | DIASTOLIC BLOOD PRESSURE: 85 MMHG

## 2018-11-28 DIAGNOSIS — M47.817 FACET ARTHROPATHY, LUMBOSACRAL: Primary | ICD-10-CM

## 2018-11-28 DIAGNOSIS — M47.816 FACET ARTHROPATHY, LUMBAR: ICD-10-CM

## 2018-11-28 PROCEDURE — 64493 INJ PARAVERT F JNT L/S 1 LEV: CPT | Mod: LT | Performed by: ANESTHESIOLOGY

## 2018-11-28 PROCEDURE — 64494 INJ PARAVERT F JNT L/S 2 LEV: CPT | Mod: LT | Performed by: ANESTHESIOLOGY

## 2018-11-28 NOTE — MR AVS SNAPSHOT
After Visit Summary   11/28/2018    Jose Moreno    MRN: [id number]           Patient Information     Date Of Birth          1950        Visit Information        Provider Department      11/28/2018 12:45 PM Nancy Cortez MD [facility]        Care Instructions    [facility] Procedure Discharge Instructions    Today you saw:     Dr. Nancy Cortez       Your procedure:  Facet joint injection     Medications used:  Lidocaine (anesthetic) Kenalog (steroid)  Omnipaque (contrast)              Be cautious when walking as numbness and/or weakness in the legs may occur up to 6-8 hours after the procedure due to effect of the local anesthetic    Do not drive for 6 hours. The effect of the local anesthetic could slow your reflexes.     Avoid strenuous activity for the first 24 hours. You may resume your regular activities after that.     You may shower, however avoid swimming, tub baths or hot tubs for 24 hours following your procedure    If you were fasting, you can resume your regular diet and medications    You may have a mild to moderate increase in pain for several days following the injection.      You may use ice packs for 10-15 minutes, 3 to 4 times a day at the injection site for comfort    Do not use heat to painful areas for 6 to 8 hours. This will give the local anesthetic time to wear off and prevent you from accidentally burning your skin.    You may use anti-inflammatory medications (such as Ibuprofen/Advil or Aleve) or Tylenol for pain control if necessary    If you have diabetes, check your blood sugar more frequently than usual as your blood sugar may be higher than normal for 10-14 days following a steroid injection. Contact your doctor who manages your diabetes if your blood sugar is higher than usual    It may take up to 14 days for the steroid medication to start working although you may feel the effect as early as a few days after the procedure.     Follow  up with your referring provider in 2 - 3 weeks.       If you experience any of the following, call the pain center  line during work hours at [phone] or on-call physician after hours at [phone]:  -Fever over 100 degree F  -Swelling, bleeding, redness, drainage, warmth at the injection site  -Progressive weakness or numbness in your legs or arms  -Loss of bowel or bladder function  -Unusual headache that is not relieved by Tylenol  -Unusual new onset of pain that is not improving            Follow-ups after your visit        Your next 10 appointments already scheduled     Dec 21, 2018  1:00 PM CST   [facility]   [facility]    [address]  [facility] [address] [phone]           Please do not eat 10-12 hours before your appointment if you are coming in fasting for labs on lipids, cholesterol, or glucose (sugar). This does not apply to pregnant women. Water, hot tea and black coffee (with nothing added) are okay. Do not drink other fluids, diet soda or chew gum.            Dec 26, 2018 10:30 AM CST   Return Visit with Clint Blankenship MD   [facility] ([facility])    [address] [facility]  [address]  [facility] [address] [phone]              Who to contact     If you have questions or need follow up information about today's clinic visit or your schedule please contact [facility] directly at [phone].  Normal or non-critical lab and imaging results will be communicated to you by MyChart, letter or phone within 4 business days after the clinic has received the results. If you do not hear from us within 7 days, please contact the clinic through MyChart or phone. If you have a critical or abnormal lab result, we will notify you by phone as soon as possible.  Submit refill requests through [employer] or call your pharmacy and they will forward  the refill request to us. Please allow 3 business days for your refill to be completed.          Additional Information About Your Visit        [employer] Information     [employer] gives you secure access to your electronic health record. If you see a primary care provider, you can also send messages to your care team and make appointments. If you have questions, please call your primary care clinic.  If you do not have a primary care provider, please call [phone] and they will assist you.        Care EveryWhere ID     This is your Care EveryWhere ID. This could be used by other organizations to access your [place] medical records  [id number]        Your Vitals Were     Pulse Pulse Oximetry                68 96%           Blood Pressure from Last 3 Encounters:   11/28/18 136/85   11/13/18 (!) 152/92   11/03/18 143/79    Weight from Last 3 Encounters:   11/14/18 62.6 kg (138 lb)   11/01/18 62.6 kg (138 lb)   10/22/18 63.5 kg (140 lb)              Today, you had the following     No orders found for display         Today's Medication Changes          These changes are accurate as of 11/28/18  1:03 PM.  If you have any questions, ask your nurse or doctor.               These medicines have changed or have updated prescriptions.        Dose/Directions    omeprazole 20 MG DR capsule   Commonly known as:  priLOSEC   This may have changed:    - how much to take  - how to take this  - when to take this  - additional instructions   Used for:  Gastroesophageal reflux disease without esophagitis        TAKE ONE CAPSULE BY MOUTH ONCE DAILY   Quantity:  90 capsule   Refills:  3       traZODone 100 MG tablet   Commonly known as:  DESYREL   This may have changed:  when to take this   Used for:  Major depressive disorder, recurrent episode, in full remission (H)        Dose:  100 mg   Take 1 tablet (100 mg) by mouth nightly as needed for sleep   Quantity:  90 tablet   Refills:  0                Primary Care Provider Office Phone  # Fax #    Jerri Tavera [phone] [address] [facility]  [address]  [facility] [address]        Equal Access to Services     DEON CAIN : Hadpatria akash friend alvino Soashley, waaxda luqadaha, qaybta kaalmada jules, jayjay carrizales laEligiohenry valdes. So [facility] [phone].    ATENCIÓN: Si habla español, tiene a weber disposición servicios gratuitos de asistencia lingüística. Llame al [phone].    We comply with applicable federal civil rights laws and [place] laws. We do not discriminate on the basis of race, color, national origin, age, disability, sex, sexual orientation, or gender identity.            Thank you!     Thank you for choosing [facility]  for your care. Our goal is always to provide you with excellent care. Hearing back from our patients is one way we can continue to improve our services. Please take a few minutes to complete the written survey that you may receive in the mail after your visit with us. Thank you!             Your Updated Medication List - Protect others around you: Learn how to safely use, store and throw away your medicines at www.disposemymeds.org.          This list is accurate as of 11/28/18  1:03 PM.  Always use your most recent med list.                   Brand Name Dispense Instructions for use Diagnosis    aspirin 81 MG EC tablet    ASA    60 tablet    Take 1 tablet (81 mg) by mouth daily    ACS (acute coronary syndrome) (H)       atorvastatin 40 MG tablet    LIPITOR    90 tablet    Take 1 tablet (40 mg) by mouth daily    Mixed hyperlipidemia, History of non-ST elevation myocardial infarction (NSTEMI), Coronary artery disease involving native coronary artery of native heart without angina pectoris       B-12 1000 MCG Tbcr      Take 1,000 mcg by mouth daily    Gastroesophageal reflux disease without esophagitis       FISH OIL PO      Take 1 capsule by mouth daily        FLUOXETINE HCL PO      Take 40 mg by mouth every other day  (Patient taking differently than prescribed, finishing up old RX; Current RX is 20mg once daily)        ketorolac 10 MG tablet    TORADOL    20 tablet    Take 1 tablet (10 mg) by mouth every 6 hours as needed for moderate pain    Prostate cancer (H)       levothyroxine 50 MCG tablet    SYNTHROID/LEVOTHROID    90 tablet    Take 1 tablet (50 mcg) by mouth daily    Hypothyroidism due to acquired atrophy of thyroid       losartan 100 MG tablet    COZAAR    90 tablet    Take 1 tablet (100 mg) by mouth daily    Coronary artery disease involving native coronary artery of native heart without angina pectoris, Essential hypertension, benign, History of non-ST elevation myocardial infarction (NSTEMI)       multivitamin w/minerals tablet      Take 1 tablet by mouth daily    Gastroesophageal reflux disease without esophagitis       nitroGLYcerin 0.4 MG sublingual tablet    NITROSTAT    25 tablet    Place 1 tablet (0.4 mg) under the tongue every 5 minutes as needed for chest pain    ACS (acute coronary syndrome) (H)       omeprazole 20 MG DR capsule    priLOSEC    90 capsule    TAKE ONE CAPSULE BY MOUTH ONCE DAILY    Gastroesophageal reflux disease without esophagitis       oxyCODONE 5 MG tablet    ROXICODONE    15 tablet    Take 1-2 tablets (5-10 mg) by mouth every 3 hours as needed    Prostate cancer (H)       senna-docusate 8.6-50 MG tablet    SENOKOT-S/PERICOLACE    60 tablet    Take 1 tablet by mouth 2 times daily To be taken with opioid (narcotic) pain medication to prevent constipation.    Prostate cancer (H)       traZODone 100 MG tablet    DESYREL    90 tablet    Take 1 tablet (100 mg) by mouth nightly as needed for sleep    Major depressive disorder, recurrent episode, in full remission (H)       TYLENOL PO      Take 325-650 mg by mouth 2 times daily as needed for mild pain or fever        vitamin D3 1000 units (25 mcg) tablet    CHOLECALCIFEROL     Take 1 tablet (1,000 Units) by mouth daily    Gastroesophageal reflux  disease without esophagitis

## 2018-11-28 NOTE — NURSING NOTE
Discharge Information    IV Discontiued Time:  NA    Amount of Fluid Infused:  NA    Discharge Criteria = When patient returns to baseline or as per MD order    Consciousness:  Pt is fully awake    Circulation:  BP +/- 20% of pre-procedure level    Respiration:  Patient is able to breathe deeply    O2 Sat:  Patient is able to maintain O2 Sat >92% on room air    Activity:  Moves 4 extremities on command    Ambulation:  Patient is able to stand and walk or stand and pivot into wheelchair    Dressing:  Clean/dry or No Dressing    Notes:   Discharge instructions and AVS given to patient    Patient meets criteria for discharge?  YES    Admitted to PCU?  No    Responsible adult present to accompany patient home?  Yes    Signature/Title:    Annabelle Milner RN Care Coordinator  [facility]

## 2018-11-28 NOTE — PATIENT INSTRUCTIONS
[facility] Procedure Discharge Instructions    Today you saw:     Dr. Nancy Cortez       Your procedure:  Facet joint injection     Medications used:  Lidocaine (anesthetic) Kenalog (steroid)  Omnipaque (contrast)              Be cautious when walking as numbness and/or weakness in the legs may occur up to 6-8 hours after the procedure due to effect of the local anesthetic    Do not drive for 6 hours. The effect of the local anesthetic could slow your reflexes.     Avoid strenuous activity for the first 24 hours. You may resume your regular activities after that.     You may shower, however avoid swimming, tub baths or hot tubs for 24 hours following your procedure    If you were fasting, you can resume your regular diet and medications    You may have a mild to moderate increase in pain for several days following the injection.      You may use ice packs for 10-15 minutes, 3 to 4 times a day at the injection site for comfort    Do not use heat to painful areas for 6 to 8 hours. This will give the local anesthetic time to wear off and prevent you from accidentally burning your skin.    You may use anti-inflammatory medications (such as Ibuprofen/Advil or Aleve) or Tylenol for pain control if necessary    If you have diabetes, check your blood sugar more frequently than usual as your blood sugar may be higher than normal for 10-14 days following a steroid injection. Contact your doctor who manages your diabetes if your blood sugar is higher than usual    It may take up to 14 days for the steroid medication to start working although you may feel the effect as early as a few days after the procedure.     Follow up with your referring provider in 2 - 3 weeks.       If you experience any of the following, call the pain center  line during work hours at [phone] or on-call physician after hours at [phone]:  -Fever over 100 degree F  -Swelling, bleeding, redness, drainage, warmth at the injection  site  -Progressive weakness or numbness in your legs or arms  -Loss of bowel or bladder function  -Unusual headache that is not relieved by Tylenol  -Unusual new onset of pain that is not improving

## 2018-12-04 ENCOUNTER — TELEPHONE (OUTPATIENT)
Dept: UROLOGY | Facility: CLINIC | Age: 68
End: 2018-12-04

## 2018-12-04 NOTE — TELEPHONE ENCOUNTER
Jose is having a urgent BM's with some pain. He is wondering if this is normal after his surgery. He is not seeing any blood in BM's or toilet . Not feeling constipated .Annabelle Corey LPN

## 2018-12-05 NOTE — TELEPHONE ENCOUNTER
Called Jose back and informed him to see his primary or colon rectal MD as this is not a normal post op symptom.Annabelle Corey LPN

## 2018-12-06 ENCOUNTER — OFFICE VISIT (OUTPATIENT)
Dept: FAMILY MEDICINE | Facility: CLINIC | Age: 68
End: 2018-12-06

## 2018-12-06 VITALS
HEART RATE: 67 BPM | WEIGHT: 139 LBS | DIASTOLIC BLOOD PRESSURE: 88 MMHG | BODY MASS INDEX: 23.13 KG/M2 | TEMPERATURE: 97.8 F | SYSTOLIC BLOOD PRESSURE: 142 MMHG | OXYGEN SATURATION: 98 %

## 2018-12-06 DIAGNOSIS — R32 URINARY INCONTINENCE, UNSPECIFIED TYPE: ICD-10-CM

## 2018-12-06 DIAGNOSIS — R15.9 INCONTINENCE OF FECES, UNSPECIFIED FECAL INCONTINENCE TYPE: ICD-10-CM

## 2018-12-06 DIAGNOSIS — C61 PROSTATE CANCER (H): ICD-10-CM

## 2018-12-06 DIAGNOSIS — R82.90 ABNORMAL URINE ODOR: Primary | ICD-10-CM

## 2018-12-06 DIAGNOSIS — R19.5 LOOSE STOOLS: ICD-10-CM

## 2018-12-06 LAB
ALBUMIN (URINE): 30 MG/DL
APPEARANCE UR: CLEAR
BACTERIA, UR: ABNORMAL
BILIRUB UR QL: ABNORMAL
CASTS/LPF: ABNORMAL
COLOR UR: YELLOW
EP/HPF: ABNORMAL
GLUCOSE URINE: ABNORMAL MG/DL
HGB UR QL: ABNORMAL
KETONES UR QL: ABNORMAL MG/DL
LEUKOCYTE ESTERASE - QUEST: ABNORMAL
MISC.: ABNORMAL
NITRITE UR QL STRIP: ABNORMAL
PH UR STRIP: 7 PH (ref 5–7)
RBC, UR MICRO: ABNORMAL (ref ?–2)
SP. GRAVITY: 1.02
UROBILINOGEN UR QL STRIP: 0.2 EU/DL (ref 0.2–1)
WBC, UR MICRO: ABNORMAL (ref ?–2)

## 2018-12-06 PROCEDURE — 99213 OFFICE O/P EST LOW 20 MIN: CPT | Performed by: PHYSICIAN ASSISTANT

## 2018-12-06 PROCEDURE — 81001 URINALYSIS AUTO W/SCOPE: CPT | Performed by: PHYSICIAN ASSISTANT

## 2018-12-06 PROCEDURE — 74018 RADEX ABDOMEN 1 VIEW: CPT | Performed by: PHYSICIAN ASSISTANT

## 2018-12-06 NOTE — MR AVS SNAPSHOT
After Visit Summary   12/6/2018    Jose Moreno    MRN: [id number]           Patient Information     Date Of Birth          1950        Visit Information        Provider Department      12/6/2018 4:00 PM Raysa Singh PA-C [facility]        Today's Diagnoses     Abnormal urine odor    -  1    Urinary incontinence, unspecified type        Incontinence of feces, unspecified fecal incontinence type        Loose stools        Prostate cancer (H)           Follow-ups after your visit        Follow-up notes from your care team     Return if symptoms worsen or fail to improve.      Your next 10 appointments already scheduled     Dec 21, 2018  1:00 PM CST   LAB [facility]   [facility]    [address]  [facility] [id number] [phone]           Please do not eat 10-12 hours before your appointment if you are coming in fasting for labs on lipids, cholesterol, or glucose (sugar). This does not apply to pregnant women. Water, hot tea and black coffee (with nothing added) are okay. Do not drink other fluids, diet soda or chew gum.            Dec 26, 2018 10:30 AM CST   Return Visit with Clint Blankenship MD   [facility] ([facility])    [address] [facility]  [address]  [facility] [id number] [phone]              Future tests that were ordered for you today     Open Future Orders        Priority Expected Expires Ordered    Ova and parasites ([facility]) Routine  1/6/2019 12/6/2018    Stool Culture Salm/Shig/Campy ([facility]) Routine  1/6/2019 12/6/2018    C Difficile Toxin GDH Rflx to PCR ([facility] Routine  1/6/2019 12/6/2018            Who to contact     If you have questions or need follow up information about today's clinic visit or your schedule please contact [facility] directly at [phone].  Normal or non-critical  lab and imaging results will be communicated to you by [employer], letter or phone within 4 business days after the clinic has received the results. If you do not hear from us within 7 days, please contact the clinic through [employer] or phone. If you have a critical or abnormal lab result, we will notify you by phone as soon as possible.  Submit refill requests through [employer] or call your pharmacy and they will forward the refill request to us. Please allow 3 business days for your refill to be completed.          Additional Information About Your Visit        [employer] Information     [employer] gives you secure access to your electronic health record. If you see a primary care provider, you can also send messages to your care team and make appointments. If you have questions, please call your primary care clinic.  If you do not have a primary care provider, please call [phone] and they will assist you.        Care EveryWhere ID     This is your Care EveryWhere ID. This could be used by other organizations to access your [place] medical records  [id number]        Your Vitals Were     Pulse Temperature Pulse Oximetry BMI (Body Mass Index)          67 97.8  F (36.6  C) (Oral) 98% 23.13 kg/m2         Blood Pressure from Last 3 Encounters:   12/06/18 142/88   11/28/18 136/85   11/13/18 (!) 152/92    Weight from Last 3 Encounters:   12/06/18 63 kg (139 lb)   11/14/18 62.6 kg (138 lb)   11/01/18 62.6 kg (138 lb)              We Performed the Following     HCL  Urinalysis, Routine ([facility])     XR Abdomen 1 View          Today's Medication Changes          These changes are accurate as of 12/6/18 11:59 PM.  If you have any questions, ask your nurse or doctor.               These medicines have changed or have updated prescriptions.        Dose/Directions    omeprazole 20 MG DR capsule   Commonly known as:  priLOSEC   This may have changed:    - how much to take  - how to take this  - when to take this  - additional  instructions   Used for:  Gastroesophageal reflux disease without esophagitis        TAKE ONE CAPSULE BY MOUTH ONCE DAILY   Quantity:  90 capsule   Refills:  3       traZODone 100 MG tablet   Commonly known as:  DESYREL   This may have changed:  when to take this   Used for:  Major depressive disorder, recurrent episode, in full remission (H)        Dose:  100 mg   Take 1 tablet (100 mg) by mouth nightly as needed for sleep   Quantity:  90 tablet   Refills:  0                Primary Care Provider Office Phone # Fax #    Jerri Tavera [phone] [address] [address]        Equal Access to Services     [facility]: Hadii aad ku hadasho Soomaali, waaxda luqadaha, qaybta kaalmada adeegyada, jayjay nicolas . So [facility] [phone].    ATENCIÓN: Si habla español, tiene a weber disposición servicios gratuitos de asistencia lingüística. [facility] [phone].    We comply with applicable federal civil rights laws and [place] laws. We do not discriminate on the basis of race, color, national origin, age, disability, sex, sexual orientation, or gender identity.            Thank you!     Thank you for choosing [facility]  for your care. Our goal is always to provide you with excellent care. Hearing back from our patients is one way we can continue to improve our services. Please take a few minutes to complete the written survey that you may receive in the mail after your visit with us. Thank you!             Your Updated Medication List - Protect others around you: Learn how to safely use, store and throw away your medicines at www.disposemymeds.org.          This list is accurate as of 12/6/18 11:59 PM.  Always use your most recent med list.                   Brand Name Dispense Instructions for use Diagnosis    aspirin 81 MG EC tablet    ASA    60 tablet    Take 1 tablet (81 mg) by mouth daily    ACS (acute coronary  syndrome) (H)       atorvastatin 40 MG tablet    LIPITOR    90 tablet    Take 1 tablet (40 mg) by mouth daily    Mixed hyperlipidemia, History of non-ST elevation myocardial infarction (NSTEMI), Coronary artery disease involving native coronary artery of native heart without angina pectoris       B-12 1000 MCG Tbcr      Take 1,000 mcg by mouth daily    Gastroesophageal reflux disease without esophagitis       FISH OIL PO      Take 1 capsule by mouth daily        FLUOXETINE HCL PO      Take 40 mg by mouth every other day (Patient taking differently than prescribed, finishing up old RX; Current RX is 20mg once daily)        ketorolac 10 MG tablet    TORADOL    20 tablet    Take 1 tablet (10 mg) by mouth every 6 hours as needed for moderate pain    Prostate cancer (H)       levothyroxine 50 MCG tablet    SYNTHROID/LEVOTHROID    90 tablet    Take 1 tablet (50 mcg) by mouth daily    Hypothyroidism due to acquired atrophy of thyroid       losartan 100 MG tablet    COZAAR    90 tablet    Take 1 tablet (100 mg) by mouth daily    Coronary artery disease involving native coronary artery of native heart without angina pectoris, Essential hypertension, benign, History of non-ST elevation myocardial infarction (NSTEMI)       multivitamin w/minerals tablet      Take 1 tablet by mouth daily    Gastroesophageal reflux disease without esophagitis       nitroGLYcerin 0.4 MG sublingual tablet    NITROSTAT    25 tablet    Place 1 tablet (0.4 mg) under the tongue every 5 minutes as needed for chest pain    ACS (acute coronary syndrome) (H)       omeprazole 20 MG DR capsule    priLOSEC    90 capsule    TAKE ONE CAPSULE BY MOUTH ONCE DAILY    Gastroesophageal reflux disease without esophagitis       oxyCODONE 5 MG tablet    ROXICODONE    15 tablet    Take 1-2 tablets (5-10 mg) by mouth every 3 hours as needed    Prostate cancer (H)       senna-docusate 8.6-50 MG tablet    SENOKOT-S/PERICOLACE    60 tablet    Take 1 tablet by mouth 2 times  daily To be taken with opioid (narcotic) pain medication to prevent constipation.    Prostate cancer (H)       traZODone 100 MG tablet    DESYREL    90 tablet    Take 1 tablet (100 mg) by mouth nightly as needed for sleep    Major depressive disorder, recurrent episode, in full remission (H)       TYLENOL PO      Take 325-650 mg by mouth 2 times daily as needed for mild pain or fever        vitamin D3 1000 units (25 mcg) tablet    CHOLECALCIFEROL     Take 1 tablet (1,000 Units) by mouth daily    Gastroesophageal reflux disease without esophagitis

## 2018-12-06 NOTE — PROGRESS NOTES
"CC: Urinary incontinence, diarrhea    History:  11/1/2018 had prostatectomy for prostate cancer. This seemingly went well. However, pt reports that since surgery he has been unable to control urine flow, and every time he stands up after sitting he experiencing urinary incontinence unless he \"pinches it off\" until he can get to the toilet. Now for the past 2 weeks, he has noticed a urinary odor. He has been wearing adult diapers to cope with this.    Morgan also mentions that he has a \"goofy\" bowel issue where he sits for 5-10 minutes, then gets up he has significant urgency. He gets a 5 second warning before needing to have a BM. If he doesn't make it to toilet, he experiences incontinence. BMs are loose, but not watery. He denies any blood in the stool. Worst in morning, but can happen at any point of the day. No abdominal pain.     Morgan informed urology team of these issues and they recommended PCP for UA, x-ray, and stool studies to rule out c dif.    PMH, MEDICATIONS, ALLERGIES, SOCIAL AND FAMILY HISTORY in [facility] and reviewed by me personally.      ROS negative other than the symptoms noted above in the HPI.        Examination   /88 (BP Location: Right arm, Patient Position: Sitting, Cuff Size: Adult Regular)  Pulse 67  Temp 97.8  F (36.6  C) (Oral)  Wt 63 kg (139 lb)  SpO2 98%  BMI 23.13 kg/m2       Constitutional: Sitting comfortably, in no acute distress. Vital signs noted  Cardiovascular:  regular rate and rhythm, no murmurs, clicks, or gallops  Respiratory:  normal respiratory rate and rhythm, lungs clear to auscultation  /rectal: Not done, will start with labs.  SKIN: No jaundice/pallor/rash.   Psychiatric: mentation appears normal and affect normal/bright        A/P    ICD-10-CM    1. Abnormal urine odor R82.90    2. Urinary incontinence, unspecified type R32 HCL  Urinalysis, Routine ([facility])     XR Abdomen 1 View   3. Incontinence of feces, unspecified fecal incontinence type R15.9 XR Abdomen 1 " View     Ova and parasites ([facility])     Stool Culture Salm/Shig/Campy ([facility])     C Difficile Toxin GDH Rflx to PCR ([facility]     CANCELED: Ova and parasites ([facility])     CANCELED: Stool Culture Salm/Shig/Campy ([facility])     CANCELED: C Difficile Toxin GDH Rflx to PCR ([facility]   4. Loose stools R19.5    5. Prostate cancer (H) C61        DISCUSSION:  UA today appears normal without bacteria.     XR of abdomen appear normal without obstruction, but will wait for radiology report and inform pt with phone call once available. He will bring back stool studies tomorrow.     Pending normal results, will need to follow-up with urology particularly for persistent total urinary incontinence at 5 weeks post-op. I will refer to colo-rectal if stool incontinence does not resolve.       follow up visit: As needed    Raysa Singh PA-C  [facility]

## 2018-12-06 NOTE — NURSING NOTE
Morgan is here for diarrhea, he is post prostate surgery            Pre-visit Screening:  Immunizations:  up to date  Colonoscopy:  is up to date  Mammogram: NA  Asthma Action Test/Plan:  NA  PHQ9:  none  GAD7:  none  Questioned patient about current smoking habits Pt. has never smoked.  Ok to leave detailed message on voice mail for today's visit only Yes, phone # [phone]

## 2018-12-07 DIAGNOSIS — R15.9 INCONTINENCE OF FECES, UNSPECIFIED FECAL INCONTINENCE TYPE: ICD-10-CM

## 2018-12-07 PROCEDURE — 87177 OVA AND PARASITES SMEARS: CPT | Mod: 90 | Performed by: PHYSICIAN ASSISTANT

## 2018-12-07 PROCEDURE — 87427 SHIGA-LIKE TOXIN AG IA: CPT | Mod: 90 | Performed by: PHYSICIAN ASSISTANT

## 2018-12-07 PROCEDURE — 87045 FECES CULTURE AEROBIC BACT: CPT | Mod: 90 | Performed by: PHYSICIAN ASSISTANT

## 2018-12-07 PROCEDURE — 87046 STOOL CULTR AEROBIC BACT EA: CPT | Mod: 90 | Performed by: PHYSICIAN ASSISTANT

## 2018-12-07 PROCEDURE — 87209 SMEAR COMPLEX STAIN: CPT | Mod: 90 | Performed by: PHYSICIAN ASSISTANT

## 2018-12-07 NOTE — NURSING NOTE
Pt dropped off stool specimens, released orders from standing.   Pt did not bring back c diff. Sample.

## 2018-12-10 ENCOUNTER — OFFICE VISIT (OUTPATIENT)
Dept: UROLOGY | Facility: CLINIC | Age: 68
End: 2018-12-10
Payer: COMMERCIAL

## 2018-12-10 VITALS — BODY MASS INDEX: 23.16 KG/M2 | WEIGHT: 139 LBS | HEIGHT: 65 IN | HEART RATE: 78 BPM | OXYGEN SATURATION: 99 %

## 2018-12-10 DIAGNOSIS — C61 PROSTATE CANCER (H): Primary | ICD-10-CM

## 2018-12-10 PROCEDURE — 99024 POSTOP FOLLOW-UP VISIT: CPT | Performed by: UROLOGY

## 2018-12-10 ASSESSMENT — MIFFLIN-ST. JEOR: SCORE: 1317.38

## 2018-12-10 ASSESSMENT — PAIN SCALES - GENERAL: PAINLEVEL: NO PAIN (0)

## 2018-12-10 NOTE — PROGRESS NOTES
Urology    Morgan is here today for a postoperative exam. He contacted me on Friday and reported that he was having some pain and urgency with bowel movements. He went to his primary care physician's office. A KUB was reportedly normal and a urinalysis was normal. He had not been checked for hemorrhoids so I recommended that he come to clinic today for exam. He says that his symptoms have improved somewhat over the weekend.    Urinalysis is normal today.  Incisions are all healed up very well.  On rectal exam he has no external or internal hemorrhoids. Empty rectal vault.    He was provided reassurance. He is seeing me back in 2 weeks for his PSA check.

## 2018-12-10 NOTE — LETTER
12/10/2018       RE: Jose Moreno  [id number] [facility] [address]     Dear Colleague,    Thank you for referring your patient, Jose Moreno, to the [facility] UROLOGY [facility]IC [place] at [facility]. Please see a copy of my visit note below.    Urology    Morgan is here today for a postoperative exam. He contacted me on Friday and reported that he was having some pain and urgency with bowel movements. He went to his primary care physician's office. A KUB was reportedly normal and a urinalysis was normal. He had not been checked for hemorrhoids so I recommended that he come to clinic today for exam. He says that his symptoms have improved somewhat over the weekend.    Urinalysis is normal today.  Incisions are all healed up very well.  On rectal exam he has no external or internal hemorrhoids. Empty rectal vault.    He was provided reassurance. He is seeing me back in 2 weeks for his PSA check.    Again, thank you for allowing me to participate in the care of your patient.      Sincerely,    Clint Blankenship MD

## 2018-12-11 ENCOUNTER — TELEPHONE (OUTPATIENT)
Dept: UROLOGY | Facility: CLINIC | Age: 68
End: 2018-12-11

## 2018-12-11 LAB
CULTURE - QUEST: NORMAL
CULTURE, STOOL - QUEST: NORMAL
EIA - QUEST: NORMAL
TRICHROME - QUEST: NORMAL

## 2018-12-11 NOTE — TELEPHONE ENCOUNTER
Urology pt of Dr. Blankenship s/p laparoscopic radical prostatectomy with bilateral pelvic lymph node dissection and bilateral wide excision procedure done 11/1/2018.

## 2018-12-21 DIAGNOSIS — C61 PROSTATE CANCER (H): ICD-10-CM

## 2018-12-21 PROCEDURE — 36415 COLL VENOUS BLD VENIPUNCTURE: CPT | Performed by: PHYSICIAN ASSISTANT

## 2018-12-21 PROCEDURE — 84153 ASSAY OF PSA TOTAL: CPT | Performed by: PHYSICIAN ASSISTANT

## 2018-12-22 LAB — PSA SERPL-MCNC: <0.01 UG/L (ref 0–4)

## 2018-12-26 ENCOUNTER — OFFICE VISIT (OUTPATIENT)
Dept: UROLOGY | Facility: CLINIC | Age: 68
End: 2018-12-26
Payer: COMMERCIAL

## 2018-12-26 VITALS — BODY MASS INDEX: 23.16 KG/M2 | HEART RATE: 56 BPM | OXYGEN SATURATION: 99 % | HEIGHT: 65 IN | WEIGHT: 139 LBS

## 2018-12-26 DIAGNOSIS — N39.3 STRESS INCONTINENCE OF URINE: ICD-10-CM

## 2018-12-26 DIAGNOSIS — Z85.46 PERSONAL HISTORY OF MALIGNANT NEOPLASM OF PROSTATE: Primary | ICD-10-CM

## 2018-12-26 PROCEDURE — 99024 POSTOP FOLLOW-UP VISIT: CPT | Performed by: UROLOGY

## 2018-12-26 ASSESSMENT — MIFFLIN-ST. JEOR: SCORE: 1317.38

## 2018-12-26 ASSESSMENT — PAIN SCALES - GENERAL: PAINLEVEL: NO PAIN (0)

## 2018-12-26 NOTE — PROGRESS NOTES
Office Visit Note  [facility]  [phone]    UROLOGIC DIAGNOSES:   pT2 Shane 3+4=7 prostate cancer    CURRENT INTERVENTIONS:   Robotic prostatectomy in November    HISTORY:   Morgan returns to clinic today for his first postoperative PSA check.The  PSA is undetectable. He reports continued problems with urinary incontinence. He says that he is not incontinent at night but during the day he has constant incontinence and changes his pads multiple times per day.      PAST MEDICAL HISTORY:   Past Medical History:   Diagnosis Date     ADHD (attention deficit hyperactivity disorder)      CAD (coronary artery disease)     cardiac cath 1/23/15: SHERRY to 1st diagonal, SHERRY x2 to LAD, cath 2009: no intervention     Esophageal reflux      History of hyperthyroidism 4/9/2014     Hyperlipidemia      Hypertension      Mitral regurgitation 2009    moderately severe MVP, s/p robotic repair at [place]     Prostate cancer      Pulmonary nodules 2009    CT-recommend repeat in 6-12 months     Rotator cuff rupture 11/3/2011       PAST SURGICAL HISTORY:   Past Surgical History:   Procedure Laterality Date     COLONOSCOPY  7/00    GI     DAVINCI PROSTATECTOMY, LYMPHADENECTOMY N/A 11/1/2018    Procedure: ROBOTIC ASSISTED RADICAL PROSTATECTOMY WITH BILATERAL PELVIC LYMPH NODE DISSECTION;  Surgeon: Clint Blankenship MD;  Location:  OR     DECOMPRESSION LUMBAR MINIMALLY INVASIVE ONE LEVEL  1/11/2012    Procedure:DECOMPRESSION LUMBAR MINIMALLY INVASIVE ONE LEVEL; L4-5 Decompression Minimally Invasive; Surgeon:MESSI HORAN; Location: OR     HEART CATH RIGHT AND LEFT HEART CATH  01-23-15    See report, pt transfered to [facility] for complex bifurcation PCI of LAD diagonal vessel.      HEART CATH RIGHT AND LEFT HEART CATH  01-26-15    PTCA and implantation of SHERRY to 1st diagonal, SHERRY to mid LAD, SHERRY to proximal LAD     Hx of rotator cuff rupture and repair       LAPAROSCOPIC PROSTATECTOMY       REPAIR VALVE MITRAL   "2009    [facility] robotic repair      Reversal of vasectomy       VASECTOMY       XR LUMBAR RADIOFREQ ABLATION UNILATERAL  01/11/2018    lumbar area/ ? level       FAMILY HISTORY:   Family History   Problem Relation Age of Onset     Cancer Mother         breast, lung     Depression Father         alcohlism     Diabetes No family hx of      Cardiovascular No family hx of      Cancer - colorectal No family hx of      Prostate Cancer No family hx of        SOCIAL HISTORY:   Social History     Tobacco Use     Smoking status: Never Smoker     Smokeless tobacco: Never Used   Substance Use Topics     Alcohol use: Yes     Alcohol/week: 4.2 oz     Types: 7 Standard drinks or equivalent per week     Comment: 1 beer daily       Current Outpatient Medications   Medication     Acetaminophen (TYLENOL PO)     aspirin EC 81 MG EC tablet     atorvastatin (LIPITOR) 40 MG tablet     cholecalciferol (VITAMIN D3) 1000 UNIT tablet     Cyanocobalamin (B-12) 1000 MCG TBCR     FLUOXETINE HCL PO     levothyroxine (SYNTHROID/LEVOTHROID) 50 MCG tablet     losartan (COZAAR) 100 MG tablet     multivitamin, therapeutic with minerals (MULTI-VITAMIN) TABS tablet     nitroGLYcerin (NITROSTAT) 0.4 MG sublingual tablet     Omega-3 Fatty Acids (FISH OIL PO)     omeprazole (PRILOSEC) 20 MG CR capsule     ketorolac (TORADOL) 10 MG tablet     oxyCODONE IR (ROXICODONE) 5 MG tablet     senna-docusate (SENOKOT-S;PERICOLACE) 8.6-50 MG per tablet     traZODone (DESYREL) 100 MG tablet     No current facility-administered medications for this visit.          PHYSICAL EXAM:    Pulse 56   Ht 1.651 m (5' 5\")   Wt 63 kg (139 lb)   SpO2 99%   BMI 23.13 kg/m      Constitutional: Well developed. Conversant and in no acute distress  Eyes: Anicteric sclera, conjunctiva clear, normal extraocular movements  ENT: Normocephalic and atraumatic,   Skin: Warm and dry. No rashes or lesions  Cardiac: No peripheral edema  Back/Flank: Not done  CNS/PNS: Normal musculature and " movements, moves all extremities normally  Respiratory: Normal non-labored breathing  Abdomen: Soft nontender and nondistended  Peripheral Vascular: No peripheral edema  Mental Status/Psych: Alert and Oriented x 3. Normal mood and affect    Penis: Not done  Scrotal Skin: Not done  Testicles: Not done  Epididymis: Not done  Digital Rectal Exam:     Cystoscopy: Not done    Imaging: None    Urinalysis: UA RESULTS:  Recent Labs   Lab Test 12/06/18 11/13/18  2356   COLOR Yellow Yellow   APPEARANCE Clear Cloudy   URINEGLC Neg Negative   URINEBILI Neg Negative   URINEKETONE Neg Negative   SG  --  1.031   UBLD Neg Large*   URINEPH 7.0 5.0   PROTEIN  --  100*   UROBILINOGEN 0.2  --    NITRITE Neg Positive*   LEUKEST  --  Large*   RBCU  --  >182*   WBCU  --  >182*       PSA: undetectable    Post Void Residual:     Other labs: None today      IMPRESSION:  Hrinary incontinence    PLAN:  He is doing well from a prostate cancer standpoint but he has urinary incontinence, worse than expected, after his robotic prostatectomy. We discussed Kegel exercises.I also recommended a referral for pelvic floor physical therapy with the [place] for athletic medicine and he agreed to this plan.For prostate cancer surveillance I will see him back in 3 months for his next PSA.    Total Time: 15 minutes                                      Total in Consultation: 15 minutes      Clint Blankenship M.D.

## 2018-12-26 NOTE — LETTER
12/26/2018       RE: Jose Moreno  [address] [facility] [address]     Dear Colleague,    Thank you for referring your patient, Jose Moreno, to the [facility] UROLOGY CLINIC [place] at [facility]. Please see a copy of my visit note below.    Office Visit Note  [facility]  [phone]    UROLOGIC DIAGNOSES:   pT2 Bernard 3+4=7 prostate cancer    CURRENT INTERVENTIONS:   Robotic prostatectomy in November    HISTORY:   Morgan returns to clinic today for his first postoperative PSA check.The  PSA is undetectable. He reports continued problems with urinary incontinence. He says that he is not incontinent at night but during the day he has constant incontinence and changes his pads multiple times per day.      PAST MEDICAL HISTORY:   Past Medical History:   Diagnosis Date     ADHD (attention deficit hyperactivity disorder)      CAD (coronary artery disease)     cardiac cath 1/23/15: SHERRY to 1st diagonal, SHERRY x2 to LAD, cath 2009: no intervention     Esophageal reflux      History of hyperthyroidism 4/9/2014     Hyperlipidemia      Hypertension      Mitral regurgitation 2009    moderately severe MVP, s/p robotic repair at [place]     Prostate cancer      Pulmonary nodules 2009    CT-recommend repeat in 6-12 months     Rotator cuff rupture 11/3/2011       PAST SURGICAL HISTORY:   Past Surgical History:   Procedure Laterality Date     COLONOSCOPY  7/00    GI     DAVINCI PROSTATECTOMY, LYMPHADENECTOMY N/A 11/1/2018    Procedure: ROBOTIC ASSISTED RADICAL PROSTATECTOMY WITH BILATERAL PELVIC LYMPH NODE DISSECTION;  Surgeon: Clint Blankenship MD;  Location: [facility] OR     DECOMPRESSION LUMBAR MINIMALLY INVASIVE ONE LEVEL  1/11/2012    Procedure:DECOMPRESSION LUMBAR MINIMALLY INVASIVE ONE LEVEL; L4-5 Decompression Minimally Invasive; Surgeon:MESSI HORAN; Location:[facility] OR     HEART CATH RIGHT AND LEFT HEART CATH  01-23-15    See report, pt  "transfered to [facility] for complex bifurcation PCI of LAD diagonal vessel.      HEART CATH RIGHT AND LEFT HEART CATH  01-26-15    PTCA and implantation of SHERRY to 1st diagonal, SHERRY to mid LAD, SHERRY to proximal LAD     Hx of rotator cuff rupture and repair       LAPAROSCOPIC PROSTATECTOMY       REPAIR VALVE MITRAL  2009    [facility] robotic repair      Reversal of vasectomy       VASECTOMY       XR LUMBAR RADIOFREQ ABLATION UNILATERAL  01/11/2018    lumbar area/ ? level       FAMILY HISTORY:   Family History   Problem Relation Age of Onset     Cancer Mother         breast, lung     Depression Father         alcohlism     Diabetes No family hx of      Cardiovascular No family hx of      Cancer - colorectal No family hx of      Prostate Cancer No family hx of        SOCIAL HISTORY:   Social History     Tobacco Use     Smoking status: Never Smoker     Smokeless tobacco: Never Used   Substance Use Topics     Alcohol use: Yes     Alcohol/week: 4.2 oz     Types: 7 Standard drinks or equivalent per week     Comment: 1 beer daily       Current Outpatient Medications   Medication     Acetaminophen (TYLENOL PO)     aspirin EC 81 MG EC tablet     atorvastatin (LIPITOR) 40 MG tablet     cholecalciferol (VITAMIN D3) 1000 UNIT tablet     Cyanocobalamin (B-12) 1000 MCG TBCR     FLUOXETINE HCL PO     levothyroxine (SYNTHROID/LEVOTHROID) 50 MCG tablet     losartan (COZAAR) 100 MG tablet     multivitamin, therapeutic with minerals (MULTI-VITAMIN) TABS tablet     nitroGLYcerin (NITROSTAT) 0.4 MG sublingual tablet     Omega-3 Fatty Acids (FISH OIL PO)     omeprazole (PRILOSEC) 20 MG CR capsule     ketorolac (TORADOL) 10 MG tablet     oxyCODONE IR (ROXICODONE) 5 MG tablet     senna-docusate (SENOKOT-S;PERICOLACE) 8.6-50 MG per tablet     traZODone (DESYREL) 100 MG tablet     No current facility-administered medications for this visit.      PHYSICAL EXAM:    Pulse 56   Ht 1.651 m (5' 5\")   Wt 63 kg (139 lb)   SpO2 99%   BMI 23.13 " kg/m       Constitutional: Well developed. Conversant and in no acute distress  Eyes: Anicteric sclera, conjunctiva clear, normal extraocular movements  ENT: Normocephalic and atraumatic,   Skin: Warm and dry. No rashes or lesions  Cardiac: No peripheral edema  Back/Flank: Not done  CNS/PNS: Normal musculature and movements, moves all extremities normally  Respiratory: Normal non-labored breathing  Abdomen: Soft nontender and nondistended  Peripheral Vascular: No peripheral edema  Mental Status/Psych: Alert and Oriented x 3. Normal mood and affect    Penis: Not done  Scrotal Skin: Not done  Testicles: Not done  Epididymis: Not done  Digital Rectal Exam:     Cystoscopy: Not done    Imaging: None    Urinalysis: UA RESULTS:  Recent Labs   Lab Test 12/06/18 11/13/18  2356   COLOR Yellow Yellow   APPEARANCE Clear Cloudy   URINEGLC Neg Negative   URINEBILI Neg Negative   URINEKETONE Neg Negative   SG  --  1.031   UBLD Neg Large*   URINEPH 7.0 5.0   PROTEIN  --  100*   UROBILINOGEN 0.2  --    NITRITE Neg Positive*   LEUKEST  --  Large*   RBCU  --  >182*   WBCU  --  >182*       PSA: undetectable    Post Void Residual:     Other labs: None today      IMPRESSION:  Hrinary incontinence    PLAN:  He is doing well from a prostate cancer standpoint but he has urinary incontinence, worse than expected, after his robotic prostatectomy. We discussed Kegel exercises.I also recommended a referral for pelvic floor physical therapy with the [place] for athletic medicine and he agreed to this plan.For prostate cancer surveillance I will see him back in 3 months for his next PSA.    Total Time: 15 minutes                                      Total in Consultation: 15 minutes    Clint Blankenship M.D.

## 2019-01-08 DIAGNOSIS — I24.9 ACS (ACUTE CORONARY SYNDROME) (H): ICD-10-CM

## 2019-01-08 RX ORDER — NITROGLYCERIN 0.4 MG/1
TABLET SUBLINGUAL
Qty: 20 TABLET | Refills: 0 | Status: SHIPPED | OUTPATIENT
Start: 2019-01-08 | End: 2019-04-01

## 2019-01-08 NOTE — TELEPHONE ENCOUNTER
Pending Prescriptions:                       Disp   Refills    nitroGLYcerin (NITROSTAT) 0.4 MG sublingu*25 tab*             Sig: TAKE 1 TABLET BY MOUTH EVERY 5 MIN AS NEEDED FOR           CHEST PAIN    Last refill 9-  Last px 10-1-2018  Change qty fax or dana Kaufman  [phone] (home)

## 2019-01-14 ENCOUNTER — TELEPHONE (OUTPATIENT)
Dept: FAMILY MEDICINE | Facility: CLINIC | Age: 69
End: 2019-01-14

## 2019-01-14 ENCOUNTER — OFFICE VISIT (OUTPATIENT)
Dept: FAMILY MEDICINE | Facility: CLINIC | Age: 69
End: 2019-01-14

## 2019-01-14 ENCOUNTER — TELEPHONE (OUTPATIENT)
Dept: PALLIATIVE MEDICINE | Facility: CLINIC | Age: 69
End: 2019-01-14

## 2019-01-14 VITALS
DIASTOLIC BLOOD PRESSURE: 78 MMHG | BODY MASS INDEX: 24.06 KG/M2 | OXYGEN SATURATION: 99 % | WEIGHT: 144.4 LBS | SYSTOLIC BLOOD PRESSURE: 138 MMHG | RESPIRATION RATE: 16 BRPM | HEART RATE: 61 BPM | TEMPERATURE: 98 F | HEIGHT: 65 IN

## 2019-01-14 DIAGNOSIS — M47.817 FACET ARTHROPATHY, LUMBOSACRAL: ICD-10-CM

## 2019-01-14 DIAGNOSIS — M48.061 SPINAL STENOSIS OF LUMBAR REGION WITHOUT NEUROGENIC CLAUDICATION: Primary | ICD-10-CM

## 2019-01-14 PROCEDURE — 99213 OFFICE O/P EST LOW 20 MIN: CPT | Performed by: FAMILY MEDICINE

## 2019-01-14 ASSESSMENT — MIFFLIN-ST. JEOR: SCORE: 1341.87

## 2019-01-14 NOTE — NURSING NOTE
Morgan is here today for a referral for lower back pain.    Pre-visit Screening:  Immunizations:  up to date  Colonoscopy:  is up to date  Mammogram: NA  Asthma Action Test/Plan:  NA  PHQ9:  NA  GAD7:  NA  Questioned patient about current smoking habits Pt. has never smoked.  Ok to leave detailed message on voice mail for today's visit only Yes, phone # [phone]

## 2019-01-14 NOTE — PATIENT INSTRUCTIONS
Spinal stenosis of lumbar region without neurogenic claudication  (primary encounter diagnosis)  Plan: PAIN MANAGEMENT REFERRAL        Await consult    Our referral specialists will help find in network information

## 2019-01-14 NOTE — PROGRESS NOTES
SUBJECTIVE:   The patients complains of low back pain for 12 years.  Had PT at [facility] and then seen by orthopedists for a discectomy January 2012 L4-L5 spinal decompression by  Dr Henry at [facility], then Rodney Galvez in 2016 followed by cortisone injections L3 and L4 facet nerve blockade, left L4-L5 lumbar facet joint steroid injection 8/2012, , and Dr Chappell at [facility] at [place] managing cortisone injection with DR Cortez anesthesiology since that time/  positional with bending or lifting, without radiation down the legs. Precipitating factors: walking and standing.      Wants recommendation for alternative therapy looked on line and found at the [facility] , pain managing implant with DR Richter    OBJECTIVE:  Appears well, in no apparent distress.  Vital signs are normal. Lumbo-sacral spine area reveals no local tenderness or mass.  Painful and reduced LS ROM noted. Straight leg raise is negative at 90 degrees on right and left. DTR's, motor strength and sensation normal, including heel and toe gait.  Peripheral pulses are palpable. Lumbar spine X-Ray are not indicated.    ASSESSMENT: (M48.061) Spinal stenosis of lumbar region without neurogenic claudication  (primary encounter diagnosis)  Plan: PAIN MANAGEMENT REFERRAL        Await consult    (M47.7) Facet arthropathy, lumbosacral  Plan: PAIN MANAGEMENT REFERRAL

## 2019-01-14 NOTE — TELEPHONE ENCOUNTER
Patient would like to see Dr. [place] Richter with [facility]. Dr. Richter see's patients in [place] & [place]    Dr. Richter, [place]  [facility]   [address] [place] [address]  [phone] -- appt line    I left a message for patient to call me back RE above info. I will let him know that Dr. Tavera will issue the referral, some one from  [facility] will be calling him to schedule his appt.     Dr. Tavera please complete / sign the pending referral / order for pain management under today's office notes.     Thank you

## 2019-01-15 ENCOUNTER — THERAPY VISIT (OUTPATIENT)
Dept: PHYSICAL THERAPY | Facility: CLINIC | Age: 69
End: 2019-01-15
Payer: COMMERCIAL

## 2019-01-15 DIAGNOSIS — M99.05 SOMATIC DYSFUNCTION OF PELVIS REGION: ICD-10-CM

## 2019-01-15 DIAGNOSIS — N39.46 MIXED INCONTINENCE URGE AND STRESS (MALE)(FEMALE): ICD-10-CM

## 2019-01-15 PROCEDURE — 97112 NEUROMUSCULAR REEDUCATION: CPT | Mod: GP | Performed by: PHYSICAL THERAPIST

## 2019-01-15 PROCEDURE — 97110 THERAPEUTIC EXERCISES: CPT | Mod: GP | Performed by: PHYSICAL THERAPIST

## 2019-01-15 PROCEDURE — 97161 PT EVAL LOW COMPLEX 20 MIN: CPT | Mod: GP | Performed by: PHYSICAL THERAPIST

## 2019-01-15 NOTE — PROGRESS NOTES
[facility] Initial Evaluation  Subjective:        SUBJECTIVE:  Patient is s/p prostatectomy on 11-1-18. Catheter was removed on 2 weeks later.  Urination:  Do you feel the sensation of your bladder filling? No  How many pads per day are you using? 3 Depends and 3 pads  Do you leak on the way to the bathroom or with a strong urge to void? Yes  Do you leak with cough,sneeze, jumping, running? Yes  Do you have triggers that make you feel you can't wait to go to the bathroom? .  How long can you delay the need to urinate? Not at all.   Can you stop the flow of urine when on the toilet? No  Is the volume of urine passed usually: medium. (8sec rule= 250ml with average bladder storing 400-600ml)  Do you strain to pass urine? No  Do you have a slow or hesitant urinary stream? No  Do you have difficulty initiating the urine stream? No  Fluid intake(one glass is 8oz or one cup) 8 glasses/day, 8 caffinated glasses/day  8 alcohol glasses/day.        The history is provided by the spouse. No  was used.   Jose Moreno is a 68 year old male with a incontinence condition.  Condition occurred with:  After surgery.  Condition occurred: other.  This is a new condition     Patient reports pain:  N/a.      Pain Scale: 0/10.   Pain is worse during the day.  Symptoms are exacerbated by coughing and sneezing (transfers , lifting) and relieved by rest.  Since onset symptoms are unchanged.  Special tests:  X-ray and CT scan.  Previous treatment: none.      Pertinent medical history includes:  Cancer, depression, heart problems and high blood pressure.  Medical allergies: none.  Other surgeries include:  Heart surgery (stents for heart).  Current medications:  Anti-depressants and high blood pressure medication.    Employment status: retired.          Red flags:  None as reported by the patient.                        Objective:  System                                 Pelvic Dysfunction Evaluation:       Diagnostic Tests:  Diagnostic tests pelvic: see history.                        Flexibility:  normal              External Assessment:    Skin Condition:  Normal  Scars:  Well healed  Bearing Down/Coughing:  Normal  Tissue Symmetry:  Normal      Internal Assessment:    Sensory Exam:  Normal    Accessory Muscle use-Abdominals:  Yes  Accessory Muscle use-Gluteals:  Yes  Accessory Muscle use-Adductors:  Yes    SEMG Biofeedback:        Suraface electrode placement--Perianal:  Bilateral  Baseline EMG PM:  3mV    Peak pelvic muscle contraction:  9 mV    EMG interpretation to fatigue:  3-5 seconds  Position:  SupineAdditional History:        Caffeine Consumption:  2 cups per day                     General     ROS    Assessment/Plan:    Patient is a 68 year old male with pelvic complaints.    Patient has the following significant findings with corresponding treatment plan.                Diagnosis 1:  Post op incontinence  Decreased strength - therapeutic exercise, therapeutic activities and home program  Impaired muscle performance - biofeedback, neuro re-education and home program    Therapy Evaluation Codes:   1) History comprised of:   Personal factors that impact the plan of care:      None.    Comorbidity factors that impact the plan of care are:      Cancer, Depression, Heart problems, High blood pressure and Weakness.     Medications impacting care: Anti-depressant, High blood pressure and thryoid.  2) Examination of Body Systems comprised of:   Body structures and functions that impact the plan of care:      Pelvis.   Activity limitations that impact the plan of care are:      Lifting, Stress incontinence and transfers.  3) Clinical presentation characteristics are:   Stable/Uncomplicated.  4) Decision-Making    Low complexity using standardized patient assessment instrument and/or measureable assessment of functional outcome.  Cumulative Therapy Evaluation is: Low complexity.    Previous and current  functional limitations:  (See Goal Flow Sheet for this information)    Short term and Long term goals: (See Goal Flow Sheet for this information)     Communication ability:  Patient appears to be able to clearly communicate and understand verbal and written communication and follow directions correctly.  Treatment Explanation - The following has been discussed with the patient:   RX ordered/plan of care  Anticipated outcomes  Possible risks and side effects  This patient would benefit from PT intervention to resume normal activities.   Rehab potential is good.    Frequency:  1 X week, once daily  Duration:  for 6 weeks  Discharge Plan:  Achieve all LTG.  Independent in home treatment program.  Reach maximal therapeutic benefit.    Please refer to the daily flowsheet for treatment today, total treatment time and time spent performing 1:1 timed codes.

## 2019-01-16 DIAGNOSIS — F33.42 MAJOR DEPRESSIVE DISORDER, RECURRENT EPISODE, IN FULL REMISSION (H): ICD-10-CM

## 2019-01-16 PROBLEM — C61 PROSTATE CANCER (H): Status: ACTIVE | Noted: 2018-08-28

## 2019-01-16 RX ORDER — TRAZODONE HYDROCHLORIDE 100 MG/1
100 TABLET ORAL
Qty: 90 TABLET | Refills: 0 | Status: SHIPPED | OUTPATIENT
Start: 2019-01-16 | End: 2019-04-01

## 2019-01-16 NOTE — TELEPHONE ENCOUNTER
[facility] Referral      1. Confirmed address with patient? Yes  2. Confirmed phone number with patient? Yes  3. Confirmed referring provider? Yes  4. Is the PCP the same as the referring provider? Yes  5. Has the patient been to any previous pain clinics? Yes  (If yes, send LORENE with welcome letter)  6. Which insurance are we to bill for this appointment?  [employer]    7. Informed pt of cancellation (48 hour) policy? Yes    REGARDING OPIOID MEDICATIONS: We will always address appropriateness of opioid pain medications, but we generally will not automatically take on a prescribing role. When we do take on prescribing of opioids for chronic pain, it is in collaboration with the referring physician for an intermediate period of time (months), with an expectation that the primary physician or provider will assume the prescribing role if medications are effective at stable doses with demonstrated compliance. Therefore, please do not assume that your prescribing responsibilities end on the day of pain clinic consultation.  8. Informed pt of prescribing policy? Yes    9.Please be aware that once you are established with a pain provider and location, you will need to continue have all future visits with that provider and location. It is best to determine what location is the most convenient for you and schedule with that one.    ** PATIENT INFORMED OF THIS POLICY Yes      9. Referring Provider: Jerri Tavera     10. Criteria for Triage Eval:   -Missed/Failed 1st DUAL appointment? N/A   -Medication Focused? N/A   -Mental Health Concerns? (e.g. Recent psych hospitalization/snap shot)? N/A   -Active substance abuse? N/A   -Patient behaviors (e.g. Offensive language/raised voice)? N/A        Rocael KNIGHT    [facility]

## 2019-01-16 NOTE — TELEPHONE ENCOUNTER
Pending Prescriptions:                       Disp   Refills    traZODone (DESYREL) 100 MG tablet [Pharma*90 tab*             Sig: TAKE 1 TABLET (100 MG) BY MOUTH NIGHTLY AS NEEDED           FOR SLEEP    Last refill was at px 10-1-2018  Change qty fax or send to [facility]  [phone] (home)

## 2019-01-17 ENCOUNTER — OFFICE VISIT (OUTPATIENT)
Dept: PALLIATIVE MEDICINE | Facility: CLINIC | Age: 69
End: 2019-01-17
Payer: COMMERCIAL

## 2019-01-17 ENCOUNTER — TELEPHONE (OUTPATIENT)
Dept: PALLIATIVE MEDICINE | Facility: CLINIC | Age: 69
End: 2019-01-17

## 2019-01-17 VITALS
SYSTOLIC BLOOD PRESSURE: 133 MMHG | HEART RATE: 62 BPM | BODY MASS INDEX: 22.96 KG/M2 | DIASTOLIC BLOOD PRESSURE: 86 MMHG | WEIGHT: 138 LBS | OXYGEN SATURATION: 95 %

## 2019-01-17 DIAGNOSIS — M54.50 CHRONIC LEFT-SIDED LOW BACK PAIN WITHOUT SCIATICA: ICD-10-CM

## 2019-01-17 DIAGNOSIS — G89.29 CHRONIC LEFT-SIDED LOW BACK PAIN WITHOUT SCIATICA: ICD-10-CM

## 2019-01-17 DIAGNOSIS — M47.816 SPONDYLOSIS OF LUMBAR REGION WITHOUT MYELOPATHY OR RADICULOPATHY: Primary | ICD-10-CM

## 2019-01-17 PROCEDURE — 99214 OFFICE O/P EST MOD 30 MIN: CPT | Performed by: PHYSICAL MEDICINE & REHABILITATION

## 2019-01-17 ASSESSMENT — PAIN SCALES - GENERAL: PAINLEVEL: MODERATE PAIN (5)

## 2019-01-17 NOTE — PROGRESS NOTES
[facility] Consultation    Date of visit: 1/17/2019    Reason for consultation:    Jose Moreno is a 68 year old male who is seen in consultation today at the request of his primary care physician, Jerri Tavera.     Consultation and Evaluation for: spinal cord stimulator trial    Review of [place] Prescription Monitoring Program (): Today I have also reviewed the patient's history of controlled substance use, as provided by [place] licensed pharmacies and prescriber dispensers.     Review of Pain Questionnaire: Please see the [facility] health questionnaire, which the patient completed and reviewed with me in detail.    Review of Electronic Chart: Today I have also reviewed available medical information in the patient's medical record at [facility] ([facility]), including relevant provider notes, laboratory work, and imaging.     Jose Moreno has been seen at a pain clinic in the past.  Has been getting RFA and facet injections in the left low back.    Chief Complaint:    Chief Complaint   Patient presents with     Pain       Pain history:  Jose Moreno is a 68 year old male who presents for initial evaluation of chief pain complaint of chronic low back pain. Mr. Moreno has had the back pain for over 10 years in his low back, he denies having any radiating symptoms into his lower extremities. He was seen by Dr. Lenny rabago in 2016 or 2017 with recommendation for a lumbar facet injection. Since that time he has had several left sided lumbar facet injection with significant pain relief that lasts 2-3 months. His last injection was in November and this is starting to wear off currently. He has also had RFAs in the past that have helped for 6-7 months but he feels the more recent one was less effective and didn't last as long.    He presents to clinic today looking for longer term options for the low back pain stemming from the arthritis in his spine.  He avoids medications and currently works as a  and would like to avoid any medications that may be sedating. He has tried several rounds of PT which he feels did benefit for strengthening his core and back but did little in the way of treating his back pain. He has read about spinal cord stimulators and is wondering if this would be a good option for him going forward.      Onset: 10 years ago  Location/Radiation: left low back, no radiation  Quality: aching  Severity/Intensity: 9/10 at worst, 0/10 at best, 9/10 on average (without rfa or cortisone)  Aggravating factors include: standing, walking  Relieving factors include: sitting, laying down  Red Flags: The patient denies bowel or bladder incontinence, parasthesias, weakness, saddle anesthesia, unintentional weight loss, or fever/chills/sweats.         Pain Treatments:  1. Medications:       Current pain medications:  Ibuprofen prn       Previous pain medications:  none  2. Physical Therapy: didn't help     TENS unit: hasn't tried  3. Pain psychology: hasn't tried  4. Surgery: none  5. Injections: alternating lumbar facet injections and RFA over the past 2 years  6. Alternative Therapies:    Chiropractic: helped temporarily    Acupuncture: helped temporarily      Diagnostic tests:  EXAM: MRI OF THE LUMBAR SPINE WITHOUT CONTRAST  CLINICAL INFORMATION: Left low back pain ongoing for 5+ years. Patient has attempted physical therapy and chiropractic therapy.  TECHNICAL INFORMATION: Sagittal fast spin-echo T2-weighted, T1-weighted, STIR as well as axial fast spin-echo T1 and T2-weighted images of the lumbar spine were obtained on a 1.2 Dipika MRI scanner.   INTERPRETATION: Comparison MRI lumbar spine 7/8/2011.  Dextroscoliosis centered at L2-3 with straightened lumbar lordosis, 1 mm retrolisthesis of L2 on L3 and 1 to 2 mm retrolisthesis of L3 on L4. No acute fracture or spondylolysis, although edematous degenerative endplate changes are demonstrated at  L5-S1 and L4-5. Conus is normal and terminates at L1. Imaged upper sacrum is notable for mild right ventral sacroiliac joint osteophytosis. Imaged paraspinal soft tissue structures are unremarkable. Disc desiccation and annular bulging are demonstrated at L5-S1 through L1-2 and T11-12. Disc space narrowing is severe left laterally at L5-S1, severe right laterally at L4-5, severe left laterally at L2-3 and mild to moderate at L1-2.  L5-S1: Unchanged annular bulging without central stenosis or traversing neural compression. Mild right facet arthrosis with chronic, mild to moderate right and moderate lateral left foraminal stenosis. There is unchanged chronic foraminal encroachment upon the exiting left L5 ganglion and post ganglionic nerve.  L4-5: Interval left hemilaminectomy with unchanged annular bulging more prominent in the midline, but no central stenosis or traversing neural compression. Mild bilateral facet arthrosis with moderate right and mild left chronic foraminal narrowing and chronic foraminal encroachment upon the exiting right L4 ganglion.  L3-4: New right posterolateral annular fissure with slightly progressed annular bulging more prominent in this distribution, mild right subarticular recess narrowing, but no traversing neural compression or central stenosis. Mild bilateral facet arthrosis with mild to moderate right and mild left chronic foraminal narrowing.  L2-3: Unchanged annular bulging without central stenosis or traversing neural compression. Mild right facet arthrosis with mild left chronic foraminal narrowing.  L1-2: Unchanged annular bulging without central stenosis or traversing neural compression. Mild left facet arthrosis and right facet hypertrophy with patent foramina.  T12-L1: Normal posterior disc margins and facets. Foramina are patent. No significant central stenosis or cord compression is evident at the imaged portions of T11-12.  CONCLUSION: Multilevel degenerative thoracolumbar  spondylosis, dextroscoliosis centered at L2-3, interval left L4-5 hemilaminectomy and the following specific notable findings:  1. New right posterolateral L3-4 annular fissure without overlying disc herniation.  2. No central stenosis, acute fracture, spondylolysis or evidence of arachnoidal adhesive disease.  3. Notable chronic moderate lateral left L5-S1 and moderate right L4-5 foraminal stenosis with chronic foraminal encroachment upon the exiting left L5 and within/post ganglionic nerve and exiting right L4 ganglion.  4. Mild right L5-S1, bilateral L4-5 and L3-4, right L2-3 and left L1-2 facet arthrosis.  5. Edematous degenerative endplate changes are demonstrated at L5-S1 and L4-5.  SC      Electronically signed on 11/10/2016 3:59:00 PM by Tanvir Montes M.D.         Labs:   Lab Results   Component Value Date    WBC 8.8 11/13/2018     Lab Results   Component Value Date    RBC 3.50 11/13/2018     Lab Results   Component Value Date    HGB 11.9 11/13/2018     Lab Results   Component Value Date    HCT 35.0 11/13/2018     Lab Results   Component Value Date     11/13/2018     Lab Results   Component Value Date    MCH 34.0 11/13/2018     Lab Results   Component Value Date    MCHC 34.0 11/13/2018     Lab Results   Component Value Date    RDW 12.0 11/13/2018     Lab Results   Component Value Date     11/13/2018     Last Comprehensive Metabolic Panel:  Sodium   Date Value Ref Range Status   11/13/2018 137 133 - 144 mmol/L Final     Potassium   Date Value Ref Range Status   11/13/2018 4.1 3.4 - 5.3 mmol/L Final     Chloride   Date Value Ref Range Status   11/13/2018 105 94 - 109 mmol/L Final     Carbon Dioxide   Date Value Ref Range Status   11/13/2018 26 20 - 32 mmol/L Final     Anion Gap   Date Value Ref Range Status   11/13/2018 6 3 - 14 mmol/L Final     Glucose   Date Value Ref Range Status   11/13/2018 100 (H) 70 - 99 mg/dL Final     Urea Nitrogen   Date Value Ref Range Status   11/13/2018 24 7 - 30  mg/dL Final     Creatinine   Date Value Ref Range Status   11/13/2018 0.91 0.66 - 1.25 mg/dL Final     GFR Estimate   Date Value Ref Range Status   11/13/2018 82 >60 mL/min/1.7m2 Final     Comment:     Non  GFR Calc     Calcium   Date Value Ref Range Status   11/13/2018 8.8 8.5 - 10.1 mg/dL Final     Bilirubin Total   Date Value Ref Range Status   10/01/2018 0.5 0.2 - 1.2 mg/dL Final     Alkaline Phosphatase   Date Value Ref Range Status   10/01/2018 51 40 - 115 U/L Final     ALT   Date Value Ref Range Status   10/01/2018 22 9 - 46 U/L Final     AST   Date Value Ref Range Status   10/01/2018 24 10 - 35 U/L Final                 Past Medical History:  Past Medical History:   Diagnosis Date     ADHD (attention deficit hyperactivity disorder)      CAD (coronary artery disease)     cardiac cath 1/23/15: SHERRY to 1st diagonal, SHERRY x2 to LAD, cath 2009: no intervention     Esophageal reflux      History of hyperthyroidism 4/9/2014     Hyperlipidemia      Hypertension      Mitral regurgitation 2009    moderately severe MVP, s/p robotic repair at [place]     Prostate cancer      Pulmonary nodules 2009    CT-recommend repeat in 6-12 months     Rotator cuff rupture 11/3/2011       Past Surgical History:  Past Surgical History:   Procedure Laterality Date     COLONOSCOPY  7/00    GI     DAVINCI PROSTATECTOMY, LYMPHADENECTOMY N/A 11/1/2018    Procedure: ROBOTIC ASSISTED RADICAL PROSTATECTOMY WITH BILATERAL PELVIC LYMPH NODE DISSECTION;  Surgeon: Clint Blankenship MD;  Location:  OR     DECOMPRESSION LUMBAR MINIMALLY INVASIVE ONE LEVEL  1/11/2012    Procedure:DECOMPRESSION LUMBAR MINIMALLY INVASIVE ONE LEVEL; L4-5 Decompression Minimally Invasive; Surgeon:MESSI HORAN; Location:[facility] OR     HEART CATH RIGHT AND LEFT HEART CATH  01-23-15    See report, pt transfered to [facility] for complex bifurcation PCI of LAD diagonal vessel.      HEART CATH RIGHT AND LEFT HEART CATH  01-26-15    PTCA and implantation of  SHERRY to 1st diagonal, SHERRY to mid LAD, SHERRY to proximal LAD     Hx of rotator cuff rupture and repair       LAPAROSCOPIC PROSTATECTOMY       REPAIR VALVE MITRAL  2009    [facility] robotic repair      Reversal of vasectomy       VASECTOMY       XR LUMBAR RADIOFREQ ABLATION UNILATERAL  01/11/2018    lumbar area/ ? level       Medications:  Current Outpatient Medications   Medication Sig Dispense Refill     Acetaminophen (TYLENOL PO) Take 325-650 mg by mouth 2 times daily as needed for mild pain or fever       aspirin EC 81 MG EC tablet Take 1 tablet (81 mg) by mouth daily 60 tablet 0     atorvastatin (LIPITOR) 40 MG tablet Take 1 tablet (40 mg) by mouth daily 90 tablet 1     cholecalciferol (VITAMIN D3) 1000 UNIT tablet Take 1 tablet (1,000 Units) by mouth daily       Cyanocobalamin (B-12) 1000 MCG TBCR Take 1,000 mcg by mouth daily       FLUOXETINE HCL PO Take 40 mg by mouth every other day (Patient taking differently than prescribed, finishing up old RX; Current RX is 20mg once daily)       levothyroxine (SYNTHROID/LEVOTHROID) 50 MCG tablet Take 1 tablet (50 mcg) by mouth daily 90 tablet 3     losartan (COZAAR) 100 MG tablet Take 1 tablet (100 mg) by mouth daily 90 tablet 3     multivitamin, therapeutic with minerals (MULTI-VITAMIN) TABS tablet Take 1 tablet by mouth daily       nitroGLYcerin (NITROSTAT) 0.4 MG sublingual tablet TAKE 1 TABLET BY MOUTH EVERY 5 MIN AS NEEDED FOR CHEST PAIN 20 tablet 0     Omega-3 Fatty Acids (FISH OIL PO) Take 1 capsule by mouth daily       omeprazole (PRILOSEC) 20 MG CR capsule TAKE ONE CAPSULE BY MOUTH ONCE DAILY (Patient taking differently: Take 20 mg by mouth daily TAKE ONE CAPSULE BY MOUTH ONCE DAILY) 90 capsule 3     oxyCODONE IR (ROXICODONE) 5 MG tablet Take 1-2 tablets (5-10 mg) by mouth every 3 hours as needed 15 tablet 0     senna-docusate (SENOKOT-S;PERICOLACE) 8.6-50 MG per tablet Take 1 tablet by mouth 2 times daily To be taken with opioid (narcotic) pain medication to  prevent constipation. 60 tablet 1     traZODone (DESYREL) 100 MG tablet TAKE 1 TABLET (100 MG) BY MOUTH NIGHTLY AS NEEDED FOR SLEEP 90 tablet 0       Allergies:     Allergies   Allergen Reactions     Ace Inhibitors Cough     Dry cough     No Clinical Screening - See Comments      PN: LW FI1: NKA       Social History:  Home situation: , lives with wife  Occupation/Schooling:   Tobacco use: denies  Drug use: denies  Alcohol use: drink per day  History of chemical dependency treatment: denies  Mental health admissions: denies    Family history:  Family History   Problem Relation Age of Onset     Cancer Mother         breast, lung     Depression Father         alcohlism     Diabetes No family hx of      Cardiovascular No family hx of      Cancer - colorectal No family hx of      Prostate Cancer No family hx of        Review of Systems:    POSTIVE IN BOLD  GENERAL: fever/chills, fatigue, general unwell feeling, weight gain/loss.  HEAD/EYES:  headache, dizziness, or vision changes.    EARS/NOSE/THROAT:  Nosebleeds, hearing loss, sinus infection, earache, tinnitus.  IMMUNE:  Allergies, cancer, immune deficiency, or infections.  SKIN:  Urticaria, rash, hives  HEME/Lymphatic:   anemia, easy bruising, easy bleeding.  RESPIRATORY:  cough, wheezing, or shortness of breath  CARDIOVASCULAR/Circulation:  Extremity edema, syncope, hypertension, tachycardia, or angina.  GASTROINTESTINAL:  abdominal pain, nausea/emesis, diarrhea, constipation,  hematochezia, or melena.  ENDOCRINE:  Diabetes, steroid use,  thyroid disease or osteoporosis.  MUSCULOSKELETAL: neck pain, back pain, arthralgia, arthritis, or gout.  GENITOURINARY:  frequency, urgency, dysuria, difficulty voiding, hematuria or incontinence.  NEUROLOGIC:  weakness, numbness, paresthesias, seizure, tremor, stroke or memory loss.  PSYCHIATRIC:  depression, anxiety, stress, suicidal thoughts or mood swings.     Physical Exam:  Vitals:    01/17/19 1407 01/17/19  1410   BP: 144/72 133/86   Pulse: 62    SpO2: 95%    Weight: 62.6 kg (138 lb)      Exam:  Constitutional: Well developed, well nourished, appears stated age.  HEENT: Head atraumatic, normocephalic. Eyes without conjunctival injection or jaundice. Oropharynx clear. Neck supple. No obvious neck masses.  Cardiovascular: Regular rate/rhythm; no murmurs/rubs/gallops appreciated.  Respiratory: Lungs clear to auscultation bilaterally, no wheezing/rales/rhonchi.   Gastrointestinal: Normoactive bowel sounds. Abdomen soft, non-tender, without guarding/rebound.  Skin: No rash, lesions, or petechiae of exposed skin.   Extremities: Peripheral pulses intact. No clubbing, cyanosis, or edema.  Psychiatric/mental status: Alert, without lethargy or stupor. Speech fluent. Appropriate affect. Mood normal. Able to follow commands without difficulty.     Musculoskeletal exam:  Gait/Station/Posture: Normal stance, arm swing, and stride; no antalgia or Trendelenburg  Normal bulk and tone. Unremarkable spinal curvature. ASIS heights even.     Cervical spine:  Range of motion within normal limits   Myofascial tenderness: none  No focal spinous process tenderness.   Spurling's negative bilaterally.      Lumbar spine:  Range of motion within normal limits    Rotation/ext to right: pain free   Rotation/ext to left: painful   Myofascial tenderness:  Lower left lumbar paraspinals  Focal tenderness: No SI joint, gluteal, piriformis, GT, or IT tenderness  SLR: negative  Onel's maneuver: negative    Hip exam:   normal internal and external range of motion bilaterally. CARLOS negative. Scour negative.     Neurologic exam:  CN:  Cranial nerves 2-12 are grossly intact  Motor Strength:  5/5 symmetric UE and LE strength    Reflexes:     Biceps C5:     R:  2/4 L: 2/4   Brachioradialis  C6: R:  2/4 L: 2/4   Triceps C7:  R:  2/4 L: 2/4   Patella L4:  R:  2/4 L: 2/4   Achilles S1:  R:  2/4 L: 2/4    Other reflexes:  Toes downgoing, No ankle  clonus    Sensory:  (upper and lower extremities):   Light touch and pin prick: normal    Allodynia: absent    Hyperalgesia: absent     Opioid Risk Tool (ORT) score is calculated as follows:   Family History of Substance Abuse:    Alcohol = 1 pt (yes, female)   Illegal Drugs = 0 pt (no)   Prescription Drugs = 0 pt (no)     Personal History of Substance Abuse:   Alcohol = 0 pt (no)   Illegal Drugs = 0 pt (no)   Prescription Drugs = 0 pt (no)   Age: 0 pt (age < 16; age > 45)     History of Pre-adolescent Sexual Abuse: 0 pt (no)     Psychological Disease: 1 pt (depression)         ORT Score = 2        0-3: Low risk for opioid abuse       4-7: Moderate risk for opioid abuse       >/= 8: High risk for opioid abuse      Assessment:  Mr. Moreno is a 68 year old with past medical history including: CAD (nstemi, s/p PTCA), HTN, mitral valve repair, mitral regurgitation, HLD, Hypothyroidism, GERD, Prostate cancer (s/p prostatectomy in 2018) who presents for procedural evaluation of chronic low back pain.    He has had several facet injections and radiofrequency ablations with declining duration of benefit and the amount of benefit in terms of ability to continue with mobility and ADLs seems to be declining as well. He is not medication focused and would like to avoid sedating medications due to his occupation as a . He has had several rounds of PT in which he participated diligently with limited benefit. At this point we will proceed with setting up a spinal cord stimulator trial/implant.    Plan:  The following recommendations were given to the patient. Diagnosis, treatment options, risks, benefits, and alternatives were discussed, and all questions were answered. The patient expressed understanding of the plan for management.     I am recommending a multidisciplinary treatment plan to help this patient better manage his pain.  This includes:     1. Physical Therapy: Referred to chronic pain PT  2. Clinical  Health Pain Psychologist: referred to chronic pain phd for SCS eval as well as ongoing sessions if it would be of benefit.   1. Therapy can help reduce physical and psychosocial triggers or reinforcers of pain by adapting thoughts, feelings and behaviors to reduce symptoms and increase quality of life.  Evidence indicates that the practice of relaxation, meditation, and mindfulness techniques can significantly affect pain levels and overall well being.  3. Self Care Recommendations: Gentle progressive exercise that does not increase pain - gradually increase daily walking program.  Take mini breaks - 5 minutes of mindfullness a couple times a day.   4. Diagnostic Studies: thoracic and lumbar MRI rodered  1. Medication Management:  No changes made to regimen  5. Further procedures recommended: once the above steps are completed, will obtain prior authorization for SCS trial.  6. Follow up: Will have Mr. Moreno follow up in clinic prior to SCS trial to discuss the intricacies of the procedure.      I spent 45 minutes of time face to face with the patient.  Greater than 50% of this time was spent in patient counseling and/or coordination of care regarding principles of multidisciplinary care, medication management, and treatment options as discussed above.         Jersey Richter,   [facility]

## 2019-01-17 NOTE — TELEPHONE ENCOUNTER
Phoned pt to schedule PAIN PHD EVAL and PT Evaluation and Treat no jessica KNIGHT    [facility] [place]

## 2019-01-17 NOTE — PATIENT INSTRUCTIONS
1. I ordered thoracic and lumbar MRI, have this done at your convenience.    2. I ordered pain PT and pain PHD, schedule this at your convenience.    3. Once you have completed the above then we will place the order for the spinal cord stimulator trial and submit the authorization to your insurance company.    ----------------------------------------------------------------  Clinic Number:  [phone]   Call this number with any questions about your care and for scheduling assistance. Calls are returned Monday through Friday between 8 AM and 4:30 PM. We usually get back to you within 2 business days depending on the issue/request.       Medication refills:    For non-narcotic medications, call your pharmacy directly to request a refill. The pharmacy will contact the [facility] for authorization. Please allow 3-4 days for these refills to be processed.     For narcotic refills, call the clinic number or send a 7-bites message. Please contact us 7-10 days before your refill is due. The message MUST include the name of the specific medication(s) requested and how you would like to receive the prescription(s). The options are as follows:    Pain Clinic staff can mail the prescription to your pharmacy. Please tell us the name of the pharmacy.    You may pick the prescription up at the Pain Clinic (tell us the location) or during a clinic visit with your pain provider    Pain Clinic staff can deliver the prescription to the [facility] pharmacy in the clinic building. Please tell us the location.      We believe regular attendance is key to your success in our program.    Any time you are unable to keep your appointment we ask that you call us at least 24 hours in advance to let us know. This will allow us to offer the appointment time to another patient.

## 2019-01-23 ENCOUNTER — THERAPY VISIT (OUTPATIENT)
Dept: PHYSICAL THERAPY | Facility: CLINIC | Age: 69
End: 2019-01-23
Payer: COMMERCIAL

## 2019-01-23 DIAGNOSIS — N39.46 MIXED INCONTINENCE URGE AND STRESS (MALE)(FEMALE): ICD-10-CM

## 2019-01-23 DIAGNOSIS — M99.05 SOMATIC DYSFUNCTION OF PELVIS REGION: ICD-10-CM

## 2019-01-23 PROCEDURE — 97112 NEUROMUSCULAR REEDUCATION: CPT | Mod: GP | Performed by: PHYSICAL THERAPIST

## 2019-01-23 PROCEDURE — 97110 THERAPEUTIC EXERCISES: CPT | Mod: GP | Performed by: PHYSICAL THERAPIST

## 2019-01-23 NOTE — PROGRESS NOTES
Subjective:  HPI                    Objective:  System    Physical Exam    General     ROS    Assessment/Plan:    SUBJECTIVE  Subjective changes as noted by pt:  Pt notes increased strength.      Current pain level: 0/10     Changes in function:  Pt tolerating exercises well. Pt reports no change in incontinence.     Adverse reaction to treatment or activity:  None    OBJECTIVE  Changes in objective findings:  Pt able to maintain supine Kegel for 5 seconds. Pt progressed to roll outs supine and sitting and sitting roll ins.         ASSESSMENT  Jose continues to require intervention to meet STG and LTG's: PT  Patient's symptoms are resolving.  Response to therapy has shown an improvement in  strength  Progress made towards STG/LTG?  Yes,     PLAN  Current treatment program is being advanced to more complex exercises.    PTA/ATC plan:  N/A    Please refer to the daily flowsheet for treatment today, total treatment time and time spent performing 1:1 timed codes.

## 2019-02-01 ENCOUNTER — THERAPY VISIT (OUTPATIENT)
Dept: PHYSICAL THERAPY | Facility: CLINIC | Age: 69
End: 2019-02-01
Payer: COMMERCIAL

## 2019-02-01 ENCOUNTER — OFFICE VISIT (OUTPATIENT)
Dept: PALLIATIVE MEDICINE | Facility: CLINIC | Age: 69
End: 2019-02-01
Payer: COMMERCIAL

## 2019-02-01 DIAGNOSIS — M54.50 CHRONIC LEFT-SIDED LOW BACK PAIN WITHOUT SCIATICA: ICD-10-CM

## 2019-02-01 DIAGNOSIS — M99.05 SOMATIC DYSFUNCTION OF PELVIS REGION: ICD-10-CM

## 2019-02-01 DIAGNOSIS — N39.46 MIXED INCONTINENCE URGE AND STRESS (MALE)(FEMALE): ICD-10-CM

## 2019-02-01 DIAGNOSIS — M47.816 SPONDYLOSIS OF LUMBAR REGION WITHOUT MYELOPATHY OR RADICULOPATHY: Primary | ICD-10-CM

## 2019-02-01 DIAGNOSIS — G89.29 CHRONIC LEFT-SIDED LOW BACK PAIN WITHOUT SCIATICA: ICD-10-CM

## 2019-02-01 PROCEDURE — 96150 HC HEALTH & BEHAVIOR ASSESS INITIAL, EA 15MIN: CPT | Performed by: PSYCHOLOGIST

## 2019-02-01 PROCEDURE — 97112 NEUROMUSCULAR REEDUCATION: CPT | Mod: GP | Performed by: PHYSICAL THERAPIST

## 2019-02-01 PROCEDURE — 97110 THERAPEUTIC EXERCISES: CPT | Mod: GP | Performed by: PHYSICAL THERAPIST

## 2019-02-01 NOTE — PROGRESS NOTES
Subjective:  HPI                    Objective:  System    Physical Exam    General     ROS    Assessment/Plan:    SUBJECTIVE  Subjective changes as noted by pt:  Pt still notes leaking but admits to being inconsistent with exercises     Current pain level: 0/10     Changes in function:  Pt notes leakage with sit to stand     Adverse reaction to treatment or activity:  None    OBJECTIVE  Changes in objective findings:  Practiced Kegel and bio with sit to stand. Pt was able to hold Kegel 25% of the time with sit to stand exercise        ASSESSMENT  Jose continues to require intervention to meet STG and LTG's: PT  Patient is progressing as expected.  Response to therapy has shown an improvement in  strength  Progress made towards STG/LTG?  Yes,     PLAN  Current treatment program is being advanced to more complex exercises. Encourage pt to be more consistent with HEP    PTA/ATC plan:  N/A    Please refer to the daily flowsheet for treatment today, total treatment time and time spent performing 1:1 timed codes.

## 2019-02-01 NOTE — PROGRESS NOTES
February 1, 2019    Psychologist Spinal Cord Stimulator (SCS) Evaluation       Does the patient:    Have a reasonable understanding of what the SCS actually is? (as below)    - Understand that the SCS is implanted surgically, beneath their skin, with wires placed into their spinal column? Yes  - Understand that the SCS is made of metal, and that wires may remain in their body permanently?  Yes  - Understand that the SCS is battery powered, and the battery likely will need to be replaced after 7-10 years? Yes  - Understand that the permanent implant is preceded by a trial, where the wires are placed into the spine, but they come out to the surface, and are plugged into an external battery that the patient wears for the duration of the trial (5-7 days)? Yes       Know what the SCS is doing, and what it is not doing? (as below)    - Know that the SCS is distracting their nervous system from the pain they otherwise would feel? Yes  - Know that it is not curing the source of the problem? Yes  - know the battery needs to be charged weekly, and sometimes daily (depending on how much power they use), and eventually replaced after 7-10 years involving another small surgery? Yes  - Know there are generally several months of reprogramming and fine-tuning that are needed after the implant to get the settings  just right ? Yes  - Know they will have a remote control, need to learn how to use it to get the most out of the stimulator? Yes      Understand the general process start to finish including:    - an understanding their health insurance is presented with all their past treatments tried (meds, procedures, therapy, etc.) and they give approval or denial for the SCS trial? Yes    -appealing denials can take up to 30 additional days to the process? Yes    - if the trial is approved and then scheduled that the scheduling is contingent upon appointment availability?  Yes    - that the trial takes generally 1 week, at end  of trial the patient decides if there was good stimulation/coverage of the painful areas? Yes    -an understanding there are bench marks for proceeding with implant such as the need to have at least 50% relief of pain, 50% reduction in medication doses and 50% improvement in function? Yes    -an understanding that request for prior approval from the insurance of the permanent SCS implant is required? Yes    -an understanding that only when approval is granted is there an attempt to schedule patient and that this schedule is also contingent on provider availability? Yes    -an understanding that the implant procedure is done in an outpatient surgery center and that s/he goes home from the surgery center the same day? Yes    -an understanding that s/he returns to clinic approximately 1 week post op and has the reprograming done by device company at that time? Yes    -an understanding that s/he returns to clinic at 2-3 weeks post op; has additional reprogramming, and that some restrictions are lifted for driving, lifting, bending, twisting? Yes    -an understanding that s/he returns to clinic at 6 weeks post-op; fine-turning of programming (if needed), and all restrictions are lifted? Yes    -an understanding that additional, as needed, visits for more reprogramming can be done out of the office, with just the device company? Yes    - an understanding that generally at this point the pain provider will begin again with things like PT and start to make changes in medications (weaning off nerve-pain meds, and narcotics) if we have not already started this process. Yes      Current:  Pain in lower back due to arthritis pinches nerves. Has tried cortisone shots and RFA's which have worked temporarily to alleviate pain. He is currently not prescribed any medications to manage the pain and has not found them all that helpful when prescribed them in the past following shoulder surgery. When pain is at its worst he can't walk  very far due to pain. Has a difficult time walking or standing for long periods of time without pain. Longer standing pain, first obtained treatment 5-6 years ago. Pain currently is 0 in seated position, walking increases to 2 slowly increasing to 5/6 with continued activity. Has had to use electric carts in grocery store and doesn't like this. 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 Currently employed as . Standing at a counter or work bench increases pain; ibuprofen, laying down, sitting down, stretches help alleviate pain. Pain at its worst November 2018 8/9. Describes pain as a sharp ache that increases with movement. Currently elementary and middle school  and seems to enjoy his job quite a bit. 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       Current/Past Mental Health History: History of major depressive disorder onset about 5 years ago following suicide of his son. Received mental health therapy as well prescription for Prozac which he still takes. He no longer reports symptoms of depression. No history of hospitalization for mental health. Seems to have a good awareness of his mental health and will seek out additional mental health therapy support as needed. Scores on PHQ-9 and CARL-7 are both 0 indicating no current depressive or anxious features.       Current/Past Chemical Health History: No problem use reported. Social drinker. Tried marijuana in late teens, didn't like it. No tobacco use reported other than occasional cigar use in 1990's. 2-3 cup of coffee daily. CAGE-AID score 0.      Current/Past Coping Skills for Pain Management: Stretching, soak in a hot bath occasionally, ibuprofen PRN couple times per day. Has tried cortisone and RFI with limited long-term results.    Engagement and Compliance with Current Pain Treatment: Patient has been engaged with and compliant with all aspects of the [facility] pain management services.    Summary/  Recommendations:    Patient is a 68-year-old.  He appears to have a solid understanding of the SCS trial and reports feeling    comfortable asking his provider about any additional concerns about the procedure as they arise. He appears   to be capable of coping well given the limitations associated with his chronic pain.  I support him proceeding to a   trial for the SCS.     Zee Cantu PsyD LP  Outpatient Clinic Therapist

## 2019-02-09 ENCOUNTER — HOSPITAL ENCOUNTER (OUTPATIENT)
Dept: MRI IMAGING | Facility: CLINIC | Age: 69
Discharge: HOME | End: 2019-02-09
Attending: PHYSICAL MEDICINE & REHABILITATION | Admitting: PHYSICAL MEDICINE & REHABILITATION
Payer: COMMERCIAL

## 2019-02-09 DIAGNOSIS — M47.816 SPONDYLOSIS OF LUMBAR REGION WITHOUT MYELOPATHY OR RADICULOPATHY: ICD-10-CM

## 2019-02-09 DIAGNOSIS — M54.50 CHRONIC LEFT-SIDED LOW BACK PAIN WITHOUT SCIATICA: ICD-10-CM

## 2019-02-09 DIAGNOSIS — G89.29 CHRONIC LEFT-SIDED LOW BACK PAIN WITHOUT SCIATICA: ICD-10-CM

## 2019-02-09 PROCEDURE — 72146 MRI CHEST SPINE W/O DYE: CPT

## 2019-02-09 PROCEDURE — 72148 MRI LUMBAR SPINE W/O DYE: CPT

## 2019-02-11 ENCOUNTER — TELEPHONE (OUTPATIENT)
Dept: PALLIATIVE MEDICINE | Facility: CLINIC | Age: 69
End: 2019-02-11

## 2019-02-11 DIAGNOSIS — M47.816 SPONDYLOSIS OF LUMBAR REGION WITHOUT MYELOPATHY OR RADICULOPATHY: Primary | ICD-10-CM

## 2019-02-11 NOTE — TELEPHONE ENCOUNTER
Criteria for a trial insertion  Documentation by the operating physician demonstrating compliance with all of the following criteria:    Spinal cord stimulation is covered to treat intractable chronic neuropathic pain that is:  1)   Refractory to other treatment modalities (see  Criteria for a trial insertion   item #1); and  2) The result of one of the following:        A. Failed back surgery syndrome with intractable radicular pain refractory to  medical management. NOTE: Medical management for this condition must include Physical Therapy (PT). In addition, an Oswestry disability index (DON) is required; OR        B.   Complex regional pain syndrome, including upper or lower extremity pain. OR        C.   Diabetic peripheral neuropathy    3)  The treatment is recommended after all other patient-appropriate therapies have been tried and proven ineffective, including but not limited to pharmacological management, injection therapies, physical therapy, surgery and psychological treatment if indicated. Documentation for failed back surgery syndrome must include Oswestry Disability Index (DON) scores from the first and last therapy visits prior to implantation of SCS that demonstrate less than 30% improvement.    4)  Preoperative psychiatric/psychological evaluation conducted by a licensed psychiatrist, psychologist or other licensed mental health professional who has a working knowledge of the psychological issues involved in chronic pain syndromes. The goals of this assessment are to rule out major psychiatric comorbidities or major substance abuse issues that would be strong predictors of poor outcomes and to select motivated patients who would be more likely to adhere to a follow-up plan.    5)  Documentation of severe pain and disability.  For low back pain, the most recent pre-trial DON is equal to or greater than 40% disability.    For complex regional pain syndrome an DON is not required.

## 2019-02-11 NOTE — TELEPHONE ENCOUNTER
Faxed completed PA form and supporting documentation for SCS trial to  .  Right fax confirmation          Ursula HUFFMAN    [facility]

## 2019-02-11 NOTE — TELEPHONE ENCOUNTER
Encounter opened to start the prior authorization process for spinal cord stimulator and implant.    Thanks,      Jersey Richter DO  [facility]

## 2019-02-13 ENCOUNTER — THERAPY VISIT (OUTPATIENT)
Dept: PHYSICAL THERAPY | Facility: CLINIC | Age: 69
End: 2019-02-13
Payer: COMMERCIAL

## 2019-02-13 DIAGNOSIS — N39.46 MIXED INCONTINENCE URGE AND STRESS (MALE)(FEMALE): ICD-10-CM

## 2019-02-13 DIAGNOSIS — M99.05 SOMATIC DYSFUNCTION OF PELVIS REGION: ICD-10-CM

## 2019-02-13 PROCEDURE — 97110 THERAPEUTIC EXERCISES: CPT | Mod: GP | Performed by: PHYSICAL THERAPIST

## 2019-02-13 PROCEDURE — 97112 NEUROMUSCULAR REEDUCATION: CPT | Mod: GP | Performed by: PHYSICAL THERAPIST

## 2019-02-13 NOTE — PROGRESS NOTES
Subjective:  HPI                    Objective:  System    Physical Exam    General     ROS    Assessment/Plan:    SUBJECTIVE  Subjective changes as noted by pt:  Pt reports little change since last visit.      Current pain level: 0/10     Changes in function:  None     Adverse reaction to treatment or activity:  None    OBJECTIVE  Changes in objective findings:  Pt showing good contractions sit to stand and with step ups.          ASSESSMENT  Jose continues to require intervention to meet STG and LTG's: PT  Patient's symptoms are resolving.  Response to therapy has shown an improvement in  strength  Progress made towards STG/LTG?  Yes, pt showing objective improvement despite little change subjectively.    PLAN  Current treatment program is being advanced to more complex exercises.    PTA/ATC plan:  N/A    Please refer to the daily flowsheet for treatment today, total treatment time and time spent performing 1:1 timed codes.

## 2019-03-11 ENCOUNTER — OFFICE VISIT (OUTPATIENT)
Dept: PALLIATIVE MEDICINE | Facility: CLINIC | Age: 69
End: 2019-03-11
Payer: COMMERCIAL

## 2019-03-11 VITALS — HEART RATE: 91 BPM | SYSTOLIC BLOOD PRESSURE: 167 MMHG | DIASTOLIC BLOOD PRESSURE: 98 MMHG | OXYGEN SATURATION: 98 %

## 2019-03-11 DIAGNOSIS — M47.816 SPONDYLOSIS OF LUMBAR REGION WITHOUT MYELOPATHY OR RADICULOPATHY: Primary | ICD-10-CM

## 2019-03-11 PROCEDURE — 99213 OFFICE O/P EST LOW 20 MIN: CPT | Performed by: PHYSICAL MEDICINE & REHABILITATION

## 2019-03-11 ASSESSMENT — PAIN SCALES - GENERAL: PAINLEVEL: MILD PAIN (3)

## 2019-03-11 NOTE — PROGRESS NOTES
[facility]    Date of visit: 3/11/2019    Chief complaint:   Chief Complaint   Patient presents with     Pain       Interval history:  Jose Moreno is a 68 year old male last seen by me on 1/17/19.      Recommendations/plan at the last visit included:  1. Physical Therapy: Referred to chronic pain PT  2. Clinical Health Pain Psychologist: referred to chronic pain phd for SCS eval as well as ongoing sessions if it would be of benefit.   1. Therapy can help reduce physical and psychosocial triggers or reinforcers of pain by adapting thoughts, feelings and behaviors to reduce symptoms and increase quality of life.  Evidence indicates that the practice of relaxation, meditation, and mindfulness techniques can significantly affect pain levels and overall well being.  3. Self Care Recommendations: Gentle progressive exercise that does not increase pain - gradually increase daily walking program.  Take mini breaks - 5 minutes of mindfullness a couple times a day.   4. Diagnostic Studies: thoracic and lumbar MRI rodered  1. Medication Management:  No changes made to regimen  5. Further procedures recommended: once the above steps are completed, will obtain prior authorization for SCS trial.  6. Follow up: Will have Mr. Moreno follow up in clinic prior to SCS trial to discuss the intricacies of the procedure.        Since his last visit, Jose Moreno reports:  -He has been working with PT for pelvic floor weakness/incontinence.  -Back pain continues to be a significant issue and limits his mobility and function.  -Discussed that we have not heard back from health partners regarding prior authorization for scs trial.  -Back pain is getting to the point where he wants to have the facet injections again, this has helped for 3-4 months in the past.      Pain scores:  Pain intensity on average is 7 on a scale of 0-10.     Current pain treatments:   -Ibuprofen prn    Past pain  treatments:  -none  -Lumbar RFA with 6 months relief  -Lumbar facet injections with 3 months relief  -PT    Side Effects: no side effect    Medications:  Current Outpatient Medications   Medication Sig Dispense Refill     Acetaminophen (TYLENOL PO) Take 325-650 mg by mouth 2 times daily as needed for mild pain or fever       aspirin EC 81 MG EC tablet Take 1 tablet (81 mg) by mouth daily 60 tablet 0     atorvastatin (LIPITOR) 40 MG tablet Take 1 tablet (40 mg) by mouth daily 90 tablet 1     cholecalciferol (VITAMIN D3) 1000 UNIT tablet Take 1 tablet (1,000 Units) by mouth daily       Cyanocobalamin (B-12) 1000 MCG TBCR Take 1,000 mcg by mouth daily       FLUOXETINE HCL PO Take 40 mg by mouth every other day (Patient taking differently than prescribed, finishing up old RX; Current RX is 20mg once daily)       levothyroxine (SYNTHROID/LEVOTHROID) 50 MCG tablet Take 1 tablet (50 mcg) by mouth daily 90 tablet 3     losartan (COZAAR) 100 MG tablet Take 1 tablet (100 mg) by mouth daily 90 tablet 3     multivitamin, therapeutic with minerals (MULTI-VITAMIN) TABS tablet Take 1 tablet by mouth daily       nitroGLYcerin (NITROSTAT) 0.4 MG sublingual tablet TAKE 1 TABLET BY MOUTH EVERY 5 MIN AS NEEDED FOR CHEST PAIN 20 tablet 0     Omega-3 Fatty Acids (FISH OIL PO) Take 1 capsule by mouth daily       omeprazole (PRILOSEC) 20 MG CR capsule TAKE ONE CAPSULE BY MOUTH ONCE DAILY (Patient taking differently: Take 20 mg by mouth daily TAKE ONE CAPSULE BY MOUTH ONCE DAILY) 90 capsule 3     oxyCODONE IR (ROXICODONE) 5 MG tablet Take 1-2 tablets (5-10 mg) by mouth every 3 hours as needed 15 tablet 0     senna-docusate (SENOKOT-S;PERICOLACE) 8.6-50 MG per tablet Take 1 tablet by mouth 2 times daily To be taken with opioid (narcotic) pain medication to prevent constipation. 60 tablet 1     traZODone (DESYREL) 100 MG tablet TAKE 1 TABLET (100 MG) BY MOUTH NIGHTLY AS NEEDED FOR SLEEP 90 tablet 0       Medical History: any changes in  medical history since they were last seen? No    Review of Systems:  The 14 system ROS was reviewed from the intake questionnaire, and is positive for: Arthritis, back pain  Any bowel or bladder problems: denies  Mood: denies    Physical Exam:  Blood pressure (!) 167/98, pulse 91, SpO2 98 %.  General: NAD, pleasant  Gait: Normal  MSK exam: Lumbar ROM is mildly reduced in extension. Extension and rotation produced pain bilaterally. Pain with palpation of the bilateral lumbar paraspinals. Strength is 5/5 and symmetric in the lower extremities.      Assessment:  Mr. Moreno is a 68 year old with past medical history including: CAD (nstemi, s/p PTCA), HTN, mitral valve repair, mitral regurgitation, HLD, Hypothyroidism, GERD, Prostate cancer (s/p prostatectomy in 2018) who presents for follow up of chronic low back pain.    He has had several facet injections (3 months relief) and radiofrequency ablations (6 months relief) but with overall still limited improvement in his symptoms and function. He does want to try these injections again if his spinal cord stimulator trial is not approved. He is not medication focused and would like to avoid sedating medications due to his occupation as a . He has had several rounds of PT in which he participated diligently with limited benefit.     Plan:  The following recommendations were given to the patient. Diagnosis, treatment options, risks, benefits, and alternatives were discussed, and all questions were answered. The patient expressed understanding of the plan for management.     I am recommending a multidisciplinary treatment plan to help this patient better manage his pain.  This includes:     1. Physical Therapy: Chronic pain PT referral placed at last visit, discussed that he should follow through with this.  2. Clinical Health Pain Psychologist: Yesenia ludwig pain phd completed for scs trial evaluation.  3. Self Care Recommendations: Gentle progressive exercise  that does not increase pain - gradually increase daily walking program.  Take mini breaks - 5 minutes of mindfullness a couple times a day.   4. Diagnostic Studies: thoracic and lumbar MRI reviewed  5. Medication Management:  No changes made to regimen, continue prn ibuprofen  6. Further procedures recommended:   1. Order placed for lumbar facet injections. If scs trial is approved we will cancel this procedure.  2. Will check with team whether they have heard back from health partners regarding prior authorization.   7. Follow up: 2 months in clinic. If scs trial is approved with schedule pre-trial appointment.            I spent 30 minutes of time face to face with the patient.  Greater than 50% of this time was spent in patient counseling and/or coordination of care.      Jersey Richter DO  [facility]  3/11/2019

## 2019-03-11 NOTE — PATIENT INSTRUCTIONS
1. I ordered lumbar facet injections, schedule this at your convenience.  2. Follow up in clinic in 2 months.    ----------------------------------------------------------------  Clinic Number:  [phone]   Call this number with any questions about your care and for scheduling assistance. Calls are returned Monday through Friday between 8 AM and 4:30 PM. We usually get back to you within 2 business days depending on the issue/request.       Medication refills:    For non-narcotic medications, call your pharmacy directly to request a refill. The pharmacy will contact the [facility] Management Center for authorization. Please allow 3-4 days for these refills to be processed.     For narcotic refills, call the clinic number or send a [employer] message. Please contact us 7-10 days before your refill is due. The message MUST include the name of the specific medication(s) requested and how you would like to receive the prescription(s). The options are as follows:    Pain Clinic staff can mail the prescription to your pharmacy. Please tell us the name of the pharmacy.    You may pick the prescription up at the Pain Clinic (tell us the location) or during a clinic visit with your pain provider    Pain Clinic staff can deliver the prescription to the [facility] pharmacy in the clinic building. Please tell us the location.      We believe regular attendance is key to your success in our program.    Any time you are unable to keep your appointment we ask that you call us at least 24 hours in advance to let us know. This will allow us to offer the appointment time to another patient.

## 2019-03-12 ENCOUNTER — TELEPHONE (OUTPATIENT)
Dept: PALLIATIVE MEDICINE | Facility: CLINIC | Age: 69
End: 2019-03-12

## 2019-03-12 NOTE — TELEPHONE ENCOUNTER
Deonte Garza from  calling in regards to the SCS Prior Auth. [phone]. He would like to speak to a nurse to get and give more info      Rocael KNIGHT    [facility]

## 2019-03-14 NOTE — TELEPHONE ENCOUNTER
Information added to call already open about PA for SPINAL CORD STIMULATOR trial.      Closing this encounter.     ISRRAEL AsifN, RN-BC  Patient Care Supervisor/Care Coordinator  [facility]

## 2019-03-14 NOTE — TELEPHONE ENCOUNTER
Message put in separate telephone encounter dated 3/12 but relates to SPINAL CORD STIMULATOR PA:    Deonte Garza from  calling in regards to the SCS Prior Auth. [phone]. He would like to speak to a nurse to get and give more info        Rocael KNIGHT    [facility]   ------------  Will route to [place] to connect with  rep to determine if conversation with nurse/provider needed.     Jacque Bolaños, BSN, RN-BC  Patient Care Supervisor/Care Coordinator  [facility]

## 2019-03-15 ENCOUNTER — TELEPHONE (OUTPATIENT)
Dept: PALLIATIVE MEDICINE | Facility: CLINIC | Age: 69
End: 2019-03-15

## 2019-03-15 ENCOUNTER — TELEPHONE (OUTPATIENT)
Dept: UROLOGY | Facility: CLINIC | Age: 69
End: 2019-03-15

## 2019-03-15 NOTE — TELEPHONE ENCOUNTER
LM for patient to schedule Lumbar Facet Joint Injection        Flori Valenzuela    [facility]

## 2019-03-15 NOTE — TELEPHONE ENCOUNTER
Incoming call from Deonte, he states that the PA was never received. I clarified the fax number and re-faxed the PA for the SCS trial. Right fax confirmed.        Ursula HUFFMAN    [facility]

## 2019-03-15 NOTE — TELEPHONE ENCOUNTER
Left VM for Deonte Garza to return my call      Ursula HUFFMAN    [facility]

## 2019-03-15 NOTE — TELEPHONE ENCOUNTER
Urology pt of Dr. Blankenship s/p s/p laparoscopic radical prostatectomy with bilateral pelvic lymph node dissection and bilateral wide excision procedure done 11/1/2018.  Jose calls today reporting on-going problem with incontinence and requesting prescription  for a external male catheter. He feels this would be an improvement over the diapers he has been using. Will forward this request to provider. Pt currently has f/u scheduled for 4/1. In Basket message sent.

## 2019-03-15 NOTE — TELEPHONE ENCOUNTER
Pre-screening Questions for Radiology Injections:    Injection to be done at which interventional clinic site? [facility]    Instruct patient to arrive as directed prior to the scheduled appointment time:    [place] minutes before      [place]: 30 minutes before; if IV needed 1 hour before     Procedure ordered by Dr. Richter    Procedure ordered? Lumbar Facet Joint Injection    What insurance would patient like us to bill for this procedure? [employer]      Worker's comp or MVA (motor vehicle accident) -Any injection DO NOT SCHEDULE and route to [place].      [employer] insurance - For SI joint injections, DO NOT SCHEDULE and route [place].       [employer] - Any injection besides hip/shoulder/knee joint DO NOT SCHEDULE and route to [place]. She will obtain PA and call pt back to schedule procedure or notify pt of denial.       [employer]-Route to [place] for review        IF SCHEDULING IN [place] AND NEEDS A PA, IT IS OKAY TO SCHEDULE. [place] HANDLES THEIR OWN PA'S AFTER THE PATIENT IS SCHEDULED. PLEASE SCHEDULE AT LEAST 1 WEEK OUT SO A PA CAN BE OBTAINED.      Any chance of pregnancy? Not Applicable   If YES, do NOT schedule and route to RN pool    Is an  needed? No     Patient has a drive home? (mandatory) YES: INFORMED    Is patient taking any blood thinners (plavix, coumadin, jantoven, warfarin, heparin, pradaxa or dabigatran )? No   If hold needed, do NOT schedule, route to RN pool     Is patient taking any aspirin products (includes Excedrin and Fiorinal)? Yes - Pt takes 81mg daily; instructed to hold 0 day(s) prior to procedure.      If more than 325mg/day do NOT schedule; route to RN pool     For CERVICAL procedures, hold all aspirin products for 6 days.     Tell pt that if aspirin product is not held for 6 days, the procedure WILL BE cancelled.      Does the patient have a bleeding or clotting disorder? No     If YES, okay to schedule AND route to RN nurse  pool    For any patients with platelet count <100, must be forwarded to provider    Is patient diabetic?  No  If YES, have them bring their glucometer.    Does patient have an active infection or treated for one within the past week? No     Is patient currently taking any antibiotics?  No     For patients on chronic, preventative, or prophylactic antibiotics, procedures may be scheduled.     For patients on antibiotics for active or recent infection:    Aylin Chen [place], [place]-antibiotic course must have been completed for 4 days    Is patient currently taking any steroid medications? (i.e. Prednisone, Medrol)  No     For patients on steroid medications:    Aylin Chen [place], [place]-steroid course must have been completed for 4 days    Reviewed with patient:  If you are started on any steroids or antibiotics between now and your appointment, you must contact us because the procedure may need to be cancelled.  Yes    Is patient actively being treated for cancer or immunocompromised? No  If YES, do NOT schedule and route to RN pool     Are you able to get on and off an exam table with minimal or no assistance? Yes  If NO, do NOT schedule and route to RN pool    Are you able to roll over and lay on your stomach with minimal or no assistance? Yes  If NO, do NOT schedule and route to RN pool     Any allergies to contrast dye, iodine, shellfish, or numbing and steroid medications? No  If YES, route to RN pool AND add allergy information to appointment notes    Allergies: Ace inhibitors and No clinical screening - see comments      Has the patient had a flu shot or any other vaccinations within 7 days before or after the procedure.  No     Does patient have an MRI/CT?  YES: MRI  (SI joint, hip injections, lumbar sympathetic blocks, and stellate ganglion blocks do not require an MRI)    Was the MRI done w/in the last 3 years?  Yes    Was MRI done at [facility]? Yes      If not, where was it  done? N/A       If MRI was not done at [facility] do NOT schedule and route to nursing.  If pt has an imaging disc, the injection may be scheduled but pt has to bring disc to appt. If they show up w/out disc the injection cannot be done    Reminders (please tell patient if applicable):       Instructed pt to arrive 30 minutes early for IV start if this is for a cervical procedure, ALL sympathetic (stellate ganglion, hypogastric, or lumbar sympathetic block) and all sedation procedures (RFA, spinal cord stimulation trials).  Not Applicable   -IVs are not routinely placed for Dr. Cortez cervical cases   -Dr. Loyola: IVs for cervical ESIs and cervical TBDs (not CMBBs/facet inj)      If NPO for sedation, informed patient that it is okay to take medications with sips of water (except if they are to hold blood thinners).  Not Applicable   *DO take blood pressure medication if it is prescribed*      If this is for a cervical RAMSES, informed patient that aspirin needs to be held for 6 days.   Not Applicable      For all patients not having spinal cord stimulator (SCS) trials or radiofrequency ablations (RFAs), informed patient:    IV sedation is not provided for this procedure.  If you feel that an oral anti-anxiety medication is needed, you can discuss this further with your referring provider or primary care provider.  The Pain Clinic provider will discuss specifics of what the procedure includes at your appointment.  Most procedures last 10-20 minutes.  We use numbing medications to help with any discomfort during the procedure.  Not Applicable      Do not schedule procedures requiring IV placement in the first appointment of the day or first appointment after lunch. Do NOT schedule at 0745, 0815 or 1245. N/A      For patients 85 or older we recommend having an adult stay w/ them for the remainder of the day.   N/A    Does the patient have any questions?  NO  Flori Valenzuela  [facility]

## 2019-03-16 ENCOUNTER — APPOINTMENT (OUTPATIENT)
Dept: CT IMAGING | Facility: CLINIC | Age: 69
End: 2019-03-16
Attending: EMERGENCY MEDICINE
Payer: OTHER MISCELLANEOUS

## 2019-03-16 ENCOUNTER — HOSPITAL ENCOUNTER (EMERGENCY)
Facility: CLINIC | Age: 69
Discharge: HOME | End: 2019-03-16
Attending: EMERGENCY MEDICINE | Admitting: EMERGENCY MEDICINE
Payer: OTHER MISCELLANEOUS

## 2019-03-16 VITALS
TEMPERATURE: 97.5 F | BODY MASS INDEX: 25.79 KG/M2 | RESPIRATION RATE: 16 BRPM | HEART RATE: 77 BPM | SYSTOLIC BLOOD PRESSURE: 144 MMHG | DIASTOLIC BLOOD PRESSURE: 96 MMHG | WEIGHT: 155 LBS | OXYGEN SATURATION: 100 %

## 2019-03-16 DIAGNOSIS — S09.90XA CLOSED HEAD INJURY, INITIAL ENCOUNTER: ICD-10-CM

## 2019-03-16 PROCEDURE — 70450 CT HEAD/BRAIN W/O DYE: CPT

## 2019-03-16 PROCEDURE — 99284 EMERGENCY DEPT VISIT MOD MDM: CPT | Mod: 25

## 2019-03-16 ASSESSMENT — ENCOUNTER SYMPTOMS
SPEECH DIFFICULTY: 0
APPETITE CHANGE: 1
WEAKNESS: 0
NUMBNESS: 0
FEVER: 0
VOMITING: 0
HEADACHES: 1
NAUSEA: 0
DIZZINESS: 0

## 2019-03-16 NOTE — ED TRIAGE NOTES
"Patient reports he fell on the ice on Wednesday, 3 days ago. He hit the the back of his head and still doesn't \"feel right\". He had a memory lapse yesterday and has had a headache and lightheadedness, feels funny. He denies loss of consciousness with initial event.       "

## 2019-03-16 NOTE — DISCHARGE INSTRUCTIONS
Discharge Instructions  Head Injury    You have been seen today for a head injury. Your evaluation included a history and physical examination. You may have had a CT (CAT) scan performed, though most head injuries do not require a scan. Based on this evaluation, your provider today does not feel that your head injury is serious.    Generally, every Emergency Department visit should have a follow-up clinic visit with either a primary or a specialty clinic/provider. Please follow-up as instructed by your emergency provider today.  Return to the Emergency Department if:  You are confused or you are not acting right.  Your headache gets worse or you start to have a really bad headache even with your recommended treatment plan.  You vomit (throw up) more than once.  You have a seizure.  You have trouble walking.  You have weakness or paralysis (cannot move) in an arm or a leg.  You have blood or fluid coming from your ears or nose.  You have new symptoms or anything that worries you.    Sleeping:  It is okay for you to sleep, but someone should wake you up if instructed by your provider, and someone should check on you at your usual time to wake up.     Activity:  Do not drive for at least 24 hours.  Do not drive if you have dizzy spells or trouble concentrating, or remembering things.  Do not return to any contact sports until cleared by your regular provider.     MORE INFORMATION:    Concussion:  A concussion is a minor head injury that may cause temporary problems with the way the brain works. Although concussions are important, they are generally not an emergency or a reason that a person needs to be hospitalized. Some concussion symptoms include confusion, amnesia (forgetful), nausea (sick to your stomach) and vomiting (throwing up), dizziness, fatigue, memory or concentration problems, irritability and sleep problems. For most people, concussions are mild and temporary but some will have more severe and persistent  symptoms that require on-going care and treatment.  CT Scans: Your evaluation today may have included a CT scan (CAT scan) to look for things like bleeding or a skull fracture (broken bone).  CT scans involve radiation and too many CT scans can cause serious health problems like cancer, especially in children.  Because of this, your provider may not have ordered a CT scan today if they think you are at low risk for a serious or life threatening problem.    If you were given a prescription for medicine here today, be sure to read all of the information (including the package insert) that comes with your prescription.  This will include important information about the medicine, its side effects, and any warnings that you need to know about.  The pharmacist who fills the prescription can provide more information and answer questions you may have about the medicine.  If you have questions or concerns that the pharmacist cannot address, please call or return to the Emergency Department.     Remember that you can always come back to the Emergency Department if you are not able to see your regular provider in the amount of time listed above, if you get any new symptoms, or if there is anything that worries you.     Discharge Instructions  Concussion    You were seen today for signs of a concussion.  The symptoms will vary, depending on the nature of your injury and your health. You may have: headache, confusion, nausea (feel sick to your stomach), vomiting (throwing up) and problems with memory, concentrating, or sleep. You may feel dizzy, irritable, and tired. Children and teens may need help from their parents, teachers, and coaches to watch for symptoms as they recover.    Generally, every Emergency Department visit should have a follow-up clinic visit with either a primary or a specialty clinic/provider. Please follow-up as instructed by your emergency provider today.     Return to the Emergency Department if:  Your  headache gets worse or you start to have a really bad headache even with the recommended treatment plan.   You feel drowsier, have growing confusion, or slurred speech.   You keep repeating yourself.   You have strange behavior or are feeling more irritable.   You have a seizure.   You vomit (throw up) more than once.   You have trouble walking.   You have weakness or numbness.  Your neck pain gets worse.   You have a loss of consciousness.   You have blood for fluid coming from your ears or nose.   You have new symptoms or anything that worries you.     Home Care:  Get lots of rest and get enough sleep at night. Take daytime naps or rest if you feel tired.   Limit physical activity and ?thinking? activities. These can make symptoms worse.   Physical activities include gym, sports, weight training, running, exercise, and heavy lifting.   Thinking activities include homework, class work, job-related work, and screen time (phone, computer, tablet, TV, and video games).   Stick to a healthy diet and drink lots of fluids. Avoid alcohol.  As symptoms improve, you may slowly return to your daily activities. If symptoms get worse or return, reduce your activity.   Know that it is normal to feel sad or frustrated when you do not feel right and are less active.     Going Back to Work:  Your care team will tell you when you are ready to return to work.    Limit the amount of work you do soon after your injury. This may speed healing. Take breaks if your symptoms get worse. You should also reduce your physical activity as well as activities that require a lot of thinking until you see your doctor. You may need shorter work days and a lighter workload.  Avoid heavy lifting, working with machinery, driving and working at heights until your symptoms are gone or you are cleared by a provider.    Going Back to School:  If you are still having symptoms, you may need extra help at school.  Tell your teachers and school nurse about  your injury and symptoms. Ask them to watch for problems with learning, memory, and concentrating. Symptoms may get worse when you do schoolwork, and you may become more irritable. You may need shorter school days, a reduced workload, and to postpone testing.  Do not drive or take gym class (physical activity) until cleared by a provider.    Returning to Sports:  Never return to play if you have any symptoms. A full recovery will reduce the chances of getting hurt again. Remember, it is better to miss one or two games than a whole season.  You should rest from all physical activity until you see your provider. Generally, if all symptoms have completely cleared, your provider can help guide you to slowly return to sports. If symptoms return or worsen, stop the activity and see your provider.  Important: If you are in an organized sport and under age 18, you will need written consent from a healthcare provider before you return to sports. Typically, this will be your primary care or sports medicine provider. Please make an appointment.    If you were given a prescription for medicine here today, be sure to read all of the information (including the package insert) that comes with your prescription.  This will include important information about the medicine, its side effects, and any warnings that you need to know about.  The pharmacist who fills the prescription can provide more information and answer questions you may have about the medicine.  If you have questions or concerns that the pharmacist cannot address, please call or return to the Emergency Department.     Remember that you can always come back to the Emergency Department if you are not able to see your regular provider in the amount of time listed above, if you get any new symptoms, or if there is anything that worries you.

## 2019-03-16 NOTE — ED PROVIDER NOTES
History     Chief Complaint:  Fall    HPI   Jose Moreno is a 68 year old male with a history of CAD, who presents to the ED for the evaluation of fall and memory lapse. The patient reports that three days ago he slipped on ice and hit the back of his head. 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 He realized his mistake and returned to get the students about 15 min. He states that his boss told him to come to the ED to get cleared to work. He also notes that he has been experiencing a slight headache and mild decreased appetite since his fall. He denies any other memory issues or lapses. The patient denies dizziness, loss of consciousness, nausea, vomiting, vision changes, extremity weakness or numbness, speech difficulty, and fever.    Allergies:  Ace inhibitors, cough     Medications:    Lipitor  Fluoxetine  Synthroid  Cozaar  Nitrostat  Prilosec  Roxicodone  Senokot  Desyrel    Past Medical History:    ADHD  CAD  Esophageal reflux  Hyperthyroidism  Hyperlipidemia  Hypertension  Mitral regurgitation  Prostate cancer  Pulmonary nodules  Rotator cuff rupture  Spinal stenosis    Past Surgical History:    Heart cath right and left - 2015    Family History:    Cancer  Depression     Social History:  Smoking status: Never Smoker  Alcohol use: Yes   Marital Status:   [2]     Review of Systems   Constitutional: Positive for appetite change. Negative for fever.   Eyes: Negative for visual disturbance.   Gastrointestinal: Negative for nausea and vomiting.   Neurological: Positive for headaches. Negative for dizziness, syncope, speech difficulty, weakness and numbness.       Physical Exam     Patient Vitals for the past 24 hrs:   BP Temp Temp src Pulse Heart Rate Resp SpO2 Weight   03/16/19 1146 (!) 144/96 97.5  F (36.4  C) Temporal 77 77 16 100 % 70.3 kg (155 lb)        Physical  Exam  Nursing note and vitals reviewed.    Constitutional: Pleasant and well groomed.          HENT:    Mouth/Throat: Oropharynx is without swelling or erythema. Oral mucosa moist.    Eyes: Conjunctivae are normal. No scleral icterus.    Neck: Neck supple.   Cardiovascular: Normal rate, regular rhythm and intact distal pulses.    Pulmonary/Chest: Effort normal and breath sounds normal.   Abdominal: Soft.  No distension. There is no tenderness.   Musculoskeletal:  No edema, No calf tenderness. No external sign of trauma to the head. No posterior cervical midline tenderness. Neck ROM is intact.   Neurological:Alert and oriented. Coordination normal. Cranial nerves 2-12 intact. Upper and lower extremity strength intact. Light touch sensation intact. Normal steady gait.   Skin: Skin is warm and dry.   Psychiatric: Normal mood and affect.     Emergency Department Course   Imaging:  Radiographic findings were communicated with the patient who voiced understanding of the findings.  Head CT w/o Contrast  IMPRESSION: No bleed or skull fractures are identified.  As read by Radiology.    Emergency Department Course:  Past medical records, nursing notes, and vitals reviewed.  1158: I performed an exam of the patient and obtained history, as documented above.    The patient was sent for a head CT while in the emergency department, findings above.    1340: I rechecked the patient.  Findings and plan explained to the Patient. Patient discharged home with instructions regarding supportive care, medications, and reasons to return. The importance of close follow-up was reviewed.       Impression & Plan    Medical Decision Making:  Jose Moreno is a 68 year old male who presents for evaluation after slipping on ice and hitting the back of his head.  This patient has a history and clinical exam consistent with closed head injury.  The differential diagnosis includes skull fracture, epidural hematoma, subdural hematoma, intracerebral  hemorrhage, and traumatic subarachnoid hemorrhage; all of these are highly unlikely in this clinical setting.  CT imaging obtained due to the patient's memory lapse yesterday. This head CT was negative.  He will return to ED for red flags (change in behavior, drowsiness, seizures, vomiting, etc) and gave concussion precautions for home.  The patients head to toe trauma exam is otherwise negative for serious underlying disease of the head, neck, chest, abdomen, extremities, pelvis. I have apparoved return to work as long as he as no ongoing symptoms. If so he will follow up with his PMD on Monday.    Diagnosis:    ICD-10-CM    1. Closed head injury, initial encounter S09.90XA        Disposition:  discharged to home      Scribe Disclosure:  I, Mario Banuelos, am serving as a scribe at 11:56 AM on 3/16/2019 to document services personally performed by Maria Del Carmen Haywood MD based on my observations and the provider's statements to me.      Mario Banuelos  3/16/2019   [facility] EMERGENCY DEPARTMENT       Maria Del Carmen Haywood MD  03/17/19 2011

## 2019-03-16 NOTE — ED AVS SNAPSHOT
[facility] Emergency Department  [address]  [facility] [address]  Phone:  [phone]  Fax:  [phone]                                    Jose Moreno   MRN: [id number]    Department:  [facility] Emergency Department   Date of Visit:  3/16/2019           After Visit Summary Signature Page    I have received my discharge instructions, and my questions have been answered. I have discussed any challenges I see with this plan with the nurse or doctor.    ..........................................................................................................................................  Patient/Patient Representative Signature      ..........................................................................................................................................  Patient Representative Print Name and Relationship to Patient    ..................................................               ................................................  Date                                   Time    ..........................................................................................................................................  Reviewed by Signature/Title    ...................................................              ..............................................  Date                                               Time          22EPIC Rev 08/18

## 2019-03-18 ENCOUNTER — ANCILLARY PROCEDURE (OUTPATIENT)
Dept: GENERAL RADIOLOGY | Facility: CLINIC | Age: 69
End: 2019-03-18
Attending: PHYSICAL MEDICINE & REHABILITATION
Payer: COMMERCIAL

## 2019-03-18 ENCOUNTER — RADIOLOGY INJECTION OFFICE VISIT (OUTPATIENT)
Dept: PALLIATIVE MEDICINE | Facility: CLINIC | Age: 69
End: 2019-03-18
Payer: COMMERCIAL

## 2019-03-18 VITALS — DIASTOLIC BLOOD PRESSURE: 94 MMHG | HEART RATE: 61 BPM | OXYGEN SATURATION: 97 % | SYSTOLIC BLOOD PRESSURE: 149 MMHG

## 2019-03-18 DIAGNOSIS — M47.816 FACET ARTHROPATHY, LUMBAR: ICD-10-CM

## 2019-03-18 DIAGNOSIS — M47.816 LUMBAR FACET ARTHROPATHY: Primary | ICD-10-CM

## 2019-03-18 PROCEDURE — 64494 INJ PARAVERT F JNT L/S 2 LEV: CPT | Mod: LT | Performed by: PHYSICAL MEDICINE & REHABILITATION

## 2019-03-18 PROCEDURE — 64493 INJ PARAVERT F JNT L/S 1 LEV: CPT | Mod: LT | Performed by: PHYSICAL MEDICINE & REHABILITATION

## 2019-03-18 NOTE — NURSING NOTE
Discharge Information    IV Discontiued Time:  NA    Amount of Fluid Infused:  NA    Discharge Criteria = When patient returns to baseline or as per MD order    Consciousness:  Pt is fully awake    Circulation:  BP +/- 20% of pre-procedure level    Respiration:  Patient is able to breathe deeply    O2 Sat:  Patient is able to maintain O2 Sat >92% on room air    Activity:  Moves 4 extremities on command    Ambulation:  Patient is able to stand and walk or stand and pivot into wheelchair    Dressing:  Clean/dry or No Dressing    Notes:   Discharge instructions and AVS given to patient    Patient meets criteria for discharge?  YES    Admitted to PCU?  No    Responsible adult present to accompany patient home?  Yes    Signature/Title:    Katia Medeiros  RN Care Coordinator  [facility]

## 2019-03-18 NOTE — PATIENT INSTRUCTIONS
[facility] Procedure Discharge Instructions    Today you saw:  Dr. Jersey Richter    Your procedure:    Facet joint injection     Medications used:  Lidocaine (anesthetic)  Bupivacaine (anesthetic)  Kenalog (steroid)  Omnipaque (contrast)              Be cautious when walking as numbness and/or weakness in the legs may occur up to 6-8 hours after the procedure due to effect of the local anesthetic    Do not drive for 6 hours. The effect of the local anesthetic could slow your reflexes.     Avoid strenuous activity for the first 24 hours. You may resume your regular activities after that.     You may shower, however avoid swimming, tub baths or hot tubs for 24 hours following your procedure    You may have a mild to moderate increase in pain for several days following the injection.      You may use ice packs for 10-15 minutes, 3 to 4 times a day at the injection site for comfort    Do not use heat to painful areas for 6 to 8 hours. This will give the local anesthetic time to wear off and prevent you from accidentally burning your skin.    You may use anti-inflammatory medications (such as Ibuprofen/Advil or Aleve) or Tylenol for pain control if necessary    With diabetes, check your blood sugar more frequently than usual as your blood sugar may be higher than normal for 10-14 days following a steroid injection. Contact your doctor who manages your diabetes if your blood sugar is higher than usual    Possible side effects of steroids that you may experience include flushing, elevated blood pressure, increased appetite, mild headaches and restlessness.  All of these symptoms will get better with time.    It may take up to 14 days for the steroid medication to start working although you may feel the effect as early as a few days after the procedure.     Follow up with your referring provider in 2-3 weeks      If you experience any of the following, call the pain center line during work hours at  [phone] or on-call physician after hours at [phone]:  -Fever over 100 degree F  -Swelling, bleeding, redness, drainage, warmth at the injection site  -Progressive weakness or numbness in your legs or arms  -Loss of bowel or bladder function  -Unusual headache that is not relieved by Tylenol or your regular headache medication  -Unusual new onset of pain that is not improving

## 2019-03-18 NOTE — PROGRESS NOTES
[facility] - Procedure Note    Date of Visit: 3/18/2019    Pre procedure Diagnosis: facet arthropathy   Post procedure Diagnosis: Same  Procedure performed: Left l4-5, L5-S1 facet joint injections  Anesthesia: none  Complications: none  Operators: Jersey Richter DO    Indications:   Jose Moreno is a 68 year old male was sent by myself for facet joint injections in the lumbar spine.  They have a history of chronic low back pain on the left low back.  Exam shows pain with extension and rotation to the left and they have tried conservative treatment including oral medications and therapy.    He has had prior Left L4-5 L5-S1 facet injections with 3+ months of relief and a left L3,4,5 RFA with 6 months of relief in the past.    Options/alternatives, benefits and risks were discussed with the patient including bleeding, infection, flared pain, tissue trauma, exposure to radiation, reaction to medications including seizure, spinal cord injury, paralysis, weakness, numbness and headache.    Questions were answered to his satisfaction and he agrees to proceed. Voluntary informed consent was obtained and signed.     Vitals were reviewed: Yes  Allergies were reviewed:  Yes   Medications were reviewed:  Yes   Pre-procedure pain score: 4/10    Imaging:   MRI OF THE LUMBAR SPINE WITHOUT CONTRAST 2/9/2019 10:53 AM      COMPARISON: None     HISTORY: Lumbar spondylosis/facet arthropathy. Planning for SCS.  Spondylosis of lumbar region without myelopathy or radiculopathy.  Chronic left-sided low back pain without sciatica.     TECHNIQUE: Multiplanar, multisequence MRI images of the lumbar spine  were acquired without IV contrast.     FINDINGS: There are five lumbar-type vertebrae for the purposes of  this dictation.      There is normal alignment of the lumbar vertebrae; however, there is  sigmoid curvature of the lumbar spine with right curvature centered at  the L1-L2 level and left curvature centered at  the L4-L5 level.  Vertebral body heights of the lumbar spine are normal. There is  reactive, degenerative Type II marrow signal change in the opposing  endplates at the L2-L3 level and reactive, degenerative Type I marrow  signal change in the opposing endplates at the L4-L5 level. Marrow  signal throughout the lumbar vertebrae is otherwise normal. There is  no evidence for fracture or pathologic bony lesion of the lumbar  spine.     There is posterior disc bulging/herniation to varying degrees at all  levels of the lumbar spine.     The tip of the conus medullaris is at the mid L2 level which is within  normal limits. There is no evidence for intrathecal abnormality.      Level by level:      L1-L2: There is a minimal circumferential disc bulge and minimal facet  arthropathy bilaterally. There is no spinal canal or neural foraminal  stenosis at this level.     L2-L3: There is a circumferential disc bulge, circumferential endplate  spurring and mild facet arthropathy bilaterally. These findings result  in mild left foraminal narrowing but no spinal canal or right  foraminal stenosis.     L3-L4: There is a circumferential disc bulge with a probable tiny  superimposed posterior central disc herniation (protrusion) and mild  facet arthropathy bilaterally. These findings result in mild spinal  canal narrowing with moderate narrowing of the right lateral recess of  the spinal canal, mild left foraminal narrowing and moderate right  foraminal stenosis.     L4-L5: There is a circumferential disc bulge with a questionable  superimposed right lateral (foraminal zone) disc herniation  (protrusion) and moderate facet arthropathy bilaterally. These  findings result in mild spinal canal narrowing, severe right foraminal  stenosis and minimal left foraminal narrowing.     L5-S1: There is a circumferential disc bulge and severe facet  arthropathy bilaterally. These findings result in moderate bilateral  neural foraminal stenosis  and mild spinal canal narrowing.                                                                      IMPRESSION: Degenerative changes of the lumbar spine as described  above.     ALFONSO RUIZ MD    Procedure:  After getting informed consent, patient was brought into the procedure suite and was placed in a prone position on the procedure table.   A Pause for the Cause was performed.  Patient was prepped and draped in sterile fashion.     Under AP fluoroscopic guidance the L4-5, L5-S1 facet joints on the left side were identified, and the C-arm was rotated obliquely to the affected side to open the joint space. A total of 3 ml of 1% lidocaine was injected at the needle entry point and needle tract. Then a 22 gauge 3.5 inch quincke type spinal needle was inserted and advanced under fluoroscopic guidance targeting the superior articular pillar of each joint. Once the needle made a contact with SAP, it was rotated and was then advanced into the joint.    AP fluoroscopic views were obtained to confirm the needle placement. Then,  Omnipaque 300 contrast dye was injected after negative aspiration for heme and CSF in each joint, confirming appropriate placement.  A total of 1mL of Omnipaque was used and 9mL was wasted.    The injection was then accomplished using a solution containing 2ml of 0.5% bupivacaine mixed with 40mg of kenolog, divided between the two joints. The needles were removed..     Hemostasis was achieved, the area was cleaned, and bandaids were placed when appropriate.  The patient tolerated the procedure well, and was taken to the recovery room.    Images were saved to PACS.        Post-procedure pain score: 3/10      Assessment/Plan: Jose Moreno is a 68 year old male s/p left L4-5, L5-S1 facet injections for back pain.     1. Following today's procedure, the patient was advised to contact the [facility] for any of the following:   Fever, chills, or night sweats   New onset of  pain, numbness, or weakness   Any questions/concerns regarding the procedure  If unable to contact the Pain Center, the patient was instructed to go to a local Emergency Room for any complications.   2. The patient will receive a follow-up call in 1 week.   3. Follow-up with the referring provider in 2 weeks for post-procedure evaluation.      Jersey Richter DO  [facility]

## 2019-03-18 NOTE — NURSING NOTE
Pre-procedure Intake    Have you been fasting? NA    If yes, for how long?     Are you taking a prescribed blood thinner such as coumadin, Plavix, Xarelto?    No    If yes, when did you take your last dose?     Do you take aspirin?  No    If cervical procedure, have you held aspirin for 6 days?   NA    Do you have any allergies to contrast dye, iodine, steroid and/or numbing medications?  NO    Are you currently taking antibiotics or have an active infection?  NO    Have you had a fever/elevated temperature within the past week? NO    Are you currently taking oral steroids? NO    Do you have a ? No        Are you pregnant or breastfeeding?  NO    Are the vital signs normal?  Yes

## 2019-03-20 NOTE — TELEPHONE ENCOUNTER
PA denied.  Reason given:      Patient does not meet PA criteria.  Patient notified:  Letter sent by insurance company and letter scanned.      **Information submitted and reviewed indicates that you do not meet criteria for #1 and #2A of the medical policy. Your pain is not caused by one of the three covered conditions listed below. Also, treatments previously attempted such as injections and RFA's , have been somewhat effective.       Medical policy     Criteria for a trial insertion  Documentation by the operating physician demonstrating compliance with all of the following criteria:    Spinal cord stimulation is covered to treat intractable chronic neuropathic pain that is:    1) The result of one of the following:        A. Failed back surgery syndrome with intractable radicular pain refractory to  medical management.         B.   Complex regional pain syndrome, including upper or lower extremity pain.         C.   Diabetic peripheral neuropathy    2)    A) The treatment is recommended after all other patient-appropriate therapies have been tried and proven ineffective,  including but not limited to pharmacological management, injection therapies, physical therapy, surgery and  psychological treatment if indicated.       Routing for review    Ursula HUFFMAN    [facility]

## 2019-03-25 ENCOUNTER — TELEPHONE (OUTPATIENT)
Dept: FAMILY MEDICINE | Facility: CLINIC | Age: 69
End: 2019-03-25

## 2019-03-25 DIAGNOSIS — F33.42 MAJOR DEPRESSIVE DISORDER, RECURRENT EPISODE, IN FULL REMISSION (H): Primary | ICD-10-CM

## 2019-03-25 RX ORDER — METOPROLOL SUCCINATE 25 MG/1
1 TABLET, EXTENDED RELEASE ORAL EVERY 24 HOURS
COMMUNITY
Start: 2009-10-16 | End: 2019-06-19

## 2019-03-25 RX ORDER — ATORVASTATIN CALCIUM 40 MG/1
40 TABLET, FILM COATED ORAL DAILY
Refills: 1 | COMMUNITY
Start: 2019-02-19 | End: 2019-06-10

## 2019-03-25 NOTE — TELEPHONE ENCOUNTER
Addressed at 10/2018 Physical/ 20 mgm decrease in dosing/ 90 refilled.    Please let the patient know that a 90 day refill has been sent for their prescription.  They are due for a return non-fasting office visit within 90 days.  Failure to schedule an appointment may result in no further refills of his/her medication.    '

## 2019-03-25 NOTE — TELEPHONE ENCOUNTER
[facility] sent refill request for a 90 day of fluoxetine HCL 20 mg, take one capsule by mouth daily. I don't see that you have filled this before?

## 2019-04-01 ENCOUNTER — OFFICE VISIT (OUTPATIENT)
Dept: FAMILY MEDICINE | Facility: CLINIC | Age: 69
End: 2019-04-01

## 2019-04-01 VITALS
HEART RATE: 80 BPM | TEMPERATURE: 98.2 F | OXYGEN SATURATION: 98 % | BODY MASS INDEX: 24.3 KG/M2 | SYSTOLIC BLOOD PRESSURE: 138 MMHG | WEIGHT: 146 LBS | DIASTOLIC BLOOD PRESSURE: 80 MMHG

## 2019-04-01 DIAGNOSIS — I10 ESSENTIAL HYPERTENSION, BENIGN: ICD-10-CM

## 2019-04-01 DIAGNOSIS — R97.20 ELEVATED PROSTATE SPECIFIC ANTIGEN (PSA): ICD-10-CM

## 2019-04-01 DIAGNOSIS — F33.42 MAJOR DEPRESSIVE DISORDER, RECURRENT EPISODE, IN FULL REMISSION (H): ICD-10-CM

## 2019-04-01 DIAGNOSIS — E78.2 MIXED HYPERLIPIDEMIA: ICD-10-CM

## 2019-04-01 DIAGNOSIS — Z85.46 HISTORY OF PROSTATE CANCER: Primary | ICD-10-CM

## 2019-04-01 DIAGNOSIS — I24.9 ACS (ACUTE CORONARY SYNDROME) (H): ICD-10-CM

## 2019-04-01 PROCEDURE — 90471 IMMUNIZATION ADMIN: CPT | Performed by: PHYSICIAN ASSISTANT

## 2019-04-01 PROCEDURE — 99213 OFFICE O/P EST LOW 20 MIN: CPT | Mod: 25 | Performed by: PHYSICIAN ASSISTANT

## 2019-04-01 PROCEDURE — 90750 HZV VACC RECOMBINANT IM: CPT | Performed by: PHYSICIAN ASSISTANT

## 2019-04-01 PROCEDURE — 36415 COLL VENOUS BLD VENIPUNCTURE: CPT | Performed by: PHYSICIAN ASSISTANT

## 2019-04-01 PROCEDURE — 84153 ASSAY OF PSA TOTAL: CPT | Mod: 90 | Performed by: PHYSICIAN ASSISTANT

## 2019-04-01 PROCEDURE — 80053 COMPREHEN METABOLIC PANEL: CPT | Mod: 90 | Performed by: PHYSICIAN ASSISTANT

## 2019-04-01 ASSESSMENT — ANXIETY QUESTIONNAIRES
GAD7 TOTAL SCORE: 0
7. FEELING AFRAID AS IF SOMETHING AWFUL MIGHT HAPPEN: NOT AT ALL
2. NOT BEING ABLE TO STOP OR CONTROL WORRYING: NOT AT ALL
1. FEELING NERVOUS, ANXIOUS, OR ON EDGE: NOT AT ALL
6. BECOMING EASILY ANNOYED OR IRRITABLE: NOT AT ALL
3. WORRYING TOO MUCH ABOUT DIFFERENT THINGS: NOT AT ALL
5. BEING SO RESTLESS THAT IT IS HARD TO SIT STILL: NOT AT ALL
IF YOU CHECKED OFF ANY PROBLEMS ON THIS QUESTIONNAIRE, HOW DIFFICULT HAVE THESE PROBLEMS MADE IT FOR YOU TO DO YOUR WORK, TAKE CARE OF THINGS AT HOME, OR GET ALONG WITH OTHER PEOPLE: NOT DIFFICULT AT ALL

## 2019-04-01 ASSESSMENT — PATIENT HEALTH QUESTIONNAIRE - PHQ9
SUM OF ALL RESPONSES TO PHQ QUESTIONS 1-9: 0
5. POOR APPETITE OR OVEREATING: NOT AT ALL

## 2019-04-01 NOTE — PROGRESS NOTES
CC: Medication Check    History:  Jose underwent procedure for prostate cancer 11/1/2018. This went well, but pt continues to struggle with incontinence. He had PSA checked recently that was undetectable, and would like to have this checked again.     Jose also needs refills of his fluoxetine 20 mg that he takes for depression. This has been working well for him. He denies any side effects. Also takes trazodone every night, which works well to help him sleep.     He is also due for refills of his nitroglycerin that he carries with him after having an NSTEMI 1/2015.      PMH, MEDICATIONS, ALLERGIES, SOCIAL AND FAMILY HISTORY in [facility] and reviewed by me personally.      ROS negative other than the symptoms noted above in the HPI.        Examination   /80 (BP Location: Left arm, Patient Position: Sitting, Cuff Size: Adult Large)   Pulse 80   Temp 98.2  F (36.8  C) (Oral)   Wt 66.2 kg (146 lb)   SpO2 98%   BMI 24.30 kg/m         Constitutional: Sitting comfortably, in no acute distress. Vital signs noted  Neck:  no adenopathy, trachea midline and normal to palpation, thyroid normal to palpation  Cardiovascular:  regular rate and rhythm, no murmurs, clicks, or gallops  Respiratory:  normal respiratory rate and rhythm, lungs clear to auscultation  SKIN: No jaundice/pallor/rash.   Psychiatric: mentation appears normal and affect normal/bright        A/P    ICD-10-CM    1. History of prostate cancer Z85.46 VENOUS COLLECTION     PSA, SCREENING   2. Elevated prostate specific antigen (PSA) R97.20 VENOUS COLLECTION     PSA, SCREENING   3. Major depressive disorder, recurrent episode, in full remission (H) F33.42 FLUoxetine (PROZAC) 20 MG capsule     traZODone (DESYREL) 100 MG tablet   4. ACS (acute coronary syndrome) (H) I24.9 nitroGLYcerin (NITROSTAT) 0.4 MG sublingual tablet   5. Essential hypertension, benign I10 Comprehensive metabolic panel   6. Mixed hyperlipidemia E78.2 Comprehensive metabolic panel        DISCUSSION: Will recheck PSA and CMP today and send Jose Villalobos with results when available. PHQ-9 score today shows good control, so refilled fluoxetine for 1 year without change. Refilled trazodone for 6 months. Would like him to return at that time, to recheck thyroid, review other medications.      follow up visit: 6 months    Raysa Singh PA-C  [facility]

## 2019-04-01 NOTE — NURSING NOTE
Morgan is here for a PSA test, med refill, and for 2nd Shingles shot.          Pre-visit Screening:  Immunizations:  up to date  Colonoscopy:  is up to date  Mammogram: NA  Asthma Action Test/Plan:  NA  PHQ9:  Done today  GAD7:  Done today  Questioned patient about current smoking habits Pt. has never smoked.  Ok to leave detailed message on voice mail for today's visit only Yes, phone # [phone]

## 2019-04-02 LAB
ABBOTT PSA - QUEST: <0.1 NG/ML
ALBUMIN SERPL-MCNC: 4 G/DL (ref 3.6–5.1)
ALBUMIN/GLOB SERPL: 1.5 (CALC) (ref 1–2.5)
ALP SERPL-CCNC: 68 U/L (ref 40–115)
ALT SERPL-CCNC: 22 U/L (ref 9–46)
AST SERPL-CCNC: 19 U/L (ref 10–35)
BILIRUB SERPL-MCNC: 0.5 MG/DL (ref 0.2–1.2)
BUN SERPL-MCNC: 23 MG/DL (ref 7–25)
BUN/CREATININE RATIO: ABNORMAL (CALC) (ref 6–22)
CALCIUM SERPL-MCNC: 9.4 MG/DL (ref 8.6–10.3)
CHLORIDE SERPLBLD-SCNC: 106 MMOL/L (ref 98–110)
CO2 SERPL-SCNC: 24 MMOL/L (ref 20–32)
CREAT SERPL-MCNC: 0.78 MG/DL (ref 0.7–1.18)
EGFR AFRICAN AMERICAN - QUEST: 106 ML/MIN/1.73M2
GFR SERPL CREATININE-BSD FRML MDRD: 91 ML/MIN/1.73M2
GLOBULIN, CALCULATED - QUEST: 2.7 G/DL (CALC) (ref 1.9–3.7)
GLUCOSE - QUEST: 121 MG/DL (ref 65–99)
POTASSIUM SERPL-SCNC: 4.1 MMOL/L (ref 3.5–5.3)
PROT SERPL-MCNC: 6.7 G/DL (ref 6.1–8.1)
SODIUM SERPL-SCNC: 140 MMOL/L (ref 135–146)

## 2019-04-02 RX ORDER — NITROGLYCERIN 0.4 MG/1
TABLET SUBLINGUAL
Qty: 20 TABLET | Refills: 0 | Status: SHIPPED | OUTPATIENT
Start: 2019-04-02 | End: 2019-08-02

## 2019-04-02 RX ORDER — TRAZODONE HYDROCHLORIDE 100 MG/1
100 TABLET ORAL
Qty: 90 TABLET | Refills: 1 | Status: SHIPPED | OUTPATIENT
Start: 2019-04-02 | End: 2019-09-19 | Stop reason: ALTCHOICE

## 2019-04-02 ASSESSMENT — ANXIETY QUESTIONNAIRES: GAD7 TOTAL SCORE: 0

## 2019-04-05 ENCOUNTER — OFFICE VISIT (OUTPATIENT)
Dept: UROLOGY | Facility: CLINIC | Age: 69
End: 2019-04-05
Payer: COMMERCIAL

## 2019-04-05 VITALS — HEART RATE: 76 BPM | HEIGHT: 66 IN | WEIGHT: 155 LBS | BODY MASS INDEX: 24.91 KG/M2 | OXYGEN SATURATION: 96 %

## 2019-04-05 DIAGNOSIS — Z85.46 PERSONAL HISTORY OF MALIGNANT NEOPLASM OF PROSTATE: Primary | ICD-10-CM

## 2019-04-05 DIAGNOSIS — N39.3 STRESS INCONTINENCE OF URINE: ICD-10-CM

## 2019-04-05 PROCEDURE — 99214 OFFICE O/P EST MOD 30 MIN: CPT | Performed by: UROLOGY

## 2019-04-05 ASSESSMENT — MIFFLIN-ST. JEOR: SCORE: 1397.89

## 2019-04-05 ASSESSMENT — PAIN SCALES - GENERAL: PAINLEVEL: NO PAIN (0)

## 2019-04-05 NOTE — PROGRESS NOTES
Office Visit Note  [facility]  [phone]    UROLOGIC DIAGNOSES:   pT2 Shane 3+4 = 7 prostate cancer    CURRENT INTERVENTIONS:   Robotic prostatectomy November 2018    HISTORY:   Morgan returns to clinic today for prostate cancer follow-up. His PSA remains undetectable. Unfortunately, he continues to have fairly severe urinary incontinence. He says he is still using 8-10 pads per day.  He has seen the physical therapist at the [place] for [facility] medicine and has been performing his kegel exercises but continues to have severe incontinence.      PAST MEDICAL HISTORY:   Past Medical History:   Diagnosis Date     ADHD (attention deficit hyperactivity disorder)      CAD (coronary artery disease)     cardiac cath 1/23/15: SHERRY to 1st diagonal, SHERRY x2 to LAD, cath 2009: no intervention     Esophageal reflux      History of hyperthyroidism 4/9/2014     Hyperlipidemia      Hypertension      Mitral regurgitation 2009    moderately severe MVP, s/p robotic repair at [place]     Prostate cancer      Pulmonary nodules 2009    CT-recommend repeat in 6-12 months     Rotator cuff rupture 11/3/2011       PAST SURGICAL HISTORY:   Past Surgical History:   Procedure Laterality Date     COLONOSCOPY  7/00    GI     DAVINCI PROSTATECTOMY, LYMPHADENECTOMY N/A 11/1/2018    Procedure: ROBOTIC ASSISTED RADICAL PROSTATECTOMY WITH BILATERAL PELVIC LYMPH NODE DISSECTION;  Surgeon: Clint Blankenship MD;  Location:  OR     DECOMPRESSION LUMBAR MINIMALLY INVASIVE ONE LEVEL  1/11/2012    Procedure:DECOMPRESSION LUMBAR MINIMALLY INVASIVE ONE LEVEL; L4-5 Decompression Minimally Invasive; Surgeon:MESSI HORAN; Location: OR     HEART CATH RIGHT AND LEFT HEART CATH  01-23-15    See report, pt transfered to [facility] for complex bifurcation PCI of LAD diagonal vessel.      HEART CATH RIGHT AND LEFT HEART CATH  01-26-15    PTCA and implantation of SHERRY to 1st diagonal, SHERRY to mid LAD, SHERRY to proximal LAD     Hx of rotator  "cuff rupture and repair       LAPAROSCOPIC PROSTATECTOMY       REPAIR VALVE MITRAL  2009    [facility] robotic repair      Reversal of vasectomy       VASECTOMY       XR LUMBAR RADIOFREQ ABLATION UNILATERAL  01/11/2018    lumbar area/ ? level       FAMILY HISTORY:   Family History   Problem Relation Age of Onset     Cancer Mother         breast, lung     Depression Father         alcohlism     Diabetes No family hx of      Cardiovascular No family hx of      Cancer - colorectal No family hx of      Prostate Cancer No family hx of        SOCIAL HISTORY:   Social History     Tobacco Use     Smoking status: Never Smoker     Smokeless tobacco: Never Used   Substance Use Topics     Alcohol use: Yes     Alcohol/week: 4.2 oz     Types: 7 Standard drinks or equivalent per week     Comment: 1 beer daily       Current Outpatient Medications   Medication     Acetaminophen (TYLENOL PO)     aspirin EC 81 MG EC tablet     atorvastatin (LIPITOR) 40 MG tablet     cholecalciferol (VITAMIN D3) 1000 UNIT tablet     Cyanocobalamin (B-12) 1000 MCG TBCR     FLUoxetine (PROZAC) 20 MG capsule     levothyroxine (SYNTHROID/LEVOTHROID) 50 MCG tablet     losartan (COZAAR) 100 MG tablet     metoprolol succinate ER (TOPROL-XL) 25 MG 24 hr tablet     multivitamin, therapeutic with minerals (MULTI-VITAMIN) TABS tablet     Omega-3 Fatty Acids (FISH OIL PO)     omeprazole (PRILOSEC) 20 MG CR capsule     traZODone (DESYREL) 100 MG tablet     nitroGLYcerin (NITROSTAT) 0.4 MG sublingual tablet     No current facility-administered medications for this visit.          PHYSICAL EXAM:    Pulse 76   Ht 1.664 m (5' 5.5\")   Wt 70.3 kg (155 lb)   SpO2 96%   BMI 25.40 kg/m      Constitutional: Well developed. Conversant and in no acute distress  Eyes: Anicteric sclera, conjunctiva clear, normal extraocular movements  ENT: Normocephalic and atraumatic,   Skin: Warm and dry. No rashes or lesions  Cardiac: No peripheral edema  Back/Flank: Not done  CNS/PNS: " Normal musculature and movements, moves all extremities normally  Respiratory: Normal non-labored breathing  Abdomen: Soft nontender and nondistended  Peripheral Vascular: No peripheral edema  Mental Status/Psych: Alert and Oriented x 3. Normal mood and affect    Penis: Not done  Scrotal Skin: Not done  Testicles: Not done  Epididymis: Not done  Digital Rectal Exam:     Cystoscopy: Not done    Imaging: None    Urinalysis: UA RESULTS:  Recent Labs   Lab Test 12/06/18 11/13/18  2356   COLOR Yellow Yellow   APPEARANCE Clear Cloudy   URINEGLC Neg Negative   URINEBILI Neg Negative   URINEKETONE Neg Negative   SG  --  1.031   UBLD Neg Large*   URINEPH 7.0 5.0   PROTEIN  --  100*   UROBILINOGEN 0.2  --    NITRITE Neg Positive*   LEUKEST  --  Large*   RBCU  --  >182*   WBCU  --  >182*       PSA: undetectable    Post Void Residual:     Other labs: None today      IMPRESSION:  PSA undetectable, urinary incontinence    PLAN:  He is doing well from a prostate cancer standpoint but he is having severe urinary incontinence after his prostatectomy surgery. We discussed his options. He is continuing with Kegel exercises and possibly seeing some improvement. I counseled him that it can often take up to a year to  Reach your endpoint in terms of continence after prostate surgery. We discussed artificial urinary stent replacement as well. He had multiple questions about this procedure that were answered for him. We discussed the surgery in detail today along with its risks, and particularly the risk of infection or mechanical failure. He also understands that he could still have incontinence even after the procedure. He tells me that he is anxious to have an artificial urinary sphincter placed but also understands my counseling for him to wait and give it more time to heal after the surgery. I am planning on seeing him back in 6 months. If you would like to reconsider and go ahead with a sphincter placement sooner than that I  encouraged him to contact me by phone.      Clint Blankenship M.D.

## 2019-04-05 NOTE — LETTER
4/5/2019       RE: Jose Moreno  [id number] [facility] [address]     Dear Colleague,    Thank you for referring your patient, Joes Moreno, to the [facility] [place] at [facility]. Please see a copy of my visit note below.    Office Visit Note  [facility]  [phone]    UROLOGIC DIAGNOSES:   pT2 [place] 3+4 = 7 prostate cancer    CURRENT INTERVENTIONS:   Robotic prostatectomy November 2018    HISTORY:   Morgan returns to clinic today for prostate cancer follow-up. His PSA remains undetectable. Unfortunately, he continues to have fairly severe urinary incontinence. He says he is still using 8-10 pads per day.  He has seen the physical therapist at the [place] [facility] and has been performing his kegel exercises but continues to have severe incontinence.      PAST MEDICAL HISTORY:   Past Medical History:   Diagnosis Date     ADHD (attention deficit hyperactivity disorder)      CAD (coronary artery disease)     cardiac cath 1/23/15: SHERRY to 1st diagonal, SHERRY x2 to LAD, cath 2009: no intervention     Esophageal reflux      History of hyperthyroidism 4/9/2014     Hyperlipidemia      Hypertension      Mitral regurgitation 2009    moderately severe MVP, s/p robotic repair at [place]     Prostate cancer      Pulmonary nodules 2009    CT-recommend repeat in 6-12 months     Rotator cuff rupture 11/3/2011       PAST SURGICAL HISTORY:   Past Surgical History:   Procedure Laterality Date     COLONOSCOPY  7/00    GI     DAVINCI PROSTATECTOMY, LYMPHADENECTOMY N/A 11/1/2018    Procedure: ROBOTIC ASSISTED RADICAL PROSTATECTOMY WITH BILATERAL PELVIC LYMPH NODE DISSECTION;  Surgeon: Clint Blankenship MD;  Location: [facility] OR     DECOMPRESSION LUMBAR MINIMALLY INVASIVE ONE LEVEL  1/11/2012    Procedure:DECOMPRESSION LUMBAR MINIMALLY INVASIVE ONE LEVEL; L4-5 Decompression Minimally Invasive; Surgeon:MESSI HORAN;  "Location:[facility] OR     HEART CATH RIGHT AND LEFT HEART CATH  01-23-15    See report, pt transfered to [facility] for complex bifurcation PCI of LAD diagonal vessel.      HEART CATH RIGHT AND LEFT HEART CATH  01-26-15    PTCA and implantation of SHERRY to 1st diagonal, SHERRY to mid LAD, SHERRY to proximal LAD     Hx of rotator cuff rupture and repair       LAPAROSCOPIC PROSTATECTOMY       REPAIR VALVE MITRAL  2009    [facility] robotic repair      Reversal of vasectomy       VASECTOMY       XR LUMBAR RADIOFREQ ABLATION UNILATERAL  01/11/2018    lumbar area/ ? level       FAMILY HISTORY:   Family History   Problem Relation Age of Onset     Cancer Mother         breast, lung     Depression Father         alcohlism     Diabetes No family hx of      Cardiovascular No family hx of      Cancer - colorectal No family hx of      Prostate Cancer No family hx of        SOCIAL HISTORY:   Social History     Tobacco Use     Smoking status: Never Smoker     Smokeless tobacco: Never Used   Substance Use Topics     Alcohol use: Yes     Alcohol/week: 4.2 oz     Types: 7 Standard drinks or equivalent per week     Comment: 1 beer daily       Current Outpatient Medications   Medication     Acetaminophen (TYLENOL PO)     aspirin EC 81 MG EC tablet     atorvastatin (LIPITOR) 40 MG tablet     cholecalciferol (VITAMIN D3) 1000 UNIT tablet     Cyanocobalamin (B-12) 1000 MCG TBCR     FLUoxetine (PROZAC) 20 MG capsule     levothyroxine (SYNTHROID/LEVOTHROID) 50 MCG tablet     losartan (COZAAR) 100 MG tablet     metoprolol succinate ER (TOPROL-XL) 25 MG 24 hr tablet     multivitamin, therapeutic with minerals (MULTI-VITAMIN) TABS tablet     Omega-3 Fatty Acids (FISH OIL PO)     omeprazole (PRILOSEC) 20 MG CR capsule     traZODone (DESYREL) 100 MG tablet     nitroGLYcerin (NITROSTAT) 0.4 MG sublingual tablet     No current facility-administered medications for this visit.          PHYSICAL EXAM:    Pulse 76   Ht 1.664 m (5' 5.5\")   Wt 70.3 kg (155 lb)   " SpO2 96%   BMI 25.40 kg/m       Constitutional: Well developed. Conversant and in no acute distress  Eyes: Anicteric sclera, conjunctiva clear, normal extraocular movements  ENT: Normocephalic and atraumatic,   Skin: Warm and dry. No rashes or lesions  Cardiac: No peripheral edema  Back/Flank: Not done  CNS/PNS: Normal musculature and movements, moves all extremities normally  Respiratory: Normal non-labored breathing  Abdomen: Soft nontender and nondistended  Peripheral Vascular: No peripheral edema  Mental Status/Psych: Alert and Oriented x 3. Normal mood and affect    Penis: Not done  Scrotal Skin: Not done  Testicles: Not done  Epididymis: Not done  Digital Rectal Exam:     Cystoscopy: Not done    Imaging: None    Urinalysis: UA RESULTS:  Recent Labs   Lab Test 12/06/18 11/13/18  2356   COLOR Yellow Yellow   APPEARANCE Clear Cloudy   URINEGLC Neg Negative   URINEBILI Neg Negative   URINEKETONE Neg Negative   SG  --  1.031   UBLD Neg Large*   URINEPH 7.0 5.0   PROTEIN  --  100*   UROBILINOGEN 0.2  --    NITRITE Neg Positive*   LEUKEST  --  Large*   RBCU  --  >182*   WBCU  --  >182*       PSA: undetectable    Post Void Residual:     Other labs: None today      IMPRESSION:  PSA undetectable, urinary incontinence    PLAN:  He is doing well from a prostate cancer standpoint but he is having severe urinary incontinence after his prostatectomy surgery. We discussed his options. He is continuing with Kegel exercises and possibly seeing some improvement. I counseled him that it can often take up to a year to  Reach your endpoint in terms of continence after prostate surgery. We discussed artificial urinary stent replacement as well. He had multiple questions about this procedure that were answered for him. We discussed the surgery in detail today along with its risks, and particularly the risk of infection or mechanical failure. He also understands that he could still have incontinence even after the procedure. He tells  me that he is anxious to have an artificial urinary sphincter placed but also understands my counseling for him to wait and give it more time to heal after the surgery. I am planning on seeing him back in 6 months. If you would like to reconsider and go ahead with a sphincter placement sooner than that I encouraged him to contact me by phone.      Clint Blankenship M.D.

## 2019-04-08 ENCOUNTER — TELEPHONE (OUTPATIENT)
Dept: UROLOGY | Facility: CLINIC | Age: 69
End: 2019-04-08

## 2019-04-08 NOTE — TELEPHONE ENCOUNTER
Patient is calling about the discussed for  artificial urinary stent replacement.  He would like to proceed to get set up to have  an artificial urinary sphincter placed.  Message sent to Dr. Blankenship.    Marilou JEROME  [facility]  April 8, 2019 2:11 PM

## 2019-04-09 PROBLEM — N39.46 MIXED INCONTINENCE URGE AND STRESS (MALE)(FEMALE): Status: RESOLVED | Noted: 2019-01-15 | Resolved: 2019-04-09

## 2019-04-09 PROBLEM — M99.05 SOMATIC DYSFUNCTION OF PELVIS REGION: Status: RESOLVED | Noted: 2019-01-15 | Resolved: 2019-04-09

## 2019-04-09 NOTE — PROGRESS NOTES
Subjective:SUBJECTIVE  Subjective changes as noted by pt:  Pt reports little change since last visit.      Current pain level: 0/10     Changes in function:  None     Adverse reaction to treatment or activity:  None     OBJECTIVE  Changes in objective findings:  Pt showing good contractions sit to stand and with step ups.      HPI                    Objective:  System    Physical Exam    General     ROS    Assessment/Plan:    ASSESSMENT/PLAN  Updated problem list and treatment plan: Diagnosis 1:  Post op incontinence  Decreased strength - therapeutic exercise, therapeutic activities and home program  Impaired muscle performance - biofeedback, neuro re-education and home program  Progress toward STG/LTGs have been made:  Yes,   Assessment of Progress: The patient's condition is improving.  Self Management Plans:  Patient has been instructed in a home treatment program.  I have re-evaluated this patient and find that the nature, scope, duration and intensity of the therapy is appropriate for the medical condition of the patient.  Jose continues to require the following intervention to meet STG and LT's:  PT    Recommendations:  Pt has not returned for treatment since 2-13-19. Pt discharged at this time.      Please refer to the daily flowsheet for treatment today, total treatment time and time spent performing 1:1 timed codes.

## 2019-04-10 ENCOUNTER — TELEPHONE (OUTPATIENT)
Dept: PALLIATIVE MEDICINE | Facility: CLINIC | Age: 69
End: 2019-04-10

## 2019-04-10 NOTE — TELEPHONE ENCOUNTER
Reviewed chart. Patient had left L4-5, L5-S1 facet joint injections on 3/18.     Pt last seen in clinic on 3/11 with recommendations to be seen again in 2 months.     Will call pt to gather more information.     EVELINE Asif, RN-BC  Patient Care Supervisor/Care Coordinator  [facility]

## 2019-04-10 NOTE — TELEPHONE ENCOUNTER
Called pt and LM letting him know that an appointment sooner than 2 months may be needed and to call the main clinic number to schedule. LM that he may call that number to speak with the care team as well.     ISRRAEL AsifN, RN-BC  Patient Care Supervisor/Care Coordinator  [facility] [place]

## 2019-04-10 NOTE — TELEPHONE ENCOUNTER
Pt had an injection and has not felt relief. He stated he feels worse pain than before he had the injection. He is concerned and would like a phone call back to follow up. He can be reached at. [phone]      Rocael KNIGHT    [facility] [place]

## 2019-04-11 ENCOUNTER — TELEPHONE (OUTPATIENT)
Dept: UROLOGY | Facility: CLINIC | Age: 69
End: 2019-04-11

## 2019-04-11 NOTE — TELEPHONE ENCOUNTER
Patient is calling saying he has changed his mind and doesn't want to have surgery. He wants to try the external catheter instead. He will need an appointment for the fitting.  Marilou JEROME  [facility]  April 11, 2019 12:00 PM                 Lionel Burgos LPN Bennett, Stephen David, MD Cc: Lionel Burgos LPN   Phone Number: [phone]             Dr. Blankenship:  Patient is calling about the discussed for  artificial urinary stent replacement.  He would like to proceed to get set up to have  an artificial urinary sphincter placed.     Marilou JEROME   [facility]   April 8, 2019 2:11 PM

## 2019-04-12 NOTE — TELEPHONE ENCOUNTER
Called pt and LM that we were returning his call and that an appointment is recommended to discuss next steps; provided main clinic number for scheduling.     EVELINE Asif, RN-BC  Patient Care Supervisor/Care Coordinator  [facility] [place]

## 2019-04-15 ENCOUNTER — ALLIED HEALTH/NURSE VISIT (OUTPATIENT)
Dept: UROLOGY | Facility: CLINIC | Age: 69
End: 2019-04-15
Payer: COMMERCIAL

## 2019-04-15 DIAGNOSIS — R32 URINARY INCONTINENCE: Primary | ICD-10-CM

## 2019-04-15 PROCEDURE — 99211 OFF/OP EST MAY X REQ PHY/QHP: CPT

## 2019-04-15 NOTE — PROGRESS NOTES
Patient presents to clinic for condom catheter fitting. Patient was measured and given a 35mm size condom catheter to try. Patient was able to put on and fit was comfortable. A leg-bag was attached to condom catheter for drainage. Supplies were ordered for patient and he will call the office if he has any questions.     Jose Moreno comes into clinic today at the request of Dr. Clint Blankenship Ordering Provider for Condom Cath fitting.     This service provided today was under the supervising provider of the day Dr. Armani Schmitz, who was available if needed.    Kiara Licea LPN

## 2019-04-17 NOTE — TELEPHONE ENCOUNTER
Pt scheduled for 4/22.     ISRRAEL AsifN, RN-BC  Patient Care Supervisor/Care Coordinator  [facility] [place]

## 2019-04-19 NOTE — PROGRESS NOTES
[facility]    Date of visit: 4/22/2019    Chief complaint:   Chief Complaint   Patient presents with     Pain       Interval history:  Jose Moreno is a 68 year old male last seen by me on 3/11/19.      Recommendations/plan at the last visit included:  1. Physical Therapy: Chronic pain PT referral placed at last visit, discussed that he should follow through with this.  2. Clinical Health Pain Psychologist: Yesenia by pain phd completed for scs trial evaluation.  3. Self Care Recommendations: Gentle progressive exercise that does not increase pain - gradually increase daily walking program.  Take mini breaks - 5 minutes of mindfullness a couple times a day.   4. Diagnostic Studies: thoracic and lumbar MRI reviewed  5. Medication Management:  No changes made to regimen, continue prn ibuprofen  6. Further procedures recommended:   1. Order placed for lumbar facet injections. If scs trial is approved we will cancel this procedure.  2. Will check with team whether they have heard back from [employer] regarding prior authorization.   7. Follow up: 2 months in clinic. If scs trial is approved with schedule pre-trial appointment.        Since his last visit, Jose Moreno reports:  -He underwent left 3-4, 4-5 facet injections on 3/18/19 with mild improvement for a couple weeks but the pain has returned now.  -he has not been able to go on walks as much as he used to.   -Pain continues to be in the left low part of his back. He is worried that the arthritis might be getting worse in this area.  -He had been working with PT for pelvic floor weakness and incontinence, this hasn't changed much.      Pain scores:  Pain intensity on average is 7 on a scale of 0-10.     Current pain treatments:   -Ibuprofen prn    Past pain treatments:  -none  -Lumbar RFA with 6 months relief  -Lumbar facet injections with 3 months relief  -PT    Side Effects: no side  effect    Medications:  Current Outpatient Medications   Medication Sig Dispense Refill     Acetaminophen (TYLENOL PO) Take 325-650 mg by mouth 2 times daily as needed for mild pain or fever       aspirin EC 81 MG EC tablet Take 1 tablet (81 mg) by mouth daily 60 tablet 0     atorvastatin (LIPITOR) 40 MG tablet Take 1 tablet (40 mg) by mouth daily 90 tablet 1     cholecalciferol (VITAMIN D3) 1000 UNIT tablet Take 1 tablet (1,000 Units) by mouth daily       Cyanocobalamin (B-12) 1000 MCG TBCR Take 1,000 mcg by mouth daily       FLUOXETINE HCL PO Take 40 mg by mouth every other day (Patient taking differently than prescribed, finishing up old RX; Current RX is 20mg once daily)       levothyroxine (SYNTHROID/LEVOTHROID) 50 MCG tablet Take 1 tablet (50 mcg) by mouth daily 90 tablet 3     losartan (COZAAR) 100 MG tablet Take 1 tablet (100 mg) by mouth daily 90 tablet 3     multivitamin, therapeutic with minerals (MULTI-VITAMIN) TABS tablet Take 1 tablet by mouth daily       nitroGLYcerin (NITROSTAT) 0.4 MG sublingual tablet TAKE 1 TABLET BY MOUTH EVERY 5 MIN AS NEEDED FOR CHEST PAIN 20 tablet 0     Omega-3 Fatty Acids (FISH OIL PO) Take 1 capsule by mouth daily       omeprazole (PRILOSEC) 20 MG CR capsule TAKE ONE CAPSULE BY MOUTH ONCE DAILY (Patient taking differently: Take 20 mg by mouth daily TAKE ONE CAPSULE BY MOUTH ONCE DAILY) 90 capsule 3     oxyCODONE IR (ROXICODONE) 5 MG tablet Take 1-2 tablets (5-10 mg) by mouth every 3 hours as needed 15 tablet 0     senna-docusate (SENOKOT-S;PERICOLACE) 8.6-50 MG per tablet Take 1 tablet by mouth 2 times daily To be taken with opioid (narcotic) pain medication to prevent constipation. 60 tablet 1     traZODone (DESYREL) 100 MG tablet TAKE 1 TABLET (100 MG) BY MOUTH NIGHTLY AS NEEDED FOR SLEEP 90 tablet 0       Medical History: any changes in medical history since they were last seen? No    Review of Systems:  The 14 system ROS was reviewed from the intake questionnaire, and  is positive for: Arthritis, back pain  Any bowel or bladder problems: denies new issues  Mood: denies    Physical Exam:  /85   Pulse 69   Wt 70.3 kg (155 lb)   SpO2 97%   BMI 25.40 kg/m    General: NAD, pleasant  Gait: Normal  MSK exam: Lumbar extension and rotation produces pain in the low back, pain with palpation of the bilateral lumbar paraspinals. Strength is 5/5  And symmetric.    Assessment:  Mr. Moreno is a 68 year old with past medical history including: CAD (nstemi, s/p PTCA), HTN, mitral valve repair, mitral regurgitation, HLD, Hypothyroidism, GERD, Prostate cancer (s/p prostatectomy in 2018) who presents for follow up of chronic low back pain.    He has had several facet injections (3 months relief) and radiofrequency ablations (6 months relief) but improvement in his back pain and increased activity tolerance. The last facet injections on 3/18/19 did not provide prolonged pain relief although he had about 2 weeks of benefit.    Plan:  The following recommendations were given to the patient. Diagnosis, treatment options, risks, benefits, and alternatives were discussed, and all questions were answered. The patient expressed understanding of the plan for management.     I am recommending a multidisciplinary treatment plan to help this patient better manage his pain.  This includes:     1. Physical Therapy: Referral placed to [facility] for lumbar spondylosis and facet artropathy.  2. Clinical Health Pain Psychologist: Coping well, defer at this time.  3. Self Care Recommendations: Gentle progressive exercise that does not increase pain - gradually increase daily walking program.  Take mini breaks - 5 minutes of mindfullness a couple times a day.   4. Diagnostic Studies: none  5. Medication Management:  No changes made to regimen, continue prn ibuprofen  6. Further procedures recommended:   1. Repeat lumbar facet injection ordered  2. MBB/RFA if facet injections not beneficial.  7. Follow up: 6 weeks in  clinic        I spent 30 minutes of time face to face with the patient.  Greater than 50% of this time was spent in patient counseling and/or coordination of care.      Jersey Richter DO  [facility]  4/22/2019

## 2019-04-22 ENCOUNTER — ANCILLARY PROCEDURE (OUTPATIENT)
Dept: GENERAL RADIOLOGY | Facility: CLINIC | Age: 69
End: 2019-04-22
Attending: PHYSICAL MEDICINE & REHABILITATION
Payer: COMMERCIAL

## 2019-04-22 ENCOUNTER — RADIOLOGY INJECTION OFFICE VISIT (OUTPATIENT)
Dept: PALLIATIVE MEDICINE | Facility: CLINIC | Age: 69
End: 2019-04-22
Payer: COMMERCIAL

## 2019-04-22 ENCOUNTER — OFFICE VISIT (OUTPATIENT)
Dept: PALLIATIVE MEDICINE | Facility: CLINIC | Age: 69
End: 2019-04-22
Payer: COMMERCIAL

## 2019-04-22 VITALS
BODY MASS INDEX: 25.4 KG/M2 | HEART RATE: 69 BPM | OXYGEN SATURATION: 97 % | SYSTOLIC BLOOD PRESSURE: 151 MMHG | WEIGHT: 155 LBS | DIASTOLIC BLOOD PRESSURE: 85 MMHG

## 2019-04-22 VITALS — SYSTOLIC BLOOD PRESSURE: 164 MMHG | OXYGEN SATURATION: 97 % | HEART RATE: 81 BPM | DIASTOLIC BLOOD PRESSURE: 84 MMHG

## 2019-04-22 DIAGNOSIS — M47.816 SPONDYLOSIS OF LUMBAR REGION WITHOUT MYELOPATHY OR RADICULOPATHY: Primary | ICD-10-CM

## 2019-04-22 DIAGNOSIS — M47.816 FACET ARTHROPATHY, LUMBAR: ICD-10-CM

## 2019-04-22 PROCEDURE — 99214 OFFICE O/P EST MOD 30 MIN: CPT | Performed by: PHYSICAL MEDICINE & REHABILITATION

## 2019-04-22 ASSESSMENT — PAIN SCALES - GENERAL: PAINLEVEL: MILD PAIN (3)

## 2019-04-22 NOTE — PROGRESS NOTES
[facility] - Procedure Note    Date of Visit: 4/22/2019    Pre procedure Diagnosis: facet arthropathy   Post procedure Diagnosis: Same  Procedure performed: Left L4-5, L5-S1 facet joint injections  Anesthesia: none  Complications: none  Operators: Jersey Richter DO    Indications:   Jose Moreno is a 68 year old male was sent by myself for facet joint injections in the lumbar spine.  They have a history of chronic low back pain on the left low back.  Exam shows pain with extension and rotation to the left and they have tried conservative treatment including oral medications and therapy.    He has had prior Left L4-5 L5-S1 facet injections with 3+ months of relief and a left L3,4,5 RFA with 6 months of relief in the past.  The last injections performed in march 2018 provided about 2 weeks of relief.    Options/alternatives, benefits and risks were discussed with the patient including bleeding, infection, flared pain, tissue trauma, exposure to radiation, reaction to medications including seizure, spinal cord injury, paralysis, weakness, numbness and headache.    Questions were answered to his satisfaction and he agrees to proceed. Voluntary informed consent was obtained and signed.     Vitals were reviewed: Yes  Allergies were reviewed:  Yes   Medications were reviewed:  Yes   Pre-procedure pain score: 3/10 at rest, worse with activity.    Imaging:   MRI OF THE LUMBAR SPINE WITHOUT CONTRAST 2/9/2019 10:53 AM      COMPARISON: None     HISTORY: Lumbar spondylosis/facet arthropathy. Planning for SCS.  Spondylosis of lumbar region without myelopathy or radiculopathy.  Chronic left-sided low back pain without sciatica.     TECHNIQUE: Multiplanar, multisequence MRI images of the lumbar spine  were acquired without IV contrast.     FINDINGS: There are five lumbar-type vertebrae for the purposes of  this dictation.      There is normal alignment of the lumbar vertebrae; however, there is  sigmoid  curvature of the lumbar spine with right curvature centered at  the L1-L2 level and left curvature centered at the L4-L5 level.  Vertebral body heights of the lumbar spine are normal. There is  reactive, degenerative Type II marrow signal change in the opposing  endplates at the L2-L3 level and reactive, degenerative Type I marrow  signal change in the opposing endplates at the L4-L5 level. Marrow  signal throughout the lumbar vertebrae is otherwise normal. There is  no evidence for fracture or pathologic bony lesion of the lumbar  spine.     There is posterior disc bulging/herniation to varying degrees at all  levels of the lumbar spine.     The tip of the conus medullaris is at the mid L2 level which is within  normal limits. There is no evidence for intrathecal abnormality.      Level by level:      L1-L2: There is a minimal circumferential disc bulge and minimal facet  arthropathy bilaterally. There is no spinal canal or neural foraminal  stenosis at this level.     L2-L3: There is a circumferential disc bulge, circumferential endplate  spurring and mild facet arthropathy bilaterally. These findings result  in mild left foraminal narrowing but no spinal canal or right  foraminal stenosis.     L3-L4: There is a circumferential disc bulge with a probable tiny  superimposed posterior central disc herniation (protrusion) and mild  facet arthropathy bilaterally. These findings result in mild spinal  canal narrowing with moderate narrowing of the right lateral recess of  the spinal canal, mild left foraminal narrowing and moderate right  foraminal stenosis.     L4-L5: There is a circumferential disc bulge with a questionable  superimposed right lateral (foraminal zone) disc herniation  (protrusion) and moderate facet arthropathy bilaterally. These  findings result in mild spinal canal narrowing, severe right foraminal  stenosis and minimal left foraminal narrowing.     L5-S1: There is a circumferential disc bulge and  severe facet  arthropathy bilaterally. These findings result in moderate bilateral  neural foraminal stenosis and mild spinal canal narrowing.                                                                      IMPRESSION: Degenerative changes of the lumbar spine as described  above.     ALFONSO RUIZ MD    Procedure:  After getting informed consent, patient was brought into the procedure suite and was placed in a prone position on the procedure table.   A Pause for the Cause was performed.  Patient was prepped and draped in sterile fashion.     Under AP fluoroscopic guidance the L4-5, L5-S1 facet joints on the left side were identified, and the C-arm was rotated obliquely to the affected side to open the joint space. A total of 2 ml of 1% lidocaine was injected at the needle entry point and needle tract. Then a 25 gauge 3.5 inch quincke type spinal needle was inserted and advanced under fluoroscopic guidance targeting the superior articular pillar of each joint. Once the needle made a contact with SAP, it was rotated and was then advanced into the joint.    AP fluoroscopic views were obtained to confirm the needle placement. Then,  Omnipaque 300 contrast dye was injected after negative aspiration for heme and CSF in each joint, confirming appropriate placement.  A total of 1mL of Omnipaque was used and 9mL was wasted.    The injection was then accomplished using a solution containing 2ml of 0.5% bupivacaine mixed with 40mg of kenolog, divided between the two joints. The needles were removed..     Hemostasis was achieved, the area was cleaned, and bandaids were placed when appropriate.  The patient tolerated the procedure well, and was taken to the recovery room.    Images were saved to PACS.        Post-procedure pain score: 1/10      Assessment/Plan: Jose Moreno is a 68 year old male s/p left L4-5, L5-S1 facet injections for back pain.     1. Following today's procedure, the patient was advised to contact the  [facility] for any of the following:   Fever, chills, or night sweats   New onset of pain, numbness, or weakness   Any questions/concerns regarding the procedure  If unable to contact the Pain Center, the patient was instructed to go to a local Emergency Room for any complications.   2. The patient will receive a follow-up call in 1 week.   3. Follow-up with the referring provider in 2 weeks for post-procedure evaluation.      Jersey Richter DO  [facility]

## 2019-04-22 NOTE — PATIENT INSTRUCTIONS
1. I ordered PT, schedule at your convenience.  2. Call the number below to schedule the back injections.  3. Follow up in clinic in 6 weeks.    ----------------------------------------------------------------  Clinic Number:  [phone]   Call this number with any questions about your care and for scheduling assistance. Calls are returned Monday through Friday between 8 AM and 4:30 PM. We usually get back to you within 2 business days depending on the issue/request.       Medication refills:    For non-opioid medications, call your pharmacy directly to request a refill. The pharmacy will contact the [facility] for authorization. Please allow 3-4 days for these refills to be processed.     For opioid refills, call the clinic number or send a FinalCAD message. Please contact us 7-10 days before your refill is due. The message MUST include the name of the specific medication(s) requested and how you would like to receive the prescription(s). The options are as follows:    Pain Clinic staff can mail the prescription to your pharmacy. Please tell us the name of the pharmacy.    You may pick the prescription up at the Pain Clinic (tell us the location) or during a clinic visit with your pain provider    Pain Clinic staff can deliver the prescription to the [place] pharmacy in the clinic building. Please tell us the location.      We believe regular attendance is key to your success in our program.    Any time you are unable to keep your appointment we ask that you call us at least 24 hours in advance to let us know. This will allow us to offer the appointment time to another patient.

## 2019-04-22 NOTE — NURSING NOTE
Pre-screening Questions for Radiology Injections:    Injection to be done at which interventional clinic site? [facility]    Instruct patient to arrive as directed prior to the scheduled appointment time:    [place] minutes before      [place]: 30 minutes before; if IV needed 1 hour before     Procedure ordered by Dr. Richter    Procedure ordered? Lumbar Facet Joint Injection    What insurance would patient like us to bill for this procedure? [employer]      Worker's comp or MVA (motor vehicle accident) -Any injection DO NOT SCHEDULE and route to Ursula Xavier.      [employer] insurance - For SI joint injections, DO NOT SCHEDULE and route Ursula Park.       [employer] - Any injection besides hip/shoulder/knee joint DO NOT SCHEDULE and route to Ursula Park. She will obtain PA and call pt back to schedule procedure or notify pt of denial.        [employer]-Route to Ursula for review        IF SCHEDULING IN [place] AND NEEDS A PA, IT IS OKAY TO SCHEDULE. [place] HANDLES THEIR OWN PA'S AFTER THE PATIENT IS SCHEDULED. PLEASE SCHEDULE AT LEAST 1 WEEK OUT SO A PA CAN BE OBTAINED.      Any chance of pregnancy? Not Applicable   If YES, do NOT schedule and route to RN pool    Is an  needed? No     Patient has a drive home? (mandatory) NO    Is patient taking any blood thinners (plavix, coumadin, jantoven, warfarin, heparin, pradaxa or dabigatran )? No   If hold needed, do NOT schedule, route to RN pool     Is patient taking any aspirin products (includes Excedrin and Fiorinal)? Yes - Pt takes 81 mg daily; instructed to hold 0 day(s) prior to procedure.      If more than 325mg/day do NOT schedule; route to RN pool     For CERVICAL procedures, hold all aspirin products for 6 days.     Tell pt that if aspirin product is not held for 6 days, the procedure WILL BE cancelled.      Does the patient have a bleeding or clotting disorder? No     If YES, okay to schedule AND route to RN nurse pool    For  any patients with platelet count <100, must be forwarded to provider    Is patient diabetic?  No  If YES, have them bring their glucometer.    Does patient have an active infection or treated for one within the past week? No     Is patient currently taking any antibiotics?  No     For patients on chronic, preventative, or prophylactic antibiotics, procedures may be scheduled.     For patients on antibiotics for active or recent infection:antibiotic course must have been completed for 4 days    Is patient currently taking any steroid medications? (i.e. Prednisone, Medrol)  No     For patients on steroid medications, course must have been completed for 4 days    Reviewed with patient:  If you are started on any steroids or antibiotics between now and your appointment, you must contact us because the procedure may need to be cancelled.  No    Is patient actively being treated for cancer or immunocompromised? No  If YES, do NOT schedule and route to RN pool     Are you able to get on and off an exam table with minimal or no assistance? Yes  If NO, do NOT schedule and route to RN pool    Are you able to roll over and lay on your stomach with minimal or no assistance? Yes  If NO, do NOT schedule and route to RN pool     Any allergies to contrast dye, iodine, shellfish, or numbing and steroid medications? No  If YES, route to RN pool AND add allergy information to appointment notes    Allergies: Ace inhibitors and No clinical screening - see comments      Has the patient had a flu shot or any other vaccinations within 7 days before or after the procedure.  No     Does patient have an MRI/CT?  YES:   (SI joint, hip injections, lumbar sympathetic blocks, and stellate ganglion blocks do not require an MRI)    Was the MRI done w/in the last 3 years?  Yes    Was MRI done at [facility]? Yes      If not, where was it done? N/A       If MRI was not done at [facility], [facility] or [facility] do NOT schedule and route to nursing.  If  pt has an imaging disc, the injection may be scheduled but pt has to bring disc to appt. If they show up w/out disc the injection cannot be done    Reminders (please tell patient if applicable):       Instructed pt to arrive 30 minutes early for IV start if this is for a cervical procedure, ALL sympathetic (stellate ganglion, hypogastric, or lumbar sympathetic block) and all sedation procedures (RFA, spinal cord stimulation trials).  Not Applicable   -IVs are not routinely placed for Dr. Cortez cervical cases   -Dr. Loyola: IVs for cervical ESIs and cervical TBDs (not CMBBs/facet inj)      If NPO for sedation, informed patient that it is okay to take medications with sips of water (except if they are to hold blood thinners).  NO   *DO take blood pressure medication if it is prescribed*      If this is for a cervical RAMSES, informed patient that aspirin needs to be held for 6 days.   Not Applicable      For all patients not having spinal cord stimulator (SCS) trials or radiofrequency ablations (RFAs), informed patient:    IV sedation is not provided for this procedure.  If you feel that an oral anti-anxiety medication is needed, you can discuss this further with your referring provider or primary care provider.  The Pain Clinic provider will discuss specifics of what the procedure includes at your appointment.  Most procedures last 10-20 minutes.  We use numbing medications to help with any discomfort during the procedure.  Not Applicable      Do not schedule procedures requiring IV placement in the first appointment of the day or first appointment after lunch. Do NOT schedule at 0745, 0815 or 1245.       For patients 85 or older we recommend having an adult stay w/ them for the remainder of the day.       Does the patient have any questions?  NO  Katia Medeiros  [facility]

## 2019-04-22 NOTE — PATIENT INSTRUCTIONS
[facility] Procedure Discharge Instructions    Today you saw:    Dr. Jersey Richter    Your procedure:   Facet joint injection     Medications used:  Lidocaine (local anesthetic)  Bupivacaine (anesthetic)   Kenalog (steroid)  Omnipaque (contrast)                Be cautious when walking as numbness and/or weakness in the legs may occur up to 6-8 hours after the procedure due to effect of the local anesthetic    Do not drive for 6 hours. The effect of the local anesthetic could slow your reflexes.     Avoid strenuous activity for the first 24 hours. You may resume your regular activities after that.     You may shower, however avoid swimming, tub baths or hot tubs for 24 hours following your procedure    You may have a mild to moderate increase in pain for several days following the injection.      You may use ice packs for 10-15 minutes, 3 to 4 times a day at the injection site for comfort    Do not use heat to painful areas for 6 to 8 hours. This will give the local anesthetic time to wear off and prevent you from accidentally burning your skin.    You may use anti-inflammatory medications (such as Ibuprofen/Advil or Aleve) or Tylenol for pain control if necessary    With diabetes, check your blood sugar more frequently than usual as your blood sugar may be higher than normal for 10-14 days following a steroid injection. Contact your doctor who manages your diabetes if your blood sugar is higher than usual    Possible side effects of steroids that you may experience include flushing, elevated blood pressure, increased appetite, mild headaches and restlessness.  All of these symptoms will get better with time.    It may take up to 14 days for the steroid medication to start working although you may feel the effect as early as a few days after the procedure.     Follow up with your referring provider in 2-3 weeks      If you experience any of the following, call the pain center line during work hours at  [phone] or on-call physician after hours at [phone]:  -Fever over 100 degree F  -Swelling, bleeding, redness, drainage, warmth at the injection site  -Progressive weakness or numbness in your legs or arms  -Loss of bowel or bladder function  -Unusual headache that is not relieved by Tylenol or your regular headache medication  -Unusual new onset of pain that is not improving

## 2019-04-22 NOTE — NURSING NOTE
Discharge Information    IV Discontiued Time:  NA    Amount of Fluid Infused:  NA    Discharge Criteria = When patient returns to baseline or as per MD order    Consciousness:  Pt is fully awake    Circulation:  BP +/- 20% of pre-procedure level    Respiration:  Patient is able to breathe deeply    O2 Sat:  Patient is able to maintain O2 Sat >92% on room air    Activity:  Moves 4 extremities on command    Ambulation:  Patient is able to stand and walk or stand and pivot into wheelchair    Dressing:  Clean/dry or No Dressing    Notes:   Discharge instructions and AVS given to patient    Patient meets criteria for discharge?  YES    Admitted to PCU?  No    Responsible adult present to accompany patient home?  Yes    Signature/Title:    Katia Medeiros  RN Care Coordinator  [facility]

## 2019-05-20 ENCOUNTER — TELEPHONE (OUTPATIENT)
Dept: UROLOGY | Facility: CLINIC | Age: 69
End: 2019-05-20

## 2019-05-20 NOTE — TELEPHONE ENCOUNTER
Urology pt of Dr. Blankenship s/p prostatectomy November 2018 and last seen 04/05/2019. Jose has f/u appt scheduled for this Friday 5/24 but calls today wanting to talk to Dr. Blankenship about some of the procedures discussed at last visit.  Will forward this request to Dr. Blankenship. In Basket message sent.

## 2019-05-30 ENCOUNTER — TELEPHONE (OUTPATIENT)
Dept: UROLOGY | Facility: CLINIC | Age: 69
End: 2019-05-30

## 2019-05-30 ENCOUNTER — PRE VISIT (OUTPATIENT)
Dept: UROLOGY | Facility: CLINIC | Age: 69
End: 2019-05-30

## 2019-05-30 NOTE — TELEPHONE ENCOUNTER
MEDICAL RECORDS REQUEST   [facility]  Urology Clinic  [address] [place] [address]  PHONE: [phone]  Fax: [phone]        FUTURE VISIT INFORMATION                                                   Jose Moreno, : 1950 scheduled for future visit at [facility]    APPOINTMENT INFORMATION:    Date: 19 11AM     Provider:  Armani Franz MD    Reason for Visit/Diagnosis: Incontinence consult for AUS     REFERRAL INFORMATION:    Referring provider: Clint Blankenship    Specialty:  MD    Referring providers clinic:  [facility]     Clinic contact number: [phone]    RECORDS REQUESTED FOR VISIT                                                     NOTES  STATUS/DETAILS   OFFICE NOTE from referring provider  yes   OFFICE NOTE from other specialist  no   DISCHARGE SUMMARY from hospital  no   DISCHARGE REPORT from the ER  no   OPERATIVE REPORT  yes   MEDICATION LIST  no   LABS     URINALYSIS (UA)  yes     PRE-VISIT CHECKLIST      Record collection complete Yes - All recs in Epic   Appointment appropriately scheduled           (right time/right provider) Yes   MyChart activation Yes   Questionnaire complete If no, please explain: In process      Completed by: Haven Norman

## 2019-05-30 NOTE — TELEPHONE ENCOUNTER
----- Message from Clint Blankenship MD sent at 5/30/2019  7:26 AM CDT -----  Please call today  This patient needs to see Dr Melvin Franz at the [facility] urology department to discuss placement if artificial urinary sphincter AND inflatable penile prosthesis at the same time    If he doesn't answer, patient says its OK to leave him a voicemail with contact info    Thanks

## 2019-05-31 ENCOUNTER — TELEPHONE (OUTPATIENT)
Dept: PALLIATIVE MEDICINE | Facility: CLINIC | Age: 69
End: 2019-05-31

## 2019-05-31 NOTE — TELEPHONE ENCOUNTER
Received call from patient who is returning a call. He states he is available at both his home and cell phone numbers today.         Flori Valenzuela    [facility]

## 2019-05-31 NOTE — TELEPHONE ENCOUNTER
Called patient to gather more information.     Patient requesting repeat injection.     Previous injections:    3/18/19 Left Lumbar facets  4/22/18 Left Lumbar facets      LVM requesting a call back to discuss.     Naomi ARCOSN-RN Care Coordinator  [facility]

## 2019-05-31 NOTE — TELEPHONE ENCOUNTER
Patient canceled appointment for Monday, he would like to have a repeat injection. Please review and place order if appropriate         Ursula HUFFMAN    [facility]

## 2019-06-03 NOTE — TELEPHONE ENCOUNTER
Called patient to discuss injection options. LVM requesting a call back.    Naomi ARCOSN-RN Care Coordinator  [facility] [place]

## 2019-06-03 NOTE — TELEPHONE ENCOUNTER
Received call from patient who states he is returning a call. Patient states he is hard to get ahold of as he drives a school bus. He states that he is interested in getting the same steroid shot that he had before.       Flori Valenzuela    [facility]

## 2019-06-03 NOTE — TELEPHONE ENCOUNTER
Patient will call back around 1 pm      Ursula HUFFMAN    [facility]

## 2019-06-04 NOTE — TELEPHONE ENCOUNTER
Spoke with patient. He will wait a while for another injection. He states he will also start PT again to start the process for MBB to RFA.      ADvised patient to call back if pain is unbearable and needs to have facets done again. Patient agreeable to plan.       Naomi MOSQUERA-RN Care Coordinator  [facility]

## 2019-06-07 ENCOUNTER — HOSPITAL ENCOUNTER (OUTPATIENT)
Facility: CLINIC | Age: 69
End: 2019-06-07
Attending: UROLOGY | Admitting: UROLOGY
Payer: COMMERCIAL

## 2019-06-07 ENCOUNTER — TELEPHONE (OUTPATIENT)
Dept: UROLOGY | Facility: CLINIC | Age: 69
End: 2019-06-07

## 2019-06-07 DIAGNOSIS — R32 URINARY INCONTINENCE, UNSPECIFIED TYPE: Primary | ICD-10-CM

## 2019-06-07 NOTE — TELEPHONE ENCOUNTER
Morgan  Has decide he just wants to proceed with the  sphincter . He would like it done before the end of June for insurance reasons. He states he does not need to talk to you unless you want to. He  wants  you to place the surgical orders . He talked to Ursula and he said he thinks they can get it done before end of June . Thanks

## 2019-06-10 DIAGNOSIS — E78.2 MIXED HYPERLIPIDEMIA: Primary | ICD-10-CM

## 2019-06-10 RX ORDER — ATORVASTATIN CALCIUM 40 MG/1
40 TABLET, FILM COATED ORAL DAILY
Qty: 90 TABLET | Refills: 1 | Status: SHIPPED | OUTPATIENT
Start: 2019-06-10 | End: 2019-08-02

## 2019-06-10 NOTE — TELEPHONE ENCOUNTER
Last seen 4/9/19 and this was not reordered at the time    Pending Prescriptions:                       Disp   Refills    atorvastatin (LIPITOR) 40 MG tablet       90 tab*1            Sig: Take 1 tablet (40 mg) by mouth daily

## 2019-06-17 ENCOUNTER — ANCILLARY PROCEDURE (OUTPATIENT)
Dept: GENERAL RADIOLOGY | Facility: CLINIC | Age: 69
End: 2019-06-17
Attending: PHYSICAL MEDICINE & REHABILITATION
Payer: COMMERCIAL

## 2019-06-17 ENCOUNTER — OFFICE VISIT (OUTPATIENT)
Dept: PALLIATIVE MEDICINE | Facility: CLINIC | Age: 69
End: 2019-06-17
Payer: COMMERCIAL

## 2019-06-17 ENCOUNTER — TELEPHONE (OUTPATIENT)
Dept: PALLIATIVE MEDICINE | Facility: CLINIC | Age: 69
End: 2019-06-17

## 2019-06-17 ENCOUNTER — RADIOLOGY INJECTION OFFICE VISIT (OUTPATIENT)
Dept: PALLIATIVE MEDICINE | Facility: CLINIC | Age: 69
End: 2019-06-17
Payer: COMMERCIAL

## 2019-06-17 ENCOUNTER — TELEPHONE (OUTPATIENT)
Dept: UROLOGY | Facility: CLINIC | Age: 69
End: 2019-06-17

## 2019-06-17 VITALS — HEART RATE: 73 BPM | SYSTOLIC BLOOD PRESSURE: 160 MMHG | DIASTOLIC BLOOD PRESSURE: 80 MMHG | OXYGEN SATURATION: 99 %

## 2019-06-17 VITALS — DIASTOLIC BLOOD PRESSURE: 90 MMHG | HEART RATE: 64 BPM | SYSTOLIC BLOOD PRESSURE: 168 MMHG | OXYGEN SATURATION: 97 %

## 2019-06-17 DIAGNOSIS — R32 URINARY INCONTINENCE: Primary | ICD-10-CM

## 2019-06-17 DIAGNOSIS — M47.816 SPONDYLOSIS OF LUMBAR REGION WITHOUT MYELOPATHY OR RADICULOPATHY: Primary | ICD-10-CM

## 2019-06-17 DIAGNOSIS — M47.816 FACET ARTHROPATHY, LUMBAR: ICD-10-CM

## 2019-06-17 PROCEDURE — 64494 INJ PARAVERT F JNT L/S 2 LEV: CPT | Mod: LT | Performed by: PHYSICAL MEDICINE & REHABILITATION

## 2019-06-17 PROCEDURE — 99214 OFFICE O/P EST MOD 30 MIN: CPT | Performed by: PHYSICAL MEDICINE & REHABILITATION

## 2019-06-17 PROCEDURE — 64493 INJ PARAVERT F JNT L/S 1 LEV: CPT | Mod: LT | Performed by: PHYSICAL MEDICINE & REHABILITATION

## 2019-06-17 RX ORDER — METHOCARBAMOL 500 MG/1
500 TABLET, FILM COATED ORAL 3 TIMES DAILY PRN
Qty: 90 TABLET | Refills: 0 | Status: SHIPPED | OUTPATIENT
Start: 2019-06-17 | End: 2019-07-23

## 2019-06-17 ASSESSMENT — PAIN SCALES - GENERAL: PAINLEVEL: MILD PAIN (3)

## 2019-06-17 NOTE — PROGRESS NOTES
[facility]    Date of visit: 4/22/2019    Chief complaint:   Chief Complaint   Patient presents with     Pain       Interval history:  Jose Moreno is a 68 year old male last seen by me on 3/11/19.      Recommendations/plan at the last visit included:  1. Physical Therapy: Chronic pain PT referral placed at last visit, discussed that he should follow through with this.  2. Clinical Health Pain Psychologist: Yesenia by pain phd completed for scs trial evaluation.  3. Self Care Recommendations: Gentle progressive exercise that does not increase pain - gradually increase daily walking program.  Take mini breaks - 5 minutes of mindfullness a couple times a day.   4. Diagnostic Studies: thoracic and lumbar MRI reviewed  5. Medication Management:  No changes made to regimen, continue prn ibuprofen  6. Further procedures recommended:   1. Order placed for lumbar facet injections. If scs trial is approved we will cancel this procedure.  2. Will check with team whether they have heard back from [employer] regarding prior authorization.   7. Follow up: 2 months in clinic. If scs trial is approved with schedule pre-trial appointment.        Since his last visit, Jose Moreno reports:  -He underwent left 3-4, 4-5 facet injections on 4/22/19 with significant relief until last week.  -ADLs and mobility have been more difficult.  -pain continues to be in the same area it has always been, left low back.  -He spoke with his insurance and they stated that an appear could be filed by myself so that he doesn't have to do PT and mbb every time he wants an RFA. RFAs in the past did help for 6 months. He had left L3,4,5 RFA in jan 2018 this was his last RFA.   -He is taking a trip to [place] next week and wants to have facet injections before this so that he is able to be more comfortable.      Pain scores:  Pain intensity on average is 7 on a scale of 0-10.     Current pain treatments:    -Ibuprofen prn    Past pain treatments:  -none  -Lumbar RFA with 6 months relief  -Lumbar facet injections with 2-3 months relief  -PT    Side Effects: no side effect    Medications:  Current Outpatient Medications   Medication Sig Dispense Refill     Acetaminophen (TYLENOL PO) Take 325-650 mg by mouth 2 times daily as needed for mild pain or fever       aspirin EC 81 MG EC tablet Take 1 tablet (81 mg) by mouth daily 60 tablet 0     atorvastatin (LIPITOR) 40 MG tablet Take 1 tablet (40 mg) by mouth daily 90 tablet 1     cholecalciferol (VITAMIN D3) 1000 UNIT tablet Take 1 tablet (1,000 Units) by mouth daily       Cyanocobalamin (B-12) 1000 MCG TBCR Take 1,000 mcg by mouth daily       FLUOXETINE HCL PO Take 40 mg by mouth every other day (Patient taking differently than prescribed, finishing up old RX; Current RX is 20mg once daily)       levothyroxine (SYNTHROID/LEVOTHROID) 50 MCG tablet Take 1 tablet (50 mcg) by mouth daily 90 tablet 3     losartan (COZAAR) 100 MG tablet Take 1 tablet (100 mg) by mouth daily 90 tablet 3     multivitamin, therapeutic with minerals (MULTI-VITAMIN) TABS tablet Take 1 tablet by mouth daily       nitroGLYcerin (NITROSTAT) 0.4 MG sublingual tablet TAKE 1 TABLET BY MOUTH EVERY 5 MIN AS NEEDED FOR CHEST PAIN 20 tablet 0     Omega-3 Fatty Acids (FISH OIL PO) Take 1 capsule by mouth daily       omeprazole (PRILOSEC) 20 MG CR capsule TAKE ONE CAPSULE BY MOUTH ONCE DAILY (Patient taking differently: Take 20 mg by mouth daily TAKE ONE CAPSULE BY MOUTH ONCE DAILY) 90 capsule 3     oxyCODONE IR (ROXICODONE) 5 MG tablet Take 1-2 tablets (5-10 mg) by mouth every 3 hours as needed 15 tablet 0     senna-docusate (SENOKOT-S;PERICOLACE) 8.6-50 MG per tablet Take 1 tablet by mouth 2 times daily To be taken with opioid (narcotic) pain medication to prevent constipation. 60 tablet 1     traZODone (DESYREL) 100 MG tablet TAKE 1 TABLET (100 MG) BY MOUTH NIGHTLY AS NEEDED FOR SLEEP 90 tablet 0        Medical History: any changes in medical history since they were last seen? No    Review of Systems:  The 14 system ROS was reviewed from the intake questionnaire, and is positive for: Arthritis, back pain  Any bowel or bladder problems: denies new issues, chronic bladder incontinence  Mood: denies    Physical Exam:  /80   Pulse 73   SpO2 99%   General: NAD, pleasant  Gait: Normal  MSK exam: Lumbar rom is mildly reduced in extension and rotation. Pain with palpation of the left lower lumbar paraspinals. Extension and rotation to the left produces pain. Strength in the lower extremities is 5/5 and symmetric.     Assessment:  Mr. Moreno is a 68 year old with past medical history including: CAD (nstemi, s/p PTCA), HTN, mitral valve repair, mitral regurgitation, HLD, Hypothyroidism, GERD, Prostate cancer (s/p prostatectomy in 2018) who presents for follow up of chronic low back pain.    He has had several facet injections (3 months relief) and radiofrequency ablations (6 months relief) but improvement in his back pain and increased activity tolerance. He last had facet injections in April of 2019 with about 6-7 weeks of relief. He would like to try RFA again but is going on a trip to [place] next [place] and elects to have repeat facet injections at this time. I will submit an appeal to see if we can have an RFA covered without repeating MBB and physical therapy.    Plan:  The following recommendations were given to the patient. Diagnosis, treatment options, risks, benefits, and alternatives were discussed, and all questions were answered. The patient expressed understanding of the plan for management.     I am recommending a multidisciplinary treatment plan to help this patient better manage his pain.  This includes:     1. Physical Therapy: Referral placed to [facility] for lumbar spondylosis and facet artropathy. If insurance approves RFA without having to re-do PT, he can hold off on this.  2. Clinical Health Pain  Psychologist: Coping well, defer at this time.  3. Self Care Recommendations: Gentle progressive exercise that does not increase pain - gradually increase daily walking program.  Take mini breaks - 5 minutes of mindfullness a couple times a day.   4. Diagnostic Studies: none  5. Medication Management:    1. Continue prn ibuprofen  2. Ordered methocarbamol 500mg tablets. Start at night and take daytime doses if no significant side effects.  6. Further procedures recommended:   1. Repeat lumbar facet injection ordered  2. RFA appeal will be completed  7. Follow up: 2 months in clinic        I spent 30 minutes of time face to face with the patient.  Greater than 50% of this time was spent in patient counseling and/or coordination of care.      Jersey Richter DO  [facility]  4/22/2019

## 2019-06-17 NOTE — NURSING NOTE
Pre-procedure Intake    Have you been fasting? NA    If yes, for how long?     Are you taking a prescribed blood thinner such as coumadin, Plavix, Xarelto?    No    If yes, when did you take your last dose?     Do you take aspirin?  Yes    If cervical procedure, have you held aspirin for 6 days?   NA    Do you have any allergies to contrast dye, iodine, steroid and/or numbing medications?  NO    Are you currently taking antibiotics or have an active infection?  NO    Have you had a fever/elevated temperature within the past week? NO    Are you currently taking oral steroids? NO    Do you have a ? Yes       Are you pregnant or breastfeeding?  Not Applicable    Are the vital signs normal?  No: BP: 157/84

## 2019-06-17 NOTE — TELEPHONE ENCOUNTER
Urology pt of Dr. lBankenship s/p prostatectomy November 2018.   Jose calls reporting he's cancelled his appt for today. He states he wants to wait until a year from his surgery and see how he's doing at that point.

## 2019-06-17 NOTE — NURSING NOTE
Discharge Information    IV Discontiued Time:  NA    Amount of Fluid Infused:  NA    Discharge Criteria = When patient returns to baseline or as per MD order    Consciousness:  Pt is fully awake    Circulation:  BP +/- 20% of pre-procedure level    Respiration:  Patient is able to breathe deeply    O2 Sat:  Patient is able to maintain O2 Sat >92% on room air    Activity:  Moves 4 extremities on command    Ambulation:  Patient is able to stand and walk or stand and pivot into wheelchair    Dressing:  Clean/dry or No Dressing    Notes:   Discharge instructions and AVS given to patient    Patient meets criteria for discharge?  YES    Admitted to PCU?  No    Responsible adult present to accompany patient home?  Yes    Signature/Title:    Naomi Carrera RN Care Coordinator  [facility] [place]

## 2019-06-17 NOTE — PATIENT INSTRUCTIONS
[facility] Procedure Discharge Instructions    Today you saw: Dr. Jersey Richter    Your procedure:  LEFT Facet joint injection    Medications used: Bupivacaine (anesthetic)  Kenalog (steroid)  Omnipaque (contrast)        If you were holding your blood thinning medication, please restart taking it: N/A          Be cautious when walking as numbness and/or weakness in the legs may occur up to 6-8 hours after the procedure due to effect of the local anesthetic    Do not drive for 6 hours. The effect of the local anesthetic could slow your reflexes.     Avoid strenuous activity for the first 24 hours. You may resume your regular activities after that.     You may shower, however avoid swimming, tub baths or hot tubs for 24 hours following your procedure    You may have a mild to moderate increase in pain for several days following the injection.      You may use ice packs for 10-15 minutes, 3 to 4 times a day at the injection site for comfort    Do not use heat to painful areas for 6 to 8 hours. This will give the local anesthetic time to wear off and prevent you from accidentally burning your skin.    Unless you have been directed to avoid the use of anti-inflammatory medications (NSAIDS-ibuprofen, Aleve, Motrin), you may use these medications or Tylenol for pain control if needed.     With diabetes, check your blood sugar more frequently than usual as your blood sugar may be higher than normal for 10-14 days following a steroid injection. Contact your doctor who manages your diabetes if your blood sugar is higher than usual    Possible side effects of steroids that you may experience include flushing, elevated blood pressure, increased appetite, mild headaches and restlessness.  All of these symptoms will get better with time.    It may take up to 14 days for the steroid medication to start working although you may feel the effect as early as a few days after the procedure.     Follow up with your  referring provider in 2-3 weeks      If you experience any of the following, call the pain center line during work hours at [phone] or on-call physician after hours at [phone]:  -Fever over 100 degree F  -Swelling, bleeding, redness, drainage, warmth at the injection site  -Progressive weakness or numbness in your legs  -Loss of bowel or bladder function  -Unusual headache that is not relieved by Tylenol or your regular headache medication  -Unusual new onset of pain that is not improving

## 2019-06-17 NOTE — TELEPHONE ENCOUNTER
To Whom it May Concern,  I am writing this letter to appeal for coverage of a lumbar radiofrequency ablation for my patient, Jose Moreno,  1950.  I am currently his treating physician at the [facility].  I am writing to appeal for coverage of the radiofrequency ablation. He has had this covered in the past for pain from lumbar spondylosis and facet arthropathy. He reports 6 months of significant pain relief and improvement in mobility and ADLs with this treatment. He continues doing the physical therapy exercises previously provided at home with limited pain relief. Currently he reports that to proceed with the radiofrequency ablation he has to again participate in PT as well as have another medial branch block. I am writing to appeal this decision as he has done PT in the past, continues the exercises and did demonstrate improvement with MBB and RFA in the past as well..    Please feel free to contact me with any additional questions.    Jersey Richter, DO  [facility]

## 2019-06-17 NOTE — TELEPHONE ENCOUNTER
Pre-screening Questions for Radiology Injections:    Injection to be done at which interventional clinic site? [facility]    Instruct patient to arrive as directed prior to the scheduled appointment time:    [place] minutes before      [place]: 30 minutes before; if IV needed 1 hour before     Procedure ordered by Dr Richter    Procedure ordered? Lumbar facet injection         Transforaminal Cervical RAMSES - Dr. Abbie Castellon ONLY    What insurance would patient like us to bill for this procedure? [employer]      Worker's comp or MVA (motor vehicle accident) -Any injection DO NOT SCHEDULE and route to Ursula Xavier.      [employer] insurance - For SI joint injections, DO NOT SCHEDULE and route Ursula [place].       [employer] - Any injection besides hip/shoulder/knee joint DO NOT SCHEDULE and route to Ursula Xavier. She will obtain PA and call pt back to schedule procedure or notify pt of denial.       [employer]-Route to Ursula for review      IF SCHEDULING IN [place] AND NEEDS A PA, IT IS OKAY TO SCHEDULE. [place] HANDLES THEIR OWN PA'S AFTER THE PATIENT IS SCHEDULED. PLEASE SCHEDULE AT LEAST 1 WEEK OUT SO A PA CAN BE OBTAINED.      Any chance of pregnancy? NO   If YES, do NOT schedule and route to RN pool    Is an  needed? No     Patient has a drive home? (mandatory) N/A    Is patient taking any blood thinners (plavix, coumadin, jantoven, warfarin, heparin, pradaxa or dabigatran )? No   If hold needed, do NOT schedule, route to RN pool     Is patient taking any aspirin products (includes Excedrin and Fiorinal)? Yes - Pt takes 81 mg daily; instructed to hold 0 day(s) prior to procedure.      If more than 325mg/day do NOT schedule; route to RN pool     For CERVICAL procedures, hold all aspirin products for 6 days.     Tell pt that if aspirin product is not held for 6 days, the procedure WILL BE cancelled.      Does the patient have a bleeding or clotting disorder? No     If YES, okay to schedule  AND route to RN nurse pool    For any patients with platelet count <100, must be forwarded to provider    Is patient diabetic?  No  If YES, have them bring their glucometer.    Does patient have an active infection or treated for one within the past week? No     Is patient currently taking any antibiotics?  No     For patients on chronic, preventative, or prophylactic antibiotics, procedures may be scheduled.     For patients on antibiotics for active or recent infection:antibiotic course must have been completed for 4 days    Is patient currently taking any steroid medications? (i.e. Prednisone, Medrol)  No     For patients on steroid medications, course must have been completed for 4 days    Reviewed with patient:  If you are started on any steroids or antibiotics between now and your appointment, you must contact us because the procedure may need to be cancelled.  No    Is patient actively being treated for cancer or immunocompromised? No  If YES, do NOT schedule and route to RN pool     Are you able to get on and off an exam table with minimal or no assistance? Yes  If NO, do NOT schedule and route to RN pool    Are you able to roll over and lay on your stomach with minimal or no assistance? Yes  If NO, do NOT schedule and route to RN pool     Any allergies to contrast dye, iodine, shellfish, or numbing and steroid medications? No  If YES, route to RN pool AND add allergy information to appointment notes    Allergies: Ace inhibitors and No clinical screening - see comments      Has the patient had a flu shot or any other vaccinations within 7 days before or after the procedure.  No     Does patient have an MRI/CT?  YES:   (SI joint, hip injections, lumbar sympathetic blocks, and stellate ganglion blocks do not require an MRI)    Was the MRI done w/in the last 3 years?  Yes    Was MRI done at [place]? Yes      If not, where was it done? N/A       If MRI was not done at [place], [facility] or [facility] do NOT  schedule and route to nursing.  If pt has an imaging disc, the injection may be scheduled but pt has to bring disc to appt. If they show up w/out disc the injection cannot be done    Reminders (please tell patient if applicable):       Instructed pt to arrive 30 minutes early for IV start if this is for a cervical procedure, ALL sympathetic (stellate ganglion, hypogastric, or lumbar sympathetic block) and all sedation procedures (RFA, spinal cord stimulation trials).  Not Applicable   -IVs are not routinely placed for Dr. Cortez cervical cases   -Dr. Loyola: IVs for cervical ESIs and cervical TBDs (not CMBBs/facet inj)      If NPO for sedation, informed patient that it is okay to take medications with sips of water (except if they are to hold blood thinners).  Not Applicable   *DO take blood pressure medication if it is prescribed*      If this is for a cervical RAMSES, informed patient that aspirin needs to be held for 6 days.   Not Applicable      For all patients not having spinal cord stimulator (SCS) trials or radiofrequency ablations (RFAs), informed patient:    IV sedation is not provided for this procedure.  If you feel that an oral anti-anxiety medication is needed, you can discuss this further with your referring provider or primary care provider.  The Pain Clinic provider will discuss specifics of what the procedure includes at your appointment.  Most procedures last 10-20 minutes.  We use numbing medications to help with any discomfort during the procedure.  Not Applicable      Do not schedule procedures requiring IV placement in the first appointment of the day or first appointment after lunch. Do NOT schedule at 0745, 0815 or 1245.       For patients 85 or older we recommend having an adult stay w/ them for the remainder of the day.       Does the patient have any questions?  NO  Mendoza Galvez  [facility]

## 2019-06-17 NOTE — PATIENT INSTRUCTIONS
----------------------------------------------------------------  Clinic Number:  [phone]   Call this number with any questions about your care and for scheduling assistance. Calls are returned Monday through Friday between 8 AM and 4:30 PM. We usually get back to you within 2 business days depending on the issue/request.       Medication refills:    For non-opioid medications, call your pharmacy directly to request a refill. The pharmacy will contact the Pain Management Center for authorization. Please allow 3-4 days for these refills to be processed.     For opioid refills, call the clinic number or send a [employer] message. Please contact us 7-10 days before your refill is due. The message MUST include the name of the specific medication(s) requested and how you would like to receive the prescription(s). The options are as follows:    Pain Clinic staff can mail the prescription to your pharmacy. Please tell us the name of the pharmacy.    You may pick the prescription up at the Pain Clinic (tell us the location) or during a clinic visit with your pain provider    Pain Clinic staff can deliver the prescription to the [facility] pharmacy in the clinic building. Please tell us the location.      We believe regular attendance is key to your success in our program.    Any time you are unable to keep your appointment we ask that you call us at least 24 hours in advance to let us know. This will allow us to offer the appointment time to another patient.

## 2019-06-17 NOTE — PROGRESS NOTES
[facility] - Procedure Note    Date of Visit: 6/17/2019    Pre procedure Diagnosis: facet arthropathy   Post procedure Diagnosis: Same  Procedure performed: Left L4-5, L5-S1 facet joint injections  Anesthesia: none  Complications: none  Operators: Jersey Richter DO    Indications:   Jose Moreno is a 69 year old male was sent by myself for facet joint injections in the lumbar spine.  They have a history of chronic low back pain on the left low back.  Exam shows pain with extension and rotation to the left and they have tried conservative treatment including oral medications and therapy.    He has had prior Left L4-5 L5-S1 facet injections with 3+ months of relief and a left L3,4,5 RFA with 6 months of relief in the past.  The last facet injections performed in April 2019 provided about 7 weeks of relief.    Options/alternatives, benefits and risks were discussed with the patient including bleeding, infection, flared pain, tissue trauma, exposure to radiation, reaction to medications including seizure, spinal cord injury, paralysis, weakness, numbness and headache.    Questions were answered to his satisfaction and he agrees to proceed. Voluntary informed consent was obtained and signed.     Vitals were reviewed: Yes  Allergies were reviewed:  Yes   Medications were reviewed:  Yes   Pre-procedure pain score: 5/10 at rest, worse with activity.    Imaging:   MRI OF THE LUMBAR SPINE WITHOUT CONTRAST 2/9/2019 10:53 AM      COMPARISON: None     HISTORY: Lumbar spondylosis/facet arthropathy. Planning for SCS.  Spondylosis of lumbar region without myelopathy or radiculopathy.  Chronic left-sided low back pain without sciatica.     TECHNIQUE: Multiplanar, multisequence MRI images of the lumbar spine  were acquired without IV contrast.     FINDINGS: There are five lumbar-type vertebrae for the purposes of  this dictation.      There is normal alignment of the lumbar vertebrae; however, there  is  sigmoid curvature of the lumbar spine with right curvature centered at  the L1-L2 level and left curvature centered at the L4-L5 level.  Vertebral body heights of the lumbar spine are normal. There is  reactive, degenerative Type II marrow signal change in the opposing  endplates at the L2-L3 level and reactive, degenerative Type I marrow  signal change in the opposing endplates at the L4-L5 level. Marrow  signal throughout the lumbar vertebrae is otherwise normal. There is  no evidence for fracture or pathologic bony lesion of the lumbar  spine.     There is posterior disc bulging/herniation to varying degrees at all  levels of the lumbar spine.     The tip of the conus medullaris is at the mid L2 level which is within  normal limits. There is no evidence for intrathecal abnormality.      Level by level:      L1-L2: There is a minimal circumferential disc bulge and minimal facet  arthropathy bilaterally. There is no spinal canal or neural foraminal  stenosis at this level.     L2-L3: There is a circumferential disc bulge, circumferential endplate  spurring and mild facet arthropathy bilaterally. These findings result  in mild left foraminal narrowing but no spinal canal or right  foraminal stenosis.     L3-L4: There is a circumferential disc bulge with a probable tiny  superimposed posterior central disc herniation (protrusion) and mild  facet arthropathy bilaterally. These findings result in mild spinal  canal narrowing with moderate narrowing of the right lateral recess of  the spinal canal, mild left foraminal narrowing and moderate right  foraminal stenosis.     L4-L5: There is a circumferential disc bulge with a questionable  superimposed right lateral (foraminal zone) disc herniation  (protrusion) and moderate facet arthropathy bilaterally. These  findings result in mild spinal canal narrowing, severe right foraminal  stenosis and minimal left foraminal narrowing.     L5-S1: There is a circumferential  disc bulge and severe facet  arthropathy bilaterally. These findings result in moderate bilateral  neural foraminal stenosis and mild spinal canal narrowing.                                                                      IMPRESSION: Degenerative changes of the lumbar spine as described  above.     ALFONSO RUIZ MD    Procedure:  After getting informed consent, patient was brought into the procedure suite and was placed in a prone position on the procedure table.   A Pause for the Cause was performed.  Patient was prepped and draped in sterile fashion.     Under AP fluoroscopic guidance the L4-5, L5-S1 facet joints on the left side were identified, and the C-arm was rotated obliquely to the affected side to open the joint space. A total of 2 ml of 1% lidocaine was injected at the needle entry point and needle tract. Then a 25 gauge 3.5 inch quincke type spinal needle was inserted and advanced under fluoroscopic guidance targeting the superior articular pillar of each joint. Once the needle made a contact with SAP, it was rotated and was then advanced into the joint.    AP fluoroscopic views were obtained to confirm the needle placement. Then,  Omnipaque 300 contrast dye was injected after negative aspiration for heme and CSF in each joint, confirming appropriate placement.  A total of 1mL of Omnipaque was used and 9mL was wasted.    The injection was then accomplished using a solution containing 1.5ml of 0.5% bupivacaine mixed with 60mg of kenolog, divided between the two joints. The needles were removed..     Hemostasis was achieved, the area was cleaned, and bandaids were placed when appropriate.  The patient tolerated the procedure well, and was taken to the recovery room.    Images were saved to PACS.        Post-procedure pain score: 3/10      Assessment/Plan: Jose Moreno is a 69 year old male s/p left L4-5, L5-S1 facet injections for back pain.     1. Following today's procedure, the patient was  advised to contact the [facility] for any of the following:   Fever, chills, or night sweats   New onset of pain, numbness, or weakness   Any questions/concerns regarding the procedure  If unable to contact the Pain Center, the patient was instructed to go to a local Emergency Room for any complications.   2. The patient will receive a follow-up call in 1 week.   3. Follow-up with the referring provider in 2 weeks for post-procedure evaluation.      Jersey Richter DO  [facility]

## 2019-06-19 ENCOUNTER — OFFICE VISIT (OUTPATIENT)
Dept: FAMILY MEDICINE | Facility: CLINIC | Age: 69
End: 2019-06-19

## 2019-06-19 VITALS
SYSTOLIC BLOOD PRESSURE: 146 MMHG | TEMPERATURE: 98.3 F | OXYGEN SATURATION: 97 % | HEART RATE: 75 BPM | WEIGHT: 147.4 LBS | BODY MASS INDEX: 23.69 KG/M2 | HEIGHT: 66 IN | DIASTOLIC BLOOD PRESSURE: 78 MMHG

## 2019-06-19 DIAGNOSIS — I25.10 CORONARY ARTERY DISEASE INVOLVING NATIVE CORONARY ARTERY OF NATIVE HEART WITHOUT ANGINA PECTORIS: ICD-10-CM

## 2019-06-19 DIAGNOSIS — I10 BENIGN ESSENTIAL HYPERTENSION: Primary | ICD-10-CM

## 2019-06-19 PROCEDURE — 99213 OFFICE O/P EST LOW 20 MIN: CPT | Performed by: PHYSICIAN ASSISTANT

## 2019-06-19 RX ORDER — METOPROLOL SUCCINATE 25 MG/1
25 TABLET, EXTENDED RELEASE ORAL DAILY
Qty: 90 TABLET | Refills: 0 | Status: SHIPPED | OUTPATIENT
Start: 2019-06-19 | End: 2019-08-02

## 2019-06-19 RX ORDER — METOPROLOL SUCCINATE 50 MG/1
50 TABLET, EXTENDED RELEASE ORAL DAILY
Qty: 90 TABLET | Refills: 0 | Status: SHIPPED | OUTPATIENT
Start: 2019-06-19 | End: 2019-06-19

## 2019-06-19 ASSESSMENT — MIFFLIN-ST. JEOR: SCORE: 1358.41

## 2019-06-19 NOTE — NURSING NOTE
Morgan is here today to recheck his BP.    Pre-visit Screening:  Immunizations:  up to date  Colonoscopy:  is up to date  Mammogram: NA  Asthma Action Test/Plan:  GEOVANI  PHQ9:  GEOVANI  GAD7:  NA  Questioned patient about current smoking habits Pt. has never smoked.  Ok to leave detailed message on voice mail for today's visit only Yes, phone # [phone]

## 2019-06-19 NOTE — PROGRESS NOTES
"CC: High BPs    History:  Morgan is here today as he has been getting higher BP reading at home recently. He takes losartan 100 mg. He had been prescribed metoprolol Xr in the past, but he has not been taking this for years. He denies any chest pain, palpitations, SOB, frequent headaches, vision changes. He has been taking these consistently. Admits he has been exercising less lately, but did start biking more 2 days ago. Diet has been stable.     Does not check BP at home, but does occasionally check through pharmacy and through pain doc, and it has been systolic 150s.    Morgan is treated for hypothyroid, with TSH last checked 10/2018, and no changes to dosing of levothyroxine at that time.    PMH, MEDICATIONS, ALLERGIES, SOCIAL AND FAMILY HISTORY in [facility] and reviewed by me personally.      ROS negative other than the symptoms noted above in the HPI.        Examination   /78 (BP Location: Right arm, Patient Position: Sitting, Cuff Size: Adult Large)   Pulse 75   Temp 98.3  F (36.8  C) (Oral)   Ht 1.664 m (5' 5.5\")   Wt 66.9 kg (147 lb 6.4 oz)   SpO2 97%   BMI 24.16 kg/m         Constitutional: Sitting comfortably, in no acute distress. Vital signs noted  Eyes: pupils equal round reactive to light and accomodation, extra ocular movements intact  Neck:  no adenopathy, trachea midline and normal to palpation  Cardiovascular:  regular rate and rhythm, no murmurs, clicks, or gallops  Respiratory:  normal respiratory rate and rhythm, lungs clear to auscultation  SKIN: No jaundice/pallor/rash.   Psychiatric: mentation appears normal and affect normal/bright        A/P    ICD-10-CM    1. Benign essential hypertension I10        DISCUSSION:  BP today and recent outside BPs elevated. Recommended restart on metoprolol XR 25 mg daily, and continue same dose of losartan. Take with food. Warned of side effects, and contact me if noted. Advised him to buy home BP cuff, and check at home 1-2 times weekly. Encouraged him " to continue trying to be more active on exercise bike. Contact me if symptoms develop, or return in 3 months to recheck.       follow up visit: 3 months    [facility]

## 2019-07-08 DIAGNOSIS — I25.10 CORONARY ARTERY DISEASE INVOLVING NATIVE CORONARY ARTERY OF NATIVE HEART WITHOUT ANGINA PECTORIS: Primary | Chronic | ICD-10-CM

## 2019-07-08 DIAGNOSIS — I34.0 NON-RHEUMATIC MITRAL REGURGITATION: ICD-10-CM

## 2019-07-18 ENCOUNTER — OFFICE VISIT (OUTPATIENT)
Dept: PALLIATIVE MEDICINE | Facility: CLINIC | Age: 69
End: 2019-07-18
Payer: COMMERCIAL

## 2019-07-18 VITALS — SYSTOLIC BLOOD PRESSURE: 147 MMHG | DIASTOLIC BLOOD PRESSURE: 79 MMHG | HEART RATE: 47 BPM | OXYGEN SATURATION: 98 %

## 2019-07-18 DIAGNOSIS — M47.816 SPONDYLOSIS OF LUMBAR REGION WITHOUT MYELOPATHY OR RADICULOPATHY: Primary | ICD-10-CM

## 2019-07-18 DIAGNOSIS — M47.817 FACET ARTHROPATHY, LUMBOSACRAL: ICD-10-CM

## 2019-07-18 PROCEDURE — 99214 OFFICE O/P EST MOD 30 MIN: CPT | Performed by: PHYSICAL MEDICINE & REHABILITATION

## 2019-07-18 ASSESSMENT — PAIN SCALES - GENERAL: PAINLEVEL: MODERATE PAIN (4)

## 2019-07-18 NOTE — PATIENT INSTRUCTIONS
----------------------------------------------------------------  [facility] Number:  [phone]     Call with any questions about your care and for scheduling assistance.     Calls are returned Monday through Friday between 8 AM and 4:30 PM. We usually get back to you within 2 business days depending on the issue/request.    If we are prescribing your medications:    For opioid medication refills, call the clinic or send a [employer] message 7 days in advance.  Please include:    Name of requested medication    Name of the pharmacy.    For non-opioid medications, call your pharmacy directly to request a refill. Please allow 3-4 days to be processed.     Per [place] State Law:    All controlled substance prescriptions must be filled within 30 days of being written.      For those controlled substances allowing refills, pickup must occur within 30 days of last fill.      We believe regular attendance is key to your success in our program!      Any time you are unable to keep your appointment we ask that you call us at least 24 hours in advance to cancel.This will allow us to offer the appointment time to another patient.     Multiple missed appointments may lead to dismissal from the clinic.

## 2019-07-18 NOTE — PROGRESS NOTES
[facility]    Date of visit: 7/18/19    Chief complaint:   Chief Complaint   Patient presents with     Pain       Interval history:  Jose Moreno is a 68 year old male last seen by me on 6/17/19.      Recommendations/plan at the last visit included:  1. Physical Therapy: Referral placed to [facility] for lumbar spondylosis and facet artropathy. If insurance approves RFA without having to re-do PT, he can hold off on this.  2. Clinical Health Pain Psychologist: Coping well, defer at this time.  3. Self Care Recommendations: Gentle progressive exercise that does not increase pain - gradually increase daily walking program.  Take mini breaks - 5 minutes of mindfullness a couple times a day.   4. Diagnostic Studies: none  5. Medication Management:    1. Continue prn ibuprofen  2. Ordered methocarbamol 500mg tablets. Start at night and take daytime doses if no significant side effects.  6. Further procedures recommended:   1. Repeat lumbar facet injection ordered  2. RFA appeal will be completed  7. Follow up: 2 months in clinic    Since his last visit, Jose Moreno reports:  -He underwent left 3-4, 4-5 facet injections on 6/17/19 only mild relief in his pain.  -Had a trip to [place] and it went ok, did take the train/public transport a lot instead of walking.  -Increasing activity levels still painful in the left low back.  -Wants to try having a MBB/RFA next instead of repeating facet injections.        Pain scores:  Pain intensity on average is 7 on a scale of 0-10.     Current pain treatments:   -Ibuprofen prn  -Tylenol 650mg prn  -Methocarbamol prn    Past pain treatments:  -none  -Lumbar RFA with 6 months relief  -Lumbar facet injections with 2-3 months relief  -PT    Side Effects: no side effect    Medications:  Current Outpatient Medications   Medication Sig Dispense Refill     Acetaminophen (TYLENOL PO) Take 325-650 mg by mouth 2 times daily as needed for mild pain or  fever       aspirin EC 81 MG EC tablet Take 1 tablet (81 mg) by mouth daily 60 tablet 0     atorvastatin (LIPITOR) 40 MG tablet Take 1 tablet (40 mg) by mouth daily 90 tablet 1     cholecalciferol (VITAMIN D3) 1000 UNIT tablet Take 1 tablet (1,000 Units) by mouth daily       Cyanocobalamin (B-12) 1000 MCG TBCR Take 1,000 mcg by mouth daily       FLUOXETINE HCL PO Take 40 mg by mouth every other day (Patient taking differently than prescribed, finishing up old RX; Current RX is 20mg once daily)       levothyroxine (SYNTHROID/LEVOTHROID) 50 MCG tablet Take 1 tablet (50 mcg) by mouth daily 90 tablet 3     losartan (COZAAR) 100 MG tablet Take 1 tablet (100 mg) by mouth daily 90 tablet 3     multivitamin, therapeutic with minerals (MULTI-VITAMIN) TABS tablet Take 1 tablet by mouth daily       nitroGLYcerin (NITROSTAT) 0.4 MG sublingual tablet TAKE 1 TABLET BY MOUTH EVERY 5 MIN AS NEEDED FOR CHEST PAIN 20 tablet 0     Omega-3 Fatty Acids (FISH OIL PO) Take 1 capsule by mouth daily       omeprazole (PRILOSEC) 20 MG CR capsule TAKE ONE CAPSULE BY MOUTH ONCE DAILY (Patient taking differently: Take 20 mg by mouth daily TAKE ONE CAPSULE BY MOUTH ONCE DAILY) 90 capsule 3     oxyCODONE IR (ROXICODONE) 5 MG tablet Take 1-2 tablets (5-10 mg) by mouth every 3 hours as needed 15 tablet 0     senna-docusate (SENOKOT-S;PERICOLACE) 8.6-50 MG per tablet Take 1 tablet by mouth 2 times daily To be taken with opioid (narcotic) pain medication to prevent constipation. 60 tablet 1     traZODone (DESYREL) 100 MG tablet TAKE 1 TABLET (100 MG) BY MOUTH NIGHTLY AS NEEDED FOR SLEEP 90 tablet 0       Medical History: any changes in medical history since they were last seen? No    Review of Systems:  The 14 system ROS was reviewed from the intake questionnaire, and is positive for: Arthritis, back pain  Any bowel or bladder problems: denies new issues, chronic bladder incontinence  Mood: denies    Physical Exam:  /79   Pulse (!) 47   SpO2  98%   General: NAD, pleasant  Gait: Normal  MSK exam: Lumbar ROM is wnl in all planes. Pain with ext/rotation to the left. Pain with lumbar paraspinal palpation on the left. Strength in the lower extremities is 5/5 and symmetric.     L-spine MRI 2/9/19     L1-L2: There is a minimal circumferential disc bulge and minimal facet  arthropathy bilaterally. There is no spinal canal or neural foraminal  stenosis at this level.     L2-L3: There is a circumferential disc bulge, circumferential endplate  spurring and mild facet arthropathy bilaterally. These findings result  in mild left foraminal narrowing but no spinal canal or right  foraminal stenosis.     L3-L4: There is a circumferential disc bulge with a probable tiny  superimposed posterior central disc herniation (protrusion) and mild  facet arthropathy bilaterally. These findings result in mild spinal  canal narrowing with moderate narrowing of the right lateral recess of  the spinal canal, mild left foraminal narrowing and moderate right  foraminal stenosis.     L4-L5: There is a circumferential disc bulge with a questionable  superimposed right lateral (foraminal zone) disc herniation  (protrusion) and moderate facet arthropathy bilaterally. These  findings result in mild spinal canal narrowing, severe right foraminal  stenosis and minimal left foraminal narrowing.     L5-S1: There is a circumferential disc bulge and severe facet  arthropathy bilaterally. These findings result in moderate bilateral  neural foraminal stenosis and mild spinal canal narrowing.                                                                      IMPRESSION: Degenerative changes of the lumbar spine as described  above.       Assessment:  Mr. Moreno is a 68 year old with past medical history including: CAD (nstemi, s/p PTCA), HTN, mitral valve repair, mitral regurgitation, HLD, Hypothyroidism, GERD, Prostate cancer (s/p prostatectomy in 2018) who presents for follow up of chronic low  back pain.    He has had several facet injections (3 months relief initially but last 2 rounds of injections with 4-6 weeks of relief unfortunately) and radiofrequency ablations (6 months relief) but improvement in his back pain and increased activity tolerance when injections are performed. At this point he would like to try MBB/RFA again. I have submitted an appeal to proceed with RFA without doing 4 sessions of PT again, will check on the status of this.    Plan:  The following recommendations were given to the patient. Diagnosis, treatment options, risks, benefits, and alternatives were discussed, and all questions were answered. The patient expressed understanding of the plan for management.     I am recommending a multidisciplinary treatment plan to help this patient better manage his pain.  This includes:     1. Physical Therapy: Referral placed to [facility] for lumbar spondylosis and facet artropathy. Did recommend that he do this but If insurance approves RFA without having to re-do PT, he can hold off on this.  2. Clinical Health Pain Psychologist: Coping well, defer at this time.  3. Self Care Recommendations: Gentle progressive exercise that does not increase pain - gradually increase daily walking program.  Take mini breaks - 5 minutes of mindfullness a couple times a day.   4. Diagnostic Studies: none  5. Medication Management:    6. Continue prn ibuprofen  7. Continue methocarbamol 500mg TID prn.  8. Further procedures recommended:   1. Lumbar MBB/RFA ordered  9. Follow up: 1 month post procedure in clinic        I spent 30 minutes of time face to face with the patient.  Greater than 50% of this time was spent in patient counseling and/or coordination of care.      Jersey Richter,   [facility]  7/18/19

## 2019-07-19 ENCOUNTER — TELEPHONE (OUTPATIENT)
Dept: PALLIATIVE MEDICINE | Facility: CLINIC | Age: 69
End: 2019-07-19

## 2019-07-19 NOTE — TELEPHONE ENCOUNTER
Pre-screening questions for MBB Injections:    Injection to be done at which interventional clinic site? [facility]     Instruct patient to arrive as directed prior to the scheduled appointment time:    [place] [facility] [place]: 30 minutes before        Procedure ordered by Dr. Richter    Procedure ordered? Lumbar Medial Branch Block    What insurance would patient like us to bill for this procedure? [employer]      [employer]'s comp- Any injection DO NOT SCHEDULE and route to [place].      [employer] insurance - If scheduling an SI joint injection DO NOT SCHEDULE and route to [place].          MBB's must be scheduled at LEAST two weeks apart for insurance purposes       [employer] - Any injection besides hip/shoulder/knee joint DO NOT SCHEDULE and route to [place]. She will obtain PA and call pt back to schedule procedure or notify pt of denial.        [employer]- PA required for all MBB's    Any chance of pregnancy? NO   If YES, do NOT schedule and route to RN pool    Is an  needed? No     Patient has a drive home? (mandatory) Yes     Is patient taking any blood thinners (plavix, coumadin, jantoven, warfarin, heparin, pradaxa or dabigatran )? No    If hold needed, do NOT schedule, route to RN pool     Is patient taking any aspirin products? Yes - Pt takes 81mg daily; instructed to hold 0 day(s) prior to procedure.      If more than 325mg/day do NOT schedule; route to RN pool     For CERVICAL procedures, hold all aspirin products for 6 days.      Does the patient have a bleeding or clotting disorder? No    If YES, okay to schedule AND route to RN nurse pool    **For any patients with platelet count <100, must be forwarded to provider**    Is patient diabetic? No If YES, have them bring their glucometer.    Does patient have an active infection or treated for one within the past week? No    Is patient currently taking any antibiotics?  No    For patients on chronic, preventative, or prophylacti  antibiotics, procedures can be scheduled.     For patients on antibiotics for active or recent infection:    [facility] Chen [place], [place], [place]-antibiotic course must have been completed for 4 days     Is patient currently taking any steroid medications? (i.e. Prednisone, Medrol)  No     For patients on steroid medications:    Aylin Chen [place], [place]-steroid course must have been completed for 4 days    Review with patient:  If you are started on any steroids or antibiotics between now and your appointment, you must contact us because it may affect our ability to perform your procedure YES    Is patient actively being treated for cancer or immunocompromised? No   If YES, do NOT schedule and route to RN    Are you able to get on and off an exam table with minimal or no assistance? Yes   If NO, do NOT schedule and route to RN    Are you able to roll over and lay on your stomach with minimal or no assistance? Yes   If NO, do NOT schedule and route to RN    Any allergies to contrast dye, iodine, shellfish, or numbing and steroid medications? No  (If so, inform nursing and note in scheduling comments.)    Allergies: Ace inhibitors and No clinical screening - see comments     Has the patient had a flu shot or any other vaccinations within 7 days before or after the procedure.  No     Does patient have an MRI/CT?  Not Applicable  (SI joint, hip injections, lumbar sympathetic blocks, and stellate ganglion blocks do not require an MRI)    Was the MRI done w/in the last 3 years?  NA    Was MRI done at [place]? No      If not, where was it done? N/A       If MRI was not done at [place], [facility] or [facility] do NOT schedule and route to nursing.  If pt has an imaging disc, the injection may be scheduled but pt has to bring disc to appt. If they show up w/out disc the injection cannot be done    **Must be scheduled with elapsed time interval of at least 2 weeks and not more than 6 months  between the First MBB and the Second MBB**       Medial Branch Block Pre-Procedure Instructions    It is okay to take long acting pain medications (if you are on them) the day of the procedure but try not to take any short acting medications unless absolutely necessary. YES        Long acting meds would include: Gabapentin (Neurontin), MS Contin, Oxycontin        Short acting meds would include:  Percocet, Oxycodone, Vicodin, Ibuprofen     The day of the procedure, you should try to do things that provoke your pain, since the injection is being done to see if it will relieve your pain . ok    If your pain level is a 4 out of 10 or less on the day of the procedure, please call [phone] to reschedule.  ok  Reminders (please tell patient if applicable):      Instructed pt to arrive 30 minutes early for IV start if this is for a cervical procedure, ALL sympathetic (stellate ganglion, hypogastric, or lumbar sympathetic block) and all sedation procedures (RFA, spinal cord stimulation trials). n/a        -IVs are not routinely placed for Dr. Cortez cervical cases        -Dr. Loyola: no IV needed for CMBB       If NPO for sedation, it is okay to take medications with sips of water (except if they are to hold blood thinners).    *DO take blood pressure medication if it is prescribed*      If this is for a cervical MBB aspirin needs to be held for 6 days.           Do not schedule procedures requiring IV placement in the first appointment of the day or first appointment after lunch. Do NOT schedule at 0745, 0815 or 1245.  ok          For patients 85 or older we recommend having an adult stay w/ them for the remainder of the day.      Does the patient have any questions? no

## 2019-07-22 ENCOUNTER — RADIOLOGY INJECTION OFFICE VISIT (OUTPATIENT)
Dept: PALLIATIVE MEDICINE | Facility: CLINIC | Age: 69
End: 2019-07-22
Attending: PHYSICAL MEDICINE & REHABILITATION
Payer: COMMERCIAL

## 2019-07-22 ENCOUNTER — ANCILLARY PROCEDURE (OUTPATIENT)
Dept: GENERAL RADIOLOGY | Facility: CLINIC | Age: 69
End: 2019-07-22
Attending: PHYSICAL MEDICINE & REHABILITATION
Payer: COMMERCIAL

## 2019-07-22 VITALS — SYSTOLIC BLOOD PRESSURE: 146 MMHG | OXYGEN SATURATION: 98 % | HEART RATE: 60 BPM | DIASTOLIC BLOOD PRESSURE: 94 MMHG

## 2019-07-22 DIAGNOSIS — M47.816 FACET ARTHROPATHY, LUMBAR: ICD-10-CM

## 2019-07-22 NOTE — PATIENT INSTRUCTIONS
[facility]   Medial Branch Block Discharge Instructions      Your procedure was performed by:  Dr. Jersey Richter       Medications used: Bupivicaine    You will need to complete the Pain Scale Log form and return it to us as soon as possible.  Once we have received the form, we will review it and call you to determine the next steps.     The form can be faxed to [phone] or mailed to:   [facility] - [facility]    [id number] [place] [address], [address] [id number]      You may resume your regular activity after the injection.    If you were holding your blood thinning medication, please restart taking it: N/A    Be cautious with walking since you may have numbness and/or weakness in the lower extremities for up to 6-8 hours after the procedure due to the effects of the local anesthetic.    Avoid driving for 6 hours. The local anesthetic could slow your reflexes    You may shower, however no swimming or tub baths or hot tubs for 24 hours following your procedure.    Your pain will return after the numbing medications have worn off.  You may use your current pain medications as needed.    Unless you have been directed to avoid the use of anti-inflammatory medications (NSAIDS), you may use medications such as ibuprofen, Aleve or Tylenol for pain control if needed. Some people find it helpful to alternate ibuprofen and Tylenol every 3 hours for a couple of days.    You may use ice packs 10-15 minutes three to four times a day at the injection site for comfort.     Do not use heat to painful areas for 6 to 8 hours. This will give the local anesthetic time to wear off and prevent you from accidentally burning your skin.     If you experience any of the following, call the Pain Clinic during work hours at [phone] or the Provider Line after hours at [phone]:  -Fever over 100 degree F  -Swelling, bleeding, redness, drainage, warmth at the  injection site  -Progressive weakness or numbness on your legs   -If lumbar, call if you have a loss of bowel or bladder function  -If cervical, call if you have any unusual headache that is not relieved by Tylenol  -Unusual new onset of pain that is not improving

## 2019-07-22 NOTE — PROGRESS NOTES
[facility] - Procedure Note    Date of Service: 7/22/2019    Procedure performed: Left Lumbar 3,4,5 medial branch blocks  Diagnosis: Lumbar spondylosis; Lumbar facet arthropathy  :  Jersey Richter DO     Indications: Jose Moreno is a 69 year old male who is seen for left lumbar medial branch blocks. The patient describes pain with extension/rotation. The patient reports minimal improvement with conservative treatment, including oral medications and PT. He had a lumbar MBB/RFA in the past with significant relief for about 6 months. He has also had lumbar 4/5, L5/S1 facet injections with declining benefit over the past 2-3 years.    MRI was done on 2/9/19 which showed   FINDINGS: There are five lumbar-type vertebrae for the purposes of  this dictation.      There is normal alignment of the lumbar vertebrae; however, there is  sigmoid curvature of the lumbar spine with right curvature centered at  the L1-L2 level and left curvature centered at the L4-L5 level.  Vertebral body heights of the lumbar spine are normal. There is  reactive, degenerative Type II marrow signal change in the opposing  endplates at the L2-L3 level and reactive, degenerative Type I marrow  signal change in the opposing endplates at the L4-L5 level. Marrow  signal throughout the lumbar vertebrae is otherwise normal. There is  no evidence for fracture or pathologic bony lesion of the lumbar  spine.     There is posterior disc bulging/herniation to varying degrees at all  levels of the lumbar spine.     The tip of the conus medullaris is at the mid L2 level which is within  normal limits. There is no evidence for intrathecal abnormality.      Level by level:      L1-L2: There is a minimal circumferential disc bulge and minimal facet  arthropathy bilaterally. There is no spinal canal or neural foraminal  stenosis at this level.     L2-L3: There is a circumferential disc bulge, circumferential endplate  spurring and  mild facet arthropathy bilaterally. These findings result  in mild left foraminal narrowing but no spinal canal or right  foraminal stenosis.     L3-L4: There is a circumferential disc bulge with a probable tiny  superimposed posterior central disc herniation (protrusion) and mild  facet arthropathy bilaterally. These findings result in mild spinal  canal narrowing with moderate narrowing of the right lateral recess of  the spinal canal, mild left foraminal narrowing and moderate right  foraminal stenosis.     L4-L5: There is a circumferential disc bulge with a questionable  superimposed right lateral (foraminal zone) disc herniation  (protrusion) and moderate facet arthropathy bilaterally. These  findings result in mild spinal canal narrowing, severe right foraminal  stenosis and minimal left foraminal narrowing.     L5-S1: There is a circumferential disc bulge and severe facet  arthropathy bilaterally. These findings result in moderate bilateral  neural foraminal stenosis and mild spinal canal narrowing.                                                                      IMPRESSION: Degenerative changes of the lumbar spine as described  above.     ALFONSO RUIZ MD    Allergies:      Allergies   Allergen Reactions     Ace Inhibitors Cough     Dry cough     No Clinical Screening - See Comments      PN: LW FI1: NKA        Vitals:  BP (!) 146/94   Pulse 60   SpO2 98%     Review of Systems: The patient denies recent fever, chills, illness, use of antibiotics or anticoagulants. All other 10-point review of systems negative.     Procedure:   Options/alternatives, benefits and risks were discussed with the patient including bleeding, infection, tissue trauma, exposure to radiation, reaction to medications, spinal cord injury, weakness, numbness and paralysis.  Questions were answered to his satisfaction and he agrees to proceed. Voluntary informed consent was obtained and signed.     After getting informed consent,  patient was brought into the procedure suite and was placed in a prone position on the procedure table.   A Pause for the Cause was performed.  Patient was prepped and draped in sterile fashion.     Under AP fluoroscopic guidance the L3, L4, L5 vertebral bodies were identified. The C-arm was rotated to the oblique view to afford optimal visualization the pedicles.  Lidocaine 1% was used to anesthetize the skin at each level.  Under intermittent fluoroscopy, 22 gauge 5 inch Quinke spinal needles were positioned inferior and lateral to the intersection of the transverse process and pedicle at the Left L4 & L5 levels, as well as the corresponding sacral alar notch. The needle positions were verified and optimized from the AP and lateral views.    The anatomic targets for the L3 & L4 medial nerve and L5 dorsal ramus (which functionally incorporates the medial branch) were the  L4 & L5 transverse processes and sacral alar notch, with laterality as described above, resulting in blockade of the L4/5 and L5/S1 facet joints.    After negative aspiration, 1cc's of Omnipaque 300 was injected to rule out intravascular injection.  9cc's of omnipaque 300 was wasted.  Bupivacaine 0.5% 0.5 ml was injected at each location. The needles were removed. Hemostasis was achieved, the area was cleaned, and bandaids were placed when appropriate. The patient tolerated the procedure well, and was taken to the recovery room. Images were saved to PACS.      Pre procecedure pain score: 5/10   Post procedure pain score: 5/10.     Assessment/Plan: Jose Moreno is a 69 year old male s/p left lumbar medial branch block today for lumbar spondylosis, facet arthropathy.         1. The patient will continue to monitor progress, and they were given a pain diary to complete at home.  They will either fax or mail this back to us or bring it to their next appointment. We will determine the treatment plan after we review the diary.    2. The patient was  advised to contact the [facility] for any of the following:   Fever, chills, or night sweats   New onset of pain, numbness, or weakness   Any questions/concerns regarding the procedure  If unable to contact the Pain Center, the patient was instructed to go to a local Emergency Room for any complications.   3. The patient will receive a follow-up call in 1 week.  4. We will await the pain diary to determine the next step of the treatment plan and call the patient to schedule.        Jersey Richter DO  [facility]

## 2019-07-22 NOTE — NURSING NOTE
Discharge Information    IV Discontiued Time:  NA    Amount of Fluid Infused:  NA    Discharge Criteria = When patient returns to baseline or as per MD order    Consciousness:  Pt is fully awake    Circulation:  BP +/- 20% of pre-procedure level    Respiration:  Patient is able to breathe deeply    O2 Sat:  Patient is able to maintain O2 Sat >92% on room air    Activity:  Moves 4 extremities on command    Ambulation:  Patient is able to stand and walk or stand and pivot into wheelchair    Dressing:  Clean/dry or No Dressing    Notes:   Discharge instructions and AVS given to patient    Patient meets criteria for discharge?  YES    Admitted to PCU?  No    Responsible adult present to accompany patient home?  Yes    Signature/Title:    Naomi Carrera RN Care Coordinator  [facility]

## 2019-07-22 NOTE — Clinical Note
Mr. Moreno has had lumbar MBB/RFA in the past, 2018/2017 I think. Do we have to do 2 MBBs at this point? Also we appealed to insurance about doing PT, can you check on the status of that?Thanks,Jarrett

## 2019-07-24 ENCOUNTER — TELEPHONE (OUTPATIENT)
Dept: PALLIATIVE MEDICINE | Facility: CLINIC | Age: 69
End: 2019-07-24

## 2019-07-24 NOTE — TELEPHONE ENCOUNTER
[facility]- RFA REQUIREMENTS    Repeat Radiofrequency ablative denervation (RFA) at the same level is covered when the following criteria are met:  A. A minimum of six months has elapsed since the initial RFA; and  B. The initial RFA relieved at least 50% of the pain within 3 months of the procedure date as reported by the patient; and  C. Severe pain limiting activities of daily living for at least 3 months despite conservative treatments (structured exercise, physical therapy, activity modification, pharmacological management), - these must be documented on the request for prior authorization;      Conservative therapy must include physical therapy (PT) and may include activity modification, weight loss, and drug therapy. Documentation must correspond to the current episode of pain (within 6 months).      Formal physical therapy, at least four visits over a six week course, including active muscle conditioning is required, or there must be an explicit statement in the clinical documents that explains why such physical therapy is contraindicated. The requirement for physical therapy will not be met if there is a failure to initiate or complete prescribed physical therapy for non-clinical reasons. Documentation of formal physical therapy would be the therapist s notes. If a patient is unable to complete physical therapy (PT) due to progressively, worsening pain and disability, the case will be reviewed on an individual basis by an internal physician reviewer. Documentation in the physical therapist s notes demonstrating this must be submitted      PA pending additional information -  The medical policy has changed. Please review and advise.        Ursula HUFFMAN    [facility]

## 2019-07-26 ENCOUNTER — THERAPY VISIT (OUTPATIENT)
Dept: PHYSICAL THERAPY | Facility: CLINIC | Age: 69
End: 2019-07-26
Attending: PHYSICAL MEDICINE & REHABILITATION
Payer: COMMERCIAL

## 2019-07-26 DIAGNOSIS — M47.816 SPONDYLOSIS OF LUMBAR REGION WITHOUT MYELOPATHY OR RADICULOPATHY: ICD-10-CM

## 2019-07-26 PROCEDURE — 97110 THERAPEUTIC EXERCISES: CPT | Mod: GP | Performed by: PHYSICAL THERAPIST

## 2019-07-26 PROCEDURE — 97161 PT EVAL LOW COMPLEX 20 MIN: CPT | Mod: GP | Performed by: PHYSICAL THERAPIST

## 2019-07-26 NOTE — PROGRESS NOTES
[facility] Initial Evaluation  Subjective:  HPI                    Objective:  System    Physical Exam    General     ROS    Assessment/Plan:    {REHAB NOTES:902676}

## 2019-07-26 NOTE — PROGRESS NOTES
[facility] Initial Evaluation  Subjective:  History of left lumbar pain for 10 years secondary to degenerative changes in the spine. Previous treatment has consisted of physical therapy, chiropractic and injections. Most recent injections were on 6-17-19 which gave some relief. Pt referred by MD for physical therapy on 7-18-19    The history is provided by the patient. No  was used.   Type of problem:  Lumbar   Condition occurred with:  Degenerative joint disease. This is a chronic condition    Patient reports pain:  Lumbar spine left. Radiates to:  No radiation. Associated symptoms:  Loss of motion/stiffness and loss of strength. Symptoms are exacerbated by standing, lifting and walking and relieved by NSAID's and rest.                      Objective:  Standing Alignment:        Lumbar deviations alignment: increased lumbar lordosis.                Flexibility/Screens:       Lower Extremity:  Decreased left lower extremity flexibility:Hamstrings    Decreased right lower extremity flexibility:  Hamstrings               Lumbar/SI Evaluation  ROM:    AROM Lumbar:   Flexion:            Moderate loss  Ext:                    Moderate loss   Side Bend:        Left:  Minimal loss     Right:  Minimal loss  Rotation:           Left:  Moderate loss     Right:  Moderate loss  Side Glide:        Left:     Right:         Strength: weak lower abdominals and pelvic stabilizers  Lumbar Myotomes:  normal            Lumbar DTR's:  normal        Lumbar Dermtomes:  normal                Neural Tension/Mobility:      Left side:SLR or SLR w/DF  negative.     Right side:   SLR w/DF or SLR  negative.   Lumbar Palpation:    Tenderness present at Left:    Erector Spinae and Vertebral                                                       General     ROS    Assessment/Plan:    Patient is a 69 year old male with lumbar complaints.    Patient has the following significant findings with corresponding  treatment plan.                Diagnosis 1:  Lumbar pain  Pain -  hot/cold therapy, self management, education and home program  Decreased ROM/flexibility - therapeutic exercise, therapeutic activity and home program  Decreased strength - therapeutic exercise, therapeutic activities and home program    Therapy Evaluation Codes:   1) History comprised of:   Personal factors that impact the plan of care:      Time since onset of symptoms.    Comorbidity factors that impact the plan of care are:      Cancer, High blood pressure, Weakness and thyroid.     Medications impacting care: Anti-depressant, High blood pressure, Pain and thyroid.  2) Examination of Body Systems comprised of:   Body structures and functions that impact the plan of care:      Lumbar spine.   Activity limitations that impact the plan of care are:      Lifting, Standing and Walking.  3) Clinical presentation characteristics are:   Stable/Uncomplicated.  4) Decision-Making    Low complexity using standardized patient assessment instrument and/or measureable assessment of functional outcome.  Cumulative Therapy Evaluation is: Low complexity.    Previous and current functional limitations:  (See Goal Flow Sheet for this information)    Short term and Long term goals: (See Goal Flow Sheet for this information)     Communication ability:  Patient appears to be able to clearly communicate and understand verbal and written communication and follow directions correctly.  Treatment Explanation - The following has been discussed with the patient:   RX ordered/plan of care  Anticipated outcomes  Possible risks and side effects  This patient would benefit from PT intervention to resume normal activities.   Rehab potential is good.    Frequency:  1 X week, once daily  Duration:  for 6 weeks  Discharge Plan:  Achieve all LTG.  Independent in home treatment program.  Reach maximal therapeutic benefit.    Please refer to the daily flowsheet for treatment today,  total treatment time and time spent performing 1:1 timed codes.

## 2019-07-26 NOTE — TELEPHONE ENCOUNTER
Called patient. JAYE to call back.     Patient states the he had 90% pain relief within a week of previous RFA, about 2 years ago.     He began physical therapy today for his back. He will complete at least 4 visits within the 6 week time frame needed. Advised that I will monitor his chart for the 4 visits and we will for PA.    Kaita MOSQUERA, RN Care Coordinator  [facility]

## 2019-07-31 ENCOUNTER — HOSPITAL ENCOUNTER (OUTPATIENT)
Dept: CARDIOLOGY | Facility: CLINIC | Age: 69
Discharge: HOME | End: 2019-07-31
Attending: INTERNAL MEDICINE | Admitting: INTERNAL MEDICINE
Payer: COMMERCIAL

## 2019-07-31 DIAGNOSIS — I34.0 NON-RHEUMATIC MITRAL REGURGITATION: ICD-10-CM

## 2019-07-31 DIAGNOSIS — I25.10 CORONARY ARTERY DISEASE INVOLVING NATIVE CORONARY ARTERY OF NATIVE HEART WITHOUT ANGINA PECTORIS: Chronic | ICD-10-CM

## 2019-07-31 PROCEDURE — 93306 TTE W/DOPPLER COMPLETE: CPT

## 2019-07-31 PROCEDURE — 93306 TTE W/DOPPLER COMPLETE: CPT | Mod: 26 | Performed by: INTERNAL MEDICINE

## 2019-08-02 ENCOUNTER — OFFICE VISIT (OUTPATIENT)
Dept: CARDIOLOGY | Facility: CLINIC | Age: 69
End: 2019-08-02
Payer: COMMERCIAL

## 2019-08-02 ENCOUNTER — THERAPY VISIT (OUTPATIENT)
Dept: PHYSICAL THERAPY | Facility: CLINIC | Age: 69
End: 2019-08-02
Payer: COMMERCIAL

## 2019-08-02 VITALS
HEART RATE: 53 BPM | WEIGHT: 146 LBS | SYSTOLIC BLOOD PRESSURE: 130 MMHG | DIASTOLIC BLOOD PRESSURE: 72 MMHG | BODY MASS INDEX: 23.46 KG/M2 | OXYGEN SATURATION: 96 % | HEIGHT: 66 IN

## 2019-08-02 DIAGNOSIS — I25.2 HISTORY OF NON-ST ELEVATION MYOCARDIAL INFARCTION (NSTEMI): ICD-10-CM

## 2019-08-02 DIAGNOSIS — I24.9 ACS (ACUTE CORONARY SYNDROME) (H): ICD-10-CM

## 2019-08-02 DIAGNOSIS — I34.0 NON-RHEUMATIC MITRAL REGURGITATION: ICD-10-CM

## 2019-08-02 DIAGNOSIS — I10 ESSENTIAL HYPERTENSION, BENIGN: ICD-10-CM

## 2019-08-02 DIAGNOSIS — G89.29 CHRONIC LEFT-SIDED LOW BACK PAIN WITHOUT SCIATICA: Primary | ICD-10-CM

## 2019-08-02 DIAGNOSIS — M54.50 CHRONIC LEFT-SIDED LOW BACK PAIN WITHOUT SCIATICA: Primary | ICD-10-CM

## 2019-08-02 DIAGNOSIS — I10 BENIGN ESSENTIAL HYPERTENSION: ICD-10-CM

## 2019-08-02 DIAGNOSIS — E78.2 MIXED HYPERLIPIDEMIA: ICD-10-CM

## 2019-08-02 DIAGNOSIS — I25.10 CORONARY ARTERY DISEASE INVOLVING NATIVE CORONARY ARTERY OF NATIVE HEART WITHOUT ANGINA PECTORIS: Chronic | ICD-10-CM

## 2019-08-02 DIAGNOSIS — M47.817 LUMBOSACRAL SPONDYLOSIS WITHOUT MYELOPATHY: ICD-10-CM

## 2019-08-02 LAB
ALT SERPL W P-5'-P-CCNC: 36 U/L (ref 0–70)
ANION GAP SERPL CALCULATED.3IONS-SCNC: 5 MMOL/L (ref 3–14)
BUN SERPL-MCNC: 14 MG/DL (ref 7–30)
CALCIUM SERPL-MCNC: 9 MG/DL (ref 8.5–10.1)
CHLORIDE SERPL-SCNC: 106 MMOL/L (ref 94–109)
CHOLEST SERPL-MCNC: 207 MG/DL
CO2 SERPL-SCNC: 28 MMOL/L (ref 20–32)
CREAT SERPL-MCNC: 0.82 MG/DL (ref 0.66–1.25)
GFR SERPL CREATININE-BSD FRML MDRD: 89 ML/MIN/{1.73_M2}
GLUCOSE SERPL-MCNC: 103 MG/DL (ref 70–99)
HDLC SERPL-MCNC: 64 MG/DL
LDLC SERPL CALC-MCNC: 114 MG/DL
NONHDLC SERPL-MCNC: 143 MG/DL
POTASSIUM SERPL-SCNC: 4.2 MMOL/L (ref 3.4–5.3)
SODIUM SERPL-SCNC: 139 MMOL/L (ref 133–144)
TRIGL SERPL-MCNC: 147 MG/DL

## 2019-08-02 PROCEDURE — 80061 LIPID PANEL: CPT | Performed by: INTERNAL MEDICINE

## 2019-08-02 PROCEDURE — 80048 BASIC METABOLIC PNL TOTAL CA: CPT | Performed by: INTERNAL MEDICINE

## 2019-08-02 PROCEDURE — 99214 OFFICE O/P EST MOD 30 MIN: CPT | Performed by: PHYSICIAN ASSISTANT

## 2019-08-02 PROCEDURE — 84460 ALANINE AMINO (ALT) (SGPT): CPT | Performed by: INTERNAL MEDICINE

## 2019-08-02 PROCEDURE — 97110 THERAPEUTIC EXERCISES: CPT | Mod: GP | Performed by: PHYSICAL THERAPY ASSISTANT

## 2019-08-02 RX ORDER — ATORVASTATIN CALCIUM 40 MG/1
40 TABLET, FILM COATED ORAL DAILY
Qty: 90 TABLET | Refills: 3 | Status: SHIPPED | OUTPATIENT
Start: 2019-08-02 | End: 2020-06-26

## 2019-08-02 RX ORDER — METOPROLOL SUCCINATE 25 MG/1
25 TABLET, EXTENDED RELEASE ORAL DAILY
Qty: 90 TABLET | Refills: 3 | Status: SHIPPED | OUTPATIENT
Start: 2019-08-02 | End: 2020-06-09

## 2019-08-02 RX ORDER — LOSARTAN POTASSIUM 100 MG/1
100 TABLET ORAL DAILY
Qty: 90 TABLET | Refills: 3 | Status: SHIPPED | OUTPATIENT
Start: 2019-08-02 | End: 2019-10-16 | Stop reason: ALTCHOICE

## 2019-08-02 RX ORDER — NITROGLYCERIN 0.4 MG/1
TABLET SUBLINGUAL
Qty: 25 TABLET | Refills: 1 | Status: SHIPPED | OUTPATIENT
Start: 2019-08-02 | End: 2020-02-27

## 2019-08-02 ASSESSMENT — MIFFLIN-ST. JEOR: SCORE: 1352.06

## 2019-08-02 NOTE — PROGRESS NOTES
"CARDIOLOGY CLINIC PROGRESS NOTE    DOS: 2019      Jose Moreno  : 1950, 69 year old  MRN: [id number]      History:  I am following up with Jose Moreno today in the cardiology clinic.  He follows with Dr. Alejandra.     Mrogan is a pleasant 69-year-old gentleman with past medical history significant for a robotic mitral valve repair at the [facility] in  for mitral valve prolapse and severe mitral regurgitation.  Also CAD, HTN, dyslipidemia, rare and brief runs of SVT.      Preoperative coronary angiography  demonstrated only a 50% mid LAD stenosis.       2015 Morgan presented with a NSTEMI. He was brought to the cath lab where he was found to have what appeared to be a chronically occluded right coronary artery that could not be crossed with a wire.  He subsequently was transferred to [facility], underwent complex LAD diagonal intervention with drug-eluting stents placed in both vessels.  He tolerated the procedure quite well.       He was subsequently seen in our clinic because he woke up with a \"spell.\"  He was sent back to his primary care doctor for neurologic evaluation.  Subsequent echocardiogram 2/15/16 demonstrated a structurally normal heart.  His valve repair appeared to be functioning appropriately.  He has a normal ejection fraction.  A ZIO Patch showed some brief runs of SVT lasting 4 beats, but no atrial fibrillation.  Stress nuclear scan appeared to be consistent with a small to moderate size reversible inferior, inferoseptal and inferolateral defect consistent with his known anatomy.       In followup, he appeared to be doing well from a cardiac standpoint without exertional chest, arm, neck, jaw or shoulder discomfort.  He did have a low-grade discomfort that was there all the time, did not change with exercise and we felt this was not cardiac in origin.  Options were discussed, including medical management versus coronary artery bypass grafting versus a "  and it was decided to continue with medical management.  He was having problems with fatigue, tiredness, cold hands, cold feet and we ultimately discontinued his metoprolol, of which he was only on 12.5 mg at that time.       Morgan saw Dr. Alejandra 7/10/18.  He had some more fatigue with walking.  Dr. Alejandra ordered a stress echo.  This was done 7/18/18.  He exercised to an acceptable level.  Heart rate response was normal.  BP was a little high.  LVEF at rest was 55-60%.  There was no evidence of stress-induced ischemia.  Dr. Alejandra recommended he increase losartan from 50 mg to 100 mg.     I met Morgan 8/17/18.  His BP was better controlled.      9/5/18 saw urology, new diagnosis of high-grade prostate cancer.  11/1/18 s/p prostatectomy for prostate cancer.     6/19/19 saw PCP.  BP was 146/78.  He was started on Toprol XL 25 mg.      He presents today for annual follow up.   BP is improved.  He is tolerating the Toprol XL.   No chest pain.  No FAUSTIN.  No palpitations.    He is exercising.  He walks and rides bike (outdoor in summer and indoor in winter).    He was not fasting this morning for his lipid panel.  Though he was not fasting, the LDL had been trending up. He also notes he eats a bit more ice cream than he was.   He is having fairly severe urinary incontinence and is considering artificial urinary sphincter.      ROS:  Skin:  Negative     Eyes:  Positive for glasses  ENT:  Negative    Respiratory:  Negative    Cardiovascular:  Negative    Gastroenterology: Positive for reflux  Genitourinary:  Positive for prostate problem;incontinence  Musculoskeletal:  Positive for back pain;arthritis  Neurologic:  Negative    Psychiatric:  Negative    Heme/Lymph/Imm:  Negative    Endocrine:  Positive for thyroid disorder    PAST MEDICAL HISTORY:  Past Medical History:   Diagnosis Date     ADHD (attention deficit hyperactivity disorder)      CAD (coronary artery disease)     cardiac cath 1/23/15: SHERRY to 1st  diagonal, SHERRY x2 to LAD, cath 2009: no intervention     Esophageal reflux      History of hyperthyroidism 4/9/2014     Hyperlipidemia      Hypertension      Mitral regurgitation 2009    moderately severe MVP, s/p robotic repair at [place]     Prostate cancer      Pulmonary nodules 2009    CT-recommend repeat in 6-12 months     Rotator cuff rupture 11/3/2011       PAST SURGICAL HISTORY:  Past Surgical History:   Procedure Laterality Date     COLONOSCOPY  7/00    GI     DAVINCI PROSTATECTOMY, LYMPHADENECTOMY N/A 11/1/2018    Procedure: ROBOTIC ASSISTED RADICAL PROSTATECTOMY WITH BILATERAL PELVIC LYMPH NODE DISSECTION;  Surgeon: Clint Blankenship MD;  Location: [facility] OR     DECOMPRESSION LUMBAR MINIMALLY INVASIVE ONE LEVEL  1/11/2012    Procedure:DECOMPRESSION LUMBAR MINIMALLY INVASIVE ONE LEVEL; L4-5 Decompression Minimally Invasive; Surgeon:MESSI HORAN; Location:[facility] OR     HEART CATH RIGHT AND LEFT HEART CATH  01-23-15    See report, pt transfered to [facility] for complex bifurcation PCI of LAD diagonal vessel.      HEART CATH RIGHT AND LEFT HEART CATH  01-26-15    PTCA and implantation of SHERRY to 1st diagonal, SHERRY to mid LAD, SHERRY to proximal LAD     Hx of rotator cuff rupture and repair       LAPAROSCOPIC PROSTATECTOMY       REPAIR VALVE MITRAL  2009    [facility] robotic repair      Reversal of vasectomy       VASECTOMY       XR LUMBAR RADIOFREQ ABLATION UNILATERAL  01/11/2018    lumbar area/ ? level       SOCIAL HISTORY:  Social History     Social History     Marital status:      Spouse name: Chastity     Number of children: 4     Years of education: 14     Occupational History      [employer]     RETIRED     Social History Main Topics     Smoking status: Never Smoker     Smokeless tobacco: Never Used     Alcohol use 4.2 oz/week     7 Standard drinks or equivalent per week      Comment: 1 beer daily     Drug use: No     Sexual activity: Yes     Partners: Female     Birth control/ protection:  Condom, Post-menopausal     Other Topics Concern     Caffeine Concern No     2-3 daily     Sleep Concern No     Special Diet No     low sodium     Exercise Yes     walking daily     Seat Belt Yes     Self-Exams No     Social History Narrative       FAMILY HISTORY:  Family History   Problem Relation Age of Onset     Cancer Mother         breast, lung     Depression Father         alcohlism     Diabetes No family hx of      Cardiovascular No family hx of      Cancer - colorectal No family hx of      Prostate Cancer No family hx of        MEDS:   Current Outpatient Medications on File Prior to Visit:  Acetaminophen (TYLENOL PO) Take 325-650 mg by mouth 2 times daily as needed for mild pain or fever   aspirin EC 81 MG EC tablet Take 1 tablet (81 mg) by mouth daily   cholecalciferol (VITAMIN D3) 1000 UNIT tablet Take 1 tablet (1,000 Units) by mouth daily   Cyanocobalamin (B-12) 1000 MCG TBCR Take 1,000 mcg by mouth daily   FLUoxetine (PROZAC) 20 MG capsule Take 1 capsule (20 mg) by mouth daily   levothyroxine (SYNTHROID/LEVOTHROID) 50 MCG tablet Take 1 tablet (50 mcg) by mouth daily   methocarbamol (ROBAXIN) 500 MG tablet Take 1 tablet (500 mg) by mouth 3 times daily as needed for muscle spasms   multivitamin, therapeutic with minerals (MULTI-VITAMIN) TABS tablet Take 1 tablet by mouth daily   Omega-3 Fatty Acids (FISH OIL PO) Take 1 capsule by mouth daily   omeprazole (PRILOSEC) 20 MG CR capsule TAKE ONE CAPSULE BY MOUTH ONCE DAILY (Patient taking differently: Take 20 mg by mouth daily TAKE ONE CAPSULE BY MOUTH ONCE DAILY)   order for DME Equipment being ordered: Digital home blood pressure monitor kit   traZODone (DESYREL) 100 MG tablet Take 1 tablet (100 mg) by mouth nightly as needed for sleep     No current facility-administered medications on file prior to visit.     ALLERGIES:   Allergies   Allergen Reactions     Ace Inhibitors Cough     Dry cough     No Clinical Screening - See Comments      PN: ARMANDO FI1: MATEO  "      PHYSICAL EXAM:  Vitals: /72 (BP Location: Right arm, Patient Position: Sitting, Cuff Size: Adult Regular)   Pulse 53   Ht 1.664 m (5' 5.5\")   Wt 66.2 kg (146 lb)   SpO2 96%   BMI 23.93 kg/m    Constitutional:  cooperative, alert and oriented, well developed, well nourished, in no acute distress        Skin:  warm and dry to the touch, no apparent skin lesions or masses noted        Head:  normocephalic, no masses or lesions        Eyes:  pupils equal and round;conjunctivae and lids unremarkable;sclera white;no xanthalasma        ENT:  no pallor or cyanosis, dentition good        Neck:  no carotid bruit;JVP normal        Respiratory:  normal breath sounds, clear to auscultation, normal A-P diameter, normal symmetry, normal respiratory excursion, no use of accessory muscles        Cardiac: regular rhythm;normal S1 and S2;no murmurs, gallops or rubs detected                  GI:  abdomen soft;BS normoactive        Vascular: pulses full and equal                                      Extremities and Musculoskeletal:  no edema;no spinal abnormalities noted;normal muscle strength and tone;no deformities, clubbing, cyanosis, erythema observed        Neurological:  no gross motor deficits;affect appropriate          LABS/DATA:  I reviewed the following:  Stress echo 7/18/18:  Interpretation Summary  1. Average exercise capacity, target HR achieved.  2. The patient exhibited no chest pain during exercise.  3. This was a normal stress EKG with no evidence of stress-induced ischemia.  4. Rest echo: Normal left ventricular function and wall motion at rest. The  visual ejection fraction is estimated at 55-60%.  5. Stress echo: This was a normal stress echocardiogram with no evidence of  stress-induced ischemia. The visual ejection fraction is estimated at 65-70%.     Limited TTE views:  1. s/p posterior mitral leaflet repair with some restricted motion. Mean  5mmHg.  2. There is mild (1+) mitral " regurgitation.  3. There is mild (1+) aortic regurgitation.     Stress echo was normal in 1/2013.  No changes on TTE views compared to 2/2016.        Echo 7/31/19:  Interpretation Summary  1. The left ventricle is normal in structure, function and size. The visual  ejection fraction is estimated at 55%.  2. The right ventricle is normal in structure, function and size.  3. s/p posterior MV leaflet repair. Mean 3mmHg. There is mild (1+) mitral  regurgitation.  4. There is mild (1+) aortic regurgitation.     No changes from echo 7/2018.      Component      Latest Ref Rng & Units 8/2/2019   Sodium      133 - 144 mmol/L 139   Potassium      3.4 - 5.3 mmol/L 4.2   Chloride      94 - 109 mmol/L 106   Carbon Dioxide      20 - 32 mmol/L 28   Anion Gap      3 - 14 mmol/L 5   Glucose      70 - 99 mg/dL 103 (H)   Urea Nitrogen      7 - 30 mg/dL 14   Creatinine      0.66 - 1.25 mg/dL 0.82   GFR Estimate      >60 mL/min/1.73:m2 89   GFR Estimate If Black      >60 mL/min/1.73:m2 >90   Calcium      8.5 - 10.1 mg/dL 9.0   Cholesterol      <200 mg/dL 207 (H)   Triglycerides      <150 mg/dL 147   HDL Cholesterol      >39 mg/dL 64   LDL Cholesterol Calculated      <100 mg/dL 114 (H)   Non HDL Cholesterol      <130 mg/dL 143 (H)   ALT      0 - 70 U/L 36   * NOT FASTING        ASSESSMENT/PLAN:  Mitral valve prolapse and severe mitral regurgitation s/p robotic mitral valve repair at the [facility] in 2009  - Stress echo 7/2018 shows valve working well, MG 5 mmHg, mild MR  - Echo 7/31/19: LVEF 55%, s/p posterior MV leaflet repair, MG 3 mmHg, mild MR      CAD  - Preoperative coronary angiography 2009 demonstrated only a 50% mid LAD stenosis.   - 1/2015 Jose presented with a NSTEMI.  Cardiac cath:  RCA, underwent complex LAD diagonal intervention with drug-eluting stents placed in both vessels.   - Negative stress echo 7/2018  - No anginal sxs  - Continue ASA, BB, statin.        HTN  - Now controlled on losartan 100 mg, Toprol XL 25  mg  - He will call if consistently >130      Dyslipidemia  - 8/2/19 lipid panel (NOT FASTING): , HDL 64, ,   - Though he was not fasting, it does look like his LDL has been trending up since 2017  - He will work on dietary changes and continue atorvastatin 40 mg  - Repeat next year      Rare and brief runs of SVT  - Asymptomatic       Prostate cancer s/p prostatectomy  - Still following with urology. He is having fairly severe urinary incontinence and is considering artificial urinary sphincter.           Follow up:  Dr. Alejandra 7/2020 with BMP, FLP        Cornell De Anda PA-C

## 2019-08-02 NOTE — PATIENT INSTRUCTIONS
Your blood pressure looks better today.   If you notice your blood pressures are consistently >!30 let us know.   Continue your current medications. I have refilled them for a year.   See Dr. Alejandra in 1 year with labs.

## 2019-08-02 NOTE — LETTER
"2019    Raysa Singh PA-C  [address], [address]  [facility] [address]    RE: Jose NAVARRO Josh       Dear Colleague,    I had the pleasure of seeing Jose Moreno in the [facility].    CARDIOLOGY CLINIC PROGRESS NOTE    DOS: 2019      Jose Moreno  : 1950, 69 year old  MRN: [id number]      History:  I am following up with Jose Moreno today in the cardiology clinic.  He follows with Dr. Alejandra.     Morgan is a pleasant 69-year-old gentleman with past medical history significant for a robotic mitral valve repair at the [facility] in  for mitral valve prolapse and severe mitral regurgitation.  Also CAD, HTN, dyslipidemia, rare and brief runs of SVT.      Preoperative coronary angiography  demonstrated only a 50% mid LAD stenosis.       2015 Morgan presented with a NSTEMI. He was brought to the cath lab where he was found to have what appeared to be a chronically occluded right coronary artery that could not be crossed with a wire.  He subsequently was transferred to [facility], underwent complex LAD diagonal intervention with drug-eluting stents placed in both vessels.  He tolerated the procedure quite well.       He was subsequently seen in our clinic because he woke up with a \"spell.\"  He was sent back to his primary care doctor for neurologic evaluation.  Subsequent echocardiogram 2/15/16 demonstrated a structurally normal heart.  His valve repair appeared to be functioning appropriately.  He has a normal ejection fraction.  A ZIO Patch showed some brief runs of SVT lasting 4 beats, but no atrial fibrillation.  Stress nuclear scan appeared to be consistent with a small to moderate size reversible inferior, inferoseptal and inferolateral defect consistent with his known anatomy.       In followup, he appeared to be doing well from a cardiac standpoint without exertional chest, arm, neck, jaw or shoulder discomfort.  He did have " a low-grade discomfort that was there all the time, did not change with exercise and we felt this was not cardiac in origin.  Options were discussed, including medical management versus coronary artery bypass grafting versus a  and it was decided to continue with medical management.  He was having problems with fatigue, tiredness, cold hands, cold feet and we ultimately discontinued his metoprolol, of which he was only on 12.5 mg at that time.       Morgan saw Dr. Alejandra 7/10/18.  He had some more fatigue with walking.  Dr. Alejandra ordered a stress echo.  This was done 7/18/18.  He exercised to an acceptable level.  Heart rate response was normal.  BP was a little high.  LVEF at rest was 55-60%.  There was no evidence of stress-induced ischemia.  Dr. Alejandra recommended he increase losartan from 50 mg to 100 mg.     I met Morgan 8/17/18.  His BP was better controlled.      9/5/18 saw urology, new diagnosis of high-grade prostate cancer.  11/1/18 s/p prostatectomy for prostate cancer.     6/19/19 saw PCP.  BP was 146/78.  He was started on Toprol XL 25 mg.      He presents today for annual follow up.   BP is improved.  He is tolerating the Toprol XL.   No chest pain.  No FAUSTIN.  No palpitations.    He is exercising.  He walks and rides bike (outdoor in summer and indoor in winter).    He was not fasting this morning for his lipid panel.  Though he was not fasting, the LDL had been trending up. He also notes he eats a bit more ice cream than he was.   He is having fairly severe urinary incontinence and is considering artificial urinary sphincter.      ROS:  Skin:  Negative     Eyes:  Positive for glasses  ENT:  Negative    Respiratory:  Negative    Cardiovascular:  Negative    Gastroenterology: Positive for reflux  Genitourinary:  Positive for prostate problem;incontinence  Musculoskeletal:  Positive for back pain;arthritis  Neurologic:  Negative    Psychiatric:  Negative    Heme/Lymph/Imm:  Negative    Endocrine:   Positive for thyroid disorder    PAST MEDICAL HISTORY:  Past Medical History:   Diagnosis Date     ADHD (attention deficit hyperactivity disorder)      CAD (coronary artery disease)     cardiac cath 1/23/15: SHERRY to 1st diagonal, SHERRY x2 to LAD, cath 2009: no intervention     Esophageal reflux      History of hyperthyroidism 4/9/2014     Hyperlipidemia      Hypertension      Mitral regurgitation 2009    moderately severe MVP, s/p robotic repair at [place]     Prostate cancer      Pulmonary nodules 2009    CT-recommend repeat in 6-12 months     Rotator cuff rupture 11/3/2011       PAST SURGICAL HISTORY:  Past Surgical History:   Procedure Laterality Date     COLONOSCOPY  7/00    GI     DAVINCI PROSTATECTOMY, LYMPHADENECTOMY N/A 11/1/2018    Procedure: ROBOTIC ASSISTED RADICAL PROSTATECTOMY WITH BILATERAL PELVIC LYMPH NODE DISSECTION;  Surgeon: Clint Blankenship MD;  Location: [facility] OR     DECOMPRESSION LUMBAR MINIMALLY INVASIVE ONE LEVEL  1/11/2012    Procedure:DECOMPRESSION LUMBAR MINIMALLY INVASIVE ONE LEVEL; L4-5 Decompression Minimally Invasive; Surgeon:MESSI HORAN; Location:[facility] OR     HEART CATH RIGHT AND LEFT HEART CATH  01-23-15    See report, pt transfered to [facility] for complex bifurcation PCI of LAD diagonal vessel.      HEART CATH RIGHT AND LEFT HEART CATH  01-26-15    PTCA and implantation of SHERRY to 1st diagonal, SHERRY to mid LAD, SHERRY to proximal LAD     Hx of rotator cuff rupture and repair       LAPAROSCOPIC PROSTATECTOMY       REPAIR VALVE MITRAL  2009    [facility] robotic repair      Reversal of vasectomy       VASECTOMY       XR LUMBAR RADIOFREQ ABLATION UNILATERAL  01/11/2018    lumbar area/ ? level       SOCIAL HISTORY:  Social History     Social History     Marital status:      Spouse name: Chastity     Number of children: 4     Years of education: 14     Occupational History      [employer]     RETIRED     Social History Main Topics     Smoking status: Never Smoker      Smokeless tobacco: Never Used     Alcohol use 4.2 oz/week     7 Standard drinks or equivalent per week      Comment: 1 beer daily     Drug use: No     Sexual activity: Yes     Partners: Female     Birth control/ protection: Condom, Post-menopausal     Other Topics Concern     Caffeine Concern No     2-3 daily     Sleep Concern No     Special Diet No     low sodium     Exercise Yes     walking daily     Seat Belt Yes     Self-Exams No     Social History Narrative       FAMILY HISTORY:  Family History   Problem Relation Age of Onset     Cancer Mother         breast, lung     Depression Father         alcohlism     Diabetes No family hx of      Cardiovascular No family hx of      Cancer - colorectal No family hx of      Prostate Cancer No family hx of        MEDS:   Current Outpatient Medications on File Prior to Visit:  Acetaminophen (TYLENOL PO) Take 325-650 mg by mouth 2 times daily as needed for mild pain or fever   aspirin EC 81 MG EC tablet Take 1 tablet (81 mg) by mouth daily   cholecalciferol (VITAMIN D3) 1000 UNIT tablet Take 1 tablet (1,000 Units) by mouth daily   Cyanocobalamin (B-12) 1000 MCG TBCR Take 1,000 mcg by mouth daily   FLUoxetine (PROZAC) 20 MG capsule Take 1 capsule (20 mg) by mouth daily   levothyroxine (SYNTHROID/LEVOTHROID) 50 MCG tablet Take 1 tablet (50 mcg) by mouth daily   methocarbamol (ROBAXIN) 500 MG tablet Take 1 tablet (500 mg) by mouth 3 times daily as needed for muscle spasms   multivitamin, therapeutic with minerals (MULTI-VITAMIN) TABS tablet Take 1 tablet by mouth daily   Omega-3 Fatty Acids (FISH OIL PO) Take 1 capsule by mouth daily   omeprazole (PRILOSEC) 20 MG CR capsule TAKE ONE CAPSULE BY MOUTH ONCE DAILY (Patient taking differently: Take 20 mg by mouth daily TAKE ONE CAPSULE BY MOUTH ONCE DAILY)   order for DME Equipment being ordered: Digital home blood pressure monitor kit   traZODone (DESYREL) 100 MG tablet Take 1 tablet (100 mg) by mouth nightly as needed for sleep  "    No current facility-administered medications on file prior to visit.     ALLERGIES:   Allergies   Allergen Reactions     Ace Inhibitors Cough     Dry cough     No Clinical Screening - See Comments      PN: LW FI1: NKA       PHYSICAL EXAM:  Vitals: /72 (BP Location: Right arm, Patient Position: Sitting, Cuff Size: Adult Regular)   Pulse 53   Ht 1.664 m (5' 5.5\")   Wt 66.2 kg (146 lb)   SpO2 96%   BMI 23.93 kg/m     Constitutional:  cooperative, alert and oriented, well developed, well nourished, in no acute distress        Skin:  warm and dry to the touch, no apparent skin lesions or masses noted        Head:  normocephalic, no masses or lesions        Eyes:  pupils equal and round;conjunctivae and lids unremarkable;sclera white;no xanthalasma        ENT:  no pallor or cyanosis, dentition good        Neck:  no carotid bruit;JVP normal        Respiratory:  normal breath sounds, clear to auscultation, normal A-P diameter, normal symmetry, normal respiratory excursion, no use of accessory muscles        Cardiac: regular rhythm;normal S1 and S2;no murmurs, gallops or rubs detected                  GI:  abdomen soft;BS normoactive        Vascular: pulses full and equal                                      Extremities and Musculoskeletal:  no edema;no spinal abnormalities noted;normal muscle strength and tone;no deformities, clubbing, cyanosis, erythema observed        Neurological:  no gross motor deficits;affect appropriate          LABS/DATA:  I reviewed the following:  Stress echo 7/18/18:  Interpretation Summary  1. Average exercise capacity, target HR achieved.  2. The patient exhibited no chest pain during exercise.  3. This was a normal stress EKG with no evidence of stress-induced ischemia.  4. Rest echo: Normal left ventricular function and wall motion at rest. The  visual ejection fraction is estimated at 55-60%.  5. Stress echo: This was a normal stress echocardiogram with no evidence " of  stress-induced ischemia. The visual ejection fraction is estimated at 65-70%.     Limited TTE views:  1. s/p posterior mitral leaflet repair with some restricted motion. Mean  5mmHg.  2. There is mild (1+) mitral regurgitation.  3. There is mild (1+) aortic regurgitation.     Stress echo was normal in 1/2013.  No changes on TTE views compared to 2/2016.        Echo 7/31/19:  Interpretation Summary  1. The left ventricle is normal in structure, function and size. The visual  ejection fraction is estimated at 55%.  2. The right ventricle is normal in structure, function and size.  3. s/p posterior MV leaflet repair. Mean 3mmHg. There is mild (1+) mitral  regurgitation.  4. There is mild (1+) aortic regurgitation.     No changes from echo 7/2018.      Component      Latest Ref Rng & Units 8/2/2019   Sodium      133 - 144 mmol/L 139   Potassium      3.4 - 5.3 mmol/L 4.2   Chloride      94 - 109 mmol/L 106   Carbon Dioxide      20 - 32 mmol/L 28   Anion Gap      3 - 14 mmol/L 5   Glucose      70 - 99 mg/dL 103 (H)   Urea Nitrogen      7 - 30 mg/dL 14   Creatinine      0.66 - 1.25 mg/dL 0.82   GFR Estimate      >60 mL/min/1.73:m2 89   GFR Estimate If Black      >60 mL/min/1.73:m2 >90   Calcium      8.5 - 10.1 mg/dL 9.0   Cholesterol      <200 mg/dL 207 (H)   Triglycerides      <150 mg/dL 147   HDL Cholesterol      >39 mg/dL 64   LDL Cholesterol Calculated      <100 mg/dL 114 (H)   Non HDL Cholesterol      <130 mg/dL 143 (H)   ALT      0 - 70 U/L 36   * NOT FASTING        ASSESSMENT/PLAN:  Mitral valve prolapse and severe mitral regurgitation s/p robotic mitral valve repair at the [facility] in 2009  - Stress echo 7/2018 shows valve working well, MG 5 mmHg, mild MR  - Echo 7/31/19: LVEF 55%, s/p posterior MV leaflet repair, MG 3 mmHg, mild MR      CAD  - Preoperative coronary angiography 2009 demonstrated only a 50% mid LAD stenosis.   - 1/2015 Jose presented with a NSTEMI.  Cardiac cath:  RCA, underwent complex  LAD diagonal intervention with drug-eluting stents placed in both vessels.   - Negative stress echo 7/2018  - No anginal sxs  - Continue ASA, BB, statin.        HTN  - Now controlled on losartan 100 mg, Toprol XL 25 mg  - He will call if consistently >130      Dyslipidemia  - 8/2/19 lipid panel (NOT FASTING): , HDL 64, ,   - Though he was not fasting, it does look like his LDL has been trending up since 2017  - He will work on dietary changes and continue atorvastatin 40 mg  - Repeat next year      Rare and brief runs of SVT  - Asymptomatic       Prostate cancer s/p prostatectomy  - Still following with urology. He is having fairly severe urinary incontinence and is considering artificial urinary sphincter.           Follow up:  Dr. Alejandra 7/2020 with BMP, FLP        Cornell De Anda PA-C    Thank you for allowing me to participate in the care of your patient.      Sincerely,     Cornell De Anda PA-C     [facility]

## 2019-08-19 ENCOUNTER — THERAPY VISIT (OUTPATIENT)
Dept: PHYSICAL THERAPY | Facility: CLINIC | Age: 69
End: 2019-08-19
Payer: COMMERCIAL

## 2019-08-19 DIAGNOSIS — M54.50 LUMBAR PAIN: Primary | ICD-10-CM

## 2019-08-19 PROCEDURE — 97530 THERAPEUTIC ACTIVITIES: CPT | Mod: GP | Performed by: PHYSICAL THERAPIST

## 2019-08-19 PROCEDURE — 97110 THERAPEUTIC EXERCISES: CPT | Mod: GP | Performed by: PHYSICAL THERAPIST

## 2019-08-23 ENCOUNTER — THERAPY VISIT (OUTPATIENT)
Dept: PHYSICAL THERAPY | Facility: CLINIC | Age: 69
End: 2019-08-23
Payer: COMMERCIAL

## 2019-08-23 DIAGNOSIS — M54.50 LUMBAR PAIN: Primary | ICD-10-CM

## 2019-08-23 PROCEDURE — 97110 THERAPEUTIC EXERCISES: CPT | Mod: GP | Performed by: PHYSICAL THERAPIST

## 2019-08-23 PROCEDURE — 97530 THERAPEUTIC ACTIVITIES: CPT | Mod: GP | Performed by: PHYSICAL THERAPIST

## 2019-08-23 NOTE — TELEPHONE ENCOUNTER
Received call from patient who states he has completed 4 PT sessions.         Flori Valenzuela    [facility]

## 2019-08-26 NOTE — TELEPHONE ENCOUNTER
Will forward to Dr. Richter to review and advise on message from [place].    Kishan Vaca, RN  Care Coordinator   [facility] [place]

## 2019-08-26 NOTE — TELEPHONE ENCOUNTER
Will forward to [place] to process PA for repeat RFA.    Kishan Vaca, RN  Care Coordinator   [facility] [place]

## 2019-08-26 NOTE — TELEPHONE ENCOUNTER
PA pending additional information - please specify exact level, laterality and approach of injection.          I am not sure if this is bilateral or just left side from 1/2018?    Please specify as I am not sure what date the last one was done       Ursula HUFFMAN    [facility]

## 2019-08-27 NOTE — TELEPHONE ENCOUNTER
Faxed completed PA form and supporting documentation for repeat Left LRFA to .  Right fax confirmation          Ursula HUFFMAN    [facility]

## 2019-08-29 ENCOUNTER — OFFICE VISIT (OUTPATIENT)
Dept: PALLIATIVE MEDICINE | Facility: CLINIC | Age: 69
End: 2019-08-29
Payer: COMMERCIAL

## 2019-08-29 VITALS
BODY MASS INDEX: 24.6 KG/M2 | HEART RATE: 60 BPM | OXYGEN SATURATION: 96 % | SYSTOLIC BLOOD PRESSURE: 154 MMHG | WEIGHT: 150.1 LBS | DIASTOLIC BLOOD PRESSURE: 82 MMHG

## 2019-08-29 DIAGNOSIS — M54.50 CHRONIC LEFT-SIDED LOW BACK PAIN WITHOUT SCIATICA: Primary | ICD-10-CM

## 2019-08-29 DIAGNOSIS — G89.29 CHRONIC LEFT-SIDED LOW BACK PAIN WITHOUT SCIATICA: Primary | ICD-10-CM

## 2019-08-29 PROCEDURE — 99213 OFFICE O/P EST LOW 20 MIN: CPT | Performed by: PHYSICAL MEDICINE & REHABILITATION

## 2019-08-29 ASSESSMENT — PAIN SCALES - GENERAL: PAINLEVEL: SEVERE PAIN (7)

## 2019-08-29 NOTE — PATIENT INSTRUCTIONS
1. You can take ibuprofen 400-600mg four times daily as needed.    2. You can alternate this with tylenol 500-650mg four times daily as needed.    3. My clinic will call to schedule the medial branch blocks.    ----------------------------------------------------------------  Clinic Number:  [phone]     Call with any questions about your care and for scheduling assistance.     Calls are returned Monday through Friday between 8 AM and 4:30 PM. We usually get back to you within 2 business days depending on the issue/request.    If we are prescribing your medications:    For opioid medication refills, call the clinic or send a [employer] message 7 days in advance.  Please include:    Name of requested medication    Name of the pharmacy.    For non-opioid medications, call your pharmacy directly to request a refill. Please allow 3-4 days to be processed.     Per [place] State Law:    All controlled substance prescriptions must be filled within 30 days of being written.      For those controlled substances allowing refills, pickup must occur within 30 days of last fill.      We believe regular attendance is key to your success in our program!      Any time you are unable to keep your appointment we ask that you call us at least 24 hours in advance to cancel.This will allow us to offer the appointment time to another patient.     Multiple missed appointments may lead to dismissal from the clinic.

## 2019-08-29 NOTE — PROGRESS NOTES
[facility]    Date of visit: 8/29/2019    Chief complaint: No chief complaint on file.      Interval history:  Jose Moreno is a 69 year old male last seen by me on 7/18/19.      Recommendations/plan at the last visit included:  1. Physical Therapy: Referral placed to [facility] for lumbar spondylosis and facet artropathy. Did recommend that he do this but If insurance approves RFA without having to re-do PT, he can hold off on this.  2. Clinical Health Pain Psychologist: Coping well, defer at this time.  3. Self Care Recommendations: Gentle progressive exercise that does not increase pain - gradually increase daily walking program.  Take mini breaks - 5 minutes of mindfullness a couple times a day.   4. Diagnostic Studies: none  5. Medication Management:    6. Continue prn ibuprofen  7. Continue methocarbamol 500mg TID prn.  8. Further procedures recommended:   1. Lumbar MBB/RFA ordered  9. Follow up: 1 month post procedure in clinic    Since his last visit, Jose Moreno reports:  -Back pain is ongoing, maybe a little worse than before  -PT seems to make his back pain worse  -Wondering if he can have the RFA yet  -Going back to driving next week, looking forward to this. 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  -methocarbamol doesn't help much and makes him drowsy, doesn't take this often    Pain scores:  Pain intensity on average is 6 on a scale of 0-10.        Current pain treatments:   -Ibuprofen prn  -Tylenol 650mg prn       Past pain treatments:  -none  -Lumbar RFA with 6 months relief  -Lumbar facet injections with 2-3 months relief  -PT   -Methocarbamol prn-nh  Side Effects: Drowsy with methocarbamol    Medications:  Current Outpatient Medications   Medication Sig Dispense Refill     Acetaminophen (TYLENOL PO) Take 325-650 mg by mouth 2 times daily as needed for mild pain or fever       aspirin EC 81 MG EC tablet Take 1 tablet (81 mg) by mouth daily 60 tablet 0      atorvastatin (LIPITOR) 40 MG tablet Take 1 tablet (40 mg) by mouth daily 90 tablet 3     cholecalciferol (VITAMIN D3) 1000 UNIT tablet Take 1 tablet (1,000 Units) by mouth daily       Cyanocobalamin (B-12) 1000 MCG TBCR Take 1,000 mcg by mouth daily       FLUoxetine (PROZAC) 20 MG capsule Take 1 capsule (20 mg) by mouth daily 90 capsule 3     levothyroxine (SYNTHROID/LEVOTHROID) 50 MCG tablet Take 1 tablet (50 mcg) by mouth daily 90 tablet 3     losartan (COZAAR) 100 MG tablet Take 1 tablet (100 mg) by mouth daily 90 tablet 3     methocarbamol (ROBAXIN) 500 MG tablet Take 1 tablet (500 mg) by mouth 3 times daily as needed for muscle spasms 90 tablet 3     metoprolol succinate ER (TOPROL-XL) 25 MG 24 hr tablet Take 1 tablet (25 mg) by mouth daily 90 tablet 3     multivitamin, therapeutic with minerals (MULTI-VITAMIN) TABS tablet Take 1 tablet by mouth daily       nitroGLYcerin (NITROSTAT) 0.4 MG sublingual tablet TAKE 1 TABLET BY MOUTH EVERY 5 MIN AS NEEDED FOR CHEST PAIN 25 tablet 1     Omega-3 Fatty Acids (FISH OIL PO) Take 1 capsule by mouth daily       omeprazole (PRILOSEC) 20 MG CR capsule TAKE ONE CAPSULE BY MOUTH ONCE DAILY (Patient taking differently: Take 20 mg by mouth daily TAKE ONE CAPSULE BY MOUTH ONCE DAILY) 90 capsule 3     order for DME Equipment being ordered: Digital home blood pressure monitor kit 1 Units 0     traZODone (DESYREL) 100 MG tablet Take 1 tablet (100 mg) by mouth nightly as needed for sleep 90 tablet 1       Medical History: any changes in medical history since they were last seen? No    Review of Systems:  The 14 system ROS was reviewed from the intake questionnaire, and is positive for: back pain  Any bowel or bladder problems: denies  Mood: denies    Physical Exam:  Blood pressure (!) 154/82, pulse 60, weight 68.1 kg (150 lb 1.6 oz), SpO2 96 %.  General: NAD, pleasant  Gait: Antalgic  MSK exam: Lumbar paraspinal tenderness in the left 3-5 paraspinals. Lumbar ROM mildly reduced in  extension and rotation bilaterally. LE strength is 5/5 and symmetric.       Assessment:  Mr. Moreno is a 68 year old with past medical history including: CAD (nstemi, s/p PTCA), HTN, mitral valve repair, mitral regurgitation, HLD, Hypothyroidism, GERD, Prostate cancer (s/p prostatectomy in 2018) who presents for follow up of chronic low back pain.     He has had several facet injections (3 months relief initially but last 2 rounds of injections with 4-6 weeks of relief unfortunately) and radiofrequency ablations (6 months relief) but improvement in his back pain and increased activity tolerance when injections are performed. At this point he would like to try MBB/RFA again. MBB in July of 2019 was beneficial, completed 4 PT sessions without relief. Would like to proceed with RFA.     Plan:  The following recommendations were given to the patient. Diagnosis, treatment options, risks, benefits, and alternatives were discussed, and all questions were answered. The patient expressed understanding of the plan for management.      I am recommending a multidisciplinary treatment plan to help this patient better manage his pain.  This includes:   1. Physical Therapy: continue exercises recommended by PT  2. Clinical Health Pain Psychologist: Coping well, defer at this time.  3. Self Care Recommendations: Gentle progressive exercise that does not increase pain - gradually increase daily walking program.  Take mini breaks - 5 minutes of mindfullness a couple times a day.   4. Diagnostic Studies: none  5. Medication Management:    1. Continue prn ibuprofen 400-600mg q4h prn  2. Alternate with tylenol 500-650mg q4h prn  6. Further procedures recommended:   1. Awaiting approval to schedule RFA  7. Follow up: 1 month post procedure in clinic       I spent 30 minutes of time face to face with the patient.  Greater than 50% of this time was spent in patient counseling  and/or coordination of care.      Jersey Richter MD,  DO  [facility]  8/29/2019

## 2019-09-05 NOTE — TELEPHONE ENCOUNTER
PA approved.  Effective date: 8/27/19-2/26/20  PA reference #: [id number]  Pt. notified:   Please schedule Left LRFA          Ursula HUFFMAN    [facility]

## 2019-09-05 NOTE — TELEPHONE ENCOUNTER
Pre-screening Questions for RFA Procedure      Procedure ordered? Repeat Lumbar RFA    What insurance are we billing for this procedure?  [employer]    Has patient had this injection before? Yes:   Any chance of pregnancy? NO   If YES, do NOT schedule and route to RN pool    Is  Needed?: No  Will patient have a ?  Yes   If pt is given sedation meds, no driving for 24 hours.  Is pt taking a cab or transportation service? NO        If so will need to be accompanied by an adult too (friend/family member) in order for IV sedation to be given.      Per [facility] Policy:  Outpatients are to have responsible adult or family member to accompany them at discharge and drive them home. A service providing medically trained drivers or attendants would be acceptable. Public transportation would not be acceptable unless the patient is accompanied by a responsible adult or family member.    Is patient taking any blood thinners (plavix, coumadin, jantoven, warfarin, heparin, pradaxa or dabigatran )? No   If YES, do NOT schedule, and route to RN pool    Is patient taking any aspirin products? Yes - Pt takes 81mg daily; instructed to hold 0 day(s) prior to procedure.      If more than 325mg/day do NOT schedule and route to RN pool     For CERVICAL procedures, hold all aspirin products for 6 days.     Does the patient have a bleeding or clotting disorder? No     If YES, it it OKAY to schedule AND route to RN pool    **For any patients with platelet count <100, must be forwarded to provider**    Does patient have an active infection or treated for one within the past week? No   If YES, do NOT schedule and route to RN nurse pool     Is patient currently taking any antibiotics?  No    For patients on chronic, preventative, or prophylactic antibiotics, procedures may be scheduled.     For patients on antibiotics for active or recent infection:    Aylin Chen Burton, Snitzer-antibiotic course must have  been completed for 4 days    Is patient currently taking any steroid medications? (i.e. Prednisone, Medrol)  No     For patients on steroid medications:    Giorgi Castellon, Aylin, Nir Cortez-steroid course must have been completed for 4 days    Reviewed with patient:  If you are started on any steroids or antibiotics between now and your appointment, you must contact us because it may affect our ability to perform your procedure.  Yes    Is patient actively being treated for cancer or immunocompromised, including the spleen having been removed? No  If YES, do NOT schedule and route to RN pool     Any history of complications with sedation medications?  NO   If YES, OK to schedule AND route to RN pool     Any history of sleep apnea?  NO   If YES, OK to schedule AND route to RN pool     Any cardiac history?  YES   If YES, OK to schedule AND route to RN pool     Do you have an implanted pacemaker, ICD (implanted cardiac device) or AICD (automatic implanted cardiac device)?  NO    If YES, do NOT schedule AND route to RN pool.     Obtain name of device : N/A      Obtain name of cardiologist: N/A      Do you have an implanted stimulator?  NO    If YES, OK to schedule AND route to nursing.     Instruct patient to bring in the remote to the appointment and it will need to be turned off.  reviewed and not discussed      Does patient have an allergy to contrast dye, iodine or shellfish?  No   If YES, OK to schedule. Route to RN pool AND add allergy information to appointment notes    Are you able to get on and off an exam table with minimal or no assistance? Yes   If NO, do NOT schedule and route to RN pool    Are you able to roll over and lay on your stomach with minimal or no assistance? Yes   If NO, do NOT schedule and route to RN pool    Informed patient that s/he needs to be NPO for 6 hours before procedure?  YES   Informed patient that it is OK to take normal medications with sips of water, especially  blood pressure medications, before the procedure and must hold blood thinners as instructed.  Yes  Informed patient to arrive 30 minutes before procedure time to have an IV inserted.  reviewed   Do NOT schedule at 0745, 0815 or 1245.  reviewed   All radiofrequency ablations are in a 90 minute time slot except genicular radiofrequency ablation those are 60 minute time slot.  Reviewed        Flori Valenzuela    [facility]

## 2019-09-06 NOTE — PROGRESS NOTES
[facility] - Procedure Note    Date of Visit: 9/9/2019    Pre procedure Diagnosis: facet arthropathy   Post procedure Diagnosis: Same  Procedure performed: Left 3,4,5 radiofrequency ablation   Anesthesia: Local only  Complications: NONE  Operators: Jersey Richter DO    Indications:   Jose Moreno is a 69 year old male who is seen for a lumbar radiofrequency ablation.  They have a history of chronic left low back pain.  Exam shows increased discomfort with extension and rotation and they have tried conservative treatment including oral medications and PT. He underwent prior RFA in 2018 with 5 months of pain relief..    MRI was done on 2/9/19 which showed   FINDINGS: There are five lumbar-type vertebrae for the purposes of  this dictation.      There is normal alignment of the lumbar vertebrae; however, there is  sigmoid curvature of the lumbar spine with right curvature centered at  the L1-L2 level and left curvature centered at the L4-L5 level.  Vertebral body heights of the lumbar spine are normal. There is  reactive, degenerative Type II marrow signal change in the opposing  endplates at the L2-L3 level and reactive, degenerative Type I marrow  signal change in the opposing endplates at the L4-L5 level. Marrow  signal throughout the lumbar vertebrae is otherwise normal. There is  no evidence for fracture or pathologic bony lesion of the lumbar  spine.     There is posterior disc bulging/herniation to varying degrees at all  levels of the lumbar spine.     The tip of the conus medullaris is at the mid L2 level which is within  normal limits. There is no evidence for intrathecal abnormality.      Level by level:      L1-L2: There is a minimal circumferential disc bulge and minimal facet  arthropathy bilaterally. There is no spinal canal or neural foraminal  stenosis at this level.     L2-L3: There is a circumferential disc bulge, circumferential endplate  spurring and mild facet  arthropathy bilaterally. These findings result  in mild left foraminal narrowing but no spinal canal or right  foraminal stenosis.     L3-L4: There is a circumferential disc bulge with a probable tiny  superimposed posterior central disc herniation (protrusion) and mild  facet arthropathy bilaterally. These findings result in mild spinal  canal narrowing with moderate narrowing of the right lateral recess of  the spinal canal, mild left foraminal narrowing and moderate right  foraminal stenosis.     L4-L5: There is a circumferential disc bulge with a questionable  superimposed right lateral (foraminal zone) disc herniation  (protrusion) and moderate facet arthropathy bilaterally. These  findings result in mild spinal canal narrowing, severe right foraminal  stenosis and minimal left foraminal narrowing.     L5-S1: There is a circumferential disc bulge and severe facet  arthropathy bilaterally. These findings result in moderate bilateral  neural foraminal stenosis and mild spinal canal narrowing.        IMPRESSION: Degenerative changes of the lumbar spine as described  above.     ALFONSO RUIZ MD     Allergies:      Allergies   Allergen Reactions     Ace Inhibitors Cough     Dry cough     No Clinical Screening - See Comments      PN: LW FI1: NKA        Vitals:  BP (!) 172/95 (BP Location: Left arm, Patient Position: Sitting, Cuff Size: Adult Regular)   Pulse 56   Resp 17   SpO2 99%     Review of Systems: The patient denies recent fever, chills, illness, use of antibiotics or anticoagulants. All other 10-point review of systems negative.     Physical Exam:   Resp: CTA bilaterally  CV: RRR no murmurs, rubs or gallops  Airway:  Mallampati Class 2    Jose Moreno had medial branch blocks on 7/22/19  appropriate pain relief, therefore radiofrequency ablation will be done. She had a lumbar RFA in 2017 which provided 5-6 months of relief.    Options/alternatives, benefits and risks were discussed with the patient  including bleeding, infection, no pain relief, tissue trauma, exposure to radiation, reaction to medications including seizure, spinal cord injury,increased pain after the procedure, weakness, numbness or sensory changes and headache.   We also discussed risks of sedation, including reaction to medications and cardiovascular collapse.    Questions were answered to his satisfaction and he agrees to proceed. Voluntary informed consent was obtained and signed.     Medications were reviewed:  Yes   Pre-procedure pain score: 4/10    Procedure:  After getting informed consent, patient was brought into the procedure suite and was placed in a prone position on the procedure table.   A Pause for the Cause was performed.  Patient was prepped and draped in sterile fashion.     Jose Moreno had an IV line placed prior the procedure.  The C-arm was positioned in the left oblique view to afford optimal view of the L4-L5 vertebral bodies. Lidocaine 1% was used to anesthetize the skin at each level.  At each level, a 100mm, 20G curved radiofrequency cannula with a 10mm active tip was positioned, overlying the intersection of the transverse process and pedicle at L4 & L5, and was advanced under intermittent fluoroscopy until it contacted the transverse process and notch, and the tip slightly overran that process, just lateral to the mamillary process.  The position of each cannula was verified and optimized in the oblique view and AP views.    In the AP view, another cannula was placed at the sacral alar notch.      Each position was tested for motor and sensory stimulation, and was positioned so that stimulation was negative for stimuli outside the immediate area of the desired lesion.  Sensory stimulation was completed at 50 Hz, with max stimulation up to 0.8V.  Motor stimulation was completed at 2Hz, up to 2.0V.  Lidocaine 2% 0.7 ml was injected at each level, and a 90 second, 80 degree Centigrade lesion was generated.    The  needles were then rotated within the pathway of the medial branch, and locations were evaluated with repeat imaging.  Motor testing was again completed, and showed appropriate stimulation.  A second lesion was then generated at each location.    The needles were withdrawn. Hemostasis was achieved, the area was cleaned, and bandaids were placed when appropriate.  The patient tolerated the procedure well, and was taken to the recovery room.    Images were saved to PACS.      Post-procedure pain score: 4/10    Assessment/Plan: Jose Moreno is a 69 year old male s/p left lumbar RFA for chronic low back pain.     1. Following today's procedure, the patient was advised to contact the [facility] for any of the following:   Fever, chills, or night sweats   New onset of pain, numbness, or weakness   Any questions/concerns regarding the procedure  If unable to contact the Pain Center, the patient was instructed to go to a local Emergency Room for any complications.     2. The patient should follow-up in clinic in 1 month for post-procedure evaluation.    3. We will follow up with a phone call in one week.            Jersey Richter DO  [facility]

## 2019-09-09 ENCOUNTER — RADIOLOGY INJECTION OFFICE VISIT (OUTPATIENT)
Dept: PALLIATIVE MEDICINE | Facility: CLINIC | Age: 69
End: 2019-09-09
Payer: COMMERCIAL

## 2019-09-09 ENCOUNTER — ANCILLARY PROCEDURE (OUTPATIENT)
Dept: GENERAL RADIOLOGY | Facility: CLINIC | Age: 69
End: 2019-09-09
Attending: PHYSICAL MEDICINE & REHABILITATION
Payer: COMMERCIAL

## 2019-09-09 VITALS
HEART RATE: 56 BPM | OXYGEN SATURATION: 99 % | RESPIRATION RATE: 17 BRPM | DIASTOLIC BLOOD PRESSURE: 95 MMHG | SYSTOLIC BLOOD PRESSURE: 172 MMHG

## 2019-09-09 DIAGNOSIS — M47.816 FACET ARTHROPATHY, LUMBAR: ICD-10-CM

## 2019-09-09 DIAGNOSIS — M47.816 SPONDYLOSIS OF LUMBAR REGION WITHOUT MYELOPATHY OR RADICULOPATHY: Primary | ICD-10-CM

## 2019-09-09 PROCEDURE — 64635 DESTROY LUMB/SAC FACET JNT: CPT | Mod: LT | Performed by: PHYSICAL MEDICINE & REHABILITATION

## 2019-09-09 PROCEDURE — 64636 DESTROY L/S FACET JNT ADDL: CPT | Mod: LT | Performed by: PHYSICAL MEDICINE & REHABILITATION

## 2019-09-09 RX ORDER — FENTANYL CITRATE 50 UG/ML
50 INJECTION, SOLUTION INTRAMUSCULAR; INTRAVENOUS EVERY 5 MIN PRN
Status: DISCONTINUED | OUTPATIENT
Start: 2019-09-09 | End: 2019-09-09

## 2019-09-09 NOTE — NURSING NOTE
Pre-procedure Intake    Have you been fasting? Yes    If yes, for how long? 6 hrs    Are you taking a prescribed blood thinner such as coumadin, Plavix, Xarelto?    No    If yes, when did you take your last dose?     Do you take aspirin?  YES    If cervical procedure, have you held aspirin for 6 days?   NA    Do you have any allergies to contrast dye, iodine, steroid and/or numbing medications?  NO    Are you currently taking antibiotics or have an active infection?  NO    Have you had a fever/elevated temperature within the past week? NO    Are you currently taking oral steroids? NO    Do you have a ? Yes       Are you pregnant or breastfeeding?  Not Applicable    Are the vital signs normal?  No:

## 2019-09-09 NOTE — NURSING NOTE
MD Time IN: 1414  Sedation start time:  NA  Sedation end time:  Na    Medications given: NA  Intravenous fluids were administered, normal saline 50 cc's.  Sedation Level Achieved:  No sedation    PIV placed to left hand, 1st attempt    Katia ARCOSN, RN Care Coordinator  [facility]

## 2019-09-09 NOTE — NURSING NOTE
Discharge Information    IV Discontiued Time:  1502    Amount of Fluid Infused:  50mL    Discharge Criteria = When patient returns to baseline or as per MD order    Consciousness:  Pt is fully awake    Circulation:  BP +/- 20% of pre-procedure level    Respiration:  Patient is able to breathe deeply    O2 Sat:  Patient is able to maintain O2 Sat >92% on room air    Activity:  Moves 4 extremities on command    Ambulation:  Patient is able to stand and walk or stand and pivot into wheelchair    Dressing:  Clean/dry or No Dressing    Notes:   Discharge instructions and AVS given to patient    Patient meets criteria for discharge?  YES    Admitted to PCU?  No    Responsible adult present to accompany patient home?  Yes    Signature/Title:    Katia Medeiros RN  RN Care Coordinator  [facility]

## 2019-09-09 NOTE — PATIENT INSTRUCTIONS
[facility] Procedure Discharge Instructions       Today you saw:  Dr. Jersey Richter    Your procedure:  Radiofrequency Nerve Ablation     Procedural Medications:  Lidocaine (anesthetic)         You may resume your normal diet and medications.   Avoid strenuous activity for the first 24 hours and resume regular activities after that.   Be cautious with walking as numbness and/or weakness in the lower extremities up to 6-8 hours may occur due to effect of local anesthetic   You may shower, however no swimming or tub baths or hot tubs for 24 hours following your procedure   Anticipate pain for up to 2 weeks after this procedure.  You may use ice packs 10-15 minutes three to four times a day at the injection site for comfort   You may use anti-inflammatory medications (such as Ibuprofen or Aleve or Advil) or Tylenol for pain control if necessary           It may take up to 8 weeks to receive relief from the RFA    If you experience any of the following, call the pain center line during work hours at [phone] or on call physician after hours at [phone]:  -Fever over 100 degree F   -Swelling, bleeding, redness, drainage, warmth at the injection site   -Progressive weakness or numbness in your legs or arms   -Loss of bowel or bladder function   -Unusual headache that is not relieved by Tylenol   -Unusual new onset of pain that is not improving

## 2019-09-09 NOTE — TELEPHONE ENCOUNTER
Appointment completed. Closing    Katia ARCOSN, RN Care Coordinator  [facility]

## 2019-09-17 ENCOUNTER — TELEPHONE (OUTPATIENT)
Dept: PALLIATIVE MEDICINE | Facility: CLINIC | Age: 69
End: 2019-09-17

## 2019-09-17 NOTE — TELEPHONE ENCOUNTER
Patient Left 3,4,5 radiofrequency ablation  on 9/9/19.  Called patient for an update.        Left message that we were calling for an update about how he was doing after the procedure and that if he has any questions or concerns to call the clinic at [phone].

## 2019-09-19 ENCOUNTER — OFFICE VISIT (OUTPATIENT)
Dept: FAMILY MEDICINE | Facility: CLINIC | Age: 69
End: 2019-09-19

## 2019-09-19 VITALS
HEART RATE: 72 BPM | SYSTOLIC BLOOD PRESSURE: 158 MMHG | TEMPERATURE: 98 F | HEIGHT: 66 IN | RESPIRATION RATE: 16 BRPM | BODY MASS INDEX: 23.63 KG/M2 | DIASTOLIC BLOOD PRESSURE: 82 MMHG | WEIGHT: 147 LBS | OXYGEN SATURATION: 97 %

## 2019-09-19 DIAGNOSIS — K21.9 GASTROESOPHAGEAL REFLUX DISEASE WITHOUT ESOPHAGITIS: ICD-10-CM

## 2019-09-19 DIAGNOSIS — Z23 INFLUENZA VACCINE NEEDED: ICD-10-CM

## 2019-09-19 DIAGNOSIS — Z79.899 ENCOUNTER FOR LONG-TERM (CURRENT) USE OF MEDICATIONS: ICD-10-CM

## 2019-09-19 DIAGNOSIS — E03.4 HYPOTHYROIDISM DUE TO ACQUIRED ATROPHY OF THYROID: Primary | ICD-10-CM

## 2019-09-19 LAB
% GRANULOCYTES: 63.6 %
HCT VFR BLD AUTO: 43.8 % (ref 40–53)
HEMOGLOBIN: 14.5 G/DL (ref 13.3–17.7)
LYMPHOCYTES NFR BLD AUTO: 26.7 %
MCH RBC QN AUTO: 34 PG (ref 26–33)
MCHC RBC AUTO-ENTMCNC: 33.1 G/DL (ref 31–36)
MCV RBC AUTO: 102.5 FL (ref 78–100)
MONOCYTES NFR BLD AUTO: 9.7 %
PLATELET COUNT - QUEST: 295 10^9/L (ref 150–375)
RBC # BLD AUTO: 4.27 10*12/L (ref 4.4–5.9)
WBC # BLD AUTO: 6.1 10*9/L (ref 4–11)

## 2019-09-19 PROCEDURE — 84443 ASSAY THYROID STIM HORMONE: CPT | Mod: 90 | Performed by: FAMILY MEDICINE

## 2019-09-19 PROCEDURE — 85025 COMPLETE CBC W/AUTO DIFF WBC: CPT | Performed by: FAMILY MEDICINE

## 2019-09-19 PROCEDURE — 90686 IIV4 VACC NO PRSV 0.5 ML IM: CPT | Performed by: FAMILY MEDICINE

## 2019-09-19 PROCEDURE — 90471 IMMUNIZATION ADMIN: CPT | Performed by: FAMILY MEDICINE

## 2019-09-19 PROCEDURE — 36415 COLL VENOUS BLD VENIPUNCTURE: CPT | Performed by: FAMILY MEDICINE

## 2019-09-19 PROCEDURE — 99214 OFFICE O/P EST MOD 30 MIN: CPT | Mod: 25 | Performed by: FAMILY MEDICINE

## 2019-09-19 RX ORDER — LEVOTHYROXINE SODIUM 50 UG/1
50 TABLET ORAL DAILY
Qty: 90 TABLET | Refills: 3 | Status: SHIPPED | OUTPATIENT
Start: 2019-09-19 | End: 2020-11-02

## 2019-09-19 ASSESSMENT — MIFFLIN-ST. JEOR: SCORE: 1356.6

## 2019-09-19 NOTE — PROGRESS NOTES
SUBJECTIVE: 69 year old male complaining of elevated BP off Toprol XL  for 2 week(s).   The patient describes elevated BP noted to day so he called his pharmacy and got a refill and started it again 1 pm today. See cardiology note. We reviewed the time line expected for BP response after restarting medication/ he will make a follow up appointment with them in 3-5 weeks.   The patient denies a history of thyroid symptoms or GERD issues.   Smoking history: No.   Relevant past medical history: positive for some confusion at times in his thinking/ encouraged stopping trazodone and switching to diphenhydramine at bedtime. Monitor response.    OBJECTIVE: The patient appears healthy, alert, no distress, cooperative and smiling.   EARS: negative  NOSE/SINUS: Nares normal. Septum midline. Mucosa normal. No drainage or sinus tenderness.   THROAT: normal   NECK:Neck supple. No adenopathy. Thyroid symmetric, normal size,, Carotids without bruits.   CHEST: Regular rate and  rhythm. S1 and S2 normal, no murmurs, clicks, gallops or rubs. No edema or JVD. Chest is clear; no wheezes or rales.  CNS; Cranial nerves are normal. Fundi are normal with sharp disc margins, no papilledema, hemorrhages or exudates noted. ARIELA. EOM's intact. DTR's, motor power and sensation normal and symmetric. Babinski sign absent.  Mental status normal.  Gait and station normal.  Cerebellar function is normal.  BMI : Body mass index is 24.09 kg/m .  ABD; The abdomen is soft without tenderness, guarding, mass or organomegaly. Bowel sounds are normal. No CVA tenderness or inguinal adenopathy noted.      ASSESSMENT: (E03.4) Hypothyroidism due to acquired atrophy of thyroid  (primary encounter diagnosis)  Plan: TSH with free T4 reflex ([facility]), VENOUS         COLLECTION, levothyroxine         (SYNTHROID/LEVOTHROID) 50 MCG tablet        Await labs/ refilled    (K21.9) Gastroesophageal reflux disease without esophagitis  Comment: refilled  Plan: [facility]  COLLECTION, CL AFF HEMOGRAM/PLATE/DIFF         ([facility]), omeprazole (PRILOSEC) 20 MG DR capsule        Potential medication side effects were discussed with the patient; let me know if any occur.      (Z79.899) Encounter for long-term (current) use of medications  Plan: TSH with free T4 reflex ([facility]), [facility]OUS         COLLECTION, CL AFF HEMOGRAM/PLATE/DIFF ([facility])            (Z23) Influenza vaccine needed  Plan: HC FLU VAC PRESRV FREE QUAD SPLIT VIR 3+YRS IM

## 2019-09-19 NOTE — NURSING NOTE
Morgan is here for a bp med check.        Pre-visit Screening:  Immunizations:  up to date  Colonoscopy:  is up to date  Mammogram: NA  Asthma Action Test/Plan:  NA  PHQ9:  None  GAD7:  None  Questioned patient about current smoking habits Pt. has never smoked.  Ok to leave detailed message on voice mail for today's visit only Yes, phone # [phone]

## 2019-09-19 NOTE — PATIENT INSTRUCTIONS
Restart Toprol as prescribed. Monitor BP response over the next 2-3 weeks. Schedule a follow up visit with cardiology.    Diphenhydramine 12.5-25 mgm at bedtime/  stop trazodone    Await thyroid levels/ refilled this and omeprazole for one year  Potential medication side effects were discussed with the patient; let me know if any occur.

## 2019-09-20 ENCOUNTER — TELEPHONE (OUTPATIENT)
Dept: PALLIATIVE MEDICINE | Facility: CLINIC | Age: 69
End: 2019-09-20

## 2019-09-20 LAB — TSH SERPL-ACNC: 2.01 MIU/L (ref 0.4–4.5)

## 2019-09-20 NOTE — TELEPHONE ENCOUNTER
Received call from patient who is providing an update on how he is doing post-RFA. He states the procedure was painful and that the pain that area has stayed. He can't stand for very long and can't sleep in normal sleeping positions. He states he sometimes has to sleep in a recliner because he can't lay down. He states that he doesn't believe the procedure worked and he has gotten worse since the procedure. Phone #[phone]      Flori Valenzuela    [facility]

## 2019-09-20 NOTE — TELEPHONE ENCOUNTER
Called patient. Having deep ache to his back. Reports nothing down his legs. He is disappointment that RFA is not working. He is having trouble sleeping at night, he has never had trouble before sleeping. He states that he is having to sleep in recliner to try to sleep.   Advised on conservative measures for pain management. Advised that 2 week post procedure discomfort can be expected (no red flags) and can be up to 8 weeks to have benefit. Reminded that he should have follow up one mnth post procedure.   Patient states that he had to take another call and so call was disconnected.   Routing to provider as JUANITA ARCOSN, RN Care Coordinator  [facility]

## 2019-09-27 ENCOUNTER — HEALTH MAINTENANCE LETTER (OUTPATIENT)
Age: 69
End: 2019-09-27

## 2019-10-01 ENCOUNTER — OFFICE VISIT (OUTPATIENT)
Dept: CARDIOLOGY | Facility: CLINIC | Age: 69
End: 2019-10-01
Payer: COMMERCIAL

## 2019-10-01 VITALS
BODY MASS INDEX: 24.27 KG/M2 | WEIGHT: 151 LBS | SYSTOLIC BLOOD PRESSURE: 138 MMHG | OXYGEN SATURATION: 97 % | HEIGHT: 66 IN | HEART RATE: 65 BPM | DIASTOLIC BLOOD PRESSURE: 72 MMHG

## 2019-10-01 DIAGNOSIS — I10 ESSENTIAL HYPERTENSION, BENIGN: Primary | ICD-10-CM

## 2019-10-01 PROCEDURE — 99214 OFFICE O/P EST MOD 30 MIN: CPT | Performed by: PHYSICIAN ASSISTANT

## 2019-10-01 ASSESSMENT — MIFFLIN-ST. JEOR: SCORE: 1374.74

## 2019-10-01 NOTE — PATIENT INSTRUCTIONS
Continue on both the losartan and Toprol XL for the blood pressure.  Today it is controlled.   Stop in 2 weeks to have your blood pressure checked with my nurse.  Also bring in your home cuff to be checked.

## 2019-10-01 NOTE — PROGRESS NOTES
"CARDIOLOGY CLINIC PROGRESS NOTE    DOS: 10/01/2019      Jose Moreno  : 1950, 69 year old  MRN: [id number]      History:  I am following up with Jose Moreno today in the cardiology clinic.  He follows with Dr. Alejandra.     Morgan is a pleasant 69-year-old gentleman with past medical history significant for a robotic mitral valve repair at the [facility] in  for mitral valve prolapse and severe mitral regurgitation.  Also CAD, HTN, dyslipidemia, rare and brief runs of SVT.      Preoperative coronary angiography  demonstrated only a 50% mid LAD stenosis.       2015 Morgan presented with a NSTEMI. He was brought to the cath lab where he was found to have what appeared to be a chronically occluded right coronary artery that could not be crossed with a wire.  He subsequently was transferred to [facility], underwent complex LAD diagonal intervention with drug-eluting stents placed in both vessels.  He tolerated the procedure quite well.       He was subsequently seen in our clinic because he woke up with a \"spell.\"  He was sent back to his primary care doctor for neurologic evaluation.  Subsequent echocardiogram 2/15/16 demonstrated a structurally normal heart.  His valve repair appeared to be functioning appropriately.  He has a normal ejection fraction.  A ZIO Patch showed some brief runs of SVT lasting 4 beats, but no atrial fibrillation.  Stress nuclear scan appeared to be consistent with a small to moderate size reversible inferior, inferoseptal and inferolateral defect consistent with his known anatomy.       In followup, he appeared to be doing well from a cardiac standpoint without exertional chest, arm, neck, jaw or shoulder discomfort.  He did have a low-grade discomfort that was there all the time, did not change with exercise and we felt this was not cardiac in origin.  Options were discussed, including medical management versus coronary artery bypass grafting versus a "  and it was decided to continue with medical management.  He was having problems with fatigue, tiredness, cold hands, cold feet and we ultimately discontinued his metoprolol, of which he was only on 12.5 mg at that time.       Morgan saw Dr. Alejandra 7/10/18.  He had some more fatigue with walking.  Dr. Alejandra ordered a stress echo.  This was done 7/18/18.  He exercised to an acceptable level.  Heart rate response was normal.  BP was a little high.  LVEF at rest was 55-60%.  There was no evidence of stress-induced ischemia.  Dr. Alejandra recommended he increase losartan from 50 mg to 100 mg.     I met Morgan 8/17/18.  His BP was better controlled.      9/5/18 saw urology, new diagnosis of high-grade prostate cancer.  11/1/18 s/p prostatectomy for prostate cancer.     6/19/19 saw PCP.  BP was 146/78.  He was started on Toprol XL 25 mg.      I saw Morgan 8/2/19 for annual follow up.  BPs were improved on Toprol XL 25 mg and losartan 100 mg.     Interval History:  9/9/19 radiofrequency nerve ablation.      9/19/19 PMD OV: Elevated BP.  He was off Toprol XL for 2 weeks.  Told to restart Toprol XL.      Called in to be seen.   He says he ran out of the Toprol XL, and had some issues with the pharmacy, and that's why he wasn't on it for 2 weeks.   He says the RFA nerve ablation did not help much.   No chest pain.  No FAUSTIN.  No palpitations.    He is exercising.  He walks and rides bike (outdoor in summer and indoor in winter).   But he is limited by his lower back pain.   He is having fairly severe urinary incontinence and is considering artificial urinary sphincter.      ROS:  Skin:  Positive for     Eyes:  Positive for glasses  ENT:  Negative for    Respiratory:  Negative for    Cardiovascular:  Negative    Gastroenterology: Negative for    Genitourinary:  Negative for    Musculoskeletal:  Positive for arthritis;back pain  Neurologic:  Negative for    Psychiatric:  Negative for    Heme/Lymph/Imm:  Negative for     Endocrine:  Positive for      PAST MEDICAL HISTORY:  Past Medical History:   Diagnosis Date     ADHD (attention deficit hyperactivity disorder)      CAD (coronary artery disease)     cardiac cath 1/23/15: SHERRY to 1st diagonal, SHERRY x2 to LAD, cath 2009: no intervention     Esophageal reflux      History of hyperthyroidism 4/9/2014     Hyperlipidemia      Hypertension      Mitral regurgitation 2009    moderately severe MVP, s/p robotic repair at [facility]     Prostate cancer      Pulmonary nodules 2009    CT-recommend repeat in 6-12 months     Rotator cuff rupture 11/3/2011       PAST SURGICAL HISTORY:  Past Surgical History:   Procedure Laterality Date     COLONOSCOPY  7/00    GI     DAVINCI PROSTATECTOMY, LYMPHADENECTOMY N/A 11/1/2018    Procedure: ROBOTIC ASSISTED RADICAL PROSTATECTOMY WITH BILATERAL PELVIC LYMPH NODE DISSECTION;  Surgeon: Clint Blankenship MD;  Location: [facility] OR     DECOMPRESSION LUMBAR MINIMALLY INVASIVE ONE LEVEL  1/11/2012    Procedure:DECOMPRESSION LUMBAR MINIMALLY INVASIVE ONE LEVEL; L4-5 Decompression Minimally Invasive; Surgeon:MESSI HORAN; Location:[facility] OR     HEART CATH RIGHT AND LEFT HEART CATH  01-23-15    See report, pt transfered to [facility] for complex bifurcation PCI of LAD diagonal vessel.      HEART CATH RIGHT AND LEFT HEART CATH  01-26-15    PTCA and implantation of SHERRY to 1st diagonal, SHERRY to mid LAD, SHERRY to proximal LAD     Hx of rotator cuff rupture and repair       LAPAROSCOPIC PROSTATECTOMY       REPAIR VALVE MITRAL  2009    [facility] robotic repair      Reversal of vasectomy       VASECTOMY       XR LUMBAR RADIOFREQ ABLATION UNILATERAL  01/11/2018    lumbar area/ ? level       SOCIAL HISTORY:  Social History     Social History     Marital status:      Spouse name: Chastity     Number of children: 4     Years of education: 14     Occupational History      [employer]     RETIRED     Social History Main Topics     Smoking status: Never Smoker      Smokeless tobacco: Never Used     Alcohol use 4.2 oz/week     7 Standard drinks or equivalent per week      Comment: 1 beer daily     Drug use: No     Sexual activity: Yes     Partners: Female     Birth control/ protection: Condom, Post-menopausal     Other Topics Concern     Caffeine Concern No     2-3 daily     Sleep Concern No     Special Diet No     low sodium     Exercise Yes     walking daily     Seat Belt Yes     Self-Exams No     Social History Narrative       FAMILY HISTORY:  Family History   Problem Relation Age of Onset     Cancer Mother         breast, lung     Depression Father         alcohlism     Diabetes No family hx of      Cardiovascular No family hx of      Cancer - colorectal No family hx of      Prostate Cancer No family hx of        MEDS: Acetaminophen (TYLENOL PO), Take 325-650 mg by mouth 2 times daily as needed for mild pain or fever  aspirin EC 81 MG EC tablet, Take 1 tablet (81 mg) by mouth daily  atorvastatin (LIPITOR) 40 MG tablet, Take 1 tablet (40 mg) by mouth daily  cholecalciferol (VITAMIN D3) 1000 UNIT tablet, Take 1 tablet (1,000 Units) by mouth daily  Cyanocobalamin (B-12) 1000 MCG TBCR, Take 1,000 mcg by mouth daily  FLUoxetine (PROZAC) 20 MG capsule, Take 1 capsule (20 mg) by mouth daily  levothyroxine (SYNTHROID/LEVOTHROID) 50 MCG tablet, Take 1 tablet (50 mcg) by mouth daily  losartan (COZAAR) 100 MG tablet, Take 1 tablet (100 mg) by mouth daily  metoprolol succinate ER (TOPROL-XL) 25 MG 24 hr tablet, Take 1 tablet (25 mg) by mouth daily  multivitamin, therapeutic with minerals (MULTI-VITAMIN) TABS tablet, Take 1 tablet by mouth daily  nitroGLYcerin (NITROSTAT) 0.4 MG sublingual tablet, TAKE 1 TABLET BY MOUTH EVERY 5 MIN AS NEEDED FOR CHEST PAIN  Omega-3 Fatty Acids (FISH OIL PO), Take 1 capsule by mouth daily  omeprazole (PRILOSEC) 20 MG DR capsule, Take 1 capsule (20 mg) by mouth daily  order for DME, Equipment being ordered: Digital home blood pressure monitor kit    No  "current facility-administered medications on file prior to visit.       ALLERGIES:   Allergies   Allergen Reactions     Ace Inhibitors Cough     Dry cough     No Clinical Screening - See Comments      PN: LW FI1: NKA       PHYSICAL EXAM:  Vitals: /72 (BP Location: Right arm)   Pulse 65   Ht 1.664 m (5' 5.5\")   Wt 68.5 kg (151 lb)   SpO2 97%   BMI 24.75 kg/m    Constitutional:  cooperative, alert and oriented, well developed, well nourished, in no acute distress        Skin:  warm and dry to the touch, no apparent skin lesions or masses noted        Head:  normocephalic, no masses or lesions        Eyes:  pupils equal and round;conjunctivae and lids unremarkable;sclera white;no xanthalasma        ENT:  no pallor or cyanosis, dentition good        Neck:  JVP normal        Respiratory:  normal breath sounds, clear to auscultation, normal A-P diameter, normal symmetry, normal respiratory excursion, no use of accessory muscles        Cardiac: regular rhythm;normal S1 and S2;no murmurs, gallops or rubs detected                  GI:  abdomen soft;BS normoactive        Vascular: pulses full and equal                                      Extremities and Musculoskeletal:  no edema;no spinal abnormalities noted;normal muscle strength and tone;no deformities, clubbing, cyanosis, erythema observed        Neurological:  no gross motor deficits;affect appropriate          LABS/DATA:  I reviewed the following:  Stress echo 7/18/18:  Interpretation Summary  1. Average exercise capacity, target HR achieved.  2. The patient exhibited no chest pain during exercise.  3. This was a normal stress EKG with no evidence of stress-induced ischemia.  4. Rest echo: Normal left ventricular function and wall motion at rest. The  visual ejection fraction is estimated at 55-60%.  5. Stress echo: This was a normal stress echocardiogram with no evidence of  stress-induced ischemia. The visual ejection fraction is estimated at " 65-70%.     Limited TTE views:  1. s/p posterior mitral leaflet repair with some restricted motion. Mean  5mmHg.  2. There is mild (1+) mitral regurgitation.  3. There is mild (1+) aortic regurgitation.     Stress echo was normal in 1/2013.  No changes on TTE views compared to 2/2016.        Echo 7/31/19:  Interpretation Summary  1. The left ventricle is normal in structure, function and size. The visual  ejection fraction is estimated at 55%.  2. The right ventricle is normal in structure, function and size.  3. s/p posterior MV leaflet repair. Mean 3mmHg. There is mild (1+) mitral  regurgitation.  4. There is mild (1+) aortic regurgitation.     No changes from echo 7/2018.      Component      Latest Ref Rng & Units 8/2/2019   Sodium      133 - 144 mmol/L 139   Potassium      3.4 - 5.3 mmol/L 4.2   Chloride      94 - 109 mmol/L 106   Carbon Dioxide      20 - 32 mmol/L 28   Anion Gap      3 - 14 mmol/L 5   Glucose      70 - 99 mg/dL 103 (H)   Urea Nitrogen      7 - 30 mg/dL 14   Creatinine      0.66 - 1.25 mg/dL 0.82   GFR Estimate      >60 mL/min/1.73:m2 89   GFR Estimate If Black      >60 mL/min/1.73:m2 >90   Calcium      8.5 - 10.1 mg/dL 9.0   Cholesterol      <200 mg/dL 207 (H)   Triglycerides      <150 mg/dL 147   HDL Cholesterol      >39 mg/dL 64   LDL Cholesterol Calculated      <100 mg/dL 114 (H)   Non HDL Cholesterol      <130 mg/dL 143 (H)   ALT      0 - 70 U/L 36   * NOT FASTING        ASSESSMENT/PLAN:  Mitral valve prolapse and severe mitral regurgitation s/p robotic mitral valve repair at the [facility] in 2009  - Stress echo 7/2018 shows valve working well, MG 5 mmHg, mild MR  - Echo 7/31/19: LVEF 55%, s/p posterior MV leaflet repair, MG 3 mmHg, mild MR      CAD  - Preoperative coronary angiography 2009 demonstrated only a 50% mid LAD stenosis.   - 1/2015 Jose presented with a NSTEMI.  Cardiac cath:  RCA, underwent complex LAD diagonal intervention with drug-eluting stents placed in both vessels.    - Negative stress echo 7/2018  - No anginal sxs  - Continue ASA, BB, statin.        HTN  - Now controlled on losartan 100 mg, Toprol XL 25 mg  - He will call if consistently >130  - He does tell me he has some higher BPs at home.  He will come in 2 weeks for RN check and have his home cuff checked as well      Dyslipidemia  - 8/2/19 lipid panel (NOT FASTING): , HDL 64, ,   - Though he was not fasting, it does look like his LDL has been trending up since 2017  - He will work on dietary changes and continue atorvastatin 40 mg  - Repeat next year      Rare and brief runs of SVT  - Asymptomatic       Prostate cancer s/p prostatectomy  - Still following with urology. He is having fairly severe urinary incontinence and is considering artificial urinary sphincter.           Follow up:  Dr. Alejandra 7/2020 with BMP, FLP        Cornell De Anda PA-C

## 2019-10-09 NOTE — PROGRESS NOTES
Subjective:SUBJECTIVE  Subjective changes as noted by pt: Patient reports being sore for 2 days following previous visit.  Pain is intermittent at this time patient notes increased strength     Current pain level: 1/10 Current Pain level: 1/10   Changes in function: Patient was able to walk 1/2 mile     Adverse reaction to treatment or activity:  None     OBJECTIVE  Changes in objective findings: Patient demonstrates improved strength lower abdominal muscles.  HPI                    Objective:  System    Physical Exam    General     ROS    Assessment/Plan:    ASSESSMENT/PLAN  Updated problem list and treatment plan: Diagnosis 1:  Lumbar pain  Pain -  hot/cold therapy, self management, education and home program  Decreased ROM/flexibility - therapeutic exercise, therapeutic activity and home program  Decreased strength - therapeutic exercise, therapeutic activities and home program  Progress toward STG/LTGs have been made:  Yes,   Assessment of Progress: The patient's condition is improving.  Self Management Plans:  Patient has been instructed in a home treatment program.  I have re-evaluated this patient and find that the nature, scope, duration and intensity of the therapy is appropriate for the medical condition of the patient.  Jose continues to require the following intervention to meet STG and LT's:  PT    Recommendations:  Pt has not returned for treatment since 8-19-19. Pt discharged at this time.      Please refer to the daily flowsheet for treatment today, total treatment time and time spent performing 1:1 timed codes.

## 2019-10-15 ENCOUNTER — ALLIED HEALTH/NURSE VISIT (OUTPATIENT)
Dept: CARDIOLOGY | Facility: CLINIC | Age: 69
End: 2019-10-15
Attending: PHYSICIAN ASSISTANT
Payer: COMMERCIAL

## 2019-10-15 ENCOUNTER — CARE COORDINATION (OUTPATIENT)
Dept: CARDIOLOGY | Facility: CLINIC | Age: 69
End: 2019-10-15

## 2019-10-15 VITALS — HEART RATE: 50 BPM | DIASTOLIC BLOOD PRESSURE: 82 MMHG | SYSTOLIC BLOOD PRESSURE: 158 MMHG

## 2019-10-15 DIAGNOSIS — I10 ESSENTIAL HYPERTENSION, BENIGN: ICD-10-CM

## 2019-10-15 DIAGNOSIS — I10 ESSENTIAL HYPERTENSION, BENIGN: Primary | ICD-10-CM

## 2019-10-15 PROCEDURE — 99211 OFF/OP EST MAY X REQ PHY/QHP: CPT

## 2019-10-15 NOTE — PROGRESS NOTES
[facility] NURSE VISIT    Last OV was 10/1/19 w/ Cornell De Anda PA-C. BP was 138/72, HR was 65 bpm on Toprol XL 25 mg once daily and Losartan 100 mg daily. Reported home BPs elevated. Cornell advised to have nurse visit in 2 wks to check home cuff.     Patient is here today for a blood pressure check. Patient took medications at 0530 AM today including losartan 100 mg and Toprol 25 mg.       Manual Blood pressure taken at 1040 AM:  Site: Right arm  Position: Chair  Cuff size: Adult regular  BP: 158/82    Pulse: 50 bpm    Patient's is asymptomatic. Has been meditating this morning before coming here.   Home BP cuff checked: 164/92 HR 46  Home cuff readings: 153/96 HR 59, 156/94 HR 64, 172/116 HR 57    Advised to continue current med regimen for now. Will route to Cornell De Anda PA-C to see if she wants to make any changes.     Will route to ordering Provider: Cornell De Anda PA-C   Primary cardiologist: Dr. Alejandra  In-Clinic cardiologist: Dr. Doug Kemp RN 10/15/19 11:00 AM  [facility]

## 2019-10-15 NOTE — NURSING NOTE
Pt in clinic for nurse visit per Cornell De Anda PA-C. See care coordination encounter for details.   Ordering Provider: Cornell De Anda PA-C   Primary cardiologist: Dr. Alejandra  In-Clinic cardiologist: Dr. Doug Kemp RN, BSN 10/15/19 10:57 AM

## 2019-10-16 RX ORDER — IRBESARTAN 150 MG/1
150 TABLET ORAL DAILY
Qty: 30 TABLET | Refills: 3 | Status: SHIPPED | OUTPATIENT
Start: 2019-10-16 | End: 2019-10-30

## 2019-10-16 NOTE — PROGRESS NOTES
Called pt, reviewed Cornell De Anda PA-C recommendations. Pt understood, agreed to stop losartan, start irbesartan 150 mg daily. Sent Rx.   Scheduled for nurse visit 10/30/19 at 1000, BMP at 1030.  Viridiana HERNANDEZ, BSN  10/16/19 12:28 PM

## 2019-10-16 NOTE — PROGRESS NOTES
Instead of adding another medicine, have  Him stop losartan and start irbesartan 150 mg daily.   See me or you in 2-3 weeks with BMP.   If BP still >140 mmHg, we will increase to 300 mg daily with same follow up.     Thanks,   Cornell De Anda PA-C 10/16/2019 11:21 AM

## 2019-10-21 ENCOUNTER — OFFICE VISIT (OUTPATIENT)
Dept: PALLIATIVE MEDICINE | Facility: CLINIC | Age: 69
End: 2019-10-21
Payer: COMMERCIAL

## 2019-10-21 ENCOUNTER — OFFICE VISIT (OUTPATIENT)
Dept: UROLOGY | Facility: CLINIC | Age: 69
End: 2019-10-21
Payer: COMMERCIAL

## 2019-10-21 VITALS — BODY MASS INDEX: 24.11 KG/M2 | HEART RATE: 66 BPM | OXYGEN SATURATION: 97 % | WEIGHT: 150 LBS | HEIGHT: 66 IN

## 2019-10-21 VITALS
OXYGEN SATURATION: 100 % | HEART RATE: 55 BPM | WEIGHT: 150.1 LBS | DIASTOLIC BLOOD PRESSURE: 90 MMHG | SYSTOLIC BLOOD PRESSURE: 150 MMHG | BODY MASS INDEX: 24.6 KG/M2

## 2019-10-21 DIAGNOSIS — N39.3 STRESS INCONTINENCE OF URINE: ICD-10-CM

## 2019-10-21 DIAGNOSIS — G89.29 CHRONIC LEFT-SIDED LOW BACK PAIN WITHOUT SCIATICA: Primary | ICD-10-CM

## 2019-10-21 DIAGNOSIS — Z85.46 PERSONAL HISTORY OF MALIGNANT NEOPLASM OF PROSTATE: ICD-10-CM

## 2019-10-21 DIAGNOSIS — M54.50 CHRONIC LEFT-SIDED LOW BACK PAIN WITHOUT SCIATICA: Primary | ICD-10-CM

## 2019-10-21 DIAGNOSIS — Z85.46 PERSONAL HISTORY OF MALIGNANT NEOPLASM OF PROSTATE: Primary | ICD-10-CM

## 2019-10-21 PROCEDURE — 99213 OFFICE O/P EST LOW 20 MIN: CPT | Performed by: PHYSICAL MEDICINE & REHABILITATION

## 2019-10-21 PROCEDURE — 99214 OFFICE O/P EST MOD 30 MIN: CPT | Performed by: UROLOGY

## 2019-10-21 PROCEDURE — 36415 COLL VENOUS BLD VENIPUNCTURE: CPT | Performed by: UROLOGY

## 2019-10-21 PROCEDURE — 84153 ASSAY OF PSA TOTAL: CPT | Performed by: UROLOGY

## 2019-10-21 ASSESSMENT — MIFFLIN-ST. JEOR: SCORE: 1378.15

## 2019-10-21 ASSESSMENT — PAIN SCALES - GENERAL
PAINLEVEL: MILD PAIN (3)
PAINLEVEL: MILD PAIN (2)

## 2019-10-21 NOTE — PATIENT INSTRUCTIONS
----------------------------------------------------------------  [facility] [place] Number:  [phone]     Call with any questions about your care and for scheduling assistance.     Calls are returned Monday through Friday between 8 AM and 4:30 PM. We usually get back to you within 2 business days depending on the issue/request.    If we are prescribing your medications:    For opioid medication refills, call the clinic or send a [employer] message 7 days in advance.  Please include:    Name of requested medication    Name of the pharmacy.    For non-opioid medications, call your pharmacy directly to request a refill. Please allow 3-4 days to be processed.     Per [place] State Law:    All controlled substance prescriptions must be filled within 30 days of being written.      For those controlled substances allowing refills, pickup must occur within 30 days of last fill.      We believe regular attendance is key to your success in our program!      Any time you are unable to keep your appointment we ask that you call us at least 24 hours in advance to cancel.This will allow us to offer the appointment time to another patient.     Multiple missed appointments may lead to dismissal from the clinic.

## 2019-10-21 NOTE — PROGRESS NOTES
[facility]    Date of visit: 10/21/2019    Chief complaint:   Chief Complaint   Patient presents with     Pain       Interval history:  Jose Moreno is a 69 year old male last seen by me on 8/29/19.      Recommendations/plan at the last visit included:  1. Physical Therapy: Referral placed to [facility] for lumbar spondylosis and facet artropathy. Did recommend that he do this but If insurance approves RFA without having to re-do PT, he can hold off on this.  2. Clinical Health Pain Psychologist: Coping well, defer at this time.  3. Self Care Recommendations: Gentle progressive exercise that does not increase pain - gradually increase daily walking program.  Take mini breaks - 5 minutes of mindfullness a couple times a day.   4. Diagnostic Studies: none  5. Medication Management:    6. Continue prn ibuprofen  7. Continue methocarbamol 500mg TID prn.  8. Further procedures recommended:   1. Lumbar MBB/RFA ordered  9. Follow up: 1 month post procedure in clinic    Since his last visit, Jose Moreno reports:  -Back pain is ongoing, the RFA helped with some of the sharper pains he used to get but not the constant back pain.  -Not doing any exercises regularly.  -Still working, , cant take a lot of medications due to this.  -Considering surgical options, has an appointment coming up with a med-spine provider.  -Considering going to [place] to discuss some kind of spacer for his discs/low back.    Pain scores:  Pain intensity on average is 5/10 a scale of 0-10.        Current pain treatments:   -Ibuprofen prn  -Tylenol 650mg prn       Past pain treatments:  -none  -Lumbar RFA with 6 months relief, repeated on 9/9/19 without significant pain relief  -Lumbar facet injections with 2-3 months relief  -PT   -Methocarbamol prn-nh  Side Effects: none    Medications:  Current Outpatient Medications   Medication Sig Dispense Refill     Acetaminophen (TYLENOL PO)  Take 325-650 mg by mouth 2 times daily as needed for mild pain or fever       aspirin EC 81 MG EC tablet Take 1 tablet (81 mg) by mouth daily 60 tablet 0     atorvastatin (LIPITOR) 40 MG tablet Take 1 tablet (40 mg) by mouth daily 90 tablet 3     cholecalciferol (VITAMIN D3) 1000 UNIT tablet Take 1 tablet (1,000 Units) by mouth daily       Cyanocobalamin (B-12) 1000 MCG TBCR Take 1,000 mcg by mouth daily       FLUoxetine (PROZAC) 20 MG capsule Take 1 capsule (20 mg) by mouth daily 90 capsule 3     irbesartan (AVAPRO) 150 MG tablet Take 1 tablet (150 mg) by mouth daily 30 tablet 3     levothyroxine (SYNTHROID/LEVOTHROID) 50 MCG tablet Take 1 tablet (50 mcg) by mouth daily 90 tablet 3     metoprolol succinate ER (TOPROL-XL) 25 MG 24 hr tablet Take 1 tablet (25 mg) by mouth daily (Patient not taking: Reported on 10/21/2019) 90 tablet 3     multivitamin, therapeutic with minerals (MULTI-VITAMIN) TABS tablet Take 1 tablet by mouth daily       nitroGLYcerin (NITROSTAT) 0.4 MG sublingual tablet TAKE 1 TABLET BY MOUTH EVERY 5 MIN AS NEEDED FOR CHEST PAIN 25 tablet 1     Omega-3 Fatty Acids (FISH OIL PO) Take 1 capsule by mouth daily       omeprazole (PRILOSEC) 20 MG DR capsule Take 1 capsule (20 mg) by mouth daily 90 capsule 3     order for DME Equipment being ordered: Digital home blood pressure monitor kit 1 Units 0       Medical History: any changes in medical history since they were last seen? No    Review of Systems:  The 14 system ROS was reviewed from the intake questionnaire, and is positive for: back pain, joint pain  Any bowel or bladder problems: denies  Mood: denies    Physical Exam:  Blood pressure (!) 150/90, pulse 55, weight 68.1 kg (150 lb 1.6 oz), SpO2 100 %.  General: NAD, pleasant  Gait: Antalgic  MSK exam: Lumbar ROM is mildly reduced in extension/rotation bilaterally, pain with palpation of the left lower lumbar paraspinals. LE strength is 5/5 and symmetric.       Assessment:  Mr. Moreno is a 68 year old  with past medical history including: CAD (nstemi, s/p PTCA), HTN, mitral valve repair, mitral regurgitation, HLD, Hypothyroidism, GERD, Prostate cancer (s/p prostatectomy in 2018) who presents for follow up of chronic low back pain.     His chronic low back pain is likely due to a combination of lumbar spondylosis, scoliosis, facet artropathy. He has had several facet injections (3 months relief initially but last 2 rounds of injections with 4-6 weeks of relief unfortunately) and radiofrequency ablations (6 months relief) but improvement in his back pain and increased activity tolerance when injections are performed. At this point he would like to try MBB/RFA again. MBB in July of 2019 was beneficial, completed 4 PT sessions without relief. He underwent a repeat left sided RFA but only mild improvement in pain. Facet injections have been performed with declining benefit. At this point he is considering surgical options and will be seeing a med spine specialist initially.    Plan:  The following recommendations were given to the patient. Diagnosis, treatment options, risks, benefits, and alternatives were discussed, and all questions were answered. The patient expressed understanding of the plan for management.      I am recommending a multidisciplinary treatment plan to help this patient better manage his pain.  This includes:   1. Physical Therapy: continue exercises recommended by PT  2. Clinical Health Pain Psychologist: Coping well, defer at this time.  3. Self Care Recommendations: Gentle progressive exercise that does not increase pain - gradually increase daily walking program.  Take mini breaks - 5 minutes of mindfullness a couple times a day.   4. Diagnostic Studies: none  5. Medication Management:    1. Continue prn ibuprofen 400-600mg q4h prn  2. Alternate with tylenol 500-650mg q4h prn  6. Further procedures recommended: Repeated left lumbar RFA with only mild improvement, no other procedures recommended  at this time. Consider an epidural based on recommendations from surgeon.  7. Follow up: 1 month post procedure in clinic       I spent 30 minutes of time face to face with the patient.  Greater than 50% of this time was spent in patient counseling  and/or coordination of care.      Jersey Richter,   [facility]

## 2019-10-21 NOTE — NURSING NOTE
Pt here to talk surgery... AUS.    No PSA done.  Pt had surg scheduled in June and cancelled.  ANGÉLICA Greer CMA

## 2019-10-21 NOTE — PROGRESS NOTES
Office Visit Note  [facility]  [phone]    UROLOGIC DIAGNOSES:   pT2 [place] 3+4 = 7 prostate cancer    CURRENT INTERVENTIONS:   Robotic prostatectomy November 2018    HISTORY:   Morgan returns to clinic today for prostate cancer follow-up.  He has not yet had his PSA rechecked.  He continues to report severe urinary leakage.  He reports very little control of his urine during the day and has taken to wearing condom catheters during the day.  He has less leakage at night but does still wear a pad at night.  He has not seen any improvement for months and has been doing Kegel exercises.      PAST MEDICAL HISTORY:   Past Medical History:   Diagnosis Date     ADHD (attention deficit hyperactivity disorder)      CAD (coronary artery disease)     cardiac cath 1/23/15: SHERRY to 1st diagonal, SHERRY x2 to LAD, cath 2009: no intervention     Esophageal reflux      History of hyperthyroidism 4/9/2014     Hyperlipidemia      Hypertension      Mitral regurgitation 2009    moderately severe MVP, s/p robotic repair at [place]     Prostate cancer      Pulmonary nodules 2009    CT-recommend repeat in 6-12 months     Rotator cuff rupture 11/3/2011       PAST SURGICAL HISTORY:   Past Surgical History:   Procedure Laterality Date     COLONOSCOPY  7/00    GI     DAVINCI PROSTATECTOMY, LYMPHADENECTOMY N/A 11/1/2018    Procedure: ROBOTIC ASSISTED RADICAL PROSTATECTOMY WITH BILATERAL PELVIC LYMPH NODE DISSECTION;  Surgeon: Clint Blankenship MD;  Location: [facility] OR     DECOMPRESSION LUMBAR MINIMALLY INVASIVE ONE LEVEL  1/11/2012    Procedure:DECOMPRESSION LUMBAR MINIMALLY INVASIVE ONE LEVEL; L4-5 Decompression Minimally Invasive; Surgeon:MESSI HORAN; Location:[facility] OR     HEART CATH RIGHT AND LEFT HEART CATH  01-23-15    See report, pt transfered to [facility] for complex bifurcation PCI of LAD diagonal vessel.      HEART CATH RIGHT AND LEFT HEART CATH  01-26-15    PTCA and implantation of SHERRY to 1st diagonal, SHERRY to mid  "LAD, SHERRY to proximal LAD     Hx of rotator cuff rupture and repair       LAPAROSCOPIC PROSTATECTOMY       REPAIR VALVE MITRAL  2009    [facility] robotic repair      Reversal of vasectomy       VASECTOMY       XR LUMBAR RADIOFREQ ABLATION UNILATERAL  01/11/2018    lumbar area/ ? level       FAMILY HISTORY:   Family History   Problem Relation Age of Onset     Cancer Mother         breast, lung     Depression Father         alcohlism     Diabetes No family hx of      Cardiovascular No family hx of      Cancer - colorectal No family hx of      Prostate Cancer No family hx of        SOCIAL HISTORY:   Social History     Tobacco Use     Smoking status: Never Smoker     Smokeless tobacco: Never Used   Substance Use Topics     Alcohol use: Yes     Alcohol/week: 7.0 standard drinks     Types: 7 Standard drinks or equivalent per week     Comment: 1 beer daily       Current Outpatient Medications   Medication     Acetaminophen (TYLENOL PO)     aspirin EC 81 MG EC tablet     atorvastatin (LIPITOR) 40 MG tablet     cholecalciferol (VITAMIN D3) 1000 UNIT tablet     Cyanocobalamin (B-12) 1000 MCG TBCR     FLUoxetine (PROZAC) 20 MG capsule     irbesartan (AVAPRO) 150 MG tablet     levothyroxine (SYNTHROID/LEVOTHROID) 50 MCG tablet     metoprolol succinate ER (TOPROL-XL) 25 MG 24 hr tablet     multivitamin, therapeutic with minerals (MULTI-VITAMIN) TABS tablet     nitroGLYcerin (NITROSTAT) 0.4 MG sublingual tablet     Omega-3 Fatty Acids (FISH OIL PO)     omeprazole (PRILOSEC) 20 MG DR capsule     order for DME     No current facility-administered medications for this visit.          PHYSICAL EXAM:    Pulse 66   Ht 1.676 m (5' 6\")   Wt 68 kg (150 lb)   SpO2 97%   BMI 24.21 kg/m      Constitutional: Well developed. Conversant and in no acute distress  Eyes: Anicteric sclera, conjunctiva clear, normal extraocular movements  ENT: Normocephalic and atraumatic,   Skin: Warm and dry. No rashes or lesions  Cardiac: No peripheral " edema  Back/Flank: Not done  CNS/PNS: Normal musculature and movements, moves all extremities normally  Respiratory: Normal non-labored breathing  Abdomen: Soft nontender and nondistended  Peripheral Vascular: No peripheral edema  Mental Status/Psych: Alert and Oriented x 3. Normal mood and affect    Penis: Not done  Scrotal Skin: Not done  Testicles: Not done  Epididymis: Not done  Digital Rectal Exam:     Cystoscopy: Not done    Imaging: None    Urinalysis: UA RESULTS:  Recent Labs   Lab Test 12/06/18 11/13/18  2356   COLOR Yellow Yellow   APPEARANCE Clear Cloudy   URINEGLC Neg Negative   URINEBILI Neg Negative   URINEKETONE Neg Negative   SG  --  1.031   UBLD Neg Large*   URINEPH 7.0 5.0   PROTEIN  --  100*   UROBILINOGEN 0.2  --    NITRITE Neg Positive*   LEUKEST  --  Large*   RBCU  --  >182*   WBCU  --  >182*       PSA:     Post Void Residual:     Other labs: None today      IMPRESSION:  History of prostate cancer, urinary incontinence    PLAN:  We first discussed his prostate cancer history.  He needs his PSA checked today.  I will contact him with those results.  We discussed his urinary incontinence as well.  He has had severe urinary incontinence after his surgery.  It does not appear to be improving.  We discussed treatment options.  He would like to proceed with an artificial urinary sphincter placement.  We discussed artificial urinary sphincter placement in detail today along with its risks, particularly the risk of infection, erosion, or mechanical failure.  He understands these risks and wishes to proceed.    I will contact him with his PSA results.  If the PSA remains undetectable we can schedule his artificial urinary sphincter placement.  Before unofficially nursing for placement I would recommend a cystoscopy in the office and also that we check a urine culture 1 week before the procedure.  All this was discussed in detail today and I answered all of his questions.      Clint Blankenship M.D.

## 2019-10-21 NOTE — LETTER
10/21/2019       RE: Jose Moreno  [address] [facility] [address]     Dear Colleague,    Thank you for referring your patient, Jose Moreno, to the [facility] [place] at [facility]. Please see a copy of my visit note below.    Office Visit Note  [facility]  [phone]    UROLOGIC DIAGNOSES:   pT2 [place] 3+4 = 7 prostate cancer    CURRENT INTERVENTIONS:   Robotic prostatectomy November 2018    HISTORY:   Morgan returns to clinic today for prostate cancer follow-up.  He has not yet had his PSA rechecked.  He continues to report severe urinary leakage.  He reports very little control of his urine during the day and has taken to wearing condom catheters during the day.  He has less leakage at night but does still wear a pad at night.  He has not seen any improvement for months and has been doing Kegel exercises.      PAST MEDICAL HISTORY:   Past Medical History:   Diagnosis Date     ADHD (attention deficit hyperactivity disorder)      CAD (coronary artery disease)     cardiac cath 1/23/15: SHERRY to 1st diagonal, SHERRY x2 to LAD, cath 2009: no intervention     Esophageal reflux      History of hyperthyroidism 4/9/2014     Hyperlipidemia      Hypertension      Mitral regurgitation 2009    moderately severe MVP, s/p robotic repair at [place]     Prostate cancer      Pulmonary nodules 2009    CT-recommend repeat in 6-12 months     Rotator cuff rupture 11/3/2011       PAST SURGICAL HISTORY:   Past Surgical History:   Procedure Laterality Date     COLONOSCOPY  7/00    GI     DAVINCI PROSTATECTOMY, LYMPHADENECTOMY N/A 11/1/2018    Procedure: ROBOTIC ASSISTED RADICAL PROSTATECTOMY WITH BILATERAL PELVIC LYMPH NODE DISSECTION;  Surgeon: Clint Blankenship MD;  Location: [facility] OR     DECOMPRESSION LUMBAR MINIMALLY INVASIVE ONE LEVEL  1/11/2012    Procedure:DECOMPRESSION LUMBAR MINIMALLY INVASIVE ONE LEVEL; L4-5 Decompression  Minimally Invasive; Surgeon:MESSI HORAN; Location:[facility] OR     HEART CATH RIGHT AND LEFT HEART CATH  01-23-15    See report, pt transfered to [facility] for complex bifurcation PCI of LAD diagonal vessel.      HEART CATH RIGHT AND LEFT HEART CATH  01-26-15    PTCA and implantation of SHERRY to 1st diagonal, SHERRY to mid LAD, SHERRY to proximal LAD     Hx of rotator cuff rupture and repair       LAPAROSCOPIC PROSTATECTOMY       REPAIR VALVE MITRAL  2009    [facility] robotic repair      Reversal of vasectomy       VASECTOMY       XR LUMBAR RADIOFREQ ABLATION UNILATERAL  01/11/2018    lumbar area/ ? level       FAMILY HISTORY:   Family History   Problem Relation Age of Onset     Cancer Mother         breast, lung     Depression Father         alcohlism     Diabetes No family hx of      Cardiovascular No family hx of      Cancer - colorectal No family hx of      Prostate Cancer No family hx of        SOCIAL HISTORY:   Social History     Tobacco Use     Smoking status: Never Smoker     Smokeless tobacco: Never Used   Substance Use Topics     Alcohol use: Yes     Alcohol/week: 7.0 standard drinks     Types: 7 Standard drinks or equivalent per week     Comment: 1 beer daily       Current Outpatient Medications   Medication     Acetaminophen (TYLENOL PO)     aspirin EC 81 MG EC tablet     atorvastatin (LIPITOR) 40 MG tablet     cholecalciferol (VITAMIN D3) 1000 UNIT tablet     Cyanocobalamin (B-12) 1000 MCG TBCR     FLUoxetine (PROZAC) 20 MG capsule     irbesartan (AVAPRO) 150 MG tablet     levothyroxine (SYNTHROID/LEVOTHROID) 50 MCG tablet     metoprolol succinate ER (TOPROL-XL) 25 MG 24 hr tablet     multivitamin, therapeutic with minerals (MULTI-VITAMIN) TABS tablet     nitroGLYcerin (NITROSTAT) 0.4 MG sublingual tablet     Omega-3 Fatty Acids (FISH OIL PO)     omeprazole (PRILOSEC) 20 MG DR capsule     order for DME     No current facility-administered medications for this visit.          PHYSICAL EXAM:    Pulse 66   Ht  "1.676 m (5' 6\")   Wt 68 kg (150 lb)   SpO2 97%   BMI 24.21 kg/m       Constitutional: Well developed. Conversant and in no acute distress  Eyes: Anicteric sclera, conjunctiva clear, normal extraocular movements  ENT: Normocephalic and atraumatic,   Skin: Warm and dry. No rashes or lesions  Cardiac: No peripheral edema  Back/Flank: Not done  CNS/PNS: Normal musculature and movements, moves all extremities normally  Respiratory: Normal non-labored breathing  Abdomen: Soft nontender and nondistended  Peripheral Vascular: No peripheral edema  Mental Status/Psych: Alert and Oriented x 3. Normal mood and affect    Penis: Not done  Scrotal Skin: Not done  Testicles: Not done  Epididymis: Not done  Digital Rectal Exam:     Cystoscopy: Not done    Imaging: None    Urinalysis: UA RESULTS:  Recent Labs   Lab Test 12/06/18 11/13/18  2356   COLOR Yellow Yellow   APPEARANCE Clear Cloudy   URINEGLC Neg Negative   URINEBILI Neg Negative   URINEKETONE Neg Negative   SG  --  1.031   UBLD Neg Large*   URINEPH 7.0 5.0   PROTEIN  --  100*   UROBILINOGEN 0.2  --    NITRITE Neg Positive*   LEUKEST  --  Large*   RBCU  --  >182*   WBCU  --  >182*       PSA:     Post Void Residual:     Other labs: None today      IMPRESSION:  History of prostate cancer, urinary incontinence    PLAN:  We first discussed his prostate cancer history.  He needs his PSA checked today.  I will contact him with those results.  We discussed his urinary incontinence as well.  He has had severe urinary incontinence after his surgery.  It does not appear to be improving.  We discussed treatment options.  He would like to proceed with an artificial urinary sphincter placement.  We discussed artificial urinary sphincter placement in detail today along with its risks, particularly the risk of infection, erosion, or mechanical failure.  He understands these risks and wishes to proceed.    I will contact him with his PSA results.  If the PSA remains undetectable we can " schedule his artificial urinary sphincter placement.  Before unofficially nursing for placement I would recommend a cystoscopy in the office and also that we check a urine culture 1 week before the procedure.  All this was discussed in detail today and I answered all of his questions.      Clint Blankenship M.D.

## 2019-10-22 LAB — PSA SERPL-MCNC: <0.01 UG/L (ref 0–4)

## 2019-10-23 DIAGNOSIS — N39.3 STRESS INCONTINENCE OF URINE: Primary | ICD-10-CM

## 2019-10-23 RX ORDER — CEFAZOLIN SODIUM 1 G/50ML
1 INJECTION, SOLUTION INTRAVENOUS SEE ADMIN INSTRUCTIONS
Status: CANCELLED | OUTPATIENT
Start: 2019-10-23

## 2019-10-23 RX ORDER — CEFAZOLIN SODIUM 2 G/50ML
2 SOLUTION INTRAVENOUS
Status: CANCELLED | OUTPATIENT
Start: 2019-10-23

## 2019-10-26 ENCOUNTER — HEALTH MAINTENANCE LETTER (OUTPATIENT)
Age: 69
End: 2019-10-26

## 2019-10-30 ENCOUNTER — ALLIED HEALTH/NURSE VISIT (OUTPATIENT)
Dept: CARDIOLOGY | Facility: CLINIC | Age: 69
End: 2019-10-30
Payer: COMMERCIAL

## 2019-10-30 VITALS — HEART RATE: 58 BPM | SYSTOLIC BLOOD PRESSURE: 170 MMHG | DIASTOLIC BLOOD PRESSURE: 78 MMHG

## 2019-10-30 DIAGNOSIS — I10 ESSENTIAL HYPERTENSION, BENIGN: Primary | ICD-10-CM

## 2019-10-30 DIAGNOSIS — I10 ESSENTIAL HYPERTENSION, BENIGN: ICD-10-CM

## 2019-10-30 LAB
ANION GAP SERPL CALCULATED.3IONS-SCNC: 6 MMOL/L (ref 3–14)
BUN SERPL-MCNC: 13 MG/DL (ref 7–30)
CALCIUM SERPL-MCNC: 9 MG/DL (ref 8.5–10.1)
CHLORIDE SERPL-SCNC: 103 MMOL/L (ref 94–109)
CO2 SERPL-SCNC: 28 MMOL/L (ref 20–32)
CREAT SERPL-MCNC: 0.81 MG/DL (ref 0.66–1.25)
GFR SERPL CREATININE-BSD FRML MDRD: 89 ML/MIN/{1.73_M2}
GLUCOSE SERPL-MCNC: 98 MG/DL (ref 70–99)
POTASSIUM SERPL-SCNC: 4 MMOL/L (ref 3.4–5.3)
SODIUM SERPL-SCNC: 137 MMOL/L (ref 133–144)

## 2019-10-30 PROCEDURE — 36415 COLL VENOUS BLD VENIPUNCTURE: CPT | Performed by: PHYSICIAN ASSISTANT

## 2019-10-30 PROCEDURE — 80048 BASIC METABOLIC PNL TOTAL CA: CPT | Performed by: PHYSICIAN ASSISTANT

## 2019-10-30 PROCEDURE — 99207 ZZC NO CHARGE NURSE ONLY: CPT | Performed by: PHYSICIAN ASSISTANT

## 2019-10-30 RX ORDER — IRBESARTAN 300 MG/1
300 TABLET ORAL DAILY
Qty: 30 TABLET | Refills: 0 | Status: SHIPPED | OUTPATIENT
Start: 2019-10-30 | End: 2019-11-26

## 2019-10-30 NOTE — PROGRESS NOTES
Discussed w/ Cornell De Anda PA-C who advised to increase irbesartan from 150 mg to 300 mg, Nurse visit and BMP x2-3 wks.     Called pt, no answer, no vm setup.   Sent So1 message w/ recommendations. Advised to call me to setup Nurse visit and BMP for 2-3 wks. Sent Rx to [facility] pharmacy for irbesartan 300 mg daily.   Viridiana RN, BSN  10/30/19 12:15 PM

## 2019-10-30 NOTE — PROGRESS NOTES
"[facility] NURSE VISIT    - 10/1/19 OV w/ Cornell, /72 HR 65 on Toprol XL 25 mg and losartan 100 mg daily. No changes. Ordered nurse visit x2 wks.   -  10/15/19 Nurse visit, /82 HR 50. Cornell De Anda PA-C switched from losartan 100 mg to irbesartan 150 mg. Continued Toprol XL 25 mg daily. Ordered nurse visit and BMP x2-3 wks. \"If BP still >140 mmHg, we will increase to 300 mg daily with same follow up\".     Patient is here today for a blood pressure check.Patient took medications at 0530 AM today including Toprol XL 25 mg. Took irbesartan 150 mg at 9 pm last night.     Manual Blood pressure taken at 1011 AM  Site: Right arm  Position: chair   Cuff size: adult regular  BP: 176/86  - On Recheck 5 mins later: 170/78   Pulse: 58 bpm    Patient's is asymptomatic. States he has chronic lower back pain. Had [employer] cheeseburger and fries last night for dinner. Does not typically eat a lot of fast food.     BMP results below compared to previous:  Component      Latest Ref Rng & Units 8/2/2019 10/30/2019   Sodium      133 - 144 mmol/L 139 137   Potassium      3.4 - 5.3 mmol/L 4.2 4.0   Chloride      94 - 109 mmol/L 106 103   Carbon Dioxide      20 - 32 mmol/L 28 28   Anion Gap      3 - 14 mmol/L 5 6   Glucose      70 - 99 mg/dL 103 (H) 98   Urea Nitrogen      7 - 30 mg/dL 14 13   Creatinine      0.66 - 1.25 mg/dL 0.82 0.81   GFR Estimate      >60 mL/min/1.73:m2 89 89   GFR Estimate If Black      >60 mL/min/1.73:m2 >90 >90   Calcium      8.5 - 10.1 mg/dL 9.0 9.0       Advised to continue current med regimen. Will call patient if provider has any further recommendations.   Follow-up is ordered for 7/2020.    Will route to ordering provider Cornell De Anda PA-C   Provider in clinic today: Dr. Camacho  Patient's primary cardiologist: Dr. Justyn Kemp RN 10/30/19 10:28 AM  [facility]   "

## 2019-10-31 ENCOUNTER — OFFICE VISIT (OUTPATIENT)
Dept: UROLOGY | Facility: CLINIC | Age: 69
End: 2019-10-31
Payer: COMMERCIAL

## 2019-10-31 VITALS
DIASTOLIC BLOOD PRESSURE: 90 MMHG | OXYGEN SATURATION: 96 % | HEIGHT: 66 IN | SYSTOLIC BLOOD PRESSURE: 148 MMHG | HEART RATE: 72 BPM | WEIGHT: 150 LBS | BODY MASS INDEX: 24.11 KG/M2

## 2019-10-31 DIAGNOSIS — Z79.2 PROPHYLACTIC ANTIBIOTIC: Primary | ICD-10-CM

## 2019-10-31 DIAGNOSIS — Z85.46 PERSONAL HISTORY OF MALIGNANT NEOPLASM OF PROSTATE: ICD-10-CM

## 2019-10-31 DIAGNOSIS — N39.3 STRESS INCONTINENCE OF URINE: ICD-10-CM

## 2019-10-31 LAB
ALBUMIN UR-MCNC: 100 MG/DL
APPEARANCE UR: CLEAR
BILIRUB UR QL STRIP: NEGATIVE
COLOR UR AUTO: YELLOW
GLUCOSE UR STRIP-MCNC: NEGATIVE MG/DL
HGB UR QL STRIP: NEGATIVE
KETONES UR STRIP-MCNC: NEGATIVE MG/DL
LEUKOCYTE ESTERASE UR QL STRIP: NEGATIVE
NITRATE UR QL: NEGATIVE
PH UR STRIP: 7 PH (ref 5–7)
SOURCE: ABNORMAL
SP GR UR STRIP: 1.01 (ref 1–1.03)
UROBILINOGEN UR STRIP-ACNC: 0.2 EU/DL (ref 0.2–1)

## 2019-10-31 PROCEDURE — 52000 CYSTOURETHROSCOPY: CPT | Performed by: UROLOGY

## 2019-10-31 PROCEDURE — 81003 URINALYSIS AUTO W/O SCOPE: CPT | Performed by: UROLOGY

## 2019-10-31 PROCEDURE — 99213 OFFICE O/P EST LOW 20 MIN: CPT | Mod: 25 | Performed by: UROLOGY

## 2019-10-31 RX ORDER — LIDOCAINE HYDROCHLORIDE 20 MG/ML
JELLY TOPICAL ONCE
Status: COMPLETED | OUTPATIENT
Start: 2019-10-31 | End: 2019-10-31

## 2019-10-31 RX ORDER — CIPROFLOXACIN 500 MG/1
500 TABLET, FILM COATED ORAL ONCE
Qty: 1 TABLET | Refills: 0 | Status: SHIPPED | OUTPATIENT
Start: 2019-10-31 | End: 2019-12-27

## 2019-10-31 RX ADMIN — LIDOCAINE HYDROCHLORIDE: 20 JELLY TOPICAL at 08:45

## 2019-10-31 ASSESSMENT — MIFFLIN-ST. JEOR: SCORE: 1378.15

## 2019-10-31 ASSESSMENT — PAIN SCALES - GENERAL: PAINLEVEL: NO PAIN (0)

## 2019-10-31 NOTE — NURSING NOTE
Chief Complaint   Patient presents with     Cystoscopy     Pt here for cysto due to incontinence     Prior to the start of the procedure and with procedural staff participation, I verbally confirmed the patient s identity using two indicators, relevant allergies, that the procedure was appropriate and matched the consent or emergent situation, and that the correct equipment/implants were available. Immediately prior to starting the procedure I conducted the Time Out with the procedural staff and re-confirmed the patient s name, procedure, and site/side. I have wiped the patient off with the povidone-Iodine solution, draped them,  used Lidocaine hydrochloride jelly, and instilled sterile water into the bladder. (The Joint Commission universal protocol was followed.)  Yes    Sedation (Moderate or Deep): None  UA RESULTS:  Recent Labs   Lab Test 10/31/19  0840  11/13/18  2356   COLOR Yellow   < > Yellow   APPEARANCE Clear   < > Cloudy   URINEGLC Negative   < > Negative   URINEBILI Negative   < > Negative   URINEKETONE Negative   < > Negative   SG 1.015  --  1.031   UBLD Negative   < > Large*   URINEPH 7.0   < > 5.0   PROTEIN 100*  --  100*   UROBILINOGEN 0.2   < >  --    NITRITE Negative   < > Positive*   LEUKEST Negative  --  Large*   RBCU  --   --  >182*   WBCU  --   --  >182*    < > = values in this interval not displayed.   5mL 2% lidocaine hydrochloride Urojet instilled into urethra.    NDC# [address]-03  Lot #: [id number]  Expiration Date:  6/21    Melissa Winslow CMA

## 2019-10-31 NOTE — PROGRESS NOTES
Office Visit Note  [facility]  [phone]    UROLOGIC DIAGNOSES:   pT2 [place] 3+4=7 prostate cancer, urinary incontinence    CURRENT INTERVENTIONS:   Robotic prostatectomy November 2018    HISTORY:   Morgan returns to clinic today for further evaluation of a cystoscopy.  He wishes to undergo an artificial urinary center placement and I recommended a cystoscopy to rule out any stricture disease.  He continues to complain of incontinence.      PAST MEDICAL HISTORY:   Past Medical History:   Diagnosis Date     ADHD (attention deficit hyperactivity disorder)      CAD (coronary artery disease)     cardiac cath 1/23/15: SHERRY to 1st diagonal, SHERRY x2 to LAD, cath 2009: no intervention     Esophageal reflux      History of hyperthyroidism 4/9/2014     Hyperlipidemia      Hypertension      Mitral regurgitation 2009    moderately severe MVP, s/p robotic repair at [place]     Prostate cancer      Pulmonary nodules 2009    CT-recommend repeat in 6-12 months     Rotator cuff rupture 11/3/2011       PAST SURGICAL HISTORY:   Past Surgical History:   Procedure Laterality Date     COLONOSCOPY  7/00    GI     DAVINCI PROSTATECTOMY, LYMPHADENECTOMY N/A 11/1/2018    Procedure: ROBOTIC ASSISTED RADICAL PROSTATECTOMY WITH BILATERAL PELVIC LYMPH NODE DISSECTION;  Surgeon: Clint Blankenship MD;  Location: [facility] OR     DECOMPRESSION LUMBAR MINIMALLY INVASIVE ONE LEVEL  1/11/2012    Procedure:DECOMPRESSION LUMBAR MINIMALLY INVASIVE ONE LEVEL; L4-5 Decompression Minimally Invasive; Surgeon:MESSI HORAN; Location:[facility] OR     HEART CATH RIGHT AND LEFT HEART CATH  01-23-15    See report, pt transfered to [facility] for complex bifurcation PCI of LAD diagonal vessel.      HEART CATH RIGHT AND LEFT HEART CATH  01-26-15    PTCA and implantation of SHERRY to 1st diagonal, SHERRY to mid LAD, SHERRY to proximal LAD     Hx of rotator cuff rupture and repair       LAPAROSCOPIC PROSTATECTOMY       REPAIR VALVE MITRAL  2009    [facility] robotic  "repair      Reversal of vasectomy       VASECTOMY       XR LUMBAR RADIOFREQ ABLATION UNILATERAL  01/11/2018    lumbar area/ ? level       FAMILY HISTORY:   Family History   Problem Relation Age of Onset     Cancer Mother         breast, lung     Depression Father         alcohlism     Diabetes No family hx of      Cardiovascular No family hx of      Cancer - colorectal No family hx of      Prostate Cancer No family hx of        SOCIAL HISTORY:   Social History     Tobacco Use     Smoking status: Never Smoker     Smokeless tobacco: Never Used   Substance Use Topics     Alcohol use: Yes     Alcohol/week: 7.0 standard drinks     Types: 7 Standard drinks or equivalent per week     Comment: 1 beer daily       Current Outpatient Medications   Medication     aspirin EC 81 MG EC tablet     atorvastatin (LIPITOR) 40 MG tablet     cholecalciferol (VITAMIN D3) 1000 UNIT tablet     ciprofloxacin (CIPRO) 500 MG tablet     Cyanocobalamin (B-12) 1000 MCG TBCR     FLUoxetine (PROZAC) 20 MG capsule     irbesartan (AVAPRO) 300 MG tablet     levothyroxine (SYNTHROID/LEVOTHROID) 50 MCG tablet     metoprolol succinate ER (TOPROL-XL) 25 MG 24 hr tablet     multivitamin, therapeutic with minerals (MULTI-VITAMIN) TABS tablet     Omega-3 Fatty Acids (FISH OIL PO)     omeprazole (PRILOSEC) 20 MG DR capsule     Acetaminophen (TYLENOL PO)     nitroGLYcerin (NITROSTAT) 0.4 MG sublingual tablet     order for DME     No current facility-administered medications for this visit.          PHYSICAL EXAM:    BP (!) 148/90   Pulse 72   Ht 1.676 m (5' 6\")   Wt 68 kg (150 lb)   SpO2 96%   BMI 24.21 kg/m      Constitutional: Well developed. Conversant and in no acute distress  Eyes: Anicteric sclera, conjunctiva clear, normal extraocular movements  ENT: Normocephalic and atraumatic,   Skin: Warm and dry. No rashes or lesions  Cardiac: No peripheral edema  Back/Flank: Not done  CNS/PNS: Normal musculature and movements, moves all extremities " normally  Respiratory: Normal non-labored breathing  Abdomen: Soft nontender and nondistended  Peripheral Vascular: No peripheral edema  Mental Status/Psych: Alert and Oriented x 3. Normal mood and affect    Penis: Not done  Scrotal Skin: Not done  Testicles: Not done  Epididymis: Not done  Digital Rectal Exam:     Cystoscopy: I performed flexible cystoscopy today and the bladder was normal throughout.  No tumors identified throughout the bladder.  The urethra was normal throughout.  No ureteral stricture.  I visualized the urethral sphincter and had him do Kegel exercises and he had good coaptation of the urethra with Kegel exercises.    Imaging: None    Urinalysis: UA RESULTS:  Recent Labs   Lab Test 10/31/19  0840  11/13/18  2356   COLOR Yellow   < > Yellow   APPEARANCE Clear   < > Cloudy   URINEGLC Negative   < > Negative   URINEBILI Negative   < > Negative   URINEKETONE Negative   < > Negative   SG 1.015  --  1.031   UBLD Negative   < > Large*   URINEPH 7.0   < > 5.0   PROTEIN 100*  --  100*   UROBILINOGEN 0.2   < >  --    NITRITE Negative   < > Positive*   LEUKEST Negative  --  Large*   RBCU  --   --  >182*   WBCU  --   --  >182*    < > = values in this interval not displayed.       PSA: Undetectable    Post Void Residual:     Other labs: None today      IMPRESSION:  Urinary incontinence    PLAN:  We again discussed treatment options for his urinary incontinence.  Interestingly, on cystoscopy today he had very good coaptation of the urethral sphincter when he performed Kegel exercises.  He says that he will continue Kegel exercises but likely wants to proceed with artificial urinary sphincter placement in December.  We discussed the procedure in detail today along with its risks again, including infection and mechanical failure.  He will be seen in clinic 1 week prior to his procedure for a urinalysis/urine culture.    Total Time: 20 min                                      Total in Consultation: 15  erika Blankenship M.D.

## 2019-10-31 NOTE — PATIENT INSTRUCTIONS

## 2019-10-31 NOTE — LETTER
10/31/2019       RE: Jose Moreno  [id number] [address] [facility] [id number]     Dear Colleague,    Thank you for referring your patient, Jose Moreno, to the [facility] UROLOGY [facility] [place] at [facility]. Please see a copy of my visit note below.    Office Visit Note  [facility]  [phone]    UROLOGIC DIAGNOSES:   pT2 Shane 3+4=7 prostate cancer, urinary incontinence    CURRENT INTERVENTIONS:   Robotic prostatectomy November 2018    HISTORY:   Morgan returns to clinic today for further evaluation of a cystoscopy.  He wishes to undergo an artificial urinary center placement and I recommended a cystoscopy to rule out any stricture disease.  He continues to complain of incontinence.      PAST MEDICAL HISTORY:   Past Medical History:   Diagnosis Date     ADHD (attention deficit hyperactivity disorder)      CAD (coronary artery disease)     cardiac cath 1/23/15: SHERRY to 1st diagonal, SHERRY x2 to LAD, cath 2009: no intervention     Esophageal reflux      History of hyperthyroidism 4/9/2014     Hyperlipidemia      Hypertension      Mitral regurgitation 2009    moderately severe MVP, s/p robotic repair at [place]     Prostate cancer      Pulmonary nodules 2009    CT-recommend repeat in 6-12 months     Rotator cuff rupture 11/3/2011       PAST SURGICAL HISTORY:   Past Surgical History:   Procedure Laterality Date     COLONOSCOPY  7/00    GI     DAVINCI PROSTATECTOMY, LYMPHADENECTOMY N/A 11/1/2018    Procedure: ROBOTIC ASSISTED RADICAL PROSTATECTOMY WITH BILATERAL PELVIC LYMPH NODE DISSECTION;  Surgeon: Clint Blankenship MD;  Location: [facility] OR     DECOMPRESSION LUMBAR MINIMALLY INVASIVE ONE LEVEL  1/11/2012    Procedure:DECOMPRESSION LUMBAR MINIMALLY INVASIVE ONE LEVEL; L4-5 Decompression Minimally Invasive; Surgeon:MESSI HORAN; Location:[facility] OR     HEART CATH RIGHT AND LEFT HEART CATH  01-23-15    See report, pt transfered to [facility] for  "complex bifurcation PCI of LAD diagonal vessel.      HEART CATH RIGHT AND LEFT HEART CATH  01-26-15    PTCA and implantation of SHERRY to 1st diagonal, SHERRY to mid LAD, SHERRY to proximal LAD     Hx of rotator cuff rupture and repair       LAPAROSCOPIC PROSTATECTOMY       REPAIR VALVE MITRAL  2009    [facility] robotic repair      Reversal of vasectomy       VASECTOMY       XR LUMBAR RADIOFREQ ABLATION UNILATERAL  01/11/2018    lumbar area/ ? level       FAMILY HISTORY:   Family History   Problem Relation Age of Onset     Cancer Mother         breast, lung     Depression Father         alcohlism     Diabetes No family hx of      Cardiovascular No family hx of      Cancer - colorectal No family hx of      Prostate Cancer No family hx of        SOCIAL HISTORY:   Social History     Tobacco Use     Smoking status: Never Smoker     Smokeless tobacco: Never Used   Substance Use Topics     Alcohol use: Yes     Alcohol/week: 7.0 standard drinks     Types: 7 Standard drinks or equivalent per week     Comment: 1 beer daily       Current Outpatient Medications   Medication     aspirin EC 81 MG EC tablet     atorvastatin (LIPITOR) 40 MG tablet     cholecalciferol (VITAMIN D3) 1000 UNIT tablet     ciprofloxacin (CIPRO) 500 MG tablet     Cyanocobalamin (B-12) 1000 MCG TBCR     FLUoxetine (PROZAC) 20 MG capsule     irbesartan (AVAPRO) 300 MG tablet     levothyroxine (SYNTHROID/LEVOTHROID) 50 MCG tablet     metoprolol succinate ER (TOPROL-XL) 25 MG 24 hr tablet     multivitamin, therapeutic with minerals (MULTI-VITAMIN) TABS tablet     Omega-3 Fatty Acids (FISH OIL PO)     omeprazole (PRILOSEC) 20 MG DR capsule     Acetaminophen (TYLENOL PO)     nitroGLYcerin (NITROSTAT) 0.4 MG sublingual tablet     order for DME     No current facility-administered medications for this visit.          PHYSICAL EXAM:    BP (!) 148/90   Pulse 72   Ht 1.676 m (5' 6\")   Wt 68 kg (150 lb)   SpO2 96%   BMI 24.21 kg/m       Constitutional: Well developed. " Conversant and in no acute distress  Eyes: Anicteric sclera, conjunctiva clear, normal extraocular movements  ENT: Normocephalic and atraumatic,   Skin: Warm and dry. No rashes or lesions  Cardiac: No peripheral edema  Back/Flank: Not done  CNS/PNS: Normal musculature and movements, moves all extremities normally  Respiratory: Normal non-labored breathing  Abdomen: Soft nontender and nondistended  Peripheral Vascular: No peripheral edema  Mental Status/Psych: Alert and Oriented x 3. Normal mood and affect    Penis: Not done  Scrotal Skin: Not done  Testicles: Not done  Epididymis: Not done  Digital Rectal Exam:     Cystoscopy: I performed flexible cystoscopy today and the bladder was normal throughout.  No tumors identified throughout the bladder.  The urethra was normal throughout.  No ureteral stricture.  I visualized the urethral sphincter and had him do Kegel exercises and he had good coaptation of the urethra with Kegel exercises.    Imaging: None    Urinalysis: UA RESULTS:  Recent Labs   Lab Test 10/31/19  0840  11/13/18  2356   COLOR Yellow   < > Yellow   APPEARANCE Clear   < > Cloudy   URINEGLC Negative   < > Negative   URINEBILI Negative   < > Negative   URINEKETONE Negative   < > Negative   SG 1.015  --  1.031   UBLD Negative   < > Large*   URINEPH 7.0   < > 5.0   PROTEIN 100*  --  100*   UROBILINOGEN 0.2   < >  --    NITRITE Negative   < > Positive*   LEUKEST Negative  --  Large*   RBCU  --   --  >182*   WBCU  --   --  >182*    < > = values in this interval not displayed.       PSA: Undetectable    Post Void Residual:     Other labs: None today      IMPRESSION:  Urinary incontinence    PLAN:  We again discussed treatment options for his urinary incontinence.  Interestingly, on cystoscopy today he had very good coaptation of the urethral sphincter when he performed Kegel exercises.  He says that he will continue Kegel exercises but likely wants to proceed with artificial urinary sphincter placement in  December.  We discussed the procedure in detail today along with its risks again, including infection and mechanical failure.  He will be seen in clinic 1 week prior to his procedure for a urinalysis/urine culture.    Total Time: 20 min                                      Total in Consultation: 15 min      Clint Blankenship M.D.

## 2019-11-04 NOTE — PROGRESS NOTES
Second attempt to reach pt to review Cornell De Anda PA-C recommendation to increase irbesartan from 150 to 300 mg, nurse visit and BMP x2-3 wks.   No answer, left vm.   Pt has not checked vSocial message yet either.   Viridiana RN, BSN  11/04/19 3:13 PM

## 2019-11-07 NOTE — PROGRESS NOTES
Pt called back, reviewed recs to increase irbesartan from 150 to 300 mg daily. Pt states he did this already. Scheduled pt for nurse visit and BMP 11/21/19.   Viridiana RN, BSN  11/07/19 9:56 AM

## 2019-11-21 ENCOUNTER — CARE COORDINATION (OUTPATIENT)
Dept: CARDIOLOGY | Facility: CLINIC | Age: 69
End: 2019-11-21

## 2019-11-21 ENCOUNTER — ALLIED HEALTH/NURSE VISIT (OUTPATIENT)
Dept: CARDIOLOGY | Facility: CLINIC | Age: 69
End: 2019-11-21
Payer: COMMERCIAL

## 2019-11-21 VITALS
BODY MASS INDEX: 24.72 KG/M2 | WEIGHT: 153.8 LBS | SYSTOLIC BLOOD PRESSURE: 138 MMHG | HEART RATE: 70 BPM | HEIGHT: 66 IN | DIASTOLIC BLOOD PRESSURE: 76 MMHG

## 2019-11-21 DIAGNOSIS — I10 ESSENTIAL HYPERTENSION, BENIGN: ICD-10-CM

## 2019-11-21 LAB
ANION GAP SERPL CALCULATED.3IONS-SCNC: 6 MMOL/L (ref 3–14)
BUN SERPL-MCNC: 20 MG/DL (ref 7–30)
CALCIUM SERPL-MCNC: 9.1 MG/DL (ref 8.5–10.1)
CHLORIDE SERPL-SCNC: 107 MMOL/L (ref 94–109)
CO2 SERPL-SCNC: 26 MMOL/L (ref 20–32)
CREAT SERPL-MCNC: 0.87 MG/DL (ref 0.66–1.25)
GFR SERPL CREATININE-BSD FRML MDRD: 86 ML/MIN/{1.73_M2}
GLUCOSE SERPL-MCNC: 119 MG/DL (ref 70–99)
POTASSIUM SERPL-SCNC: 4 MMOL/L (ref 3.4–5.3)
SODIUM SERPL-SCNC: 139 MMOL/L (ref 133–144)

## 2019-11-21 PROCEDURE — 36415 COLL VENOUS BLD VENIPUNCTURE: CPT | Performed by: PHYSICIAN ASSISTANT

## 2019-11-21 PROCEDURE — 99207 ZZC NO CHARGE NURSE ONLY: CPT

## 2019-11-21 PROCEDURE — 80048 BASIC METABOLIC PNL TOTAL CA: CPT | Performed by: PHYSICIAN ASSISTANT

## 2019-11-21 ASSESSMENT — MIFFLIN-ST. JEOR: SCORE: 1395.38

## 2019-11-21 NOTE — PROGRESS NOTES
- 10/1/19 last OV w/ Cornell De Anda PA-C. BP was 138/72, HR was 65 bpm on Toprol XL 25 mg once daily and Losartan 100 mg daily. Reported home BPs elevated. Cornell advised to have nurse visit in 2 wks to check home cuff.     - 10/15/19 Nurse visit, /82 HR 50, asymptomatic. Cornell De Anda PA-C switched from losartan 100 mg to irbesartan 150 mg. Continued Toprol XL 25 mg daily. Ordered nurse visit and BMP x2-3 wks.     - 10/30/19 Nurse visit, BP was 176/86, on recheck 170/78, HR 58 bpm, asymptomatic, had fast food the night before. Cornell advised to increase irbesartan from 150 mg to 300 mg daily. Nurse visit and BMP x2-3 wks.     TODAY - pt in clinic for nurse visit. Took Metoprolol succinate 25 mg this morning around 0830 AM. Takes irbesartan 300 mg in evening at bedtime ~10pm.     Office blood pressure and pulse  Site: Right arm  Position: Chair   Cuff size: Adult regular  BP: 136/76    Pulse: 70 bpm    Tolerance: Pt is asymptomatic. BMP results below compared to previous:  Component      Latest Ref Rng & Units 8/2/2019 10/30/2019 11/21/2019   Sodium      133 - 144 mmol/L 139 137 139   Potassium      3.4 - 5.3 mmol/L 4.2 4.0 4.0   Chloride      94 - 109 mmol/L 106 103 107   Carbon Dioxide      20 - 32 mmol/L 28 28 26   Anion Gap      3 - 14 mmol/L 5 6 6   Glucose      70 - 99 mg/dL 103 (H) 98 119 (H)   Urea Nitrogen      7 - 30 mg/dL 14 13 20   Creatinine      0.66 - 1.25 mg/dL 0.82 0.81 0.87   GFR Estimate      >60 mL/min/1.73:m2 89 89 86   GFR Estimate If Black      >60 mL/min/1.73:m2 >90 >90 >90   Calcium      8.5 - 10.1 mg/dL 9.0 9.0 9.1       Follow-up w/ Dr. Alejandra and labs (FLP/ALT) ordered for 7/2020.    Routing to ordering provider: Cornell De Anda PA-C   Provider in clinic today: Dr. Garza    Patient's cardiologist: Dr. Justyn Kemp RN, BSN 11/21/19 11:37 AM  [facility]

## 2019-11-21 NOTE — NURSING NOTE
Pt in clinic for nurse visit and BMP per Cornell De Anda PA-C.   See care coordination encounter for details.     Ordering provider: Cornell De Anda PA-C   In-Clinic provider: Dr. Garza    Primary cardiologist: Dr. Justyn Kemp RN, BSN  11/21/19 11:30 AM

## 2019-11-26 DIAGNOSIS — I10 ESSENTIAL HYPERTENSION, BENIGN: ICD-10-CM

## 2019-11-26 RX ORDER — IRBESARTAN 300 MG/1
300 TABLET ORAL DAILY
Qty: 90 TABLET | Refills: 2 | Status: SHIPPED | OUTPATIENT
Start: 2019-11-26 | End: 2020-08-14

## 2019-11-26 NOTE — PROGRESS NOTES
BPs and lab results reviewed.   BP better controlled.   BMP generally stable.   Continue current regimen and follow up plan.     Cornell De Anda PA-C 11/26/2019 11:33 AM

## 2019-11-26 NOTE — TELEPHONE ENCOUNTER
Received refill request for:  Irbesartan  Last OV was: 10/1/2019 with JOHANA Espinoza  Labs/EKG: last BMP 11/21/2019  F/U scheduled: orders in Epic for 7/2020  New script sent to: [facility]

## 2019-11-26 NOTE — PROGRESS NOTES
Called pt, reviewed Cornell De Anda PA-C recommendations. Pt understood. Will follow-up as planned 8/2020 w/ labs and OV w/ Dr. Alejandra.   Viridiana RN, BSN  11/26/19 12:50 PM

## 2019-12-18 ENCOUNTER — TRANSFERRED RECORDS (OUTPATIENT)
Dept: FAMILY MEDICINE | Facility: CLINIC | Age: 69
End: 2019-12-18

## 2019-12-27 DIAGNOSIS — R32 URINARY INCONTINENCE: Primary | ICD-10-CM

## 2019-12-27 LAB
ALBUMIN UR-MCNC: 100 MG/DL
APPEARANCE UR: CLEAR
BILIRUB UR QL STRIP: NEGATIVE
COLOR UR AUTO: YELLOW
GLUCOSE UR STRIP-MCNC: NEGATIVE MG/DL
HGB UR QL STRIP: NEGATIVE
KETONES UR STRIP-MCNC: NEGATIVE MG/DL
LEUKOCYTE ESTERASE UR QL STRIP: NEGATIVE
NITRATE UR QL: POSITIVE
PH UR STRIP: 7 PH (ref 5–7)
SOURCE: ABNORMAL
SP GR UR STRIP: 1.02 (ref 1–1.03)
UROBILINOGEN UR STRIP-ACNC: 0.2 EU/DL (ref 0.2–1)

## 2019-12-27 PROCEDURE — 87186 SC STD MICRODIL/AGAR DIL: CPT | Performed by: UROLOGY

## 2019-12-27 PROCEDURE — 87088 URINE BACTERIA CULTURE: CPT | Performed by: UROLOGY

## 2019-12-27 PROCEDURE — 87086 URINE CULTURE/COLONY COUNT: CPT | Performed by: UROLOGY

## 2019-12-27 PROCEDURE — 81003 URINALYSIS AUTO W/O SCOPE: CPT | Mod: QW | Performed by: UROLOGY

## 2019-12-27 NOTE — PHARMACY-ADMISSION MEDICATION HISTORY
Admission medication history interview status for this patient is complete. See [facility] admission navigator for allergy information, prior to admission medications and immunization status.     PTA meds completed by pre-admitting nurse Jocelin Alejandro and reviewed by pharmacy       Prior to Admission medications    Medication Sig Last Dose Taking? Auth Provider   Acetaminophen (TYLENOL PO) Take 325-650 mg by mouth 2 times daily as needed for mild pain or fever  Yes Reported, Patient   aspirin EC 81 MG EC tablet Take 1 tablet (81 mg) by mouth daily  Yes Jose Nunez MD   atorvastatin (LIPITOR) 40 MG tablet Take 1 tablet (40 mg) by mouth daily  Yes Cornell De Anda PA-C   cholecalciferol (VITAMIN D3) 1000 UNIT tablet Take 1 tablet (1,000 Units) by mouth daily  Yes Jerri Tavera MD   Cyanocobalamin (B-12) 1000 MCG TBCR Take 1,000 mcg by mouth daily  Yes Jerri Tavera MD   FLUoxetine (PROZAC) 20 MG capsule Take 1 capsule (20 mg) by mouth daily  Yes Raysa Singh PA-C   irbesartan (AVAPRO) 300 MG tablet Take 1 tablet (300 mg) by mouth daily  Yes Cornell De Anda PA-C   levothyroxine (SYNTHROID/LEVOTHROID) 50 MCG tablet Take 1 tablet (50 mcg) by mouth daily  Yes Jerri Tavera MD   metoprolol succinate ER (TOPROL-XL) 25 MG 24 hr tablet Take 1 tablet (25 mg) by mouth daily  Yes Cornell De Anda PA-C   multivitamin, therapeutic with minerals (MULTI-VITAMIN) TABS tablet Take 1 tablet by mouth daily  Yes Jerri Tavera MD   nitroGLYcerin (NITROSTAT) 0.4 MG sublingual tablet TAKE 1 TABLET BY MOUTH EVERY 5 MIN AS NEEDED FOR CHEST PAIN  Yes Cornell De Anda PA-C   Omega-3 Fatty Acids (FISH OIL PO) Take 1 capsule by mouth daily  Yes Reported, Patient   omeprazole (PRILOSEC) 20 MG DR capsule Take 1 capsule (20 mg) by mouth daily  Yes Jerri Tavera MD   order for DME Equipment being ordered: Digital home blood pressure monitor kit  Patient not taking: Reported on 10/31/2019   Raysa Singh PA-C

## 2019-12-28 LAB
BACTERIA SPEC CULT: ABNORMAL
Lab: ABNORMAL
SPECIMEN SOURCE: ABNORMAL

## 2019-12-30 ENCOUNTER — TELEPHONE (OUTPATIENT)
Dept: UROLOGY | Facility: CLINIC | Age: 69
End: 2019-12-30

## 2019-12-30 ENCOUNTER — OFFICE VISIT (OUTPATIENT)
Dept: FAMILY MEDICINE | Facility: CLINIC | Age: 69
End: 2019-12-30

## 2019-12-30 VITALS
RESPIRATION RATE: 20 BRPM | TEMPERATURE: 97.5 F | WEIGHT: 156.2 LBS | HEIGHT: 66 IN | HEART RATE: 68 BPM | SYSTOLIC BLOOD PRESSURE: 142 MMHG | BODY MASS INDEX: 25.1 KG/M2 | DIASTOLIC BLOOD PRESSURE: 76 MMHG

## 2019-12-30 DIAGNOSIS — F33.42 MAJOR DEPRESSIVE DISORDER, RECURRENT EPISODE, IN FULL REMISSION (H): ICD-10-CM

## 2019-12-30 DIAGNOSIS — I10 ESSENTIAL HYPERTENSION, BENIGN: ICD-10-CM

## 2019-12-30 DIAGNOSIS — N39.3 STRESS INCONTINENCE: ICD-10-CM

## 2019-12-30 DIAGNOSIS — Z01.818 PRE-OP EXAM: Primary | ICD-10-CM

## 2019-12-30 DIAGNOSIS — Z87.440 PERSONAL HISTORY OF URINARY TRACT INFECTION: ICD-10-CM

## 2019-12-30 DIAGNOSIS — E03.4 HYPOTHYROIDISM DUE TO ACQUIRED ATROPHY OF THYROID: ICD-10-CM

## 2019-12-30 DIAGNOSIS — N39.0 URINARY TRACT INFECTION: Primary | ICD-10-CM

## 2019-12-30 DIAGNOSIS — I25.10 CORONARY ARTERY DISEASE INVOLVING NATIVE CORONARY ARTERY OF NATIVE HEART WITHOUT ANGINA PECTORIS: Chronic | ICD-10-CM

## 2019-12-30 DIAGNOSIS — Z87.440 PERSONAL HISTORY OF URINARY TRACT INFECTION: Primary | ICD-10-CM

## 2019-12-30 DIAGNOSIS — I25.2 HISTORY OF NON-ST ELEVATION MYOCARDIAL INFARCTION (NSTEMI): ICD-10-CM

## 2019-12-30 DIAGNOSIS — Z98.890 STATUS POST MITRAL VALVE REPAIR: ICD-10-CM

## 2019-12-30 LAB
ALBUMIN UR-MCNC: 100 MG/DL
APPEARANCE UR: CLEAR
BILIRUB UR QL STRIP: NEGATIVE
COLOR UR AUTO: YELLOW
ERYTHROCYTE [DISTWIDTH] IN BLOOD BY AUTOMATED COUNT: 12.2 %
GLUCOSE UR STRIP-MCNC: NEGATIVE MG/DL
HCT VFR BLD AUTO: 44.3 % (ref 40–53)
HEMOGLOBIN: 14.4 G/DL (ref 13.3–17.7)
HGB UR QL STRIP: NEGATIVE
KETONES UR STRIP-MCNC: NEGATIVE MG/DL
LEUKOCYTE ESTERASE UR QL STRIP: NEGATIVE
MCH RBC QN AUTO: 32.7 PG (ref 26–33)
MCHC RBC AUTO-ENTMCNC: 32.5 G/DL (ref 31–36)
MCV RBC AUTO: 100.6 FL (ref 78–100)
NITRATE UR QL: NEGATIVE
PH UR STRIP: 7 PH (ref 5–7)
PLATELET COUNT - QUEST: 296 10^9/L (ref 150–375)
RBC # BLD AUTO: 4.4 10*12/L (ref 4.4–5.9)
SOURCE: ABNORMAL
SP GR UR STRIP: 1.01 (ref 1–1.03)
UROBILINOGEN UR STRIP-ACNC: 0.2 EU/DL (ref 0.2–1)
WBC # BLD AUTO: 6.7 10*9/L (ref 4–11)

## 2019-12-30 PROCEDURE — 87086 URINE CULTURE/COLONY COUNT: CPT | Performed by: UROLOGY

## 2019-12-30 PROCEDURE — 81003 URINALYSIS AUTO W/O SCOPE: CPT | Performed by: UROLOGY

## 2019-12-30 PROCEDURE — 80053 COMPREHEN METABOLIC PANEL: CPT | Performed by: PHYSICIAN ASSISTANT

## 2019-12-30 PROCEDURE — 36415 COLL VENOUS BLD VENIPUNCTURE: CPT | Performed by: PHYSICIAN ASSISTANT

## 2019-12-30 PROCEDURE — 99214 OFFICE O/P EST MOD 30 MIN: CPT | Performed by: PHYSICIAN ASSISTANT

## 2019-12-30 PROCEDURE — 85027 COMPLETE CBC AUTOMATED: CPT | Performed by: PHYSICIAN ASSISTANT

## 2019-12-30 RX ORDER — SULFAMETHOXAZOLE/TRIMETHOPRIM 800-160 MG
1 TABLET ORAL 2 TIMES DAILY
Qty: 14 TABLET | Refills: 0 | Status: SHIPPED | OUTPATIENT
Start: 2019-12-30 | End: 2020-01-27

## 2019-12-30 ASSESSMENT — MIFFLIN-ST. JEOR: SCORE: 1406.27

## 2019-12-30 NOTE — TELEPHONE ENCOUNTER
----- Message from Summer Dover PA-C sent at 12/30/2019  9:30 AM CST -----  Regarding: RE: UTI  Bactrim DS 1 tab BID x 7 days  ----- Message -----  From: Annabelle Corey LPN  Sent: 12/30/2019   8:26 AM CST  To: Summer Dover PA-C, Annabelle Corey LPN  Subject: UTI                                              Patient having surgery with SDB on Friday . Urine culture is positive  can I get an antibiotic order so he can start today. Thanks

## 2019-12-30 NOTE — NURSING NOTE
Jose Moreno is here for a pre-op exam.    Questioned patient about current smoking habits.  Pt. has never smoked.  PULSE regular  My Chart: active  CLASSIFICATION OF OVERWEIGHT AND OBESITY BY BMI                        Obesity Class           BMI(kg/m2)  Underweight                                    < 18.5  Normal                                         18.5-24.9  Overweight                                     25.0-29.9  OBESITY                     I                  30.0-34.9                             II                 35.0-39.9  EXTREME OBESITY             III                >40                            Patient's  BMI Body mass index is 25.21 kg/m .  http://hin.nhlbi.nih.gov/menuplanner/menu.cgi  Pre-visit planning  Immunizations - up to date  Colonoscopy - up to date  Mammogram -   Asthma -   PHQ9 -    CARL-7 -

## 2019-12-30 NOTE — PROGRESS NOTES
[facility]  [address]  [address]  [facility] [address]  [phone]  Dept: [phone]    PRE-OP EVALUATION:  Today's date: 2019    Jose Moreno (: 1950) presents for pre-operative evaluation assessment as requested by Dr. Blankenship.  He requires evaluation and anesthesia risk assessment prior to undergoing surgery/procedure for treatment of bladder.    Proposed Surgery/ Procedure: Artificial urinary sphincter  Date of Surgery/ Procedure: 1/3/20  Time of Surgery/ Procedure: 9am  Hospital/Surgical Facility: [facility]  Fax number for surgical facility:   Primary Physician: Raysa Singh  Type of Anesthesia Anticipated: to be determined    Patient has a Health Care Directive or Living Will:  YES     1. YES - DO YOU HAVE A HISTORY OF HEART ATTACK, STROKE, STENT, BYPASS OR SURGERY ON AN ARTERY IN THE HEAD, NECK, HEART OR LEG? 2015 had NSTEMI, had LAD SHERRY placed.  2. NO - Do you ever have any pain or discomfort in your chest?  3. NO - Do you have a history of  Heart Failure?  4. NO - Are you troubled by shortness of breath when: walking on the level, up a slight hill or at night?  5. NO - Do you currently have a cold, bronchitis or other respiratory infection?  6. NO - Do you have a cough, shortness of breath or wheezing?  7. NO - Do you sometimes get pains in the calves of your legs when you walk?  8. NO - Do you or anyone in your family have previous history of blood clots?  9. NO - Do you or does anyone in your family have a serious bleeding problem such as prolonged bleeding following surgeries or cuts?  10. NO - Have you ever had problems with anemia or been told to take iron pills?  11. NO - Have you had any abnormal blood loss such as black, tarry or bloody stools, or abnormal vaginal bleeding?  12. NO - Have you ever had a blood transfusion?  13. NO - Have you or any of your relatives ever had problems with anesthesia?  14. NO - Do you have sleep apnea,  excessive snoring or daytime drowsiness?  15. NO - Do you have any prosthetic heart valves?  16. NO - Do you have prosthetic joints?  17. NO - Is there any chance that you may be pregnant?      HPI:     HPI related to upcoming procedure:   Morgan has been having trouble with stress incontinence. Plan is for placement of artificial urethral sphincter.     CAD - Patient has a longstanding history of moderate-severe CAD. Patient denies recent chest pain or NTG use, denies exercise induced dyspnea or PND. Last echo 7/2019, EKG 7/2019.     HYPERLIPIDEMIA - Patient has a long history of significant Hyperlipidemia requiring medication for treatment with recent fair control. Patient reports no problems or side effects with the medication.     HYPERTENSION - Patient has longstanding history of HTN , currently denies any symptoms referable to elevated blood pressure. Specifically denies chest pain, palpitations, dyspnea, orthopnea, PND or peripheral edema. Blood pressure readings have been in normal range. Current medication regimen is as listed below. Patient denies any side effects of medication.     HYPOTHYROIDISM - Patient has a longstanding history of chronic Hypothyroidism. Patient has been doing well, noting no tremor, insomnia, hair loss or changes in skin texture. Continues to take medications as directed, without adverse reactions or side effects. Last TSH   Lab Results   Component Value Date    TSH 2.01 09/19/2019       MEDICAL HISTORY:     Patient Active Problem List    Diagnosis Date Noted     Stress incontinence of urine 10/23/2019     Priority: Medium     Added automatically from request for surgery [id number]       Prostate cancer (H) 08/28/2018     Priority: Medium     SCP complete. Ready for distribution at 4/1/19.  UBURO       FAUSTIN (dyspnea on exertion) 07/10/2018     Priority: Medium     Chronic fatigue 07/10/2018     Priority: Medium     Major depressive disorder, recurrent episode, in full remission (H)  05/02/2018     Priority: Medium     Lumbosacral spondylosis without myelopathy 01/17/2018     Priority: Medium     Facet arthropathy, lumbosacral 01/17/2018     Priority: Medium     History of non-ST elevation myocardial infarction (NSTEMI) 01/08/2018     Priority: Medium     Hiatal hernia 11/23/2016     Priority: Medium     Chronic left-sided low back pain without sciatica 11/05/2016     Priority: Medium     Gastroesophageal reflux disease without esophagitis 11/02/2015     Priority: Medium     ACP (advance care planning) 10/20/2015     Priority: Medium     Advance Care Planning 10/20/2015: ACP Review and Resources Provided:  Reviewed chart for advance care plan.  Jose Moreno has no plan or code status on file. Discussed available resources and provided with information. Confirmed code status reflects current choices pending further ACP discussions.  Confirmed/documented legally designated decision maker(s). Added by Paty Downing    Advance Care Planning 1/5/2018: ACP Review of Chart / Resources Provided:  Reviewed chart for advance care plan.  Jose Moreno has been provided information and resources to begin or update their advance care plan.  Added by Sanjana Padilla             Hypothyroidism due to acquired atrophy of thyroid 10/20/2015     Priority: Medium     Was hyperthyroid first, then got better on his own, was not radiated, and eventually gland pooped out        Non-rheumatic mitral regurgitation      Priority: Medium     Overview:   Moderately severe MVP, S/P robotic repair at [place] 2009  moderately severe MVP, s/p robotic repair at [place]       Coronary artery disease involving native coronary artery of native heart without angina pectoris      Priority: Medium     cardiac cath 1/23/15: SHERRY to 1st diagonal, SHERRY x2 to LAD, cath 2009: no intervention       Spinal stenosis, lumbar 12/29/2011     Priority: Medium     Mixed hyperlipidemia 10/27/2009     Priority: Medium     Pulmonary nodules       Priority: Medium     CT-recommend repeat in 6-12 months       Status post mitral valve repair 05/27/2009     Priority: Medium     Heart: hx of mitral valve repair, rather sudden chordae tendonae tear, fixed at [facility], not symptomatic,        Nonspecific (abnormal) findings on radiological and other examination of lung field 12/04/2006     Priority: Medium     Problem list name updated by automated process. Provider to review and confirm       Essential hypertension, benign 07/01/2004     Priority: Medium     Health Care Home 12/11/2012     Priority: Low     State Tier Level:  Tier 2  Status: na  Care Coordinator:     See Letters for [facility] Care Plan            Past Medical History:   Diagnosis Date     ADHD (attention deficit hyperactivity disorder)      Arthritis     lower back     CAD (coronary artery disease)     cardiac cath 1/23/15: SHERRY to 1st diagonal, SHERRY x2 to LAD, cath 2009: no intervention     Esophageal reflux      Heart murmur     mitral valve repair     History of hyperthyroidism 4/9/2014     Hyperlipidemia      Hypertension      Mitral regurgitation 2009    moderately severe MVP, s/p robotic repair at [place]     Prostate cancer      Pulmonary nodules 2009    CT-recommend repeat in 6-12 months     Rotator cuff rupture 11/3/2011     Thyroid disease      Past Surgical History:   Procedure Laterality Date     COLONOSCOPY  7/00    GI     DAVINCI PROSTATECTOMY, LYMPHADENECTOMY N/A 11/1/2018    Procedure: ROBOTIC ASSISTED RADICAL PROSTATECTOMY WITH BILATERAL PELVIC LYMPH NODE DISSECTION;  Surgeon: Clint Blankenship MD;  Location: [facility] OR     DECOMPRESSION LUMBAR MINIMALLY INVASIVE ONE LEVEL  1/11/2012    Procedure:DECOMPRESSION LUMBAR MINIMALLY INVASIVE ONE LEVEL; L4-5 Decompression Minimally Invasive; Surgeon:MESSI HORAN; Location:[facility] OR     HEART CATH RIGHT AND LEFT HEART CATH  01-23-15    See report, pt transfered to [facility] for complex bifurcation PCI of LAD diagonal vessel.      HEART CATH RIGHT AND  LEFT HEART CATH  01-26-15    PTCA and implantation of SHERRY to 1st diagonal, SHERRY to mid LAD, SHERRY to proximal LAD     Hx of rotator cuff rupture and repair       LAPAROSCOPIC PROSTATECTOMY       REPAIR VALVE MITRAL  2009    [facility] robotic repair      Reversal of vasectomy       VASECTOMY       XR LUMBAR RADIOFREQ ABLATION UNILATERAL  01/11/2018    lumbar area/ ? level     Current Outpatient Medications   Medication Sig Dispense Refill     Acetaminophen (TYLENOL PO) Take 325-650 mg by mouth 2 times daily as needed for mild pain or fever       atorvastatin (LIPITOR) 40 MG tablet Take 1 tablet (40 mg) by mouth daily 90 tablet 3     cholecalciferol (VITAMIN D3) 1000 UNIT tablet Take 1 tablet (1,000 Units) by mouth daily       Cyanocobalamin (B-12) 1000 MCG TBCR Take 1,000 mcg by mouth daily       FLUoxetine (PROZAC) 20 MG capsule Take 1 capsule (20 mg) by mouth daily 90 capsule 3     irbesartan (AVAPRO) 300 MG tablet Take 1 tablet (300 mg) by mouth daily 90 tablet 2     levothyroxine (SYNTHROID/LEVOTHROID) 50 MCG tablet Take 1 tablet (50 mcg) by mouth daily 90 tablet 3     metoprolol succinate ER (TOPROL-XL) 25 MG 24 hr tablet Take 1 tablet (25 mg) by mouth daily 90 tablet 3     multivitamin, therapeutic with minerals (MULTI-VITAMIN) TABS tablet Take 1 tablet by mouth daily       nitroGLYcerin (NITROSTAT) 0.4 MG sublingual tablet TAKE 1 TABLET BY MOUTH EVERY 5 MIN AS NEEDED FOR CHEST PAIN 25 tablet 1     Omega-3 Fatty Acids (FISH OIL PO) Take 1 capsule by mouth daily       omeprazole (PRILOSEC) 20 MG DR capsule Take 1 capsule (20 mg) by mouth daily 90 capsule 3     sulfamethoxazole-trimethoprim (BACTRIM DS/SEPTRA DS) 800-160 MG tablet Take 1 tablet by mouth 2 times daily 14 tablet 0     aspirin EC 81 MG EC tablet Take 1 tablet (81 mg) by mouth daily (Patient not taking: Reported on 12/30/2019) 60 tablet 0     OTC products: None, except as noted above    Allergies   Allergen Reactions     Ace Inhibitors Cough     Dry  "cough     No Clinical Screening - See Comments      PN: LW FI1: MATEO      Latex Allergy: NO    Social History     Tobacco Use     Smoking status: Never Smoker     Smokeless tobacco: Never Used   Substance Use Topics     Alcohol use: Yes     Alcohol/week: 7.0 standard drinks     Types: 7 Standard drinks or equivalent per week     Comment: 1 beer daily     History   Drug Use No       REVIEW OF SYSTEMS:   Constitutional, neuro, ENT, endocrine, pulmonary, cardiac, gastrointestinal, genitourinary, musculoskeletal, integument and psychiatric systems are negative, except as otherwise noted.    EXAM:   BP (!) 142/76 (BP Location: Right arm, Patient Position: Chair, Cuff Size: Adult Regular)   Pulse 68   Temp 97.5  F (36.4  C) (Oral)   Resp 20   Ht 1.676 m (5' 6\")   Wt 70.9 kg (156 lb 3.2 oz)   BMI 25.21 kg/m      GENERAL APPEARANCE: healthy, alert and no distress     EYES: EOMI,  PERRL     HENT: ear canals and TM's normal and nose and mouth without ulcers or lesions     NECK: no adenopathy, no asymmetry, masses, or scars and thyroid normal to palpation     RESP: lungs clear to auscultation - no rales, rhonchi or wheezes     CV: regular rates and rhythm, normal S1 S2, no S3 or S4 and no murmur, click or rub     ABDOMEN:  soft, nontender, no HSM or masses and bowel sounds normal     MS: extremities normal- no gross deformities noted, no evidence of inflammation in joints, FROM in all extremities.     SKIN: no suspicious lesions or rashes     NEURO: Normal strength and tone, sensory exam grossly normal, mentation intact and speech normal     PSYCH: mentation appears normal. and affect normal/bright     LYMPHATICS: No cervical adenopathy    DIAGNOSTICS:   EKG: Not indicated due to non-vascular surgery and low risk of event (age <65 and without cardiac risk factors)  Hemoglobin (indicated for history of anemia or procedure with significant blood loss such as tonsillectomy, major intraperitoneal surgery, vascular surgery, " major spine surgery, total joint replacement)  Serum Potassium  Serum Creatinine    Office Visit on 12/30/2019   Component Date Value Ref Range Status     WBC 12/30/2019 6.7  4.0 - 11 10*9/L Final     RBC Count 12/30/2019 4.40  4.4 - 5.9 10*12/L Final     Hemoglobin 12/30/2019 14.4  13.3 - 17.7 g/dL Final     Hematocrit 12/30/2019 44.3  40.0 - 53.0 % Final     MCV 12/30/2019 100.6* 78 - 100 fL Final     MCH 12/30/2019 32.7  26 - 33 pg Final     MCHC 12/30/2019 32.5  31 - 36 g/dL Final     RDW 12/30/2019 12.2  % Final     Platelet Count 12/30/2019 296  150 - 375 10^9/L Final     Carbon Dioxide 12/31/2019 28.3  20 - 32 mmol/L Final     Creatinine 12/31/2019 0.92  0.70 - 1.18 mg/dL Final     Glucose 12/31/2019 108* 60 - 99 mg/dL Final     Sodium 12/31/2019 138.7  135 - 146 mmol/L Final     Potassium 12/31/2019 4.53  3.5 - 5.3 mmol/L Final     Chloride 12/31/2019 104.0  98 - 110 mmol/L Final     Protein Total 12/31/2019 6.9  6.1 - 8.1 g/dL Final     Albumin 12/31/2019 4.1  3.6 - 5.1 g/dL Final     Alkaline Phosphatase 12/31/2019 59  33 - 130 U/L Final     ALT 12/31/2019 11  0 - 32 U/L Final     AST 12/31/2019 14  0 - 35 U/L Final     Bilirubin Total 12/31/2019 0.5  0.2 - 1.2 mg/dL Final     Urea Nitrogen 12/31/2019 17  7 - 25 mg/dL Final     Calcium 12/31/2019 9.2  8.6 - 10.3 mg/dL Final     BUN/Creatinine Ratio 12/31/2019 18.5  6 - 22 Final     Globulin Calculated 12/31/2019 2.8  1.9 - 3.7 Final     A/G Ratio 12/31/2019 1.5  1 - 2.5 Final   Orders Only on 12/30/2019   Component Date Value Ref Range Status     Specimen Description 12/30/2019 Catheterized Urine   Final     Culture Micro 12/30/2019 Referred to [facility], await final report.   Preliminary     Color Urine 12/30/2019 Yellow   Final     Appearance Urine 12/30/2019 Clear   Final     Glucose Urine 12/30/2019 Negative  NEG^Negative mg/dL Final     Bilirubin Urine 12/30/2019 Negative  NEG^Negative Final     Ketones Urine  12/30/2019 Negative  NEG^Negative mg/dL Final     Specific Gravity Urine 12/30/2019 1.015  1.003 - 1.035 Final     Blood Urine 12/30/2019 Negative  NEG^Negative Final     pH Urine 12/30/2019 7.0  5.0 - 7.0 pH Final     Protein Albumin Urine 12/30/2019 100* NEG^Negative mg/dL Final     Urobilinogen Urine 12/30/2019 0.2  0.2 - 1.0 EU/dL Final     Nitrite Urine 12/30/2019 Negative  NEG^Negative Final     Leukocyte Esterase Urine 12/30/2019 Negative  NEG^Negative Final     Source 12/30/2019 Catheterized Urine   Final         IMPRESSION:   Reason for surgery/procedure: Urinary Stress Incontinence  Diagnosis/reason for consult: Pre-operative Examination    The proposed surgical procedure is considered LOW risk.    REVISED CARDIAC RISK INDEX  The patient has the following serious cardiovascular risks for perioperative complications such as (MI, PE, VFib and 3  AV Block):  No serious cardiac risks  INTERPRETATION: 1 risks: Class II (low risk - 0.9% complication rate)    The patient has the following additional risks for perioperative complications:  No identified additional risks      ICD-10-CM    1. Pre-op exam Z01.818 HEMOGRAM/PLATELET (BFP)     VENOUS COLLECTION     Comprehensive Metobolic Panel (BFP)   2. Stress incontinence N39.3 Comprehensive Metobolic Panel (BFP)   3. Hypothyroidism due to acquired atrophy of thyroid E03.4    4. Coronary artery disease involving native coronary artery of native heart without angina pectoris I25.10    5. History of non-ST elevation myocardial infarction (NSTEMI) I25.2    6. Major depressive disorder, recurrent episode, in full remission (H) F33.42    7. Status post mitral valve repair Z98.890    8. Essential hypertension, benign I10        RECOMMENDATIONS:     --Patient is to take all scheduled medications on the day of surgery EXCEPT for modifications listed below.    APPROVAL GIVEN to proceed with proposed procedure, without further diagnostic evaluation    1. Seven days before  surgery do not take Aspirin or any over-the-counter pain medications other than Tylenol.  TYLENOL is the safest pain pill to use before surgery because it does not affect your bleeding time. If tylenol is not sufficient for pain control talk to me or the surgeon and we will decide what is safe to use.    2. Do not eat anything after midnight  (eduardo of the surgery) and nothing the morning of the surgery.    3. Medications: Hold medications other than levothyroxine on day of surgery until after surgery, unless otherwise instructed by surgery team.    4. Follow all instructions given by the surgery team. They usually give out a packet. Read it and please follow it precisely. This helps surgical experience and outcomes.    5. If you have any questions do not hesitate to call me or the surgeon/surgical team.      Raysa Singh PA-C  [facility]           Signed Electronically by: Raysa Singh PA-C    Copy of this evaluation report is provided to requesting physician.    Kin Preop Guidelines    Revised Cardiac Risk Index

## 2019-12-30 NOTE — TELEPHONE ENCOUNTER
RX sent in left message for patient that his urine was infected and to  and begin antibiotic..me

## 2019-12-31 ENCOUNTER — TELEPHONE (OUTPATIENT)
Dept: SURGERY | Facility: CLINIC | Age: 69
End: 2019-12-31

## 2019-12-31 LAB
ALBUMIN SERPL-MCNC: 4.1 G/DL (ref 3.6–5.1)
ALBUMIN/GLOB SERPL: 1.5 {RATIO} (ref 1–2.5)
ALP SERPL-CCNC: 59 U/L (ref 33–130)
ALT 1742-6: 11 U/L (ref 0–32)
AST 1920-8: 14 U/L (ref 0–35)
BACTERIA SPEC CULT: NORMAL
BILIRUB SERPL-MCNC: 0.5 MG/DL (ref 0.2–1.2)
BUN SERPL-MCNC: 17 MG/DL (ref 7–25)
BUN/CREATININE RATIO: 18.5 (ref 6–22)
CALCIUM SERPL-MCNC: 9.2 MG/DL (ref 8.6–10.3)
CHLORIDE SERPLBLD-SCNC: 104 MMOL/L (ref 98–110)
CO2 SERPL-SCNC: 28.3 MMOL/L (ref 20–32)
CREAT SERPL-MCNC: 0.92 MG/DL (ref 0.7–1.18)
GLOBULIN, CALCULATED - QUEST: 2.8 (ref 1.9–3.7)
GLUCOSE SERPL-MCNC: 108 MG/DL (ref 60–99)
POTASSIUM SERPL-SCNC: 4.53 MMOL/L (ref 3.5–5.3)
PROT SERPL-MCNC: 6.9 G/DL (ref 6.1–8.1)
SODIUM SERPL-SCNC: 138.7 MMOL/L (ref 135–146)
SPECIMEN SOURCE: NORMAL

## 2019-12-31 NOTE — TELEPHONE ENCOUNTER
Spoke on phone today.  He gave a urine sample in clinic last week that was nitrite positive and grew out E. coli.  I spoke to him about this, and he said that he had actually collected that sample from a condom catheter bag.  Therefore he came back yesterday to give a clean-catch urine specimen and that urinalysis was normal.  Culture is pending at this time from specimen from yesterday.    I did recommend that he go ahead and take the Bactrim twice daily as a precaution but it certainly appears that there is no evidence of infection at this time.  We will wait for the culture results as well.  We will continue with plans for surgery on Friday.

## 2020-01-03 ENCOUNTER — ANESTHESIA (OUTPATIENT)
Dept: SURGERY | Facility: CLINIC | Age: 70
End: 2020-01-03
Payer: COMMERCIAL

## 2020-01-03 ENCOUNTER — ANESTHESIA EVENT (OUTPATIENT)
Dept: SURGERY | Facility: CLINIC | Age: 70
End: 2020-01-03
Payer: COMMERCIAL

## 2020-01-03 ENCOUNTER — HOSPITAL ENCOUNTER (OUTPATIENT)
Facility: CLINIC | Age: 70
LOS: 1 days | Discharge: HOME | End: 2020-01-04
Attending: UROLOGY | Admitting: UROLOGY
Payer: COMMERCIAL

## 2020-01-03 DIAGNOSIS — N39.3 STRESS INCONTINENCE OF URINE: Primary | ICD-10-CM

## 2020-01-03 DIAGNOSIS — N39.3 STRESS INCONTINENCE OF URINE: ICD-10-CM

## 2020-01-03 PROBLEM — R32 URINARY INCONTINENCE: Status: ACTIVE | Noted: 2020-01-03

## 2020-01-03 PROCEDURE — 25000128 H RX IP 250 OP 636: Performed by: NURSE ANESTHETIST, CERTIFIED REGISTERED

## 2020-01-03 PROCEDURE — 25000128 H RX IP 250 OP 636: Performed by: UROLOGY

## 2020-01-03 PROCEDURE — 25800030 ZZH RX IP 258 OP 636: Performed by: UROLOGY

## 2020-01-03 PROCEDURE — C1815 PROS, URINARY SPH, IMP: HCPCS | Performed by: UROLOGY

## 2020-01-03 PROCEDURE — 27210794 ZZH OR GENERAL SUPPLY STERILE: Performed by: UROLOGY

## 2020-01-03 PROCEDURE — 25800030 ZZH RX IP 258 OP 636: Performed by: ANESTHESIOLOGY

## 2020-01-03 PROCEDURE — 25000125 ZZHC RX 250: Performed by: NURSE ANESTHETIST, CERTIFIED REGISTERED

## 2020-01-03 PROCEDURE — 71000012 ZZH RECOVERY PHASE 1 LEVEL 1 FIRST HR: Performed by: UROLOGY

## 2020-01-03 PROCEDURE — 36000063 ZZH SURGERY LEVEL 4 EA 15 ADDTL MIN: Performed by: UROLOGY

## 2020-01-03 PROCEDURE — 53445 INSERT URO/VES NCK SPHINCTER: CPT | Performed by: UROLOGY

## 2020-01-03 PROCEDURE — 37000008 ZZH ANESTHESIA TECHNICAL FEE, 1ST 30 MIN: Performed by: UROLOGY

## 2020-01-03 PROCEDURE — 25800025 ZZH RX 258: Performed by: UROLOGY

## 2020-01-03 PROCEDURE — 25000132 ZZH RX MED GY IP 250 OP 250 PS 637: Performed by: UROLOGY

## 2020-01-03 PROCEDURE — 93010 ELECTROCARDIOGRAM REPORT: CPT | Performed by: INTERNAL MEDICINE

## 2020-01-03 PROCEDURE — 25000132 ZZH RX MED GY IP 250 OP 250 PS 637: Performed by: STUDENT IN AN ORGANIZED HEALTH CARE EDUCATION/TRAINING PROGRAM

## 2020-01-03 PROCEDURE — 40000306 ZZH STATISTIC PRE PROC ASSESS II: Performed by: UROLOGY

## 2020-01-03 PROCEDURE — 37000009 ZZH ANESTHESIA TECHNICAL FEE, EACH ADDTL 15 MIN: Performed by: UROLOGY

## 2020-01-03 PROCEDURE — 27211024 ZZHC OR SUPPLY OTHER OPNP: Performed by: UROLOGY

## 2020-01-03 PROCEDURE — C1713 ANCHOR/SCREW BN/BN,TIS/BN: HCPCS | Performed by: UROLOGY

## 2020-01-03 PROCEDURE — 36000093 ZZH SURGERY LEVEL 4 1ST 30 MIN: Performed by: UROLOGY

## 2020-01-03 PROCEDURE — 27810325 ZZHC OR IMPLANT OTHER OPNP: Performed by: UROLOGY

## 2020-01-03 PROCEDURE — 25000128 H RX IP 250 OP 636: Performed by: ANESTHESIOLOGY

## 2020-01-03 DEVICE — IMPLANTABLE DEVICE: Type: IMPLANTABLE DEVICE | Site: URINARY BLADDER | Status: FUNCTIONAL

## 2020-01-03 RX ORDER — METOPROLOL SUCCINATE 25 MG/1
25 TABLET, EXTENDED RELEASE ORAL DAILY
Status: DISCONTINUED | OUTPATIENT
Start: 2020-01-03 | End: 2020-01-04 | Stop reason: HOSPADM

## 2020-01-03 RX ORDER — SODIUM CHLORIDE, SODIUM LACTATE, POTASSIUM CHLORIDE, CALCIUM CHLORIDE 600; 310; 30; 20 MG/100ML; MG/100ML; MG/100ML; MG/100ML
INJECTION, SOLUTION INTRAVENOUS CONTINUOUS
Status: DISCONTINUED | OUTPATIENT
Start: 2020-01-03 | End: 2020-01-03 | Stop reason: HOSPADM

## 2020-01-03 RX ORDER — HYDROCODONE BITARTRATE AND ACETAMINOPHEN 5; 325 MG/1; MG/1
1 TABLET ORAL EVERY 6 HOURS PRN
Status: DISCONTINUED | OUTPATIENT
Start: 2020-01-03 | End: 2020-01-03

## 2020-01-03 RX ORDER — ATORVASTATIN CALCIUM 40 MG/1
40 TABLET, FILM COATED ORAL DAILY
Status: DISCONTINUED | OUTPATIENT
Start: 2020-01-03 | End: 2020-01-04 | Stop reason: HOSPADM

## 2020-01-03 RX ORDER — KETOROLAC TROMETHAMINE 15 MG/ML
15 INJECTION, SOLUTION INTRAMUSCULAR; INTRAVENOUS
Status: DISCONTINUED | OUTPATIENT
Start: 2020-01-03 | End: 2020-01-03 | Stop reason: HOSPADM

## 2020-01-03 RX ORDER — METOCLOPRAMIDE 5 MG/1
5 TABLET ORAL EVERY 6 HOURS PRN
Status: DISCONTINUED | OUTPATIENT
Start: 2020-01-03 | End: 2020-01-03

## 2020-01-03 RX ORDER — ACETAMINOPHEN 325 MG/1
650 TABLET ORAL EVERY 4 HOURS
Status: DISCONTINUED | OUTPATIENT
Start: 2020-01-03 | End: 2020-01-04 | Stop reason: HOSPADM

## 2020-01-03 RX ORDER — FENTANYL CITRATE 50 UG/ML
INJECTION, SOLUTION INTRAMUSCULAR; INTRAVENOUS PRN
Status: DISCONTINUED | OUTPATIENT
Start: 2020-01-03 | End: 2020-01-03

## 2020-01-03 RX ORDER — DEXAMETHASONE SODIUM PHOSPHATE 4 MG/ML
INJECTION, SOLUTION INTRA-ARTICULAR; INTRALESIONAL; INTRAMUSCULAR; INTRAVENOUS; SOFT TISSUE PRN
Status: DISCONTINUED | OUTPATIENT
Start: 2020-01-03 | End: 2020-01-03

## 2020-01-03 RX ORDER — ACETAMINOPHEN 325 MG/1
325-650 TABLET ORAL 2 TIMES DAILY PRN
Status: DISCONTINUED | OUTPATIENT
Start: 2020-01-03 | End: 2020-01-03

## 2020-01-03 RX ORDER — METOPROLOL TARTRATE 1 MG/ML
1-2 INJECTION, SOLUTION INTRAVENOUS EVERY 5 MIN PRN
Status: DISCONTINUED | OUTPATIENT
Start: 2020-01-03 | End: 2020-01-03 | Stop reason: HOSPADM

## 2020-01-03 RX ORDER — DOCUSATE SODIUM 100 MG/1
100 CAPSULE, LIQUID FILLED ORAL 2 TIMES DAILY
Status: DISCONTINUED | OUTPATIENT
Start: 2020-01-03 | End: 2020-01-04 | Stop reason: HOSPADM

## 2020-01-03 RX ORDER — ONDANSETRON 4 MG/1
4 TABLET, ORALLY DISINTEGRATING ORAL EVERY 30 MIN PRN
Status: DISCONTINUED | OUTPATIENT
Start: 2020-01-03 | End: 2020-01-03 | Stop reason: HOSPADM

## 2020-01-03 RX ORDER — LIDOCAINE 40 MG/G
CREAM TOPICAL
Status: DISCONTINUED | OUTPATIENT
Start: 2020-01-03 | End: 2020-01-03 | Stop reason: HOSPADM

## 2020-01-03 RX ORDER — FENTANYL CITRATE 50 UG/ML
25-50 INJECTION, SOLUTION INTRAMUSCULAR; INTRAVENOUS
Status: DISCONTINUED | OUTPATIENT
Start: 2020-01-03 | End: 2020-01-03 | Stop reason: HOSPADM

## 2020-01-03 RX ORDER — LANOLIN ALCOHOL/MO/W.PET/CERES
3 CREAM (GRAM) TOPICAL
Status: DISCONTINUED | OUTPATIENT
Start: 2020-01-03 | End: 2020-01-04 | Stop reason: HOSPADM

## 2020-01-03 RX ORDER — ONDANSETRON 2 MG/ML
INJECTION INTRAMUSCULAR; INTRAVENOUS PRN
Status: DISCONTINUED | OUTPATIENT
Start: 2020-01-03 | End: 2020-01-03

## 2020-01-03 RX ORDER — HYDROMORPHONE HYDROCHLORIDE 1 MG/ML
.3-.5 INJECTION, SOLUTION INTRAMUSCULAR; INTRAVENOUS; SUBCUTANEOUS EVERY 5 MIN PRN
Status: DISCONTINUED | OUTPATIENT
Start: 2020-01-03 | End: 2020-01-03 | Stop reason: HOSPADM

## 2020-01-03 RX ORDER — NALOXONE HYDROCHLORIDE 0.4 MG/ML
.1-.4 INJECTION, SOLUTION INTRAMUSCULAR; INTRAVENOUS; SUBCUTANEOUS
Status: DISCONTINUED | OUTPATIENT
Start: 2020-01-03 | End: 2020-01-04 | Stop reason: HOSPADM

## 2020-01-03 RX ORDER — OXYCODONE HYDROCHLORIDE 5 MG/1
5 TABLET ORAL EVERY 4 HOURS PRN
Status: DISCONTINUED | OUTPATIENT
Start: 2020-01-03 | End: 2020-01-04

## 2020-01-03 RX ORDER — DIMENHYDRINATE 50 MG/ML
25 INJECTION, SOLUTION INTRAMUSCULAR; INTRAVENOUS
Status: DISCONTINUED | OUTPATIENT
Start: 2020-01-03 | End: 2020-01-03 | Stop reason: HOSPADM

## 2020-01-03 RX ORDER — METOCLOPRAMIDE HYDROCHLORIDE 5 MG/ML
5 INJECTION INTRAMUSCULAR; INTRAVENOUS EVERY 6 HOURS PRN
Status: DISCONTINUED | OUTPATIENT
Start: 2020-01-03 | End: 2020-01-03

## 2020-01-03 RX ORDER — LEVOTHYROXINE SODIUM 50 UG/1
50 TABLET ORAL
Status: DISCONTINUED | OUTPATIENT
Start: 2020-01-04 | End: 2020-01-04 | Stop reason: HOSPADM

## 2020-01-03 RX ORDER — DEXAMETHASONE SODIUM PHOSPHATE 4 MG/ML
4 INJECTION, SOLUTION INTRA-ARTICULAR; INTRALESIONAL; INTRAMUSCULAR; INTRAVENOUS; SOFT TISSUE
Status: DISCONTINUED | OUTPATIENT
Start: 2020-01-03 | End: 2020-01-03 | Stop reason: HOSPADM

## 2020-01-03 RX ORDER — LIDOCAINE HYDROCHLORIDE 10 MG/ML
INJECTION, SOLUTION INFILTRATION; PERINEURAL PRN
Status: DISCONTINUED | OUTPATIENT
Start: 2020-01-03 | End: 2020-01-03

## 2020-01-03 RX ORDER — VANCOMYCIN HYDROCHLORIDE 1 G/200ML
1000 INJECTION, SOLUTION INTRAVENOUS ONCE
Status: COMPLETED | OUTPATIENT
Start: 2020-01-04 | End: 2020-01-04

## 2020-01-03 RX ORDER — MEPERIDINE HYDROCHLORIDE 50 MG/ML
12.5 INJECTION INTRAMUSCULAR; INTRAVENOUS; SUBCUTANEOUS EVERY 5 MIN PRN
Status: DISCONTINUED | OUTPATIENT
Start: 2020-01-03 | End: 2020-01-03 | Stop reason: HOSPADM

## 2020-01-03 RX ORDER — GLYCOPYRROLATE 0.2 MG/ML
INJECTION, SOLUTION INTRAMUSCULAR; INTRAVENOUS PRN
Status: DISCONTINUED | OUTPATIENT
Start: 2020-01-03 | End: 2020-01-03

## 2020-01-03 RX ORDER — IRBESARTAN 300 MG/1
300 TABLET ORAL DAILY
Status: DISCONTINUED | OUTPATIENT
Start: 2020-01-03 | End: 2020-01-04 | Stop reason: HOSPADM

## 2020-01-03 RX ORDER — HYDRALAZINE HYDROCHLORIDE 20 MG/ML
2.5-5 INJECTION INTRAMUSCULAR; INTRAVENOUS EVERY 10 MIN PRN
Status: DISCONTINUED | OUTPATIENT
Start: 2020-01-03 | End: 2020-01-03 | Stop reason: HOSPADM

## 2020-01-03 RX ORDER — ONDANSETRON 2 MG/ML
4 INJECTION INTRAMUSCULAR; INTRAVENOUS EVERY 30 MIN PRN
Status: DISCONTINUED | OUTPATIENT
Start: 2020-01-03 | End: 2020-01-03 | Stop reason: HOSPADM

## 2020-01-03 RX ORDER — HYDROCODONE BITARTRATE AND ACETAMINOPHEN 5; 325 MG/1; MG/1
1 TABLET ORAL EVERY 6 HOURS PRN
Qty: 10 TABLET | Refills: 0 | Status: SHIPPED | OUTPATIENT
Start: 2020-01-03 | End: 2020-01-27

## 2020-01-03 RX ORDER — NALOXONE HYDROCHLORIDE 0.4 MG/ML
.1-.4 INJECTION, SOLUTION INTRAMUSCULAR; INTRAVENOUS; SUBCUTANEOUS
Status: DISCONTINUED | OUTPATIENT
Start: 2020-01-03 | End: 2020-01-03

## 2020-01-03 RX ORDER — PROPOFOL 10 MG/ML
INJECTION, EMULSION INTRAVENOUS PRN
Status: DISCONTINUED | OUTPATIENT
Start: 2020-01-03 | End: 2020-01-03

## 2020-01-03 RX ORDER — VANCOMYCIN HYDROCHLORIDE 1 G/200ML
1000 INJECTION, SOLUTION INTRAVENOUS ONCE
Status: COMPLETED | OUTPATIENT
Start: 2020-01-03 | End: 2020-01-03

## 2020-01-03 RX ORDER — DOCUSATE SODIUM 100 MG/1
100 CAPSULE, LIQUID FILLED ORAL 2 TIMES DAILY PRN
Qty: 20 CAPSULE | Refills: 0 | Status: SHIPPED | OUTPATIENT
Start: 2020-01-03 | End: 2020-01-27

## 2020-01-03 RX ADMIN — FENTANYL CITRATE 50 MCG: 50 INJECTION, SOLUTION INTRAMUSCULAR; INTRAVENOUS at 16:07

## 2020-01-03 RX ADMIN — DEXAMETHASONE SODIUM PHOSPHATE 4 MG: 4 INJECTION, SOLUTION INTRA-ARTICULAR; INTRALESIONAL; INTRAMUSCULAR; INTRAVENOUS; SOFT TISSUE at 13:58

## 2020-01-03 RX ADMIN — GENTAMICIN SULFATE 100 MG: 40 INJECTION, SOLUTION INTRAMUSCULAR; INTRAVENOUS at 12:49

## 2020-01-03 RX ADMIN — FENTANYL CITRATE 100 MCG: 50 INJECTION, SOLUTION INTRAMUSCULAR; INTRAVENOUS at 13:57

## 2020-01-03 RX ADMIN — PROPOFOL 200 MG: 10 INJECTION, EMULSION INTRAVENOUS at 13:57

## 2020-01-03 RX ADMIN — ATORVASTATIN CALCIUM 40 MG: 40 TABLET, FILM COATED ORAL at 17:40

## 2020-01-03 RX ADMIN — FENTANYL CITRATE 50 MCG: 50 INJECTION, SOLUTION INTRAMUSCULAR; INTRAVENOUS at 16:23

## 2020-01-03 RX ADMIN — DOCUSATE SODIUM 100 MG: 100 CAPSULE, LIQUID FILLED ORAL at 21:04

## 2020-01-03 RX ADMIN — ROCURONIUM BROMIDE 10 MG: 10 INJECTION INTRAVENOUS at 14:39

## 2020-01-03 RX ADMIN — MELATONIN TAB 3 MG 3 MG: 3 TAB at 21:52

## 2020-01-03 RX ADMIN — GLYCOPYRROLATE 0.2 MG: 0.2 INJECTION, SOLUTION INTRAMUSCULAR; INTRAVENOUS at 14:07

## 2020-01-03 RX ADMIN — SODIUM CHLORIDE, POTASSIUM CHLORIDE, SODIUM LACTATE AND CALCIUM CHLORIDE: 600; 310; 30; 20 INJECTION, SOLUTION INTRAVENOUS at 13:50

## 2020-01-03 RX ADMIN — METOPROLOL SUCCINATE 25 MG: 25 TABLET, FILM COATED, EXTENDED RELEASE ORAL at 17:40

## 2020-01-03 RX ADMIN — OMEPRAZOLE 20 MG: 20 CAPSULE, DELAYED RELEASE ORAL at 17:40

## 2020-01-03 RX ADMIN — FENTANYL CITRATE 100 MCG: 50 INJECTION, SOLUTION INTRAMUSCULAR; INTRAVENOUS at 15:11

## 2020-01-03 RX ADMIN — FENTANYL CITRATE 100 MCG: 50 INJECTION, SOLUTION INTRAMUSCULAR; INTRAVENOUS at 14:39

## 2020-01-03 RX ADMIN — ONDANSETRON HYDROCHLORIDE 4 MG: 2 INJECTION, SOLUTION INTRAVENOUS at 15:43

## 2020-01-03 RX ADMIN — IRBESARTAN 300 MG: 300 TABLET ORAL at 19:17

## 2020-01-03 RX ADMIN — VANCOMYCIN HYDROCHLORIDE 1000 MG: 1 INJECTION, SOLUTION INTRAVENOUS at 12:10

## 2020-01-03 RX ADMIN — LIDOCAINE HYDROCHLORIDE 50 MG: 10 INJECTION, SOLUTION INFILTRATION; PERINEURAL at 13:57

## 2020-01-03 RX ADMIN — OXYCODONE HYDROCHLORIDE 5 MG: 5 TABLET ORAL at 23:44

## 2020-01-03 RX ADMIN — FLUOXETINE 20 MG: 20 CAPSULE ORAL at 17:40

## 2020-01-03 RX ADMIN — GENTAMICIN SULFATE 100 MG: 40 INJECTION, SOLUTION INTRAMUSCULAR; INTRAVENOUS at 21:04

## 2020-01-03 RX ADMIN — ACETAMINOPHEN 650 MG: 325 TABLET, FILM COATED ORAL at 21:52

## 2020-01-03 RX ADMIN — HYDROCODONE BITARTRATE AND ACETAMINOPHEN 1 TABLET: 5; 325 TABLET ORAL at 17:40

## 2020-01-03 RX ADMIN — SODIUM CHLORIDE, POTASSIUM CHLORIDE, SODIUM LACTATE AND CALCIUM CHLORIDE: 600; 310; 30; 20 INJECTION, SOLUTION INTRAVENOUS at 15:06

## 2020-01-03 ASSESSMENT — MIFFLIN-ST. JEOR: SCORE: 1374.74

## 2020-01-03 NOTE — ANESTHESIA PREPROCEDURE EVALUATION
Anesthesia Pre-Procedure Evaluation    Patient: Jose Moreno   MRN: [id number] : 1950          Preoperative Diagnosis: Stress incontinence of urine [N39.3]    Procedure(s):  Artificial urinary sphincter insertion    Past Medical History:   Diagnosis Date     ADHD (attention deficit hyperactivity disorder)      Arthritis     lower back     CAD (coronary artery disease)     cardiac cath 1/23/15: SHERRY to 1st diagonal, SHERRY x2 to LAD, cath 2009: no intervention     Esophageal reflux      Heart murmur     mitral valve repair     History of hyperthyroidism 2014     Hyperlipidemia      Hypertension      Mitral regurgitation     moderately severe MVP, s/p robotic repair at [place]     Prostate cancer      Pulmonary nodules     CT-recommend repeat in 6-12 months     Rotator cuff rupture 11/3/2011     Thyroid disease      Past Surgical History:   Procedure Laterality Date     COLONOSCOPY      GI     DAVINCI PROSTATECTOMY, LYMPHADENECTOMY N/A 2018    Procedure: ROBOTIC ASSISTED RADICAL PROSTATECTOMY WITH BILATERAL PELVIC LYMPH NODE DISSECTION;  Surgeon: Clint Blankenship MD;  Location: [facility] OR     DECOMPRESSION LUMBAR MINIMALLY INVASIVE ONE LEVEL  2012    Procedure:DECOMPRESSION LUMBAR MINIMALLY INVASIVE ONE LEVEL; L4-5 Decompression Minimally Invasive; Surgeon:MESSI HORAN; Location:[facility] OR     HEART CATH RIGHT AND LEFT HEART CATH  01-23-15    See report, pt transfered to [facility] for complex bifurcation PCI of LAD diagonal vessel.      HEART CATH RIGHT AND LEFT HEART CATH  01-26-15    PTCA and implantation of SHERRY to 1st diagonal, SHERRY to mid LAD, SHERRY to proximal LAD     Hx of rotator cuff rupture and repair       LAPAROSCOPIC PROSTATECTOMY       REPAIR VALVE MITRAL      [facility] robotic repair      Reversal of vasectomy       VASECTOMY       XR LUMBAR RADIOFREQ ABLATION UNILATERAL  2018    lumbar area/ ? level     Anesthesia Evaluation     . Pt has had prior anesthetic.  Type: General           ROS/MED HX    ENT/Pulmonary:  - neg pulmonary ROS     Neurologic:  - neg neurologic ROS     Cardiovascular: Comment: cardiac cath 1/23/15: SHERRY to 1st diagonal, SHERRY x2 to LAD, cath 2009    (+) hypertension--CAD, --. : . . . :. valvular problems/murmurs type: MR [place]       METS/Exercise Tolerance:     Hematologic:  - neg hematologic  ROS       Musculoskeletal:  - neg musculoskeletal ROS       GI/Hepatic:     (+) GERD Asymptomatic on medication, hiatal hernia,       Renal/Genitourinary:  - ROS Renal section negative       Endo:     (+) thyroid problem hypothyroidism, .      Psychiatric:  - neg psychiatric ROS       Infectious Disease:  - neg infectious disease ROS       Malignancy:   (+) Malignancy History of Prostate  Prostate CA Remission status post Surgery,         Other:    (+) No chance of pregnancy C-spine cleared: N/A, no H/O Chronic Pain,no other significant disability                         Physical Exam  Normal systems: cardiovascular, pulmonary and dental    Airway   Mallampati: II  TM distance: >3 FB  Neck ROM: full    Dental     Cardiovascular       Pulmonary             Lab Results   Component Value Date    WBC 6.7 12/30/2019    HGB 14.4 12/30/2019    HCT 44.3 12/30/2019     12/30/2019    SED 10 04/08/2016    .7 12/31/2019    POTASSIUM 4.53 12/31/2019    CHLORIDE 104.0 12/31/2019    CO2 28.3 12/31/2019    BUN 17 12/31/2019    BUN 18.5 12/31/2019    CR 0.92 12/31/2019     (A) 12/31/2019    MIRYAM 9.2 12/31/2019    MAG 2.1 04/08/2016    ALBUMIN 4.1 12/31/2019    PROTTOTAL 6.9 12/31/2019    ALT 36 08/02/2019    AST 19 04/01/2019    ALKPHOS 59 12/31/2019    BILITOTAL 0.5 12/31/2019    BILIDIRECT 0.1 04/11/2009    LIPASE 115 01/23/2015    PTT 28 01/23/2015    INR 1.00 01/23/2015    TSH 2.01 09/19/2019    T4 1.2 10/20/2015       Preop Vitals  BP Readings from Last 3 Encounters:   01/03/20 127/80   12/30/19 (!) 142/76   11/21/19 138/76    Pulse Readings from Last 3  "Encounters:   01/03/20 53   12/30/19 68   11/21/19 70      Resp Readings from Last 3 Encounters:   01/03/20 18   12/30/19 20   09/19/19 16    SpO2 Readings from Last 3 Encounters:   01/03/20 97%   10/31/19 96%   10/21/19 97%      Temp Readings from Last 1 Encounters:   01/03/20 98.5  F (36.9  C) (Temporal)    Ht Readings from Last 1 Encounters:   12/30/19 1.676 m (5' 6\")      Wt Readings from Last 1 Encounters:   12/30/19 70.9 kg (156 lb 3.2 oz)    Estimated body mass index is 25.21 kg/m  as calculated from the following:    Height as of 12/30/19: 1.676 m (5' 6\").    Weight as of 12/30/19: 70.9 kg (156 lb 3.2 oz).       Anesthesia Plan      History & Physical Review  History and physical reviewed and following examination; no interval change.    ASA Status:  3 .    NPO Status:  > 8 hours    Plan for General and ETT with Intravenous induction. Maintenance will be Balanced.    PONV prophylaxis:  Dexamethasone or Solumedrol and Ondansetron (or other 5HT-3)       Postoperative Care  Postoperative pain management:  IV analgesics.      Consents  Anesthetic plan, risks, benefits and alternatives discussed with:  Patient.  Use of blood products discussed: Yes.   Use of blood products discussed with Patient.  Consented to blood products.  .                 Abram Knight MD                    .  "

## 2020-01-03 NOTE — ANESTHESIA POSTPROCEDURE EVALUATION
Patient: Jose Moreno    Procedure(s):  Artificial urinary sphincter insertion    Diagnosis:Stress incontinence of urine [N39.3]  Diagnosis Additional Information: No value filed.    Anesthesia Type:  General, ETT    Note:  Anesthesia Post Evaluation    Patient location during evaluation: PACU  Patient participation: Able to fully participate in evaluation  Level of consciousness: awake  Pain management: adequate  Airway patency: patent  Cardiovascular status: acceptable  Respiratory status: acceptable  Hydration status: euvolemic  PONV: controlled     Anesthetic complications: None          Last vitals:  Vitals:    01/03/20 1625 01/03/20 1630 01/03/20 1645   BP: (!) 151/85 (!) 148/87 (!) 143/87   Pulse: 59 64 66   Resp: 29 12 14   Temp:  97.2  F (36.2  C)    SpO2: 100% 99% 98%         Electronically Signed By: Rafi Garcia MD  January 3, 2020  4:55 PM

## 2020-01-03 NOTE — ANESTHESIA CARE TRANSFER NOTE
Patient: Jose Moreno    Procedure(s):  Artificial urinary sphincter insertion    Diagnosis: Stress incontinence of urine [N39.3]  Diagnosis Additional Information: No value filed.    Anesthesia Type:   General, ETT     Note:  Airway :Face Mask  Patient transferred to:PACU  Comments: Spont Resps. Follows commands. LMA removed. Maintains Resps. Tx to PACU. Report to RNHandoff Report: Identifed the Patient, Identified the Reponsible Provider, Reviewed the pertinent medical history, Discussed the surgical course, Reviewed Intra-OP anesthesia mangement and issues during anesthesia, Set expectations for post-procedure period and Allowed opportunity for questions and acknowledgement of understanding      Vitals: (Last set prior to Anesthesia Care Transfer)    CRNA VITALS  1/3/2020 1522 - 1/3/2020 1557      1/3/2020             Pulse:  76    SpO2:  100 %    Resp Rate (observed):  (!) 2                Electronically Signed By: TACOS England CRNA  January 3, 2020  3:57 PM

## 2020-01-04 VITALS
DIASTOLIC BLOOD PRESSURE: 66 MMHG | RESPIRATION RATE: 16 BRPM | BODY MASS INDEX: 24.27 KG/M2 | HEART RATE: 62 BPM | SYSTOLIC BLOOD PRESSURE: 110 MMHG | OXYGEN SATURATION: 98 % | HEIGHT: 66 IN | WEIGHT: 151 LBS | TEMPERATURE: 97.7 F

## 2020-01-04 PROCEDURE — 25000132 ZZH RX MED GY IP 250 OP 250 PS 637: Performed by: STUDENT IN AN ORGANIZED HEALTH CARE EDUCATION/TRAINING PROGRAM

## 2020-01-04 PROCEDURE — 25000128 H RX IP 250 OP 636: Performed by: UROLOGY

## 2020-01-04 PROCEDURE — 25800030 ZZH RX IP 258 OP 636: Performed by: UROLOGY

## 2020-01-04 PROCEDURE — 25000132 ZZH RX MED GY IP 250 OP 250 PS 637: Performed by: UROLOGY

## 2020-01-04 RX ORDER — DOCUSATE SODIUM 100 MG/1
100 CAPSULE, LIQUID FILLED ORAL 2 TIMES DAILY PRN
Qty: 10 CAPSULE | Refills: 0 | Status: SHIPPED | OUTPATIENT
Start: 2020-01-04 | End: 2020-01-27

## 2020-01-04 RX ORDER — OXYCODONE HYDROCHLORIDE 5 MG/1
5-10 TABLET ORAL EVERY 4 HOURS PRN
Status: DISCONTINUED | OUTPATIENT
Start: 2020-01-04 | End: 2020-01-04 | Stop reason: HOSPADM

## 2020-01-04 RX ADMIN — ACETAMINOPHEN 650 MG: 325 TABLET, FILM COATED ORAL at 05:18

## 2020-01-04 RX ADMIN — METOPROLOL SUCCINATE 25 MG: 25 TABLET, FILM COATED, EXTENDED RELEASE ORAL at 08:54

## 2020-01-04 RX ADMIN — GENTAMICIN SULFATE 100 MG: 40 INJECTION, SOLUTION INTRAMUSCULAR; INTRAVENOUS at 05:25

## 2020-01-04 RX ADMIN — ACETAMINOPHEN 650 MG: 325 TABLET, FILM COATED ORAL at 01:31

## 2020-01-04 RX ADMIN — OXYCODONE HYDROCHLORIDE 5 MG: 5 TABLET ORAL at 03:26

## 2020-01-04 RX ADMIN — DOCUSATE SODIUM 100 MG: 100 CAPSULE, LIQUID FILLED ORAL at 08:54

## 2020-01-04 RX ADMIN — ATORVASTATIN CALCIUM 40 MG: 40 TABLET, FILM COATED ORAL at 08:54

## 2020-01-04 RX ADMIN — OXYCODONE HYDROCHLORIDE 5 MG: 5 TABLET ORAL at 11:42

## 2020-01-04 RX ADMIN — OMEPRAZOLE 20 MG: 20 CAPSULE, DELAYED RELEASE ORAL at 08:53

## 2020-01-04 RX ADMIN — VANCOMYCIN HYDROCHLORIDE 1000 MG: 1 INJECTION, SOLUTION INTRAVENOUS at 01:34

## 2020-01-04 RX ADMIN — FLUOXETINE 20 MG: 20 CAPSULE ORAL at 08:53

## 2020-01-04 RX ADMIN — LEVOTHYROXINE SODIUM 50 MCG: 50 TABLET ORAL at 06:01

## 2020-01-04 RX ADMIN — OXYCODONE HYDROCHLORIDE 5 MG: 5 TABLET ORAL at 06:01

## 2020-01-04 RX ADMIN — ACETAMINOPHEN 650 MG: 325 TABLET, FILM COATED ORAL at 10:28

## 2020-01-04 NOTE — OP NOTE
Procedure Date: 01/03/2020      SURGEON:  Clint Blankenship MD.        PREOPERATIVE DIAGNOSIS: Urinary incontinence.        POSTOPERATIVE DIAGNOSIS:  Urinary incontinence.      PROCEDURE PERFORMED:  Placement of  artificial urinary sphincter.      ANESTHESIA:  General.      COMPLICATIONS:  None.      INDICATIONS FOR PROCEDURE:  Jose Moreno is a 69-year-old gentleman who underwent a robotic prostatectomy and has had incontinence since his procedure.  He has now decided to have an artificial urinary sphincter placed.      DETAILS OF THE PROCEDURE:  The risks and benefits were explained in detail with the patient and informed consent was obtained.  The patient was brought to the operating room, placed supine on the operating table, where he underwent general endotracheal anesthetic.  He was then moved to the low lithotomy position in the [facility].  The scrotum was shaved with the clippers, and all excess hair was removed.  I then did a 10-minute by the clock Betadine scrub myself followed by Betadine paint.  The patient was then prepped and draped in the standard sterile fashion.      After appropriate timeout, I inserted a 14-Malaysian Sen catheter through the urethra.  I did not drain the bladder.  I placed a Lone Star retractor over the scrotum and then placed the hook in the meatus to retract up the penis.  The penis was retracted over the penile bridge and the Lone Star retractor.  I then made a transverse scrotal incision using Bovie electrocautery and set up the [place] hooks over the penile bridge.  I bluntly exposed the corpora and then made an incision over the right corpora cavernosa until it was on the cavernosum.  I readjusted hooks so that I was in this plane over both corpora and the spongiosum.  I then bluntly developed the corpora cavernosum until I had exposed the urethra at the bulbospongiosum.  I used Metzenbaum scissors to cut the scrotal septum off the bulb of spongiosum and then I removed  bulbospongiosum muscle.  I then used the Metzenbaum scissors to dissect on either side of the spongiosum at the level of the bulbous urethra until the 2 sides connected and then I could pass a right angle through and around the urethra.  I used the measuring device and measured a 4.5 cm urethra.  I then placed a 4.5 cm cuff around the urethra and latched the cuff.  I next made a dartos pouch inferiorly in the midline of the scrotum and placed the pump at this site.  I used a 2-0 Vicryl pursestring suture to hold it into place in the independent portion of the scrotum.  I then removed the Lone Star retractor.  I used the Eloina to retract over the external inguinal ring.  I dissected down over the pubic tubercle, onto the transversalis fascia and then developed a plane just above the transversalis fascia that was submuscular.  I then used the long ring and forceps to place a balloon in a high submuscular location.  I opted for this rather than the space of Retzius since the patient had a prior robotic prostatectomy.  I then used scissors to cut all the tubing at the appropriate length.  I placed collets on all the tubing along with straight connectors and then used the crimping device to connect first the reservoir to the pump and then the pump to the sphincter.  I irrigated everything thoroughly with the gentamicin irrigation.  I closed the dartos layer of the scrotum over the tubing with a 2-0 Vicryl suture and then I closed the deep dermal layer with a running 2-0 Vicryl suture.  Skin was closed with a 4-0 Monocryl subcuticular stitch.  The wound was covered with Dermabond.  I then cycled the artificial urinary sphincter and deactivated the sphincter.  Once the Dermabond had dried, I covered this with a Telfa and fluffed gauze held in by a scrotal support and the procedure was concluded at this point.      The patient tolerated the procedure without complications.  He was brought to the post-anesthetic care in  good condition.  He will stay overnight and receive further antibiotics from there.  He will have his Sen catheter removed tomorrow morning, and he will have his sphincter activated in 6 weeks.         IDALIA JONES MD             D: 2020   T: 2020   MT: VH      Name:     DANIELLA OVALLE   MRN:      [id number]        Account:        [id number]   :      1950           Procedure Date: 2020      Document: [id number]

## 2020-01-04 NOTE — PLAN OF CARE
LS clear. On RA. Capno 32 and 10.  C/o pain. Gave Norco x 1.  Urology changed to oxycodone and Tylenol. Melatonin added for sleep. Sen in place. Tolerating regular diet, denies nausea. Receiving Gentamicin and Vanco. Discharge TBD.

## 2020-01-04 NOTE — PROGRESS NOTES
Instilled 200ml into bladder via catheter. Pt unable to remain continent, voided on the way to the bathroom, 75ml in urinal. PVR 0mL x3.

## 2020-01-04 NOTE — PROVIDER NOTIFICATION
1242 Pt walked for first time rating pain 9/10 Oxycodone not helping at this time.  Pt states pain is increasing.  Paged for possible one time dose of IV pain medication.    Xiao Saravia RN on 1/4/2020 at 12:42 AM    0120  Pt stated as of now he did not want Urology to be paged.  His pain was better and he would let me know if his pain increased.    0540 Paged Urology for increased pain.  Lopez Guzman ordered 5-10 mg Oxycodone PRN q4hrs.  Xiao Saravia RN on 1/4/2020 at 5:45 AM

## 2020-01-04 NOTE — DISCHARGE SUMMARY
Discharge Summary     Jose Moreno MRN# [id number]   YOB: 1950 Age: 69 year old     Date of Admission:  1/3/2020  Date of Discharge::  1/4/2020  Admitting Physician:  Clint Blankenship MD  Discharge Physician:  Renato Means MD  Primary Care Physician:         Raysa Singh          Admission Diagnoses:   Stress incontinence of urine [N39.3]            Discharge Diagnosis:   Same as above           Procedures:   : Procedure(s):  Artificial urinary sphincter insertion        Non-operative procedures:   None performed          Consultations:   None           Imaging Studies:     Results for orders placed or performed in visit on 09/09/19   XR Lumbar Radiofrequency Ablation Unilat    Narrative    This exam was marked as non-reportable because it will not be read by a   radiologist or a [facility] non-radiologist provider.                      Medications Prior to Admission:     Medications Prior to Admission   Medication Sig Dispense Refill Last Dose     Acetaminophen (TYLENOL PO) Take 325-650 mg by mouth 2 times daily as needed for mild pain or fever   Not Taking     aspirin EC 81 MG EC tablet Take 1 tablet (81 mg) by mouth daily (Patient not taking: Reported on 12/30/2019) 60 tablet 0 Not Taking     atorvastatin (LIPITOR) 40 MG tablet Take 1 tablet (40 mg) by mouth daily 90 tablet 3 Taking     cholecalciferol (VITAMIN D3) 1000 UNIT tablet Take 1 tablet (1,000 Units) by mouth daily   Taking     Cyanocobalamin (B-12) 1000 MCG TBCR Take 1,000 mcg by mouth daily   Taking     FLUoxetine (PROZAC) 20 MG capsule Take 1 capsule (20 mg) by mouth daily 90 capsule 3 Taking     irbesartan (AVAPRO) 300 MG tablet Take 1 tablet (300 mg) by mouth daily 90 tablet 2      levothyroxine (SYNTHROID/LEVOTHROID) 50 MCG tablet Take 1 tablet (50 mcg) by mouth daily 90 tablet 3 1/3/2020 at Unknown time     metoprolol succinate ER (TOPROL-XL) 25 MG 24 hr tablet Take 1 tablet (25 mg) by  mouth daily 90 tablet 3 1/2/2020 at Unknown time     multivitamin, therapeutic with minerals (MULTI-VITAMIN) TABS tablet Take 1 tablet by mouth daily   Taking     nitroGLYcerin (NITROSTAT) 0.4 MG sublingual tablet TAKE 1 TABLET BY MOUTH EVERY 5 MIN AS NEEDED FOR CHEST PAIN 25 tablet 1 Not Taking     Omega-3 Fatty Acids (FISH OIL PO) Take 1 capsule by mouth daily   Taking     omeprazole (PRILOSEC) 20 MG DR capsule Take 1 capsule (20 mg) by mouth daily 90 capsule 3 Taking     sulfamethoxazole-trimethoprim (BACTRIM DS/SEPTRA DS) 800-160 MG tablet Take 1 tablet by mouth 2 times daily 14 tablet 0             Discharge Medications:     Current Discharge Medication List      START taking these medications    Details   !! docusate sodium (COLACE) 100 MG capsule Take 1 capsule (100 mg) by mouth 2 times daily as needed for constipation  Qty: 10 capsule, Refills: 0    Associated Diagnoses: Stress incontinence of urine      !! docusate sodium (COLACE) 100 MG capsule Take 1 capsule (100 mg) by mouth 2 times daily as needed for constipation  Qty: 20 capsule, Refills: 0    Associated Diagnoses: Stress incontinence of urine      HYDROcodone-acetaminophen (NORCO) 5-325 MG tablet Take 1 tablet by mouth every 6 hours as needed for pain  Qty: 10 tablet, Refills: 0    Associated Diagnoses: Stress incontinence of urine       !! - Potential duplicate medications found. Please discuss with provider.      CONTINUE these medications which have NOT CHANGED    Details   Acetaminophen (TYLENOL PO) Take 325-650 mg by mouth 2 times daily as needed for mild pain or fever      aspirin EC 81 MG EC tablet Take 1 tablet (81 mg) by mouth daily  Qty: 60 tablet, Refills: 0    Associated Diagnoses: ACS (acute coronary syndrome) (H)      atorvastatin (LIPITOR) 40 MG tablet Take 1 tablet (40 mg) by mouth daily  Qty: 90 tablet, Refills: 3    Associated Diagnoses: Mixed hyperlipidemia      cholecalciferol (VITAMIN D3) 1000 UNIT tablet Take 1 tablet (1,000  Units) by mouth daily    Associated Diagnoses: Gastroesophageal reflux disease without esophagitis      Cyanocobalamin (B-12) 1000 MCG TBCR Take 1,000 mcg by mouth daily    Associated Diagnoses: Gastroesophageal reflux disease without esophagitis      FLUoxetine (PROZAC) 20 MG capsule Take 1 capsule (20 mg) by mouth daily  Qty: 90 capsule, Refills: 3    Associated Diagnoses: Major depressive disorder, recurrent episode, in full remission (H)      irbesartan (AVAPRO) 300 MG tablet Take 1 tablet (300 mg) by mouth daily  Qty: 90 tablet, Refills: 2    Associated Diagnoses: Essential hypertension, benign      levothyroxine (SYNTHROID/LEVOTHROID) 50 MCG tablet Take 1 tablet (50 mcg) by mouth daily  Qty: 90 tablet, Refills: 3    Associated Diagnoses: Hypothyroidism due to acquired atrophy of thyroid      metoprolol succinate ER (TOPROL-XL) 25 MG 24 hr tablet Take 1 tablet (25 mg) by mouth daily  Qty: 90 tablet, Refills: 3    Comments: Additional refills added  Associated Diagnoses: Benign essential hypertension      multivitamin, therapeutic with minerals (MULTI-VITAMIN) TABS tablet Take 1 tablet by mouth daily    Associated Diagnoses: Gastroesophageal reflux disease without esophagitis      nitroGLYcerin (NITROSTAT) 0.4 MG sublingual tablet TAKE 1 TABLET BY MOUTH EVERY 5 MIN AS NEEDED FOR CHEST PAIN  Qty: 25 tablet, Refills: 1    Associated Diagnoses: ACS (acute coronary syndrome) (H)      Omega-3 Fatty Acids (FISH OIL PO) Take 1 capsule by mouth daily      omeprazole (PRILOSEC) 20 MG DR capsule Take 1 capsule (20 mg) by mouth daily  Qty: 90 capsule, Refills: 3    Associated Diagnoses: Gastroesophageal reflux disease without esophagitis      sulfamethoxazole-trimethoprim (BACTRIM DS/SEPTRA DS) 800-160 MG tablet Take 1 tablet by mouth 2 times daily  Qty: 14 tablet, Refills: 0    Associated Diagnoses: Urinary tract infection                    Brief History of Illness:   Reason for admission requiring a surgical or invasive  procedure:   Stress incontinence of urine [N39.3]   The patient underwent the following procedure(s):   See above   There were no immediate complications during this procedure.    Please refer to the full operative summary for details.           Hospital Course:   The patient's hospital course was unremarkable.  Jose Moreno recovered as anticipated and experienced no post-operative complications.      On POD#1 patient was ambulating without assitance, tolerating the discharge diet, had pain controlled with PO medications to go home with, and requiring no IV medications or fluids. Patient was discharged home with appropriate contact information, follow-up and instructions as seen below in the discharge paperwork.       Final Pathology Result:   Pending at time of discharge         Discharge Instructions and Follow-Up:     Discharge Procedure Orders   Reason for your hospital stay   Order Comments: AUS placement     Follow-up and recommended labs and tests    Order Comments: Follow up with Dr Blankenship in 6 weeks for device activation     Activity   Order Comments: See discharge instructions     Order Specific Question Answer Comments   Is discharge order? Yes      When to contact your care team   Order Comments: See discharge instructions     Wound care and dressings   Order Comments: See discharge instructions     Discharge Instructions   Order Comments: AUS Discharge Instructions    Activity  - No strenuous exercise for 6 weeks.  - No lifting, pushing, pulling more than 10 pounds for 6 weeks.     Urination  - You are going home with your AUS deactivated.  You should continue to leak urine as you did preoperatively.   - Some light redness (up to a watermelon-like-pink in color) is expected in the upcoming 7-10 days.   - If having hematuria (blood in the urine), make sure to increase your water intake and monitor for improvement  - If you are unable to urinate you should return urgently to the ED or call clinic to  try to arrange for an urgent visit same day.     Wound Care  -Resume showers.  Allow water to wash over incision.  Do not scrub directly.  No baths or pools for 4 weeks  -Continue to wear a jock strap for the next 2 weeks.     Medications  1) Home Medications - Resume all preoperative medications  2) Pain - You have been prescribed Norco for pain.  This contains both a narcotic and 325 mg of acetaminophen.  Take this medication every 4 hours as needed.  If you have residual pain you may take over the counter ibuprofen.  Avoid taking more than 4000 mg of acetaminophen in a 24 hour period  3) Constipation - Senna/docusate to be taken twice daily as needed for constipation while on narcotics    Follow-Up:  - Follow up with Dr Blankenship in 6 weeks for device activation   - Call your primary care provider to touch base regarding your recent admission.     - Call or return sooner than your regularly scheduled visit if you develop any of the following: fever (greater than 101.5), uncontrolled pain, uncontrolled nausea or vomiting, as well as increased redness, swelling, or drainage from your wound.     Full Code     Order Specific Question Answer Comments   Code status determined by: Unable to discuss and no AD/POLST on file; continue PREVIOUSLY ORDERED code status      Diet   Order Comments: Follow this diet upon discharge: Regular diet     Order Specific Question Answer Comments   Is discharge order? Yes             Discharge Disposition:   Discharged to Home      Condition at discharge: Good    --    Renato Means MD  Urology Resident    10:34 AM, 1/4/2020

## 2020-01-04 NOTE — PLAN OF CARE
Pt. Up standby assist.  Pt walked on overnights without any difficulties.  Scrotal incision with jock strap.  Pt has majority of pain where reservoir is placed and most pain is with movement.  Educated patient on splitting with a pillow and making slow movement.  Paged urology for increased pain medication.  Sen catheter with good urine output.  No bruising around reservoir site or abdomen area.

## 2020-01-04 NOTE — PLAN OF CARE
Discharged to home via wife transport. AVS reviewed, no further questions at this time. IV removed CDI. 10 Norco tabs sent home with pt as discharge medication. All belongings returned to pt. Follow up instruction understood.     Pt education envelope given including user guide, pt identification card and other education.

## 2020-01-04 NOTE — PROGRESS NOTES
"Urology  Progress Note    No acute events overnight  Focus of discomfort noted over PRB site  Tolerating diet  Freely leaking urine after catheter removal     Exam  /66 (BP Location: Left arm)   Pulse 62   Temp 97.7  F (36.5  C) (Oral)   Resp 16   Ht 1.664 m (5' 5.5\")   Wt 68.5 kg (151 lb)   SpO2 98%   BMI 24.75 kg/m    No acute distress  Non-abored breathing  Abdomen soft, non-distended  RLQ PRB site - palpable balloon with associated tenderness - no overlying ecchymosis  Scrotal incision c/d/i with neither drainage nor significant erythema - mild scrotal swelling with appropriate tenderness - pump deactivated  Perineal ecchymosis     Assessment/Plan  69 year old y/o male POD#1 s/p AUS placement.  Appropriate for discharge    Neuro: Continue current regimen for pain control   CV: HDS  Pulm: incentive spirometry while awake  FEN/GI:Regular diet   Endo: No acute issues  : Sen removed - AUS deactivated - home with jock supporter and extra depends  Heme/ID: No acute issues  Activity: Ad joão  PPx: SCDs  Dispo: discharge to home    Will discuss with Dr. Black Means MD, PGY-5  Urology Resident     Contacting the Urology Team     Please use the following job codes to reach the Urology Team. Note that you must use an in house phone and that job codes cannot receive text pages.     On weekdays, dial [phone] (or star-star-star [phone] on the new [employer] telephones) then [phone] to reach the Adult Urology resident or PA on call    On weekdays, dial [phone] (or star-star-star [phone] on the new [employer] telephones) then [phone] to reach the Pediatric Urology resident    On weeknights and weekends, dial [phone] (or star-star-star 777 on the new [employer] telephones) then [phone] to reach the Urology resident on call (for both Adult and Pediatrics)          "

## 2020-01-05 LAB — INTERPRETATION ECG - MUSE: NORMAL

## 2020-01-15 ENCOUNTER — NURSE TRIAGE (OUTPATIENT)
Dept: NURSING | Facility: CLINIC | Age: 70
End: 2020-01-15

## 2020-01-15 NOTE — TELEPHONE ENCOUNTER
Spoke with patient and informed him to ice penis and use ibuprofen as needed. Patient is concerned he has an infection. Message sent to MD. Informed patient that if symptoms get worse, he may need to go to ER.     Kiara Licea LPN

## 2020-01-15 NOTE — TELEPHONE ENCOUNTER
On Mynor 3, 2020 Pt had Placement of  artificial urinary sphincter.   Everything was going fine until the last couple of days.     Pt noticed his entire penis is very swollen and red.    Has to sit down to urinate to have a normal stream.     No fever symptoms noted.  No unusual drainage coming from the penis area.  No nausea or vomiting noted.        Would like a call back from the office for further questions, concerns, office visit if needed.     Please call back @ [phone] for further assistance.     Thank you     Shamika Sanchez RN  Central Triage Red Flags/Med Refills      Additional Information    Negative: Sounds like a life-threatening emergency to the triager    Negative: Pain or burning with passing urine (urination) is main symptom    Negative: Pubic lice suspected    Negative: STD exposure and prevention, question about    Negative: Pain in scrotum or testicle is main symptom    Negative: Swollen scrotum OR lump in the scrotum/groin area    Negative: Large amount of blood from end of penis    Negative: Foreskin pulled back and stuck (not circumcised)    Negative: Fever > 100.5 F (38.1 C)    Negative: Unable to urinate (or only a few drops) and bladder feels very full    Negative: Painful erection present > 2 hours    Negative: Patient sounds very sick or weak to the triager    Negative: SEVERE pain or burning with passing urine (urination)    Entire penis is swollen (i.e., edema)    Protocols used: PENIS AND SCROTUM SYMPTOMS-A-OH

## 2020-01-15 NOTE — TELEPHONE ENCOUNTER
Pt calling back for the second time waiting to hear back from the clinic for further instruction from the clinic.    Please call [phone] for further questions and concerns regarding the issue from below.    Shamika Sanchez RN  Central Triage Red Flags/Med Refills               []Hide copied text    []Win for details  On Mynor 3, 2020 Pt had Placement of  artificial urinary sphincter.   Everything was going fine until the last couple of days.     Pt noticed his entire penis is very swollen and red.    Has to sit down to urinate to have a normal stream.     No fever symptoms noted.  No unusual drainage coming from the penis area.  No nausea or vomiting noted.         Would like a call back from the office for further questions, concerns, office visit if needed.     Please call back @ [phone] for further assistance.     Thank you      Shamika Sanchez RN  Central Triage Red Flags/Med Refills        Additional Information    Negative: Sounds like a life-threatening emergency to the triager    Negative: Pain or burning with passing urine (urination) is main symptom    Negative: Pubic lice suspected    Negative: STD exposure and prevention, question about    Negative: Pain in scrotum or testicle is main symptom    Negative: Swollen scrotum OR lump in the scrotum/groin area    Negative: Large amount of blood from end of penis    Negative: Foreskin pulled back and stuck (not circumcised)    Negative: Fever > 100.5 F (38.1 C)    Negative: Unable to urinate (or only a few drops) and bladder feels very full    Negative: Painful erection present > 2 hours    Negative: Patient sounds very sick or weak to the triager    Negative: SEVERE pain or burning with passing urine (urination)    Entire penis is swollen (i.e., edema)    Protocols used: PENIS AND SCROTUM SYMPTOMS-A-OH

## 2020-01-16 ENCOUNTER — OFFICE VISIT (OUTPATIENT)
Dept: UROLOGY | Facility: CLINIC | Age: 70
End: 2020-01-16
Payer: COMMERCIAL

## 2020-01-16 VITALS
DIASTOLIC BLOOD PRESSURE: 82 MMHG | WEIGHT: 150 LBS | HEIGHT: 66 IN | BODY MASS INDEX: 24.11 KG/M2 | OXYGEN SATURATION: 99 % | HEART RATE: 56 BPM | SYSTOLIC BLOOD PRESSURE: 138 MMHG

## 2020-01-16 DIAGNOSIS — Z87.440 PERSONAL HISTORY OF URINARY TRACT INFECTION: Primary | ICD-10-CM

## 2020-01-16 DIAGNOSIS — Z85.46 PERSONAL HISTORY OF MALIGNANT NEOPLASM OF PROSTATE: Primary | ICD-10-CM

## 2020-01-16 PROCEDURE — 99024 POSTOP FOLLOW-UP VISIT: CPT | Performed by: UROLOGY

## 2020-01-16 ASSESSMENT — PAIN SCALES - GENERAL: PAINLEVEL: NO PAIN (0)

## 2020-01-16 ASSESSMENT — MIFFLIN-ST. JEOR: SCORE: 1378.15

## 2020-01-16 NOTE — PROGRESS NOTES
Office Visit Note  [facility]  [phone]    UROLOGIC DIAGNOSES:   pT2c [place] 3+4=7 prostate cancer, urinary incontinence    CURRENT INTERVENTIONS:   Robotic prostatectomy 2018, AUS    HISTORY:   Jose called the clinic yesterday reporting increased swelling from the penis so I had him come in today for exam.  However, today, he says that the swelling has gone away since he stopped using a condom catheter.  He thinks that the swelling and irritation may have been from the condom catheter.  He otherwise is feeling well.  He has had no fevers or chills.  He is back to his normal state of leaking urine.      PAST MEDICAL HISTORY:   Past Medical History:   Diagnosis Date     ADHD (attention deficit hyperactivity disorder)      Arthritis     lower back     CAD (coronary artery disease)     cardiac cath 1/23/15: SHERRY to 1st diagonal, SHERRY x2 to LAD, cath 2009: no intervention     Esophageal reflux      Heart murmur     mitral valve repair     History of hyperthyroidism 4/9/2014     Hyperlipidemia      Hypertension      Mitral regurgitation 2009    moderately severe MVP, s/p robotic repair at [place]     Prostate cancer      Pulmonary nodules 2009    CT-recommend repeat in 6-12 months     Rotator cuff rupture 11/3/2011     Thyroid disease        PAST SURGICAL HISTORY:   Past Surgical History:   Procedure Laterality Date     COLONOSCOPY  7/00    GI     DAVINCI PROSTATECTOMY, LYMPHADENECTOMY N/A 11/1/2018    Procedure: ROBOTIC ASSISTED RADICAL PROSTATECTOMY WITH BILATERAL PELVIC LYMPH NODE DISSECTION;  Surgeon: Clint Blankenship MD;  Location: [facility] OR     DECOMPRESSION LUMBAR MINIMALLY INVASIVE ONE LEVEL  1/11/2012    Procedure:DECOMPRESSION LUMBAR MINIMALLY INVASIVE ONE LEVEL; L4-5 Decompression Minimally Invasive; Surgeon:MESSI HORAN; Location:[facility] OR     HEART CATH RIGHT AND LEFT HEART CATH  01-23-15    See report, pt transfered to [facility] for complex bifurcation PCI of LAD diagonal vessel.       HEART CATH RIGHT AND LEFT HEART CATH  01-26-15    PTCA and implantation of SHERRY to 1st diagonal, SHERRY to mid LAD, SHERRY to proximal LAD     Hx of rotator cuff rupture and repair       IMPLANT PROSTHESIS SPHINCTER URINARY N/A 1/3/2020    Procedure: Placement of  artificial urinary sphincter;  Surgeon: Clint Blankenship MD;  Location: [facility] OR     LAPAROSCOPIC PROSTATECTOMY       REPAIR VALVE MITRAL  2009    [facility] robotic repair      Reversal of vasectomy       VASECTOMY       XR LUMBAR RADIOFREQ ABLATION UNILATERAL  01/11/2018    lumbar area/ ? level       FAMILY HISTORY:   Family History   Problem Relation Age of Onset     Cancer Mother         breast, lung     Depression Father         alcohlism     Diabetes No family hx of      Cardiovascular No family hx of      Cancer - colorectal No family hx of      Prostate Cancer No family hx of        SOCIAL HISTORY:   Social History     Tobacco Use     Smoking status: Never Smoker     Smokeless tobacco: Never Used   Substance Use Topics     Alcohol use: Yes     Alcohol/week: 7.0 standard drinks     Types: 7 Standard drinks or equivalent per week     Comment: 1 beer daily       Current Outpatient Medications   Medication     Acetaminophen (TYLENOL PO)     aspirin EC 81 MG EC tablet     atorvastatin (LIPITOR) 40 MG tablet     cholecalciferol (VITAMIN D3) 1000 UNIT tablet     Cyanocobalamin (B-12) 1000 MCG TBCR     docusate sodium (COLACE) 100 MG capsule     docusate sodium (COLACE) 100 MG capsule     FLUoxetine (PROZAC) 20 MG capsule     irbesartan (AVAPRO) 300 MG tablet     levothyroxine (SYNTHROID/LEVOTHROID) 50 MCG tablet     metoprolol succinate ER (TOPROL-XL) 25 MG 24 hr tablet     multivitamin, therapeutic with minerals (MULTI-VITAMIN) TABS tablet     nitroGLYcerin (NITROSTAT) 0.4 MG sublingual tablet     Omega-3 Fatty Acids (FISH OIL PO)     omeprazole (PRILOSEC) 20 MG DR capsule     sulfamethoxazole-trimethoprim (BACTRIM DS/SEPTRA DS) 800-160 MG tablet      No current facility-administered medications for this visit.          PHYSICAL EXAM:    There were no vitals taken for this visit.    Constitutional: Well developed. Conversant and in no acute distress  Eyes: Anicteric sclera, conjunctiva clear, normal extraocular movements  ENT: Normocephalic and atraumatic,   Skin: Warm and dry. No rashes or lesions  Cardiac: No peripheral edema  Back/Flank: Not done  CNS/PNS: Normal musculature and movements, moves all extremities normally  Respiratory: Normal non-labored breathing  Abdomen: Soft nontender and nondistended  Peripheral Vascular: No peripheral edema  Mental Status/Psych: Alert and Oriented x 3. Normal mood and affect    Penis: Normal, no erythema or swelling  Scrotal skin: Normal, no lesions.  The scrotal incision is well-healed  Testicles: Normal to palpation bilaterally.  The pump is easily palpable in the scrotum  Epididymis: Normal to palpation bilaterally  Lymphatic: Normal inguinal lymph nodes  Digital Rectal Exam:     Cystoscopy: Not done    Imaging: None    Urinalysis: UA RESULTS:  Recent Labs   Lab Test 12/30/19  1120  11/13/18  2356   COLOR Yellow   < > Yellow   APPEARANCE Clear   < > Cloudy   URINEGLC Negative   < > Negative   URINEBILI Negative   < > Negative   URINEKETONE Negative   < > Negative   SG 1.015   < > 1.031   UBLD Negative   < > Large*   URINEPH 7.0   < > 5.0   PROTEIN 100*   < > 100*   UROBILINOGEN 0.2   < >  --    NITRITE Negative   < > Positive*   LEUKEST Negative   < > Large*   RBCU  --   --  >182*   WBCU  --   --  >182*    < > = values in this interval not displayed.       PSA:     Post Void Residual:     Other labs: None today      IMPRESSION:  Doing well     PLAN:  He appears to be doing well after artificial urinary sphincter placement.No evidence of infection on exam.  I will plan on seeing him back again in 1 month to activate the sphincter.      Clint Blankenship M.D.

## 2020-01-16 NOTE — NURSING NOTE
Chief Complaint   Patient presents with     Penis swelling     Patient is here for swelling of the penis       Myah Gonzalez, EMT

## 2020-01-16 NOTE — LETTER
1/16/2020       RE: Jose Moreno  [address] [facility] [address]     Dear Colleague,    Thank you for referring your patient, Jose Moreno, to the [facility] at [facility]. Please see a copy of my visit note below.    Office Visit Note  [facility]  [phone]    UROLOGIC DIAGNOSES:   pT2c [place] 3+4=7 prostate cancer, urinary incontinence    CURRENT INTERVENTIONS:   Robotic prostatectomy 2018, AUS    HISTORY:   Jose called the clinic yesterday reporting increased swelling from the penis so I had him come in today for exam.  However, today, he says that the swelling has gone away since he stopped using a condom catheter.  He thinks that the swelling and irritation may have been from the condom catheter.  He otherwise is feeling well.  He has had no fevers or chills.  He is back to his normal state of leaking urine.      PAST MEDICAL HISTORY:   Past Medical History:   Diagnosis Date     ADHD (attention deficit hyperactivity disorder)      Arthritis     lower back     CAD (coronary artery disease)     cardiac cath 1/23/15: SHERRY to 1st diagonal, SHERRY x2 to LAD, cath 2009: no intervention     Esophageal reflux      Heart murmur     mitral valve repair     History of hyperthyroidism 4/9/2014     Hyperlipidemia      Hypertension      Mitral regurgitation 2009    moderately severe MVP, s/p robotic repair at [place]     Prostate cancer      Pulmonary nodules 2009    CT-recommend repeat in 6-12 months     Rotator cuff rupture 11/3/2011     Thyroid disease        PAST SURGICAL HISTORY:   Past Surgical History:   Procedure Laterality Date     COLONOSCOPY  7/00    GI     DAVINCI PROSTATECTOMY, LYMPHADENECTOMY N/A 11/1/2018    Procedure: ROBOTIC ASSISTED RADICAL PROSTATECTOMY WITH BILATERAL PELVIC LYMPH NODE DISSECTION;  Surgeon: Clint Blankenship MD;  Location: [facility] OR     DECOMPRESSION LUMBAR MINIMALLY INVASIVE ONE  LEVEL  1/11/2012    Procedure:DECOMPRESSION LUMBAR MINIMALLY INVASIVE ONE LEVEL; L4-5 Decompression Minimally Invasive; Surgeon:MESSI HORAN; Location:[facility]     HEART CATH RIGHT AND LEFT HEART CATH  01-23-15    See report, pt transfered to [facility] for complex bifurcation PCI of LAD diagonal vessel.      HEART CATH RIGHT AND LEFT HEART CATH  01-26-15    PTCA and implantation of SHERRY to 1st diagonal, SHERRY to mid LAD, SHERRY to proximal LAD     Hx of rotator cuff rupture and repair       IMPLANT PROSTHESIS SPHINCTER URINARY N/A 1/3/2020    Procedure: Placement of  artificial urinary sphincter;  Surgeon: Clint Blankenship MD;  Location: [facility] OR     LAPAROSCOPIC PROSTATECTOMY       REPAIR VALVE MITRAL  2009    [facility] robotic repair      Reversal of vasectomy       VASECTOMY       XR LUMBAR RADIOFREQ ABLATION UNILATERAL  01/11/2018    lumbar area/ ? level       FAMILY HISTORY:   Family History   Problem Relation Age of Onset     Cancer Mother         breast, lung     Depression Father         alcohlism     Diabetes No family hx of      Cardiovascular No family hx of      Cancer - colorectal No family hx of      Prostate Cancer No family hx of        SOCIAL HISTORY:   Social History     Tobacco Use     Smoking status: Never Smoker     Smokeless tobacco: Never Used   Substance Use Topics     Alcohol use: Yes     Alcohol/week: 7.0 standard drinks     Types: 7 Standard drinks or equivalent per week     Comment: 1 beer daily       Current Outpatient Medications   Medication     Acetaminophen (TYLENOL PO)     aspirin EC 81 MG EC tablet     atorvastatin (LIPITOR) 40 MG tablet     cholecalciferol (VITAMIN D3) 1000 UNIT tablet     Cyanocobalamin (B-12) 1000 MCG TBCR     docusate sodium (COLACE) 100 MG capsule     docusate sodium (COLACE) 100 MG capsule     FLUoxetine (PROZAC) 20 MG capsule     irbesartan (AVAPRO) 300 MG tablet     levothyroxine (SYNTHROID/LEVOTHROID) 50 MCG tablet     metoprolol succinate ER  (TOPROL-XL) 25 MG 24 hr tablet     multivitamin, therapeutic with minerals (MULTI-VITAMIN) TABS tablet     nitroGLYcerin (NITROSTAT) 0.4 MG sublingual tablet     Omega-3 Fatty Acids (FISH OIL PO)     omeprazole (PRILOSEC) 20 MG DR capsule     sulfamethoxazole-trimethoprim (BACTRIM DS/SEPTRA DS) 800-160 MG tablet     No current facility-administered medications for this visit.          PHYSICAL EXAM:    There were no vitals taken for this visit.    Constitutional: Well developed. Conversant and in no acute distress  Eyes: Anicteric sclera, conjunctiva clear, normal extraocular movements  ENT: Normocephalic and atraumatic,   Skin: Warm and dry. No rashes or lesions  Cardiac: No peripheral edema  Back/Flank: Not done  CNS/PNS: Normal musculature and movements, moves all extremities normally  Respiratory: Normal non-labored breathing  Abdomen: Soft nontender and nondistended  Peripheral Vascular: No peripheral edema  Mental Status/Psych: Alert and Oriented x 3. Normal mood and affect    Penis: Normal, no erythema or swelling  Scrotal skin: Normal, no lesions.  The scrotal incision is well-healed  Testicles: Normal to palpation bilaterally.  The pump is easily palpable in the scrotum  Epididymis: Normal to palpation bilaterally  Lymphatic: Normal inguinal lymph nodes  Digital Rectal Exam:     Cystoscopy: Not done    Imaging: None    Urinalysis: UA RESULTS:  Recent Labs   Lab Test 12/30/19  1120  11/13/18  2356   COLOR Yellow   < > Yellow   APPEARANCE Clear   < > Cloudy   URINEGLC Negative   < > Negative   URINEBILI Negative   < > Negative   URINEKETONE Negative   < > Negative   SG 1.015   < > 1.031   UBLD Negative   < > Large*   URINEPH 7.0   < > 5.0   PROTEIN 100*   < > 100*   UROBILINOGEN 0.2   < >  --    NITRITE Negative   < > Positive*   LEUKEST Negative   < > Large*   RBCU  --   --  >182*   WBCU  --   --  >182*    < > = values in this interval not displayed.       PSA:     Post Void Residual:     Other labs: None  today      IMPRESSION:  Doing well     PLAN:  He appears to be doing well after artificial urinary sphincter placement.No evidence of infection on exam.  I will plan on seeing him back again in 1 month to activate the sphincter.      Clint Blankenship M.D.

## 2020-01-17 ENCOUNTER — TELEPHONE (OUTPATIENT)
Dept: UROLOGY | Facility: CLINIC | Age: 70
End: 2020-01-17

## 2020-01-17 NOTE — TELEPHONE ENCOUNTER
[facility] Call Center    Phone Message    May a detailed message be left on voicemail: yes    Reason for Call: Form or Letter   Type or form/letter needing completion: Back to work letter   Provider: MADELYN Blankenship   Date form needed: ASAP/ needing it to be for 1/21/2020  Once completed: Fax form to: [phone]      Action Taken: Message routed to:  Other: NICOL RUIZ

## 2020-01-20 NOTE — TELEPHONE ENCOUNTER
Called Jose back to f/u. The request for a return to work letter was received last Friday afternoon 1/17/20 and was forwarded to our business office. It will be taken care of tomorrow, 1/21. He expressed understanding.  SARAH Torres RN      [facility] Call Center    Phone Message    May a detailed message be left on voicemail: yes    Reason for Call: Other: Pt is calling again about the letter to go back to work with no restrictions, he needs this faxed to his employer today.  Pt is going back to work tomorrow, pt is asking for a call back when this has been done     Action Taken: Message routed to:  [facility]

## 2020-01-27 ENCOUNTER — OFFICE VISIT (OUTPATIENT)
Dept: FAMILY MEDICINE | Facility: CLINIC | Age: 70
End: 2020-01-27

## 2020-01-27 VITALS
BODY MASS INDEX: 25.17 KG/M2 | RESPIRATION RATE: 16 BRPM | DIASTOLIC BLOOD PRESSURE: 80 MMHG | HEART RATE: 59 BPM | OXYGEN SATURATION: 99 % | TEMPERATURE: 97.9 F | HEIGHT: 66 IN | SYSTOLIC BLOOD PRESSURE: 132 MMHG | WEIGHT: 156.6 LBS

## 2020-01-27 DIAGNOSIS — N39.45 CONTINUOUS LEAKAGE OF URINE: ICD-10-CM

## 2020-01-27 DIAGNOSIS — F51.02 ADJUSTMENT INSOMNIA: ICD-10-CM

## 2020-01-27 DIAGNOSIS — B37.42 CANDIDAL BALANITIS: Primary | ICD-10-CM

## 2020-01-27 PROCEDURE — 99214 OFFICE O/P EST MOD 30 MIN: CPT | Performed by: FAMILY MEDICINE

## 2020-01-27 RX ORDER — MICONAZOLE NITRATE 20 MG/G
CREAM TOPICAL 2 TIMES DAILY
Qty: 56.7 G | Refills: 0 | Status: SHIPPED | OUTPATIENT
Start: 2020-01-27 | End: 2020-02-27

## 2020-01-27 RX ORDER — KETOCONAZOLE 20 MG/G
CREAM TOPICAL DAILY
Qty: 60 G | Refills: 0 | Status: SHIPPED | OUTPATIENT
Start: 2020-01-27 | End: 2023-02-17

## 2020-01-27 RX ORDER — TRAZODONE HYDROCHLORIDE 100 MG/1
150 TABLET ORAL AT BEDTIME
Qty: 90 TABLET | Refills: 0 | Status: SHIPPED | OUTPATIENT
Start: 2020-01-27 | End: 2020-04-07

## 2020-01-27 ASSESSMENT — PATIENT HEALTH QUESTIONNAIRE - PHQ9: SUM OF ALL RESPONSES TO PHQ QUESTIONS 1-9: 2

## 2020-01-27 ASSESSMENT — MIFFLIN-ST. JEOR: SCORE: 1408.08

## 2020-01-27 NOTE — PATIENT INSTRUCTIONS
Candidal balanitis  (primary encounter diagnosis)  Comment: skin care and etiology reviewed. Gentle cleansing/ Desitin or Udder balm protection  Plan: ketoconazole (NIZORAL) 2 % external cream,         UROLOGY ADULT REFERRAL, miconazole (MICATIN) 2         % external cream        Use antifungal per formulary/ back to Urology    (N39.45) Continuous leakage of urine  Plan: ketoconazole (NIZORAL) 2 % external cream,         traZODone (DESYREL) 100 MG tablet        As above    (F51.02) Adjustment insomnia  Comment: short term increase in dosing  Plan: traZODone (DESYREL) 100 MG tablet        Cut back to 100 mgm and monitor.

## 2020-01-27 NOTE — PROGRESS NOTES
SUBJECTIVE: 69 year old male complaining of recent urethral sphincter implant without bladder control awaiting healing before using the implant. Struggles with external rash after using condom catheter, then Depends for 3 week(s). It is red and itchy  The patient describes rash used bacitracin and Desitin with some relief. Gets up 1-2 times a night a night to go the bathroom. Wants to go back on 150 mgm trazodone from 100 because his sleep is better.  The patient denies a history of urine odor, dysuria or Gi symptoms.   Smoking history: No.   Relevant past medical history: positive for depression with taper of trazodone to 100 mgm 2018, ASCVD, hypothyroid, hyperlipidemia.    OBJECTIVE: The patient appears healthy, alert, no distress, cooperative, smiling and fatigue  CHEST: Regular rate and  rhythm. S1 and S2 normal, no murmurs, clicks, gallops or rubs. No edema or JVD. Chest is clear; no wheezes or rales.  ABD: The abdomen is soft without tenderness, guarding, mass or organomegaly. Bowel sounds are normal. No CVA tenderness or inguinal adenopathy noted.  Genitals: erythema of the glans and foreskin with curdlike exudate without ulcers over the distal penile shaft and scrotal skin  Unable to leave a urine    PMH reviewed/ PHQ9 discussed    ASSESSMENT:(B37.42) Candidal balanitis  (primary encounter diagnosis)  Comment: skin care and etiology reviewed. Gentle cleansing/ Desitin or Udder balm protection  Plan: ketoconazole (NIZORAL) 2 % external cream,         UROLOGY ADULT REFERRAL, miconazole (MICATIN) 2         % external cream        Use antifungal per formulary/ back to Urology    (N39.45) Continuous leakage of urine  Plan: ketoconazole (NIZORAL) 2 % external cream,         traZODone (DESYREL) 100 MG tablet        As above    (F51.02) Adjustment insomnia  Comment: short term increase in dosing  Plan: traZODone (DESYREL) 100 MG tablet        Cut back to 100 mgm and monitor.

## 2020-01-27 NOTE — NURSING NOTE
Morgan is here today for an infection on his penis and trouble sleeping.    Pre-visit Screening:  Immunizations:  up to date  Colonoscopy:  is up to date  Mammogram: GEOVANI  Asthma Action Test/Plan:GEOVANI  PHQ9:  NA  GAD7:  NA  Questioned patient about current smoking habits Pt. has never smoked.  Ok to leave detailed message on voice mail for today's visit only Yes, phone # [phone]

## 2020-02-01 NOTE — LETTER
"10/1/2019    Raysa Singh PA-C  [address], [address]  [facility] [address]    RE: Jose NAVARRO Josh       Dear Colleague,    I had the pleasure of seeing Jose Moreno in the [facility].    CARDIOLOGY CLINIC PROGRESS NOTE    DOS: 10/01/2019      Jose Moreno  : 1950, 69 year old  MRN: [id number]      History:  I am following up with Jose Moreno today in the cardiology clinic.  He follows with Dr. Alejandra.     Morgan is a pleasant 69-year-old gentleman with past medical history significant for a robotic mitral valve repair at the [facility] in  for mitral valve prolapse and severe mitral regurgitation.  Also CAD, HTN, dyslipidemia, rare and brief runs of SVT.      Preoperative coronary angiography  demonstrated only a 50% mid LAD stenosis.       2015 Morgan presented with a NSTEMI. He was brought to the cath lab where he was found to have what appeared to be a chronically occluded right coronary artery that could not be crossed with a wire.  He subsequently was transferred to [facility], underwent complex LAD diagonal intervention with drug-eluting stents placed in both vessels.  He tolerated the procedure quite well.       He was subsequently seen in our clinic because he woke up with a \"spell.\"  He was sent back to his primary care doctor for neurologic evaluation.  Subsequent echocardiogram 2/15/16 demonstrated a structurally normal heart.  His valve repair appeared to be functioning appropriately.  He has a normal ejection fraction.  A ZIO Patch showed some brief runs of SVT lasting 4 beats, but no atrial fibrillation.  Stress nuclear scan appeared to be consistent with a small to moderate size reversible inferior, inferoseptal and inferolateral defect consistent with his known anatomy.       In followup, he appeared to be doing well from a cardiac standpoint without exertional chest, arm, neck, jaw or shoulder discomfort.  He did " have a low-grade discomfort that was there all the time, did not change with exercise and we felt this was not cardiac in origin.  Options were discussed, including medical management versus coronary artery bypass grafting versus a  and it was decided to continue with medical management.  He was having problems with fatigue, tiredness, cold hands, cold feet and we ultimately discontinued his metoprolol, of which he was only on 12.5 mg at that time.       Morgan saw Dr. Alejandra 7/10/18.  He had some more fatigue with walking.  Dr. Alejandra ordered a stress echo.  This was done 7/18/18.  He exercised to an acceptable level.  Heart rate response was normal.  BP was a little high.  LVEF at rest was 55-60%.  There was no evidence of stress-induced ischemia.  Dr. Alejandra recommended he increase losartan from 50 mg to 100 mg.     I met Morgan 8/17/18.  His BP was better controlled.      9/5/18 saw urology, new diagnosis of high-grade prostate cancer.  11/1/18 s/p prostatectomy for prostate cancer.     6/19/19 saw PCP.  BP was 146/78.  He was started on Toprol XL 25 mg.      I saw Morgan 8/2/19 for annual follow up.  BPs were improved on Toprol XL 25 mg and losartan 100 mg.     Interval History:  9/9/19 radiofrequency nerve ablation.      9/19/19 PMD OV: Elevated BP.  He was off Toprol XL for 2 weeks.  Told to restart Toprol XL.      Called in to be seen.   He says he ran out of the Toprol XL, and had some issues with the pharmacy, and that's why he wasn't on it for 2 weeks.   He says the RFA nerve ablation did not help much.   No chest pain.  No FAUSTIN.  No palpitations.    He is exercising.  He walks and rides bike (outdoor in summer and indoor in winter).   But he is limited by his lower back pain.   He is having fairly severe urinary incontinence and is considering artificial urinary sphincter.      ROS:  Skin:  Positive for     Eyes:  Positive for glasses  ENT:  Negative for    Respiratory:  Negative for     Cardiovascular:  Negative    Gastroenterology: Negative for    Genitourinary:  Negative for    Musculoskeletal:  Positive for arthritis;back pain  Neurologic:  Negative for    Psychiatric:  Negative for    Heme/Lymph/Imm:  Negative for    Endocrine:  Positive for      PAST MEDICAL HISTORY:  Past Medical History:   Diagnosis Date     ADHD (attention deficit hyperactivity disorder)      CAD (coronary artery disease)     cardiac cath 1/23/15: SHERRY to 1st diagonal, SHERRY x2 to LAD, cath 2009: no intervention     Esophageal reflux      History of hyperthyroidism 4/9/2014     Hyperlipidemia      Hypertension      Mitral regurgitation 2009    moderately severe MVP, s/p robotic repair at [place]     Prostate cancer      Pulmonary nodules 2009    CT-recommend repeat in 6-12 months     Rotator cuff rupture 11/3/2011       PAST SURGICAL HISTORY:  Past Surgical History:   Procedure Laterality Date     COLONOSCOPY  7/00    GI     DAVINCI PROSTATECTOMY, LYMPHADENECTOMY N/A 11/1/2018    Procedure: ROBOTIC ASSISTED RADICAL PROSTATECTOMY WITH BILATERAL PELVIC LYMPH NODE DISSECTION;  Surgeon: Clint Blankenship MD;  Location: [facility] OR     DECOMPRESSION LUMBAR MINIMALLY INVASIVE ONE LEVEL  1/11/2012    Procedure:DECOMPRESSION LUMBAR MINIMALLY INVASIVE ONE LEVEL; L4-5 Decompression Minimally Invasive; Surgeon:MESSI HORAN; Location:[facility] OR     HEART CATH RIGHT AND LEFT HEART CATH  01-23-15    See report, pt transfered to [facility] for complex bifurcation PCI of LAD diagonal vessel.      HEART CATH RIGHT AND LEFT HEART CATH  01-26-15    PTCA and implantation of SHERRY to 1st diagonal, SHERRY to mid LAD, SHERRY to proximal LAD     Hx of rotator cuff rupture and repair       LAPAROSCOPIC PROSTATECTOMY       REPAIR VALVE MITRAL  2009    [facility] robotic repair      Reversal of vasectomy       VASECTOMY       XR LUMBAR RADIOFREQ ABLATION UNILATERAL  01/11/2018    lumbar area/ ? level       SOCIAL HISTORY:  Social History     Social History      Marital status:      Spouse name: Chastity     Number of children: 4     Years of education: 14     Occupational History      [employer]     RETIRED     Social History Main Topics     Smoking status: Never Smoker     Smokeless tobacco: Never Used     Alcohol use 4.2 oz/week     7 Standard drinks or equivalent per week      Comment: 1 beer daily     Drug use: No     Sexual activity: Yes     Partners: Female     Birth control/ protection: Condom, Post-menopausal     Other Topics Concern     Caffeine Concern No     2-3 daily     Sleep Concern No     Special Diet No     low sodium     Exercise Yes     walking daily     Seat Belt Yes     Self-Exams No     Social History Narrative       FAMILY HISTORY:  Family History   Problem Relation Age of Onset     Cancer Mother         breast, lung     Depression Father         alcohlism     Diabetes No family hx of      Cardiovascular No family hx of      Cancer - colorectal No family hx of      Prostate Cancer No family hx of        MEDS: Acetaminophen (TYLENOL PO), Take 325-650 mg by mouth 2 times daily as needed for mild pain or fever  aspirin EC 81 MG EC tablet, Take 1 tablet (81 mg) by mouth daily  atorvastatin (LIPITOR) 40 MG tablet, Take 1 tablet (40 mg) by mouth daily  cholecalciferol (VITAMIN D3) 1000 UNIT tablet, Take 1 tablet (1,000 Units) by mouth daily  Cyanocobalamin (B-12) 1000 MCG TBCR, Take 1,000 mcg by mouth daily  FLUoxetine (PROZAC) 20 MG capsule, Take 1 capsule (20 mg) by mouth daily  levothyroxine (SYNTHROID/LEVOTHROID) 50 MCG tablet, Take 1 tablet (50 mcg) by mouth daily  losartan (COZAAR) 100 MG tablet, Take 1 tablet (100 mg) by mouth daily  metoprolol succinate ER (TOPROL-XL) 25 MG 24 hr tablet, Take 1 tablet (25 mg) by mouth daily  multivitamin, therapeutic with minerals (MULTI-VITAMIN) TABS tablet, Take 1 tablet by mouth daily  nitroGLYcerin (NITROSTAT) 0.4 MG sublingual tablet, TAKE 1 TABLET BY MOUTH EVERY 5 MIN AS NEEDED FOR CHEST  "PAIN  Omega-3 Fatty Acids (FISH OIL PO), Take 1 capsule by mouth daily  omeprazole (PRILOSEC) 20 MG DR capsule, Take 1 capsule (20 mg) by mouth daily  order for DME, Equipment being ordered: Digital home blood pressure monitor kit    No current facility-administered medications on file prior to visit.       ALLERGIES:   Allergies   Allergen Reactions     Ace Inhibitors Cough     Dry cough     No Clinical Screening - See Comments      PN: LW FI1: NKA       PHYSICAL EXAM:  Vitals: /72 (BP Location: Right arm)   Pulse 65   Ht 1.664 m (5' 5.5\")   Wt 68.5 kg (151 lb)   SpO2 97%   BMI 24.75 kg/m     Constitutional:  cooperative, alert and oriented, well developed, well nourished, in no acute distress        Skin:  warm and dry to the touch, no apparent skin lesions or masses noted        Head:  normocephalic, no masses or lesions        Eyes:  pupils equal and round;conjunctivae and lids unremarkable;sclera white;no xanthalasma        ENT:  no pallor or cyanosis, dentition good        Neck:  JVP normal        Respiratory:  normal breath sounds, clear to auscultation, normal A-P diameter, normal symmetry, normal respiratory excursion, no use of accessory muscles        Cardiac: regular rhythm;normal S1 and S2;no murmurs, gallops or rubs detected                  GI:  abdomen soft;BS normoactive        Vascular: pulses full and equal                                      Extremities and Musculoskeletal:  no edema;no spinal abnormalities noted;normal muscle strength and tone;no deformities, clubbing, cyanosis, erythema observed        Neurological:  no gross motor deficits;affect appropriate          LABS/DATA:  I reviewed the following:  Stress echo 7/18/18:  Interpretation Summary  1. Average exercise capacity, target HR achieved.  2. The patient exhibited no chest pain during exercise.  3. This was a normal stress EKG with no evidence of stress-induced ischemia.  4. Rest echo: Normal left ventricular function " and wall motion at rest. The  visual ejection fraction is estimated at 55-60%.  5. Stress echo: This was a normal stress echocardiogram with no evidence of  stress-induced ischemia. The visual ejection fraction is estimated at 65-70%.     Limited TTE views:  1. s/p posterior mitral leaflet repair with some restricted motion. Mean  5mmHg.  2. There is mild (1+) mitral regurgitation.  3. There is mild (1+) aortic regurgitation.     Stress echo was normal in 1/2013.  No changes on TTE views compared to 2/2016.        Echo 7/31/19:  Interpretation Summary  1. The left ventricle is normal in structure, function and size. The visual  ejection fraction is estimated at 55%.  2. The right ventricle is normal in structure, function and size.  3. s/p posterior MV leaflet repair. Mean 3mmHg. There is mild (1+) mitral  regurgitation.  4. There is mild (1+) aortic regurgitation.     No changes from echo 7/2018.      Component      Latest Ref Rng & Units 8/2/2019   Sodium      133 - 144 mmol/L 139   Potassium      3.4 - 5.3 mmol/L 4.2   Chloride      94 - 109 mmol/L 106   Carbon Dioxide      20 - 32 mmol/L 28   Anion Gap      3 - 14 mmol/L 5   Glucose      70 - 99 mg/dL 103 (H)   Urea Nitrogen      7 - 30 mg/dL 14   Creatinine      0.66 - 1.25 mg/dL 0.82   GFR Estimate      >60 mL/min/1.73:m2 89   GFR Estimate If Black      >60 mL/min/1.73:m2 >90   Calcium      8.5 - 10.1 mg/dL 9.0   Cholesterol      <200 mg/dL 207 (H)   Triglycerides      <150 mg/dL 147   HDL Cholesterol      >39 mg/dL 64   LDL Cholesterol Calculated      <100 mg/dL 114 (H)   Non HDL Cholesterol      <130 mg/dL 143 (H)   ALT      0 - 70 U/L 36   * NOT FASTING        ASSESSMENT/PLAN:  Mitral valve prolapse and severe mitral regurgitation s/p robotic mitral valve repair at the [facility] in 2009  - Stress echo 7/2018 shows valve working well, MG 5 mmHg, mild MR  - Echo 7/31/19: LVEF 55%, s/p posterior MV leaflet repair, MG 3 mmHg, mild MR      CAD  - Preoperative  coronary angiography 2009 demonstrated only a 50% mid LAD stenosis.   - 1/2015 Jose presented with a NSTEMI.  Cardiac cath:  RCA, underwent complex LAD diagonal intervention with drug-eluting stents placed in both vessels.   - Negative stress echo 7/2018  - No anginal sxs  - Continue ASA, BB, statin.        HTN  - Now controlled on losartan 100 mg, Toprol XL 25 mg  - He will call if consistently >130  - He does tell me he has some higher BPs at home.  He will come in 2 weeks for RN check and have his home cuff checked as well      Dyslipidemia  - 8/2/19 lipid panel (NOT FASTING): , HDL 64, ,   - Though he was not fasting, it does look like his LDL has been trending up since 2017  - He will work on dietary changes and continue atorvastatin 40 mg  - Repeat next year      Rare and brief runs of SVT  - Asymptomatic       Prostate cancer s/p prostatectomy  - Still following with urology. He is having fairly severe urinary incontinence and is considering artificial urinary sphincter.           Follow up:  Dr. Alejandra 7/2020 with BMP, FLP        Thank you for allowing me to participate in the care of your patient.    Sincerely,     Cornell De Anda PA-C     [facility]       normal (ped)...

## 2020-02-19 ENCOUNTER — OFFICE VISIT (OUTPATIENT)
Dept: UROLOGY | Facility: CLINIC | Age: 70
End: 2020-02-19
Payer: COMMERCIAL

## 2020-02-19 VITALS
SYSTOLIC BLOOD PRESSURE: 130 MMHG | DIASTOLIC BLOOD PRESSURE: 64 MMHG | HEIGHT: 66 IN | BODY MASS INDEX: 24.11 KG/M2 | WEIGHT: 150 LBS | HEART RATE: 53 BPM

## 2020-02-19 DIAGNOSIS — C61 PROSTATE CANCER (H): Primary | ICD-10-CM

## 2020-02-19 PROCEDURE — 99024 POSTOP FOLLOW-UP VISIT: CPT | Performed by: UROLOGY

## 2020-02-19 ASSESSMENT — PAIN SCALES - GENERAL: PAINLEVEL: NO PAIN (0)

## 2020-02-19 ASSESSMENT — MIFFLIN-ST. JEOR: SCORE: 1378.15

## 2020-02-19 NOTE — PROGRESS NOTES
Office Visit Note  [facility]  [phone]    UROLOGIC DIAGNOSES:   pT2 Shane 3+4=7 prostate cancer, urinary incontinence    CURRENT INTERVENTIONS:   Robotic prostatectomy 2018, AUS 2020    HISTORY:   Morgan returns to clinic today for artificial urinary sphincter activation.  He continues to have urinary leakage, otherwise continues to feel well.      PAST MEDICAL HISTORY:   Past Medical History:   Diagnosis Date     ADHD (attention deficit hyperactivity disorder)      Arthritis     lower back     CAD (coronary artery disease)     cardiac cath 1/23/15: SHERRY to 1st diagonal, SHERRY x2 to LAD, cath 2009: no intervention     Esophageal reflux      Heart murmur     mitral valve repair     History of hyperthyroidism 4/9/2014     Hyperlipidemia      Hypertension      Mitral regurgitation 2009    moderately severe MVP, s/p robotic repair at [place]     Prostate cancer      Pulmonary nodules 2009    CT-recommend repeat in 6-12 months     Rotator cuff rupture 11/3/2011     Thyroid disease        PAST SURGICAL HISTORY:   Past Surgical History:   Procedure Laterality Date     COLONOSCOPY  7/00    GI     DAVINCI PROSTATECTOMY, LYMPHADENECTOMY N/A 11/1/2018    Procedure: ROBOTIC ASSISTED RADICAL PROSTATECTOMY WITH BILATERAL PELVIC LYMPH NODE DISSECTION;  Surgeon: Clint Blankenship MD;  Location: [facility] OR     DECOMPRESSION LUMBAR MINIMALLY INVASIVE ONE LEVEL  1/11/2012    Procedure:DECOMPRESSION LUMBAR MINIMALLY INVASIVE ONE LEVEL; L4-5 Decompression Minimally Invasive; Surgeon:MESSI HORAN; Location:[facility] OR     HEART CATH RIGHT AND LEFT HEART CATH  01-23-15    See report, pt transfered to [facility] for complex bifurcation PCI of LAD diagonal vessel.      HEART CATH RIGHT AND LEFT HEART CATH  01-26-15    PTCA and implantation of SHERRY to 1st diagonal, SHERRY to mid LAD, SHERRY to proximal LAD     Hx of rotator cuff rupture and repair       IMPLANT PROSTHESIS SPHINCTER URINARY N/A 1/3/2020    Procedure: Placement of AMS  "800 artificial urinary sphincter;  Surgeon: Clint Blankenship MD;  Location: [facility] OR     LAPAROSCOPIC PROSTATECTOMY       [facility]PA[facility]      Reversal of vasectomy       VASECTOMY       XR LUMBAR RADIOFREQ ABLATION UNILATERAL  01/11/2018    lumbar area/ ? level       FAMILY HISTORY:   Family History   Problem Relation Age of Onset     Cancer Mother         breast, lung     Depression Father         alcohlism     Diabetes No family hx of      Cardiovascular No family hx of      Cancer - colorectal No family hx of      Prostate Cancer No family hx of        SOCIAL HISTORY:   Social History     Tobacco Use     Smoking status: Never Smoker     Smokeless tobacco: Never Used   Substance Use Topics     Alcohol use: Yes     Alcohol/week: 7.0 standard drinks     Types: 7 Standard drinks or equivalent per week     Comment: 1 beer daily       Current Outpatient Medications   Medication     Acetaminophen (TYLENOL PO)     aspirin EC 81 MG EC tablet     atorvastatin (LIPITOR) 40 MG tablet     cholecalciferol (VITAMIN D3) 1000 UNIT tablet     Cyanocobalamin (B-12) 1000 MCG TBCR     FLUoxetine (PROZAC) 20 MG capsule     irbesartan (AVAPRO) 300 MG tablet     ketoconazole (NIZORAL) 2 % external cream     levothyroxine (SYNTHROID/LEVOTHROID) 50 MCG tablet     metoprolol succinate ER (TOPROL-XL) 25 MG 24 hr tablet     miconazole (MICATIN) 2 % external cream     multivitamin, therapeutic with minerals (MULTI-VITAMIN) TABS tablet     nitroGLYcerin (NITROSTAT) 0.4 MG sublingual tablet     Omega-3 Fatty Acids (FISH OIL PO)     omeprazole (PRILOSEC) 20 MG DR capsule     traZODone (DESYREL) 100 MG tablet     No current facility-administered medications for this visit.          PHYSICAL EXAM:    /64   Pulse 53   Ht 1.676 m (5' 6\")   Wt 68 kg (150 lb)   BMI 24.21 kg/m      Constitutional: Well developed. Conversant and in no acute distress  Eyes: Anicteric sclera, conjunctiva clear, normal " extraocular movements  ENT: Normocephalic and atraumatic,   Skin: Warm and dry. No rashes or lesions  Cardiac: No peripheral edema  Back/Flank: Not done  CNS/PNS: Normal musculature and movements, moves all extremities normally  Respiratory: Normal non-labored breathing  Abdomen: Soft nontender and nondistended  Peripheral Vascular: No peripheral edema  Mental Status/Psych: Alert and Oriented x 3. Normal mood and affect    Penis: Not done  Scrotal Skin: Not done  Testicles: Not done  Epididymis: Not done  Digital Rectal Exam:     Cystoscopy: Not done    Imaging: None    Urinalysis: UA RESULTS:  Recent Labs   Lab Test 12/30/19  1120  11/13/18  2356   COLOR Yellow   < > Yellow   APPEARANCE Clear   < > Cloudy   URINEGLC Negative   < > Negative   URINEBILI Negative   < > Negative   URINEKETONE Negative   < > Negative   SG 1.015   < > 1.031   UBLD Negative   < > Large*   URINEPH 7.0   < > 5.0   PROTEIN 100*   < > 100*   UROBILINOGEN 0.2   < >  --    NITRITE Negative   < > Positive*   LEUKEST Negative   < > Large*   RBCU  --   --  >182*   WBCU  --   --  >182*    < > = values in this interval not displayed.       PSA:     Post Void Residual:     Other labs: None today      IMPRESSION:  Urinary incontinence after radical prostatectomy    PLAN:  We activated his urinary sphincter in clinic today.  His examination is normal.  I then had him cycle the cough a few times and he was able to do this without any difficulty.  I recommended that during the daytime he cycled the cuff and try to void into the toilet at least every 2-3 hours in order to keep urinary leakage to a minimum.  For his prostate cancer follow-up I will plan on seeing him back in mid April for his next PSA check.      Clint Blankenship M.D.

## 2020-02-19 NOTE — LETTER
2/19/2020       RE: Jose Moreno  [address] [facility] [address]     Dear Colleague,    Thank you for referring your patient, Jose Moreno, to the [facility] [place] at [facility]. Please see a copy of my visit note below.    Office Visit Note  [facility]  [phone]    UROLOGIC DIAGNOSES:   pT2 [place] 3+4=7 prostate cancer, urinary incontinence    CURRENT INTERVENTIONS:   Robotic prostatectomy 2018, AUS 2020    HISTORY:   Morgan returns to clinic today for artificial urinary sphincter activation.  He continues to have urinary leakage, otherwise continues to feel well.      PAST MEDICAL HISTORY:   Past Medical History:   Diagnosis Date     ADHD (attention deficit hyperactivity disorder)      Arthritis     lower back     CAD (coronary artery disease)     cardiac cath 1/23/15: SHERRY to 1st diagonal, SHERRY x2 to LAD, cath 2009: no intervention     Esophageal reflux      Heart murmur     mitral valve repair     History of hyperthyroidism 4/9/2014     Hyperlipidemia      Hypertension      Mitral regurgitation 2009    moderately severe MVP, s/p robotic repair at [place]     Prostate cancer      Pulmonary nodules 2009    CT-recommend repeat in 6-12 months     Rotator cuff rupture 11/3/2011     Thyroid disease        PAST SURGICAL HISTORY:   Past Surgical History:   Procedure Laterality Date     COLONOSCOPY  7/00    GI     DAVINCI PROSTATECTOMY, LYMPHADENECTOMY N/A 11/1/2018    Procedure: ROBOTIC ASSISTED RADICAL PROSTATECTOMY WITH BILATERAL PELVIC LYMPH NODE DISSECTION;  Surgeon: Clint Blankenship MD;  Location: [facility] OR     DECOMPRESSION LUMBAR MINIMALLY INVASIVE ONE LEVEL  1/11/2012    Procedure:DECOMPRESSION LUMBAR MINIMALLY INVASIVE ONE LEVEL; L4-5 Decompression Minimally Invasive; Surgeon:MESSI HORAN; Location:[facility] OR     HEART CATH RIGHT AND LEFT HEART CATH  01-23-15    See report, pt transfered to [facility]  for complex bifurcation PCI of LAD diagonal vessel.      HEART CATH RIGHT AND LEFT HEART CATH  01-26-15    PTCA and implantation of SHERRY to 1st diagonal, SHERRY to mid LAD, SHERRY to proximal LAD     Hx of rotator cuff rupture and repair       IMPLANT PROSTHESIS SPHINCTER URINARY N/A 1/3/2020    Procedure: Placement of  artificial urinary sphincter;  Surgeon: Clint Blankenship MD;  Location: [facility] OR     LAPAROSCOPIC PROSTATECTOMY       REPAIR VALVE MITRAL  2009    [facility] robotic repair      Reversal of vasectomy       VASECTOMY       XR LUMBAR RADIOFREQ ABLATION UNILATERAL  01/11/2018    lumbar area/ ? level       FAMILY HISTORY:   Family History   Problem Relation Age of Onset     Cancer Mother         breast, lung     Depression Father         alcohlism     Diabetes No family hx of      Cardiovascular No family hx of      Cancer - colorectal No family hx of      Prostate Cancer No family hx of        SOCIAL HISTORY:   Social History     Tobacco Use     Smoking status: Never Smoker     Smokeless tobacco: Never Used   Substance Use Topics     Alcohol use: Yes     Alcohol/week: 7.0 standard drinks     Types: 7 Standard drinks or equivalent per week     Comment: 1 beer daily       Current Outpatient Medications   Medication     Acetaminophen (TYLENOL PO)     aspirin EC 81 MG EC tablet     atorvastatin (LIPITOR) 40 MG tablet     cholecalciferol (VITAMIN D3) 1000 UNIT tablet     Cyanocobalamin (B-12) 1000 MCG TBCR     FLUoxetine (PROZAC) 20 MG capsule     irbesartan (AVAPRO) 300 MG tablet     ketoconazole (NIZORAL) 2 % external cream     levothyroxine (SYNTHROID/LEVOTHROID) 50 MCG tablet     metoprolol succinate ER (TOPROL-XL) 25 MG 24 hr tablet     miconazole (MICATIN) 2 % external cream     multivitamin, therapeutic with minerals (MULTI-VITAMIN) TABS tablet     nitroGLYcerin (NITROSTAT) 0.4 MG sublingual tablet     Omega-3 Fatty Acids (FISH OIL PO)     omeprazole (PRILOSEC) 20 MG DR capsule     traZODone  "(DESYREL) 100 MG tablet     No current facility-administered medications for this visit.          PHYSICAL EXAM:    /64   Pulse 53   Ht 1.676 m (5' 6\")   Wt 68 kg (150 lb)   BMI 24.21 kg/m       Constitutional: Well developed. Conversant and in no acute distress  Eyes: Anicteric sclera, conjunctiva clear, normal extraocular movements  ENT: Normocephalic and atraumatic,   Skin: Warm and dry. No rashes or lesions  Cardiac: No peripheral edema  Back/Flank: Not done  CNS/PNS: Normal musculature and movements, moves all extremities normally  Respiratory: Normal non-labored breathing  Abdomen: Soft nontender and nondistended  Peripheral Vascular: No peripheral edema  Mental Status/Psych: Alert and Oriented x 3. Normal mood and affect    Penis: Not done  Scrotal Skin: Not done  Testicles: Not done  Epididymis: Not done  Digital Rectal Exam:     Cystoscopy: Not done    Imaging: None    Urinalysis: UA RESULTS:  Recent Labs   Lab Test 12/30/19  1120  11/13/18  2356   COLOR Yellow   < > Yellow   APPEARANCE Clear   < > Cloudy   URINEGLC Negative   < > Negative   URINEBILI Negative   < > Negative   URINEKETONE Negative   < > Negative   SG 1.015   < > 1.031   UBLD Negative   < > Large*   URINEPH 7.0   < > 5.0   PROTEIN 100*   < > 100*   UROBILINOGEN 0.2   < >  --    NITRITE Negative   < > Positive*   LEUKEST Negative   < > Large*   RBCU  --   --  >182*   WBCU  --   --  >182*    < > = values in this interval not displayed.       PSA:     Post Void Residual:     Other labs: None today      IMPRESSION:  Urinary incontinence after radical prostatectomy    PLAN:  We activated his urinary sphincter in clinic today.  His examination is normal.  I then had him cycle the cough a few times and he was able to do this without any difficulty.  I recommended that during the daytime he cycled the cuff and try to void into the toilet at least every 2-3 hours in order to keep urinary leakage to a minimum.  For his prostate cancer " follow-up I will plan on seeing him back in mid April for his next PSA check.      Clint Blankenship M.D.

## 2020-02-19 NOTE — NURSING NOTE
Chief Complaint   Patient presents with     Follow Up     Post-Op Follow up     Myah Gonzalez, EMT

## 2020-02-27 ENCOUNTER — OFFICE VISIT (OUTPATIENT)
Dept: FAMILY MEDICINE | Facility: CLINIC | Age: 70
End: 2020-02-27

## 2020-02-27 VITALS
WEIGHT: 152.6 LBS | DIASTOLIC BLOOD PRESSURE: 82 MMHG | RESPIRATION RATE: 20 BRPM | HEART RATE: 52 BPM | BODY MASS INDEX: 24.53 KG/M2 | TEMPERATURE: 97.7 F | HEIGHT: 66 IN | SYSTOLIC BLOOD PRESSURE: 146 MMHG

## 2020-02-27 DIAGNOSIS — L98.9 SKIN LESION: ICD-10-CM

## 2020-02-27 DIAGNOSIS — I24.9 ACS (ACUTE CORONARY SYNDROME) (H): ICD-10-CM

## 2020-02-27 DIAGNOSIS — Z71.89 ACP (ADVANCE CARE PLANNING): ICD-10-CM

## 2020-02-27 DIAGNOSIS — I25.2 HISTORY OF NON-ST ELEVATION MYOCARDIAL INFARCTION (NSTEMI): ICD-10-CM

## 2020-02-27 DIAGNOSIS — Z13.1 SCREENING FOR DIABETES MELLITUS: ICD-10-CM

## 2020-02-27 DIAGNOSIS — Z00.00 ENCOUNTER FOR GENERAL MEDICAL EXAMINATION: Primary | ICD-10-CM

## 2020-02-27 LAB
CHOLEST SERPL-MCNC: 171 MG/DL (ref 0–199)
CHOLEST/HDLC SERPL: 2 {RATIO} (ref 0–5)
GLUCOSE SERPL-MCNC: 83 MG/DL (ref 60–99)
HDLC SERPL-MCNC: 70 MG/DL (ref 40–150)
LDLC SERPL CALC-MCNC: 85 MG/DL (ref 0–130)
TRIGL SERPL-MCNC: 82 MG/DL (ref 0–149)

## 2020-02-27 PROCEDURE — 80061 LIPID PANEL: CPT | Performed by: PHYSICIAN ASSISTANT

## 2020-02-27 PROCEDURE — 82947 ASSAY GLUCOSE BLOOD QUANT: CPT | Performed by: PHYSICIAN ASSISTANT

## 2020-02-27 PROCEDURE — 99397 PER PM REEVAL EST PAT 65+ YR: CPT | Performed by: PHYSICIAN ASSISTANT

## 2020-02-27 PROCEDURE — 36415 COLL VENOUS BLD VENIPUNCTURE: CPT | Performed by: PHYSICIAN ASSISTANT

## 2020-02-27 RX ORDER — NITROGLYCERIN 0.4 MG/1
TABLET SUBLINGUAL
Qty: 25 TABLET | Refills: 1 | Status: SHIPPED | OUTPATIENT
Start: 2020-02-27 | End: 2021-03-01

## 2020-02-27 SDOH — HEALTH STABILITY: MENTAL HEALTH: HOW OFTEN DO YOU HAVE 6 OR MORE DRINKS ON ONE OCCASION?: NEVER

## 2020-02-27 SDOH — HEALTH STABILITY: MENTAL HEALTH: HOW MANY STANDARD DRINKS CONTAINING ALCOHOL DO YOU HAVE ON A TYPICAL DAY?: 1 OR 2

## 2020-02-27 SDOH — HEALTH STABILITY: MENTAL HEALTH: HOW OFTEN DO YOU HAVE A DRINK CONTAINING ALCOHOL?: 4 OR MORE TIMES A WEEK

## 2020-02-27 ASSESSMENT — ANXIETY QUESTIONNAIRES
2. NOT BEING ABLE TO STOP OR CONTROL WORRYING: NOT AT ALL
1. FEELING NERVOUS, ANXIOUS, OR ON EDGE: NOT AT ALL
5. BEING SO RESTLESS THAT IT IS HARD TO SIT STILL: NOT AT ALL
3. WORRYING TOO MUCH ABOUT DIFFERENT THINGS: NOT AT ALL
GAD7 TOTAL SCORE: 0
6. BECOMING EASILY ANNOYED OR IRRITABLE: NOT AT ALL
IF YOU CHECKED OFF ANY PROBLEMS ON THIS QUESTIONNAIRE, HOW DIFFICULT HAVE THESE PROBLEMS MADE IT FOR YOU TO DO YOUR WORK, TAKE CARE OF THINGS AT HOME, OR GET ALONG WITH OTHER PEOPLE: NOT DIFFICULT AT ALL
7. FEELING AFRAID AS IF SOMETHING AWFUL MIGHT HAPPEN: NOT AT ALL

## 2020-02-27 ASSESSMENT — MIFFLIN-ST. JEOR: SCORE: 1389.94

## 2020-02-27 ASSESSMENT — PATIENT HEALTH QUESTIONNAIRE - PHQ9
5. POOR APPETITE OR OVEREATING: NOT AT ALL
SUM OF ALL RESPONSES TO PHQ QUESTIONS 1-9: 0

## 2020-02-27 NOTE — NURSING NOTE
Jose Moreno is here for a CPX.    Pre-visit planning  Immunizations -up to date  Colonoscopy -is up to date  Mammogram -  Asthma test --  PHQ9 -  CARL 7 -    Questioned patient about current smoking habits.  Pt. has never smoked.  Body mass index is 24.63 kg/m .  PULSE regular  My Chart: active  CLASSIFICATION OF OVERWEIGHT AND OBESITY BY BMI                        Obesity Class           BMI(kg/m2)  Underweight                                    < 18.5  Normal                                         18.5-24.9  Overweight                                     25.0-29.9  OBESITY                     I                  30.0-34.9                             II                 35.0-39.9  EXTREME OBESITY             III                >40                            Patient's  BMI Body mass index is 24.63 kg/m .  Http://hin.nhlbi.nih.gov/menuplanner/menu.cgi

## 2020-02-27 NOTE — PROGRESS NOTES
Jose Moreno is a 69 year old male presents for routine health maintenance.    Current concerns: Spot on chest that he first noticed a month ago. Seemed to be okay then got more red/brown.     BP at home has been 130/80 for weeks.     Body mass index is 24.63 kg/m .    Present exercise habits:  Stationary bike, but limited with back pain. 2 times weekly.   Present dietary habits:  eats regular meals and follows a balanced nutrition diet    Vit D intake: is taking supplement    Is the patient a smoker? No  If yes, smoking cessation advised and counseling provided.     Cardiovascular risk factors: previous MI    Over the past few weeks, have you felt down or depressed? Little interest or pleasure in doing things? No concerns    Last dental appointment:  last year  Last optical appointment:  2 years ago    Was the patient born between [birth year] and has not had Hep C testing?  Patient has already been tested    I have reviewed the following histories: Past Medical History, Past Surgical History, Social History, Family History, Problem List, Medication List and Allergies    Past Medical History:   Diagnosis Date     ADHD (attention deficit hyperactivity disorder)      Arthritis     lower back     CAD (coronary artery disease)     cardiac cath 1/23/15: SHERRY to 1st diagonal, SHERRY x2 to LAD, cath 2009: no intervention     Esophageal reflux      Heart murmur     mitral valve repair     History of hyperthyroidism 4/9/2014     Hyperlipidemia      Hypertension      Mitral regurgitation 2009    moderately severe MVP, s/p robotic repair at [place]     Prostate cancer      Pulmonary nodules 2009    CT-recommend repeat in 6-12 months     Rotator cuff rupture 11/3/2011     Thyroid disease      Family History   Problem Relation Age of Onset     Breast Cancer Mother      Lung Cancer Mother         small cell carcinoma- radon?     Depression Father         alcohlism     Macular Degeneration Father      Colon Cancer Father         age 75      Crohn's Disease Sister      Pleurisy Brother      Depression Son      Diabetes No family hx of      Cardiovascular No family hx of      Prostate Cancer No family hx of      Social History     Socioeconomic History     Marital status:      Spouse name: Chastity     Number of children: 4     Years of education: 14     Highest education level: Not on file   Occupational History     Occupation:      Employer: [employer]     Comment: RETIRED   Social Needs     Financial resource strain: Not on file     Food insecurity:     Worry: Not on file     Inability: Not on file     Transportation needs:     Medical: Not on file     Non-medical: Not on file   Tobacco Use     Smoking status: Never Smoker     Smokeless tobacco: Never Used   Substance and Sexual Activity     Alcohol use: Yes     Alcohol/week: 7.0 standard drinks     Types: 7 Standard drinks or equivalent per week     Frequency: 4 or more times a week     Drinks per session: 1 or 2     Binge frequency: Never     Comment: 1 beer daily     Drug use: No     Sexual activity: Yes     Partners: Female     Birth control/protection: Condom, Post-menopausal   Lifestyle     Physical activity:     Days per week: Not on file     Minutes per session: Not on file     Stress: Not on file   Relationships     Social connections:     Talks on phone: Not on file     Gets together: Not on file     Attends Sikh service: Not on file     Active member of club or organization: Not on file     Attends meetings of clubs or organizations: Not on file     Relationship status: Not on file     Intimate partner violence:     Fear of current or ex partner: Not on file     Emotionally abused: Not on file     Physically abused: Not on file     Forced sexual activity: Not on file   Other Topics Concern      Service Not Asked     Blood Transfusions Not Asked     Caffeine Concern No     Comment: 2-3 daily     Occupational Exposure Not Asked     Hobby Hazards Not Asked      Sleep Concern No     Stress Concern Not Asked     Weight Concern Not Asked     Special Diet No     Comment: low sodium     Back Care Not Asked     Exercise Yes     Comment: walking daily     Bike Helmet Not Asked     Seat Belt Yes     Self-Exams No     Parent/sibling w/ CABG, MI or angioplasty before 65F 55M? Not Asked   Social History Narrative     Not on file     Patient Active Problem List   Diagnosis     Essential hypertension, benign     Nonspecific (abnormal) findings on radiological and other examination of lung field     Status post mitral valve repair     Pulmonary nodules     Mixed hyperlipidemia     Spinal stenosis, lumbar     Health Care Home     Coronary artery disease involving native coronary artery of native heart without angina pectoris     Non-rheumatic mitral regurgitation     ACP (advance care planning)     Hypothyroidism due to acquired atrophy of thyroid     Gastroesophageal reflux disease without esophagitis     Chronic left-sided low back pain without sciatica     Hiatal hernia     History of non-ST elevation myocardial infarction (NSTEMI)     Lumbosacral spondylosis without myelopathy     Facet arthropathy, lumbosacral     Major depressive disorder, recurrent episode, in full remission (H)     FAUSTIN (dyspnea on exertion)     Chronic fatigue     Prostate cancer (H)     Stress incontinence of urine     Urinary incontinence     Current Outpatient Medications   Medication     Acetaminophen (TYLENOL PO)     aspirin EC 81 MG EC tablet     atorvastatin (LIPITOR) 40 MG tablet     cholecalciferol (VITAMIN D3) 1000 UNIT tablet     Cyanocobalamin (B-12) 1000 MCG TBCR     FLUoxetine (PROZAC) 20 MG capsule     irbesartan (AVAPRO) 300 MG tablet     ketoconazole (NIZORAL) 2 % external cream     levothyroxine (SYNTHROID/LEVOTHROID) 50 MCG tablet     metoprolol succinate ER (TOPROL-XL) 25 MG 24 hr tablet     multivitamin, therapeutic with minerals (MULTI-VITAMIN) TABS tablet     nitroGLYcerin (NITROSTAT) 0.4  "MG sublingual tablet     Omega-3 Fatty Acids (FISH OIL PO)     omeprazole (PRILOSEC) 20 MG DR capsule     traZODone (DESYREL) 100 MG tablet     No current facility-administered medications for this visit.        Allergies:    Allergies   Allergen Reactions     Ace Inhibitors Cough     Dry cough     No Clinical Screening - See Comments      PN: LW FI1: NKA     ROS:  E/M: NEGATIVE for ear, nose, mouth and throat problems  R: NEGATIVE for significant/chronic cough or SOB  CV: NEGATIVE for chest pain or palpitations  GI: NEGATIVE for abdominal pain, chronic diarrhea or constipation  : NEGATIVE for dysuria, hematuria, weakened urinary stream    OBJECTIVE:    Vitals:    02/27/20 1057   BP: (!) 146/82   BP Location: Left arm   Patient Position: Chair   Cuff Size: Adult Regular   Pulse: 52   Resp: 20   Temp: 97.7  F (36.5  C)   TempSrc: Oral   Weight: 69.2 kg (152 lb 9.6 oz)   Height: 1.676 m (5' 6\")       General: 69 year old male who appears his stated age. Vital signs noted.  Head: Normocephalic  Eyes: pupils equal round reactive to light and accomodation, extra ocular movements intact  Ears: external canals and tms free of abnormalities  Nose: patent, without mucosal abnormalities  Mouth and throat: without erythema or lesions of the mucosa  Neck: supple, without adenopathy or thyromegaly  Lungs: clear to auscultation, no wheezing or crackles  Cv: regular rate and rhythm, normal s1 and s2 without murmur or click  Abd: soft, non-tender, no masses, no hepatomegaly or splenomegaly.  Gu: normal external genitalia, no hernia  Rectal: Pt Declines  Ms: normal muscle tone & symmetry  Skin: Clear to inspection other than erythematous scaling papule with mild verrucous textures.   Neuro: sensation and motor function grossly intact; cranial nerves without obvious abnormalities.    ASSESSMENT/PLAN:    1. Encounter for general medical examination  Morgan is doing well today. Just had normal CMP for pre-op 12/2019. Will check lipid " "profile and fasting glucose today, and contact via Mo-DVt with results.     2. ACS (acute coronary syndrome) (H)  - nitroGLYcerin (NITROSTAT) 0.4 MG sublingual tablet; TAKE 1 TABLET BY MOUTH EVERY 5 MIN AS NEEDED FOR CHEST PAIN  Dispense: 25 tablet; Refill: 1    3. Screening for diabetes mellitus  - VENOUS COLLECTION  - Glucose Fasting (BFP)    4. History of non-ST elevation myocardial infarction (NSTEMI)  Refilled nitroglycerin without change.   - VENOUS COLLECTION  - Lipid Panel (BFP)    5. Skin   Lesion on left upper chest suspicious for SCC versus inflamed SK. Discussed with other providers, and given size, recommended he follow-up with dermatology to determine if this needs to be biopsied versus doing a biopsy in primary care.     6. ACP (advance care planning)       reports that he has never smoked. He has never used smokeless tobacco.    Estimated body mass index is 24.63 kg/m  as calculated from the following:    Height as of this encounter: 1.676 m (5' 6\").    Weight as of this encounter: 69.2 kg (152 lb 9.6 oz).        Labs pending:      Fasting glucose      Fasting lipids  Meds Suggested:      Vitamin D       Calcium  Tests Recommended:      Regular Dental Examinations        Eye exam  Behavior Modifications:       Cardiovascular exercise 3 times per week--enough to get your Target Heart rate  Other recommendations:     BMI noted and discussed      Regular testicle exam     Encouraged My Chart    The patient will return to the clinic if symptoms are changing or concern with follow up as discussed. The patient understands and agrees with the plan.      Raysa Singh PA-C  2/27/2020      Counseling Resources:  ATP IV Guidelines  Pooled Cohorts Equation Calculator  Breast Cancer Risk Calculator  FRAX Risk Assessment  ICSI Preventive Guidelines  Dietary Guidelines for Americans, 2010  [email or web address]'s MyPlate    "

## 2020-02-28 ENCOUNTER — MYC MEDICAL ADVICE (OUTPATIENT)
Dept: FAMILY MEDICINE | Facility: CLINIC | Age: 70
End: 2020-02-28

## 2020-02-28 ASSESSMENT — ANXIETY QUESTIONNAIRES: GAD7 TOTAL SCORE: 0

## 2020-03-06 ENCOUNTER — TELEPHONE (OUTPATIENT)
Dept: FAMILY MEDICINE | Facility: CLINIC | Age: 70
End: 2020-03-06

## 2020-03-06 NOTE — TELEPHONE ENCOUNTER
I called [facility] ( [phone] ). They have an opening Monday March 9th @ 2pm in [place] OR Friday March 27th @ 9:30am. -- I did not take the appt(s)     I did schedule patient with [facility] for Monday March 9th @ 2:30 with     Dr. Phillip Vallejo  [facility]   [address] [place] [address] [address]  [place] [address]  [phone] -- appt line  [phone] -- fax    I left a message for patient to call me back RE his appt.

## 2020-03-06 NOTE — TELEPHONE ENCOUNTER
I talked with patient RE note below. 3/9/2020 @ 2:30 does not work for patient. Patient will call [facility] to reschedule his appt. I asked that he call us back with his new appt info.

## 2020-03-06 NOTE — TELEPHONE ENCOUNTER
Patient called in stating that he has not heard back yet about what to do with the lesion that he has because he has not heard anything yet. I informed him that per Raysa's note from his visit, she recommends the patient seeing dermatology for a consult due to the size of it. I do not see a dermatology referral in the system so routing to Dr Briscoe to put this in. Will route to [facility] to help patient schedule an appointment as soon as possible. He can be reached at [phone]

## 2020-03-27 NOTE — TELEPHONE ENCOUNTER
Refused Prescriptions:                       Disp   Refills    traZODone (DESYREL) 100 MG tablet [Pharmac*135 ta*3        Sig: TAKE 1 AND 1/2 TABLETS BY MOUTH AT BEDTIME  Refused By: ERIKA MACK  Reason for Refusal: Request already responded to by other means (phone, fax, etc.)  Reason for Refusal Comment: refilled 10-1-2018    At time of px 10-1-2018 rtc in 6 month  E[place]  [phone] (home)      No

## 2020-04-04 DIAGNOSIS — N39.45 CONTINUOUS LEAKAGE OF URINE: ICD-10-CM

## 2020-04-04 DIAGNOSIS — F51.02 ADJUSTMENT INSOMNIA: ICD-10-CM

## 2020-04-06 RX ORDER — TRAZODONE HYDROCHLORIDE 100 MG/1
TABLET ORAL
Qty: 90 TABLET | Refills: 0 | COMMUNITY
Start: 2020-04-06

## 2020-04-06 NOTE — TELEPHONE ENCOUNTER
Jose Moreno is requesting a refill of:    Refused Prescriptions:                       Disp   Refills    traZODone (DESYREL) 100 MG tablet [Pharmac*90 tab*0        Sig: TAKE 1.5 TABLETS (150 MG) BY MOUTH AT BEDTIME , THEN           TAPER DOWN  MG AT BEDTIME AFTER 2-3 WEEKS  Refused By: AASHISH GR  Reason for Refusal: Patient needs appointment    Pt seen 01/27/20 advised to return in 3 months. Pt due for OV

## 2020-04-07 DIAGNOSIS — N39.45 CONTINUOUS LEAKAGE OF URINE: ICD-10-CM

## 2020-04-07 DIAGNOSIS — F51.02 ADJUSTMENT INSOMNIA: ICD-10-CM

## 2020-04-07 RX ORDER — TRAZODONE HYDROCHLORIDE 100 MG/1
100 TABLET ORAL AT BEDTIME
Qty: 90 TABLET | Refills: 0 | Status: SHIPPED | OUTPATIENT
Start: 2020-04-07 | End: 2020-06-30

## 2020-04-07 NOTE — TELEPHONE ENCOUNTER
Pt called asking for a refill of his Trazadone. Had a CPX on 02/27/20 this was not discussed. Last discussed 01/27/20 advised to return in 3 months. Are you willing to give pt an extension as he was just in for a CPX?    Please advise for SRB #[phone]    Jose Moreno is requesting a refill of:    Pending Prescriptions:                       Disp   Refills    traZODone (DESYREL) 100 MG tablet         90 tab*0            Sig: Take 1 tablet (100 mg) by mouth At Bedtime , then           taper down to 100 mgm at bedtime after 2-3 weeks        Thanks, Angie

## 2020-04-14 DIAGNOSIS — C61 PROSTATE CANCER (H): ICD-10-CM

## 2020-04-14 PROCEDURE — 36415 COLL VENOUS BLD VENIPUNCTURE: CPT | Performed by: UROLOGY

## 2020-04-14 PROCEDURE — 84153 ASSAY OF PSA TOTAL: CPT | Performed by: UROLOGY

## 2020-04-15 LAB — PSA SERPL-MCNC: <0.01 UG/L (ref 0–4)

## 2020-04-20 ENCOUNTER — VIRTUAL VISIT (OUTPATIENT)
Dept: UROLOGY | Facility: CLINIC | Age: 70
End: 2020-04-20
Payer: COMMERCIAL

## 2020-04-20 VITALS — HEIGHT: 66 IN | WEIGHT: 145 LBS | BODY MASS INDEX: 23.3 KG/M2

## 2020-04-20 DIAGNOSIS — Z85.46 PERSONAL HISTORY OF MALIGNANT NEOPLASM OF PROSTATE: Primary | ICD-10-CM

## 2020-04-20 PROCEDURE — 99212 OFFICE O/P EST SF 10 MIN: CPT | Mod: 95 | Performed by: UROLOGY

## 2020-04-20 ASSESSMENT — MIFFLIN-ST. JEOR: SCORE: 1350.47

## 2020-04-20 ASSESSMENT — PAIN SCALES - GENERAL: PAINLEVEL: NO PAIN (1)

## 2020-04-20 NOTE — PROGRESS NOTES
"Jose Moreno is a 70 year old male who is being evaluated via a billable telephone visit.      The patient has been notified of following:     \"This telephone visit will be conducted via a call between you and your physician/provider. We have found that certain health care needs can be provided without the need for a physical exam.  This service lets us provide the care you need with a short phone conversation.  If a prescription is necessary we can send it directly to your pharmacy.  If lab work is needed we can place an order for that and you can then stop by our lab to have the test done at a later time.    Telephone visits are billed at different rates depending on your insurance coverage. During this emergency period, for some insurers they may be billed the same as an in-person visit.  Please reach out to your insurance provider with any questions.    If during the course of the call the physician/provider feels a telephone visit is not appropriate, you will not be charged for this service.\"    Patient has given verbal consent for Telephone visit?  Yes    How would you like to obtain your AVS?     Additional provider notes: Morgan was set up for a phone visit today due to Covid. He had his PSA rechecked last week and it remains undetectable.    He is doing well with the artificial sphincter. He wears 1 pad at all times but rarely leaks. He is happy with the sphincter and empties well with 2 cycles of the sphincter.    We discussed the prostate cancer surveillance plan.  I will see him back in 1 year for his next PSA check.      Phone call duration: 6 minutes    Clint Blankenship M.D.  [facility]        "

## 2020-04-20 NOTE — NURSING NOTE
Chief Complaint   Patient presents with     Prostate Cancer     Review PSA results     Myah Gonzalez, EMT

## 2020-05-19 DIAGNOSIS — F33.42 MAJOR DEPRESSIVE DISORDER, RECURRENT EPISODE, IN FULL REMISSION (H): ICD-10-CM

## 2020-05-19 NOTE — TELEPHONE ENCOUNTER
Refilled 90 days/ call patient and review with him he needs thyroid recheck and this medication addressed in 90 days. Raysa will return next month from [facility].

## 2020-05-20 NOTE — TELEPHONE ENCOUNTER
Spoke with pt's wife. Notified her that refill was sent to pharmacy and he will be due back in September for recheck.

## 2020-05-27 NOTE — LETTER
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 Catheter was removed on 2 weeks later.  Urination:  Do you feel the sensation of your bladder filling? No  How many pads per day are you using? 3 Depends and 3 pads  Do you leak on the way to the bathroom or with a strong urge to void? Yes  Do you leak with cough,sneeze, jumping, running? Yes  Do you have triggers that make you feel you can't wait to go to the bathroom? .  How long can you delay the need to urinate? Not at all.   Can you stop the flow of urine when on the toilet? No  Is the volume of urine passed usually: medium. (8sec rule= 250ml with average bladder storing 400-600ml)  Do you strain to pass urine? No  Do you have a slow or hesitant urinary stream? No  Do you have difficulty initiating the urine stream? No  Fluid intake(one glass is 8oz or one cup) 8 glasses/day, 8 caffinated glasses/day  8 alcohol glasses/day.    The history is provided by the spouse. No  was used.   Jose Moreno is a 68 year old male with a incontinence condition.  Condition occurred with:  After surgery.  Condition occurred: other.  This is a new condition     Patient reports pain:  N/a.      Pain Scale: 0/10.   Pain is worse during the day.  Symptoms are exacerbated by coughing and sneezing (transfers , lifting) and relieved by rest.  Since onset symptoms are unchanged.  Special tests:  X-ray and CT scan.  Previous treatment: none.      Pertinent medical history includes:  Cancer,  depression, heart problems and high blood pressure.  Medical allergies: none.  Other surgeries include:  Heart surgery (stents for heart).  Current medications:  Anti-depressants and high blood pressure medication.    Employment status: retired.      Red flags:  None as reported by the patient.    OBJECTIVE:      Pelvic Dysfunction Evaluation:      Diagnostic Tests:  Diagnostic tests pelvic: see history.    Re: Jose Moreno   :   1950    Flexibility:  normal    External Assessment:    Skin Condition:  Normal  Scars:  Well healed  Bearing Down/Coughing:  Normal  Tissue Symmetry:  Normal    Internal Assessment:    Sensory Exam:  Normal    Accessory Muscle use-Abdominals:  Yes  Accessory Muscle use-Gluteals:  Yes  Accessory Muscle use-Adductors:  Yes    SEMG Biofeedback:      Suraface electrode placement--Perianal:  Bilateral  Baseline EMG PM:  3mV    Peak pelvic muscle contraction:  9 mV    EMG interpretation to fatigue:  3-5 seconds  Position:  SupineAdditional History:    Caffeine Consumption:  2 cups per day          Assessment/Plan:    Patient is a 68 year old male with pelvic complaints.    Patient has the following significant findings with corresponding treatment plan.                Diagnosis 1:  Post op incontinence  Decreased strength - therapeutic exercise, therapeutic activities and home program  Impaired muscle performance - biofeedback, neuro re-education and home program    Therapy Evaluation Codes:   1) History comprised of:   Personal factors that impact the plan of care:      None.    Comorbidity factors that impact the plan of care are:      Cancer, Depression, Heart problems, High blood pressure and Weakness.     Medications impacting care: Anti-depressant, High blood pressure and thryoid.  2) Examination of Body Systems comprised of:   Body structures and functions that impact the plan of care:      Pelvis.   Activity limitations that impact the plan of care are:      Lifting, Stress  incontinence and transfers.  3) Clinical presentation characteristics are:   Stable/Uncomplicated.  4) Decision-Making    Low complexity using standardized patient assessment instrument and/or measureable assessment of functional outcome.  Cumulative Therapy Evaluation is: Low complexity.    Previous and current functional limitations:  (See Goal Flow Sheet for this information)    Short term and Long term goals: (See Goal Flow Sheet for this information)   Re: Jose Moreno   :   1950    Communication ability:  Patient appears to be able to clearly communicate and understand verbal and written communication and follow directions correctly.  Treatment Explanation - The following has been discussed with the patient:   RX ordered/plan of care  Anticipated outcomes  Possible risks and side effects  This patient would benefit from PT intervention to resume normal activities.   Rehab potential is good.    Frequency:  1 X week, once daily  Duration:  for 6 weeks  Discharge Plan:  Achieve all LTG.  Independent in home treatment program.  Reach maximal therapeutic benefit.    Thank you for your referral.    INQUIRIES  Therapist: Mikhail Mario, PT  [facility]  [address] [place] [address], [address] [place] [id number]  Phone: [phone]  Fax: [phone]      negative details…

## 2020-06-09 DIAGNOSIS — I10 BENIGN ESSENTIAL HYPERTENSION: ICD-10-CM

## 2020-06-09 RX ORDER — METOPROLOL SUCCINATE 25 MG/1
25 TABLET, EXTENDED RELEASE ORAL DAILY
Qty: 90 TABLET | Refills: 1 | Status: SHIPPED | OUTPATIENT
Start: 2020-06-09 | End: 2020-12-16

## 2020-06-09 NOTE — LETTER
[facility]    [address]  [place] [address]    Phone:  [phone]     [email or web address]         Jose Moreno  [address] [address] [facility] [address]                       Dear Jose Moreno,      Our records indicate that you are due for follow-up with your Heart Care Provider.      As part of our effort to provide you with the best care possible, we have expanded our services to include video visits in addition to in-person clinic visits.    Please call our office at [phone] to schedule your appointment.    Thank you for allowing [facility] to be a part of your health care team.     Sincerely,      [facility]

## 2020-06-09 NOTE — TELEPHONE ENCOUNTER
Medication Refilled: Metoprolol  Last office visit: 10/1/19 with Cornell De Anda PA-C  Last Labs/EKG: NA  Next office visit: 7/2020 no appt scheduled - reminder letter sent   Pharmacy sent to: PATY Triana RN

## 2020-06-26 DIAGNOSIS — E78.2 MIXED HYPERLIPIDEMIA: ICD-10-CM

## 2020-06-26 RX ORDER — ATORVASTATIN CALCIUM 40 MG/1
40 TABLET, FILM COATED ORAL DAILY
Qty: 90 TABLET | Refills: 0 | Status: SHIPPED | OUTPATIENT
Start: 2020-06-26 | End: 2020-07-13

## 2020-06-26 NOTE — TELEPHONE ENCOUNTER
Medication Refilled: Metoprolol  Last office visit: 10/1/19 with Cornell De Anda PA-C  Last Labs/EKG: NA  Next office visit: 7/13/2020 with Dr. Alejandra  Pharmacy sent to: PATY Triana RN

## 2020-06-30 ENCOUNTER — PRE VISIT (OUTPATIENT)
Dept: CARDIOLOGY | Facility: CLINIC | Age: 70
End: 2020-06-30

## 2020-06-30 DIAGNOSIS — F51.02 ADJUSTMENT INSOMNIA: ICD-10-CM

## 2020-06-30 DIAGNOSIS — N39.45 CONTINUOUS LEAKAGE OF URINE: ICD-10-CM

## 2020-06-30 RX ORDER — TRAZODONE HYDROCHLORIDE 100 MG/1
TABLET ORAL
Qty: 90 TABLET | Refills: 0 | Status: SHIPPED | OUTPATIENT
Start: 2020-06-30 | End: 2020-11-02

## 2020-06-30 NOTE — TELEPHONE ENCOUNTER
Received incoming refill request for  Pending Prescriptions:                       Disp   Refills    traZODone (DESYREL) 100 MG tablet [Pharma*90 tab*0            Sig: TAKE 1 TABLET BY MOUTH AT BEDTIME    Routing to [facility] for approval or denial of medication refill request.

## 2020-07-13 ENCOUNTER — DOCUMENTATION ONLY (OUTPATIENT)
Dept: CARDIOLOGY | Facility: CLINIC | Age: 70
End: 2020-07-13

## 2020-07-13 ENCOUNTER — VIRTUAL VISIT (OUTPATIENT)
Dept: CARDIOLOGY | Facility: CLINIC | Age: 70
End: 2020-07-13
Attending: INTERNAL MEDICINE
Payer: COMMERCIAL

## 2020-07-13 DIAGNOSIS — E78.2 MIXED HYPERLIPIDEMIA: ICD-10-CM

## 2020-07-13 DIAGNOSIS — I25.10 CORONARY ARTERY DISEASE INVOLVING NATIVE CORONARY ARTERY OF NATIVE HEART WITHOUT ANGINA PECTORIS: Primary | Chronic | ICD-10-CM

## 2020-07-13 DIAGNOSIS — I10 BENIGN ESSENTIAL HYPERTENSION: ICD-10-CM

## 2020-07-13 DIAGNOSIS — Z98.890 STATUS POST MITRAL VALVE REPAIR: ICD-10-CM

## 2020-07-13 DIAGNOSIS — I34.0 NON-RHEUMATIC MITRAL REGURGITATION: ICD-10-CM

## 2020-07-13 DIAGNOSIS — I10 ESSENTIAL HYPERTENSION, BENIGN: ICD-10-CM

## 2020-07-13 PROCEDURE — 99214 OFFICE O/P EST MOD 30 MIN: CPT | Mod: GT | Performed by: INTERNAL MEDICINE

## 2020-07-13 RX ORDER — ATORVASTATIN CALCIUM 80 MG/1
80 TABLET, FILM COATED ORAL DAILY
Qty: 90 TABLET | Refills: 3 | Status: SHIPPED | OUTPATIENT
Start: 2020-07-13 | End: 2021-02-22

## 2020-07-13 NOTE — LETTER
"7/13/2020    Raysa Singh PA-C  [address]  [facility] [address]    RE: Jose Moreno       Dear Colleague,    I had the pleasure of seeing Jose Moreno in the [facility].    Jose Moreno is a 70 year old male who is being evaluated via a billable video visit.      HISTORY OF PRESENT ILLNESS:  Mr. Moreno is a very nice 70-year-old gentleman with past medical history significant for a robotic mitral valve repair at the [facility] in 2009 for mitral valve prolapse and severe mitral regurgitation.  Preoperative angiography demonstrated only a 50% mid LAD stenosis.      I met Mr. Moreno in 01/2015 when he presented with a non-ST segment elevation myocardial infarction to the [facility].  We took him to Cath Lab where he was found to have what appeared to be a chronically occluded right coronary artery that could not be crossed with a wire.  He subsequently was transferred to [facility], underwent complex LAD diagonal intervention with drug-eluting stents placed in both vessels.  He tolerated the procedure quite well.      He was subsequently seen in our clinic because he woke up with a \"spell.\"  He states he was not thinking clearly, he has trouble getting his words out.  He did not seek medical attention.  Echocardiogram demonstrated a structurally normal heart.  His valve repair appeared to be functioning appropriately.  He has a normal ejection fraction.  A ZIO Patch showed some brief runs of SVT lasting 4 beats, but no atrial fibrillation.  Stress nuclear scan appeared to be consistent with a small to moderate size reversible inferior, inferoseptal and inferolateral defect consistent with his known anatomy.  He was referred back to his primary physician for neurologic evaluation.     In followup, he appeared to be doing well from a cardiac standpoint without exertional chest, arm, neck, jaw or shoulder discomfort.  He did have a low-grade " discomfort that was there all the time, did not change with exercise and we felt this was not cardiac in origin.  We talked about options including medical management versus coronary artery bypass grafting versus a  and decided to continue with medical management.  He was having problems with fatigue, tiredness, cold hands, cold feet and we ultimately discontinued his metoprolol, of which he was only on 12.5 mg at that time.      A stress echo was performed in 2018 due to exertional fatigue noted by his wife.  He thought he was fine.  He exercised to an adequate workload without evidence of ischemia.  Blood pressure was not well controlled and we increased his losartan to 100 mg daily.    Due to Covid pandemic we conducted a video visit today.  Jose relates that in March his daughter was sent home from [employer] when the school shutdown because of Covid.  She was having classic symptoms at the time.  She was not tested due to lack of testing availability.  He states his wife subsequently also developed fevers.  He and his sons had no symptoms.  None of them were tested.  They wonder whether they may have had a Covid infection at that time.  Otherwise he is practicing social distancing and asymptomatic.    From a cardiac standpoint he states he is doing great.  He has no exertional chest arm neck jaw or shoulder discomfort.  No dyspnea on exertion orthopnea or PND.  No lightheadedness dizziness syncope or near syncope.  He notes no side effects or problems with his current medical regiment.    General:  no apparent distress, normal body habitus, sitting upright.  ENT/Mouth:  membranes moist, no nasal discharge.  Normal head shape, no apparent injury or laceration.  Eyes:  no scleral icterus, normal conjunctivae.  No observed jaundice.  Neck:  no apparent neck swelling.   Chest/Lungs:  No breathing difficulty while speaking.  No audible wheezing.  No cough during conversation.  Cardiovascular:  No obviously elevated  jugular venous pressure.   Extremities:  no apparent cyanosis.  Skin:  no xanthelasma.  No facial lacerations.  Neurologic:  Normal arm motion bilateral, no tremors.    Psychiatric:  Alert and oriented x3, calm demeanor    The rest of the comprehensive physical examination is deferred due to public health emergency video visit restrictions.         ASSESSMENT AND PLAN:   Jose appears to be well from a cardiac standpoint without clinical evidence of ischemia, heart failure or significant arrhythmia.    He has no blood pressure to report today.  I will have him come in the office for an MOHSEN appointment for a blood pressure check and follow-up on his increased atorvastatin.    Fasting lipid profile was checked in February with a total cholesterol 171, HDL 70, LDL 85 and triglycerides of 82.  He is on atorvastatin 40.  I will increase to atorvastatin 80 to try to drive his LDL lower.  We will recheck a fasting lipid profile and ALT in 1 month.     I emphasized the importance of a regular exercise regimen and a healthy diet.      Thank you for allowing me to participate in his care.        Sincerely,     Clint Alejandra MD     [facility]

## 2020-07-13 NOTE — PROGRESS NOTES
Message from Dr. Alejandra:  After my visit I decided to increase his atorvastatin to 80 mg daily.  He can double up on his 40s.  I sent in a prescription for 80.  I would like him to follow-up at the [facility] in 1 month with a MOHSEN for blood pressure check and a fasting lipid profile and ALT.  Thanks    Message sent to scheduling to find in-clinic MOHSEN visit in August (with labs) at [facility].  Attempted to contact patient to verify that he is aware of the medication plan and to update him to expect a call from scheduling. No answer on home phone. Spoke with patient on cell phone to review plan.    Called patient with update to verify that he is aware to increase atorvastatin to 80mg daily and then plan 1 month follow up with labs. Patient verbalized understanding and agreed with plan.

## 2020-07-13 NOTE — PROGRESS NOTES
"Jose Moreno is a 70 year old male who is being evaluated via a billable video visit.      The patient has been notified of following:     \"This video visit will be conducted via a call between you and your physician/provider. We have found that certain health care needs can be provided without the need for an in-person physical exam.  This service lets us provide the care you need with a video conversation.  If a prescription is necessary we can send it directly to your pharmacy.  If lab work is needed we can place an order for that and you can then stop by our lab to have the test done at a later time.    Video visits are billed at different rates depending on your insurance coverage.  Please reach out to your insurance provider with any questions.    If during the course of the call the physician/provider feels a video visit is not appropriate, you will not be charged for this service.\"    Patient has given verbal consent for Video visit? Yes  How would you like to obtain your AVS? FedericoNewry  Patient would like the video invitation sent by: Text to cell phone: [phone]  Will anyone else be joining your video visit? No      Review Of Systems  Skin: NEGATIVE  Eyes:Ears/Nose/Throat: NEGATIVE  Respiratory: NEGATIVE  Cardiovascular:NEGATIVE  Gastrointestinal: NEGATIVE  Genitourinary:NEGATIVE   Musculoskeletal: lower back pain  Neurologic: NEGATIVE  Psychiatric: NEGATIVE  Hematologic/Lymphatic/Immunologic: NEGATIVE  Endocrine:  NEGATIVE    Candy Proctor LPN    Video-Visit Details    Type of service:  Video Visit    Video Start Time: 11:06 AM  Video End Time: 11:27 AM    Originating Location (pt. Location): Home    Distant Location (provider location):  [facility]     Platform used for Video Visit: [facility]       HISTORY OF PRESENT ILLNESS:  Mr. Moreno is a very nice 70-year-old gentleman with past medical history significant for a robotic mitral valve repair at the [facility] in " "2009 for mitral valve prolapse and severe mitral regurgitation.  Preoperative angiography demonstrated only a 50% mid LAD stenosis.      I met Mr. Moreno in 01/2015 when he presented with a non-ST segment elevation myocardial infarction to the [facility].  We took him to Cath Lab where he was found to have what appeared to be a chronically occluded right coronary artery that could not be crossed with a wire.  He subsequently was transferred to [facility], underwent complex LAD diagonal intervention with drug-eluting stents placed in both vessels.  He tolerated the procedure quite well.      He was subsequently seen in our clinic because he woke up with a \"spell.\"  He states he was not thinking clearly, he has trouble getting his words out.  He did not seek medical attention.  Echocardiogram demonstrated a structurally normal heart.  His valve repair appeared to be functioning appropriately.  He has a normal ejection fraction.  A ZIO Patch showed some brief runs of SVT lasting 4 beats, but no atrial fibrillation.  Stress nuclear scan appeared to be consistent with a small to moderate size reversible inferior, inferoseptal and inferolateral defect consistent with his known anatomy.  He was referred back to his primary physician for neurologic evaluation.     In followup, he appeared to be doing well from a cardiac standpoint without exertional chest, arm, neck, jaw or shoulder discomfort.  He did have a low-grade discomfort that was there all the time, did not change with exercise and we felt this was not cardiac in origin.  We talked about options including medical management versus coronary artery bypass grafting versus a  and decided to continue with medical management.  He was having problems with fatigue, tiredness, cold hands, cold feet and we ultimately discontinued his metoprolol, of which he was only on 12.5 mg at that time.      A stress echo was performed in 2018 due to exertional " fatigue noted by his wife.  He thought he was fine.  He exercised to an adequate workload without evidence of ischemia.  Blood pressure was not well controlled and we increased his losartan to 100 mg daily.    Due to Covid pandemic we conducted a video visit today.  Jose relates that in March his daughter was sent home from [employer] when the school shutdown because of Covid.  She was having classic symptoms at the time.  She was not tested due to lack of testing availability.  He states his wife subsequently also developed fevers.  He and his sons had no symptoms.  None of them were tested.  They wonder whether they may have had a Covid infection at that time.  Otherwise he is practicing social distancing and asymptomatic.    From a cardiac standpoint he states he is doing great.  He has no exertional chest arm neck jaw or shoulder discomfort.  No dyspnea on exertion orthopnea or PND.  No lightheadedness dizziness syncope or near syncope.  He notes no side effects or problems with his current medical regiment.    General:  no apparent distress, normal body habitus, sitting upright.  ENT/Mouth:  membranes moist, no nasal discharge.  Normal head shape, no apparent injury or laceration.  Eyes:  no scleral icterus, normal conjunctivae.  No observed jaundice.  Neck:  no apparent neck swelling.   Chest/Lungs:  No breathing difficulty while speaking.  No audible wheezing.  No cough during conversation.  Cardiovascular:  No obviously elevated jugular venous pressure.   Extremities:  no apparent cyanosis.  Skin:  no xanthelasma.  No facial lacerations.  Neurologic:  Normal arm motion bilateral, no tremors.    Psychiatric:  Alert and oriented x3, calm demeanor    The rest of the comprehensive physical examination is deferred due to public health emergency video visit restrictions.         ASSESSMENT AND PLAN:   Jose appears to be well from a cardiac standpoint without clinical evidence of ischemia, heart failure or  significant arrhythmia.    He has no blood pressure to report today.  I will have him come in the office for an MOHSEN appointment for a blood pressure check and follow-up on his increased atorvastatin.    Fasting lipid profile was checked in February with a total cholesterol 171, HDL 70, LDL 85 and triglycerides of 82.  He is on atorvastatin 40.  I will increase to atorvastatin 80 to try to drive his LDL lower.  We will recheck a fasting lipid profile and ALT in 1 month.     I emphasized the importance of a regular exercise regimen and a healthy diet.         Thank you for allowing me to participate in his care.           IDALIA MORA MD, FACC

## 2020-07-17 ENCOUNTER — VIRTUAL VISIT (OUTPATIENT)
Dept: PALLIATIVE MEDICINE | Facility: CLINIC | Age: 70
End: 2020-07-17
Payer: COMMERCIAL

## 2020-07-17 DIAGNOSIS — M54.50 CHRONIC LEFT-SIDED LOW BACK PAIN WITHOUT SCIATICA: Primary | ICD-10-CM

## 2020-07-17 DIAGNOSIS — M47.816 SPONDYLOSIS OF LUMBAR REGION WITHOUT MYELOPATHY OR RADICULOPATHY: ICD-10-CM

## 2020-07-17 DIAGNOSIS — G89.29 CHRONIC LEFT-SIDED LOW BACK PAIN WITHOUT SCIATICA: Primary | ICD-10-CM

## 2020-07-17 PROCEDURE — 99213 OFFICE O/P EST LOW 20 MIN: CPT | Mod: GT | Performed by: PHYSICAL MEDICINE & REHABILITATION

## 2020-07-17 RX ORDER — PREGABALIN 50 MG/1
CAPSULE ORAL
Qty: 15 CAPSULE | Refills: 0 | Status: SHIPPED | OUTPATIENT
Start: 2020-07-17 | End: 2020-08-13

## 2020-07-17 RX ORDER — PREGABALIN 100 MG/1
100 CAPSULE ORAL 2 TIMES DAILY
Qty: 60 CAPSULE | Refills: 0 | Status: SHIPPED | OUTPATIENT
Start: 2020-07-17 | End: 2020-08-24

## 2020-07-17 ASSESSMENT — PAIN SCALES - GENERAL: PAINLEVEL: MILD PAIN (2)

## 2020-07-17 NOTE — PATIENT INSTRUCTIONS
1. Start lyrica and increase as detailed below:    The prescription will not give the full details as outlined below.      AM    PM    0   50mg   If tolerating, after 3 days, increase as tolerated to next line   50mg    50mg   If tolerating, after 3 days, increase as tolerated to next line   50mg              100mg   If tolerating, after 3 days, increase as tolerated to next line   100mg       100mg   Call us when you are at this dose, or with any concerns.     Avoid activities that require mental alertness or coordination until you realize the effects of gabapentin as it can cause dizziness, sleepiness, fatigue and incoordination.    2. Start methocarbamol 500mg three times daily as needed.    3. You can take tylenol 650mg four times daily as needed in addition to the above.    4. Continue the CBD oil.    5. Follow up in 4-6 weeks via video visit.    ----------------------------------------------------------------  Clinic Number:  [phone]     Call with any questions about your care and for scheduling assistance.     Calls are returned Monday through Friday between 8 AM and 4:30 PM. We usually get back to you within 2 business days depending on the issue/request.    If we are prescribing your medications:    For opioid medication refills, call the clinic or send a [employer] message 7 days in advance.  Please include:    Name of requested medication    Name of the pharmacy.    For non-opioid medications, call your pharmacy directly to request a refill. Please allow 3-4 days to be processed.     Per [place] State Law:    All controlled substance prescriptions must be filled within 30 days of being written.      For those controlled substances allowing refills, pickup must occur within 30 days of last fill.      We believe regular attendance is key to your success in our program!      Any time you are unable to keep your appointment we ask that you call us at least 24 hours in advance to cancel.This will allow us to  offer the appointment time to another patient.     Multiple missed appointments may lead to dismissal from the clinic.

## 2020-07-17 NOTE — PROGRESS NOTES
"Jose Moreno is a 70 year old male who is being evaluated via a billable video visit.      The patient has been notified of following:     \"This video visit will be conducted via a call between you and your physician/provider. We have found that certain health care needs can be provided without the need for an in-person physical exam.  This service lets us provide the care you need with a video conversation.  If a prescription is necessary we can send it directly to your pharmacy.  If lab work is needed we can place an order for that and you can then stop by our lab to have the test done at a later time.    Video visits are billed at different rates depending on your insurance coverage.  Please reach out to your insurance provider with any questions.    If during the course of the call the physician/provider feels a video visit is not appropriate, you will not be charged for this service.\"    Patient has given verbal consent for Video visit? Yes  How would you like to obtain your AVS? Mailed  If you are dropped from the video visit, the video invite should be resent to: Text to cell phone: [phone] [facility]  Will anyone else be joining your video visit? No       Juany Hammond [facility]  [place]      Video-Visit Details    Type of service:  Video Visit    Video Start Time: 855am  Video End Time: 9:11 AM    Originating Location (pt. Location): Home    Distant Location (provider location):  [place] [facility]     Platform used for Video Visit: [facility]    Date of visit: 7/17/2020    Chief complaint:   No chief complaint on file.      Interval history:  Jose Moreno is a 70 year old male last seen by me on 10/21/2019.      Recommendations/plan at the last visit included:  1. Physical Therapy: continue exercises recommended by PT  2. Clinical Health Pain Psychologist: Coping well, defer at " this time.  3. Self Care Recommendations: Gentle progressive exercise that does not increase pain - gradually increase daily walking program.  Take mini breaks - 5 minutes of mindfullness a couple times a day.   4. Diagnostic Studies: none  5. Medication Management:    1. Continue prn ibuprofen 400-600mg q4h prn  2. Alternate with tylenol 500-650mg q4h prn  6. Further procedures recommended: Repeated left lumbar RFA with only mild improvement, no other procedures recommended at this time. Consider an epidural based on recommendations from surgeon.  7. Follow up: 1 month post procedure in clinic      Since his last visit, Jose Moreno reports:  -He was seen at [place] by their pain team who recommended a SCS trial but this was rejected by his insurance company.  -Was told that he has arthritis and scoliosis in his spine.  -Discussed his back pain with spine surgery and their recommendations were for a 4-5 level fusion. He does not want to have any significant spine surgery.  -He also retired from his job as a , he no longer has restrictions on the type of medications he can take.  -He has been trying CBD oil and he feels that his back pain is less intense, not as sharp as it used to be.      Pain scores:  Pain intensity on average is 6 a scale of 0-10.        Current pain treatments:   -Ibuprofen prn  -Tylenol 650mg prn  -CBD oil     Past pain treatments:  -none  -Lumbar RFA with 6 months relief, repeated on 9/9/19 without significant pain relief  -Lumbar facet injections with 2-3 months relief  -PT      Side Effects: none    Medications:  Current Outpatient Medications   Medication Sig Dispense Refill     Acetaminophen (TYLENOL PO) Take 325-650 mg by mouth 2 times daily as needed for mild pain or fever       aspirin EC 81 MG EC tablet Take 1 tablet (81 mg) by mouth daily 60 tablet 0     atorvastatin (LIPITOR) 80 MG tablet Take 1 tablet (80 mg) by mouth daily 90 tablet 3     cholecalciferol (VITAMIN D3) 1000  UNIT tablet Take 1 tablet (1,000 Units) by mouth daily       Cyanocobalamin (B-12) 1000 MCG TBCR Take 1,000 mcg by mouth daily       FLUoxetine (PROZAC) 20 MG capsule Take 1 capsule (20 mg) by mouth daily 90 capsule 0     irbesartan (AVAPRO) 300 MG tablet Take 1 tablet (300 mg) by mouth daily 90 tablet 2     ketoconazole (NIZORAL) 2 % external cream Apply topically daily 60 g 0     levothyroxine (SYNTHROID/LEVOTHROID) 50 MCG tablet Take 1 tablet (50 mcg) by mouth daily 90 tablet 3     metoprolol succinate ER (TOPROL-XL) 25 MG 24 hr tablet Take 1 tablet (25 mg) by mouth daily 90 tablet 1     multivitamin, therapeutic with minerals (MULTI-VITAMIN) TABS tablet Take 1 tablet by mouth daily       nitroGLYcerin (NITROSTAT) 0.4 MG sublingual tablet TAKE 1 TABLET BY MOUTH EVERY 5 MIN AS NEEDED FOR CHEST PAIN 25 tablet 1     Omega-3 Fatty Acids (FISH OIL PO) Take 1 capsule by mouth daily       omeprazole (PRILOSEC) 20 MG DR capsule Take 1 capsule (20 mg) by mouth daily 90 capsule 3     traZODone (DESYREL) 100 MG tablet TAKE 1 TABLET BY MOUTH AT BEDTIME 90 tablet 0       Medical History: any changes in medical history since they were last seen? No    Review of Systems:  The 14 system ROS was reviewed from the intake questionnaire, and is positive for: back pain, arthritis  Any bowel or bladder problems: dribbling/incontinence  Mood: denies       Assessment:  Mr. Moreno is a 70 year old with past medical history including: CAD (nstemi, s/p PTCA), HTN, mitral valve repair, mitral regurgitation, HLD, Hypothyroidism, GERD, Prostate cancer (s/p prostatectomy in 2018, ongoing urinary incontinence) who presents for follow up of chronic low back pain.     1. Lumbar DDD, Spondylosis and scoliosis: Initially responded well to lumbar RFA but no benefit with repeat RFA and facet injections in 2019. He declines any surgical procedures, SCS was denied by his insurance company. When initially seen in clinic he was a  and was  prohibited from using many medications that are used for chronic pain but has since retired. At this time we will initiate the mediation changes detailed below.    Plan:  The following recommendations were given to the patient. Diagnosis, treatment options, risks, benefits, and alternatives were discussed, and all questions were answered. The patient expressed understanding of the plan for management.     1. Physical Therapy: continue exercises recommended by PT  2. Clinical Health Pain Psychologist: Coping well, defer at this time.  3. Self Care Recommendations: Gentle progressive exercise that does not increase pain - gradually increase daily walking program.  Take mini breaks - 5 minutes of mindfullness a couple times a day.   4. Diagnostic Studies: none  5. Medication Management:    1. Start lyrica 50mg HS and increase in 50mg increments every 3 days to 100mg BID.  2. Start methocarbamol 500mg TID prn  3. Take tylenol 650mg four times daily as needed.  4. Continue CBD oil  5. You can take ibuprofen 400mg twice daily as needed, avoid taking this more than 3 days/week.  6. Further procedures recommended: Consider lumbar mbb/RFA in the upper lumbar spine.  7. Follow up: 1 month video visit       I spent 30 minutes of time face to face with the patient.  Greater than 50% of this time was spent in patient counseling  and/or coordination of care.      DO [facility]      [Normal Development] : development [Normal Growth] : growth [None] : No known medical problems [No Elimination Concerns] : elimination [No Skin Concerns] : skin [No Feeding Concerns] : feeding [School Readiness] : school readiness [Normal Sleep Pattern] : sleep [Mental Health] : mental health [Nutrition and Physical Activity] : nutrition and physical activity [Safety] : safety [Oral Health] : oral health [No Medications] : ~He/She~ is not on any medications [Parent/Guardian] : parent/guardian [] : Counseling for  all components of the vaccines given today (see orders below) discussed with patient and patient’s parent/legal guardian. VIS statement provided as well. All questions answered. [FreeTextEntry1] : Continue balanced diet with all food groups. Brush teeth twice a day with toothbrush. Recommend visit to dentist. As per car seat 's guidelines, use forward-facing booster seat until child reaches highest weight/height for seat. Child needs to ride in a belt-positioning booster seat until  4 feet 9 inches has been reached and are between 8 and 12 years of age. Put child to sleep in own bed. Help child to maintain consistent daily routines and sleep schedule.  discussed. Ensure home is safe. Teach child about personal safety. Use consistent, positive discipline. Read aloud to child. Limit screen time to no more than 2 hours per day.\par Return 1 year for routine well child check.\par \par

## 2020-08-05 DIAGNOSIS — I25.10 CORONARY ARTERY DISEASE INVOLVING NATIVE CORONARY ARTERY OF NATIVE HEART WITHOUT ANGINA PECTORIS: Primary | ICD-10-CM

## 2020-08-05 DIAGNOSIS — E78.2 MIXED HYPERLIPIDEMIA: ICD-10-CM

## 2020-08-05 NOTE — PROGRESS NOTES
Entered orders for FLP and ALT per Dr. Alejandra's note 7/13/20, to be done prior to visit next week.   Viridiana RN, BSN  08/05/20 10:31 AM

## 2020-08-11 ENCOUNTER — TELEPHONE (OUTPATIENT)
Dept: CARDIOLOGY | Facility: CLINIC | Age: 70
End: 2020-08-11

## 2020-08-11 DIAGNOSIS — F33.42 MAJOR DEPRESSIVE DISORDER, RECURRENT EPISODE, IN FULL REMISSION (H): ICD-10-CM

## 2020-08-11 NOTE — TELEPHONE ENCOUNTER
Per your message on 06/30/20 pt due for OV in September. He will be 1 month short of Fluoxetine. Please advise.    Jose Moreno is requesting a refill of:    Pending Prescriptions:                       Disp   Refills    FLUoxetine (PROZAC) 20 MG capsule [Pharma*30 cap*0            Sig: TAKE 1 CAPSULE BY MOUTH EVERY DAY. NEED APPT

## 2020-08-11 NOTE — TELEPHONE ENCOUNTER

## 2020-08-12 DIAGNOSIS — I10 BENIGN ESSENTIAL HYPERTENSION: ICD-10-CM

## 2020-08-12 DIAGNOSIS — I25.10 CORONARY ARTERY DISEASE INVOLVING NATIVE CORONARY ARTERY OF NATIVE HEART WITHOUT ANGINA PECTORIS: ICD-10-CM

## 2020-08-12 DIAGNOSIS — E78.2 MIXED HYPERLIPIDEMIA: ICD-10-CM

## 2020-08-12 LAB
ALT SERPL W P-5'-P-CCNC: 41 U/L (ref 0–70)
ANION GAP SERPL CALCULATED.3IONS-SCNC: 7 MMOL/L (ref 3–14)
BUN SERPL-MCNC: 15 MG/DL (ref 7–30)
CALCIUM SERPL-MCNC: 8.3 MG/DL (ref 8.5–10.1)
CHLORIDE SERPL-SCNC: 108 MMOL/L (ref 94–109)
CHOLEST SERPL-MCNC: 145 MG/DL
CO2 SERPL-SCNC: 20 MMOL/L (ref 20–32)
CREAT SERPL-MCNC: 0.81 MG/DL (ref 0.66–1.25)
GFR SERPL CREATININE-BSD FRML MDRD: 89 ML/MIN/{1.73_M2}
GLUCOSE SERPL-MCNC: 91 MG/DL (ref 70–99)
HDLC SERPL-MCNC: 69 MG/DL
LDLC SERPL CALC-MCNC: 64 MG/DL
NONHDLC SERPL-MCNC: 76 MG/DL
POTASSIUM SERPL-SCNC: 4.3 MMOL/L (ref 3.4–5.3)
SODIUM SERPL-SCNC: 135 MMOL/L (ref 133–144)
TRIGL SERPL-MCNC: 62 MG/DL

## 2020-08-12 PROCEDURE — 36415 COLL VENOUS BLD VENIPUNCTURE: CPT | Performed by: INTERNAL MEDICINE

## 2020-08-12 PROCEDURE — 80061 LIPID PANEL: CPT | Performed by: INTERNAL MEDICINE

## 2020-08-12 PROCEDURE — 80048 BASIC METABOLIC PNL TOTAL CA: CPT | Performed by: INTERNAL MEDICINE

## 2020-08-12 PROCEDURE — 84460 ALANINE AMINO (ALT) (SGPT): CPT | Performed by: INTERNAL MEDICINE

## 2020-08-13 ENCOUNTER — VIRTUAL VISIT (OUTPATIENT)
Dept: CARDIOLOGY | Facility: CLINIC | Age: 70
End: 2020-08-13
Attending: INTERNAL MEDICINE
Payer: COMMERCIAL

## 2020-08-13 ENCOUNTER — TELEPHONE (OUTPATIENT)
Dept: PALLIATIVE MEDICINE | Facility: CLINIC | Age: 70
End: 2020-08-13

## 2020-08-13 DIAGNOSIS — E78.2 MIXED HYPERLIPIDEMIA: ICD-10-CM

## 2020-08-13 DIAGNOSIS — I10 BENIGN ESSENTIAL HYPERTENSION: ICD-10-CM

## 2020-08-13 PROCEDURE — 99214 OFFICE O/P EST MOD 30 MIN: CPT | Mod: TEL | Performed by: PHYSICIAN ASSISTANT

## 2020-08-13 RX ORDER — AMLODIPINE BESYLATE 5 MG/1
5 TABLET ORAL DAILY
Qty: 30 TABLET | Refills: 11 | Status: SHIPPED | OUTPATIENT
Start: 2020-08-13 | End: 2020-09-24

## 2020-08-13 NOTE — LETTER
"8/13/2020    Raysa Singh PA-C  [address], [address]  [facility] [address]    RE: Jose Moreno       Dear Colleague,    I had the pleasure of seeing Jose Moreno in the [facility].    CARDIOLOGY CLINIC VIDEO and TELEPHONE VISIT - started video but switched to telephone due to patient's poor Internet connection  This visit is being conducted as a virtual visit due to the emphasis on mitigation of the COVID-19 virus pandemic. The clinician has decided that the risk of an in-office visit outweighs the benefit for this patient. The rest of the comprehensive physical examination is deferred due to public health emergency video visit restrictions.      Jose Moreno is a 70 year old male who is being evaluated via a billable video visit.      Primary Cardiologist: Dr. Alejandra    HPI:  Morgan is a pleasant 70-year-old gentleman with past medical history significant for a robotic mitral valve repair at the [facility] in 2009 for mitral valve prolapse and severe mitral regurgitation.  Also CAD, HTN, dyslipidemia, rare and brief runs of SVT.       Preoperative coronary angiography 2009 demonstrated only a 50% mid LAD stenosis.       1/2015 Morgan presented with a NSTEMI. He was brought to the cath lab where he was found to have what appeared to be a chronically occluded right coronary artery that could not be crossed with a wire.  He subsequently was transferred to [facility], underwent complex LAD diagonal intervention with drug-eluting stents placed in both vessels.  He tolerated the procedure quite well.       He was subsequently seen in our clinic because he woke up with a \"spell.\"  He was sent back to his primary care doctor for neurologic evaluation.  Subsequent echocardiogram 2/15/16 demonstrated a structurally normal heart.  His valve repair appeared to be functioning appropriately.  He has a normal ejection fraction.  A ZIO Patch showed some brief runs " of SVT lasting 4 beats, but no atrial fibrillation.  Stress nuclear scan appeared to be consistent with a small to moderate size reversible inferior, inferoseptal and inferolateral defect consistent with his known anatomy.       In followup, he appeared to be doing well from a cardiac standpoint without exertional chest, arm, neck, jaw or shoulder discomfort.  He did have a low-grade discomfort that was there all the time, did not change with exercise and we felt this was not cardiac in origin.  Options were discussed, including medical management versus coronary artery bypass grafting versus a  and it was decided to continue with medical management.  He was having problems with fatigue, tiredness, cold hands, cold feet and we ultimately discontinued his metoprolol, of which he was only on 12.5 mg at that time.       Morgan saw Dr. Alejandra 7/10/18.  He had some more fatigue with walking.  Dr. Alejandra ordered a stress echo.  This was done 7/18/18.  He exercised to an acceptable level.  Heart rate response was normal.  BP was a little high.  LVEF at rest was 55-60%.  There was no evidence of stress-induced ischemia.      We have been adjusting medications for BP control.     7/13/20 last virtual visit with Dr. Alejandra. Overall doing great, no sxs. Increased atorvastatin from 40 mg to 80 mg daily to improve LDL. LDL 2/2020 was 85.      7/17/20 started Lyrica for back pain.     Tolerating atorvastatin 80 mg. FLP today shows LDL is lower, now 64. ALT is WNL.    BMP is stable.   BP has been running higher. He thinks for the past 3-4 months.  He checks it occasionally, and it is running 150s/80-90s.   Today:  /95, HR 52  /86  Due to the back pain being better, he is increasing walking.     I have reviewed and updated the patient's Past Medical History, Social History, Family History and Medication List.    PROBLEM LIST:  Patient Active Problem List   Diagnosis     Essential hypertension, benign      Nonspecific (abnormal) findings on radiological and other examination of lung field     Status post mitral valve repair     Pulmonary nodules     Mixed hyperlipidemia     Spinal stenosis, lumbar     Health Care Home     Coronary artery disease involving native coronary artery of native heart without angina pectoris     Non-rheumatic mitral regurgitation     ACP (advance care planning)     Hypothyroidism due to acquired atrophy of thyroid     Gastroesophageal reflux disease without esophagitis     Chronic left-sided low back pain without sciatica     Hiatal hernia     History of non-ST elevation myocardial infarction (NSTEMI)     Lumbosacral spondylosis without myelopathy     Facet arthropathy, lumbosacral     Major depressive disorder, recurrent episode, in full remission (H)     FAUSTIN (dyspnea on exertion)     Chronic fatigue     Prostate cancer (H)     Stress incontinence of urine     Urinary incontinence       MEDICATIONS:  Current Outpatient Medications   Medication Sig Dispense Refill     Acetaminophen (TYLENOL PO) Take 325-650 mg by mouth 2 times daily as needed for mild pain or fever       aspirin EC 81 MG EC tablet Take 1 tablet (81 mg) by mouth daily 60 tablet 0     atorvastatin (LIPITOR) 80 MG tablet Take 1 tablet (80 mg) by mouth daily 90 tablet 3     cholecalciferol (VITAMIN D3) 1000 UNIT tablet Take 1 tablet (1,000 Units) by mouth daily       Cyanocobalamin (B-12) 1000 MCG TBCR Take 1,000 mcg by mouth daily       FLUoxetine (PROZAC) 20 MG capsule Take 1 capsule (20 mg) by mouth daily 30 capsule 0     irbesartan (AVAPRO) 300 MG tablet Take 1 tablet (300 mg) by mouth daily 90 tablet 2     ketoconazole (NIZORAL) 2 % external cream Apply topically daily 60 g 0     levothyroxine (SYNTHROID/LEVOTHROID) 50 MCG tablet Take 1 tablet (50 mcg) by mouth daily 90 tablet 3     metoprolol succinate ER (TOPROL-XL) 25 MG 24 hr tablet Take 1 tablet (25 mg) by mouth daily 90 tablet 1     multivitamin, therapeutic with  minerals (MULTI-VITAMIN) TABS tablet Take 1 tablet by mouth daily       nitroGLYcerin (NITROSTAT) 0.4 MG sublingual tablet TAKE 1 TABLET BY MOUTH EVERY 5 MIN AS NEEDED FOR CHEST PAIN 25 tablet 1     Omega-3 Fatty Acids (FISH OIL PO) Take 1 capsule by mouth daily       omeprazole (PRILOSEC) 20 MG DR capsule Take 1 capsule (20 mg) by mouth daily 90 capsule 3     pregabalin (LYRICA) 100 MG capsule Take 1 capsule (100 mg) by mouth 2 times daily 60 capsule 0     pregabalin (LYRICA) 50 MG capsule Take 1 capsule (50 mg) by mouth At Bedtime for 3 days, THEN 1 capsule (50 mg) 2 times daily for 6 days. 15 capsule 0     traZODone (DESYREL) 100 MG tablet TAKE 1 TABLET BY MOUTH AT BEDTIME 90 tablet 0       ALLERGIES:     Allergies   Allergen Reactions     Ace Inhibitors Cough     Dry cough     No Clinical Screening - See Comments      PN: LW FI1: MATEO MCNALLY ROS:  Skin: positive for rash  Eyes: positive for glasses  Ears/Nose/Throat: negative  Respiratory: No shortness of breath, dyspnea on exertion, cough, or hemoptysis  Cardiovascular: negative  Gastrointestinal: positive for reflux takes meds   Genitourinary: negative  Musculoskeletal: positive for arthritis, back pain   Neurologic: negative  Psychiatric: negative  Hematologic/Lymphatic/Immunologic: negative  Endocrine: thyroid disorder        Self reported vitals:  Weight: 145 lbs   /95  HR 52      EXAM:  A limited exam was conducted via video.  General: Patient is pleasant, alert, in no distress.  Normal body habitus. Upright.    Eyes: No scleral icterus, no apparent redness or discharge. EOM's appear intact.   Chest/Lungs: No labored breathing, no cough during exam or audible wheezing.   Cardiovascular: No evidence of elevated JVP. No evidence of pitting edema bilaterally.  Abdomen: No evidence of abdominal distention. No observed jaundice.  Skin: No rashes or lesions appreciated on visualized skin, normal skin color.  Neuro: No obvious focal defects or tremors.    Psych: Alert and oriented. Does not appear anxious.     The rest of a comprehensive physical examination is deferred due to public health emergency video visit restrictions.       DIAGNOSTICS:  Component      Latest Ref Rng & Units 2/27/2020 8/12/2020   Sodium      133 - 144 mmol/L  135   Potassium      3.4 - 5.3 mmol/L  4.3   Chloride      94 - 109 mmol/L  108   Carbon Dioxide      20 - 32 mmol/L  20   Anion Gap      3 - 14 mmol/L  7   Glucose      70 - 99 mg/dL  91   Urea Nitrogen      7 - 30 mg/dL  15   Creatinine      0.66 - 1.25 mg/dL  0.81   GFR Estimate      >60 mL/min/1.73:m2  89   GFR Estimate If Black      >60 mL/min/1.73:m2  >90   Calcium      8.5 - 10.1 mg/dL  8.3 (L)   Cholesterol      <200 mg/dL 171 145   Triglycerides      <150 mg/dL 82 62   HDL Cholesterol      >39 mg/dL 70 69   LDL Cholesterol Direct      0 - 130 mg/dL 85    Cholesterol/HDL Ratio      0 - 5 2    LDL Cholesterol Calculated      <100 mg/dL  64   Non HDL Cholesterol      <130 mg/dL  76   ALT      0 - 70 U/L  41         ASSESSMENT/PLAN:  Mitral valve prolapse and severe mitral regurgitation s/p robotic mitral valve repair at the [facility] in 2009  - Stress echo 7/2018 shows valve working well, MG 5 mmHg, mild MR  - Echo 7/31/19: LVEF 55%, s/p posterior MV leaflet repair, MG 3 mmHg, mild MR        CAD  - Preoperative coronary angiography 2009 demonstrated only a 50% mid LAD stenosis.   - 1/2015 Jose presented with a NSTEMI.  Cardiac cath:  RCA, underwent complex LAD diagonal intervention with drug-eluting stents placed in both vessels.   - Negative stress echo 7/2018  - No anginal sxs  - Continue ASA, BB, statin.          HTN  - BP uncontrolled on irbesartan 300 mg, Toprol XL 25 mg  - With his HR in the low 50s, will not increase Toprol XL  - Add amlodipine 5 mg.  We reviewed possible side effects  - Follow up in 2 weeks         Dyslipidemia  - FLP 2/2020 LDL 85  - Dr. Alejandra increased atorvastatin to 80 mg to drive LDL  lower  - FLP 8/13/2020: , HDL 69, LDL 64, TG 62        Rare and brief runs of SVT  - Asymptomatic         Prostate cancer s/p prostatectomy  - Still following with urology. He is having fairly severe urinary incontinence and is considering artificial urinary sphincter.         Follow up:   7/2021 with cardiology MOHSEN and FLP/BMP      Thank you for allowing me to participate in the care of your patient.    Sincerely,     Cornell De Anda PA-C     [facility]

## 2020-08-13 NOTE — TELEPHONE ENCOUNTER
Called Morgan.  He was calling to give an update.  He had the Office visit on 07/17/2020.  He started the Lyrica. He is up to one tab in the AM and one tablet in the PM.  He is tolerating it okay. In the morning he has little balance issues, but that lessens  throughout the day.  Muscle fatigue and achyness  with exercise  He is pushing through it. Wants to exercise.  Fatigue is tolerable.  Back pain is so much better, pain is almost gone.    Methocarbamol- Not using it.  Scheduled video visit for 08/19/2020 at 0930 am.    Routing to provider as an JUANITA Hawkins RN  Care Coordinator  [facility]

## 2020-08-13 NOTE — TELEPHONE ENCOUNTER
Pt calling to follow up on his medication with Dr Richter. Per pt the medication is working well for his back pain. Minimal side effect, with a little muscle soreness. Otherwise he is doing great      Rocael M    [facility] [place]

## 2020-08-13 NOTE — PROGRESS NOTES
CARDIOLOGY CLINIC VIDEO and TELEPHONE VISIT - started video but switched to telephone due to patient's poor Internet connection  This visit is being conducted as a virtual visit due to the emphasis on mitigation of the COVID-19 virus pandemic. The clinician has decided that the risk of an in-office visit outweighs the benefit for this patient. The rest of the comprehensive physical examination is deferred due to public health emergency video visit restrictions.      Jose Moreno is a 70 year old male who is being evaluated via a billable video visit.      The patient has been notified of following:     This video visit will be conducted via a call between you and your physician/provider. We have found that certain health care needs can be provided without the need for an in-person physical exam.  This service lets us provide the care you need with a video conversation.  If a prescription is necessary we can send it directly to your pharmacy.  If lab work is needed we can place an order for that and you can then stop by our lab to have the test done at a later time.    Virtual visits are billed at different rates depending on your insurance coverage. During this emergency period, for some insurers they may be billed the same as an in-person visit.  Please reach out to your insurance provider with any questions.    If during the course of the call the physician/provider feels a video visit is not appropriate, you will not be charged for this service.      Physician has received verbal consent for a Video Visit from the patient? Yes    Patient would like the video invitation sent by: Text to cell phone: [phone]      Primary Cardiologist: Dr. Alejandra    HPI:  Morgan is a pleasant 70-year-old gentleman with past medical history significant for a robotic mitral valve repair at the [facility] in 2009 for mitral valve prolapse and severe mitral regurgitation.  Also CAD, HTN, dyslipidemia, rare and brief runs of SVT.  "      Preoperative coronary angiography 2009 demonstrated only a 50% mid LAD stenosis.       1/2015 Morgan presented with a NSTEMI. He was brought to the cath lab where he was found to have what appeared to be a chronically occluded right coronary artery that could not be crossed with a wire.  He subsequently was transferred to [facility], underwent complex LAD diagonal intervention with drug-eluting stents placed in both vessels.  He tolerated the procedure quite well.       He was subsequently seen in our clinic because he woke up with a \"spell.\"  He was sent back to his primary care doctor for neurologic evaluation.  Subsequent echocardiogram 2/15/16 demonstrated a structurally normal heart.  His valve repair appeared to be functioning appropriately.  He has a normal ejection fraction.  A ZIO Patch showed some brief runs of SVT lasting 4 beats, but no atrial fibrillation.  Stress nuclear scan appeared to be consistent with a small to moderate size reversible inferior, inferoseptal and inferolateral defect consistent with his known anatomy.       In followup, he appeared to be doing well from a cardiac standpoint without exertional chest, arm, neck, jaw or shoulder discomfort.  He did have a low-grade discomfort that was there all the time, did not change with exercise and we felt this was not cardiac in origin.  Options were discussed, including medical management versus coronary artery bypass grafting versus a  and it was decided to continue with medical management.  He was having problems with fatigue, tiredness, cold hands, cold feet and we ultimately discontinued his metoprolol, of which he was only on 12.5 mg at that time.       Morgan saw Dr. Alejandra 7/10/18.  He had some more fatigue with walking.  Dr. Alejandra ordered a stress echo.  This was done 7/18/18.  He exercised to an acceptable level.  Heart rate response was normal.  BP was a little high.  LVEF at rest was 55-60%.  There was " no evidence of stress-induced ischemia.      We have been adjusting medications for BP control.     7/13/20 last virtual visit with Dr. Alejandra. Overall doing great, no sxs. Increased atorvastatin from 40 mg to 80 mg daily to improve LDL. LDL 2/2020 was 85.      7/17/20 started Lyrica for back pain.     Tolerating atorvastatin 80 mg. FLP today shows LDL is lower, now 64. ALT is WNL.    BMP is stable.   BP has been running higher. He thinks for the past 3-4 months.  He checks it occasionally, and it is running 150s/80-90s.   Today:  /95, HR 52  /86  Due to the back pain being better, he is increasing walking.     I have reviewed and updated the patient's Past Medical History, Social History, Family History and Medication List.    PROBLEM LIST:  Patient Active Problem List   Diagnosis     Essential hypertension, benign     Nonspecific (abnormal) findings on radiological and other examination of lung field     Status post mitral valve repair     Pulmonary nodules     Mixed hyperlipidemia     Spinal stenosis, lumbar     Health Care Home     Coronary artery disease involving native coronary artery of native heart without angina pectoris     Non-rheumatic mitral regurgitation     ACP (advance care planning)     Hypothyroidism due to acquired atrophy of thyroid     Gastroesophageal reflux disease without esophagitis     Chronic left-sided low back pain without sciatica     Hiatal hernia     History of non-ST elevation myocardial infarction (NSTEMI)     Lumbosacral spondylosis without myelopathy     Facet arthropathy, lumbosacral     Major depressive disorder, recurrent episode, in full remission (H)     FASUTIN (dyspnea on exertion)     Chronic fatigue     Prostate cancer (H)     Stress incontinence of urine     Urinary incontinence       MEDICATIONS:  Current Outpatient Medications   Medication Sig Dispense Refill     Acetaminophen (TYLENOL PO) Take 325-650 mg by mouth 2 times daily as needed for mild pain or  fever       aspirin EC 81 MG EC tablet Take 1 tablet (81 mg) by mouth daily 60 tablet 0     atorvastatin (LIPITOR) 80 MG tablet Take 1 tablet (80 mg) by mouth daily 90 tablet 3     cholecalciferol (VITAMIN D3) 1000 UNIT tablet Take 1 tablet (1,000 Units) by mouth daily       Cyanocobalamin (B-12) 1000 MCG TBCR Take 1,000 mcg by mouth daily       FLUoxetine (PROZAC) 20 MG capsule Take 1 capsule (20 mg) by mouth daily 30 capsule 0     irbesartan (AVAPRO) 300 MG tablet Take 1 tablet (300 mg) by mouth daily 90 tablet 2     ketoconazole (NIZORAL) 2 % external cream Apply topically daily 60 g 0     levothyroxine (SYNTHROID/LEVOTHROID) 50 MCG tablet Take 1 tablet (50 mcg) by mouth daily 90 tablet 3     metoprolol succinate ER (TOPROL-XL) 25 MG 24 hr tablet Take 1 tablet (25 mg) by mouth daily 90 tablet 1     multivitamin, therapeutic with minerals (MULTI-VITAMIN) TABS tablet Take 1 tablet by mouth daily       nitroGLYcerin (NITROSTAT) 0.4 MG sublingual tablet TAKE 1 TABLET BY MOUTH EVERY 5 MIN AS NEEDED FOR CHEST PAIN 25 tablet 1     Omega-3 Fatty Acids (FISH OIL PO) Take 1 capsule by mouth daily       omeprazole (PRILOSEC) 20 MG DR capsule Take 1 capsule (20 mg) by mouth daily 90 capsule 3     pregabalin (LYRICA) 100 MG capsule Take 1 capsule (100 mg) by mouth 2 times daily 60 capsule 0     pregabalin (LYRICA) 50 MG capsule Take 1 capsule (50 mg) by mouth At Bedtime for 3 days, THEN 1 capsule (50 mg) 2 times daily for 6 days. 15 capsule 0     traZODone (DESYREL) 100 MG tablet TAKE 1 TABLET BY MOUTH AT BEDTIME 90 tablet 0       ALLERGIES:     Allergies   Allergen Reactions     Ace Inhibitors Cough     Dry cough     No Clinical Screening - See Comments      PN: LW FI1: NKNICKY MCNALLY ROS:  Skin: positive for rash  Eyes: positive for glasses  Ears/Nose/Throat: negative  Respiratory: No shortness of breath, dyspnea on exertion, cough, or hemoptysis  Cardiovascular: negative  Gastrointestinal: positive for reflux takes meds    Genitourinary: negative  Musculoskeletal: positive for arthritis, back pain   Neurologic: negative  Psychiatric: negative  Hematologic/Lymphatic/Immunologic: negative  Endocrine: thyroid disorder        Self reported vitals:  Weight: 145 lbs   /95  HR 52      EXAM:  A limited exam was conducted via video.  General: Patient is pleasant, alert, in no distress.  Normal body habitus. Upright.    Eyes: No scleral icterus, no apparent redness or discharge. EOM's appear intact.   Chest/Lungs: No labored breathing, no cough during exam or audible wheezing.   Cardiovascular: No evidence of elevated JVP. No evidence of pitting edema bilaterally.  Abdomen: No evidence of abdominal distention. No observed jaundice.  Skin: No rashes or lesions appreciated on visualized skin, normal skin color.  Neuro: No obvious focal defects or tremors.   Psych: Alert and oriented. Does not appear anxious.     The rest of a comprehensive physical examination is deferred due to public health emergency video visit restrictions.       DIAGNOSTICS:  Component      Latest Ref Rng & Units 2/27/2020 8/12/2020   Sodium      133 - 144 mmol/L  135   Potassium      3.4 - 5.3 mmol/L  4.3   Chloride      94 - 109 mmol/L  108   Carbon Dioxide      20 - 32 mmol/L  20   Anion Gap      3 - 14 mmol/L  7   Glucose      70 - 99 mg/dL  91   Urea Nitrogen      7 - 30 mg/dL  15   Creatinine      0.66 - 1.25 mg/dL  0.81   GFR Estimate      >60 mL/min/1.73:m2  89   GFR Estimate If Black      >60 mL/min/1.73:m2  >90   Calcium      8.5 - 10.1 mg/dL  8.3 (L)   Cholesterol      <200 mg/dL 171 145   Triglycerides      <150 mg/dL 82 62   HDL Cholesterol      >39 mg/dL 70 69   LDL Cholesterol Direct      0 - 130 mg/dL 85    Cholesterol/HDL Ratio      0 - 5 2    LDL Cholesterol Calculated      <100 mg/dL  64   Non HDL Cholesterol      <130 mg/dL  76   ALT      0 - 70 U/L  41         ASSESSMENT/PLAN:  Mitral valve prolapse and severe mitral regurgitation s/p robotic  mitral valve repair at the [facility] in 2009  - Stress echo 7/2018 shows valve working well, MG 5 mmHg, mild MR  - Echo 7/31/19: LVEF 55%, s/p posterior MV leaflet repair, MG 3 mmHg, mild MR        CAD  - Preoperative coronary angiography 2009 demonstrated only a 50% mid LAD stenosis.   - 1/2015 Jose presented with a NSTEMI.  Cardiac cath:  RCA, underwent complex LAD diagonal intervention with drug-eluting stents placed in both vessels.   - Negative stress echo 7/2018  - No anginal sxs  - Continue ASA, BB, statin.          HTN  - BP uncontrolled on irbesartan 300 mg, Toprol XL 25 mg  - With his HR in the low 50s, will not increase Toprol XL  - Add amlodipine 5 mg.  We reviewed possible side effects  - Follow up in 2 weeks         Dyslipidemia  - FLP 2/2020 LDL 85  - Dr. Alejandra increased atorvastatin to 80 mg to drive LDL lower  - FLP 8/13/2020: , HDL 69, LDL 64, TG 62        Rare and brief runs of SVT  - Asymptomatic         Prostate cancer s/p prostatectomy  - Still following with urology. He is having fairly severe urinary incontinence and is considering artificial urinary sphincter.         Follow up:   7/2021 with cardiology MOHSEN and FLP/BMP      Video-Visit Details  Type of service:  Video Visit  Video Start Time: 1341  Video End Time (time video stopped): 1346  Telephone duration: 13 minutes ([phone])   Originating Location (pt. Location): Home  Distant Location (provider location):  [facility]  Mode of Communication:  Video Conference via [employer] phone application       JOHANA [facility]

## 2020-08-13 NOTE — PATIENT INSTRUCTIONS
Your blood pressures are uncontrolled with top numbers in the 150-160s.   Start amlodipine 5 mg once daily.   We will have a follow up appt in 2 weeks.   Also continue to increase your walking and exercise as this will help your blood pressure as well.

## 2020-08-14 DIAGNOSIS — I10 ESSENTIAL HYPERTENSION, BENIGN: ICD-10-CM

## 2020-08-14 RX ORDER — IRBESARTAN 300 MG/1
300 TABLET ORAL DAILY
Qty: 90 TABLET | Refills: 3 | Status: SHIPPED | OUTPATIENT
Start: 2020-08-14 | End: 2021-05-21

## 2020-08-14 NOTE — TELEPHONE ENCOUNTER
Medication Refilled: Irbesartan  Last office visit: 8/13/2020 with Cornell De Anda  Last Labs/EKG: NA  Next office visit: 7/2021 orders in [facility]  Pharmacy sent to: PATY Triana RN

## 2020-08-17 ENCOUNTER — TELEPHONE (OUTPATIENT)
Dept: CARDIOLOGY | Facility: CLINIC | Age: 70
End: 2020-08-17

## 2020-08-17 ENCOUNTER — TELEPHONE (OUTPATIENT)
Dept: PALLIATIVE MEDICINE | Facility: CLINIC | Age: 70
End: 2020-08-17

## 2020-08-17 NOTE — TELEPHONE ENCOUNTER
Patient called wondering if he nneded an increase in his amlodipine 5 mg one tablet daily,  Patient states he saw Swathi S. MOHSEN 8- and recommenced to start amlodipine.  Patient started 8-.   Today's BP was 150/92 20 minutes after taking the medication.  Patient states a couple of days ago his BP was 120's/70's a couple of hour after taking the medication.    Patient denies any symptoms.    Patient will check his BP about 90 minutes after taking the AM Amlodipine and will will call in 2 days with update.    Patient due to F/Up with MOHSEN in 2 weeks but states due to cost of medical visit and a high deductible he may decline visit.

## 2020-08-17 NOTE — TELEPHONE ENCOUNTER
See call from 08/13/2020.  Pt called to give another update. Informed  personal he is doing fine with his medications. Had a virtual visit on 08/19/2020 but canceled it.      Please close encounter after reading    Summer Hawkins RN  Care Coordinator  [facility]

## 2020-08-17 NOTE — TELEPHONE ENCOUNTER
Pt calling to inform Dr Richter that he is doing fine since taking the medication. Pt was scheduled 8/19 but cancelled.      Rocael KNIGHT    [facility] [place]

## 2020-08-18 NOTE — TELEPHONE ENCOUNTER
Good to hear that he is tolerating the medication well and it is improving his pain. Please have patient scheduled for 3 month follow up in October. Ok for a virtual visit.    DO [facility]

## 2020-08-18 NOTE — TELEPHONE ENCOUNTER
Called Rocco Pond informing him that Dr Richter is happy he is tolerating the medication.  Asked him to call to set up a follow up for anytime in October for a virtual visit with Dr Richter.    Routing to scheduling    Summer Hawkins RN  Care Coordinator  [facility]

## 2020-08-19 ENCOUNTER — TELEPHONE (OUTPATIENT)
Dept: CARDIOLOGY | Facility: CLINIC | Age: 70
End: 2020-08-19

## 2020-08-19 NOTE — TELEPHONE ENCOUNTER
Called patient to follow up on home BP after adding amlodipine 5mg daily. Pt states he takes his amlodipine in the morning and then will check his BP in the afternoon & evening. He says that the first two days after starting the amlodipine his BP was 120/60s but then afterwards it was back up to the 150's. At his last appointment it was recommended that he follow up in 2wks for his HTN, but pt verbalized concern over doing any type of appointment other than a telephone visit due to cost. Discussed that we could likely accommodate for this if cost is prohibitive. He states he will take his amlodipine now and then check his BP in 1-2hrs and then call back with an update. He states at that point he will decide if he wants to make an appointment.

## 2020-08-21 NOTE — TELEPHONE ENCOUNTER
Called patient to let him know that follow up visit is not needed, left detailed message with this information/Anah's message. Recommended he continue to monitor his BP periodically and call if it becomes elevated again. Pt to call with any additional questions/concerns. Orders already in place for follow up 7/2021.

## 2020-08-21 NOTE — TELEPHONE ENCOUNTER
Called patient to follow up on BP, was supposed to call back yesterday after taking his BP but did not. He took it at the time of the call and it was 150/89 but he had just taken his amlodipine 15min ago. He states he will call back in in an hour with a follow up BP.       Addendum 4/14/48: Pt called back, states his BP is now 124/74 with a HR of 51bpm. [facility]ed to [facility] to determine if follow up visit is still needed. Pt had previously expressed apprehension due to cost.

## 2020-08-21 NOTE — TELEPHONE ENCOUNTER
BP better controlled with addition of amlodipine.   Does not need 2 week follow up.   Thanks,     Cornell De Anda PA-C 8/21/2020 2:27 PM

## 2020-08-24 DIAGNOSIS — G89.29 CHRONIC LEFT-SIDED LOW BACK PAIN WITHOUT SCIATICA: ICD-10-CM

## 2020-08-24 DIAGNOSIS — M54.50 CHRONIC LEFT-SIDED LOW BACK PAIN WITHOUT SCIATICA: ICD-10-CM

## 2020-08-24 RX ORDER — PREGABALIN 100 MG/1
100 CAPSULE ORAL 2 TIMES DAILY
Qty: 60 CAPSULE | Refills: 1 | Status: SHIPPED | OUTPATIENT
Start: 2020-08-24 | End: 2020-11-02

## 2020-08-24 NOTE — TELEPHONE ENCOUNTER
Chart review: scheduled 10/09/ closing    Katia MOSQUERA, RN Care Coordinator  [facility]

## 2020-08-24 NOTE — TELEPHONE ENCOUNTER
Received fax request from [facility] pharmacy requesting refill(s) for pregabalin (LYRICA) 100 MG capsule     Last refilled on 07/17/20    Pt last seen on 07/17/20-virtual visit  Next appt scheduled for 10/09/20    Will facilitate refill.

## 2020-09-01 ENCOUNTER — TELEPHONE (OUTPATIENT)
Dept: CARDIOLOGY | Facility: CLINIC | Age: 70
End: 2020-09-01

## 2020-09-01 DIAGNOSIS — I10 ESSENTIAL HYPERTENSION, BENIGN: Primary | ICD-10-CM

## 2020-09-01 RX ORDER — HYDROCHLOROTHIAZIDE 25 MG/1
25 TABLET ORAL DAILY
Qty: 30 TABLET | Refills: 0 | Status: SHIPPED | OUTPATIENT
Start: 2020-09-01 | End: 2020-09-21

## 2020-09-01 NOTE — TELEPHONE ENCOUNTER
Spoke with Patient who states his afternoon and PM BP has been averaging between 145-155/89 to 98,   Even after adding amlodipine 5 mg daily.  Patient states his morning BP seems to be controlled than the throughout the day is creeps up.    Patient wondering if he needs a dose adjustment?    Patient also taking irbesartan 300 mg daily, and metoprolol succinate 25 mg daily.   Last seen by Cornell CONNOLLY 8-.     Will message Dr. Alejandra.

## 2020-09-01 NOTE — TELEPHONE ENCOUNTER
Per Dr. Alejandra's recommendation to add hydrochlorothiazide with F/Up MOHSEN and BMP visit in 2-4 weeks Patient called.  Patient agrees with plan.  Prescription e scribed.  Orders entered.    Patient will continue to  Monitor and record BP and call with any concerns or questions.

## 2020-09-05 DIAGNOSIS — K21.9 GASTROESOPHAGEAL REFLUX DISEASE WITHOUT ESOPHAGITIS: ICD-10-CM

## 2020-09-05 DIAGNOSIS — E03.4 HYPOTHYROIDISM DUE TO ACQUIRED ATROPHY OF THYROID: ICD-10-CM

## 2020-09-08 RX ORDER — LEVOTHYROXINE SODIUM 50 UG/1
TABLET ORAL
Qty: 90 TABLET | Refills: 3 | COMMUNITY
Start: 2020-09-08

## 2020-09-08 NOTE — TELEPHONE ENCOUNTER
Refused Prescriptions:                       Disp   Refills    omeprazole (PRILOSEC) 20 MG DR capsule [Ph*90 cap*3        Sig: TAKE 1 CAPSULE BY MOUTH EVERY DAY  Refused By: ASTRID ROSE  Reason for Refusal: Patient needs appointment    levothyroxine (SYNTHROID/LEVOTHROID) 50 MC*90 tab*3        Sig: TAKE 1 TABLET BY MOUTH EVERY DAY  Refused By: ASTRID ROSE  Reason for Refusal: Patient needs appointment      Pt last prescribed these medications for a year on 9/19/20. He needs his thyroid checked in office.   Can call in a short supply after a scheduled appt.

## 2020-09-12 DIAGNOSIS — F33.42 MAJOR DEPRESSIVE DISORDER, RECURRENT EPISODE, IN FULL REMISSION (H): ICD-10-CM

## 2020-09-14 NOTE — TELEPHONE ENCOUNTER
Refused Prescriptions:                       Disp   Refills    FLUoxetine (PROZAC) 20 MG capsule [Pharmac*90 cap*0        Sig: TAKE 1 CAPSULE BY MOUTH EVERY DAY. NEED APPT  Refused By: ASTRID ROSE  Reason for Refusal: Patient needs appointment      Dr. Tavera refilled 90 days on 5/19/20.  Raysa gave 30 day ext on 8/11/20.  Pt needs thyroid recheck and this medication addressed.

## 2020-09-21 DIAGNOSIS — I10 ESSENTIAL HYPERTENSION, BENIGN: ICD-10-CM

## 2020-09-21 RX ORDER — HYDROCHLOROTHIAZIDE 25 MG/1
25 TABLET ORAL DAILY
Qty: 30 TABLET | Refills: 0 | Status: SHIPPED | OUTPATIENT
Start: 2020-09-21 | End: 2020-10-22

## 2020-09-21 NOTE — PROGRESS NOTES
Fax request from: [facility] in [place]  Medication requested: hydrochlorothiazide  Last Office visit with: MOHSEN 8/13/2020  Follow up scheduled: annual 7/2021  Refill sent for 30 day supply - patient in [place] a few more days and is out of medication.  Denise Sheffield RN 09/21/2020 2:28 PM

## 2020-09-23 DIAGNOSIS — I10 ESSENTIAL HYPERTENSION, BENIGN: ICD-10-CM

## 2020-09-23 LAB
ANION GAP SERPL CALCULATED.3IONS-SCNC: 7 MMOL/L (ref 3–14)
BUN SERPL-MCNC: 27 MG/DL (ref 7–30)
CALCIUM SERPL-MCNC: 9.3 MG/DL (ref 8.5–10.1)
CHLORIDE SERPL-SCNC: 106 MMOL/L (ref 94–109)
CO2 SERPL-SCNC: 24 MMOL/L (ref 20–32)
CREAT SERPL-MCNC: 0.95 MG/DL (ref 0.66–1.25)
GFR SERPL CREATININE-BSD FRML MDRD: 80 ML/MIN/{1.73_M2}
GLUCOSE SERPL-MCNC: 100 MG/DL (ref 70–99)
POTASSIUM SERPL-SCNC: 3.7 MMOL/L (ref 3.4–5.3)
SODIUM SERPL-SCNC: 137 MMOL/L (ref 133–144)

## 2020-09-23 PROCEDURE — 36415 COLL VENOUS BLD VENIPUNCTURE: CPT | Performed by: INTERNAL MEDICINE

## 2020-09-23 PROCEDURE — 80048 BASIC METABOLIC PNL TOTAL CA: CPT | Performed by: INTERNAL MEDICINE

## 2020-09-24 ENCOUNTER — VIRTUAL VISIT (OUTPATIENT)
Dept: CARDIOLOGY | Facility: CLINIC | Age: 70
End: 2020-09-24
Payer: COMMERCIAL

## 2020-09-24 DIAGNOSIS — I25.10 CORONARY ARTERY DISEASE INVOLVING NATIVE CORONARY ARTERY OF NATIVE HEART WITHOUT ANGINA PECTORIS: Primary | ICD-10-CM

## 2020-09-24 DIAGNOSIS — I34.0 NON-RHEUMATIC MITRAL REGURGITATION: ICD-10-CM

## 2020-09-24 DIAGNOSIS — I10 ESSENTIAL HYPERTENSION, BENIGN: ICD-10-CM

## 2020-09-24 DIAGNOSIS — E78.2 MIXED HYPERLIPIDEMIA: ICD-10-CM

## 2020-09-24 DIAGNOSIS — Z98.890 STATUS POST MITRAL VALVE REPAIR: ICD-10-CM

## 2020-09-24 PROCEDURE — 99213 OFFICE O/P EST LOW 20 MIN: CPT | Mod: 95 | Performed by: NURSE PRACTITIONER

## 2020-09-24 RX ORDER — AMLODIPINE BESYLATE 10 MG/1
10 TABLET ORAL DAILY
Qty: 90 TABLET | Refills: 3 | Status: SHIPPED | OUTPATIENT
Start: 2020-09-24 | End: 2021-09-20

## 2020-09-24 NOTE — PROGRESS NOTES
"  Cardiology Phone Visit Progress Note    Service Date: September 24, 2020    PRIMARY CARDIOLOGIST: Dr. Alejandra    REASON FOR VISIT: Follow up of medication changes     Mr. Jose Moreno is a 70 year old male who is being evaluated via a billable telephone visit to minimize risk of exposure with the current COVID-19 pandemic.     The patient has been notified of following:     \"This telephone visit will be conducted via a call between you and your provider. We have found that certain health care needs can be provided without the need for a physical exam. This service lets us provide the care you need with a short phone conversation. If a prescription is necessary we can send it directly to your pharmacy. If lab work is needed we can place an order for that and you can then stop by our lab to have the test done at a later time.    Telephone visits are billed at different rates depending on your insurance coverage. During this emergency period, for some insurers they may be billed the same as an in-person visit. Please reach out to your insurance provider with any questions.    If during the course of the call the provider feels a telephone visit is not appropriate, you will not be charged for this service.\"    Patient has given verbal consent for Telephone visit? Yes    What phone number would you like to be contacted at? [phone]    How would you like to obtain your AVS? Mail a copy    I had the pleasure of speaking with Mr. Garcia \"Morgan\"Josh today over the phone for follow up of recent medication changes. Today's visit was initially scheduled for a video visit but needed to be converted to a telephone visit due to technical difficulties with the patient getting the video software to workTita Mora is a very pleasant 70 year old male with a past medical history notable for a robotic mitral valve repair at the [facility] in 2009 for mitral valve prolapse and severe mitral regurgitation.  He also has coronary " artery disease, hypertension, dyslipidemia, and rare brief runs of SVT.    A preoperative coronary angiogram in 2009 prior to his mitral valve repair demonstrated a 50% mid LAD stenosis. In January 2015, Morgan presented with a non-STEMI.  He was taken to the Cath Lab where he was found to have significant LAD and diagonal lesions, as well as a chronically occluded right coronary artery that could not be crossed with a wire. He subsequently was transferred to [facility] where he underwent complex LAD and diagonal intervention with drug-eluting stents placed in both vessels.    A most recent echocardiogram in February 2016 demonstrated a structurally normal heart.  His valve repair appeared to be functioning appropriately. His LV systolic function was preserved with a normal ejection fraction.  A previous Zio patch monitor showed some brief runs of SVT lasting up to 4 beats, but no evidence of atrial fibrillation.  His most recent stress nuclear study demonstrated a small to moderate size reversible inferior, inferoseptal, and inferolateral defect consistent with his known anatomy.    More recently, we have been adjusting medications for better blood pressure control. His atorvastatin was also recently increased from 40 mg to 80 mg daily to try to push his LDL cholesterol lower than 70 as it was at 85 in February 2020. He last met with my colleague Cornell De Anda PA-C last month. His LDL cholesterol goal improved on a subsequent check down to 64. His blood pressures had been running primarily around 150s/80s-90s at that time, so she had him start on amlodipine 5 mg once daily.    Morgan later called into the clinic noting that his blood pressures continued to be elevated, so Dr. Alejandra recommended starting hydrochlorothiazide 25 mg once daily. A basic metabolic panel was completed in follow-up yesterday showing stable electrolytes and renal function with a potassium of 3.7 and a creatinine of 0.95.      Today, Morgan tells me that he has been feeling quite well since her last spoke with Cornell. He really has no specific cardiac complaints today beyond continued issues with occasional elevated blood pressure readings. He tells me that his readings at home have improved somewhat, but have remained primarily around the 130s-140s/70s-90s. He has been tolerating amlodipine and hydrochlorothiazide well without concerns for side effects. He specifically denies symptoms of chest pain, shortness of breath, palpitations, lower extremity edema, dizziness, presyncope, or syncope. He has been trying to get regular exercise and stay physically active going for walks several times a week.    ASSESSMENT AND PLAN:  1. Coronary artery disease, s/p PCI of the LAD and D1 with SHERRY x2 in 2015  - Stable without anginal symptoms. Continue current regimen of aspirin, high intensity statin, and beta-blocker.    2.  Hypertension  - Blood pressures remain fairly consistently borderline elevated.  - Recommended he increase the dose of his amlodipine from 5 mg to 10 mg once daily. We reviewed common side effects and I encouraged him to call the clinic if he develops any significant lower extremity edema after starting on the increased dose. I instructed him to continue on hydrochlorothiazide 25 mg daily, irbesartan 300 mg daily, and metoprolol succinate 25 mg once daily without changes.    3. Mixed hyperlipidemia  - Treated on atorvastatin 80 mg once daily with an LDL at goal under 70 at 64 on his most recent lipid profile in 08/2020.    4.  History of mitral valve prolapse and severe mitral valve regurgitation, s/p robotic mitral valve repair at the [facility] in 2009    Thank you for the opportunity to participate in this pleasant patient's care. He seems to be doing quite well from a cardiac standpoint at this time.  We will plan to have him see Cornell, Dr. Alejandra, or I for a routine annual follow-up visit around July 2021. I anticipate  that the increased dose of amlodipine will likely get his blood pressures more on target at consistently under 130/85, but I encouraged him to call the clinic if he continues to have frequent readings above this range and we would be happy to make further adjustments if needed.    Provider location: Home  Patient location: Home    Phone call start time: 1:20 PM  Phone call end time: 1:31 PM  Total phone call time: 11 minutes    TACOS Ibarra CNP  Text Page  (8am - 5pm, M-F)    Orders this Visit:  No orders of the defined types were placed in this encounter.    Orders Placed This Encounter   Medications     amLODIPine (NORVASC) 10 MG tablet     Sig: Take 1 tablet (10 mg) by mouth daily     Dispense:  90 tablet     Refill:  3     Medications Discontinued During This Encounter   Medication Reason     amLODIPine (NORVASC) 5 MG tablet Reorder     Encounter Diagnosis   Name Primary?     Benign essential hypertension      CURRENT MEDICATIONS:  Current Outpatient Medications   Medication Sig Dispense Refill     Acetaminophen (TYLENOL PO) Take 325-650 mg by mouth 2 times daily as needed for mild pain or fever       amLODIPine (NORVASC) 10 MG tablet Take 1 tablet (10 mg) by mouth daily 90 tablet 3     aspirin EC 81 MG EC tablet Take 1 tablet (81 mg) by mouth daily 60 tablet 0     atorvastatin (LIPITOR) 80 MG tablet Take 1 tablet (80 mg) by mouth daily 90 tablet 3     cholecalciferol (VITAMIN D3) 1000 UNIT tablet Take 1 tablet (1,000 Units) by mouth daily       Cyanocobalamin (B-12) 1000 MCG TBCR Take 1,000 mcg by mouth daily       FLUoxetine (PROZAC) 20 MG capsule Take 1 capsule (20 mg) by mouth daily 30 capsule 0     hydrochlorothiazide (HYDRODIURIL) 25 MG tablet Take 1 tablet (25 mg) by mouth daily 30 tablet 0     irbesartan (AVAPRO) 300 MG tablet Take 1 tablet (300 mg) by mouth daily 90 tablet 3     ketoconazole (NIZORAL) 2 % external cream Apply topically daily 60 g 0     levothyroxine (SYNTHROID/LEVOTHROID) 50 MCG  tablet Take 1 tablet (50 mcg) by mouth daily 90 tablet 3     metoprolol succinate ER (TOPROL-XL) 25 MG 24 hr tablet Take 1 tablet (25 mg) by mouth daily 90 tablet 1     multivitamin, therapeutic with minerals (MULTI-VITAMIN) TABS tablet Take 1 tablet by mouth daily       nitroGLYcerin (NITROSTAT) 0.4 MG sublingual tablet TAKE 1 TABLET BY MOUTH EVERY 5 MIN AS NEEDED FOR CHEST PAIN 25 tablet 1     Omega-3 Fatty Acids (FISH OIL PO) Take 1 capsule by mouth daily       omeprazole (PRILOSEC) 20 MG DR capsule Take 1 capsule (20 mg) by mouth daily 90 capsule 3     pregabalin (LYRICA) 100 MG capsule Take 1 capsule (100 mg) by mouth 2 times daily 60 capsule 1     traZODone (DESYREL) 100 MG tablet TAKE 1 TABLET BY MOUTH AT BEDTIME 90 tablet 0     ALLERGIES  Allergies   Allergen Reactions     Ace Inhibitors Cough     Dry cough     No Clinical Screening - See Comments      PN: LW FI1: NKA       PAST MEDICAL, SURGICAL, FAMILY HISTORY:  History was reviewed and updated as needed, see medical record.    SOCIAL HISTORY:  Social History     Socioeconomic History     Marital status:      Spouse name: Chastity     Number of children: 4     Years of education: 14     Highest education level: Not on file   Occupational History     Occupation:      Employer: [employer]     Comment: RETIRED   Social Needs     Financial resource strain: Not on file     Food insecurity     Worry: Not on file     Inability: Not on file     Transportation needs     Medical: Not on file     Non-medical: Not on file   Tobacco Use     Smoking status: Never Smoker     Smokeless tobacco: Never Used   Substance and Sexual Activity     Alcohol use: Yes     Alcohol/week: 7.0 standard drinks     Types: 7 Standard drinks or equivalent per week     Frequency: 4 or more times a week     Drinks per session: 1 or 2     Binge frequency: Never     Comment: 1 beer daily     Drug use: No     Sexual activity: Yes     Partners: Female     Birth control/protection:  Condom, Post-menopausal   Lifestyle     Physical activity     Days per week: Not on file     Minutes per session: Not on file     Stress: Not on file   Relationships     Social connections     Talks on phone: Not on file     Gets together: Not on file     Attends Jehovah's witness service: Not on file     Active member of club or organization: Not on file     Attends meetings of clubs or organizations: Not on file     Relationship status: Not on file     Intimate partner violence     Fear of current or ex partner: Not on file     Emotionally abused: Not on file     Physically abused: Not on file     Forced sexual activity: Not on file   Other Topics Concern      Service Not Asked     Blood Transfusions Not Asked     Caffeine Concern No     Comment: 2-3 daily     Occupational Exposure Not Asked     Hobby Hazards Not Asked     Sleep Concern No     Stress Concern Not Asked     Weight Concern Not Asked     Special Diet No     Comment: low sodium     Back Care Not Asked     Exercise Yes     Comment: walking daily     Bike Helmet Not Asked     Seat Belt Yes     Self-Exams No     Parent/sibling w/ CABG, MI or angioplasty before 65F 55M? Not Asked   Social History Narrative     Not on file     Review of Systems:  Skin: negative  Eyes: glasses  Ears/Nose/Throat: negative  Respiratory: No shortness of breath, dyspnea on exertion, cough, or hemoptysis  Cardiovascular: negative  Gastrointestinal: heartburn  Genitourinary: negative  Musculoskeletal: back pain  Neurologic: negative  Psychiatric: negative  Hematologic/Lymphatic/Immunologic: negative  Endocrine: thyroid disorder    PHYSICAL EXAM:  Patient Reported Vitals:  BP: 135/81  Pulse: 59  Weight: 145    SHowley CMA    There were no vitals taken for this visit.   Wt Readings from Last 4 Encounters:   04/20/20 65.8 kg (145 lb)   02/27/20 69.2 kg (152 lb 9.6 oz)   02/19/20 68 kg (150 lb)   01/27/20 71 kg (156 lb 9.6 oz)     Recent Lab Results:  LIPID RESULTS:  Lab Results    Component Value Date    CHOL 145 08/12/2020    HDL 69 08/12/2020    LDL 64 08/12/2020    TRIG 62 08/12/2020    CHOLHDLRATIO 2 02/27/2020     LIVER ENZYME RESULTS:  Lab Results   Component Value Date    AST 19 04/01/2019    ALT 41 08/12/2020     CBC RESULTS:  Lab Results   Component Value Date    WBC 6.7 12/30/2019    RBC 4.40 12/30/2019    HGB 14.4 12/30/2019    HCT 44.3 12/30/2019    .6 (A) 12/30/2019    MCH 32.7 12/30/2019    MCHC 32.5 12/30/2019    RDW 12.2 12/30/2019     12/30/2019     11/13/2018     BMP RESULTS:  Lab Results   Component Value Date     09/23/2020    POTASSIUM 3.7 09/23/2020    CHLORIDE 106 09/23/2020    CO2 24 09/23/2020    ANIONGAP 7 09/23/2020     (H) 09/23/2020    BUN 27 09/23/2020    CR 0.95 09/23/2020    GFRESTIMATED 80 09/23/2020    GFRESTBLACK >90 09/23/2020    MIRYAM 9.3 09/23/2020      A1C RESULTS:  No results found for: A1C  INR RESULTS:  Lab Results   Component Value Date    INR 1.00 01/23/2015    INR 1.09 01/09/2013     This note was completed in part using Dragon voice recognition software. Although reviewed after completion, some word and grammatical errors may occur.

## 2020-09-24 NOTE — LETTER
"9/24/2020    Raysa Singh PA-C  [address], [address]  [facility] [address]    RE: Jose Moreno       Dear Colleague,    I had the pleasure of seeing Jose Moreno in the [facility].    Cardiology Phone Visit Progress Note    Service Date: September 24, 2020    PRIMARY CARDIOLOGIST: Dr. Alejandra    REASON FOR VISIT: Follow up of medication changes     Mr. Jose Moreno is a 70 year old male who is being evaluated via a billable telephone visit to minimize risk of exposure with the current COVID-19 pandemic.     The patient has been notified of following:     \"This telephone visit will be conducted via a call between you and your provider. We have found that certain health care needs can be provided without the need for a physical exam. This service lets us provide the care you need with a short phone conversation. If a prescription is necessary we can send it directly to your pharmacy. If lab work is needed we can place an order for that and you can then stop by our lab to have the test done at a later time.    Telephone visits are billed at different rates depending on your insurance coverage. During this emergency period, for some insurers they may be billed the same as an in-person visit. Please reach out to your insurance provider with any questions.    If during the course of the call the provider feels a telephone visit is not appropriate, you will not be charged for this service.\"    Patient has given verbal consent for Telephone visit? Yes    What phone number would you like to be contacted at? [phone]    How would you like to obtain your AVS? Mail a copy    I had the pleasure of speaking with Mr. Garcia \"Morgan\"Josh today over the phone for follow up of recent medication changes. Today's visit was initially scheduled for a video visit but needed to be converted to a telephone visit due to technical difficulties with the patient getting the video software " to work. Morgan is a very pleasant 70 year old male with a past medical history notable for a robotic mitral valve repair at the [facility] in 2009 for mitral valve prolapse and severe mitral regurgitation.  He also has coronary artery disease, hypertension, dyslipidemia, and rare brief runs of SVT.    A preoperative coronary angiogram in 2009 prior to his mitral valve repair demonstrated a 50% mid LAD stenosis. In January 2015, Morgan presented with a non-STEMI.  He was taken to the Cath Lab where he was found to have significant LAD and diagonal lesions, as well as a chronically occluded right coronary artery that could not be crossed with a wire. He subsequently was transferred to [facility] where he underwent complex LAD and diagonal intervention with drug-eluting stents placed in both vessels.    A most recent echocardiogram in February 2016 demonstrated a structurally normal heart.  His valve repair appeared to be functioning appropriately. His LV systolic function was preserved with a normal ejection fraction.  A previous Zio patch monitor showed some brief runs of SVT lasting up to 4 beats, but no evidence of atrial fibrillation.  His most recent stress nuclear study demonstrated a small to moderate size reversible inferior, inferoseptal, and inferolateral defect consistent with his known anatomy.    More recently, we have been adjusting medications for better blood pressure control. His atorvastatin was also recently increased from 40 mg to 80 mg daily to try to push his LDL cholesterol lower than 70 as it was at 85 in February 2020. He last met with my colleague Cornell De Anda PA-C last month. His LDL cholesterol goal improved on a subsequent check down to 64. His blood pressures had been running primarily around 150s/80s-90s at that time, so she had him start on amlodipine 5 mg once daily.    Morgan later called into the clinic noting that his blood pressures continued to be elevated, so   Justyn recommended starting hydrochlorothiazide 25 mg once daily. A basic metabolic panel was completed in follow-up yesterday showing stable electrolytes and renal function with a potassium of 3.7 and a creatinine of 0.95.     Today, Morgan tells me that he has been feeling quite well since her last spoke with Cornell. He really has no specific cardiac complaints today beyond continued issues with occasional elevated blood pressure readings. He tells me that his readings at home have improved somewhat, but have remained primarily around the 130s-140s/70s-90s. He has been tolerating amlodipine and hydrochlorothiazide well without concerns for side effects. He specifically denies symptoms of chest pain, shortness of breath, palpitations, lower extremity edema, dizziness, presyncope, or syncope. He has been trying to get regular exercise and stay physically active going for walks several times a week.    ASSESSMENT AND PLAN:  1. Coronary artery disease, s/p PCI of the LAD and D1 with SHERRY x2 in 2015  - Stable without anginal symptoms. Continue current regimen of aspirin, high intensity statin, and beta-blocker.    2.  Hypertension  - Blood pressures remain fairly consistently borderline elevated.  - Recommended he increase the dose of his amlodipine from 5 mg to 10 mg once daily. We reviewed common side effects and I encouraged him to call the clinic if he develops any significant lower extremity edema after starting on the increased dose. I instructed him to continue on hydrochlorothiazide 25 mg daily, irbesartan 300 mg daily, and metoprolol succinate 25 mg once daily without changes.    3. Mixed hyperlipidemia  - Treated on atorvastatin 80 mg once daily with an LDL at goal under 70 at 64 on his most recent lipid profile in 08/2020.    4.  History of mitral valve prolapse and severe mitral valve regurgitation, s/p robotic mitral valve repair at the [facility] in 2009    Thank you for the opportunity to participate in  this pleasant patient's care. He seems to be doing quite well from a cardiac standpoint at this time.  We will plan to have him see Dr. Justyn Sarabia, or I for a routine annual follow-up visit around July 2021. I anticipate that the increased dose of amlodipine will likely get his blood pressures more on target at consistently under 130/85, but I encouraged him to call the clinic if he continues to have frequent readings above this range and we would be happy to make further adjustments if needed.    Provider location: Home  Patient location: Home    Phone call start time: 1:20 PM  Phone call end time: 1:31 PM  Total phone call time: 11 minutes    TACOS Ibarra CNP  Text Page  (8am - 5pm, M-F)    Orders this Visit:  No orders of the defined types were placed in this encounter.    Orders Placed This Encounter   Medications     amLODIPine (NORVASC) 10 MG tablet     Sig: Take 1 tablet (10 mg) by mouth daily     Dispense:  90 tablet     Refill:  3     Medications Discontinued During This Encounter   Medication Reason     amLODIPine (NORVASC) 5 MG tablet Reorder     Encounter Diagnosis   Name Primary?     Benign essential hypertension      CURRENT MEDICATIONS:  Current Outpatient Medications   Medication Sig Dispense Refill     Acetaminophen (TYLENOL PO) Take 325-650 mg by mouth 2 times daily as needed for mild pain or fever       amLODIPine (NORVASC) 10 MG tablet Take 1 tablet (10 mg) by mouth daily 90 tablet 3     aspirin EC 81 MG EC tablet Take 1 tablet (81 mg) by mouth daily 60 tablet 0     atorvastatin (LIPITOR) 80 MG tablet Take 1 tablet (80 mg) by mouth daily 90 tablet 3     cholecalciferol (VITAMIN D3) 1000 UNIT tablet Take 1 tablet (1,000 Units) by mouth daily       Cyanocobalamin (B-12) 1000 MCG TBCR Take 1,000 mcg by mouth daily       FLUoxetine (PROZAC) 20 MG capsule Take 1 capsule (20 mg) by mouth daily 30 capsule 0     hydrochlorothiazide (HYDRODIURIL) 25 MG tablet Take 1 tablet (25 mg) by mouth daily  30 tablet 0     irbesartan (AVAPRO) 300 MG tablet Take 1 tablet (300 mg) by mouth daily 90 tablet 3     ketoconazole (NIZORAL) 2 % external cream Apply topically daily 60 g 0     levothyroxine (SYNTHROID/LEVOTHROID) 50 MCG tablet Take 1 tablet (50 mcg) by mouth daily 90 tablet 3     metoprolol succinate ER (TOPROL-XL) 25 MG 24 hr tablet Take 1 tablet (25 mg) by mouth daily 90 tablet 1     multivitamin, therapeutic with minerals (MULTI-VITAMIN) TABS tablet Take 1 tablet by mouth daily       nitroGLYcerin (NITROSTAT) 0.4 MG sublingual tablet TAKE 1 TABLET BY MOUTH EVERY 5 MIN AS NEEDED FOR CHEST PAIN 25 tablet 1     Omega-3 Fatty Acids (FISH OIL PO) Take 1 capsule by mouth daily       omeprazole (PRILOSEC) 20 MG DR capsule Take 1 capsule (20 mg) by mouth daily 90 capsule 3     pregabalin (LYRICA) 100 MG capsule Take 1 capsule (100 mg) by mouth 2 times daily 60 capsule 1     traZODone (DESYREL) 100 MG tablet TAKE 1 TABLET BY MOUTH AT BEDTIME 90 tablet 0     ALLERGIES  Allergies   Allergen Reactions     Ace Inhibitors Cough     Dry cough     No Clinical Screening - See Comments      PN: LW FI1: NKA       PAST MEDICAL, SURGICAL, FAMILY HISTORY:  History was reviewed and updated as needed, see medical record.    SOCIAL HISTORY:  Social History     Socioeconomic History     Marital status:      Spouse name: Chastity     Number of children: 4     Years of education: 14     Highest education level: Not on file   Occupational History     Occupation:      Employer: [employer]     Comment: RETIRED   Social Needs     Financial resource strain: Not on file     Food insecurity     Worry: Not on file     Inability: Not on file     Transportation needs     Medical: Not on file     Non-medical: Not on file   Tobacco Use     Smoking status: Never Smoker     Smokeless tobacco: Never Used   Substance and Sexual Activity     Alcohol use: Yes     Alcohol/week: 7.0 standard drinks     Types: 7 Standard drinks or equivalent  per week     Frequency: 4 or more times a week     Drinks per session: 1 or 2     Binge frequency: Never     Comment: 1 beer daily     Drug use: No     Sexual activity: Yes     Partners: Female     Birth control/protection: Condom, Post-menopausal   Lifestyle     Physical activity     Days per week: Not on file     Minutes per session: Not on file     Stress: Not on file   Relationships     Social connections     Talks on phone: Not on file     Gets together: Not on file     Attends Evangelical service: Not on file     Active member of club or organization: Not on file     Attends meetings of clubs or organizations: Not on file     Relationship status: Not on file     Intimate partner violence     Fear of current or ex partner: Not on file     Emotionally abused: Not on file     Physically abused: Not on file     Forced sexual activity: Not on file   Other Topics Concern      Service Not Asked     Blood Transfusions Not Asked     Caffeine Concern No     Comment: 2-3 daily     Occupational Exposure Not Asked     Hobby Hazards Not Asked     Sleep Concern No     Stress Concern Not Asked     Weight Concern Not Asked     Special Diet No     Comment: low sodium     Back Care Not Asked     Exercise Yes     Comment: walking daily     Bike Helmet Not Asked     Seat Belt Yes     Self-Exams No     Parent/sibling w/ CABG, MI or angioplasty before 65F 55M? Not Asked   Social History Narrative     Not on file     Review of Systems:  Skin: negative  Eyes: glasses  Ears/Nose/Throat: negative  Respiratory: No shortness of breath, dyspnea on exertion, cough, or hemoptysis  Cardiovascular: negative  Gastrointestinal: heartburn  Genitourinary: negative  Musculoskeletal: back pain  Neurologic: negative  Psychiatric: negative  Hematologic/Lymphatic/Immunologic: negative  Endocrine: thyroid disorder    PHYSICAL EXAM:  Patient Reported Vitals:  BP: 135/81  Pulse: 59  Weight: 145    SHowley CMA    There were no vitals taken for this  visit.   Wt Readings from Last 4 Encounters:   04/20/20 65.8 kg (145 lb)   02/27/20 69.2 kg (152 lb 9.6 oz)   02/19/20 68 kg (150 lb)   01/27/20 71 kg (156 lb 9.6 oz)     Recent Lab Results:  LIPID RESULTS:  Lab Results   Component Value Date    CHOL 145 08/12/2020    HDL 69 08/12/2020    LDL 64 08/12/2020    TRIG 62 08/12/2020    CHOLHDLRATIO 2 02/27/2020     LIVER ENZYME RESULTS:  Lab Results   Component Value Date    AST 19 04/01/2019    ALT 41 08/12/2020     CBC RESULTS:  Lab Results   Component Value Date    WBC 6.7 12/30/2019    RBC 4.40 12/30/2019    HGB 14.4 12/30/2019    HCT 44.3 12/30/2019    .6 (A) 12/30/2019    MCH 32.7 12/30/2019    MCHC 32.5 12/30/2019    RDW 12.2 12/30/2019     12/30/2019     11/13/2018     BMP RESULTS:  Lab Results   Component Value Date     09/23/2020    POTASSIUM 3.7 09/23/2020    CHLORIDE 106 09/23/2020    CO2 24 09/23/2020    ANIONGAP 7 09/23/2020     (H) 09/23/2020    BUN 27 09/23/2020    CR 0.95 09/23/2020    GFRESTIMATED 80 09/23/2020    GFRESTBLACK >90 09/23/2020    MIRYAM 9.3 09/23/2020      A1C RESULTS:  No results found for: A1C  INR RESULTS:  Lab Results   Component Value Date    INR 1.00 01/23/2015    INR 1.09 01/09/2013     This note was completed in part using Dragon voice recognition software. Although reviewed after completion, some word and grammatical errors may occur.       Thank you for allowing me to participate in the care of your patient.    Sincerely,     Arya Martinez NP     [facility]

## 2020-09-24 NOTE — PATIENT INSTRUCTIONS
Thank you for your video visit with the [facility] today.    Today's plan:   1. Increase your amlodipine from 5 mg to 10 mg by mouth once daily.  2. Continue to monitor your blood pressures periodically at home. Call if they continue to run consistently over 130/85 in your checks at home.  3. Follow up with Cornell Zeng, or KATE around July 2021 for an annual check in with labs beforehand.    If you have questions or concerns, please call my nurse at [phone].    Scheduling phone number: [phone]    It was a pleasure speaking with you today!     Eliceo Martinez, Nurse Practitioner  [facility]  September 24, 2020  _____________________________________________________

## 2020-10-09 ENCOUNTER — VIRTUAL VISIT (OUTPATIENT)
Dept: PALLIATIVE MEDICINE | Facility: CLINIC | Age: 70
End: 2020-10-09
Payer: COMMERCIAL

## 2020-10-09 ENCOUNTER — TELEPHONE (OUTPATIENT)
Dept: PALLIATIVE MEDICINE | Facility: CLINIC | Age: 70
End: 2020-10-09

## 2020-10-09 DIAGNOSIS — M47.816 FACET ARTHROPATHY, LUMBAR: Primary | ICD-10-CM

## 2020-10-09 PROCEDURE — 99213 OFFICE O/P EST LOW 20 MIN: CPT | Mod: 95 | Performed by: PHYSICAL MEDICINE & REHABILITATION

## 2020-10-09 ASSESSMENT — PAIN SCALES - GENERAL: PAINLEVEL: NO PAIN (0)

## 2020-10-09 NOTE — TELEPHONE ENCOUNTER
ROLANDO to schedule schedule 3-4 month virtual f/up.     Flori JAMIL    [facility]

## 2020-10-09 NOTE — PROGRESS NOTES
"Jose Moreno is a 70 year old male who is being evaluated via a billable video visit.      The patient has been notified of following:     \"This video visit will be conducted via a call between you and your physician/provider. We have found that certain health care needs can be provided without the need for an in-person physical exam.  This service lets us provide the care you need with a video conversation.  If a prescription is necessary we can send it directly to your pharmacy.  If lab work is needed we can place an order for that and you can then stop by our lab to have the test done at a later time.    Video visits are billed at different rates depending on your insurance coverage.  Please reach out to your insurance provider with any questions.    If during the course of the call the physician/provider feels a video visit is not appropriate, you will not be charged for this service.\"    Patient has given verbal consent for Video visit? Yes  How would you like to obtain your AVS? MyChart  If you are dropped from the video visit, the video invite should be resent to: Text to cell phone: [phone]  Will anyone else be joining your video visit? No      Ermelinda Steele, [facility]    Date of visit: 10/9/2020    Chief complaint:   Chief Complaint   Patient presents with     Pain       Interval history:  Jose Moreno is a 70 year old male last seen by me on 7/17/2020.      Recommendations/plan at the last visit included:  1. Physical Therapy: continue exercises recommended by PT  2. Clinical Health Pain Psychologist: Coping well, defer at this time.  3. Self Care Recommendations: Gentle progressive exercise that does not increase pain - gradually increase daily walking program.  Take mini breaks - 5 minutes of mindfullness a couple times a day.   4. Diagnostic Studies: none  5. Medication Management:    1. Start lyrica 50mg HS and " increase in 50mg increments every 3 days to 100mg BID.  2. Start methocarbamol 500mg TID prn  3. Take tylenol 650mg four times daily as needed.  4. Continue CBD oil  5. You can take ibuprofen 400mg twice daily as needed, avoid taking this more than 3 days/week.  6. Further procedures recommended: Consider lumbar mbb/RFA in the upper lumbar spine.  7. Follow up: 1 month video visit      Since his last visit, Jose Moreno reports:  -He has been taking lyrica 100mg BID with significant improvement in his back pain.  -Pain has gone from sharp pains constantly to occasional sharp pains with some dull aches during certain times of the day and with certain activities.   -The intensity of the sharp pains is significantly reduced.  -Went to [place] last month and was able to do a bit of biking.  -Will be going to [place] in a couple weeks to visit daughter and granddaughter.    Pain scores:  Pain intensity on average is 3 a scale of 0-10.        Current pain treatments:   -Ibuprofen prn  -Tylenol 650mg prn  -CBD oil   -Lyrica 100mg BID    Past pain treatments:  -none  -Lumbar RFA with 6 months relief, repeated on 9/9/19 without significant pain relief  -Lumbar facet injections with 2-3 months relief  -PT      Side Effects: none    Medications:  Current Outpatient Medications   Medication Sig Dispense Refill     Acetaminophen (TYLENOL PO) Take 325-650 mg by mouth 2 times daily as needed for mild pain or fever       amLODIPine (NORVASC) 10 MG tablet Take 1 tablet (10 mg) by mouth daily 90 tablet 3     aspirin EC 81 MG EC tablet Take 1 tablet (81 mg) by mouth daily 60 tablet 0     atorvastatin (LIPITOR) 80 MG tablet Take 1 tablet (80 mg) by mouth daily 90 tablet 3     cholecalciferol (VITAMIN D3) 1000 UNIT tablet Take 1 tablet (1,000 Units) by mouth daily       Cyanocobalamin (B-12) 1000 MCG TBCR Take 1,000 mcg by mouth daily       FLUoxetine (PROZAC) 20 MG capsule Take 1 capsule (20 mg) by mouth daily 30 capsule 0      hydrochlorothiazide (HYDRODIURIL) 25 MG tablet Take 1 tablet (25 mg) by mouth daily 30 tablet 0     irbesartan (AVAPRO) 300 MG tablet Take 1 tablet (300 mg) by mouth daily 90 tablet 3     ketoconazole (NIZORAL) 2 % external cream Apply topically daily 60 g 0     levothyroxine (SYNTHROID/LEVOTHROID) 50 MCG tablet Take 1 tablet (50 mcg) by mouth daily 90 tablet 3     metoprolol succinate ER (TOPROL-XL) 25 MG 24 hr tablet Take 1 tablet (25 mg) by mouth daily 90 tablet 1     multivitamin, therapeutic with minerals (MULTI-VITAMIN) TABS tablet Take 1 tablet by mouth daily       nitroGLYcerin (NITROSTAT) 0.4 MG sublingual tablet TAKE 1 TABLET BY MOUTH EVERY 5 MIN AS NEEDED FOR CHEST PAIN 25 tablet 1     Omega-3 Fatty Acids (FISH OIL PO) Take 1 capsule by mouth daily       omeprazole (PRILOSEC) 20 MG DR capsule Take 1 capsule (20 mg) by mouth daily 90 capsule 3     pregabalin (LYRICA) 100 MG capsule Take 1 capsule (100 mg) by mouth 2 times daily 60 capsule 1     traZODone (DESYREL) 100 MG tablet TAKE 1 TABLET BY MOUTH AT BEDTIME 90 tablet 0       Medical History: any changes in medical history since they were last seen? No    Review of Systems:  The 14 system ROS was reviewed from the intake questionnaire, and is positive for: back pain, arthritis  Any bowel or bladder problems: dribbling/incontinence  Mood: denies       Assessment:  Mr. Moreno is a 70 year old with past medical history including: CAD (nstemi, s/p PTCA), HTN, mitral valve repair, mitral regurgitation, HLD, Hypothyroidism, GERD, Prostate cancer (s/p prostatectomy in 2018, ongoing urinary incontinence) who presents for follow up of chronic low back pain.     1. Lumbar DDD, Spondylosis and scoliosis: Initially responded well to lumbar RFA but no benefit with repeat RFA and facet injections in 2019. He declines any surgical procedures, SCS was denied by his insurance company. When initially seen in clinic he was a  and was prohibited from using many  medications that are used for chronic pain but has since retired. Started lyrica with titration up to 100mg BID with significant improvement in pain in July 2020.    Plan:  The following recommendations were given to the patient. Diagnosis, treatment options, risks, benefits, and alternatives were discussed, and all questions were answered. The patient expressed understanding of the plan for management.     1. Physical Therapy: continue exercises recommended by PT  2. Clinical Health Pain Psychologist: Coping well, defer at this time.  3. Self Care Recommendations: Gentle progressive exercise that does not increase pain - gradually increase daily walking program.  Take mini breaks - 5 minutes of mindfullness a couple times a day.   4. Diagnostic Studies: none  5. Medication Management:    1. Continue lyrica 100mg BID.   2. Continue methocarbamol 500mg TID prn  3. Continue tylenol 650mg four times daily as needed.  4. Continue CBD oil  5. Continue Ibuprofen 400mg twice daily as needed, avoid taking this more than 3 days/week.  6. Further procedures recommended: none  7. Follow up: 3-4 month video visit         Jersey Richter DO  [facility]                   Video-Visit Details    Type of service:  Video Visit    Video Start Time: 10:01 AM  Video End Time: 10:16 AM    Originating Location (pt. Location): Home    Distant Location (provider location):  [facility] [place]     Platform used for Video Visit: Andre

## 2020-10-09 NOTE — PATIENT INSTRUCTIONS
1. No medication changes today.    2. Follow up in 3-4 months via virtual visit.    Take care,  .  [facility]        ----------------------------------------------------------------  Clinic Number:  [phone]     Call with any questions about your care and for scheduling assistance.     Calls are returned Monday through Friday between 8 AM and 4:30 PM. We usually get back to you within 2 business days depending on the issue/request.    If we are prescribing your medications:    For opioid medication refills, call the clinic or send a EarlySh[employer] message 7 days in advance.  Please include:    Name of requested medication    Name of the pharmacy.    For non-opioid medications, call your pharmacy directly to request a refill. Please allow 3-4 days to be processed.     Per [place] State Law:    All controlled substance prescriptions must be filled within 30 days of being written.      For those controlled substances allowing refills, pickup must occur within 30 days of last fill.      We believe regular attendance is key to your success in our program!      Any time you are unable to keep your appointment we ask that you call us at least 24 hours in advance to cancel.This will allow us to offer the appointment time to another patient.     Multiple missed appointments may lead to dismissal from the clinic.

## 2020-10-12 ENCOUNTER — TELEPHONE (OUTPATIENT)
Dept: FAMILY MEDICINE | Facility: CLINIC | Age: 70
End: 2020-10-12

## 2020-10-12 ENCOUNTER — TELEPHONE (OUTPATIENT)
Dept: PALLIATIVE MEDICINE | Facility: CLINIC | Age: 70
End: 2020-10-12

## 2020-10-12 DIAGNOSIS — G89.29 CHRONIC LEFT-SIDED LOW BACK PAIN WITHOUT SCIATICA: Primary | ICD-10-CM

## 2020-10-12 DIAGNOSIS — M54.50 CHRONIC LEFT-SIDED LOW BACK PAIN WITHOUT SCIATICA: Primary | ICD-10-CM

## 2020-10-12 RX ORDER — PREGABALIN 50 MG/1
50 CAPSULE ORAL 2 TIMES DAILY
Qty: 24 CAPSULE | Refills: 0 | Status: SHIPPED | OUTPATIENT
Start: 2020-10-12 | End: 2020-11-02

## 2020-10-12 NOTE — TELEPHONE ENCOUNTER
Lyrica 50mg BID (24 capsules) ordered, to take with Lyrica 100mg capsules for 150mg BID.    DO [facility]

## 2020-10-12 NOTE — TELEPHONE ENCOUNTER
Patient called and stated that his pregabalin (LYRICA) 100 MG capsule, is not longer working and he is wondering if he can have a increase         Donna Trent    [facility]   '

## 2020-10-12 NOTE — TELEPHONE ENCOUNTER
Called Morgan.  He is taking the Lyrica at 100 mg twice a day.  It was working good for him. It dulled the pain, but he has a place in his back and the pain is now martinez again.  The shape pain returned right after he talked to us last week.  He was able to go for walks but now the walks are having be shortened.  Having to sit on a stool in the kitchen when cooking now. Used to be able to stand and cook. Wondering if he can go up on the Lyrica dose since it did provide relief in the past.    Routing to provider to review and advise    Summer Hawkins RN  Care Coordinator  [facility]

## 2020-10-12 NOTE — TELEPHONE ENCOUNTER
Called Morgan.  We can increase the Lyrica to 150 mg twice a day.  Pt states he has 24 capsules of the 100 mg dose at home.  He knows we will send over a new prescription for the 50 mg capsule and will take the 150 mg twice a day. He will call next Wednesday with an update and request a refill. At that time if he is tolerating the dosage increase, we can send over a prescription for the 150 mg capsule and he will go back to taking one capsule twice a day.    Routing to provider to review new Lyrica prescription.    Summer Hawkins RN  Care Coordinator  [facility] Pain Management

## 2020-10-12 NOTE — TELEPHONE ENCOUNTER
Denied refill request for trazodone.  Pt was given a 90 day on 6/30/2020 to f/u in September.  Pt is overdue for an OV.

## 2020-10-12 NOTE — TELEPHONE ENCOUNTER
Called pt and advised per below. LM to call back if he had questions and also to call back with update next week      Katia MOSQUERA, RN Care Coordinator  [facility]  Pain [facility]

## 2020-10-12 NOTE — TELEPHONE ENCOUNTER
Ok to increase lyrica. How many of the 100mg capsules does he have left? Can prescribe 50mg capsules so he can increase his dose to 150mg twice daily, then prescribe 150mg capsules so he doesn't have to take multiple pills.    Jersey Richter, DO  [facility]

## 2020-10-13 NOTE — TELEPHONE ENCOUNTER
Denied refill request of fluoxetine.  Pt was given a 30 day on 8/11/20 to get him through until September when he was due to come in.  Pt is overdue for an OV.  Can call in an extension of medication once an appointment has been scheduled.

## 2020-10-22 DIAGNOSIS — I10 ESSENTIAL HYPERTENSION, BENIGN: ICD-10-CM

## 2020-10-22 RX ORDER — HYDROCHLOROTHIAZIDE 25 MG/1
25 TABLET ORAL DAILY
Qty: 90 TABLET | Refills: 1 | Status: SHIPPED | OUTPATIENT
Start: 2020-10-22 | End: 2020-12-30

## 2020-10-23 ENCOUNTER — TELEPHONE (OUTPATIENT)
Dept: CARDIOLOGY | Facility: CLINIC | Age: 70
End: 2020-10-23

## 2020-10-23 ENCOUNTER — TELEPHONE (OUTPATIENT)
Dept: PALLIATIVE MEDICINE | Facility: CLINIC | Age: 70
End: 2020-10-23

## 2020-10-23 DIAGNOSIS — G89.29 CHRONIC LEFT-SIDED LOW BACK PAIN WITHOUT SCIATICA: Primary | ICD-10-CM

## 2020-10-23 DIAGNOSIS — M54.50 CHRONIC LEFT-SIDED LOW BACK PAIN WITHOUT SCIATICA: Primary | ICD-10-CM

## 2020-10-23 RX ORDER — PREGABALIN 150 MG/1
150 CAPSULE ORAL 2 TIMES DAILY
Qty: 60 CAPSULE | Refills: 3 | Status: SHIPPED | OUTPATIENT
Start: 2020-10-23 | End: 2021-03-16

## 2020-10-23 NOTE — TELEPHONE ENCOUNTER
Reason for call:  Medication   If this is a refill request, has the caller requested the refill from the pharmacy already? No  Will the patient be using a [facility]? No  Name of the pharmacy and phone number for the current request: [facility]/PHARMACY #[id number] - [place], [place] - [address] [address]    Name of the medication requested: pregabalin (LYRICA) 50 MG capsule/////pregabalin (LYRICA) 50 MG capsule    Other request:     Phone number to reach patient:  Cell number on file:    Telephone Information:   Mobile [phone]       Best Time:      Can we leave a detailed message on this number?  YES    Travel screening: Not Applicable

## 2020-10-23 NOTE — TELEPHONE ENCOUNTER
Received fax from pharmacy requesting refill(s) for Lyrica 150 mg    Spoke to patient. He is currently taking the 100 mg capsule along with the 50 mg capsule BID.    He states it is working well with no side effects, and would like to continue taking 150 mg BID.    Pt last seen on 10/9/2020  Next appt scheduled for none    E-prescribe to:    [facility]/PHARMACY #[id number] - [place], [place] - [address] [address]     Will facilitate refill.

## 2020-10-23 NOTE — TELEPHONE ENCOUNTER
Voicemail received from patient calling to report good improvement on his BP with recent medication changes.     Pt was seen by Eliceo Martinez NP on 9/24/20 at which time his amlodipine was increased to 10mg daily.     Returned patient's call to discuss home readings, no answer and unable to leave message. Forwarded to Team 3 for follow up.

## 2020-10-26 NOTE — TELEPHONE ENCOUNTER
Spoke to patient . Rx was E-Prepcribed to:     [facility]/PHARMACY #[id number] - [place], [place] - [address]     Orders Placed This Encounter   Medications     pregabalin (LYRICA) 150 MG capsule     Sig: Take 1 capsule (150 mg) by mouth 2 times daily     Dispense:  60 capsule     Refill:  3         Patient aware of dose change.    Casie Steele [facility]   Pain Management [place]

## 2020-10-27 DIAGNOSIS — M54.50 CHRONIC LEFT-SIDED LOW BACK PAIN WITHOUT SCIATICA: ICD-10-CM

## 2020-10-27 DIAGNOSIS — G89.29 CHRONIC LEFT-SIDED LOW BACK PAIN WITHOUT SCIATICA: ICD-10-CM

## 2020-10-27 RX ORDER — PREGABALIN 50 MG/1
50 CAPSULE ORAL 2 TIMES DAILY
Qty: 24 CAPSULE | Refills: 0 | Status: CANCELLED | OUTPATIENT
Start: 2020-10-27

## 2020-10-27 NOTE — TELEPHONE ENCOUNTER
Received fax request from [facility] requesting refill(s) for pregabalin (LYRICA) 50 MG capsule    Last refilled on 10/12/20    Pt last seen on 10/09/20  Next appt scheduled for : none    Will facilitate refill.

## 2020-10-28 NOTE — TELEPHONE ENCOUNTER
Duplicate request, 150mg lyrica capsules prescribed on 10/23/20.    DO [facility]

## 2020-11-02 ENCOUNTER — OFFICE VISIT (OUTPATIENT)
Dept: FAMILY MEDICINE | Facility: CLINIC | Age: 70
End: 2020-11-02

## 2020-11-02 VITALS
OXYGEN SATURATION: 98 % | DIASTOLIC BLOOD PRESSURE: 60 MMHG | BODY MASS INDEX: 25.49 KG/M2 | WEIGHT: 153 LBS | HEIGHT: 65 IN | TEMPERATURE: 97.5 F | HEART RATE: 45 BPM | SYSTOLIC BLOOD PRESSURE: 118 MMHG

## 2020-11-02 DIAGNOSIS — K21.9 GASTROESOPHAGEAL REFLUX DISEASE WITHOUT ESOPHAGITIS: ICD-10-CM

## 2020-11-02 DIAGNOSIS — F33.42 MAJOR DEPRESSIVE DISORDER, RECURRENT EPISODE, IN FULL REMISSION (H): Primary | ICD-10-CM

## 2020-11-02 DIAGNOSIS — E03.4 HYPOTHYROIDISM DUE TO ACQUIRED ATROPHY OF THYROID: ICD-10-CM

## 2020-11-02 DIAGNOSIS — N39.45 CONTINUOUS LEAKAGE OF URINE: ICD-10-CM

## 2020-11-02 DIAGNOSIS — Z13.0 SCREENING FOR BLOOD DISEASE: ICD-10-CM

## 2020-11-02 DIAGNOSIS — F51.02 ADJUSTMENT INSOMNIA: ICD-10-CM

## 2020-11-02 LAB
% GRANULOCYTES: 48 %
HCT VFR BLD AUTO: 43.5 % (ref 40–53)
HEMOGLOBIN: 14.1 G/DL (ref 13.3–17.7)
LYMPHOCYTES NFR BLD AUTO: 42 %
MCH RBC QN AUTO: 33.2 PG (ref 26–33)
MCHC RBC AUTO-ENTMCNC: 32.4 G/DL (ref 31–36)
MCV RBC AUTO: 102.4 FL (ref 78–100)
MONOCYTES NFR BLD AUTO: 10 %
PLATELET COUNT - QUEST: 269 10^9/L (ref 150–375)
RBC # BLD AUTO: 4.25 10*12/L (ref 4.4–5.9)
WBC # BLD AUTO: 5.9 10*9/L (ref 4–11)

## 2020-11-02 PROCEDURE — 99213 OFFICE O/P EST LOW 20 MIN: CPT | Performed by: PHYSICIAN ASSISTANT

## 2020-11-02 PROCEDURE — 85025 COMPLETE CBC W/AUTO DIFF WBC: CPT | Performed by: PHYSICIAN ASSISTANT

## 2020-11-02 PROCEDURE — 36415 COLL VENOUS BLD VENIPUNCTURE: CPT | Performed by: PHYSICIAN ASSISTANT

## 2020-11-02 RX ORDER — TRAZODONE HYDROCHLORIDE 100 MG/1
100 TABLET ORAL AT BEDTIME
Qty: 90 TABLET | Refills: 3 | Status: SHIPPED | OUTPATIENT
Start: 2020-11-02 | End: 2021-09-15

## 2020-11-02 RX ORDER — LEVOTHYROXINE SODIUM 50 UG/1
50 TABLET ORAL DAILY
Qty: 90 TABLET | Refills: 3 | Status: SHIPPED | OUTPATIENT
Start: 2020-11-02 | End: 2021-09-15

## 2020-11-02 ASSESSMENT — MIFFLIN-ST. JEOR: SCORE: 1374.84

## 2020-11-02 ASSESSMENT — ANXIETY QUESTIONNAIRES
6. BECOMING EASILY ANNOYED OR IRRITABLE: NOT AT ALL
3. WORRYING TOO MUCH ABOUT DIFFERENT THINGS: NOT AT ALL
IF YOU CHECKED OFF ANY PROBLEMS ON THIS QUESTIONNAIRE, HOW DIFFICULT HAVE THESE PROBLEMS MADE IT FOR YOU TO DO YOUR WORK, TAKE CARE OF THINGS AT HOME, OR GET ALONG WITH OTHER PEOPLE: NOT DIFFICULT AT ALL
1. FEELING NERVOUS, ANXIOUS, OR ON EDGE: NOT AT ALL
2. NOT BEING ABLE TO STOP OR CONTROL WORRYING: NOT AT ALL
7. FEELING AFRAID AS IF SOMETHING AWFUL MIGHT HAPPEN: NOT AT ALL
GAD7 TOTAL SCORE: 0
5. BEING SO RESTLESS THAT IT IS HARD TO SIT STILL: NOT AT ALL

## 2020-11-02 ASSESSMENT — PATIENT HEALTH QUESTIONNAIRE - PHQ9
SUM OF ALL RESPONSES TO PHQ QUESTIONS 1-9: 0
5. POOR APPETITE OR OVEREATING: NOT AT ALL

## 2020-11-02 NOTE — PROGRESS NOTES
"CC: Medication Check    History:  Hypothyroid:  Takes levothyroxine 50 mcg daily. Takes right away in the morning with water, just after his first cup of coffee. Denies any symptoms of being over or under supplemented     Depression:  Takes fluoxetine 20 mg daily. Feels like this works well to control symptoms. No side effects. Does not see a therapist, but his wife is a psychologist. Does feel like fluoxetine makes it more difficult to sleep so he takes trazodone every night and this balances it out.     GERD:  Takes omeprazole 20 mg daily. This works well to control reflux. Takes this every morning.     PMH, MEDICATIONS, ALLERGIES, SOCIAL AND FAMILY HISTORY in [facility] and reviewed by me personally.    ROS negative other than the symptoms noted above in the HPI.    Examination   /60 (BP Location: Left arm, Patient Position: Sitting, Cuff Size: Adult Large)   Pulse (!) 45   Temp 97.5  F (36.4  C) (Oral)   Ht 1.657 m (5' 5.25\")   Wt 69.4 kg (153 lb)   SpO2 98%   BMI 25.27 kg/m       Constitutional: Sitting comfortably, in no acute distress. Vital signs noted  Neck:  no adenopathy, trachea midline and normal to palpation, thyroid normal to palpation  Cardiovascular:  regular rate and rhythm, no murmurs, clicks, or gallops  Respiratory:  normal respiratory rate and rhythm, lungs clear to auscultation  SKIN: No jaundice/pallor/rash.   Psychiatric: mentation appears normal and affect normal/bright        A/P    ICD-10-CM    1. Major depressive disorder, recurrent episode, in full remission (H)  F33.42 FLUoxetine (PROZAC) 20 MG capsule   2. Hypothyroidism due to acquired atrophy of thyroid  E03.4 VENOUS COLLECTION     TSH with free T4 reflex ([facility])     levothyroxine (SYNTHROID/LEVOTHROID) 50 MCG tablet   3. Gastroesophageal reflux disease without esophagitis  K21.9 omeprazole (PRILOSEC) 20 MG DR capsule   4. Continuous leakage of urine  N39.45 traZODone (DESYREL) 100 MG tablet   5. Adjustment insomnia  F51.02 " traZODone (DESYREL) 100 MG tablet   6. Screening for blood disease  Z13.0 HEMOGRAM PLATELET DIFF (BFP)       DISCUSSION:  Hypothyroid:  Will recheck TSH and send MyChart with results when available. Will refill for 1 year based on lab results.     Depression:  PHQ and CARL show good control. Agreed to refill both trazodone and fluoxetine at same dose for 1 year. Contact me sooner with side effects. Contact me sooner with any concerns.     GERD:  Will refill omeprazole for 1 year.     follow up visit: As needed    Raysa Singh PA-C  [facility]

## 2020-11-02 NOTE — NURSING NOTE
Jose is here for a non fasting med check.        Pre-visit Screening:  Immunizations:  up to date  Colonoscopy:  is up to date  Mammogram: NA  Asthma Action Test/Plan:  NA  PHQ9:  Done today  GAD7:  Done today  Questioned patient about current smoking habits Pt. has never smoked.  Ok to leave detailed message on voice mail for today's visit only Yes, phone # [phone]

## 2020-11-03 LAB — TSH SERPL-ACNC: 2.01 MIU/L (ref 0.4–4.5)

## 2020-11-03 ASSESSMENT — ANXIETY QUESTIONNAIRES: GAD7 TOTAL SCORE: 0

## 2020-11-23 ENCOUNTER — TRANSFERRED RECORDS (OUTPATIENT)
Dept: FAMILY MEDICINE | Facility: CLINIC | Age: 70
End: 2020-11-23

## 2020-11-25 ENCOUNTER — TELEPHONE (OUTPATIENT)
Dept: PALLIATIVE MEDICINE | Facility: CLINIC | Age: 70
End: 2020-11-25

## 2020-11-25 NOTE — TELEPHONE ENCOUNTER
Pt is requesting a medication stronger for his back pain. Lyrica is working just not enough.      Rocael KNIGHT    [facility] [place]

## 2020-11-27 NOTE — TELEPHONE ENCOUNTER
Called  Morgan. Video visit scheduled for Dec 7th at 0830 am.  Pt will continue to follow the plan laid out by Dr Richter. No medication changes at this time.    Summer Hawkins RN  Care Coordinator  [facility] Pain [facility]

## 2020-11-27 NOTE — TELEPHONE ENCOUNTER
Called Morgan.  He is having a lot of back pain and  is wondering if there is anything else he can take to help alleviate it.  The back pain is manageable to a point, but then it gets overwhelming and he cant function.  Pain is to the lower back. Pain does not go down the legs at all. He is taking the  lyrica 100mg BID,  methocarbamol 500mg TID prn  tylenol 650mg four times daily as needed.  He has done stretching and some chiropractic care. Feels pain is getting worse.  Stretching and chiropractic care does not help. Has tried Lidocaine patches in the past and they do not help either.  Pain gets to point he cant do anything and feels like he is going to end up in a wheelchair.  Does not want a limited life. Knows he cant  heavy things.  Standing hurts, Can only stand for an hour and then has to sit.  Difficult time walking. Can only walk about a 1/2 mile, which is better than he use to be able to walk but wants to do more and cant.      States his first ablation worked for about 6 months, second ablation only worked for about 2-3 months.  Facet injections helped but that was 6 years ago.     Routing to provider to review and advise    Summer Hawkins RN  Care Coordinator  [facility]

## 2020-11-27 NOTE — TELEPHONE ENCOUNTER
Please have patient schedule a clinic appointment to discuss options.    DO [facility]

## 2020-12-10 ENCOUNTER — TELEPHONE (OUTPATIENT)
Dept: CARDIOLOGY | Facility: CLINIC | Age: 70
End: 2020-12-10

## 2020-12-10 NOTE — TELEPHONE ENCOUNTER
"Hi,   Let's have him come in, hopefully tomorrow, and have a 24 Hour Holter placed to see what his heart rate and rhythm are doing.   We can adjust Toprol XL if needed after that.   Of course, in the interim, if he has any symptomatic episodes - LH, dizzy, near syncope, he should be evaluated sooner.     And can he elaborate more on the \"legs falling off\" feeling? Is it pain? Calf pain/claudication? Fatigue?    Thanks,   Cornell De Anda PA-C 12/10/2020 5:34 PM          "

## 2020-12-10 NOTE — TELEPHONE ENCOUNTER
"Patient called in with concerns over low heart rates that he has seen over the past month. He is a patient of Dr. Alejandra who is out and Eliceo who is out this week as well.    His HR's are in the mid 40's and after a walk in the low 50's.He doesn't check his BP too often but when he does its around 120/70-always in the green zone.    Eliceo saw patient on 9/24 and for hypertension management and increased his amlodipine from 5 to 10 MG daily. He remains on:    1. Toprol 25 MG daily  2. Hydrochlorothiazide 25 MG daily   3. Avapro 300 MG daily    He states that when he goes for a walk ,about 1/4 mi into it his legs 'feel like they are going to fall off.\"    He denies any dizziness, light headedness or fatigue. He is able to ride his stationary bike but doesn't quite have the output for it.    He is fine when he is working around the house.    He has no future appointments scheduled.      I told him that I will reach out to Cornell De Anda for recommendations since she saw him not too long ago and hopefully we can get back to him in the next day or so.  "

## 2020-12-11 ENCOUNTER — HOSPITAL ENCOUNTER (OUTPATIENT)
Dept: CARDIOLOGY | Facility: CLINIC | Age: 70
Discharge: HOME | End: 2020-12-11
Attending: PHYSICIAN ASSISTANT | Admitting: PHYSICIAN ASSISTANT
Payer: COMMERCIAL

## 2020-12-11 DIAGNOSIS — R00.1 SINUS BRADYCARDIA: ICD-10-CM

## 2020-12-11 DIAGNOSIS — R00.1 SINUS BRADYCARDIA: Primary | ICD-10-CM

## 2020-12-11 PROCEDURE — 93227 XTRNL ECG REC<48 HR R&I: CPT | Performed by: INTERNAL MEDICINE

## 2020-12-11 PROCEDURE — 93226 XTRNL ECG REC<48 HR SCAN A/R: CPT

## 2020-12-11 NOTE — TELEPHONE ENCOUNTER
I phoned patient with Dayana's recommendation to have a 24 hr holter place today in [facility]. He is agreeable to having this done. I told him that I will have scheduling call him this morning to get this scheduled.    I asked him to give more detail about how his legs feel when walking and he said that they feel extremely achy. He does not get this feeling when he is moving around in his home or biking.    I will send a message to scheduling to get him ion today.

## 2020-12-13 NOTE — PROGRESS NOTES
" Jose Moreno is a 70 year old male who is being evaluated via a billable video visit.      The patient has been notified of following:     \"This video visit will be conducted via a call between you and your physician/provider. We have found that certain health care needs can be provided without the need for an in-person physical exam.  This service lets us provide the care you need with a video conversation.  If a prescription is necessary we can send it directly to your pharmacy.  If lab work is needed we can place an order for that and you can then stop by our lab to have the test done at a later time.    Video visits are billed at different rates depending on your insurance coverage.  Please reach out to your insurance provider with any questions.    If during the course of the call the physician/provider feels a video visit is not appropriate, you will not be charged for this service.\"    Patient has given verbal consent for Video visit? Yes  How would you like to obtain your AVS? MyChart  If you are dropped from the video visit, the video invite should be resent to: Text to cell phone: .[phone]    Will anyone else be joining your video visit? No      Casie Steele CMA   [facility]   Pain Management [place]    Video-Visit Details    Type of service:  Video Visit    Video Start Time: 1005am  Video End Time: 10:21 AM    Originating Location (pt. Location): Home    Distant Location (provider location):  [facility]NAG[facility] [place]     Platform used for Video Visit: Andre Richter DO  [facility]                                        [facility] [place]    Date of visit: 12/14/2020    Chief complaint:   No chief complaint on file.      Interval history:  Jose Moreno is a 70 year old male last seen by me on 10/9/2020.      Recommendations/plan at the last visit included:  1. Physical Therapy: continue exercises recommended by " "PT  2. Clinical Health Pain Psychologist: Coping well, defer at this time.  3. Self Care Recommendations: Gentle progressive exercise that does not increase pain - gradually increase daily walking program.  Take mini breaks - 5 minutes of mindfullness a couple times a day.   4. Diagnostic Studies: none  5. Medication Management:    1. Continue lyrica 100mg BID.   2. Continue methocarbamol 500mg TID prn  3. Continue tylenol 650mg four times daily as needed.  4. Continue CBD oil  5. Continue Ibuprofen 400mg twice daily as needed, avoid taking this more than 3 days/week.  6. Further procedures recommended: none  7. Follow up: 3-4 month video visit      Since his last visit, Jose NAVARRO Josh reports:  -He has titrated his lyrica up to 150mg twice daily with good improvement in the sharp back and leg pains he used to have.    -He has been trying to stay active and go on walks, he starts to have a painful/heavy sensation in his legs if he walks about a half mile. He was able to do quarter miles walks and felt ok.    -Has been having bradycardia and is completing holter monitoring.    -Not having the sharp back pains like he used to with standing and walking.    -Gets a \"generalized\" pain in his back with activity which can be severe and make him have to sit down for a while.        Pain scores:  Pain intensity on average is 5 a scale of 0-10.              Current pain treatments:   -Ibuprofen prn  -Tylenol 650mg prn  -CBD oil   -Lyrica 150mg BID    Past pain treatments:  -none  -Lumbar RFA with 6 months relief, repeated on 9/9/19 without significant pain relief  -Lumbar facet injections with 2-3 months relief  -PT      Side Effects: none    Medications:  Current Outpatient Medications   Medication Sig Dispense Refill     Acetaminophen (TYLENOL PO) Take 325-650 mg by mouth 2 times daily as needed for mild pain or fever       amLODIPine (NORVASC) 10 MG tablet Take 1 tablet (10 mg) by mouth daily 90 tablet 3     aspirin EC 81 " MG EC tablet Take 1 tablet (81 mg) by mouth daily 60 tablet 0     atorvastatin (LIPITOR) 80 MG tablet Take 1 tablet (80 mg) by mouth daily 90 tablet 3     cholecalciferol (VITAMIN D3) 1000 UNIT tablet Take 1 tablet (1,000 Units) by mouth daily       Cyanocobalamin (B-12) 1000 MCG TBCR Take 1,000 mcg by mouth daily       FLUoxetine (PROZAC) 20 MG capsule Take 1 capsule (20 mg) by mouth daily 90 capsule 3     hydrochlorothiazide (HYDRODIURIL) 25 MG tablet Take 1 tablet (25 mg) by mouth daily 90 tablet 1     irbesartan (AVAPRO) 300 MG tablet Take 1 tablet (300 mg) by mouth daily 90 tablet 3     ketoconazole (NIZORAL) 2 % external cream Apply topically daily 60 g 0     levothyroxine (SYNTHROID/LEVOTHROID) 50 MCG tablet Take 1 tablet (50 mcg) by mouth daily 90 tablet 3     metoprolol succinate ER (TOPROL-XL) 25 MG 24 hr tablet Take 1 tablet (25 mg) by mouth daily 90 tablet 1     multivitamin, therapeutic with minerals (MULTI-VITAMIN) TABS tablet Take 1 tablet by mouth daily       nitroGLYcerin (NITROSTAT) 0.4 MG sublingual tablet TAKE 1 TABLET BY MOUTH EVERY 5 MIN AS NEEDED FOR CHEST PAIN 25 tablet 1     Omega-3 Fatty Acids (FISH OIL PO) Take 1 capsule by mouth daily       omeprazole (PRILOSEC) 20 MG DR capsule Take 1 capsule (20 mg) by mouth daily 90 capsule 3     pregabalin (LYRICA) 150 MG capsule Take 1 capsule (150 mg) by mouth 2 times daily 60 capsule 3     traZODone (DESYREL) 100 MG tablet Take 1 tablet (100 mg) by mouth At Bedtime 90 tablet 3       Medical History: any changes in medical history since they were last seen? No    Review of Systems:  The 14 system ROS was reviewed from the intake questionnaire, and is positive for: back pain, arthritis, leg pain, paresthesias  Any bowel or bladder problems: denies  Mood: denies       Assessment:  Mr. Moreno is a 70 year old with past medical history including: CAD (nstemi, s/p PTCA), HTN, mitral valve repair, mitral regurgitation, HLD, Hypothyroidism, GERD, Prostate  cancer (s/p prostatectomy in 2018, ongoing urinary incontinence) who presents for follow up for the followin. Lumbar DDD, Spondylosis and scoliosis: Initially responded well to lumbar RFA but no benefit with repeat RFA and facet injections in 2019. His symptoms appear to have changed in the past year and today the patient describes onset of neurogenic claudication in the past 1-2 months. He has also been having episodes of his back seizing up in the past 1-2 months. I suspect these symptoms are related to spinal stenosis and possibly a new disc bulge/herniation. Will order an MRI to evaluate further.      Plan:  The following recommendations were given to the patient. Diagnosis, treatment options, risks, benefits, and alternatives were discussed, and all questions were answered. The patient expressed understanding of the plan for management.     1. Physical Therapy: continue exercises recommended by PT  2. Clinical Health Pain Psychologist: Coping well, defer at this time.  3. Self Care Recommendations: Gentle progressive exercise that does not increase pain - gradually increase daily walking program.  Take mini breaks - 5 minutes of mindfullness a couple times a day.   4. Diagnostic Studies: Lumbar MRI ordered  5. Medication Management:    1. Continue lyrica 150mg BID, having good effect on overall back pain.  2. Continue methocarbamol 500mg TID prn  3. Continue tylenol 650mg four times daily as needed.  4. Continue CBD oil  5. Continue Ibuprofen 400mg twice daily as needed, avoid taking this more than 3 days/week.  6. Consider low dose tramadol/norco if symptoms are persistent.  6. Further procedures recommended: Based on results of lumbar MRI.  7. Follow up: Will f/up with MRI results.         Jersey Richter DO  [facility]

## 2020-12-14 ENCOUNTER — VIRTUAL VISIT (OUTPATIENT)
Dept: PALLIATIVE MEDICINE | Facility: CLINIC | Age: 70
End: 2020-12-14
Payer: COMMERCIAL

## 2020-12-14 DIAGNOSIS — M47.816 SPONDYLOSIS OF LUMBAR REGION WITHOUT MYELOPATHY OR RADICULOPATHY: Primary | ICD-10-CM

## 2020-12-14 PROCEDURE — 99213 OFFICE O/P EST LOW 20 MIN: CPT | Mod: 95 | Performed by: PHYSICAL MEDICINE & REHABILITATION

## 2020-12-14 NOTE — PATIENT INSTRUCTIONS
1. No medication changes today.    2. I ordered an MRI, i'll call you once I have the results available.    Take care,    DO Kin [facility]      ----------------------------------------------------------------  Clinic Number:  [phone]     Call with any questions about your care and for scheduling assistance.     Calls are returned Monday through Friday between 8 AM and 4:30 PM. We usually get back to you within 2 business days depending on the issue/request.    If we are prescribing your medications:    For opioid medication refills, call the clinic or send a [employer]booly message 7 days in advance.  Please include:    Name of requested medication    Name of the pharmacy.    For non-opioid medications, call your pharmacy directly to request a refill. Please allow 3-4 days to be processed.     Per [place] State Law:    All controlled substance prescriptions must be filled within 30 days of being written.      For those controlled substances allowing refills, pickup must occur within 30 days of last fill.      We believe regular attendance is key to your success in our program!      Any time you are unable to keep your appointment we ask that you call us at least 24 hours in advance to cancel.This will allow us to offer the appointment time to another patient.     Multiple missed appointments may lead to dismissal from the clinic.

## 2020-12-16 ENCOUNTER — TELEPHONE (OUTPATIENT)
Dept: CARDIOLOGY | Facility: CLINIC | Age: 70
End: 2020-12-16

## 2020-12-16 DIAGNOSIS — I10 BENIGN ESSENTIAL HYPERTENSION: ICD-10-CM

## 2020-12-16 RX ORDER — METOPROLOL SUCCINATE 25 MG/1
12.5 TABLET, EXTENDED RELEASE ORAL DAILY
Qty: 45 TABLET | Refills: 1 | Status: SHIPPED | OUTPATIENT
Start: 2020-12-16 | End: 2021-08-26

## 2020-12-16 NOTE — TELEPHONE ENCOUNTER
Pt called 12/10/20 c/o low HRs in mid 40s after a walk in the low 50s. Achy legs, but not when walking. BPs around 120/70. Pt last saw Eliceo on 9/24 and increased amlodipine from 5 to 10 mg daily. Continued on Toprol 25 mg daily, hydrochlorothiazide 25 mg daily and Avapro 300 MG daily. Cornell ordered holter to evaluate his HRs. Noted can adjust Toprol XL if needed after that.      No follow-up is scheduled - due in 7/2021.    Cornell De Anda PA-C reviewed holter monitor.   Monitor showed Average HR was 50 bpm, minimum HR was 38 bpm, maximum HR was 81 bpm    ----- Message from Cornell De Anda PA-C sent at 12/16/2020 11:47 AM CST -----  His average is 50, which is not changed much from prior.   But let's go ahead and have him decrease Toprol XL from 25 mg to 12.5 mg.   Cornell De Anda PA-C 12/16/2020 11:47 AM    Called pt, reviewed Cornell's recommendations. Pt will decrease Toprol XL from 25 mg to 12.5 mg daily. Sent new Rx to [facility] pharmacy in [place] w/ instructions not to fill until pt is ready for refill. Pt is leaving for [place] at the end of December for 3 months. Advised to monitor his HR at home. Call us back if any concerns or if HR does not seem to improve. Pt agreed w/ plan.   Viridiana RN, BSN  12/16/20 1:57 PM

## 2020-12-28 DIAGNOSIS — M47.816 SPONDYLOSIS OF LUMBAR REGION WITHOUT MYELOPATHY OR RADICULOPATHY: ICD-10-CM

## 2020-12-28 RX ORDER — METHOCARBAMOL 500 MG/1
500 TABLET, FILM COATED ORAL 3 TIMES DAILY PRN
Qty: 90 TABLET | Refills: 3 | Status: SHIPPED | OUTPATIENT
Start: 2020-12-28 | End: 2021-08-26

## 2020-12-28 NOTE — TELEPHONE ENCOUNTER
Received fax request from   [facility]/pharmacy #[id number] - [place], [place] - [address] [address]  [address] [address] [facility] [address]  Phone: [phone] Fax: [phone]     pharmacy requesting refill(s) for methocarbamol (ROBAXIN) 500 MG tablet (Discontinued)    Last refilled on 08/28/19    Pt last seen on 10/09/20    No future office/virtual visit scheduled at this time    Will route to RN Pool to facilitate refill.    Juany Hammond [facility]  [place]

## 2020-12-28 NOTE — TELEPHONE ENCOUNTER
Routing to provider to review medication prepped per below      Methocarbamol 500 mg, #90, Refill: No  Sig: Okay to dispense 12/28/2020  Last picked up 08/28/2020   Due Now    Per last OV note 12/14/2020: Continue methocarbamol 500mg TID prn    Follow up : Will f/up with MRI results    Summer Hawkins RN  Care Coordinator  [facility]

## 2020-12-30 DIAGNOSIS — I10 ESSENTIAL HYPERTENSION, BENIGN: ICD-10-CM

## 2020-12-30 RX ORDER — HYDROCHLOROTHIAZIDE 25 MG/1
25 TABLET ORAL DAILY
Qty: 90 TABLET | Refills: 1 | Status: SHIPPED | OUTPATIENT
Start: 2020-12-30 | End: 2021-09-15

## 2020-12-30 NOTE — TELEPHONE ENCOUNTER
Informed patient that their refill of methocarbamol (ROBAXIN) 500 MG tablet  was E-scribed to the pharmacy.       [facility]

## 2021-02-22 DIAGNOSIS — E78.2 MIXED HYPERLIPIDEMIA: ICD-10-CM

## 2021-02-22 RX ORDER — ATORVASTATIN CALCIUM 80 MG/1
80 TABLET, FILM COATED ORAL DAILY
Qty: 90 TABLET | Refills: 1 | Status: SHIPPED | OUTPATIENT
Start: 2021-02-22 | End: 2021-08-26

## 2021-03-01 DIAGNOSIS — I24.9 ACS (ACUTE CORONARY SYNDROME) (H): ICD-10-CM

## 2021-03-01 RX ORDER — NITROGLYCERIN 0.4 MG/1
TABLET SUBLINGUAL
Qty: 25 TABLET | Refills: 1 | Status: SHIPPED | OUTPATIENT
Start: 2021-03-01 | End: 2021-10-07

## 2021-03-16 DIAGNOSIS — M54.50 CHRONIC LEFT-SIDED LOW BACK PAIN WITHOUT SCIATICA: ICD-10-CM

## 2021-03-16 DIAGNOSIS — G89.29 CHRONIC LEFT-SIDED LOW BACK PAIN WITHOUT SCIATICA: ICD-10-CM

## 2021-03-16 RX ORDER — PREGABALIN 150 MG/1
150 CAPSULE ORAL 2 TIMES DAILY
Qty: 60 CAPSULE | Refills: 0 | Status: SHIPPED | OUTPATIENT
Start: 2021-03-16 | End: 2021-04-01

## 2021-03-16 NOTE — TELEPHONE ENCOUNTER
Received fax from pharmacy requesting refill(s) for pregabalin (LYRICA) 150 MG capsule     Last refilled on 02/20/21    Pt last seen on 12/17/20  Next appt scheduled for 04/07/21    E-prescribe to:    [id number] [place] [facility] - [place], [place] - [address] [place] [address]     Will facilitate refill.

## 2021-04-01 RX ORDER — PREGABALIN 150 MG/1
150 CAPSULE ORAL 2 TIMES DAILY
Qty: 60 CAPSULE | Refills: 0 | Status: SHIPPED | OUTPATIENT
Start: 2021-04-01 | End: 2021-04-05

## 2021-04-01 NOTE — TELEPHONE ENCOUNTER
Called Morgan.  Pt returned from [place] this past Tuesday.  Wanted to verify why he needed a refill on the Lyrica, when a script was sent to [place] on 03/16/2021.  He states he did not  his prescription that was sent to the [place] pharmacy on 03/16/2021.  He states his brother in law is a physician and his sister in law takes Lyrica.  He used her prescription until they returned home.  He states he just took his last dose of Lyrica yesterday.       Called [facility] in [place].  Asked if the Lyrica script sent over on 03/16/2021 was picked up.  He did not  the prescription, It is still at the pharmacy in [place].      Lyrica 150mg #60 Refill No  Pt last seen 12/14/2021: Continue lyrica 150mg BID, having good effect on overall back pain.   Follow up scheduled 04/14/2021 in clinic.    Last refill was 02/14/2021    Routing to provider to review and sign script.      Summer Singh RN  Care Coordinator  [facility]

## 2021-04-01 NOTE — TELEPHONE ENCOUNTER
Pt calling to have prescription sent to [facility] on [address] in [place]. States they were unable to get the prescription before leaving [place].    Flori JAMIL    [facility]

## 2021-04-01 NOTE — TELEPHONE ENCOUNTER
Lyrica refill approved. Thanks for cancelling the old script.    DO [facility]

## 2021-04-05 RX ORDER — PREGABALIN 150 MG/1
150 CAPSULE ORAL 2 TIMES DAILY
Qty: 60 CAPSULE | Refills: 3 | Status: SHIPPED | OUTPATIENT
Start: 2021-04-05 | End: 2021-08-25

## 2021-04-05 NOTE — TELEPHONE ENCOUNTER
Pt calling to state that [facility] [address] in [place] does not have his prescription of pregabalin (LYRICA) 150 MG capsule. Pt states he is no longer in [place] and is back in [place].    Flori JAMIL    [facility]

## 2021-04-05 NOTE — TELEPHONE ENCOUNTER
Ariannail left on pharmacy phone in [place], [place], asking them to cancel prescription. [place] pharmacy is now teed up. Please e-prescribe.

## 2021-04-07 ENCOUNTER — TELEPHONE (OUTPATIENT)
Dept: CARDIOLOGY | Facility: CLINIC | Age: 71
End: 2021-04-07

## 2021-04-07 NOTE — LETTER
April 8, 2021       RE: Jose Moreno  [id number] [facility] [id number]       To Whom It May Concern:     Jose appears to be well from a cardiac standpoint without clinical evidence of ischemia, heart failure or significant arrhythmia. He is cleared to drive from a cardiovascular viewpoint.    Thank you,          AMBIKA White, [facility]

## 2021-04-07 NOTE — TELEPHONE ENCOUNTER
Received note from patient and a copy of the DOT requirement page. He is requesting that Dr. Alejandra provide a cardiac opinion as to his ability to drive and copies of his records.  Paperwork to be sent to [facility] [employer] [facility] [employer]  [phone]    Will message Dr. Alejandra to review and approve letter for DOT

## 2021-04-07 NOTE — TELEPHONE ENCOUNTER
Patient called requesting his most recent stress test within 2 yrs for a DOT renewal.    Last stress test 7-2018. Last imaging 7- 2020.  Last MOHSEN visit 9-  Last Dr. Alejandra visit 7-    Patient will fax DOT paperwork to clinic for review.   Patient unsure what will be needed.

## 2021-04-08 NOTE — TELEPHONE ENCOUNTER
Draft letter to Dr. Alejandra for review and signature.    1300 faxed letter, office notes and requested test results to [facility] at [phone].  Spoke with patient so he is aware that the materials were sent today at 1300.

## 2021-04-12 ENCOUNTER — HOSPITAL ENCOUNTER (OUTPATIENT)
Dept: MRI IMAGING | Facility: CLINIC | Age: 71
Discharge: HOME | End: 2021-04-12
Attending: PHYSICAL MEDICINE & REHABILITATION | Admitting: PHYSICAL MEDICINE & REHABILITATION
Payer: COMMERCIAL

## 2021-04-12 DIAGNOSIS — M47.816 SPONDYLOSIS OF LUMBAR REGION WITHOUT MYELOPATHY OR RADICULOPATHY: ICD-10-CM

## 2021-04-12 PROCEDURE — 72148 MRI LUMBAR SPINE W/O DYE: CPT

## 2021-04-14 ENCOUNTER — OFFICE VISIT (OUTPATIENT)
Dept: PALLIATIVE MEDICINE | Facility: CLINIC | Age: 71
End: 2021-04-14
Payer: COMMERCIAL

## 2021-04-14 VITALS — OXYGEN SATURATION: 98 % | HEART RATE: 47 BPM | SYSTOLIC BLOOD PRESSURE: 122 MMHG | DIASTOLIC BLOOD PRESSURE: 71 MMHG

## 2021-04-14 DIAGNOSIS — M47.816 SPONDYLOSIS OF LUMBAR REGION WITHOUT MYELOPATHY OR RADICULOPATHY: Primary | ICD-10-CM

## 2021-04-14 DIAGNOSIS — G89.29 CHRONIC BILATERAL LOW BACK PAIN WITHOUT SCIATICA: ICD-10-CM

## 2021-04-14 DIAGNOSIS — M54.50 CHRONIC BILATERAL LOW BACK PAIN WITHOUT SCIATICA: ICD-10-CM

## 2021-04-14 DIAGNOSIS — M47.816 SPONDYLOSIS OF LUMBAR REGION WITHOUT MYELOPATHY OR RADICULOPATHY: ICD-10-CM

## 2021-04-14 PROCEDURE — 80307 DRUG TEST PRSMV CHEM ANLYZR: CPT | Performed by: PHYSICAL MEDICINE & REHABILITATION

## 2021-04-14 PROCEDURE — 99214 OFFICE O/P EST MOD 30 MIN: CPT | Performed by: PHYSICAL MEDICINE & REHABILITATION

## 2021-04-14 RX ORDER — HYDROCODONE BITARTRATE AND ACETAMINOPHEN 5; 325 MG/1; MG/1
1 TABLET ORAL EVERY 6 HOURS PRN
Qty: 45 TABLET | Refills: 0 | Status: SHIPPED | OUTPATIENT
Start: 2021-04-14 | End: 2021-05-14

## 2021-04-14 ASSESSMENT — PAIN SCALES - GENERAL: PAINLEVEL: MILD PAIN (2)

## 2021-04-14 NOTE — LETTER
Opioid / Opioid Plus Controlled Substance Agreement    This is an agreement between you and your provider about the safe and appropriate use of controlled substance/opioids prescribed by your care team. Controlled substances are medicines that can cause physical and mental dependence (abuse).    There are strict laws about having and using these medicines. We here at [facility] are committing to working with you in your efforts to get better. To support you in this work, we ll help you schedule regular office appointments for medicine refills. If we must cancel or change your appointment for any reason, we ll make sure you have enough medicine to last until your next appointment.     As a Provider, I will:    Listen carefully to your concerns and treat you with respect.     Recommend a treatment plan that I believe is in your best interest. This plan may involve therapies other than opioid pain medication.     Talk with you often about the possible benefits, and the risk of harm of any medicine that we prescribe for you.     Provide a plan on how to taper (discontinue or go off) using this medicine if the decision is made to stop its use.    As a Patient, I understand that opioid(s):     Are a controlled substance prescribed by my care team to help me function or work and manage my condition(s).     Are strong medicines and can cause serious side effects such as:    Drowsiness, which can seriously affect my driving ability    A lower breathing rate, enough to cause death    Harm to my thinking ability     Depression     Abuse of and addiction to this medicine    Need to be taken exactly as prescribed. Combining opioids with certain medicines or chemicals (such as illegal drugs, sedatives, sleeping pills, and benzodiazepines) can be dangerous or even fatal. If I stop opioids suddenly, I may have severe withdrawal symptoms.    Do not work for all types of pain nor for all patients. If they re not helpful, I may  be asked to stop them.        The risks, benefits and side effects of these medicine(s) were explained to me. I agree that:  1. I will take part in other treatments as advised by my care team. This may be psychiatry or counseling, physical therapy, behavioral therapy, group treatment or a referral to a specialist.     2. I will keep all my appointments. I understand that this is part of the monitoring of opioids. My care team may require an office visit for EVERY opioid/controlled substance refill. If I miss appointments or don t follow instructions, my care team may stop my medicine.    3. I will take my medicines as prescribed. I will not change the dose or schedule unless my care team tells me to. There will be no refills if I run out early.     4. I may be asked to come to the clinic and complete a urine drug test or complete a pill count at any time. If I don t give a urine sample or participate in a pill count, the care team may stop my medicine.    5. I will only receive prescriptions from this clinic for chronic pain. If I am treated by another provider for acute pain issues, I will tell them that I am taking opioid pain medication for chronic pain and that I have a treatment agreement with this provider. I will inform my [facility] care team within one business day if I am given a prescription for any pain medication by another healthcare provider. My [facility] care team can contact other providers and pharmacists about my use of any medicines.    6. It is up to me to make sure that I don t run out of my medicines on weekends or holidays. If my care team is willing to refill my opioid prescription without a visit, I must request refills only during office hours. Refills may take up to 3 business days to process. I will use one pharmacy to fill all my opioid and other controlled substance prescriptions. I will notify the clinic about any changes to my insurance or medication  availability.    7. I am responsible for my prescriptions. If the medicine/prescription is lost, stolen or destroyed, it will not be replaced. I also agree not to share controlled substance medicines with anyone.    8. I am aware I should not use any illegal or recreational drugs. I agree not to drink alcohol unless my care team says I can.       9. If I enroll in the [place] [facility] program, I will tell my care team prior to my next refill.     10. I will tell my care team right away if I become pregnant, have a new medical problem treated outside of my regular clinic, or have a change in my medications.    11. I understand that this medicine can affect my thinking, judgment and reaction time. Alcohol and drugs affect the brain and body, which can affect the safety of my driving. Being under the influence of alcohol or drugs can affect my decision-making, behaviors, personal safety, and the safety of others. Driving while impaired (DWI) can occur if a person is driving, operating, or in physical control of a car, motorcycle, boat, snowmobile, ATV, motorbike, off-road vehicle, or any other motor vehicle ([place] Statute 169A.20). I understand the risk if I choose to drive or operate any vehicle or machinery.    I understand that if I do not follow any of the conditions above, my prescriptions or treatment may be stopped or changed.          Opioids  What You Need to Know    What are opioids?   Opioids are pain medicines that must be prescribed by a doctor. They are also known as narcotics.     Examples are:   1. morphine (MS Contin, Courtney)  2. oxycodone (Oxycontin)  3. oxycodone and acetaminophen (Percocet)  4. hydrocodone and acetaminophen (Vicodin, Norco)   5. fentanyl patch (Duragesic)   6. hydromorphone (Dilaudid)   7. methadone  8. codeine (Tylenol #3)     What do opioids do well?   Opioids are best for severe short-term pain such as after a surgery or injury. They may work well for cancer pain. They may  help some people with long-lasting (chronic) pain.     What do opioids NOT do well?   Opioids never get rid of pain entirely, and they don t work well for most patients with chronic pain. Opioids don t reduce swelling, one of the causes of pain.                                    Other ways to manage chronic pain and improve function include:       Treat the health problem that may be causing pain    Anti-inflammation medicines, which reduce swelling and tenderness, such as ibuprofen (Advil, Motrin) or naproxen (Aleve)    Acetaminophen (Tylenol)    Antidepressants and anti-seizure medicines, especially for nerve pain    Topical treatments such as patches or creams    Injections or nerve blocks    Chiropractic or osteopathic treatment    Acupuncture, massage, deep breathing, meditation, visual imagery, aromatherapy    Use heat or ice at the pain site    Physical therapy     Exercise    Stop smoking    Take part in therapy       Risks and side effects     Talk to your doctor before you start or decide to keep taking opioids. Possible side effects include:      Lowering your breathing rate enough to cause death    Overdose, including death, especially if taking higher than prescribed doses    Worse depression symptoms; less pleasure in things you usually enjoy    Feeling tired or sluggish    Slower thoughts or cloudy thinking    Being more sensitive to pain over time; pain is harder to control    Trouble sleeping or restless sleep    Changes in hormone levels (for example, less testosterone)    Changes in sex drive or ability to have sex    Constipation    Unsafe driving    Itching and sweating    Dizziness    Nausea, throwing up and dry mouth    What else should I know about opioids?    Opioids may lead to dependence, tolerance, or addiction.      Dependence means that if you stop or reduce the medicine too quickly, you will have withdrawal symptoms. These include loose poop (diarrhea), jitters, flu-like symptoms,  nervousness and tremors. Dependence is not the same as addiction.                       Tolerance means needing higher doses over time to get the same effect. This may increase the chance of serious side effects.      Addiction is when people improperly use a substance that harms their body, their mind or their relations with others. Use of opiates can cause a relapse of addiction if you have a history of drug or alcohol abuse.      People who have used opioids for a long time may have a lower quality of life, worse depression, higher levels of pain and more visits to doctors.    You can overdose on opioids. Take these steps to lower your risk of overdose:    1. Recognize the signs:  Signs of overdose include decrease or loss of consciousness (blackout), slowed breathing, trouble waking up and blue lips. If someone is worried about overdose, they should call 911.    2. Talk to your doctor about Narcan (naloxone).   If you are at risk for overdose, you may be given a prescription for Narcan. This medicine very quickly reverses the effects of opioids.   If you overdose, a friend or family member can give you Narcan while waiting for the ambulance. They need to know the signs of overdose and how to give Narcan.     3. Don't use alcohol or street drugs.   Taking them with opioids can cause death.    4. Do not take any of these medicines unless your doctor says it s OK. Taking these with opioids can cause death:    Benzodiazepines, such as lorazepam (Ativan), alprazolam (Xanax) or diazepam (Valium)    Muscle relaxers, such as cyclobenzaprine (Flexeril)    Sleeping pills like zolpidem (Ambien)     Other opioids      How to keep you and other people safe while taking opioids:    1. Never share your opioids with others.  Opioid medicines are regulated by the Drug Enforcement Agency (SHAHAB). Selling or sharing medications is a criminal act.    2. Be sure to store opioids in a secure place, locked up if possible. Young children  can easily swallow them and overdose.    3. When you are traveling with your medicines, keep them in the original bottles. If you use a pill box, be sure you also carry a copy of your medicine list from your clinic or pharmacy.    4. Safe disposal of opioids    Most pharmacies have places to get rid of medicine, called disposal kiosks. Medicine disposal options are also available in every [facility]. Search your county and  medication disposal  to find more options. You can find more details at:  [email or web address]     I agree that my provider, clinic care team, and pharmacy may work with any city, state or federal law enforcement agency that investigates the misuse, sale, or other diversion of my controlled medicine. I will allow my provider to discuss my care with, or share a copy of, this agreement with any other treating provider, pharmacy or emergency room where I receive care.    I have read this agreement and have asked questions about anything I did not understand.    _______________________________________________________  Patient Signature - Jose Moreno _____________________                   Date     _______________________________________________________  Provider Signature - Jersey Richter MD   _____________________                   Date     _______________________________________________________  Witness Signature (required if provider not present while patient signing)   _____________________                   Date

## 2021-04-14 NOTE — PATIENT INSTRUCTIONS
1. I ordered norco 5-325mg, you can alternate 1 and 2 tablets daily. Max 2/day. I ordered 45 tablets for the next month.    2. Complete the urine test and the controlled substance agreement before you leave today.    3. You can take no more than 3000mg of tylenol daily. You can take tylenol 650mg every 6 hours as needed.    4. You can alternate tylenol and ibuprofen 400mg. You can take ibuprofen 400mg every 6 hours as needed. Max 1200mg/day.    5. No other medication changes today.    6. Call the number below to schedule a 1 month virtual visit.    ----------------------------------------------------------------  Clinic Number:  [phone]     Call with any questions about your care and for scheduling assistance.     Calls are returned Monday through Friday between 8 AM and 4:30 PM. We usually get back to you within 2 business days depending on the issue/request.    If we are prescribing your medications:    For opioid medication refills, call the clinic or send a [employer] message 7 days in advance.  Please include:    Name of requested medication    Name of the pharmacy.    For non-opioid medications, call your pharmacy directly to request a refill. Please allow 3-4 days to be processed.     Per [place] State Law:    All controlled substance prescriptions must be filled within 30 days of being written.      For those controlled substances allowing refills, pickup must occur within 30 days of last fill.      We believe regular attendance is key to your success in our program!      Any time you are unable to keep your appointment we ask that you call us at least 24 hours in advance to cancel.This will allow us to offer the appointment time to another patient.     Multiple missed appointments may lead to dismissal from the clinic.

## 2021-04-14 NOTE — PROGRESS NOTES
[facility]    Date of visit: 21    Assessment:  Mr. Moreno is a 71 year old with past medical history including: CAD (nstemi, s/p PTCA), HTN, mitral valve repair, mitral regurgitation, HLD, Hypothyroidism, GERD, Prostate cancer (s/p prostatectomy in 2018, ongoing urinary incontinence) who presents for follow up for the followin. Lumbar DDD, Spondylosis and scoliosis: Initially responded well to lumbar RFA but no benefit with repeat RFA and facet injections in 2019. His symptoms have changed in the past year to involve his bilateral low back across and above the belt line. Morgan also reports intermittent neurogenic claudication but this has been sporadic. On exam today there is wide spread tenderness in the lower lumbar paraspinals and laterally to the hips. There is no significant limitations with ROM, neurological exam was normal.         Plan:  The following recommendations were given to the patient. Diagnosis, treatment options, risks, benefits, and alternatives were discussed, and all questions were answered. The patient expressed understanding of the plan for management.     1. Physical Therapy: continue exercises recommended by PT  2. Clinical Health Pain Psychologist: Coping well, defer at this time.  3. Self Care Recommendations: Gentle progressive exercise that does not increase pain - gradually increase daily walking program.  Take mini breaks - 5 minutes of mindfullness a couple times a day.   1. Continue prior yoga regimen.  2. Recommended daily morning stretches for his low back and hips.  4. Diagnostic Studies: Lumbar MRI ordered  5. Medication Management:    1. Continue lyrica 150mg BIDn.  2. Continue methocarbamol 500mg TID prn  3. Continue tylenol 650mg four times daily as needed.  4. Continue Ibuprofen 400mg twice daily as needed, avoid taking this more than 3 days/week.  5. Start Norco 5-325mg q6h prn, max 2/day. #45 tabs prescribed for the  following month.  6. CSA and UDS completed 4/14/21.  6. Further procedures recommended: none at this time.  7. Follow up: 1 month virtual visit.      DO [facility]             Chief complaint:   Chief Complaint   Patient presents with     Pain       Interval history:  Jose Moreno is a 70 year old male last seen by me on 12/14/2020.      Recommendations/plan at the last visit included:  1. Physical Therapy: continue exercises recommended by PT  2. Clinical Health Pain Psychologist: Coping well, defer at this time.  3. Self Care Recommendations: Gentle progressive exercise that does not increase pain - gradually increase daily walking program.  Take mini breaks - 5 minutes of mindfullness a couple times a day.   4. Diagnostic Studies: none  5. Medication Management:    1. Continue lyrica 100mg BID.   2. Continue methocarbamol 500mg TID prn  3. Continue tylenol 650mg four times daily as needed.  4. Continue CBD oil  5. Continue Ibuprofen 400mg twice daily as needed, avoid taking this more than 3 days/week.  6. Further procedures recommended: none  7. Follow up: 3-4 month video visit      Since his last visit, Jose Moreno reports:  -Back pain has been progressing in the past 6 months.    -States he can live with it but it has impacted his quality of life significantly.    -Tries to stay active but can no longer go on walks because he starts having back pain and intermittent heaviness in his legs. The back pain overall is what limits his walking the most. Able to walk or stand for 10-15 minutes at a time.    -When biking he is able to go longer duration and distances without too much difficulty.      Pain scores:  Pain intensity on average is 5 a scale of 0-10.          Current pain treatments:   -Ibuprofen prn  -Tylenol 650mg prn  -CBD oil   -Lyrica 150mg BID    Past pain treatments:  -none  -Lumbar RFA with 6 months relief, repeated on 9/9/19 without significant pain  relief  -Lumbar facet injections with 2-3 months relief  -PT      Side Effects: none    Medications:  Current Outpatient Medications   Medication Sig Dispense Refill     Acetaminophen (TYLENOL PO) Take 325-650 mg by mouth 2 times daily as needed for mild pain or fever       amLODIPine (NORVASC) 10 MG tablet Take 1 tablet (10 mg) by mouth daily 90 tablet 3     aspirin EC 81 MG EC tablet Take 1 tablet (81 mg) by mouth daily 60 tablet 0     atorvastatin (LIPITOR) 80 MG tablet Take 1 tablet (80 mg) by mouth daily 90 tablet 1     cholecalciferol (VITAMIN D3) 1000 UNIT tablet Take 1 tablet (1,000 Units) by mouth daily       Cyanocobalamin (B-12) 1000 MCG TBCR Take 1,000 mcg by mouth daily       FLUoxetine (PROZAC) 20 MG capsule Take 1 capsule (20 mg) by mouth daily 90 capsule 3     hydrochlorothiazide (HYDRODIURIL) 25 MG tablet Take 1 tablet (25 mg) by mouth daily 90 tablet 1     irbesartan (AVAPRO) 300 MG tablet Take 1 tablet (300 mg) by mouth daily 90 tablet 3     ketoconazole (NIZORAL) 2 % external cream Apply topically daily 60 g 0     levothyroxine (SYNTHROID/LEVOTHROID) 50 MCG tablet Take 1 tablet (50 mcg) by mouth daily 90 tablet 3     methocarbamol (ROBAXIN) 500 MG tablet Take 1 tablet (500 mg) by mouth 3 times daily as needed for muscle spasms Okay to dispense 12/28/2020 90 tablet 3     metoprolol succinate ER (TOPROL-XL) 25 MG 24 hr tablet Take 0.5 tablets (12.5 mg) by mouth daily 45 tablet 1     multivitamin, therapeutic with minerals (MULTI-VITAMIN) TABS tablet Take 1 tablet by mouth daily       nitroGLYcerin (NITROSTAT) 0.4 MG sublingual tablet TAKE 1 TABLET BY MOUTH EVERY 5 MIN AS NEEDED FOR CHEST PAIN 25 tablet 1     Omega-3 Fatty Acids (FISH OIL PO) Take 1 capsule by mouth daily       omeprazole (PRILOSEC) 20 MG DR capsule Take 1 capsule (20 mg) by mouth daily 90 capsule 3     pregabalin (LYRICA) 150 MG capsule Take 1 capsule (150 mg) by mouth 2 times daily 60 capsule 3     traZODone (DESYREL) 100 MG  tablet Take 1 tablet (100 mg) by mouth At Bedtime 90 tablet 3       Medical History: any changes in medical history since they were last seen? No    Review of Systems:  The 14 system ROS was reviewed from the intake questionnaire, and is positive for: back pain, arthritis, leg pain, paresthesias  Any bowel or bladder problems: denies  Mood: denies     BILLING TIME DOCUMENTATION:   The total TIME spent on this patient on the date of the encounter/appointment was 29 minutes.      TOTAL TIME includes:   Time spent preparing to see the patient (reviewing records and tests)  Time spent face to face (or over the phone) with the patient   Time spent ordering tests, medications, procedures and referrals   Time spent documenting clinical information in Epic         DO [facility]

## 2021-04-14 NOTE — NURSING NOTE
Obtained urine specimen.  Specimen sent to the lab.        Judie Woods MA  [facility]

## 2021-04-16 LAB
CREAT UR-MCNC: 11 MG/DL
PREGABALIN UR QL CFM: PRESENT
RPT COMMENT: ABNORMAL

## 2021-04-19 ENCOUNTER — TELEPHONE (OUTPATIENT)
Dept: PALLIATIVE MEDICINE | Facility: CLINIC | Age: 71
End: 2021-04-19

## 2021-04-19 NOTE — TELEPHONE ENCOUNTER
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

## 2021-04-19 NOTE — TELEPHONE ENCOUNTER
Called Morgan.  Dejah asking for a return call.    Summer Singh RN  Care Coordinator  [facility]

## 2021-04-20 NOTE — TELEPHONE ENCOUNTER
Called pt. 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 He reports the medication is not impacting his thoughts/driving ability. He has been driving his regular car without issue.  Advised that provider out of office but would route for his review upon return.     If ok fax to:  [facility] Attn: Real Valdez  [phone]    Please call pt with further questions and also to confirm if/when fax sent.     Katia MOSQUERA, RN Care Coordinator  [facility]  Pain Management

## 2021-04-20 NOTE — TELEPHONE ENCOUNTER
patient calling wondering when a nurse will call him         Donna Trent    [facility]

## 2021-04-21 NOTE — TELEPHONE ENCOUNTER
Patient calling, wondering when someone will be calling him to discuss driving.      Please call [phone]        Donna Trent    [facility]

## 2021-04-21 NOTE — TELEPHONE ENCOUNTER
Called pt and relayed Dr. Richter's message. He said that the DOT did not request the letter. Explained that chiropractors are licensed to do DOT exams and that the chiropractor wanted confirmation that it was OK for him to take this medication when driving.     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 States that he has used 6 or 7 pills from the most recent supply and they have not helped his pain much at all.     Pt asked how to dispose of the medication. Suggested that he call his pharmacy to see if they have a means of disposal or suggested that he drop off the medications at a law enforcement agency. Pt stated understanding.     ISRRAEL AsifN, RN-BC  Patient Care Supervisor  [facility] [place]

## 2021-04-21 NOTE — TELEPHONE ENCOUNTER
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     I would not recommend commercial driving using this medication. Although he may not be having side effects currently, there is the possibility that this may be an issue with longer driving shifts and the type of driving and hours he would be working if he is involved in commercial driving. Has this letter been requested by DOT?     In addition, why is the potential letter going to his chiropractor instead of someone at DOT?    Thanks,    Jersey Richter,   [facility]

## 2021-04-22 ENCOUNTER — TELEPHONE (OUTPATIENT)
Dept: PALLIATIVE MEDICINE | Facility: CLINIC | Age: 71
End: 2021-04-22

## 2021-04-22 NOTE — TELEPHONE ENCOUNTER
Called Morgan.  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 Previous message on 04/19/2021 states he is stopping the Hydrocodone. He needs a letter sent to Dr Real Valdez who is a chiropractor who is doing the DOT.  Letter needs to state that he is no longer taking this medication and that Dr Richter is no longer prescribing it to him.  Wondering if this can be emailed to [email or web address] or Fax: [phone].    Routing to provider to see if he is willing to write this letter for patient.    Summer Singh RN  Care Coordinator  [facility] Pain Management

## 2021-04-22 NOTE — LETTER
[facility] [place]  [address] [place] [address] [address]  [facility] [address]  Phone: [phone]  Fax: [phone]    April 22, 2021        Jose Moreno  [address] [facility] [address]          To whom it may concern:    RE: Jose Moreno    I am Mr. Moreno's chronic pain medicine physician. Jose Moreno was prescribed Hydrocodone-Acetaminophen 5-325mg every 6 hours as needed starting on 4/14/21. This medication was stopped on 4/22/21 as it was ineffective to treat their pain.        Please contact me for questions or concerns.      Sincerely,        Jersey Richter MD

## 2021-04-22 NOTE — TELEPHONE ENCOUNTER
Called Morgan. Dejah asking him to set up a nurse only visit.  Asked him to bring in the remaining tablets so we can destroy them. Once this is completed, we can do the letter for him.      Summer Singh RN  Care Coordinator  [facility]

## 2021-04-22 NOTE — TELEPHONE ENCOUNTER
Thanks for the update, will discuss with patient at next appointment.    DO [facility]

## 2021-04-22 NOTE — TELEPHONE ENCOUNTER
Pt requesting Dr Richter to send a message to Dr Real Valdez that he is no longer taking or being perscribed the medication HYDROcodone-acetaminophen (NORCO) 5-325 MG tablet. Pt requesting this be done asap. [email or web address] Pt asking for copy of that email/message to be sent to him as well.      Rocael KNIGHT    [facility] [place]

## 2021-04-22 NOTE — TELEPHONE ENCOUNTER
Please contact patient to bring remaining norco tablets.    Letters saved in letter's tab, indicating patient was prescribed norco from 4/14 through 4/22.      DO [facility]

## 2021-04-23 ENCOUNTER — ALLIED HEALTH/NURSE VISIT (OUTPATIENT)
Dept: PALLIATIVE MEDICINE | Facility: CLINIC | Age: 71
End: 2021-04-23
Payer: COMMERCIAL

## 2021-04-23 DIAGNOSIS — R52 PAIN: Primary | ICD-10-CM

## 2021-04-23 PROCEDURE — 99207 PR NO CHARGE NURSE ONLY: CPT

## 2021-04-23 NOTE — TELEPHONE ENCOUNTER
Pt arrived for medication destruction.  Letter from Dr Richter and nurse visit note faxed to Dr Valdez.  [phone]    Summer Singh RN  Care Coordinator  [facility]

## 2021-04-23 NOTE — NURSING NOTE
Pt presented today with the destruction of his Hydrocodone. Medication verified.     Nursing placed 36 (quantity) tabs of medication into a [employer] Deactivation pouch per 's directions.  Nursing placed the sealed biodegradable pouch containing the neutralized medication into the trash.  Patient witnessed these actions.      Summer Singh RN  Care Coordinator  [facility] Management

## 2021-04-27 ENCOUNTER — TELEPHONE (OUTPATIENT)
Dept: PALLIATIVE MEDICINE | Facility: CLINIC | Age: 71
End: 2021-04-27

## 2021-04-27 NOTE — TELEPHONE ENCOUNTER
Called Morgan.  Dejah asking for a return call.    Summer Singh RN  Care Coordinator  [facility] Pain Management

## 2021-04-27 NOTE — TELEPHONE ENCOUNTER
Patient called asking to speak to a nurse about medications, he is wondering about other options         Donna Trent    [facility]

## 2021-05-05 NOTE — TELEPHONE ENCOUNTER
Called pt and spoke with his wife. She explained that he was at work. States that he goes to work about 1 pm and get home about 5:30 so trying to connect in the morning would be best. Suggested that he call the clinic and ask to speak with a nurse. Provided the clinic number and she will relay the message to the patient.     ISRRAEL AsifN, RN-BC  Patient Care Supervisor  [facility] [place]

## 2021-05-08 ENCOUNTER — HEALTH MAINTENANCE LETTER (OUTPATIENT)
Age: 71
End: 2021-05-08

## 2021-05-13 NOTE — TELEPHONE ENCOUNTER
Pt has not called back. No further action needed. Closing encounter.      Summer Singh RN  Care Coordinator  [facility] Management

## 2021-05-21 DIAGNOSIS — I10 ESSENTIAL HYPERTENSION, BENIGN: ICD-10-CM

## 2021-05-21 RX ORDER — IRBESARTAN 300 MG/1
300 TABLET ORAL DAILY
Qty: 90 TABLET | Refills: 0 | Status: SHIPPED | OUTPATIENT
Start: 2021-05-21 | End: 2021-09-01

## 2021-05-21 NOTE — TELEPHONE ENCOUNTER
Received refill request for:  Irbesartan 300mg daily  Last OV was: 2020 with EVERARDO Fenton  Labs/EK2020  F/U scheduled: Orders for 2021  [facility]

## 2021-06-11 ENCOUNTER — HOSPITAL ENCOUNTER (OUTPATIENT)
Dept: LAB | Facility: CLINIC | Age: 71
Discharge: HOME | End: 2021-06-11
Attending: UROLOGY | Admitting: UROLOGY
Payer: COMMERCIAL

## 2021-06-11 DIAGNOSIS — Z85.46 PERSONAL HISTORY OF MALIGNANT NEOPLASM OF PROSTATE: ICD-10-CM

## 2021-06-11 PROCEDURE — 84153 ASSAY OF PSA TOTAL: CPT | Performed by: UROLOGY

## 2021-06-12 LAB — PSA SERPL-MCNC: <0.01 UG/L (ref 0–4)

## 2021-06-15 ENCOUNTER — VIRTUAL VISIT (OUTPATIENT)
Dept: UROLOGY | Facility: CLINIC | Age: 71
End: 2021-06-15
Payer: COMMERCIAL

## 2021-06-15 VITALS — HEIGHT: 66 IN | WEIGHT: 150 LBS | BODY MASS INDEX: 24.11 KG/M2

## 2021-06-15 DIAGNOSIS — Z85.46 PERSONAL HISTORY OF MALIGNANT NEOPLASM OF PROSTATE: Primary | ICD-10-CM

## 2021-06-15 PROCEDURE — 99213 OFFICE O/P EST LOW 20 MIN: CPT | Mod: 95 | Performed by: UROLOGY

## 2021-06-15 ASSESSMENT — PAIN SCALES - GENERAL: PAINLEVEL: NO PAIN (0)

## 2021-06-15 ASSESSMENT — MIFFLIN-ST. JEOR: SCORE: 1368.15

## 2021-06-15 NOTE — LETTER
6/15/2021       RE: Jose Moreno  [address] [facility] [address]     Dear Colleague,    Thank you for referring your patient, Jose Moreno, to the [facility] at [facility]. Please see a copy of my visit note below.    *SEND LINK TO CELL PHONE*    Morgan is a 71 year old who is being evaluated via a billable video visit.      How would you like to obtain your AVS? MyChart  If the video visit is dropped, the invitation should be resent by: Text to cell phone: [phone]  Will anyone else be joining your video visit? No        Office Visit Note  [facility]  [phone]    UROLOGIC DIAGNOSES:   pT2c [place] 3+4=7 prostate cancer, incontinence    CURRENT INTERVENTIONS:   Robotic prostatectomy 2018, artificial urinary sphincter 2020    HISTORY:   Morgan is set up for virtual visit today for prostate cancer follow-up.  His PSA remains undetectable.  The artificial urinary stricture continues to work well.  He has no leakage with the sphincter and says he only has to pump the sphincter once in order to empty the bladder.  He has no complaints at this time.      PAST MEDICAL HISTORY:   Past Medical History:   Diagnosis Date     ADHD (attention deficit hyperactivity disorder)      Arthritis     lower back     CAD (coronary artery disease)     cardiac cath 1/23/15: SHERRY to 1st diagonal, SHERRY x2 to LAD, cath 2009: no intervention     Esophageal reflux      Heart murmur     mitral valve repair     History of hyperthyroidism 4/9/2014     Hyperlipidemia      Hypertension      Mitral regurgitation 2009    moderately severe MVP, s/p robotic repair at [place]     Prostate cancer      Pulmonary nodules 2009    CT-recommend repeat in 6-12 months     Rotator cuff rupture 11/3/2011     Thyroid disease        PAST SURGICAL HISTORY:   Past Surgical History:   Procedure Laterality Date     COLONOSCOPY  7/00    GI     DAVINCI PROSTATECTOMY,  LYMPHADENECTOMY N/A 11/1/2018    Procedure: ROBOTIC ASSISTED RADICAL PROSTATECTOMY WITH BILATERAL PELVIC LYMPH NODE DISSECTION;  Surgeon: Clint Blankenship MD;  Location: [facility] OR     DECOMPRESSION LUMBAR MINIMALLY INVASIVE ONE LEVEL  1/11/2012    Procedure:DECOMPRESSION LUMBAR MINIMALLY INVASIVE ONE LEVEL; L4-5 Decompression Minimally Invasive; Surgeon:MESSI HORAN; Location:[facility] OR     HEART CATH RIGHT AND LEFT HEART CATH  01-23-15    See report, pt transfered to [facility] for complex bifurcation PCI of LAD diagonal vessel.      HEART CATH RIGHT AND LEFT HEART CATH  01-26-15    PTCA and implantation of SHERRY to 1st diagonal, SHERRY to mid LAD, SHERRY to proximal LAD     Hx of rotator cuff rupture and repair       IMPLANT PROSTHESIS SPHINCTER URINARY N/A 1/3/2020    Procedure: Placement of  artificial urinary sphincter;  Surgeon: Clint Blankenship MD;  Location: [facility] OR     LAPAROSCOPIC PROSTATECTOMY       REPAIR VALVE MITRAL  2009    [facility] robotic repair      Reversal of vasectomy       VASECTOMY       XR LUMBAR RADIOFREQ ABLATION UNILATERAL  01/11/2018    lumbar area/ ? level       FAMILY HISTORY:   Family History   Problem Relation Age of Onset     Breast Cancer Mother      Lung Cancer Mother         small cell carcinoma- radon?     Depression Father         alcohlism     Macular Degeneration Father      Colon Cancer Father         age 75     Crohn's Disease Sister      Pleurisy Brother      Depression Son      Diabetes No family hx of      Cardiovascular No family hx of      Prostate Cancer No family hx of        SOCIAL HISTORY:   Social History     Tobacco Use     Smoking status: Never Smoker     Smokeless tobacco: Never Used   Substance Use Topics     Alcohol use: Yes     Alcohol/week: 7.0 standard drinks     Types: 7 Standard drinks or equivalent per week     Frequency: 4 or more times a week     Drinks per session: 1 or 2     Binge frequency: Never     Comment: 1 beer daily       REVIEW OF  SYSTEMS:  Skin: No rash, pruritis, or skin pigmentation  Eyes: No changes in vision  Ears/Nose/Throat: No changes in hearing, no nosebleeds  Respiratory: No shortness of breath, dyspnea on exertion, cough, or hemoptysis  Cardiovascular: No chest pain or palpitations  Gastrointestinal: No diarrhea or constipation. No abdominal pain. No hematochezia  Genitourinary: see HPI  Musculoskeletal: No pain or swelling of joints, normal range of motion  Neurologic: No weakness or tremors  Psychiatric: No recent changes in memory or mood  Hematologic/Lymphatic/Immunologic: No easy bruising or enlarged lymph nodes  Endocrine: No weight gain or loss      PHYSICAL EXAM:    General: Alert and oriented to time, place, and self. In NAD   HEENT: Head AT/NC, EOMI, CN Grossly intact   Lungs: no respiratory distress, or pursed lip breathing   Heart: No obvious jugular venous distension present   Musculoskeltal: Normal movements. Normal appearing musculature  Skin: no suspicious lesions or rashes   Neuro: Alert, oriented, speech and mentation normal; moving all 4 extremities equally.   Psych: affect and mood normal    Imaging: None    Urinalysis: UA RESULTS:  Recent Labs   Lab Test 12/30/19  1120 11/13/18  2356 11/13/18  2356   COLOR Yellow   < > Yellow   APPEARANCE Clear   < > Cloudy   URINEGLC Negative   < > Negative   URINEBILI Negative   < > Negative   URINEKETONE Negative   < > Negative   SG 1.015   < > 1.031   UBLD Negative   < > Large*   URINEPH 7.0   < > 5.0   PROTEIN 100*   < > 100*   UROBILINOGEN 0.2   < >  --    NITRITE Negative   < > Positive*   LEUKEST Negative   < > Large*   RBCU  --   --  >182*   WBCU  --   --  >182*    < > = values in this interval not displayed.       PSA: Undetectable    Post Void Residual:     Other labs: None today      IMPRESSION:  Doing well, PSA undetectable    PLAN:  He continues to be very well from both a prostate cancer and continence standpoint.  We will see him back in 1 year for another PSA  check.      Clint Blankenship M.D.              Video Start Time: 11:01 AM  Video-Visit Details    Type of service:  Video Visit    Video End Time:11:06 AM    Originating Location (pt. Location): Home    Distant Location (provider location):  [facility] UROLOGY [facility]     Platform used for Video Visit: Kayla[employer]ate

## 2021-06-15 NOTE — PROGRESS NOTES
*SEND LINK TO CELL PHONE*    Morgan is a 71 year old who is being evaluated via a billable video visit.      How would you like to obtain your AVS? MyChart  If the video visit is dropped, the invitation should be resent by: Text to cell phone: [phone]  Will anyone else be joining your video visit? No        Office Visit Note  [facility]  [phone]    UROLOGIC DIAGNOSES:   pT2c [place] 3+4=7 prostate cancer, incontinence    CURRENT INTERVENTIONS:   Robotic prostatectomy 2018, artificial urinary sphincter 2020    HISTORY:   Morgan is set up for virtual visit today for prostate cancer follow-up.  His PSA remains undetectable.  The artificial urinary stricture continues to work well.  He has no leakage with the sphincter and says he only has to pump the sphincter once in order to empty the bladder.  He has no complaints at this time.      PAST MEDICAL HISTORY:   Past Medical History:   Diagnosis Date     ADHD (attention deficit hyperactivity disorder)      Arthritis     lower back     CAD (coronary artery disease)     cardiac cath 1/23/15: SHERRY to 1st diagonal, SHERRY x2 to LAD, cath 2009: no intervention     Esophageal reflux      Heart murmur     mitral valve repair     History of hyperthyroidism 4/9/2014     Hyperlipidemia      Hypertension      Mitral regurgitation 2009    moderately severe MVP, s/p robotic repair at [place]     Prostate cancer      Pulmonary nodules 2009    CT-recommend repeat in 6-12 months     Rotator cuff rupture 11/3/2011     Thyroid disease        PAST SURGICAL HISTORY:   Past Surgical History:   Procedure Laterality Date     COLONOSCOPY  7/00    GI     DAVINCI PROSTATECTOMY, LYMPHADENECTOMY N/A 11/1/2018    Procedure: ROBOTIC ASSISTED RADICAL PROSTATECTOMY WITH BILATERAL PELVIC LYMPH NODE DISSECTION;  Surgeon: Clint Blankenship MD;  Location: [facility] OR     DECOMPRESSION LUMBAR MINIMALLY INVASIVE ONE LEVEL  1/11/2012    Procedure:DECOMPRESSION LUMBAR MINIMALLY INVASIVE ONE  LEVEL; L4-5 Decompression Minimally Invasive; Surgeon:MESSI HORAN; Location:[facility] OR     HEART CATH RIGHT AND LEFT HEART CATH  01-23-15    See report, pt transfered to [facility] for complex bifurcation PCI of LAD diagonal vessel.      HEART CATH RIGHT AND LEFT HEART CATH  01-26-15    PTCA and implantation of SHERRY to 1st diagonal, SHERRY to mid LAD, SHERRY to proximal LAD     Hx of rotator cuff rupture and repair       IMPLANT PROSTHESIS SPHINCTER URINARY N/A 1/3/2020    Procedure: Placement of  artificial urinary sphincter;  Surgeon: Clint Blankenship MD;  Location: [facility] OR     LAPAROSCOPIC PROSTATECTOMY       REPAIR VALVE MITRAL  2009    [facility] robotic repair      Reversal of vasectomy       VASECTOMY       XR LUMBAR RADIOFREQ ABLATION UNILATERAL  01/11/2018    lumbar area/ ? level       FAMILY HISTORY:   Family History   Problem Relation Age of Onset     Breast Cancer Mother      Lung Cancer Mother         small cell carcinoma- radon?     Depression Father         alcohlism     Macular Degeneration Father      Colon Cancer Father         age 75     Crohn's Disease Sister      Pleurisy Brother      Depression Son      Diabetes No family hx of      Cardiovascular No family hx of      Prostate Cancer No family hx of        SOCIAL HISTORY:   Social History     Tobacco Use     Smoking status: Never Smoker     Smokeless tobacco: Never Used   Substance Use Topics     Alcohol use: Yes     Alcohol/week: 7.0 standard drinks     Types: 7 Standard drinks or equivalent per week     Frequency: 4 or more times a week     Drinks per session: 1 or 2     Binge frequency: Never     Comment: 1 beer daily       REVIEW OF SYSTEMS:  Skin: No rash, pruritis, or skin pigmentation  Eyes: No changes in vision  Ears/Nose/Throat: No changes in hearing, no nosebleeds  Respiratory: No shortness of breath, dyspnea on exertion, cough, or hemoptysis  Cardiovascular: No chest pain or palpitations  Gastrointestinal: No diarrhea or  constipation. No abdominal pain. No hematochezia  Genitourinary: see HPI  Musculoskeletal: No pain or swelling of joints, normal range of motion  Neurologic: No weakness or tremors  Psychiatric: No recent changes in memory or mood  Hematologic/Lymphatic/Immunologic: No easy bruising or enlarged lymph nodes  Endocrine: No weight gain or loss      PHYSICAL EXAM:    General: Alert and oriented to time, place, and self. In NAD   HEENT: Head AT/NC, EOMI, CN Grossly intact   Lungs: no respiratory distress, or pursed lip breathing   Heart: No obvious jugular venous distension present   Musculoskeltal: Normal movements. Normal appearing musculature  Skin: no suspicious lesions or rashes   Neuro: Alert, oriented, speech and mentation normal; moving all 4 extremities equally.   Psych: affect and mood normal    Imaging: None    Urinalysis: UA RESULTS:  Recent Labs   Lab Test 12/30/19  1120 11/13/18  2356 11/13/18  2356   COLOR Yellow   < > Yellow   APPEARANCE Clear   < > Cloudy   URINEGLC Negative   < > Negative   URINEBILI Negative   < > Negative   URINEKETONE Negative   < > Negative   SG 1.015   < > 1.031   UBLD Negative   < > Large*   URINEPH 7.0   < > 5.0   PROTEIN 100*   < > 100*   UROBILINOGEN 0.2   < >  --    NITRITE Negative   < > Positive*   LEUKEST Negative   < > Large*   RBCU  --   --  >182*   WBCU  --   --  >182*    < > = values in this interval not displayed.       PSA: Undetectable    Post Void Residual:     Other labs: None today      IMPRESSION:  Doing well, PSA undetectable    PLAN:  He continues to be very well from both a prostate cancer and continence standpoint.  We will see him back in 1 year for another PSA check.      Clint Blankenship M.D.              Video Start Time: 11:01 AM  Video-Visit Details    Type of service:  Video Visit    Video End Time:11:06 AM    Originating Location (pt. Location): Home    Distant Location (provider location):  [facility] UROLOGY [facility]     Platform used  for Video Visit: Andre

## 2021-06-25 ENCOUNTER — TELEPHONE (OUTPATIENT)
Dept: PALLIATIVE MEDICINE | Facility: CLINIC | Age: 71
End: 2021-06-25

## 2021-06-25 DIAGNOSIS — M47.816 SPONDYLOSIS OF LUMBAR REGION WITHOUT MYELOPATHY OR RADICULOPATHY: Primary | ICD-10-CM

## 2021-06-25 DIAGNOSIS — M54.50 CHRONIC BILATERAL LOW BACK PAIN WITHOUT SCIATICA: ICD-10-CM

## 2021-06-25 DIAGNOSIS — G89.29 CHRONIC BILATERAL LOW BACK PAIN WITHOUT SCIATICA: ICD-10-CM

## 2021-06-25 NOTE — TELEPHONE ENCOUNTER
Patient called asking to speak to a nurse about getting something stronger for his pain        Donna Trent    [facility]

## 2021-06-28 RX ORDER — HYDROCODONE BITARTRATE AND ACETAMINOPHEN 5; 325 MG/1; MG/1
1 TABLET ORAL 2 TIMES DAILY PRN
Qty: 14 TABLET | Refills: 0 | Status: SHIPPED | OUTPATIENT
Start: 2021-06-28 | End: 2021-07-06

## 2021-06-28 NOTE — TELEPHONE ENCOUNTER
Called pt to inquire about Norco per below. He would like to trial [place] [place] [facility]   Pt inquired about methocarbamol dosing and taking along with Norco-Advised to wait a few hours inbetween doses     Katia MOSQUERA, RN Care Coordinator  [facility]  Pain [facility]

## 2021-06-28 NOTE — TELEPHONE ENCOUNTER
Recvd call back. He reports not being able to walk/stand for any period of time, is better sitting/laying-but still painful. Same pain that is worse.  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 Did  norco but had destroyed in office here- did not take any of them. He reports that had left over oxycodone that he took one yesterday and it was helpful. He is aware to not take old medication but was desperate. He does not want to live the rest of his life in pain.   Have scheduled him for 07/06 appointment. Advised him that we will callback if provider has any recommendation between now and appt    Katia MOSQUERA, RN Care Coordinator  [facility]  Pain Management

## 2021-06-28 NOTE — TELEPHONE ENCOUNTER
Called pt. Shay to call back    Katia MOSQUERA, RN Care Coordinator  [facility]  Pain [facility]

## 2021-06-28 NOTE — TELEPHONE ENCOUNTER
If Morgan would like to do a 1 week trial of norco, I can re-order this medication. Would suggest starting with Norco 5-325mg BID prn.    Jersey Richter DO  [facility] Pain Management

## 2021-06-28 NOTE — TELEPHONE ENCOUNTER
reviewed. Norco 5-325 BID prn, 14 tabs for 1 week order placed.    Jersey Richter [facility]

## 2021-06-29 NOTE — TELEPHONE ENCOUNTER
Called Morgan. Informed him Dr Richter did give him a trial script of the Norco.  Pt did  the script yesterday.  He has a follow up schedule with Dr Richter on 07/06/2021. He will touch base at that appointment on how the Norco is helping or not.    Summer Singh RN  Care Coordinator  [facility] Pain Management

## 2021-07-06 ENCOUNTER — OFFICE VISIT (OUTPATIENT)
Dept: PALLIATIVE MEDICINE | Facility: CLINIC | Age: 71
End: 2021-07-06
Payer: COMMERCIAL

## 2021-07-06 VITALS — DIASTOLIC BLOOD PRESSURE: 72 MMHG | HEART RATE: 52 BPM | SYSTOLIC BLOOD PRESSURE: 117 MMHG | OXYGEN SATURATION: 96 %

## 2021-07-06 DIAGNOSIS — G89.29 CHRONIC BILATERAL LOW BACK PAIN WITHOUT SCIATICA: ICD-10-CM

## 2021-07-06 DIAGNOSIS — M54.50 CHRONIC BILATERAL LOW BACK PAIN WITHOUT SCIATICA: ICD-10-CM

## 2021-07-06 DIAGNOSIS — G89.29 OTHER CHRONIC PAIN: Primary | ICD-10-CM

## 2021-07-06 DIAGNOSIS — M47.816 SPONDYLOSIS OF LUMBAR REGION WITHOUT MYELOPATHY OR RADICULOPATHY: ICD-10-CM

## 2021-07-06 DIAGNOSIS — M48.061 SPINAL STENOSIS OF LUMBAR REGION WITHOUT NEUROGENIC CLAUDICATION: ICD-10-CM

## 2021-07-06 PROCEDURE — 99213 OFFICE O/P EST LOW 20 MIN: CPT | Performed by: PHYSICAL MEDICINE & REHABILITATION

## 2021-07-06 RX ORDER — HYDROCODONE BITARTRATE AND ACETAMINOPHEN 5; 325 MG/1; MG/1
1 TABLET ORAL EVERY 6 HOURS PRN
Qty: 60 TABLET | Refills: 0 | Status: SHIPPED | OUTPATIENT
Start: 2021-07-06 | End: 2021-11-22

## 2021-07-06 ASSESSMENT — PAIN SCALES - GENERAL: PAINLEVEL: MILD PAIN (2)

## 2021-07-06 NOTE — PATIENT INSTRUCTIONS
1. I ordered an lumbar epidural steroid injection, you'll be called to schedule.    2. I ordered a 1 month supply of the hydrocodone, you can take 2 tabs daily max. Call 5 days early for refills.    Take care,    Jersey Richter DO  [facility] Pain Management        ----------------------------------------------------------------  Clinic Number:  [phone]     Call with any questions about your care and for scheduling assistance.     Calls are returned Monday through Friday between 8 AM and 4:30 PM. We usually get back to you within 2 business days depending on the issue/request.    If we are prescribing your medications:    For opioid medication refills, call the clinic or send a [employer] message 7 days in advance.  Please include:    Name of requested medication    Name of the pharmacy.    For non-opioid medications, call your pharmacy directly to request a refill. Please allow 3-4 days to be processed.     Per [place] State Law:    All controlled substance prescriptions must be filled within 30 days of being written.      For those controlled substances allowing refills, pickup must occur within 30 days of last fill.      We believe regular attendance is key to your success in our program!      Any time you are unable to keep your appointment we ask that you call us at least 24 hours in advance to cancel.This will allow us to offer the appointment time to another patient.     Multiple missed appointments may lead to dismissal from the clinic.

## 2021-07-06 NOTE — PROGRESS NOTES
[facility]    Date of visit: 21    Assessment:  Mr. Moreno is a 71 year old with past medical history including: CAD (nstemi, s/p PTCA), HTN, mitral valve repair, mitral regurgitation, HLD, Hypothyroidism, GERD, Prostate cancer (s/p prostatectomy in 2018, ongoing urinary incontinence) who presents for follow up for the followin. Chronic low back pain: Symptoms are multifactorial including: Lumbar DDD, spinal stenosis and scoliosis: They have had gradual worsening in pain over the past year and symptoms have not improved with repeat RFA and facet injections which previously were helpful. Repeat MRI shows worsening lumbar ddd and spinal stenosis. Patient has had symptoms consistent with neurological claudication in the past year as well. On exam he is neurologically intact with no limitations with ROM. SLR negative bilaterally. Discussed an lumbar epidural steroid injection and the medication changes recommended below and the patient agreed to these changes.        Plan:  The following recommendations were given to the patient. Diagnosis, treatment options, risks, benefits, and alternatives were discussed, and all questions were answered. The patient expressed understanding of the plan for management.     1. Physical Therapy: continue exercises recommended by PT  2. Clinical Health Pain Psychologist: Coping well, defer at this time.  3. Self Care Recommendations:   1. Continue prior yoga regimen.  2. Recommended daily morning stretches for his low back and hips.  4. Diagnostic Studies: Lumbar MRI ordered  5. Medication Management:    1. Continue lyrica 150mg BID.  2. Continue methocarbamol 500mg TID prn  3. Continue tylenol 650mg four times daily as needed.  4. Continue Ibuprofen 400mg twice daily as needed, avoid taking this more than 3 days/week.  5. Continue Norco 5-325mg q6h prn, max 2/day. #60 tabs prescribed for the following month.  6. CSA and UDS completed  4/14/21.  6. Further procedures recommended: lumbar epidural steroid injection ordered.  7. Follow up: 1 month after lumbar epidural steroid injection.      Jersey Richter DO  [facility]             Chief complaint:   Chief Complaint   Patient presents with     Pain       Interval history:  Jose Moreno is a 70 year old male last seen by me on 4/14/21.          Since his last visit, Jose Moreno reports:    -After starting lyrica which does help with their back pain, he tried to go back to work but was having difficulty.    -Any time he is standing or active he starts to have pain in the low back. He cannot do his leisure activities and other activities he enjoys like cooking, going on walks etc because of significant back pain.    -Once he has started the norco trial last week he noticed significant improvements in his mobility.    -He has been able to do the activities that were previously limited due to his back pain. Able to go on walks without as much discomfort.      Pain scores:  Pain intensity on average is 8 a scale of 0-10 without medications, 3/10 with medications.          Current pain treatments:   -Ibuprofen prn  -Tylenol 650mg prn  -CBD oil  -Lyrica 150mg BID  -Methocarbamol 500mg TID prn    Past pain treatments:  -none  -Lumbar RFA with 6 months relief, repeated on 9/9/19 without significant pain relief  -Lumbar facet injections with 2-3 months relief  -PT      Side Effects: none    Medications:  Current Outpatient Medications   Medication Sig Dispense Refill     Acetaminophen (TYLENOL PO) Take 325-650 mg by mouth 2 times daily as needed for mild pain or fever       amLODIPine (NORVASC) 10 MG tablet Take 1 tablet (10 mg) by mouth daily 90 tablet 3     aspirin EC 81 MG EC tablet Take 1 tablet (81 mg) by mouth daily 60 tablet 0     atorvastatin (LIPITOR) 80 MG tablet Take 1 tablet (80 mg) by mouth daily 90 tablet 1     cholecalciferol (VITAMIN D3) 1000 UNIT tablet Take 1 tablet  (1,000 Units) by mouth daily       Cyanocobalamin (B-12) 1000 MCG TBCR Take 1,000 mcg by mouth daily       FLUoxetine (PROZAC) 20 MG capsule Take 1 capsule (20 mg) by mouth daily 90 capsule 3     hydrochlorothiazide (HYDRODIURIL) 25 MG tablet Take 1 tablet (25 mg) by mouth daily 90 tablet 1     irbesartan (AVAPRO) 300 MG tablet Take 1 tablet (300 mg) by mouth daily 90 tablet 0     ketoconazole (NIZORAL) 2 % external cream Apply topically daily 60 g 0     levothyroxine (SYNTHROID/LEVOTHROID) 50 MCG tablet Take 1 tablet (50 mcg) by mouth daily 90 tablet 3     methocarbamol (ROBAXIN) 500 MG tablet Take 1 tablet (500 mg) by mouth 3 times daily as needed for muscle spasms Okay to dispense 12/28/2020 90 tablet 3     metoprolol succinate ER (TOPROL-XL) 25 MG 24 hr tablet Take 0.5 tablets (12.5 mg) by mouth daily 45 tablet 1     multivitamin, therapeutic with minerals (MULTI-VITAMIN) TABS tablet Take 1 tablet by mouth daily       nitroGLYcerin (NITROSTAT) 0.4 MG sublingual tablet TAKE 1 TABLET BY MOUTH EVERY 5 MIN AS NEEDED FOR CHEST PAIN 25 tablet 1     Omega-3 Fatty Acids (FISH OIL PO) Take 1 capsule by mouth daily       omeprazole (PRILOSEC) 20 MG DR capsule Take 1 capsule (20 mg) by mouth daily 90 capsule 3     pregabalin (LYRICA) 150 MG capsule Take 1 capsule (150 mg) by mouth 2 times daily 60 capsule 3     traZODone (DESYREL) 100 MG tablet Take 1 tablet (100 mg) by mouth At Bedtime 90 tablet 3       Medical History: any changes in medical history since they were last seen? No    Review of Systems:  ROS  positive for: back pain, arthritis,       Physical Exam:  /72   Pulse 52   SpO2 96%   Gen: NAD, pleasant  MSK: Lumbar ROM is wnl. Strength is 5/5 and symmetric in the lower extremities. SLR negative bilaterally.     BILLING TIME DOCUMENTATION:   The total TIME spent on this patient on the date of the encounter/appointment was 26 minutes.      TOTAL TIME includes:   Time spent preparing to see the patient  (reviewing records and tests)  Time spent face to face (or over the phone) with the patient   Time spent ordering tests, medications, procedures and referrals   Time spent documenting clinical information in Epic         DO [facility]

## 2021-07-07 ENCOUNTER — TELEPHONE (OUTPATIENT)
Dept: PALLIATIVE MEDICINE | Facility: CLINIC | Age: 71
End: 2021-07-07

## 2021-07-07 NOTE — TELEPHONE ENCOUNTER
Screening Questions for Radiology Injections:    Injection to be done at which interventional clinic site? [facility]    If [facility] location, tell patient that this procedure requires a COVID-19 lab test be done within 4 days of the procedure. Would you still like to move forward with scheduling the procedure?  Not Applicable   If YES, let patient know that someone will call them to schedule the COVID-19 test and that they will only receive a call back if the result is positive. Route to nursing to enter order.     Instruct patient to arrive as directed prior to the scheduled appointment time:    [place] minutes before      [place]: 30 minutes before; if IV needed 1 hour before     Procedure ordered by Neelam     Procedure ordered? L3-4 lumbar epidural steroid injection       Transforaminal Cervical RAMSES - no pain provider currently performing    As a reminder, receiving steroids can decrease your body's ability to fight infection.   Would you still like to move forward with scheduling the injection?  Yes    What insurance would patient like us to bill for this procedure? BCBS & medicare       Worker's comp or MVA (motor vehicle accident) -Any injection DO NOT SCHEDULE and route to [place].      [employer] insurance - For SI joint injections, DO NOT SCHEDULE and route [place].       ALL BCBS, [employer] and [employer]-Route to Ursula for review DO NOT SCHEDULE      IF SCHEDULING IN [place] AND NEEDS A PA, IT IS OKAY TO SCHEDULE. [place] HANDLES THEIR OWN PA'S AFTER THE PATIENT IS SCHEDULED. PLEASE SCHEDULE AT LEAST 1 WEEK OUT SO A PA CAN BE OBTAINED.    Any chance of pregnancy? Not Applicable   If YES, do NOT schedule and route to RN pool    Is an  needed? No     Patient has a drive home? (mandatory) YES: Informed     Is patient taking any blood thinners (i.e. plavix, coumadin, jantoven, warfarin, heparin, pradaxa or dabigatran, etc)? No   If hold needed, do NOT  schedule, route to RN pool     Is patient taking any aspirin products (includes Excedrin and Fiorinal)? Yes - Pt takes 81mg daily; instructed to hold 0 day(s) prior to procedure.      If more than 325mg/day, OK to schedule; Instruct pt to decrease to less than 325 mg for 7 days AND route to RN pool    For CERVICAL procedures, hold all aspirin products for 6 days.     Tell pt that if aspirin product is not held for 6 days, the procedure WILL BE cancelled.      Does the patient have a bleeding or clotting disorder? No     If YES, okay to schedule AND route to RN nurse pool    For any patients with platelet count <100, must be forwarded to provider    Any allergies to contrast dye, iodine, shellfish, or numbing and steroid medications? No    If YES, add allergy information to appointment notes AND route to the RN pool     If RAMSES and Contrast Dye / Iodine Allergy? DO NOT SCHEDULE, route to RN pool    Allergies: Ace inhibitors and No clinical screening - see comments     Is patient diabetic?  No  If YES, instruct them to bring their glucometer.    Does patient have an active infection or treated for one within the past week? No     Is patient currently taking any antibiotics?  No     For patients on chronic, preventative, or prophylactic antibiotics, procedures may be scheduled.     For patients on antibiotics for active or recent infection:antibiotic course must have been completed for 4 days    Is patient currently taking any steroid medications? (i.e. Prednisone, Medrol)  No     For patients on steroid medications, course must have been completed for 4 days    Is patient actively being treated for cancer or immunocompromised? No  If YES, do NOT schedule and route to RN pool     Are you able to get on and off an exam table with minimal or no assistance? Yes  If NO, do NOT schedule and route to RN pool    Are you able to roll over and lay on your stomach with minimal or no assistance? Yes  If NO, do NOT schedule and  route to RN pool     Has the patient had a flu shot or any other vaccinations within 7 days before or after the procedure.  No     Have you recently had a COVID vaccine or have plans to get it in the near future? No    If yes, explain that for the vaccine to work best they need to:       wait 1 week before and 1 week after getting Vaccine #1    wait 1 week before and 2 weeks after getting Vaccine #2    If patient has concerns about the timing, send to RN pool     Does patient have an MRI/CT?  YES: 2021  Check Procedure Scheduling Grid to see if required.      Was the MRI done within the last 3 years?  Yes    If yes, where was the MRI done i.e.[facility], [facility], [facility], [facility] etc?   [facility]     If no, do not schedule and route to RN pool    If MRI was not done at [facility], [facility] or [facility] do NOT schedule and route to RN pool.      If pt has an imaging disc, the injection MAY be scheduled but pt has to bring disc to appt.     If they show up without the disc the injection cannot be done    Procedure Specific Instructions:      If celiac plexus block, informed patient NPO for 6 hours and that it is okay to take medications with sips of water, especially blood pressure medications  Not Applicable         If this is for a cervical procedure, informed patient that aspirin needs to be held for 6 days.   Not Applicable      If IV needed:    Do not schedule procedures requiring IV placement in the first appointment of the day or first appointment after lunch. Do NOT schedule at 0745, 0815 or 1245.     Instructed pt to arrive 30 minutes early for IV start if required. (Check Procedure Scheduling Grid)  Not Applicable    Reminders:      If you are started on any steroids or antibiotics between now and your appointment, you must contact us because the procedure may need to be cancelled.  No      For all procedures except radiofrequency ablations (RFAs) and spinal cord stimulator (SCS) trials, informed  patient:    IV sedation is not provided for this procedure.  If you feel that an oral anti-anxiety medication is needed, you can discuss this further with your referring provider or primary care provider.  The Pain Clinic provider will discuss specifics of what the procedure includes at your appointment.  Most procedures last 10-20 minutes.  We use numbing medications to help with any discomfort during the procedure.  Not Applicable      For patients 85 or older we recommend having an adult stay w/ them for the remainder of the day.       Does the patient have any questions?  NO  Donna Trent  [facility]

## 2021-07-07 NOTE — TELEPHONE ENCOUNTER
PA pending: is this interlaminar or transforaminal? ( this is needed to determine which procedure code is being billed).          [place] HUFFMAN    [facility]  .

## 2021-07-07 NOTE — TELEPHONE ENCOUNTER
Interlaminar epidural, left paramedial approach at L3-4.    DO ANTONIA Tomlinson [facility]

## 2021-07-08 NOTE — TELEPHONE ENCOUNTER
Authorization Number: [id number]  Review Date: 7/8/2021 2:51:08 PM  Expiration Date: 1/4/2022  Status: Your case has been Approved.    Okay to schedule with Dr Neelam HUFFMAN    [facility]

## 2021-07-09 NOTE — TELEPHONE ENCOUNTER
Phoned pt no vm unable to Leave msg to schedule L3-4 lumbar epidural steroid injection         Rocael KNIGHT    [facility] [place]

## 2021-07-26 ENCOUNTER — RADIOLOGY INJECTION OFFICE VISIT (OUTPATIENT)
Dept: PALLIATIVE MEDICINE | Facility: CLINIC | Age: 71
End: 2021-07-26
Payer: COMMERCIAL

## 2021-07-26 VITALS — DIASTOLIC BLOOD PRESSURE: 67 MMHG | HEART RATE: 49 BPM | SYSTOLIC BLOOD PRESSURE: 109 MMHG | OXYGEN SATURATION: 95 %

## 2021-07-26 DIAGNOSIS — M54.50 BILATERAL LOW BACK PAIN WITHOUT SCIATICA, UNSPECIFIED CHRONICITY: ICD-10-CM

## 2021-07-26 DIAGNOSIS — M51.369 DDD (DEGENERATIVE DISC DISEASE), LUMBAR: Primary | ICD-10-CM

## 2021-07-26 PROCEDURE — 62323 NJX INTERLAMINAR LMBR/SAC: CPT | Performed by: PHYSICAL MEDICINE & REHABILITATION

## 2021-07-26 RX ORDER — TRIAMCINOLONE ACETONIDE 40 MG/ML
40 INJECTION, SUSPENSION INTRA-ARTICULAR; INTRAMUSCULAR ONCE
Status: COMPLETED | OUTPATIENT
Start: 2021-07-26 | End: 2021-07-26

## 2021-07-26 RX ADMIN — TRIAMCINOLONE ACETONIDE 40 MG: 40 INJECTION, SUSPENSION INTRA-ARTICULAR; INTRAMUSCULAR at 14:27

## 2021-07-26 NOTE — NURSING NOTE
Pre-procedure Intake    Have you been fasting? NA    If yes, for how long?     Are you taking a prescribed blood thinner such as coumadin, Plavix, Xarelto?    No    If yes, when did you take your last dose?     Do you take aspirin?   Yes- 81 mg    If cervical procedure, have you held aspirin for 6 days?   NA    Do you have any allergies to contrast dye, iodine, steroid and/or numbing medications?  NO    Are you currently taking antibiotics or have an active infection?  NO    Have you had a fever/elevated temperature within the past week? NO    Are you currently taking oral steroids? NO    Do you have a ? Yes       Are you pregnant or breastfeeding? NA    Have you received the COVID-19 vaccine? Yes     If yes, was it your 1st, 2nd or only dose needed? 2nd dose  May 2021      Notify provider and RNs if systolic BP >170, diastolic BP >100, P >100 or O2 sats < 90%

## 2021-07-26 NOTE — PATIENT INSTRUCTIONS
[facility] Procedure Discharge Instructions    Today you saw:      Dr. Jersey Richter    Your procedure:  Epidural steroid injection       Medications used:  Lidocaine (anesthetic)    Kenalog (steroid)  Omnipaque (contrast)    Normal Saline             Be cautious when walking as numbness and/or weakness in the legs may occur up to 6-8 hours after the procedure due to effect of the local anesthetic    Do not drive for 6 hours. The effect of the local anesthetic could slow your reflexes.     Avoid strenuous activity for the first 24 hours. You may resume your regular activities after that.     You may shower, however avoid swimming, tub baths or hot tubs for 24 hours following your procedure    You may have a mild to moderate increase in pain for several days following the injection.      You may use ice packs for 10-15 minutes, 3 to 4 times a day at the injection site for comfort    Do not use heat to painful areas for 6 to 8 hours. This will give the local anesthetic time to wear off and prevent you from accidentally burning your skin.    Unless you have been directed to avoid the use of anti-inflammatory medications (NSAIDS-ibuprofen, Aleve, Motrin), you may use these medications or Tylenol for pain control if needed.     With diabetes, check your blood sugar more frequently than usual as your blood sugar may be higher than normal for 10-14 days following a steroid injection. Contact your doctor who manages your diabetes if your blood sugar is higher than usual    Possible side effects of steroids that you may experience include flushing, elevated blood pressure, increased appetite, mild headaches and restlessness.  All of these symptoms will get better with time.    It may take up to 14 days for the steroid medication to start working although you may feel the effect as early as a few days after the procedure.     Follow up with your referring provider in 2-3 weeks      If you experience  any of the following, call the pain center line during work hours at [phone] or on-call physician after hours at [phone]:  -Fever over 100 degree F  -Swelling, bleeding, redness, drainage, warmth at the injection site  -Progressive weakness or numbness in your legs   -Loss of bowel or bladder function  -Unusual headache that is not relieved by Tylenol or your regular headache medication  -Unusual new onset of pain that is not improving

## 2021-07-26 NOTE — NURSING NOTE
Discharge Information    IV Discontiued Time:  NA    Amount of Fluid Infused:  NA    Discharge Criteria = When patient returns to baseline or as per MD order    Consciousness:  Pt is fully awake    Circulation:  BP +/- 20% of pre-procedure level    Respiration:  Patient is able to breathe deeply    O2 Sat:  Patient is able to maintain O2 Sat >92% on room air    Activity:  Moves 4 extremities on command    Ambulation:  Patient is able to stand and walk or stand and pivot into wheelchair    Dressing:  Clean/dry or No Dressing    Notes:   Discharge instructions and AVS given to patient    Patient meets criteria for discharge?  YES    Admitted to PCU?  No    Responsible adult present to accompany patient home?  Yes    Signature/Title:    Summer Singh RN Care Coordinator  [facility] [place]

## 2021-07-26 NOTE — PROGRESS NOTES
[facility] - Procedure Note    Date of Visit: 7/26/2021    Procedure performed: Lumbar 3-4 interlaminar epidural steroid injection  Diagnosis: Lumbar spondylosis; Lumbar DDD  : Jersey Richter DO   Anesthesia: none    Indications: Jose Moreno is a 71 year old male who is seen at the request of Dr. Richter for a lumbar epidural steroid injection. The patient describes chronic bilateral low back and bilateral buttock pain. The patient has been exhibiting symptoms consistent with lumbar intraspinal inflammation and degenerative disc disease. Symptoms have been persistent, disabling, and intermittently severe. The patient reports minimal improvement with conservative treatment, including oral medications and exercises.    Lumbar MRI was done on 4/12/21 which showed   T12-L1: No loss of disc height. No significant disc herniation. Normal  facets. No spinal canal or neural foraminal narrowing.      L1-L2: Mild loss of disc height and signal. Small left subarticular  and foraminal disc protrusion. Facet tropism with moderate asymmetric  left-sided facet hypertrophy with edema around the left facet joint.  No significant spinal canal narrowing. No right neural foraminal  narrowing. Moderate left neural foraminal narrowing.      L2-L3: Marked loss of disc height and signal asymmetric towards the  left with degenerative endplate changes. Circumferential disc bulge  with endplate osteophytic spurring more pronounced towards the left  foraminal and far lateral region. No significant facet hypertrophy. No  significant spinal canal narrowing. No right neural foraminal  narrowing. Mild left neural foraminal narrowing.      L3-L4: Moderate loss of disc height and signal. Posterior disc bulge  with endplate osteophytic spurring. Superimposed small central disc  extrusion which extends partially above the disc level. Facet tropism  with moderate facet hypertrophy. No significant spinal  canal  narrowing. Moderate right neural foraminal narrowing. Mild left neural  foraminal narrowing.      L4-L5: Marked loss of disc height and signal asymmetric towards the  right with degenerative endplate changes. Disc bulge with endplate  osteophytic spurring from the right subarticular through right  foraminal and far lateral region. Facet tropism. Moderate facet  hypertrophy. No significant spinal canal narrowing; however, there is  narrowing of the right lateral recess which could affect the right L5  nerve root. Moderate to severe right neural foraminal narrowing. Mild  left neural foraminal narrowing.     L5-S1: Marked loss of disc height and signal. Circumferential disc  bulge with endplate osteophytic spurring. Moderate bilateral facet  hypertrophy. No significant spinal canal narrowing. Mild right neural  foraminal narrowing. Moderate left neural foraminal narrowing.      Paraspinous soft tissues: Unremarkable.                                                                       IMPRESSION:    1. Marked multilevel degenerative changes throughout the lumbar spine  with scoliotic curvature of the lumbar spine as detailed above.  2. New edema in and around the degenerated left L1-L2 facet joint  compared to previous MR 2/9/2019. This could be a source of pain. The  left-sided facet hypertrophy at L1-L2 contributes to moderate left  neural foraminal narrowing which is increased since 2/9/2019.  3. Other degenerative changes throughout the lumbar spine appear  similar to prior MR 2/9/2019.     AGA WHATLEY MD    Allergies:      Allergies   Allergen Reactions     Ace Inhibitors Cough     Dry cough     No Clinical Screening - See Comments      PN: LW FI1: NKA        Vitals:  /67 (BP Location: Left arm, Cuff Size: Adult Regular)   Pulse (!) 49   SpO2 95%     Review of Systems: The patient denies recent fever, chills, illness, use of antibiotics or anticoagulants. All other 10-point review of systems  negative.     Procedure: The procedure and risks were explained, and informed written consent was obtained from the patient. Risks include but are not limited to: infection, bleeding, increased pain, and damage to soft tissue, nerve, muscle, and vasculature structures. After getting informed consent, patient was brought into the procedure suite and was placed in a prone position on the procedure table. A Pause for the Cause was performed. Patient was prepped and draped in sterile fashion.     The 3-4 interspace was identified with use of fluoroscopy in AP view. A 25-gauge, 1.5 inch needle was used to anesthetize the skin and subcutaneous tissue entry site with a total of 2 ml of 1% lidocaine. Under fluoroscopic visualization, a 22-gauge, 4.5 inch Tuohy epidural needle was slowly advanced towards the epidural space a few millimeters right-sided of midline. The latter part of the needle advancement was guided with fluoroscopy in the lateral view. The epidural space was identified using loss of resistance technique. After negative aspiration for heme and cerebrospinal fluid, a total of 1 mL of non-ionic contrast was injected to confirm needle placement with 9 mL of contrast wasted. Epidurogram confirmed spread within the posterior epidural space. 1 ml of 40mg/ml of triamcinolone, 1 ml of 1% lidocaine, and 3 ml of preservative free saline was injected. The needle was removed.  Images were saved to PACS.    The patient tolerated the procedure well, and there was no evidence of procedural complications. No new sensory or motor deficits were noted following the procedure. The patient was stable and able to ambulate on discharge home. Post-procedure instructions were provided.     Pre-procedure pain score: 1/10 in the back, 1/10 in the leg  Post-procedure pain score: 1/10 in the back, 1/10 in the leg    Assessment/Plan: Jose Moreno is a 71 year old male s/p lumbar interlaminar epidural steroid injection today for lumbar  spondylosis and radiculitis/radiculopathy.     1. Following today's procedure, the patient was advised to contact the [facility] for any of the following:   Fever, chills, or night sweats   New onset of pain, numbness, or weakness   Any questions/concerns regarding the procedure  If unable to contact the Pain Center, the patient was instructed to go to a local Emergency Room for any complications.   2. Follow-up with the referring provider in 2-4 weeks for post-procedure evaluation.        Jersey Richter DO  [facility] [place]

## 2021-08-24 ENCOUNTER — OFFICE VISIT (OUTPATIENT)
Dept: PALLIATIVE MEDICINE | Facility: CLINIC | Age: 71
End: 2021-08-24
Payer: COMMERCIAL

## 2021-08-24 VITALS
HEART RATE: 56 BPM | OXYGEN SATURATION: 98 % | WEIGHT: 149.1 LBS | SYSTOLIC BLOOD PRESSURE: 104 MMHG | DIASTOLIC BLOOD PRESSURE: 74 MMHG | BODY MASS INDEX: 24.07 KG/M2

## 2021-08-24 DIAGNOSIS — M51.369 DDD (DEGENERATIVE DISC DISEASE), LUMBAR: Primary | ICD-10-CM

## 2021-08-24 DIAGNOSIS — M47.816 SPONDYLOSIS OF LUMBAR REGION WITHOUT MYELOPATHY OR RADICULOPATHY: ICD-10-CM

## 2021-08-24 PROCEDURE — 99213 OFFICE O/P EST LOW 20 MIN: CPT | Performed by: PHYSICAL MEDICINE & REHABILITATION

## 2021-08-24 ASSESSMENT — PAIN SCALES - GENERAL: PAINLEVEL: NO PAIN (1)

## 2021-08-24 NOTE — PATIENT INSTRUCTIONS
1. You can take ibuprofen 400-600mg 3-4 times weekly max.    2. You can continue tylenol 650mg three times daily as needed.    3. You can call to request a repeat epidural, call the number below. We can repeat this procedure every 3+ months as needed.    4. Follow up as needed in clinic.    Take care,    Jersey Richter [facility]        ----------------------------------------------------------------  Clinic Number:  [phone]     Call with any questions about your care and for scheduling assistance.     Calls are returned Monday through Friday between 8 AM and 4:30 PM. We usually get back to you within 2 business days depending on the issue/request.    If we are prescribing your medications:    For opioid medication refills, call the clinic or send a [employer] message 7 days in advance.  Please include:    Name of requested medication    Name of the pharmacy.    For non-opioid medications, call your pharmacy directly to request a refill. Please allow 3-4 days to be processed.     Per [place] State Law:    All controlled substance prescriptions must be filled within 30 days of being written.      For those controlled substances allowing refills, pickup must occur within 30 days of last fill.      We believe regular attendance is key to your success in our program!      Any time you are unable to keep your appointment we ask that you call us at least 24 hours in advance to cancel.This will allow us to offer the appointment time to another patient.     Multiple missed appointments may lead to dismissal from the clinic.

## 2021-08-24 NOTE — PROGRESS NOTES
[facility]    Date of visit: 21    Assessment:  Mr. Moreno is a 71 year old with past medical history including: CAD (nstemi, s/p PTCA), HTN, mitral valve repair, mitral regurgitation, HLD, Hypothyroidism, GERD, Prostate cancer (s/p prostatectomy in 2018, ongoing urinary incontinence) who presents for follow up for the followin. Chronic low back pain: Symptoms are multifactorial including: Lumbar DDD, spinal stenosis and scoliosis: They have had gradual worsening in pain over the past year and symptoms have not improved with repeat RFA and facet injections which previously were helpful. Repeat MRI shows worsening lumbar ddd and spinal stenosis. Patient has had symptoms consistent with neurological claudication in the past year as well. On exam he is neurologically intact with no limitations with ROM. SLR negative bilaterally. Lumbar epidural steroid injection completed on 21 with significant pain relief to date.        Plan:  The following recommendations were given to the patient. Diagnosis, treatment options, risks, benefits, and alternatives were discussed, and all questions were answered. The patient expressed understanding of the plan for management.     1. Physical Therapy: continue exercises recommended by PT  2. Clinical Health Pain Psychologist: Coping well, defer at this time.  3. Self Care Recommendations:   1. Continue prior yoga regimen.  2. Recommended daily morning stretches for his low back and hips.  4. Diagnostic Studies: Lumbar MRI ordered  5. Medication Management:    1. Patient stopped lyrica on their own, not interested in restarting at this time.  2. Continue methocarbamol 500mg TID prn  3. Continue tylenol 650mg four times daily as needed.  4. Continue Ibuprofen 400mg twice daily as needed, avoid taking this more than 3 days/week.  5. Continue Norco 5-325mg q6h prn, max 2/day.   6. CSA and UDS completed 21.  6. Further  procedures recommended: lumbar epidural steroid injection can be repeated every 3+ months as needed.  7. Follow up: as needed      [facility]             Chief complaint:   Chief Complaint   Patient presents with     Pain       Interval history:  Jose Moreno is a 70 year old male last seen by me on 7/26/21.          Since his last visit, Jose Moreno reports:    -They had an lumbar epidural steroid injection on 7/25/21 with moderate improvement in pain. At this time the pain typically occurs only when he is on his feet too long.    -Previously he would have pain while in bed in the same position too long and even with lesser amounts of activity.    -The sharp stabbing pains have improved. He does get achy if he is too active for a while.    -He did use a couple tablets of norco prior to the lumbar epidural steroid injection but hasn't needed it since.    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      Pain scores:  Pain intensity on average is 1-2 a scale of 0-10 without medications.       Current pain treatments:   -Ibuprofen prn  -Tylenol 650mg prn  -CBD oil  -Methocarbamol 500mg TID prn    Past pain treatments:  -none  -Lumbar RFA with 6 months relief, repeated on 9/9/19 without significant pain relief  -Lumbar facet injections with 2-3 months relief  -PT  -Lyrica 150mg BID    Side Effects: none    Medications:  Current Outpatient Medications   Medication Sig Dispense Refill     Acetaminophen (TYLENOL PO) Take 325-650 mg by mouth 2 times daily as needed for mild pain or fever       amLODIPine (NORVASC) 10 MG tablet Take 1 tablet (10 mg) by mouth daily 90 tablet 3     aspirin EC 81 MG EC tablet Take 1 tablet (81 mg) by mouth daily 60 tablet 0     atorvastatin (LIPITOR) 80 MG tablet Take 1 tablet (80 mg) by mouth daily 90 tablet 1     cholecalciferol (VITAMIN D3) 1000 UNIT tablet Take 1 tablet (1,000 Units) by mouth daily        Cyanocobalamin (B-12) 1000 MCG TBCR Take 1,000 mcg by mouth daily       FLUoxetine (PROZAC) 20 MG capsule Take 1 capsule (20 mg) by mouth daily 90 capsule 3     hydrochlorothiazide (HYDRODIURIL) 25 MG tablet Take 1 tablet (25 mg) by mouth daily 90 tablet 1     HYDROcodone-acetaminophen (NORCO) 5-325 MG tablet Take 1 tablet by mouth every 6 hours as needed for moderate to severe pain or severe pain (max 2/day.) Dispense and start 7/6/21. 60 tablet 0     irbesartan (AVAPRO) 300 MG tablet Take 1 tablet (300 mg) by mouth daily 90 tablet 0     ketoconazole (NIZORAL) 2 % external cream Apply topically daily 60 g 0     levothyroxine (SYNTHROID/LEVOTHROID) 50 MCG tablet Take 1 tablet (50 mcg) by mouth daily 90 tablet 3     methocarbamol (ROBAXIN) 500 MG tablet Take 1 tablet (500 mg) by mouth 3 times daily as needed for muscle spasms Okay to dispense 12/28/2020 90 tablet 3     metoprolol succinate ER (TOPROL-XL) 25 MG 24 hr tablet Take 0.5 tablets (12.5 mg) by mouth daily 45 tablet 1     multivitamin, therapeutic with minerals (MULTI-VITAMIN) TABS tablet Take 1 tablet by mouth daily       nitroGLYcerin (NITROSTAT) 0.4 MG sublingual tablet TAKE 1 TABLET BY MOUTH EVERY 5 MIN AS NEEDED FOR CHEST PAIN 25 tablet 1     Omega-3 Fatty Acids (FISH OIL PO) Take 1 capsule by mouth daily       omeprazole (PRILOSEC) 20 MG DR capsule Take 1 capsule (20 mg) by mouth daily 90 capsule 3     pregabalin (LYRICA) 150 MG capsule Take 1 capsule (150 mg) by mouth 2 times daily 60 capsule 3     traZODone (DESYREL) 100 MG tablet Take 1 tablet (100 mg) by mouth At Bedtime 90 tablet 3       Medical History: any changes in medical history since they were last seen? No    Review of Systems:  ROS  positive for: back pain, arthritis,       Physical Exam:  Pulse 56   Wt 67.6 kg (149 lb 1.6 oz)   SpO2 98%   BMI 24.07 kg/m    Gen: NAD, pleasant  MSK: Lumbar ROM is wnl. Strength is 5/5 and symmetric in the lower extremities. SLR negative  bilaterally.       BILLING TIME DOCUMENTATION:   The total TIME spent on this patient on the date of the encounter/appointment was 27 minutes.      TOTAL TIME includes:   Time spent preparing to see the patient (reviewing records and tests)  Time spent face to face (or over the phone) with the patient   Time spent ordering tests, medications, procedures and referrals   Time spent documenting clinical information in Epic         Jersey Richter DO  [facility]

## 2021-08-25 DIAGNOSIS — M54.50 CHRONIC LEFT-SIDED LOW BACK PAIN WITHOUT SCIATICA: ICD-10-CM

## 2021-08-25 DIAGNOSIS — G89.29 CHRONIC LEFT-SIDED LOW BACK PAIN WITHOUT SCIATICA: ICD-10-CM

## 2021-08-25 RX ORDER — PREGABALIN 150 MG/1
150 CAPSULE ORAL 2 TIMES DAILY
Qty: 60 CAPSULE | Refills: 3 | Status: SHIPPED | OUTPATIENT
Start: 2021-08-25 | End: 2022-01-25

## 2021-08-25 NOTE — TELEPHONE ENCOUNTER
Received fax from pharmacy requesting refill(s) for pregabalin (LYRICA) 150 MG capsule     Last refilled on 07/13/21    Pt last seen on 08/24/21  Next appt scheduled for None    E-prescribe to:  [facility] #[id number] - [place], [place] - [address] [address]     Will facilitate refill.      Judie Woods MA  [facility] [place]

## 2021-08-26 DIAGNOSIS — I10 BENIGN ESSENTIAL HYPERTENSION: ICD-10-CM

## 2021-08-26 DIAGNOSIS — M47.816 SPONDYLOSIS OF LUMBAR REGION WITHOUT MYELOPATHY OR RADICULOPATHY: ICD-10-CM

## 2021-08-26 DIAGNOSIS — E78.2 MIXED HYPERLIPIDEMIA: ICD-10-CM

## 2021-08-26 RX ORDER — ATORVASTATIN CALCIUM 80 MG/1
80 TABLET, FILM COATED ORAL DAILY
Qty: 90 TABLET | Refills: 0 | Status: SHIPPED | OUTPATIENT
Start: 2021-08-26 | End: 2021-09-30

## 2021-08-26 RX ORDER — METHOCARBAMOL 500 MG/1
500 TABLET, FILM COATED ORAL 3 TIMES DAILY PRN
Qty: 90 TABLET | Refills: 3 | Status: SHIPPED | OUTPATIENT
Start: 2021-08-26 | End: 2021-12-29

## 2021-08-26 RX ORDER — METOPROLOL SUCCINATE 25 MG/1
12.5 TABLET, EXTENDED RELEASE ORAL DAILY
Qty: 45 TABLET | Refills: 0 | Status: SHIPPED | OUTPATIENT
Start: 2021-08-26 | End: 2021-09-30

## 2021-08-26 NOTE — LETTER
August 26, 2021       TO: Jose Moreno  [id number] [facility] [address]       Dear Jose Moreno,    We recently received a call from your pharmacy requesting a refill of your Atorastatin.    Our records indicate that you are due for follow-up with your Heart Care Provider. We will refill your medications for 3 months which will allow you enough time to be seen.    Please call [phone] to schedule your appointment.    Thank you for allowing [facility] to be a part of your health care team and we look forward to seeing you soon.    Thank you,    [facility]

## 2021-08-26 NOTE — TELEPHONE ENCOUNTER
Received fax request from [facility] pharmacy requesting refill(s) for methocarbamol (ROBAXIN) 500 MG tablet    Last refilled on 07/12/21    Pt last seen on 08/24/21  Next appt scheduled for : none    Will facilitate refill.

## 2021-08-26 NOTE — TELEPHONE ENCOUNTER
Received refill request for:  Atorvastatin and Metoprolol succinate  Last OV was: 9/24/2020 with APatnoe, CNP  Labs/EKG: last lipid 8/12/2020  F/U scheduled: overdue orders in Epic.  Note to pharmacy and letter sent  New script sent to: [facility]

## 2021-08-29 NOTE — TELEPHONE ENCOUNTER
Pt had CPX on 02/27/20 with SRB. This was not noted, however PHQ9 and GAD7 were done with a score of 0. Pt will be due back in September for thyroid recheck. Stevie advise for SRB.    Jose Moreno is requesting a refill of:    Pending Prescriptions:                       Disp   Refills    FLUoxetine (PROZAC) 20 MG capsule         90 cap*0            Sig: Take 1 capsule (20 mg) by mouth daily       The patient is Stable - Low risk of patient condition declining or worsening    Shift Goals  Clinical Goals: maintain blood pressure  Patient Goals: Rest  Family Goals: FAN    Progress made toward(s) clinical / shift goals:  Blood pressure maintained within parameters. Pharmacological intervention in place. Provided quiet and calm environment for patient comfort and rest.     Patient is not progressing towards the following goals:

## 2021-09-01 DIAGNOSIS — I10 ESSENTIAL HYPERTENSION, BENIGN: ICD-10-CM

## 2021-09-01 RX ORDER — IRBESARTAN 300 MG/1
300 TABLET ORAL DAILY
Qty: 90 TABLET | Refills: 0 | Status: SHIPPED | OUTPATIENT
Start: 2021-09-01 | End: 2021-09-30

## 2021-09-01 NOTE — TELEPHONE ENCOUNTER
Received refill request for:  Irbesartan  Last OV was: 9/24/2020 with APatnoe, CNP  Labs/EKG: last BMP 9/23/2020  F/U scheduled: overdue orders in Epic.  Letter sent 8/26/21  [facility] script [facility]: [facility]

## 2021-09-13 NOTE — TELEPHONE ENCOUNTER
Denied refill request for Trazodone. Pt last seen 11/2/2020. He is due for an annual visit/med check.  I will send a [employer] message to inform pt.

## 2021-09-14 ENCOUNTER — TELEPHONE (OUTPATIENT)
Dept: PALLIATIVE MEDICINE | Facility: CLINIC | Age: 71
End: 2021-09-14

## 2021-09-14 DIAGNOSIS — M51.369 DDD (DEGENERATIVE DISC DISEASE), LUMBAR: ICD-10-CM

## 2021-09-14 DIAGNOSIS — M48.061 SPINAL STENOSIS OF LUMBAR REGION WITHOUT NEUROGENIC CLAUDICATION: Primary | ICD-10-CM

## 2021-09-14 NOTE — TELEPHONE ENCOUNTER
patient called and stated he is wanting another injection.        Routing to nursing to review         Donna Trent    [facility]

## 2021-09-15 ENCOUNTER — OFFICE VISIT (OUTPATIENT)
Dept: FAMILY MEDICINE | Facility: CLINIC | Age: 71
End: 2021-09-15

## 2021-09-15 VITALS
HEIGHT: 66 IN | SYSTOLIC BLOOD PRESSURE: 108 MMHG | TEMPERATURE: 97.3 F | HEART RATE: 57 BPM | OXYGEN SATURATION: 98 % | BODY MASS INDEX: 24.11 KG/M2 | WEIGHT: 150 LBS | DIASTOLIC BLOOD PRESSURE: 68 MMHG

## 2021-09-15 DIAGNOSIS — I10 ESSENTIAL HYPERTENSION, BENIGN: ICD-10-CM

## 2021-09-15 DIAGNOSIS — F51.02 ADJUSTMENT INSOMNIA: ICD-10-CM

## 2021-09-15 DIAGNOSIS — I25.2 HISTORY OF NON-ST ELEVATION MYOCARDIAL INFARCTION (NSTEMI): Primary | ICD-10-CM

## 2021-09-15 DIAGNOSIS — E03.4 HYPOTHYROIDISM DUE TO ACQUIRED ATROPHY OF THYROID: ICD-10-CM

## 2021-09-15 DIAGNOSIS — F33.42 MAJOR DEPRESSIVE DISORDER, RECURRENT EPISODE, IN FULL REMISSION (H): ICD-10-CM

## 2021-09-15 DIAGNOSIS — N39.45 CONTINUOUS LEAKAGE OF URINE: ICD-10-CM

## 2021-09-15 PROBLEM — I47.10 SUPRAVENTRICULAR TACHYCARDIA (H): Status: ACTIVE | Noted: 2021-09-15

## 2021-09-15 LAB
ALBUMIN SERPL-MCNC: 4.4 G/DL (ref 3.6–5.1)
ALBUMIN/GLOB SERPL: 1.5 {RATIO} (ref 1–2.5)
ALP SERPL-CCNC: 46 U/L (ref 33–130)
ALT 1742-6: 30 U/L (ref 0–32)
AST 1920-8: 31 U/L (ref 0–35)
BILIRUB SERPL-MCNC: 0.8 MG/DL (ref 0.2–1.2)
BUN SERPL-MCNC: 20 MG/DL (ref 7–25)
BUN/CREATININE RATIO: 20.8 (ref 6–22)
CALCIUM SERPL-MCNC: 9.5 MG/DL (ref 8.6–10.3)
CHLORIDE SERPLBLD-SCNC: 101.4 MMOL/L (ref 98–110)
CHOLEST SERPL-MCNC: 169 MG/DL (ref 0–199)
CHOLEST/HDLC SERPL: 3 {RATIO} (ref 0–5)
CO2 SERPL-SCNC: 26.8 MMOL/L (ref 20–32)
CREAT SERPL-MCNC: 0.96 MG/DL (ref 0.6–1.3)
GLOBULIN, CALCULATED - QUEST: 2.9 (ref 1.9–3.7)
GLUCOSE SERPL-MCNC: 106 MG/DL (ref 60–99)
HDLC SERPL-MCNC: 62 MG/DL (ref 40–150)
LDLC SERPL CALC-MCNC: 88 MG/DL (ref 0–130)
POTASSIUM SERPL-SCNC: 4.24 MMOL/L (ref 3.5–5.3)
PROT SERPL-MCNC: 7.3 G/DL (ref 6.1–8.1)
SODIUM SERPL-SCNC: 138.3 MMOL/L (ref 135–146)
TRIGL SERPL-MCNC: 96 MG/DL (ref 0–149)

## 2021-09-15 PROCEDURE — 80053 COMPREHEN METABOLIC PANEL: CPT | Performed by: PHYSICIAN ASSISTANT

## 2021-09-15 PROCEDURE — 99213 OFFICE O/P EST LOW 20 MIN: CPT | Performed by: PHYSICIAN ASSISTANT

## 2021-09-15 PROCEDURE — 80061 LIPID PANEL: CPT | Performed by: PHYSICIAN ASSISTANT

## 2021-09-15 PROCEDURE — 36415 COLL VENOUS BLD VENIPUNCTURE: CPT | Performed by: PHYSICIAN ASSISTANT

## 2021-09-15 PROCEDURE — 84443 ASSAY THYROID STIM HORMONE: CPT | Mod: 90 | Performed by: PHYSICIAN ASSISTANT

## 2021-09-15 RX ORDER — TRIAMCINOLONE ACETONIDE 1 MG/G
CREAM TOPICAL
COMMUNITY
Start: 2021-02-22 | End: 2023-02-17

## 2021-09-15 RX ORDER — TRAZODONE HYDROCHLORIDE 100 MG/1
100 TABLET ORAL AT BEDTIME
Qty: 90 TABLET | Refills: 3 | Status: SHIPPED | OUTPATIENT
Start: 2021-09-15 | End: 2022-08-17

## 2021-09-15 RX ORDER — HYDROCHLOROTHIAZIDE 25 MG/1
25 TABLET ORAL DAILY
Qty: 90 TABLET | Refills: 3 | Status: SHIPPED | OUTPATIENT
Start: 2021-09-15 | End: 2022-10-17

## 2021-09-15 RX ORDER — LEVOTHYROXINE SODIUM 50 UG/1
50 TABLET ORAL DAILY
Qty: 90 TABLET | Refills: 3 | Status: SHIPPED | OUTPATIENT
Start: 2021-09-15 | End: 2022-08-17

## 2021-09-15 ASSESSMENT — ANXIETY QUESTIONNAIRES
3. WORRYING TOO MUCH ABOUT DIFFERENT THINGS: NOT AT ALL
7. FEELING AFRAID AS IF SOMETHING AWFUL MIGHT HAPPEN: NOT AT ALL
6. BECOMING EASILY ANNOYED OR IRRITABLE: NOT AT ALL
2. NOT BEING ABLE TO STOP OR CONTROL WORRYING: NOT AT ALL
5. BEING SO RESTLESS THAT IT IS HARD TO SIT STILL: NOT AT ALL
1. FEELING NERVOUS, ANXIOUS, OR ON EDGE: NOT AT ALL
GAD7 TOTAL SCORE: 0
IF YOU CHECKED OFF ANY PROBLEMS ON THIS QUESTIONNAIRE, HOW DIFFICULT HAVE THESE PROBLEMS MADE IT FOR YOU TO DO YOUR WORK, TAKE CARE OF THINGS AT HOME, OR GET ALONG WITH OTHER PEOPLE: NOT DIFFICULT AT ALL

## 2021-09-15 ASSESSMENT — PATIENT HEALTH QUESTIONNAIRE - PHQ9
5. POOR APPETITE OR OVEREATING: NOT AT ALL
SUM OF ALL RESPONSES TO PHQ QUESTIONS 1-9: 0

## 2021-09-15 ASSESSMENT — MIFFLIN-ST. JEOR: SCORE: 1368.15

## 2021-09-15 NOTE — PROGRESS NOTES
"CC: Medication Check    History:  Hypothyroid:  Stable on current dose of levothyroxine 50 mcg daily. Denies any symptoms of being over or under supplemented. Last thyroid blood tests 11/2020 and were normal without change. Takes medication first thing in the morning with glass of water 30-60 minutes before eating.     Depression:  Takes fluoxetine 20 mg daily. Feels like this works well to control symptoms. No side effects. Does have some insomnia where it is difficult to stay asleep, so takes trazodone which works. Does not see therapist.     GERD:  Taking omeprazole 20 mg daily. This works well. Takes this every morning.     HTN:  Takes hydrochlorothiazide 25 mg daily. Does not check BP at home. No side effects. Has been biking regularly, which seems to have help with BP. No chest pain, palpitations.     PMH, MEDICATIONS, ALLERGIES, SOCIAL AND FAMILY HISTORY in [facility] and reviewed by me personally.    ROS negative other than the symptoms noted above in the HPI.    Examination   /68 (BP Location: Left arm, Patient Position: Sitting, Cuff Size: Adult Regular)   Pulse 57   Temp 97.3  F (36.3  C) (Temporal)   Ht 1.676 m (5' 6\")   Wt 68 kg (150 lb)   SpO2 98%   BMI 24.21 kg/m       Constitutional: Sitting comfortably, in no acute distress. Vital signs noted  Neck:  no adenopathy, trachea midline and normal to palpation, thyroid normal to palpation  Cardiovascular:  regular rate and rhythm, no murmurs, clicks, or gallops  Respiratory:  normal respiratory rate and rhythm, lungs clear to auscultation  SKIN: No jaundice/pallor/rash.   Psychiatric: mentation appears normal and affect normal/bright        A/P    ICD-10-CM    1. History of non-ST elevation myocardial infarction (NSTEMI)  I25.2 VENOUS COLLECTION     Comprehensive Metobolic Panel (BFP)     Lipid Panel (BFP)   2. Major depressive disorder, recurrent episode, in full remission (H)  F33.42 FLUoxetine (PROZAC) 20 MG capsule   3. Essential hypertension, " benign  I10 hydrochlorothiazide (HYDRODIURIL) 25 MG tablet     VENOUS COLLECTION     Comprehensive Metobolic Panel (BFP)   4. Hypothyroidism due to acquired atrophy of thyroid  E03.4 levothyroxine (SYNTHROID/LEVOTHROID) 50 MCG tablet     VENOUS COLLECTION     TSH WITH FREE T4 REFLEX ([facility])   5. Continuous leakage of urine  N39.45 traZODone (DESYREL) 100 MG tablet   6. Adjustment insomnia  F51.02 traZODone (DESYREL) 100 MG tablet       DISCUSSION:  Hypothyroid:  Will recheck TSH and refill current dose of levothyroxine for 1 year, unless dose change is indicated.     Depression:  Updated PHQ/CARL today. Doing well. Will refill for 1 year.     GERD:  Continue PPI therapy indefinitely.     HTN:  BP today is controlled. Will update CMP, and send MyChart with results.     CAD:  Schedule annual f/u with cardiology.     follow up visit: 1 year    Raysa Carolina PA-C  [facility]

## 2021-09-15 NOTE — NURSING NOTE
Chief Complaint   Patient presents with     Recheck Medication     fasting med check, also check PSA         Pre-visit Screening:  Immunizations:  up to date  Colonoscopy:  is up to date  Mammogram: NA  Asthma Action Test/Plan:  NA  PHQ9:  Done today  GAD7:  Done today  Questioned patient about current smoking habits Pt. has never smoked.  Ok to leave detailed message on voice mail for today's visit only Yes, phone # cell

## 2021-09-15 NOTE — LETTER
My Depression Action Plan  Name: Jose Moreno   Date of Birth 1950  Date: 9/15/2021    My doctor: Raysa Carolina   My clinic: [facility]  [address] [place] [address]  [address]  [facility] [id number] [phone]          GREEN    ZONE   Good Control    What it looks like:     Things are going generally well. You have normal ups and downs. You may even feel depressed from time to time, but bad moods usually last less than a day.   What you need to do:  1. Continue to care for yourself (see self care plan)  2. Check your depression survival kit and update it as needed  3. Follow your physician s recommendations including any medication.  4. Do not stop taking medication unless you consult with your physician first.           YELLOW         ZONE Getting Worse    What it looks like:     Depression is starting to interfere with your life.     It may be hard to get out of bed; you may be starting to isolate yourself from others.    Symptoms of depression are starting to last most all day and this has happened for several days.     You may have suicidal thoughts but they are not constant.   What you need to do:     1. Call your care team. Your response to treatment will improve if you keep your care team informed of your progress. Yellow periods are signs an adjustment may need to be made.     2. Continue your self-care.  Just get dressed and ready for the day.  Don't give yourself time to talk yourself out of it.    3. Talk to someone in your support network.    4. Open up your Depression Self-Care Plan/Wellness Kit.           RED    ZONE Medical Alert - Get Help    What it looks like:     Depression is seriously interfering with your life.     You may experience these or other symptoms: You can t get out of bed most days, can t work or engage in other necessary activities, you have trouble taking care of basic hygiene, or basic responsibilities, thoughts of suicide or death  that will not go away, self-injurious behavior.     What you need to do:  1. Call your care team and request a same-day appointment. If they are not available (weekends or after hours) call your local crisis line, emergency room or 911.          Depression Self-Care Plan / Wellness Kit    Many people find that medication and therapy are helpful treatments for managing depression. In addition, making small changes to your everyday life can help to boost your mood and improve your wellbeing. Below are some tips for you to consider. Be sure to talk with your medical provider and/or behavioral health consultant if your symptoms are worsening or not improving.     Sleep   Sleep hygiene  means all of the habits that support good, restful sleep. It includes maintaining a consistent bedtime and wake time, using your bedroom only for sleeping or sex, and keeping the bedroom dark and free of distractions like a computer, smartphone, or television.     Develop a Healthy Routine  Maintain good hygiene. Get out of bed in the morning, make your bed, brush your teeth, take a shower, and get dressed. Don t spend too much time viewing media that makes you feel stressed. Find time to relax each day.    Exercise  Get some form of exercise every day. This will help reduce pain and release endorphins, the  feel good  chemicals in your brain. It can be as simple as just going for a walk or doing some gardening, anything that will get you moving.      Diet  Strive to eat healthy foods, including fruits and vegetables. Drink plenty of water. Avoid excessive sugar, caffeine, alcohol, and other mood-altering substances.     Stay Connected with Others  Stay in touch with friends and family members.    Manage Your Mood  Try deep breathing, massage therapy, biofeedback, or meditation. Take part in fun activities when you can. Try to find something to smile about each day.     Psychotherapy  Be open to working with a therapist if your provider  recommends it.     Medication  Be sure to take your medication as prescribed. Most anti-depressants need to be taken every day. It usually takes several weeks for medications to work. Not all medicines work for all people. It is important to follow-up with your provider to make sure you have a treatment plan that is working for you. Do not stop your medication abruptly without first discussing it with your provider.    Crisis Resources   These hotlines are for both adults and children. They and are open 24 hours a day, 7 days a week unless noted otherwise.      National Suicide Prevention Lifeline   5-642-639-CDCA (5808)      Crisis Text Line    www.crisistextline.org  Text HOME to 932538 from anywhere in the [place], anytime, about any type of crisis. A live, trained crisis counselor will receive the text and respond quickly.      [facility] Lifeline for LGBTQ Youth  A national crisis intervention and suicide lifeline for LGBTQ youth under 25. Provides a safe place to talk without judgement. Call 1-122.953.2828; text START to 931790 or visit www.thetrevorproject.org to talk to a trained counselor.      For [facility] crisis numbers, visit the [facility] website at:  https://[place].gov/dhs/people-we-serve/adults/health-care/mental-health/resources/crisis-contacts.jsp

## 2021-09-16 LAB — TSH SERPL-ACNC: 1.9 MIU/L (ref 0.4–4.5)

## 2021-09-16 ASSESSMENT — ANXIETY QUESTIONNAIRES: GAD7 TOTAL SCORE: 0

## 2021-09-16 NOTE — TELEPHONE ENCOUNTER
Called pt. He states that his back pain is same areas of pain and same type of pain as prior to July injection. Injection was helpful but is starting to wear off. They are traveling -leaving on October 15th and he would like to have this repeated prior so he can have pain relief while at his destination. He is aware  He is earlier than 3 months but was hoping to have this completed. Routing to provider to review.      OK to call both numbers listed to schedule/call back- ok to leave detailed messages   -----------------------  Chart review:  Per 08/24/21 OV:  6. Further procedures recommended: lumbar epidural steroid injection can be repeated every 3+ months as needed.      Date of Visit: 7/26/2021   Procedure performed: Lumbar 3-4 interlaminar epidural steroid     The patient describes chronic bilateral low back and bilateral buttock pain.

## 2021-09-17 NOTE — TELEPHONE ENCOUNTER
Please call pt and schedule after 10/1- Note: pt leaving for vacation 10/15    Katia MOSQUERA, RN Care Coordinator  [facility]  Pain Management

## 2021-09-17 NOTE — TELEPHONE ENCOUNTER
Repeat injection ordered, to be scheduled after 10/1/21.    Jersey Richter [facility]

## 2021-09-20 DIAGNOSIS — I10 ESSENTIAL HYPERTENSION, BENIGN: Primary | ICD-10-CM

## 2021-09-20 RX ORDER — AMLODIPINE BESYLATE 10 MG/1
10 TABLET ORAL DAILY
Qty: 90 TABLET | Refills: 0 | Status: SHIPPED | OUTPATIENT
Start: 2021-09-20 | End: 2021-09-30

## 2021-09-20 NOTE — TELEPHONE ENCOUNTER
Received refill request for:  Amlodipine  Last OV was: 9/24/2020 with APatnoe, CNP  Labs/EKG: na  F/U scheduled: overdue orders in Epic. Note to pharmacy.  Called pt to review and he was out of town.  Will call back to scheduled.  New script sent to: [facility]

## 2021-09-23 NOTE — TELEPHONE ENCOUNTER
See 9/17 encounter, waiting on insurance authorization.    Flori JAMIL    [facility]

## 2021-09-30 ENCOUNTER — OFFICE VISIT (OUTPATIENT)
Dept: CARDIOLOGY | Facility: CLINIC | Age: 71
End: 2021-09-30
Payer: COMMERCIAL

## 2021-09-30 VITALS
HEIGHT: 66 IN | BODY MASS INDEX: 24.44 KG/M2 | DIASTOLIC BLOOD PRESSURE: 62 MMHG | SYSTOLIC BLOOD PRESSURE: 112 MMHG | HEART RATE: 60 BPM | WEIGHT: 152.1 LBS

## 2021-09-30 DIAGNOSIS — I25.10 CORONARY ARTERY DISEASE INVOLVING NATIVE CORONARY ARTERY OF NATIVE HEART WITHOUT ANGINA PECTORIS: ICD-10-CM

## 2021-09-30 DIAGNOSIS — I10 BENIGN ESSENTIAL HYPERTENSION: ICD-10-CM

## 2021-09-30 DIAGNOSIS — I10 ESSENTIAL HYPERTENSION, BENIGN: Primary | ICD-10-CM

## 2021-09-30 DIAGNOSIS — E78.2 MIXED HYPERLIPIDEMIA: ICD-10-CM

## 2021-09-30 DIAGNOSIS — I34.0 NON-RHEUMATIC MITRAL REGURGITATION: ICD-10-CM

## 2021-09-30 PROCEDURE — 99214 OFFICE O/P EST MOD 30 MIN: CPT | Performed by: PHYSICIAN ASSISTANT

## 2021-09-30 RX ORDER — AMLODIPINE BESYLATE 10 MG/1
10 TABLET ORAL DAILY
Qty: 90 TABLET | Refills: 3 | Status: SHIPPED | OUTPATIENT
Start: 2021-09-30 | End: 2022-10-28

## 2021-09-30 RX ORDER — ATORVASTATIN CALCIUM 80 MG/1
80 TABLET, FILM COATED ORAL DAILY
Qty: 90 TABLET | Refills: 3 | Status: SHIPPED | OUTPATIENT
Start: 2021-09-30 | End: 2022-10-28

## 2021-09-30 RX ORDER — METOPROLOL SUCCINATE 25 MG/1
12.5 TABLET, EXTENDED RELEASE ORAL DAILY
Qty: 45 TABLET | Refills: 3 | Status: SHIPPED | OUTPATIENT
Start: 2021-09-30 | End: 2022-10-18

## 2021-09-30 RX ORDER — IRBESARTAN 300 MG/1
300 TABLET ORAL DAILY
Qty: 90 TABLET | Refills: 3 | Status: SHIPPED | OUTPATIENT
Start: 2021-09-30 | End: 2022-07-06

## 2021-09-30 ASSESSMENT — MIFFLIN-ST. JEOR: SCORE: 1377.67

## 2021-09-30 NOTE — TELEPHONE ENCOUNTER
PA is approved and LILIANA is scheduled on 10/04/21.    ISRRAEL GrimaldoN, RN  Care Coordinator  [facility] [place]

## 2021-09-30 NOTE — PROGRESS NOTES
"    CARDIOLOGY CLINIC PROGRESS NOTE    DOS: 2021      Jose Moreno  : 1950, 71 year old  MRN: [id number]      Primary Cardiologist: Dr. Alejandra     HPI:  Morgan is a pleasant 71-year-old gentleman with past medical history significant for a robotic mitral valve repair at the [facility] in  for mitral valve prolapse and severe mitral regurgitation.  Also CAD, HTN, dyslipidemia, rare and brief runs of SVT.       Preoperative coronary angiography  demonstrated only a 50% mid LAD stenosis.       2015 Morgan presented with a NSTEMI. He was brought to the cath lab where he was found to have what appeared to be a chronically occluded right coronary artery that could not be crossed with a wire.  He subsequently was transferred to [facility], underwent complex LAD diagonal intervention with drug-eluting stents placed in both vessels.  He tolerated the procedure quite well.       He was subsequently seen in our clinic because he woke up with a \"spell.\"  He was sent back to his primary care doctor for neurologic evaluation.  Subsequent echocardiogram 2/15/16 demonstrated a structurally normal heart.  His valve repair appeared to be functioning appropriately.  He has a normal ejection fraction.  A ZIO Patch showed some brief runs of SVT lasting 4 beats, but no atrial fibrillation.  Stress nuclear scan appeared to be consistent with a small to moderate size reversible inferior, inferoseptal and inferolateral defect consistent with his known anatomy.       In followup, he appeared to be doing well from a cardiac standpoint without exertional chest, arm, neck, jaw or shoulder discomfort.  He did have a low-grade discomfort that was there all the time, did not change with exercise and we felt this was not cardiac in origin.  Options were discussed, including medical management versus coronary artery bypass grafting versus a  and it was decided to continue with medical management.  He " was having problems with fatigue, tiredness, cold hands, cold feet and we ultimately discontinued his metoprolol, of which he was only on 12.5 mg at that time.       Morgan saw Dr. Alejandra 7/10/18.  He had some more fatigue with walking.  Dr. Alejandra ordered a stress echo.  This was done 7/18/18.  He exercised to an acceptable level.  Heart rate response was normal.  BP was a little high.  LVEF at rest was 55-60%.  There was no evidence of stress-induced ischemia.       7/13/20 virtual visit with Dr. Alejandra. Overall doing great, no sxs. Increased atorvastatin from 40 mg to 80 mg daily to improve LDL. LDL 2/2020 was 85.  LDL improved to 64.     BP was uncontrolled so amlodipine added and increased to 10 mg, and hydrochlorothiazide 25 mg started.     Interval History:   12/10/20 telephone note, concern for low HRs.    12/11/20 24 Holter, avg HR in 50s.    12/16/20 Decrease Toprol to 12.5 mg.      He says his HRs are a little higher now on the lower dose of Toprol.   LDL is back up to 88 despite being on atorvastatin 80 mg. He says probably he eats too much ice cream.   He is biking frequently. He and his wife go for 30 minutes most days. He has no angina with biking.   His main issue is chronic back pain.       ROS:  Skin:  Negative     Eyes:  Positive for glasses  ENT:  Negative    Respiratory:  Negative    Cardiovascular:    Positive for  Gastroenterology: Positive for reflux  Genitourinary:  Negative    Musculoskeletal:  Positive for arthritis;back pain  Neurologic:  Negative    Psychiatric:  Negative    Heme/Lymph/Imm:  Negative    Endocrine:  Positive for thyroid disorder    PAST MEDICAL HISTORY:  Past Medical History:   Diagnosis Date     ADHD (attention deficit hyperactivity disorder)      Arthritis     lower back     CAD (coronary artery disease)     cardiac cath 1/23/15: SHERRY to 1st diagonal, SHERRY x2 to LAD, cath 2009: no intervention     Esophageal reflux      Heart murmur     mitral valve repair      History of hyperthyroidism 4/9/2014     Hyperlipidemia      Hypertension      Mitral regurgitation 2009    moderately severe MVP, s/p robotic repair at [place]     Prostate cancer      Pulmonary nodules 2009    CT-recommend repeat in 6-12 months     Rotator cuff rupture 11/3/2011     Thyroid disease        PAST SURGICAL HISTORY:  Past Surgical History:   Procedure Laterality Date     COLONOSCOPY  7/00    GI     DAVINCI PROSTATECTOMY, LYMPHADENECTOMY N/A 11/1/2018    Procedure: ROBOTIC ASSISTED RADICAL PROSTATECTOMY WITH BILATERAL PELVIC LYMPH NODE DISSECTION;  Surgeon: Clint Blankenship MD;  Location: [facility] OR     DECOMPRESSION LUMBAR MINIMALLY INVASIVE ONE LEVEL  1/11/2012    Procedure:DECOMPRESSION LUMBAR MINIMALLY INVASIVE ONE LEVEL; L4-5 Decompression Minimally Invasive; Surgeon:MESSI HORAN; Location:[facility] OR     HEART CATH RIGHT AND LEFT HEART CATH  01-23-15    See report, pt transfered to [facility] for complex bifurcation PCI of LAD diagonal vessel.      HEART CATH RIGHT AND LEFT HEART CATH  01-26-15    PTCA and implantation of SHERRY to 1st diagonal, SHERRY to mid LAD, SHERRY to proximal LAD     Hx of rotator cuff rupture and repair       IMPLANT PROSTHESIS SPHINCTER URINARY N/A 1/3/2020    Procedure: Placement of  artificial urinary sphincter;  Surgeon: Clint Blankenship MD;  Location: [facility] OR     LAPAROSCOPIC PROSTATECTOMY       REPAIR VALVE MITRAL  2009    [facility] robotic repair      Reversal of vasectomy       VASECTOMY       XR LUMBAR RADIOFREQ ABLATION UNILATERAL  01/11/2018    lumbar area/ ? level       SOCIAL HISTORY:  Social History     Socioeconomic History     Marital status:      Spouse name: Chastity     Number of children: 4     Years of education: 14     Highest education level: None   Occupational History     Occupation:      Employer: [employer]     Comment: RETIRED   Tobacco Use     Smoking status: Never Smoker     Smokeless tobacco: Never Used   Substance and Sexual  Activity     Alcohol use: Yes     Alcohol/week: 7.0 standard drinks     Types: 7 Standard drinks or equivalent per week     Comment: 1 beer daily     Drug use: No     Sexual activity: Yes     Partners: Female     Birth control/protection: Condom, Post-menopausal   Other Topics Concern      Service Not Asked     Blood Transfusions Not Asked     Caffeine Concern No     Comment: 2-3 daily     Occupational Exposure Not Asked     Hobby Hazards Not Asked     Sleep Concern No     Stress Concern Not Asked     Weight Concern Not Asked     Special Diet No     Comment: low sodium     Back Care Not Asked     Exercise Yes     Comment: walking daily     Bike Helmet Not Asked     Seat Belt Yes     Self-Exams No     Parent/sibling w/ CABG, MI or angioplasty before 65F 55M? Not Asked   Social History Narrative     None     Social Determinants of Health     Financial Resource Strain:      Difficulty of Paying Living Expenses:    Food Insecurity:      Worried About Running Out of Food in the Last Year:      Ran Out of Food in the Last Year:    Transportation Needs:      Lack of Transportation (Medical):      Lack of Transportation (Non-Medical):    Physical Activity:      Days of Exercise per Week:      Minutes of Exercise per Session:    Stress:      Feeling of Stress :    Social Connections:      Frequency of Communication with Friends and Family:      Frequency of Social Gatherings with Friends and Family:      Attends [employer] Services:      Active Member of Clubs or Organizations:      Attends Club or Organization Meetings:      Marital Status:    Intimate Partner Violence:      Fear of Current or Ex-Partner:      Emotionally Abused:      Physically Abused:      Sexually Abused:        FAMILY HISTORY:  Family History   Problem Relation Age of Onset     Breast Cancer Mother      Lung Cancer Mother         small cell carcinoma- radon?     Depression Father         alcohlism     Macular Degeneration Father      Colon Cancer  Father         age 75     Crohn's Disease Sister      Pleurisy Brother      Depression Son      Diabetes No family hx of      Cardiovascular No family hx of      Prostate Cancer No family hx of        MEDS: Acetaminophen (TYLENOL PO), Take 325-650 mg by mouth 2 times daily as needed for mild pain or fever  aspirin EC 81 MG EC tablet, Take 1 tablet (81 mg) by mouth daily  cholecalciferol (VITAMIN D3) 1000 UNIT tablet, Take 1 tablet (1,000 Units) by mouth daily  Cyanocobalamin (B-12) 1000 MCG TBCR, Take 1,000 mcg by mouth daily  FLUoxetine (PROZAC) 20 MG capsule, Take 1 capsule (20 mg) by mouth daily  hydrochlorothiazide (HYDRODIURIL) 25 MG tablet, Take 1 tablet (25 mg) by mouth daily  HYDROcodone-acetaminophen (NORCO) 5-325 MG tablet, Take 1 tablet by mouth every 6 hours as needed for moderate to severe pain or severe pain (max 2/day.) Dispense and start 7/6/21.  ketoconazole (NIZORAL) 2 % external cream, Apply topically daily  levothyroxine (SYNTHROID/LEVOTHROID) 50 MCG tablet, Take 1 tablet (50 mcg) by mouth daily  methocarbamol (ROBAXIN) 500 MG tablet, Take 1 tablet (500 mg) by mouth 3 times daily as needed for muscle spasms Okay to dispense 12/28/2020  multivitamin, therapeutic with minerals (MULTI-VITAMIN) TABS tablet, Take 1 tablet by mouth daily  nitroGLYcerin (NITROSTAT) 0.4 MG sublingual tablet, TAKE 1 TABLET BY MOUTH EVERY 5 MIN AS NEEDED FOR CHEST PAIN  Omega-3 Fatty Acids (FISH OIL PO), Take 1 capsule by mouth daily  omeprazole (PRILOSEC) 20 MG DR capsule, Take 1 capsule (20 mg) by mouth daily  pregabalin (LYRICA) 150 MG capsule, Take 1 capsule (150 mg) by mouth 2 times daily  traZODone (DESYREL) 100 MG tablet, Take 1 tablet (100 mg) by mouth At Bedtime  triamcinolone (KENALOG) 0.1 % external cream, APPLY TO THE AFFECTED RASH TWICE DAILY AS NEEDED    No current facility-administered medications on file prior to visit.      ALLERGIES:   Allergies   Allergen Reactions     Ace Inhibitors Cough     Dry cough  "    No Clinical Screening - See Comments      PN: LW FI1: NKA       PHYSICAL EXAM:  Vitals: /62   Pulse 60   Ht 1.676 m (5' 6\")   Wt 69 kg (152 lb 1.6 oz)   BMI 24.55 kg/m    Constitutional:  cooperative, alert and oriented, well developed, well nourished, in no acute distress        Skin:  warm and dry to the touch, no apparent skin lesions or masses noted        Head:  normocephalic, no masses or lesions        Eyes:  pupils equal and round;conjunctivae and lids unremarkable;sclera white        ENT:  no pallor or cyanosis        Neck:  JVP normal;no carotid bruit        Respiratory:  normal breath sounds, clear to auscultation, normal A-P diameter, normal symmetry, normal respiratory excursion, no use of accessory muscles        Cardiac: regular rhythm;normal S1 and S2;no murmurs, gallops or rubs detected                  GI:  abdomen soft;BS normoactive        Vascular: pulses full and equal                                 right radial site well healed, good pulse    Extremities and Musculoskeletal:  no edema;no spinal abnormalities noted;normal muscle strength and tone;no deformities, clubbing, cyanosis, erythema observed        Neurological:  no gross motor deficits;affect appropriate            LABS/DATA:  I reviewed the following:  Component      Latest Ref Rng & Units 9/15/2021   Carbon Dioxide      20 - 32 mmol/L 26.8   Creatinine      0.60 - 1.30 mg/dL 0.96   Glucose      60 - 99 mg/dL 106 (A)   Sodium      135 - 146 mmol/L 138.3   Potassium      3.5 - 5.3 mmol/L 4.24   Chloride      98 - 110 mmol/L 101.4   Protein Total      6.1 - 8.1 g/dL 7.3   Albumin      3.6 - 5.1 g/dL 4.4   Alkaline Phosphatase      33 - 130 U/L 46   ALT      0 - 32 U/L 30   AST      0 - 35 U/L 31   Bilirubin Total      0.2 - 1.2 mg/dL 0.8   Urea Nitrogen      7 - 25 mg/dL 20   Calcium      8.6 - 10.3 mg/dL 9.5   BUN/Creatinine Ratio      6 - 22 20.8   Globulin Calculated      1.9 - 3.7 2.9   A/G Ratio      1 - 2.5 1.5 "   Cholesterol      0 - 199 mg/dL 169   Triglycerides      0 - 149 mg/dL 96   HDL Cholesterol      40 - 150 mg/dL 62   LDL Cholesterol Direct      0 - 130 mg/dL 88   Cholesterol/HDL Ratio      0 - 5 3   TSH      0.40 - 4.50 mIU/L 1.90         ASSESSMENT/PLAN:  Mitral valve prolapse and severe mitral regurgitation s/p robotic mitral valve repair at the [facility] in 2009  - Stress echo 7/2018 shows valve working well, MG 5 mmHg, mild MR  - Echo 7/31/19: LVEF 55%, s/p posterior MV leaflet repair, MG 3 mmHg, mild MR  - Repeat echo next year        CAD  - Preoperative coronary angiography 2009 demonstrated only a 50% mid LAD stenosis.   - 1/2015 Jose presented with a NSTEMI.  Cardiac cath:  RCA, underwent complex LAD diagonal intervention with drug-eluting stents placed in both vessels.   - Negative stress echo 7/2018  - No anginal sxs  - Continue ASA, BB, statin.          HTN  - BP controlled on irbesartan 300 mg, Toprol XL 12.5 mg, amlodipine 10 mg, hydrochlorothiazide 25 mg  - With his HR in the low 50s, will not increase Toprol XL       Dyslipidemia  - FLP 2/2020 LDL 85  - Dr. Alejandra increased atorvastatin to 80 mg to drive LDL lower  - FLP 8/13/2020: , HDL 69, LDL 64, TG 62  - FLP 9/15/21: , HDL 62, LDL 88, TG 96  - He will work on dietary changes.  He is active, biking most days        Rare and brief runs of SVT  - Asymptomatic         Prostate cancer s/p prostatectomy  - Still follows with urology. He is having fairly severe urinary incontinence and is considering artificial urinary sphincter.   Recent PSA was 0        Follow up:   Dr. Alejandra in 1 year with echo, FLP/ALT        Cornell De Anda PA-C

## 2021-09-30 NOTE — LETTER
"2021    Raysa Carolina PA-C  [address], [address]  [facility] [address]    RE: Jose D Josh       Dear Colleague,    I had the pleasure of seeing Jose Moreno in the [facility].        CARDIOLOGY CLINIC PROGRESS NOTE    DOS: 2021      Jose Moreno  : 1950, 71 year old  MRN: [id number]      Primary Cardiologist: Dr. Alejandra     HPI:  Morgan is a pleasant 71-year-old gentleman with past medical history significant for a robotic mitral valve repair at the [facility] in  for mitral valve prolapse and severe mitral regurgitation.  Also CAD, HTN, dyslipidemia, rare and brief runs of SVT.       Preoperative coronary angiography  demonstrated only a 50% mid LAD stenosis.       2015 Morgan presented with a NSTEMI. He was brought to the cath lab where he was found to have what appeared to be a chronically occluded right coronary artery that could not be crossed with a wire.  He subsequently was transferred to [facility], underwent complex LAD diagonal intervention with drug-eluting stents placed in both vessels.  He tolerated the procedure quite well.       He was subsequently seen in our clinic because he woke up with a \"spell.\"  He was sent back to his primary care doctor for neurologic evaluation.  Subsequent echocardiogram 2/15/16 demonstrated a structurally normal heart.  His valve repair appeared to be functioning appropriately.  He has a normal ejection fraction.  A ZIO Patch showed some brief runs of SVT lasting 4 beats, but no atrial fibrillation.  Stress nuclear scan appeared to be consistent with a small to moderate size reversible inferior, inferoseptal and inferolateral defect consistent with his known anatomy.       In followup, he appeared to be doing well from a cardiac standpoint without exertional chest, arm, neck, jaw or shoulder discomfort.  He did have a low-grade discomfort that was " there all the time, did not change with exercise and we felt this was not cardiac in origin.  Options were discussed, including medical management versus coronary artery bypass grafting versus a  and it was decided to continue with medical management.  He was having problems with fatigue, tiredness, cold hands, cold feet and we ultimately discontinued his metoprolol, of which he was only on 12.5 mg at that time.       Morgan saw Dr. Alejandra 7/10/18.  He had some more fatigue with walking.  Dr. Alejandra ordered a stress echo.  This was done 7/18/18.  He exercised to an acceptable level.  Heart rate response was normal.  BP was a little high.  LVEF at rest was 55-60%.  There was no evidence of stress-induced ischemia.       7/13/20 virtual visit with Dr. Alejandra. Overall doing great, no sxs. Increased atorvastatin from 40 mg to 80 mg daily to improve LDL. LDL 2/2020 was 85.  LDL improved to 64.     BP was uncontrolled so amlodipine added and increased to 10 mg, and hydrochlorothiazide 25 mg started.     Interval History:   12/10/20 telephone note, concern for low HRs.    12/11/20 24 Holter, avg HR in 50s.    12/16/20 Decrease Toprol to 12.5 mg.      He says his HRs are a little higher now on the lower dose of Toprol.   LDL is back up to 88 despite being on atorvastatin 80 mg. He says probably he eats too much ice cream.   He is biking frequently. He and his wife go for 30 minutes most days. He has no angina with biking.   His main issue is chronic back pain.       ROS:  Skin:  Negative     Eyes:  Positive for glasses  ENT:  Negative    Respiratory:  Negative    Cardiovascular:    Positive for  Gastroenterology: Positive for reflux  Genitourinary:  Negative    Musculoskeletal:  Positive for arthritis;back pain  Neurologic:  Negative    Psychiatric:  Negative    Heme/Lymph/Imm:  Negative    Endocrine:  Positive for thyroid disorder    PAST MEDICAL HISTORY:  Past Medical History:   Diagnosis Date     ADHD (attention  deficit hyperactivity disorder)      Arthritis     lower back     CAD (coronary artery disease)     cardiac cath 1/23/15: SHERRY to 1st diagonal, SHERRY x2 to LAD, cath 2009: no intervention     Esophageal reflux      Heart murmur     mitral valve repair     History of hyperthyroidism 4/9/2014     Hyperlipidemia      Hypertension      Mitral regurgitation 2009    moderately severe MVP, s/p robotic repair at [facility]     Prostate cancer      Pulmonary nodules 2009    CT-recommend repeat in 6-12 months     Rotator cuff rupture 11/3/2011     Thyroid disease        PAST SURGICAL HISTORY:  Past Surgical History:   Procedure Laterality Date     COLONOSCOPY  7/00    GI     DAVINCI PROSTATECTOMY, LYMPHADENECTOMY N/A 11/1/2018    Procedure: ROBOTIC ASSISTED RADICAL PROSTATECTOMY WITH BILATERAL PELVIC LYMPH NODE DISSECTION;  Surgeon: Clint Blankesnhip MD;  Location: [facility] OR     DECOMPRESSION LUMBAR MINIMALLY INVASIVE ONE LEVEL  1/11/2012    Procedure:DECOMPRESSION LUMBAR MINIMALLY INVASIVE ONE LEVEL; L4-5 Decompression Minimally Invasive; Surgeon:MESSI HORAN; Location:[facility] OR     HEART CATH RIGHT AND LEFT HEART CATH  01-23-15    See report, pt transfered to [facility] for complex bifurcation PCI of LAD diagonal vessel.      HEART CATH RIGHT AND LEFT HEART CATH  01-26-15    PTCA and implantation of SHERRY to 1st diagonal, SHERRY to mid LAD, SHERRY to proximal LAD     Hx of rotator cuff rupture and repair       IMPLANT PROSTHESIS SPHINCTER URINARY N/A 1/3/2020    Procedure: Placement of  artificial urinary sphincter;  Surgeon: Clint Blankenship MD;  Location: [facility] OR     LAPAROSCOPIC PROSTATECTOMY       REPAIR VALVE MITRAL  2009    [facility] robotic repair      Reversal of vasectomy       VASECTOMY       XR LUMBAR RADIOFREQ ABLATION UNILATERAL  01/11/2018    lumbar area/ ? level       SOCIAL HISTORY:  Social History     Socioeconomic History     Marital status:      Spouse name: Chastity     Number of children: 4      Years of education: 14     Highest education level: None   Occupational History     Occupation: [employer]     Employer: [employer]     Comment: RETIRED   Tobacco Use     Smoking status: Never Smoker     Smokeless tobacco: Never Used   Substance and Sexual Activity     Alcohol use: Yes     Alcohol/week: 7.0 standard drinks     Types: 7 Standard drinks or equivalent per week     Comment: 1 beer daily     Drug use: No     Sexual activity: Yes     Partners: Female     Birth control/protection: Condom, Post-menopausal   Other Topics Concern      Service Not Asked     Blood Transfusions Not Asked     Caffeine Concern No     Comment: 2-3 daily     Occupational Exposure Not Asked     Hobby Hazards Not Asked     Sleep Concern No     Stress Concern Not Asked     Weight Concern Not Asked     Special Diet No     Comment: low sodium     Back Care Not Asked     Exercise Yes     Comment: walking daily     Bike Helmet Not Asked     Seat Belt Yes     Self-Exams No     Parent/sibling w/ CABG, MI or angioplasty before 65F 55M? Not Asked   Social History Narrative     None     Social Determinants of Health     Financial Resource Strain:      Difficulty of Paying Living Expenses:    Food Insecurity:      Worried About Running Out of Food in the Last Year:      Ran Out of Food in the Last Year:    Transportation Needs:      Lack of Transportation (Medical):      Lack of Transportation (Non-Medical):    Physical Activity:      Days of Exercise per Week:      Minutes of Exercise per Session:    Stress:      Feeling of Stress :    Social Connections:      Frequency of Communication with Friends and Family:      Frequency of Social Gatherings with Friends and Family:      Attends Alevism Services:      Active Member of Clubs or Organizations:      Attends Club or Organization Meetings:      Marital Status:    Intimate Partner Violence:      Fear of Current or Ex-Partner:      Emotionally Abused:      Physically Abused:       Sexually Abused:        FAMILY HISTORY:  Family History   Problem Relation Age of Onset     Breast Cancer Mother      Lung Cancer Mother         small cell carcinoma- radon?     Depression Father         alcohlism     Macular Degeneration Father      Colon Cancer Father         age 75     Crohn's Disease Sister      Pleurisy Brother      Depression Son      Diabetes No family hx of      Cardiovascular No family hx of      Prostate Cancer No family hx of        MEDS: Acetaminophen (TYLENOL PO), Take 325-650 mg by mouth 2 times daily as needed for mild pain or fever  aspirin EC 81 MG EC tablet, Take 1 tablet (81 mg) by mouth daily  cholecalciferol (VITAMIN D3) 1000 UNIT tablet, Take 1 tablet (1,000 Units) by mouth daily  Cyanocobalamin (B-12) 1000 MCG TBCR, Take 1,000 mcg by mouth daily  FLUoxetine (PROZAC) 20 MG capsule, Take 1 capsule (20 mg) by mouth daily  hydrochlorothiazide (HYDRODIURIL) 25 MG tablet, Take 1 tablet (25 mg) by mouth daily  HYDROcodone-acetaminophen (NORCO) 5-325 MG tablet, Take 1 tablet by mouth every 6 hours as needed for moderate to severe pain or severe pain (max 2/day.) Dispense and start 7/6/21.  ketoconazole (NIZORAL) 2 % external cream, Apply topically daily  levothyroxine (SYNTHROID/LEVOTHROID) 50 MCG tablet, Take 1 tablet (50 mcg) by mouth daily  methocarbamol (ROBAXIN) 500 MG tablet, Take 1 tablet (500 mg) by mouth 3 times daily as needed for muscle spasms Okay to dispense 12/28/2020  multivitamin, therapeutic with minerals (MULTI-VITAMIN) TABS tablet, Take 1 tablet by mouth daily  nitroGLYcerin (NITROSTAT) 0.4 MG sublingual tablet, TAKE 1 TABLET BY MOUTH EVERY 5 MIN AS NEEDED FOR CHEST PAIN  Omega-3 Fatty Acids (FISH OIL PO), Take 1 capsule by mouth daily  omeprazole (PRILOSEC) 20 MG DR capsule, Take 1 capsule (20 mg) by mouth daily  pregabalin (LYRICA) 150 MG capsule, Take 1 capsule (150 mg) by mouth 2 times daily  traZODone (DESYREL) 100 MG tablet, Take 1 tablet (100 mg) by mouth At  "Bedtime  triamcinolone (KENALOG) 0.1 % external cream, APPLY TO THE AFFECTED RASH TWICE DAILY AS NEEDED    No current facility-administered medications on file prior to visit.      ALLERGIES:   Allergies   Allergen Reactions     Ace Inhibitors Cough     Dry cough     No Clinical Screening - See Comments      PN: LW FI1: NKA       PHYSICAL EXAM:  Vitals: /62   Pulse 60   Ht 1.676 m (5' 6\")   Wt 69 kg (152 lb 1.6 oz)   BMI 24.55 kg/m    Constitutional:  cooperative, alert and oriented, well developed, well nourished, in no acute distress        Skin:  warm and dry to the touch, no apparent skin lesions or masses noted        Head:  normocephalic, no masses or lesions        Eyes:  pupils equal and round;conjunctivae and lids unremarkable;sclera white        ENT:  no pallor or cyanosis        Neck:  JVP normal;no carotid bruit        Respiratory:  normal breath sounds, clear to auscultation, normal A-P diameter, normal symmetry, normal respiratory excursion, no use of accessory muscles        Cardiac: regular rhythm;normal S1 and S2;no murmurs, gallops or rubs detected                  GI:  abdomen soft;BS normoactive        Vascular: pulses full and equal                                 right radial site well healed, good pulse    Extremities and Musculoskeletal:  no edema;no spinal abnormalities noted;normal muscle strength and tone;no deformities, clubbing, cyanosis, erythema observed        Neurological:  no gross motor deficits;affect appropriate            LABS/DATA:  I reviewed the following:  Component      Latest Ref Rng & Units 9/15/2021   Carbon Dioxide      20 - 32 mmol/L 26.8   Creatinine      0.60 - 1.30 mg/dL 0.96   Glucose      60 - 99 mg/dL 106 (A)   Sodium      135 - 146 mmol/L 138.3   Potassium      3.5 - 5.3 mmol/L 4.24   Chloride      98 - 110 mmol/L 101.4   Protein Total      6.1 - 8.1 g/dL 7.3   Albumin      3.6 - 5.1 g/dL 4.4   Alkaline Phosphatase      33 - 130 U/L 46   ALT      0 - " 32 U/L 30   AST      0 - 35 U/L 31   Bilirubin Total      0.2 - 1.2 mg/dL 0.8   Urea Nitrogen      7 - 25 mg/dL 20   Calcium      8.6 - 10.3 mg/dL 9.5   BUN/Creatinine Ratio      6 - 22 20.8   Globulin Calculated      1.9 - 3.7 2.9   A/G Ratio      1 - 2.5 1.5   Cholesterol      0 - 199 mg/dL 169   Triglycerides      0 - 149 mg/dL 96   HDL Cholesterol      40 - 150 mg/dL 62   LDL Cholesterol Direct      0 - 130 mg/dL 88   Cholesterol/HDL Ratio      0 - 5 3   TSH      0.40 - 4.50 mIU/L 1.90         ASSESSMENT/PLAN:  Mitral valve prolapse and severe mitral regurgitation s/p robotic mitral valve repair at the [facility] in 2009  - Stress echo 7/2018 shows valve working well, MG 5 mmHg, mild MR  - Echo 7/31/19: LVEF 55%, s/p posterior MV leaflet repair, MG 3 mmHg, mild MR  - Repeat echo next year        CAD  - Preoperative coronary angiography 2009 demonstrated only a 50% mid LAD stenosis.   - 1/2015 Jose presented with a NSTEMI.  Cardiac cath:  RCA, underwent complex LAD diagonal intervention with drug-eluting stents placed in both vessels.   - Negative stress echo 7/2018  - No anginal sxs  - Continue ASA, BB, statin.          HTN  - BP controlled on irbesartan 300 mg, Toprol XL 12.5 mg, amlodipine 10 mg, hydrochlorothiazide 25 mg  - With his HR in the low 50s, will not increase Toprol XL       Dyslipidemia  - FLP 2/2020 LDL 85  - Dr. Alejandra increased atorvastatin to 80 mg to drive LDL lower  - FLP 8/13/2020: , HDL 69, LDL 64, TG 62  - FLP 9/15/21: , HDL 62, LDL 88, TG 96  - He will work on dietary changes.  He is active, biking most days        Rare and brief runs of SVT  - Asymptomatic         Prostate cancer s/p prostatectomy  - Still follows with urology. He is having fairly severe urinary incontinence and is considering artificial urinary sphincter.   Recent PSA was 0        Follow up:   Dr. Alejandra in 1 year with echo, FLP/ALT        Cornell De Anda PA-C      Thank you for allowing me to  participate in the care of your patient.      Sincerely,     JOHANA Hurley [facility]  cc:   No referring provider defined for this encounter.

## 2021-10-04 ENCOUNTER — RADIOLOGY INJECTION OFFICE VISIT (OUTPATIENT)
Dept: PALLIATIVE MEDICINE | Facility: CLINIC | Age: 71
End: 2021-10-04
Attending: PHYSICAL MEDICINE & REHABILITATION
Payer: COMMERCIAL

## 2021-10-04 VITALS — OXYGEN SATURATION: 97 % | SYSTOLIC BLOOD PRESSURE: 116 MMHG | DIASTOLIC BLOOD PRESSURE: 73 MMHG | HEART RATE: 58 BPM

## 2021-10-04 DIAGNOSIS — M48.061 SPINAL STENOSIS OF LUMBAR REGION WITHOUT NEUROGENIC CLAUDICATION: ICD-10-CM

## 2021-10-04 DIAGNOSIS — M54.16 LUMBAR RADICULOPATHY: ICD-10-CM

## 2021-10-04 DIAGNOSIS — M51.369 DDD (DEGENERATIVE DISC DISEASE), LUMBAR: Primary | ICD-10-CM

## 2021-10-04 PROCEDURE — 62323 NJX INTERLAMINAR LMBR/SAC: CPT | Performed by: PHYSICAL MEDICINE & REHABILITATION

## 2021-10-04 RX ORDER — TRIAMCINOLONE ACETONIDE 40 MG/ML
40 INJECTION, SUSPENSION INTRA-ARTICULAR; INTRAMUSCULAR ONCE
Status: COMPLETED | OUTPATIENT
Start: 2021-10-04 | End: 2021-10-04

## 2021-10-04 RX ADMIN — TRIAMCINOLONE ACETONIDE 40 MG: 40 INJECTION, SUSPENSION INTRA-ARTICULAR; INTRAMUSCULAR at 13:40

## 2021-10-04 NOTE — NURSING NOTE
Discharge Information    IV Discontiued Time:  NA    Amount of Fluid Infused:  NA    Discharge Criteria = When patient returns to baseline or as per MD order    Consciousness:  Pt is fully awake    Circulation:  BP +/- 20% of pre-procedure level    Respiration:  Patient is able to breathe deeply    O2 Sat:  Patient is able to maintain O2 Sat >92% on room air    Activity:  Moves 4 extremities on command    Ambulation:  Patient is able to stand and walk or stand and pivot into wheelchair    Dressing:  Clean/dry or No Dressing    Notes:   Discharge instructions and AVS given to patient    Patient meets criteria for discharge?  YES    Admitted to PCU?  No    Responsible adult present to accompany patient home?  Yes    Signature/Title:    Katia Medeiros RN  RN Care Coordinator  [facility]

## 2021-10-04 NOTE — PROGRESS NOTES
[facility] - Procedure Note    Date of Visit: 10/4/21    Procedure performed: Lumbar 3-4 interlaminar epidural steroid injection  Diagnosis: Lumbar spondylosis; Lumbar DDD  : Jersey Richter DO   Anesthesia: none    Indications: Jose Moreno is a 71 year old male who is seen for a lumbar epidural steroid injection. The patient describes chronic bilateral low back and bilateral buttock pain. The patient has been exhibiting symptoms consistent with lumbar intraspinal inflammation and degenerative disc disease. Symptoms have been persistent, disabling, and intermittently severe. The patient reports minimal improvement with conservative treatment, including oral medications and exercises.    They last had an lumbar epidural steroid injection on 7/26/2021 with significant pain relief 2 months with gradual reduction in effectiveness.    Lumbar MRI was done on 4/12/21 which showed   T12-L1: No loss of disc height. No significant disc herniation. Normal  facets. No spinal canal or neural foraminal narrowing.      L1-L2: Mild loss of disc height and signal. Small left subarticular  and foraminal disc protrusion. Facet tropism with moderate asymmetric  left-sided facet hypertrophy with edema around the left facet joint.  No significant spinal canal narrowing. No right neural foraminal  narrowing. Moderate left neural foraminal narrowing.      L2-L3: Marked loss of disc height and signal asymmetric towards the  left with degenerative endplate changes. Circumferential disc bulge  with endplate osteophytic spurring more pronounced towards the left  foraminal and far lateral region. No significant facet hypertrophy. No  significant spinal canal narrowing. No right neural foraminal  narrowing. Mild left neural foraminal narrowing.      L3-L4: Moderate loss of disc height and signal. Posterior disc bulge  with endplate osteophytic spurring. Superimposed small central disc  extrusion which  extends partially above the disc level. Facet tropism  with moderate facet hypertrophy. No significant spinal canal  narrowing. Moderate right neural foraminal narrowing. Mild left neural  foraminal narrowing.      L4-L5: Marked loss of disc height and signal asymmetric towards the  right with degenerative endplate changes. Disc bulge with endplate  osteophytic spurring from the right subarticular through right  foraminal and far lateral region. Facet tropism. Moderate facet  hypertrophy. No significant spinal canal narrowing; however, there is  narrowing of the right lateral recess which could affect the right L5  nerve root. Moderate to severe right neural foraminal narrowing. Mild  left neural foraminal narrowing.     L5-S1: Marked loss of disc height and signal. Circumferential disc  bulge with endplate osteophytic spurring. Moderate bilateral facet  hypertrophy. No significant spinal canal narrowing. Mild right neural  foraminal narrowing. Moderate left neural foraminal narrowing.      Paraspinous soft tissues: Unremarkable.                                                                       IMPRESSION:    1. Marked multilevel degenerative changes throughout the lumbar spine  with scoliotic curvature of the lumbar spine as detailed above.  2. New edema in and around the degenerated left L1-L2 facet joint  compared to previous MR 2/9/2019. This could be a source of pain. The  left-sided facet hypertrophy at L1-L2 contributes to moderate left  neural foraminal narrowing which is increased since 2/9/2019.  3. Other degenerative changes throughout the lumbar spine appear  similar to prior MR 2/9/2019.     AGA WHATLEY MD    Allergies:      Allergies   Allergen Reactions     Ace Inhibitors Cough     Dry cough     No Clinical Screening - See Comments      PN: LW FI1: NKA        Vitals:  BP 98/66   Pulse 61   SpO2 96%     Review of Systems: The patient denies recent fever, chills, illness, use of antibiotics or  anticoagulants. All other 10-point review of systems negative.     Procedure: The procedure and risks were explained, and informed written consent was obtained from the patient. Risks include but are not limited to: infection, bleeding, increased pain, and damage to soft tissue, nerve, muscle, and vasculature structures. After getting informed consent, patient was brought into the procedure suite and was placed in a prone position on the procedure table. A Pause for the Cause was performed. Patient was prepped and draped in sterile fashion.     The 3-4 interspace was identified with use of fluoroscopy in AP view. A 25-gauge, 1.5 inch needle was used to anesthetize the skin and subcutaneous tissue entry site with a total of 3.5 ml of 1% lidocaine. Under fluoroscopic visualization, a 22-gauge, 3.5 inch Tuohy epidural needle was slowly advanced towards the epidural space a few millimeters right-sided of midline. The latter part of the needle advancement was guided with fluoroscopy in the lateral view. The epidural space was identified using loss of resistance technique. After negative aspiration for heme and cerebrospinal fluid, a total of 1 mL of non-ionic contrast was injected to confirm needle placement with 9 mL of contrast wasted. Epidurogram confirmed spread within the posterior epidural space. 1 ml of 40mg/ml of triamcinolone, 1 ml of 1% lidocaine, and 3 ml of preservative free saline was injected. The needle was removed.  Images were saved to PACS.    The patient tolerated the procedure well, and there was no evidence of procedural complications. No new sensory or motor deficits were noted following the procedure. The patient was stable and able to ambulate on discharge home. Post-procedure instructions were provided.     Pre-procedure pain score: 2/10 in the back, 0/10 in the leg  Post-procedure pain score: 2/10 in the back, 0/10 in the leg    Assessment/Plan: Jose Moreno is a 71 year old male s/p lumbar  interlaminar epidural steroid injection today for lumbar spondylosis and radiculitis/radiculopathy.     1. Following today's procedure, the patient was advised to contact the [facility] for any of the following:   Fever, chills, or night sweats   New onset of pain, numbness, or weakness   Any questions/concerns regarding the procedure  If unable to contact the Pain Center, the patient was instructed to go to a local Emergency Room for any complications.   2. Follow-up with the referring provider in 2-4 weeks for post-procedure evaluation.        Jersey Richter DO  [facility] [place]

## 2021-10-04 NOTE — NURSING NOTE
Pre-procedure Intake    Have you been fasting? NA    If yes, for how long?     Are you taking a prescribed blood thinner such as coumadin, Plavix, Xarelto?    No    If yes, when did you take your last dose?     Do you take aspirin?   YES: 81 mg    If cervical procedure, have you held aspirin for 6 days?   NA    Do you have any allergies to contrast dye, iodine, steroid and/or numbing medications?  NO    Are you currently taking antibiotics or have an active infection?  NO    Have you had a fever/elevated temperature within the past week? NO    Are you currently taking oral steroids? NO    Do you have a ? Yes       Are you pregnant or breastfeeding? NA    Have you received the COVID-19 vaccine? Yes     If yes, was it your 1st, 2nd or only dose needed? 2nd dose  4/29/21      Notify provider and RNs if systolic BP >170, diastolic BP >100, P >100 or O2 sats < 90%

## 2021-10-04 NOTE — PATIENT INSTRUCTIONS
[facility] Procedure Discharge Instructions    Today you saw: Dr. Jersey Richter    Your procedure:  Epidural steroid injection      Medications used:  Lidocaine (anesthetic)  Kenalog (steroid)  Omnipaque (contrast)  Normal Saline      Be cautious when walking as numbness and/or weakness in the legs may occur up to 6-8 hours after the procedure due to effect of the local anesthetic    Do not drive for 6 hours. The effect of the local anesthetic could slow your reflexes.     Avoid strenuous activity for the first 24 hours. You may resume your regular activities after that.     You may shower, however avoid swimming, tub baths or hot tubs for 24 hours following your procedure    You may have a mild to moderate increase in pain for several days following the injection.      You may use ice packs for 10-15 minutes, 3 to 4 times a day at the injection site for comfort    Do not use heat to painful areas for 6 to 8 hours. This will give the local anesthetic time to wear off and prevent you from accidentally burning your skin.    Unless you have been directed to avoid the use of anti-inflammatory medications (NSAIDS-ibuprofen, Aleve, Motrin), you may use these medications or Tylenol for pain control if needed.     With diabetes, check your blood sugar more frequently than usual as your blood sugar may be higher than normal for 10-14 days following a steroid injection. Contact your doctor who manages your diabetes if your blood sugar is higher than usual    Possible side effects of steroids that you may experience include flushing, elevated blood pressure, increased appetite, mild headaches and restlessness.  All of these symptoms will get better with time.    It may take up to 14 days for the steroid medication to start working although you may feel the effect as early as a few days after the procedure.     Follow up with your referring provider in 2-3 weeks      If you experience any of the  following, call the pain center line during work hours at [phone] or on-call physician after hours at [phone]:  -Fever over 100 degree F  -Swelling, bleeding, redness, drainage, warmth at the injection site  -Progressive weakness or numbness in your legs or arms  -Loss of bowel or bladder function  -Unusual headache that is not relieved by Tylenol or your regular headache medication  -Unusual new onset of pain that is not improving

## 2021-10-05 DIAGNOSIS — K21.9 GASTROESOPHAGEAL REFLUX DISEASE WITHOUT ESOPHAGITIS: ICD-10-CM

## 2021-10-05 NOTE — TELEPHONE ENCOUNTER
Morgan is requesting a refill for omeprazole. GERD was discussed at his appt on 9/15/2021. Last filled for a year on 11/2/2020.  Routing to maldonado Tabares.      Pending Prescriptions:                       Disp   Refills    omeprazole (PRILOSEC) 20 MG DR capsule    90 cap*3            Sig: Take 1 capsule (20 mg) by mouth daily

## 2021-10-07 DIAGNOSIS — I24.9 ACS (ACUTE CORONARY SYNDROME) (H): ICD-10-CM

## 2021-10-07 RX ORDER — NITROGLYCERIN 0.4 MG/1
TABLET SUBLINGUAL
Qty: 25 TABLET | Refills: 0 | Status: SHIPPED | OUTPATIENT
Start: 2021-10-07 | End: 2021-10-14

## 2021-10-14 ENCOUNTER — NURSE TRIAGE (OUTPATIENT)
Dept: NURSING | Facility: CLINIC | Age: 71
End: 2021-10-14

## 2021-10-14 DIAGNOSIS — I24.9 ACS (ACUTE CORONARY SYNDROME) (H): ICD-10-CM

## 2021-10-14 RX ORDER — NITROGLYCERIN 0.4 MG/1
TABLET SUBLINGUAL
Qty: 25 TABLET | Refills: 0 | Status: SHIPPED | OUTPATIENT
Start: 2021-10-14 | End: 2022-10-12

## 2021-11-19 ENCOUNTER — TELEPHONE (OUTPATIENT)
Dept: PALLIATIVE MEDICINE | Facility: CLINIC | Age: 71
End: 2021-11-19
Payer: COMMERCIAL

## 2021-11-19 DIAGNOSIS — M47.816 SPONDYLOSIS OF LUMBAR REGION WITHOUT MYELOPATHY OR RADICULOPATHY: ICD-10-CM

## 2021-11-19 DIAGNOSIS — M48.061 SPINAL STENOSIS OF LUMBAR REGION WITHOUT NEUROGENIC CLAUDICATION: Primary | ICD-10-CM

## 2021-11-19 DIAGNOSIS — G89.29 OTHER CHRONIC PAIN: ICD-10-CM

## 2021-11-19 DIAGNOSIS — G89.29 CHRONIC BILATERAL LOW BACK PAIN WITHOUT SCIATICA: ICD-10-CM

## 2021-11-19 DIAGNOSIS — M54.50 CHRONIC BILATERAL LOW BACK PAIN WITHOUT SCIATICA: ICD-10-CM

## 2021-11-19 NOTE — TELEPHONE ENCOUNTER
Received call from patient requesting refill(s) of HYDROcodone-acetaminophen (NORCO) 5-325 MG tablet    Last dispensed from pharmacy on 07/06/21    Patient's last office/virtual visit by prescribing provider on 08/24/21  Next office/virtual appointment scheduled for : none    Last urine drug screen date 04/14/21  Current opioid agreement on file (completed within the last year) Yes Date of opioid agreement: 04/14/21    E-prescribe to pharmacy-[facility]/pharmacy #[id number] - [place], [place] - [address] [address]     Will route to nursing Dupree for review and preparation of prescription(s).

## 2021-11-19 NOTE — TELEPHONE ENCOUNTER
Reason for call:  Medication   If this is a refill request, has the caller requested the refill from the pharmacy already? No  Will the patient be using a [facility]? No  Name of the pharmacy and phone number for the current request: [facility]/PHARMACY #[id number] - [place], [place] - [address] [address] [facility] [address]    Name of the medication requested: HYDROcodone-acetaminophen (NORCO) 5-325 MG tablet    Other request: none    Phone number to reach patient:  Home number on file [phone] (home)    Best Time:  anytime    Can we leave a detailed message on this number?  YES    Travel screening: Not Applicable     Flori JAMIL    [facility]

## 2021-11-19 NOTE — TELEPHONE ENCOUNTER
Reason for call:  Order   Order or referral being requested: Repeat Lumbar 3-4 interlaminar epidural steroid injection  Reason for request: n/a  Date needed: as soon as possible  Has the patient been seen by the PCP for this problem? YES    Additional comments: none    Phone number to reach patient:  Home number on file [phone] (home)    Best Time:  anytime    Can we leave a detailed message on this number?  YES    Travel screening: Not Applicable     Flori JAMIL    [facility]

## 2021-11-22 RX ORDER — HYDROCODONE BITARTRATE AND ACETAMINOPHEN 5; 325 MG/1; MG/1
1 TABLET ORAL EVERY 6 HOURS PRN
Qty: 60 TABLET | Refills: 0 | Status: SHIPPED | OUTPATIENT
Start: 2021-11-22 | End: 2022-01-31

## 2021-11-22 NOTE — TELEPHONE ENCOUNTER
"Routing to provider to review medication prepped per below    Spoke with pt, as been using rx written in July until refill request. Using just \"a few a week\"/judiciously when pain is bad.      HYDROcodone-acetaminophen (NORCO) 5-325 MG tablet #60 Refill:0  Sig:Take 1 tablet by mouth every 6 hours as needed for moderate to severe pain or severe pain (max 2/day.)  Last picked up 7/6/21 with start on 7/6/21  Due: OK to fill and start 11/22/21    Per last OV note 8/24/21: Continue Norco 5-325mg q6h prn, max 2/day.     E-prescribe to pharmacy-[facility]/pharmacy #[id number] - [place], [place] - [address] [address] RN Care Coordinator  [facility]          "

## 2021-11-22 NOTE — TELEPHONE ENCOUNTER
Routing to  to schedule, per provider as late in Dec as pt is able.    Maria Del Carmen LOVE RN Care Coordinator  [facility]

## 2021-11-22 NOTE — TELEPHONE ENCOUNTER
Routing to provider.   Pt requesting orders so he can repeat injection prior to leaving for [place] at end of December. No change in sx after last injection 10/4/21.    Maria Del Carmen LOVE RN Care Coordinator  [facility]

## 2021-11-22 NOTE — TELEPHONE ENCOUNTER
reviewed, norco refill approved.    Prescription note indicates CSA is out of date but this was actually completed in April 2021 and is scanned into the media section.    Jersey Richter DO  [facility]

## 2021-11-22 NOTE — TELEPHONE ENCOUNTER
Lumbar epidural steroid injection ordered. Please let patient know to schedule this as late in December as possible to give 3 months between the injections.    Jersey Richter [facility]

## 2021-11-23 NOTE — TELEPHONE ENCOUNTER
Screening Questions for Radiology Injections:    Injection to be done at which interventional clinic site? [facility]    If [facility] location, tell patient that this procedure requires a COVID-19 lab test be done within 4 days of the procedure. Would you still like to move forward with scheduling the procedure?  Not Applicable   If YES, let patient know that someone will call them to schedule the COVID-19 test and that they will only receive a call back if the result is positive. Route to nursing to enter order.     Instruct patient to arrive as directed prior to the scheduled appointment time:    [place] minutes before      Noemy: 30 minutes before; if IV needed 1 hour before     Procedure ordered by Neelam    Procedure ordered? L3-4 lumbar interlaminar epidural steroid injection      Transforaminal Cervical RAMSES -  [facility] does NOT have providers that do these- [facility] [place] [facility] do have providers that do    As a reminder, receiving steroids can decrease your body's ability to fight infection.   Would you still like to move forward with scheduling the injection?  Yes    What insurance would patient like us to bill for this procedure? BCBS Medicare      Worker's comp or MVA (motor vehicle accident) -Any injection DO NOT SCHEDULE and route to [place].      [employer] insurance - For SI joint injections, DO NOT SCHEDULE and route [place].       ALL BCBS, [employer] and [employer]-Route to Ursula for review DO NOT SCHEDULE      IF SCHEDULING IN [place] AND NEEDS A PA, IT IS OKAY TO SCHEDULE. [place] HANDLES THEIR OWN PA'S AFTER THE PATIENT IS SCHEDULED. PLEASE SCHEDULE AT LEAST 1 WEEK OUT SO A PA CAN BE OBTAINED.    Any chance of pregnancy? Not Applicable   If YES, do NOT schedule and route to RN pool    Is an  needed? No     Patient has a drive home? (mandatory) YES: INFORMED    Is patient taking any blood thinners (That is: Coumadin, Warfarin, Jantoven, Pradaxa  Xarelto, Eliquis, Edoxaban, Enoxaparin, Lovenox, Heparin, Arixtra, Fondaparinux, or Fragmin? OR Antiplatelet medication such as Plavix, Brilinta, or Effient? )? No   If hold needed, do NOT schedule, route to RN pool     Is patient taking any aspirin products (includes Excedrin and Fiorinal)? Yes - Pt takes 81mg daily; instructed to hold 0 day(s) prior to procedure.      If more than 325mg/day, OK to schedule; Instruct pt to decrease to less than 325 mg for 7 days AND route to RN pool    For CERVICAL procedures, hold all aspirin products for 6 days.     Tell pt that if aspirin product is not held for 6 days, the procedure WILL BE cancelled.      Does the patient have a bleeding or clotting disorder? No     If YES, okay to schedule AND route to RN nurse pool    For any patients with platelet count <100, must be forwarded to provider    Any allergies to contrast dye, iodine, shellfish, or numbing and steroid medications? No    If YES, add allergy information to appointment notes AND route to the RN pool     If RAMSES and Contrast Dye / Iodine Allergy? DO NOT SCHEDULE, route to RN pool    Allergies: Ace inhibitors and No clinical screening - see comments     Is patient diabetic?  No  If YES, instruct them to bring their glucometer.    Does patient have an active infection or treated for one within the past week? No     Is patient currently taking any antibiotics?  No     For patients on chronic, preventative, or prophylactic antibiotics, procedures may be scheduled.     For patients on antibiotics for active or recent infection:antibiotic course must have been completed for 4 days    Is patient currently taking any steroid medications? (i.e. Prednisone, Medrol)  No     For patients on steroid medications, course must have been completed for 4 days    Is patient actively being treated for cancer or immunocompromised? No  If YES, do NOT schedule and route to RN pool     Are you able to get on and off an exam table with  minimal or no assistance? Yes  If NO, do NOT schedule and route to RN pool    Are you able to roll over and lay on your stomach with minimal or no assistance? Yes  If NO, do NOT schedule and route to RN pool     Has the patient had a flu shot or any other vaccinations within 7 days before or after the procedure.  No     Have you recently had a COVID vaccine or have plans to get it in the near future? No    If yes, explain that for the vaccine to work best they need to:       wait 1 week before and 1 week after getting Vaccine #1    wait 1 week before and 2 weeks after getting Vaccine #2    wait 1 week before and 2 weeks after getting Vaccine BOOSTER    If patient has concerns about the timing, send to RN pool     Does patient have an MRI/CT?  YES: 2021  Check Procedure Scheduling Grid to see if required.      Was the MRI done within the last 3 years?  Yes    If yes, where was the MRI done i.e.[facility], [facility], [facility], [facility] etc? [facility]      If no, do not schedule and route to RN pool    If MRI was not done at [facility], [facility] or [facility] do NOT schedule and route to RN pool.      If pt has an imaging disc, the injection MAY be scheduled but pt has to bring disc to appt.     If they show up without the disc the injection cannot be done    Procedure Specific Instructions:      If celiac plexus block, informed patient NPO for 6 hours and that it is okay to take medications with sips of water, especially blood pressure medications  Not Applicable         If this is for a cervical procedure, informed patient that aspirin needs to be held for 6 days.   Not Applicable      If IV needed:    Do not schedule procedures requiring IV placement in the first appointment of the day or first appointment after lunch. Do NOT schedule at 0745, 0815 or 1245.     Instructed pt to arrive 30 minutes early for IV start if required. (Check Procedure Scheduling Grid)  Not Applicable    Reminders:      If you are started  on any steroids or antibiotics between now and your appointment, you must contact us because the procedure may need to be cancelled.  Yes      For all procedures except radiofrequency ablations (RFAs) and spinal cord stimulator (SCS) trials, informed patient:    IV sedation is not provided for this procedure.  If you feel that an oral anti-anxiety medication is needed, you can discuss this further with your referring provider or primary care provider.  The Pain Clinic provider will discuss specifics of what the procedure includes at your appointment.  Most procedures last 10-20 minutes.  We use numbing medications to help with any discomfort during the procedure.  Not Applicable      For patients 85 or older we recommend having an adult stay w/ them for the remainder of the day.       Does the patient have any questions?  NO  Flori Harrell  [facility]

## 2021-11-24 NOTE — TELEPHONE ENCOUNTER
Syed Burton:  Authorization: [id number]  Case Number: [id number]  Review Date: 11/24/2021 2:45:16 PM  Expiration Date: 5/23/2022  Status: Because you have been designated by your patient to obtain a prior authorization, [facility] is notifying you of this approval. This authorization is valid for 180 calendar days, it expires 5/23/2022 and is not a guarantee of payment. The case number is [id number]. This member is enrolled in a member advocacy program and the [facility] National scheduling department will reach out to this member and assist them in scheduling their exam.      Okay to schedule with Dr Neelam HUFFMAN    [facility]

## 2021-11-24 NOTE — TELEPHONE ENCOUNTER
Called patient to get additional information for PA, he received about 75% relief for about 2 months from previous injection. He states that during that time he was able to take longer walks and do more yard work.      U[place]ula HUFFMAN    [facility]

## 2021-11-29 NOTE — TELEPHONE ENCOUNTER
Chart review: scheduled 12/02/21    Katia MOSQUERA, RN Care Coordinator  [facility]  Pain [facility]

## 2021-12-14 ENCOUNTER — OFFICE VISIT (OUTPATIENT)
Dept: FAMILY MEDICINE | Facility: CLINIC | Age: 71
End: 2021-12-14

## 2021-12-14 VITALS
TEMPERATURE: 97.2 F | DIASTOLIC BLOOD PRESSURE: 68 MMHG | SYSTOLIC BLOOD PRESSURE: 122 MMHG | HEART RATE: 56 BPM | BODY MASS INDEX: 24.83 KG/M2 | WEIGHT: 154.5 LBS | HEIGHT: 66 IN | RESPIRATION RATE: 20 BRPM

## 2021-12-14 DIAGNOSIS — I25.2 HISTORY OF NON-ST ELEVATION MYOCARDIAL INFARCTION (NSTEMI): ICD-10-CM

## 2021-12-14 DIAGNOSIS — Z00.00 ENCOUNTER FOR GENERAL MEDICAL EXAMINATION: Primary | ICD-10-CM

## 2021-12-14 DIAGNOSIS — I10 ESSENTIAL HYPERTENSION, BENIGN: ICD-10-CM

## 2021-12-14 LAB
ALBUMIN SERPL-MCNC: 4.3 G/DL (ref 3.6–5.1)
ALBUMIN/GLOB SERPL: 1.5 {RATIO} (ref 1–2.5)
ALP SERPL-CCNC: 56 U/L (ref 33–130)
ALT 1742-6: 24 U/L (ref 0–32)
AST 1920-8: 21 U/L (ref 0–35)
BILIRUB SERPL-MCNC: 0.8 MG/DL (ref 0.2–1.2)
BUN SERPL-MCNC: 15 MG/DL (ref 7–25)
BUN/CREATININE RATIO: 18.3 (ref 6–22)
CALCIUM SERPL-MCNC: 9.4 MG/DL (ref 8.6–10.3)
CHLORIDE SERPLBLD-SCNC: 101.6 MMOL/L (ref 98–110)
CHOLEST SERPL-MCNC: 180 MG/DL (ref 0–199)
CHOLEST/HDLC SERPL: 3 {RATIO} (ref 0–5)
CO2 SERPL-SCNC: 26.1 MMOL/L (ref 20–32)
CREAT SERPL-MCNC: 0.82 MG/DL (ref 0.6–1.3)
GLOBULIN, CALCULATED - QUEST: 2.9 (ref 1.9–3.7)
GLUCOSE SERPL-MCNC: 99 MG/DL (ref 60–99)
HDLC SERPL-MCNC: 66 MG/DL (ref 40–150)
LDLC SERPL CALC-MCNC: 86 MG/DL (ref 0–130)
POTASSIUM SERPL-SCNC: 4.4 MMOL/L (ref 3.5–5.3)
PROT SERPL-MCNC: 7.2 G/DL (ref 6.1–8.1)
SODIUM SERPL-SCNC: 137 MMOL/L (ref 135–146)
TRIGL SERPL-MCNC: 138 MG/DL (ref 0–149)

## 2021-12-14 PROCEDURE — 80053 COMPREHEN METABOLIC PANEL: CPT | Performed by: PHYSICIAN ASSISTANT

## 2021-12-14 PROCEDURE — 99397 PER PM REEVAL EST PAT 65+ YR: CPT | Mod: GA | Performed by: PHYSICIAN ASSISTANT

## 2021-12-14 PROCEDURE — 80061 LIPID PANEL: CPT | Performed by: PHYSICIAN ASSISTANT

## 2021-12-14 PROCEDURE — 36415 COLL VENOUS BLD VENIPUNCTURE: CPT | Performed by: PHYSICIAN ASSISTANT

## 2021-12-14 ASSESSMENT — MIFFLIN-ST. JEOR: SCORE: 1388.56

## 2021-12-14 NOTE — PROGRESS NOTES
Jose Moreno is a 71 year old male presents for routine health maintenance.    Current concerns: Leaving for [place] after [place] 3 months. Wondering    Body mass index is 24.94 kg/m .    Present exercise habits:  3-5 times/week  Present dietary habits:  eats regular meals and follows a balanced nutrition diet    Vit D intake: is taking supplement    Is the patient a smoker? No  If yes, smoking cessation advised and counseling provided.     Cardiovascular risk factors: history NSTEMI    Over the past few weeks, have you felt down or depressed? Little interest or pleasure in doing things? No concerns. Doing well.     Last dental appointment:  this year  Last optical appointment:  2 years ago    Was the patient born between [birth year] and has not had Hep C testing?  Patient has already been tested    I have reviewed the following histories: Past Medical History, Past Surgical History, Social History, Family History, Problem List, Medication List and Allergies    Past Medical History:   Diagnosis Date     ADHD (attention deficit hyperactivity disorder)      Arthritis     lower back     CAD (coronary artery disease)     cardiac cath 1/23/15: SHERRY to 1st diagonal, SHERRY x2 to LAD, cath 2009: no intervention     Esophageal reflux      Heart murmur     mitral valve repair     History of hyperthyroidism 4/9/2014     Hyperlipidemia      Hypertension      Mitral regurgitation 2009    moderately severe MVP, s/p robotic repair at [place]     Prostate cancer      Pulmonary nodules 2009    CT-recommend repeat in 6-12 months     Rotator cuff rupture 11/3/2011     Thyroid disease      Family History   Problem Relation Age of Onset     Breast Cancer Mother      Lung Cancer Mother         small cell carcinoma- radon?     Depression Father         alcohlism     Macular Degeneration Father      Colon Cancer Father         age 75     Crohn's Disease Sister      Pleurisy Brother      Depression Son      Diabetes No family hx of       Cardiovascular No family hx of      Prostate Cancer No family hx of      Social History     Socioeconomic History     Marital status:      Spouse name: Chastity     Number of children: 4     Years of education: 14     Highest education level: Not on file   Occupational History     Occupation:      Employer: [employer]     Comment: RETIRED   Tobacco Use     Smoking status: Never Smoker     Smokeless tobacco: Never Used   Substance and Sexual Activity     Alcohol use: Yes     Alcohol/week: 7.0 standard drinks     Types: 7 Standard drinks or equivalent per week     Comment: 1 beer daily     Drug use: No     Sexual activity: Yes     Partners: Female     Birth control/protection: Condom, Post-menopausal   Other Topics Concern      Service Not Asked     Blood Transfusions Not Asked     Caffeine Concern No     Comment: 2-3 daily     Occupational Exposure Not Asked     Hobby Hazards Not Asked     Sleep Concern No     Stress Concern Not Asked     Weight Concern Not Asked     Special Diet No     Comment: low sodium     Back Care Not Asked     Exercise Yes     Comment: walking daily     Bike Helmet Not Asked     Seat Belt Yes     Self-Exams No     Parent/sibling w/ CABG, MI or angioplasty before 65F 55M? Not Asked   Social History Narrative     Not on file     Social Determinants of Health     Financial Resource Strain: Not on file   Food Insecurity: Not on file   Transportation Needs: Not on file   Physical Activity: Not on file   Stress: Not on file   Social Connections: Not on file   Intimate Partner Violence: Not on file   Housing Stability: Not on file     Patient Active Problem List   Diagnosis     Essential hypertension, benign     Nonspecific (abnormal) findings on radiological and other examination of lung field     Status post mitral valve repair     Pulmonary nodules     Mixed hyperlipidemia     Spinal stenosis, lumbar     Health Care Home     Coronary artery disease involving native coronary  artery of native heart without angina pectoris     Non-rheumatic mitral regurgitation     ACP (advance care planning)     Hypothyroidism due to acquired atrophy of thyroid     Gastroesophageal reflux disease without esophagitis     Chronic left-sided low back pain without sciatica     Hiatal hernia     History of non-ST elevation myocardial infarction (NSTEMI)     Lumbosacral spondylosis without myelopathy     Facet arthropathy, lumbosacral     Major depressive disorder, recurrent episode, in full remission (H)     FAUSTIN (dyspnea on exertion)     Chronic fatigue     Prostate cancer (H)     Stress incontinence of urine     Urinary incontinence     Supraventricular tachycardia (H)     Current Outpatient Medications   Medication     Acetaminophen (TYLENOL PO)     amLODIPine (NORVASC) 10 MG tablet     aspirin EC 81 MG EC tablet     atorvastatin (LIPITOR) 80 MG tablet     cholecalciferol (VITAMIN D3) 1000 UNIT tablet     Cyanocobalamin (B-12) 1000 MCG TBCR     FLUoxetine (PROZAC) 20 MG capsule     hydrochlorothiazide (HYDRODIURIL) 25 MG tablet     HYDROcodone-acetaminophen (NORCO) 5-325 MG tablet     irbesartan (AVAPRO) 300 MG tablet     levothyroxine (SYNTHROID/LEVOTHROID) 50 MCG tablet     methocarbamol (ROBAXIN) 500 MG tablet     metoprolol succinate ER (TOPROL-XL) 25 MG 24 hr tablet     multivitamin, therapeutic with minerals (MULTI-VITAMIN) TABS tablet     nitroGLYcerin (NITROSTAT) 0.4 MG sublingual tablet     Omega-3 Fatty Acids (FISH OIL PO)     omeprazole (PRILOSEC) 20 MG DR capsule     pregabalin (LYRICA) 150 MG capsule     traZODone (DESYREL) 100 MG tablet     triamcinolone (KENALOG) 0.1 % external cream     ketoconazole (NIZORAL) 2 % external cream     No current facility-administered medications for this visit.       Allergies:    Allergies   Allergen Reactions     Ace Inhibitors Cough     Dry cough     No Clinical Screening - See Comments      PN: LW FI1: NKA       ROS:  E/M: NEGATIVE for ear, nose, mouth and  "throat problems  R: NEGATIVE for significant/chronic cough or SOB  CV: NEGATIVE for chest pain or palpitations  GI: NEGATIVE for abdominal pain, chronic diarrhea or constipation  : NEGATIVE for dysuria, hematuria, weakened urinary stream      OBJECTIVE:    Vitals:    12/14/21 1306   BP: 122/68   BP Location: Left arm   Patient Position: Chair   Cuff Size: Adult Regular   Pulse: 56   Resp: 20   Temp: 97.2  F (36.2  C)   Weight: 70.1 kg (154 lb 8 oz)   Height: 1.676 m (5' 6\")       General: 71 year old male who appears his stated age. Vital signs noted.  Head: Normocephalic  Eyes: pupils equal round reactive to light and accomodation, extra ocular movements intact  Ears: external canals and tms free of abnormalities  Nose: patent, without mucosal abnormalities  Mouth and throat: without erythema or lesions of the mucosa  Neck: supple, without adenopathy or thyromegaly  Lungs: clear to auscultation, no wheezing or crackles  Cv: regular rate and rhythm, normal s1 and s2 without murmur or click  Abd: soft, non-tender, no masses, no hepatomegaly or splenomegaly.  Gu: normal external genitalia, no hernia  Rectal: Not indicated.   Ms: normal muscle tone & symmetry  Skin: Clear to inspection  Neuro: sensation and motor function grossly intact; cranial nerves without obvious abnormalities.    ASSESSMENT/PLAN:    Encounter for general medical examination  Morgan is doing well today. Stable. Medications are already refilled. Will update fasting labs, and send results when available.     Essential hypertension, benign  History of non-ST elevation myocardial infarction (NSTEMI)  Continue working with cardiology.      reports that he has never smoked. He has never used smokeless tobacco.    Estimated body mass index is 24.94 kg/m  as calculated from the following:    Height as of this encounter: 1.676 m (5' 6\").    Weight as of this encounter: 70.1 kg (154 lb 8 oz).    Labs pending:      Fasting glucose      Fasting lipids  Meds " Suggested:      Vitamin D       Calcium  Tests Recommended:      Regular Dental Examinations        Eye exam  Behavior Modifications:       Cardiovascular exercise 3 times per week--enough to get your Target Heart rate  Other recommendations:    Health Care directive      Regular breast exam     Encouraged My Chart    The patient will return to the clinic if symptoms are changing or concern with follow up as discussed. The patient understands and agrees with the plan.    Raysa Carolina PA-C  12/14/2021      Counseling Resources:  ATP IV Guidelines  Pooled Cohorts Equation Calculator  Breast Cancer Risk Calculator  FRAX Risk Assessment  ICSI Preventive Guidelines  Dietary Guidelines for Americans, 2010  USDA's MyPlate

## 2021-12-14 NOTE — NURSING NOTE
Jose Moreno is here for a CPX.    Pre-visit planning  Immunizations -up to date  Colonoscopy -is up to date  Mammogram -  Asthma test --  PHQ9 -  CARL 7 -  Hearing screen -    Questioned patient about current smoking habits.  Pt. has never smoked.  Body mass index is 24.94 kg/m .  PULSE regular  My Chart: active  CLASSIFICATION OF OVERWEIGHT AND OBESITY BY BMI                        Obesity Class           BMI(kg/m2)  Underweight                                    < 18.5  Normal                                         18.5-24.9  Overweight                                     25.0-29.9  OBESITY                     I                  30.0-34.9                             II                 35.0-39.9  EXTREME OBESITY             III                >40                            Patient's  BMI Body mass index is 24.94 kg/m .

## 2021-12-15 NOTE — PROGRESS NOTES
[facility] - Procedure Note    Date of Visit: 12/16/21    Procedure performed: Lumbar 3-4 interlaminar epidural steroid injection  Diagnosis: Lumbar spondylosis; Lumbar DDD  : Jersey Richter DO  Anesthesia: none    Indications: Jose Moreno is a 71 year old male who is seen for a lumbar epidural steroid injection. The patient describes chronic bilateral low back and bilateral buttock pain, they have symptoms consistent with intermittent radiculitis as well. The patient has been exhibiting symptoms consistent with lumbar intraspinal inflammation and degenerative disc disease. Symptoms have been persistent, disabling, and intermittently severe. The patient reports minimal improvement with conservative treatment, including oral medications and exercises.    They last had an lumbar epidural steroid injection on 10/4/21 with significant pain relief 2 months with gradual reduction in effectiveness.    Lumbar MRI was done on 4/12/21 which showed   T12-L1: No loss of disc height. No significant disc herniation. Normal  facets. No spinal canal or neural foraminal narrowing.      L1-L2: Mild loss of disc height and signal. Small left subarticular  and foraminal disc protrusion. Facet tropism with moderate asymmetric  left-sided facet hypertrophy with edema around the left facet joint.  No significant spinal canal narrowing. No right neural foraminal  narrowing. Moderate left neural foraminal narrowing.      L2-L3: Marked loss of disc height and signal asymmetric towards the  left with degenerative endplate changes. Circumferential disc bulge  with endplate osteophytic spurring more pronounced towards the left  foraminal and far lateral region. No significant facet hypertrophy. No  significant spinal canal narrowing. No right neural foraminal  narrowing. Mild left neural foraminal narrowing.      L3-L4: Moderate loss of disc height and signal. Posterior disc bulge  with endplate  osteophytic spurring. Superimposed small central disc  extrusion which extends partially above the disc level. Facet tropism  with moderate facet hypertrophy. No significant spinal canal  narrowing. Moderate right neural foraminal narrowing. Mild left neural  foraminal narrowing.      L4-L5: Marked loss of disc height and signal asymmetric towards the  right with degenerative endplate changes. Disc bulge with endplate  osteophytic spurring from the right subarticular through right  foraminal and far lateral region. Facet tropism. Moderate facet  hypertrophy. No significant spinal canal narrowing; however, there is  narrowing of the right lateral recess which could affect the right L5  nerve root. Moderate to severe right neural foraminal narrowing. Mild  left neural foraminal narrowing.     L5-S1: Marked loss of disc height and signal. Circumferential disc  bulge with endplate osteophytic spurring. Moderate bilateral facet  hypertrophy. No significant spinal canal narrowing. Mild right neural  foraminal narrowing. Moderate left neural foraminal narrowing.      Paraspinous soft tissues: Unremarkable.                                                                       IMPRESSION:    1. Marked multilevel degenerative changes throughout the lumbar spine  with scoliotic curvature of the lumbar spine as detailed above.  2. New edema in and around the degenerated left L1-L2 facet joint  compared to previous MR 2/9/2019. This could be a source of pain. The  left-sided facet hypertrophy at L1-L2 contributes to moderate left  neural foraminal narrowing which is increased since 2/9/2019.  3. Other degenerative changes throughout the lumbar spine appear  similar to prior MR 2/9/2019.     AGA WHATLEY MD    Allergies:      Allergies   Allergen Reactions     Ace Inhibitors Cough     Dry cough     No Clinical Screening - See Comments      PN: LW FI1: NKA        Vitals:  /80   Pulse 54   SpO2 97%     Review of Systems:  The patient denies recent fever, chills, illness, use of antibiotics or anticoagulants. All other 10-point review of systems negative.     Procedure: The procedure and risks were explained, and informed written consent was obtained from the patient. Risks include but are not limited to: infection, bleeding, increased pain, and damage to soft tissue, nerve, muscle, and vasculature structures. After getting informed consent, patient was brought into the procedure suite and was placed in a prone position on the procedure table. A Pause for the Cause was performed. Patient was prepped and draped in sterile fashion.     The 3-4 interspace was identified with use of fluoroscopy in AP view. A 25-gauge, 1.5 inch needle was used to anesthetize the skin and subcutaneous tissue entry site with a total of 3.5 ml of 1% lidocaine. Under fluoroscopic visualization, a 22-gauge, 3.5 inch Tuohy epidural needle was slowly advanced towards the epidural space a few millimeters left-sided of midline. The latter part of the needle advancement was guided with fluoroscopy in the lateral view. The epidural space was identified using loss of resistance technique. After negative aspiration for heme and cerebrospinal fluid, a total of 1 mL of non-ionic contrast was injected to confirm needle placement with 9 mL of contrast wasted. Epidurogram confirmed spread within the posterior epidural space. 1 ml of 40mg/ml of triamcinolone, 1 ml of 1% lidocaine, and 3 ml of preservative free saline was injected. The needle was removed.  Images were saved to PACS.    The patient tolerated the procedure well, and there was no evidence of procedural complications. No new sensory or motor deficits were noted following the procedure. The patient was stable and able to ambulate on discharge home. Post-procedure instructions were provided.     Pre-procedure pain score: 3/10 in the back, 0/10 in the leg  Post-procedure pain score: 1/10 in the back, 0/10 in the  leg    Assessment/Plan: Jose Moreno is a 71 year old male s/p lumbar interlaminar epidural steroid injection today for lumbar spondylosis and radiculitis/radiculopathy.     1. Following today's procedure, the patient was advised to contact the [facility] for any of the following:   Fever, chills, or night sweats   New onset of pain, numbness, or weakness   Any questions/concerns regarding the procedure  If unable to contact the Pain Center, the patient was instructed to go to a local Emergency Room for any complications.   2. Follow-up with the referring provider in 2-3 months for post-procedure evaluation.        Jersey Richter DO  [facility] [place]

## 2021-12-16 ENCOUNTER — TELEPHONE (OUTPATIENT)
Dept: FAMILY MEDICINE | Facility: CLINIC | Age: 71
End: 2021-12-16

## 2021-12-16 ENCOUNTER — RADIOLOGY INJECTION OFFICE VISIT (OUTPATIENT)
Dept: PALLIATIVE MEDICINE | Facility: CLINIC | Age: 71
End: 2021-12-16
Payer: COMMERCIAL

## 2021-12-16 VITALS — OXYGEN SATURATION: 97 % | HEART RATE: 54 BPM | SYSTOLIC BLOOD PRESSURE: 125 MMHG | DIASTOLIC BLOOD PRESSURE: 80 MMHG

## 2021-12-16 DIAGNOSIS — M54.16 LUMBAR RADICULOPATHY: Primary | ICD-10-CM

## 2021-12-16 PROCEDURE — 62323 NJX INTERLAMINAR LMBR/SAC: CPT | Performed by: PHYSICAL MEDICINE & REHABILITATION

## 2021-12-16 RX ORDER — TRIAMCINOLONE ACETONIDE 40 MG/ML
40 INJECTION, SUSPENSION INTRA-ARTICULAR; INTRAMUSCULAR ONCE
Status: COMPLETED | OUTPATIENT
Start: 2021-12-16 | End: 2021-12-16

## 2021-12-16 RX ADMIN — TRIAMCINOLONE ACETONIDE 40 MG: 40 INJECTION, SUSPENSION INTRA-ARTICULAR; INTRAMUSCULAR at 10:07

## 2021-12-16 NOTE — NURSING NOTE
Pre-procedure Intake    If YES to any questions or NO to having a   Please complete laminated checklist and leave on the computer keyboard for Provider, verbally inform provider if able.    For SCS Trial, RFA's or any sedation procedure: NA    If yes, for how long?         Are you taking any any blood thinners such as Coumadin, Warfarin, Jantoven, Pradaxa Xarelto, Eliquis, Edoxaban, Enoxaparin, Lovenox, Heparin, Arixtra, Fondaparinux, or Fragmin? OR Antiplatelet medication such as Plavix, Brilinta, or Effient?  NO     If yes, when did you take your last dose?        Do you take aspirin?  Yes 81mg    If cervical procedure, have you held aspirin for 6 days?   NA    Do you have any allergies to contrast dye, iodine, steroid and/or numbing medications?  NO     Are you currently taking antibiotics or have an active infection?  NO     Have you had a fever/elevated temperature within the past week? NO     Are you currently taking oral steroids? NO     Do you have a ? Yes     Are you pregnant or breastfeeding? NA     Have you received the COVID-19 vaccine? Yes     If yes, was it your 1st, 2nd or only dose needed? Booster dose 11/1/21    Notify provider and RNs if systolic BP >170, diastolic BP >100, P >100 or O2 sats < 90%

## 2021-12-16 NOTE — NURSING NOTE
Discharge Information    IV Discontiued Time:  NA    Amount of Fluid Infused:  NA    Discharge Criteria = When patient returns to baseline or as per MD order    Consciousness:  Pt is fully awake    Circulation:  BP +/- 20% of pre-procedure level    Respiration:  Patient is able to breathe deeply    O2 Sat:  Patient is able to maintain O2 Sat >92% on room air    Activity:  Moves 4 extremities on command    Ambulation:  Patient is able to stand and walk or stand and pivot into wheelchair    Dressing:  Clean/dry or No Dressing    Notes:   Discharge instructions and AVS given to patient    Patient meets criteria for discharge?  YES    Admitted to PCU?  No    Responsible adult present to accompany patient home?  Yes    Signature/Title:    Katia Medeiros RN  RN Care Coordinator  [facility] [place]

## 2021-12-16 NOTE — PATIENT INSTRUCTIONS
[facility] Procedure Discharge Instructions    Today you saw:    Dr. Jersey Richter    Your procedure:  Epidural steroid injection       Medications used:  Lidocaine (anesthetic)   Kenalog (steroid)  Omnipaque (contrast)            Be cautious when walking as numbness and/or weakness in the legs may occur up to 6-8 hours after the procedure due to effect of the local anesthetic    Do not drive for 6 hours. The effect of the local anesthetic could slow your reflexes.     Avoid strenuous activity for the first 24 hours. You may resume your regular activities after that.     You may shower, however avoid swimming, tub baths or hot tubs for 24 hours following your procedure    You may have a mild to moderate increase in pain for several days following the injection.      You may use ice packs for 10-15 minutes, 3 to 4 times a day at the injection site for comfort    Do not use heat to painful areas for 6 to 8 hours. This will give the local anesthetic time to wear off and prevent you from accidentally burning your skin.    Unless you have been directed to avoid the use of anti-inflammatory medications (NSAIDS-ibuprofen, Aleve, Motrin), you may use these medications or Tylenol for pain control if needed.     With diabetes, check your blood sugar more frequently than usual as your blood sugar may be higher than normal for 10-14 days following a steroid injection. Contact your doctor who manages your diabetes if your blood sugar is higher than usual    Possible side effects of steroids that you may experience include flushing, elevated blood pressure, increased appetite, mild headaches and restlessness.  All of these symptoms will get better with time.    It may take up to 14 days for the steroid medication to start working although you may feel the effect as early as a few days after the procedure.     Follow up with your referring provider in 2-3 weeks      If you experience any of the following,  call the pain center line during work hours at [phone] or on-call physician after hours at [phone]:  -Fever over 100 degree F  -Swelling, bleeding, redness, drainage, warmth at the injection site  -Progressive weakness or numbness in your legs  -Loss of bowel or bladder function  -Unusual headache that is not relieved by Tylenol or your regular headache medication  -Unusual new onset of pain that is not improving

## 2021-12-29 DIAGNOSIS — M47.816 SPONDYLOSIS OF LUMBAR REGION WITHOUT MYELOPATHY OR RADICULOPATHY: ICD-10-CM

## 2021-12-29 RX ORDER — METHOCARBAMOL 500 MG/1
500 TABLET, FILM COATED ORAL 3 TIMES DAILY PRN
Qty: 90 TABLET | Refills: 3 | Status: SHIPPED | OUTPATIENT
Start: 2021-12-29 | End: 2022-09-26

## 2021-12-29 NOTE — TELEPHONE ENCOUNTER
Methocarbamol refill approved.    Jersey Richter DO  [facility]

## 2021-12-29 NOTE — TELEPHONE ENCOUNTER
Left generic message on no id vm to inform of approved Rx .  Princess/MA  [facility]

## 2021-12-29 NOTE — TELEPHONE ENCOUNTER
Received fax request from [facility] pharmacy requesting refill(s) for methocarbamol (ROBAXIN) 500 MG tablet    Last refilled on 12/17/21    Pt last seen on 08/24/21  Next appt scheduled for : none    Will facilitate refill.

## 2022-01-25 DIAGNOSIS — M54.50 CHRONIC LEFT-SIDED LOW BACK PAIN WITHOUT SCIATICA: ICD-10-CM

## 2022-01-25 DIAGNOSIS — G89.29 CHRONIC LEFT-SIDED LOW BACK PAIN WITHOUT SCIATICA: ICD-10-CM

## 2022-01-25 RX ORDER — PREGABALIN 150 MG/1
150 CAPSULE ORAL 2 TIMES DAILY
Qty: 60 CAPSULE | Refills: 3 | Status: SHIPPED | OUTPATIENT
Start: 2022-01-25 | End: 2022-01-31

## 2022-01-25 NOTE — TELEPHONE ENCOUNTER
Received fax request from   [facility]/pharmacy #[id number] - [place], [place] - [address] [address]  [address] [address] [address]  Phone: [phone] Fax: [phone]    Pharmacy requesting refill(s) for pregabalin (LYRICA) 150 MG capsule    Last refilled on 12/17/21    Pt last seen on 08/24/21    Next appt scheduled for None    Will facilitate refill.    Deepti Borja [facility] [place]

## 2022-01-31 ENCOUNTER — TELEPHONE (OUTPATIENT)
Dept: PALLIATIVE MEDICINE | Facility: CLINIC | Age: 72
End: 2022-01-31
Payer: COMMERCIAL

## 2022-01-31 DIAGNOSIS — M47.816 SPONDYLOSIS OF LUMBAR REGION WITHOUT MYELOPATHY OR RADICULOPATHY: ICD-10-CM

## 2022-01-31 DIAGNOSIS — G89.29 CHRONIC BILATERAL LOW BACK PAIN WITHOUT SCIATICA: ICD-10-CM

## 2022-01-31 DIAGNOSIS — G89.29 OTHER CHRONIC PAIN: ICD-10-CM

## 2022-01-31 DIAGNOSIS — M54.50 CHRONIC BILATERAL LOW BACK PAIN WITHOUT SCIATICA: ICD-10-CM

## 2022-01-31 NOTE — TELEPHONE ENCOUNTER
Called pt. Confirmed he will be in [place] until April and would appreciate refills.   Canceled prescription on file for [facility], prepped new script for [place] pharmacy #[id number]    Katia MOSQUERA, RN Care Coordinator  [facility]  [facility]

## 2022-01-31 NOTE — TELEPHONE ENCOUNTER
Routing to provider to review medication prepped per below  Incorrect documentation in Epic- CSA/UDS last completed 04/21/21.     Norco 5-967, #60, Refill:no  Sig:OK to fill and start 01/31/22  Last picked up 11/22/21with start on 11/22/21  Due: anytime    Per last OV note 12/16/21 Injection:    Follow-up with the referring provider in 2-3 months for post-procedure evaluation.        Katia ARCOSN, RN Care Coordinator  [facility]  Pain [facility]

## 2022-01-31 NOTE — TELEPHONE ENCOUNTER
Received call from patient requesting refill(s) of HYDROcodone-acetaminophen (NORCO) 5-325 MG tablet     Last dispensed from pharmacy on 11/22/21    Patient's last office/virtual visit by prescribing provider on 8/24/21  Next office/virtual appointment scheduled for none    Last urine drug screen date 4/14/21  Current opioid agreement on file (completed within the last year) Yes Date of opioid agreement: 4/16/21    E-prescribe to [facility]/pharmacy #[id number] [facility] pharmacy    Will route to nursing Ma[place] for review and preparation of prescription(s).

## 2022-01-31 NOTE — TELEPHONE ENCOUNTER
Reason for call:  Medication   If this is a refill request, has the caller requested the refill from the pharmacy already? No  Will the patient be using a [facility]? No  Name of the pharmacy and phone number for the current request: [id number] [place], [place]    Name of the medication requested: HYDROcodone-acetaminophen (NORCO) 5-325 MG tablet    Other request: none    Phone number to reach patient:  Cell number on file:    Telephone Information:   Mobile [phone]       Best Time:  anytime    Can we leave a detailed message on this number?  YES    Travel screening: Not Applicable    Flori JAMIL    [facility]

## 2022-01-31 NOTE — TELEPHONE ENCOUNTER
Pt calling to state he needs Lyrica sent to [id number] [place], [place].    Flori JAMIL    [facility]

## 2022-02-01 RX ORDER — HYDROCODONE BITARTRATE AND ACETAMINOPHEN 5; 325 MG/1; MG/1
1 TABLET ORAL EVERY 6 HOURS PRN
Qty: 60 TABLET | Refills: 0 | Status: SHIPPED | OUTPATIENT
Start: 2022-02-01

## 2022-02-01 RX ORDER — PREGABALIN 150 MG/1
150 CAPSULE ORAL 2 TIMES DAILY
Qty: 60 CAPSULE | Refills: 3 | Status: SHIPPED | OUTPATIENT
Start: 2022-02-01 | End: 2023-12-11

## 2022-02-01 NOTE — TELEPHONE ENCOUNTER
Left message on voicemail . Rx was E-Prepcribed to:        [facility]      Orders Placed This Encounter   Medications     HYDROcodone-acetaminophen (NORCO) 5-325 MG tablet     Sig: Take 1 tablet by mouth every 6 hours as needed for moderate to severe pain or severe pain (max 2/day.) OK to fill and start 01/31/22     Dispense:  60 tablet     Refill:  0         Patient notified of dispense and start date.

## 2022-02-01 NOTE — TELEPHONE ENCOUNTER
Lyrica refill approved for pharmacy in [place].    Jersey Richter [facility]

## 2022-02-01 NOTE — TELEPHONE ENCOUNTER
reviewed, norco refkayley approved.    Jersey Richter [facility]

## 2022-02-01 NOTE — TELEPHONE ENCOUNTER
Left message on numerical id vm to inform of approved Rx .  Princess/MA  [facility]

## 2022-02-01 NOTE — TELEPHONE ENCOUNTER
LVM for patient that prescription was sent to pharmacy. Confirmed multiple pharmacies.        Deepti Borja [facility] [place]

## 2022-04-28 ENCOUNTER — TELEPHONE (OUTPATIENT)
Dept: CARDIOLOGY | Facility: CLINIC | Age: 72
End: 2022-04-28
Payer: COMMERCIAL

## 2022-04-28 DIAGNOSIS — R07.9 CHEST PAIN: ICD-10-CM

## 2022-04-28 DIAGNOSIS — I25.10 CORONARY ARTERY DISEASE INVOLVING NATIVE CORONARY ARTERY OF NATIVE HEART WITHOUT ANGINA PECTORIS: Primary | ICD-10-CM

## 2022-04-28 RX ORDER — ISOSORBIDE MONONITRATE 30 MG/1
15 TABLET, EXTENDED RELEASE ORAL DAILY
Qty: 15 TABLET | Refills: 1 | Status: SHIPPED | OUTPATIENT
Start: 2022-04-28 | End: 2022-06-20

## 2022-04-28 NOTE — TELEPHONE ENCOUNTER
[facility] Call Center    Phone Message    May a detailed message be left on voicemail: no     Reason for Call: Other: Morgan called to speak with a member of the care team, please reach out to him at [phone].     Action Taken: Other: [facility]    Travel Screening: Not Applicable

## 2022-04-28 NOTE — TELEPHONE ENCOUNTER
Called pt and reviewed comments and recommendations from Dr. Alejandra. Rx for Imdur sent. Reviewed 30mg tablet will be sent, he should take 1/2 tablet daily. Offered AP Visit 5/2 but pt not available. Offered next available MOHS[facility] visit on 5/10/22 in . Pt stated understanding and wrote down information. Gave pt direct RN phone number for any questions or concerns prior to visit.     Left 5/26/22 MOHSEN visit just in case needed, can cancel at 5/10 visit if not needed.     Farideh Armendariz, RN, BSN  04/28/22 at 9:17 AM     Future Appointments   Date Time Provider Department Center   5/10/2022  9:20 AM Shelby Allred APRN CNP [facility]   5/26/2022 10:00 AM Cornell De Anda, JOHANA [facility]

## 2022-04-28 NOTE — TELEPHONE ENCOUNTER
Called pt per [facility], pt reporting angina and swelling in feet/legs.     Pt reports mild chest pain. He has been having it off and on for years, but has become more consistent in the last 6 months since last visit. He reports he has taken nitroglycerin 1 time in last 6 months with relief of chest pain. Pt reports pain as mild ache, again reports he has had this for years, but it has progressed and become more consistent. He denies chest pain at this time. He reports it can come on at rest or with activity. Pt also reports the last few months he has noted some swelling in feet and legs. He reports he also has pain in his legs. He has been trying to be more active and do more walking, but legs hurt, he tries to just push through the pain. Pt has never had vascular work up of legs. He asked if support stockings would help. Reviewed that would recommend he try some support or compression socks for swelling.     Pt is due for follow up 9/2022 with Dr. Alejandra and [facility]. He spoke with scheduling earlier and made an appointment with [facility] 5/26/22. Again pt reports he feels this can wait until visit, but just wanted to check in since symptoms more consistent. Will send to MARCELLUS Espinoza and Dr. Alejandra to see if any testing recommended prior to 5/26/22 [facility] visit.     Farideh Armendariz RN, BSN  04/28/22 at 8:14 AM     Future Appointments   Date Time Provider Department Center   5/26/2022 10:00 AM [facility]

## 2022-04-28 NOTE — TELEPHONE ENCOUNTER
He is a minimizer.  Lets add Imdur 15 mg daily to his regiment and see if we can get him in sooner with an MOHSEN to consider repeat coronary angiography.  He has a known chronically occluded right but we have also done complex intervention on his LAD diagonal.  It may just be the right however if it is the LAD diagonal he may need further intervention or bypass

## 2022-05-10 ENCOUNTER — OFFICE VISIT (OUTPATIENT)
Dept: CARDIOLOGY | Facility: CLINIC | Age: 72
End: 2022-05-10
Payer: COMMERCIAL

## 2022-05-10 VITALS
BODY MASS INDEX: 25.17 KG/M2 | SYSTOLIC BLOOD PRESSURE: 114 MMHG | HEIGHT: 66 IN | OXYGEN SATURATION: 97 % | DIASTOLIC BLOOD PRESSURE: 62 MMHG | HEART RATE: 60 BPM | WEIGHT: 156.6 LBS

## 2022-05-10 DIAGNOSIS — I25.10 CORONARY ARTERY DISEASE INVOLVING NATIVE CORONARY ARTERY OF NATIVE HEART WITHOUT ANGINA PECTORIS: Primary | ICD-10-CM

## 2022-05-10 PROCEDURE — 99215 OFFICE O/P EST HI 40 MIN: CPT | Performed by: NURSE PRACTITIONER

## 2022-05-10 NOTE — PROGRESS NOTES
"CARDIOLOGY CLINIC   DOS: 5.10.22    Jose Moreno  : 1950  MRN: [id number]    Primary Cardiologist: Dr. Alejandra   MOHSEN: seen by Cornell De Anda, Eliceo Dasilva, and now myself.     Reason for visit: URGENT VISIT     HPI:  Morgan is a pleasant 72-year-old gentleman with past medical history significant for a robotic mitral valve repair at the [facility] in  for mitral valve prolapse and severe mitral regurgitation.  Also CAD, HTN, dyslipidemia, rare and brief runs of SVT.       Preoperative coronary angiography  demonstrated only a 50% mid LAD stenosis.       2015 NSTEMI. Coronary angiogram showed a chronically occluded right coronary artery that could not be crossed with a wire.  He subsequently was transferred to [facility], underwent complex LAD diagonal intervention with drug-eluting stents placed in both vessels.       He was subsequently seen in our clinic because he woke up with a \"spell.\"  He was sent back to his primary care doctor for neurologic evaluation.  Subsequent echocardiogram 2/15/16 demonstrated a structurally normal heart. His valve repair appeared to be functioning appropriately.  He has a normal ejection fraction.  A ZIO Patch showed some brief runs of SVT lasting 4 beats, but no atrial fibrillation.  Stress nuclear scan appeared to be consistent with a small to moderate size reversible inferior, inferoseptal and inferolateral defect consistent with his known anatomy.       In followup, he did have a low-grade discomfort that was there all the time, did not change with exercise and we felt this was not cardiac in origin.  Options were discussed, including medical management versus coronary artery bypass grafting versus a  and it was decided to continue with medical management.  He was having problems with fatigue, tiredness, cold hands, cold feet and we ultimately discontinued his metoprolol, of which he was only on 12.5 mg at that time.       7/10/18 seen by Dr." Justyn.  He had some more fatigue with walking.  7/18/18 Dr. Alejandra ordered a stress echo.   He exercised to an acceptable level.  Heart rate response was normal.  BP was a little high.  LVEF at rest was 55-60%.  There was no evidence of stress-induced ischemia.       7/13/20 virtual visit with Dr. Alejandra. Overall doing great, no sxs. Increased atorvastatin from 40 mg to 80 mg daily LDL 2/2020 was 85.  12/2020 LDL improved to 64.      BP was uncontrolled so amlodipine 10mg was added and and hydrochlorothiazide 25 mg was started.    12/11/20 24 Holter, avg HR in 50s.    12/16/20 Decrease Toprol to 12.5 mg.    9/21 LDL is back up to 88 despite being on atorvastatin 80 mg.    12/21 LDL 86    4/28/22 Patient called and complaint of mild chest pain. He has been having it off and on for years, but has become more consistent in the last 6 months since last visit. He reported he has taken nitroglycerin 1 time in last 6 months with relief of chest pain. Pt reported pain as mild ache, again reports he has had this for years, but it has progressed and become more consistent. Dr. Alejandra started Imdur 15 mg daily.     Per Dr. Alejandra: He has a known chronically occluded right but we have also done complex intervention on his LAD diagonal.  It may just be the right however if it is the LAD diagonal he may need further intervention or bypass     5/10 he returns in follow-up.  He states he no longer has angina.  Imdur alleviated the pain.   He recently vacationed in [place].  He was with the group and ended up walking 5 miles a day.  At first it was quite a push for him but he improved over time.  He did not have chest pain when he was walking.  He tells me his back pain improved.    When he returned he did he developed occasional chest pain relieved with nitro.  He denies shortness of breath, orthopnea, PND, syncope or near syncope.        PREVIOUS STUDIES (personally reviewed):  BMP RESULTS: No BMP drawn for  today.  Echocardiography: 2019 The left ventricle is normal in structure, function and size. The visual ejection fraction is estimated at 55%. 2. The right ventricle is normal in structure, function and size. 3. s/p posterior MV leaflet repair. Mean 3mmHg. There is mild (1+) mitral regurgitation. 4. There is mild (1+) aortic regurgitation   Coronary angiogram: 2015 as above         ASSESSMENT/PLAN:  Mitral valve prolapse and severe mitral regurgitation s/p robotic mitral valve repair at the [facility] in 2009  - Stress echo 7/2018 shows valve working well, MG 5 mmHg, mild MR  - Echo 7/31/19: LVEF 55%, s/p posterior MV leaflet repair, MG 3 mmHg, mild MR  - Repeat echo 7/2022      CAD  - Preoperative coronary angiography 2009 demonstrated only a 50% mid LAD stenosis.   - 1/2015 Jose presented with a NSTEMI.  Cardiac cath:  RCA, underwent complex LAD diagonal intervention with drug-eluting stents placed in both vessels.   - Negative stress echo 7/2018  - 4/2022 more anginal sxs, Imdur started at 15mg daily  - Continue ASA, BB, statin.    -No complaints of angina today        HTN  - BP controlled on irbesartan 300 mg, Toprol XL 12.5 mg, amlodipine 10 mg, hydrochlorothiazide 25 mg, Imdur  - With his HR in the low 50s, will not increase Toprol XL        Dyslipidemia  - FLP 2/2020 LDL 85  - Dr. Alejandra increased atorvastatin to 80 mg to drive LDL lower  - FLP 8/13/2020: , HDL 69, LDL 64, TG 62  - FLP 9/15/21: , HDL 62, LDL 88, TG 96  - FLP 12/21 LDL 86  - He will work on dietary changes.  He is active, biking most days        Rare and brief runs of SVT  - Asymptomatic         Prostate cancer s/p prostatectomy  - Still follows with urology. He is having fairly severe urinary incontinence and is considering artificial urinary sphincter.   Recent PSA was 0        Follow up:   Dr. Alejandra in 7/2022 with echo, FLP/ALT, BMP   -With me 6 months later.      Thank you for the opportunity to be involved in this  very pleasant patient's care please feel to contact me with any questions.    Total time: 41 minutes was spent today reviewing the chart, visiting the patient, and documenting the visit.    Shelby BALDERRAMA, CNP   Nurse Practitioner  [facility]        CURRENT MEDICATIONS:  Current Outpatient Medications   Medication Sig Dispense Refill     Acetaminophen (TYLENOL PO) Take 325-650 mg by mouth 2 times daily as needed for mild pain or fever       amLODIPine (NORVASC) 10 MG tablet Take 1 tablet (10 mg) by mouth daily 90 tablet 3     aspirin EC 81 MG EC tablet Take 1 tablet (81 mg) by mouth daily 60 tablet 0     atorvastatin (LIPITOR) 80 MG tablet Take 1 tablet (80 mg) by mouth daily 90 tablet 3     cholecalciferol (VITAMIN D3) 1000 UNIT tablet Take 1 tablet (1,000 Units) by mouth daily       Cyanocobalamin (B-12) 1000 MCG TBCR Take 1,000 mcg by mouth daily       FLUoxetine (PROZAC) 20 MG capsule Take 1 capsule (20 mg) by mouth daily 90 capsule 3     hydrochlorothiazide (HYDRODIURIL) 25 MG tablet Take 1 tablet (25 mg) by mouth daily 90 tablet 3     HYDROcodone-acetaminophen (NORCO) 5-325 MG tablet Take 1 tablet by mouth every 6 hours as needed for moderate to severe pain or severe pain (max 2/day.) OK to fill and start 01/31/22 60 tablet 0     irbesartan (AVAPRO) 300 MG tablet Take 1 tablet (300 mg) by mouth daily 90 tablet 3     isosorbide mononitrate (IMDUR) 30 MG 24 hr tablet Take 0.5 tablets (15 mg) by mouth daily 15 tablet 1     ketoconazole (NIZORAL) 2 % external cream Apply topically daily 60 g 0     levothyroxine (SYNTHROID/LEVOTHROID) 50 MCG tablet Take 1 tablet (50 mcg) by mouth daily 90 tablet 3     methocarbamol (ROBAXIN) 500 MG tablet Take 1 tablet (500 mg) by mouth 3 times daily as needed for muscle spasms Okay to dispense 12/28/2020 90 tablet 3     metoprolol succinate ER (TOPROL-XL) 25 MG 24 hr tablet Take 0.5 tablets (12.5 mg) by mouth daily 45 tablet 3     multivitamin,  therapeutic with minerals (MULTI-VITAMIN) TABS tablet Take 1 tablet by mouth daily       nitroGLYcerin (NITROSTAT) 0.4 MG sublingual tablet TAKE 1 TABLET BY MOUTH EVERY 5 MIN AS NEEDED FOR CHEST PAIN max of three tablets and if doesn't relive pain go to the emergency departemnt. 25 tablet 0     Omega-3 Fatty Acids (FISH OIL PO) Take 1 capsule by mouth daily       omeprazole (PRILOSEC) 20 MG DR capsule Take 1 capsule (20 mg) by mouth daily 90 capsule 3     pregabalin (LYRICA) 150 MG capsule Take 1 capsule (150 mg) by mouth 2 times daily 60 capsule 3     traZODone (DESYREL) 100 MG tablet Take 1 tablet (100 mg) by mouth At Bedtime 90 tablet 3     triamcinolone (KENALOG) 0.1 % external cream APPLY TO THE AFFECTED RASH TWICE DAILY AS NEEDED         ALLERGIES  Allergies   Allergen Reactions     Ace Inhibitors Cough     Dry cough     No Clinical Screening - See Comments      PN: LW FI1: NKA       PAST MEDICAL, SURGICAL, FAMILY HISTORY:  History was reviewed and updated as needed, see medical record.    ROS:  12-pt ROS is negative except for as noted above.     PHYSICAL EXAMINATION:  Vitals: /62, HR 60 bpm, weight 156 pounds   Constitutional:  Patient is pleasant, alert, cooperative, and in NAD.   HEENT:  NCAT. PERRLA. EOM's intact.   Neck:   No JVD   pulmonary: clear  Cardiac: regular rhythm;normal S1 and S2;no murmurs, gallops or rubs detected                Abdomen:  Soft, non-tender abdomen, no hepatosplenomegaly appreciated.   Extremities:    Neurological:  No gross motor or sensory deficits.   Psych: Appropriate affect    Past Medical History:   Diagnosis Date     ADHD (attention deficit hyperactivity disorder)      Arthritis     lower back     CAD (coronary artery disease)     cardiac cath 1/23/15: SHERRY to 1st diagonal, SHERRY x2 to LAD, cath 2009: no intervention     Esophageal reflux      Heart murmur     mitral valve repair     History of hyperthyroidism 4/9/2014     Hyperlipidemia      Hypertension      Mitral  regurgitation 2009    moderately severe MVP, s/p robotic repair at [place]     Prostate cancer      Pulmonary nodules 2009    CT-recommend repeat in 6-12 months     Rotator cuff rupture 11/3/2011     Thyroid disease               Past Surgical History:     Past Surgical History:   Procedure Laterality Date     COLONOSCOPY  7/00    GI     DAVINCI PROSTATECTOMY, LYMPHADENECTOMY N/A 11/1/2018    Procedure: ROBOTIC ASSISTED RADICAL PROSTATECTOMY WITH BILATERAL PELVIC LYMPH NODE DISSECTION;  Surgeon: Clint Blankenship MD;  Location: [facility] OR     DECOMPRESSION LUMBAR MINIMALLY INVASIVE ONE LEVEL  1/11/2012    Procedure:DECOMPRESSION LUMBAR MINIMALLY INVASIVE ONE LEVEL; L4-5 Decompression Minimally Invasive; Surgeon:MESSI HORAN; Location:[facility] OR     HEART CATH RIGHT AND LEFT HEART CATH  01-23-15    See report, pt transfered to [facility] for complex bifurcation PCI of LAD diagonal vessel.      HEART CATH RIGHT AND LEFT HEART CATH  01-26-15    PTCA and implantation of SHERRY to 1st diagonal, SHERRY to mid LAD, SHERRY to proximal LAD     Hx of rotator cuff rupture and repair       IMPLANT PROSTHESIS SPHINCTER URINARY N/A 1/3/2020    Procedure: Placement of  artificial urinary sphincter;  Surgeon: Clint Blankenship MD;  Location: [facility] OR     LAPAROSCOPIC PROSTATECTOMY       REPAIR VALVE MITRAL  2009    [facility] robotic repair      Reversal of vasectomy       VASECTOMY       XR LUMBAR RADIOFREQ ABLATION UNILATERAL  01/11/2018    lumbar area/ ? level        Recent Lab Results:  LIPID RESULTS:  Lab Results   Component Value Date    CHOL 180 12/14/2021    HDL 66 12/14/2021    LDL 86 12/14/2021    LDL 64 08/12/2020    TRIG 138 12/14/2021    CHOLHDLRATIO 3 12/14/2021     LIVER ENZYME RESULTS:  Lab Results   Component Value Date    AST 19 04/01/2019    ALT 41 08/12/2020     CBC RESULTS:  Lab Results   Component Value Date    WBC 5.9 11/02/2020    RBC 4.25 (A) 11/02/2020    HGB 14.1 11/02/2020    HCT 43.5 11/02/2020    MCV  102.4 (A) 11/02/2020    MCH 33.2 (A) 11/02/2020    MCHC 32.4 11/02/2020    RDW 12.2 12/30/2019     11/02/2020     11/13/2018     BMP RESULTS:  Lab Results   Component Value Date    .0 12/14/2021    POTASSIUM 4.40 12/14/2021    CHLORIDE 101.6 12/14/2021    CO2 26.1 12/14/2021    ANIONGAP 7 09/23/2020    GLC 99 12/14/2021    BUN 15 12/14/2021    BUN 18.3 12/14/2021    CR 0.82 12/14/2021    GFRESTIMATED 80 09/23/2020    GFRESTBLACK >90 09/23/2020    MIRYAM 9.4 12/14/2021      A1C RESULTS:  No results found for: A1C  INR RESULTS:  Lab Results   Component Value Date    INR 1.00 01/23/2015    INR 1.09 01/09/2013       This note was completed in part using Dragon voice recognition software. Although reviewed after completion, some word and grammatical errors may occur.

## 2022-05-10 NOTE — PATIENT INSTRUCTIONS
Call my nurse for any questions or concerns Mon-Fri 8am-4pm:                                                 #[phone]                                       For concerns after hours:                                               #[phone]      Medication changes:no changes    Plan from today:   Set up Appt with Dr. Alejandra in July   Try to eat less hidden salt.

## 2022-05-13 ENCOUNTER — TELEPHONE (OUTPATIENT)
Dept: CARDIOLOGY | Facility: CLINIC | Age: 72
End: 2022-05-13

## 2022-05-13 ENCOUNTER — HOSPITAL ENCOUNTER (EMERGENCY)
Facility: CLINIC | Age: 72
Discharge: LEFT BEFORE DISCHARGE | End: 2022-05-13
Admitting: EMERGENCY MEDICINE
Payer: COMMERCIAL

## 2022-05-13 VITALS
DIASTOLIC BLOOD PRESSURE: 76 MMHG | TEMPERATURE: 98.1 F | OXYGEN SATURATION: 72 % | RESPIRATION RATE: 20 BRPM | SYSTOLIC BLOOD PRESSURE: 121 MMHG | HEART RATE: 62 BPM

## 2022-05-13 LAB
ANION GAP SERPL CALCULATED.3IONS-SCNC: 7 MMOL/L (ref 3–14)
BASOPHILS # BLD AUTO: 0 10E3/UL (ref 0–0.2)
BASOPHILS NFR BLD AUTO: 0 %
BUN SERPL-MCNC: 24 MG/DL (ref 7–30)
CALCIUM SERPL-MCNC: 9.2 MG/DL (ref 8.5–10.1)
CHLORIDE BLD-SCNC: 104 MMOL/L (ref 94–109)
CO2 SERPL-SCNC: 26 MMOL/L (ref 20–32)
CREAT SERPL-MCNC: 1.01 MG/DL (ref 0.66–1.25)
EOSINOPHIL # BLD AUTO: 0.1 10E3/UL (ref 0–0.7)
EOSINOPHIL NFR BLD AUTO: 2 %
ERYTHROCYTE [DISTWIDTH] IN BLOOD BY AUTOMATED COUNT: 11.9 % (ref 10–15)
GFR SERPL CREATININE-BSD FRML MDRD: 78 ML/MIN/1.73M2
GLUCOSE BLD-MCNC: 128 MG/DL (ref 70–99)
HCT VFR BLD AUTO: 41.8 % (ref 40–53)
HGB BLD-MCNC: 14.3 G/DL (ref 13.3–17.7)
HOLD SPECIMEN: NORMAL
IMM GRANULOCYTES # BLD: 0 10E3/UL
IMM GRANULOCYTES NFR BLD: 0 %
LYMPHOCYTES # BLD AUTO: 2.2 10E3/UL (ref 0.8–5.3)
LYMPHOCYTES NFR BLD AUTO: 33 %
MCH RBC QN AUTO: 33.6 PG (ref 26.5–33)
MCHC RBC AUTO-ENTMCNC: 34.2 G/DL (ref 31.5–36.5)
MCV RBC AUTO: 98 FL (ref 78–100)
MONOCYTES # BLD AUTO: 0.6 10E3/UL (ref 0–1.3)
MONOCYTES NFR BLD AUTO: 9 %
NEUTROPHILS # BLD AUTO: 3.8 10E3/UL (ref 1.6–8.3)
NEUTROPHILS NFR BLD AUTO: 56 %
NRBC # BLD AUTO: 0 10E3/UL
NRBC BLD AUTO-RTO: 0 /100
PLATELET # BLD AUTO: 284 10E3/UL (ref 150–450)
POTASSIUM BLD-SCNC: 3.8 MMOL/L (ref 3.4–5.3)
RBC # BLD AUTO: 4.26 10E6/UL (ref 4.4–5.9)
SODIUM SERPL-SCNC: 137 MMOL/L (ref 133–144)
TROPONIN I SERPL HS-MCNC: 5 NG/L
WBC # BLD AUTO: 6.8 10E3/UL (ref 4–11)

## 2022-05-13 PROCEDURE — 80048 BASIC METABOLIC PNL TOTAL CA: CPT | Performed by: EMERGENCY MEDICINE

## 2022-05-13 PROCEDURE — 999N000104 HC STATISTIC NO CHARGE

## 2022-05-13 PROCEDURE — 36415 COLL VENOUS BLD VENIPUNCTURE: CPT | Performed by: EMERGENCY MEDICINE

## 2022-05-13 PROCEDURE — 84484 ASSAY OF TROPONIN QUANT: CPT | Performed by: EMERGENCY MEDICINE

## 2022-05-13 PROCEDURE — 85025 COMPLETE CBC W/AUTO DIFF WBC: CPT | Performed by: EMERGENCY MEDICINE

## 2022-05-13 NOTE — TELEPHONE ENCOUNTER
"[facility]    Received VM for Jose reporting angina. He states that he has a constant ache in his chest that is at a 1 on a pain scale of 0-10 but he has episodes where is turns sharp occasionally. I asked him when this began he reports that he has had chest pain pretty consistently over the last year and was recently put on Imdur 4/28/22 by Dr. Alejandra. He reported that it helped for a while but it now seems to be coming back. Per Raman Allred CNP's note from most recent OV 5/10/22 she writes, \"Per Dr. Alejandra: He has a known chronically occluded right but we have also done complex intervention on his LAD diagonal.  It may just be the right however if it is the LAD diagonal he may need further intervention or bypass\". Pt reports that he has not taken any sublingual nitroglycerin as it gives him a really bad headache. I advised he go to ER which he is agreeable to but disappointed. He states he will go to the ER now.    Routed to Raman Allred CNP and Dr. Alejandra for FYI.    Ursula Cohen, RN, BSN  [facility] [place], [place]  [facility] Care Coordinator  May 13, 2022 2:35 PM        "

## 2022-05-13 NOTE — ED TRIAGE NOTES
Chest pain off and on for months. Not a stabbing pain. Only took NitroSL a couple times during this time frame. Pain 2/10.

## 2022-05-16 LAB
ATRIAL RATE - MUSE: 53 BPM
DIASTOLIC BLOOD PRESSURE - MUSE: NORMAL MMHG
INTERPRETATION ECG - MUSE: NORMAL
P AXIS - MUSE: 59 DEGREES
PR INTERVAL - MUSE: 192 MS
QRS DURATION - MUSE: 86 MS
QT - MUSE: 424 MS
QTC - MUSE: 397 MS
R AXIS - MUSE: 59 DEGREES
SYSTOLIC BLOOD PRESSURE - MUSE: NORMAL MMHG
T AXIS - MUSE: 73 DEGREES
VENTRICULAR RATE- MUSE: 53 BPM

## 2022-05-26 ENCOUNTER — CARE COORDINATION (OUTPATIENT)
Dept: CARDIOLOGY | Facility: CLINIC | Age: 72
End: 2022-05-26
Payer: COMMERCIAL

## 2022-05-26 DIAGNOSIS — I25.10 CORONARY ARTERY DISEASE INVOLVING NATIVE CORONARY ARTERY OF NATIVE HEART WITHOUT ANGINA PECTORIS: Primary | ICD-10-CM

## 2022-05-26 NOTE — PROGRESS NOTES
Called Morgan with recommendation to be seen. He is agreeable to come in. No lab openings prior to appt. Last BMP 5/13. Will make appt for after appt.     Future Appointments   Date Time Provider Department Center   5/27/2022  8:00 AM [facility]   5/27/2022  8:45 AM RU LAB [facility]   8/22/2022  8:30 AM [id number]   8/22/2022  9:30 AM RU LAB [facility] [place] [facility]   9/29/2022  4:15 PM Clint Alejandra MD [facility] CL[facility]         Katelyn Sherman, RN 5:04 PM 05/26/22

## 2022-05-26 NOTE — PROGRESS NOTES
"Morgan called requesting a call back.    Returned call. For the last month \"or a little more maybe\" he has been feeling more fatigued and his legs \"feel heavy\". He reports swelling up to his knees. He has not been weighing daily or checking his blood pressures. I did stress to him the importance of daily weights. He states he weighs \"maybe one or two times a week. It is always around 150-153 lbs. [facility] started going daily. It was 150.1 lbs there.\" He is able to complete his workout \"But I can't push my self like I want my legs are so heavy.\"     I did ask him to sit for 5 minutes and check his blood pressure: 125/75, 54     Denies chest pain since \"starting that new pill\" (Imdur). He states he does watch his diet and is compliant with meds. He has no dyspnea at rest or activity. \"It's just this fatigue and my legs are so heavy.\"     Update to Raman      Future Appointments   Date Time Provider Department Center   8/22/2022  8:30 AM [id number] [facility]   8/22/2022  9:30 AM RU LAB [facility]   9/29/2022  4:15 PM Clint Alejandra MD [facility] CLIN     Katelyn Sherman, RN 3:36 PM 05/26/22        "

## 2022-05-27 ENCOUNTER — LAB (OUTPATIENT)
Dept: LAB | Facility: CLINIC | Age: 72
End: 2022-05-27
Payer: COMMERCIAL

## 2022-05-27 ENCOUNTER — OFFICE VISIT (OUTPATIENT)
Dept: CARDIOLOGY | Facility: CLINIC | Age: 72
End: 2022-05-27
Attending: PHYSICIAN ASSISTANT
Payer: COMMERCIAL

## 2022-05-27 VITALS
BODY MASS INDEX: 24.68 KG/M2 | HEIGHT: 66 IN | DIASTOLIC BLOOD PRESSURE: 58 MMHG | SYSTOLIC BLOOD PRESSURE: 96 MMHG | HEART RATE: 58 BPM | WEIGHT: 153.6 LBS | OXYGEN SATURATION: 95 %

## 2022-05-27 DIAGNOSIS — E78.2 MIXED HYPERLIPIDEMIA: ICD-10-CM

## 2022-05-27 DIAGNOSIS — N17.9 ACUTE RENAL FAILURE, UNSPECIFIED ACUTE RENAL FAILURE TYPE (H): ICD-10-CM

## 2022-05-27 DIAGNOSIS — I73.9 CLAUDICATION (H): ICD-10-CM

## 2022-05-27 DIAGNOSIS — I25.10 CORONARY ARTERY DISEASE INVOLVING NATIVE CORONARY ARTERY OF NATIVE HEART WITHOUT ANGINA PECTORIS: Primary | ICD-10-CM

## 2022-05-27 DIAGNOSIS — I34.0 NON-RHEUMATIC MITRAL REGURGITATION: ICD-10-CM

## 2022-05-27 DIAGNOSIS — I10 ESSENTIAL HYPERTENSION, BENIGN: ICD-10-CM

## 2022-05-27 DIAGNOSIS — I25.10 CORONARY ARTERY DISEASE INVOLVING NATIVE CORONARY ARTERY OF NATIVE HEART WITHOUT ANGINA PECTORIS: ICD-10-CM

## 2022-05-27 LAB
ANION GAP SERPL CALCULATED.3IONS-SCNC: 7 MMOL/L (ref 3–14)
BUN SERPL-MCNC: 24 MG/DL (ref 7–30)
CALCIUM SERPL-MCNC: 9.2 MG/DL (ref 8.5–10.1)
CHLORIDE BLD-SCNC: 102 MMOL/L (ref 94–109)
CO2 SERPL-SCNC: 27 MMOL/L (ref 20–32)
CREAT SERPL-MCNC: 1.34 MG/DL (ref 0.66–1.25)
GFR SERPL CREATININE-BSD FRML MDRD: 56 ML/MIN/1.73M2
GLUCOSE BLD-MCNC: 107 MG/DL (ref 70–99)
POTASSIUM BLD-SCNC: 4 MMOL/L (ref 3.4–5.3)
SODIUM SERPL-SCNC: 136 MMOL/L (ref 133–144)

## 2022-05-27 PROCEDURE — 80048 BASIC METABOLIC PNL TOTAL CA: CPT | Performed by: INTERNAL MEDICINE

## 2022-05-27 PROCEDURE — 99215 OFFICE O/P EST HI 40 MIN: CPT | Performed by: NURSE PRACTITIONER

## 2022-05-27 PROCEDURE — 36415 COLL VENOUS BLD VENIPUNCTURE: CPT | Performed by: INTERNAL MEDICINE

## 2022-05-27 NOTE — PROGRESS NOTES
Cardiology Clinic Progress Note  Jose Moreno MRN# [id number]   YOB: 1950 Age: 72 year old   Primary Cardiologist: Dr. Alejandra  Reason for visit: Cardiology follow up            Assessment and Plan:   Jose Moreno is a very pleasant 72 year old male whom I am seeing for urgent cardiology follow up due to leg fatigue/pain.    1. Leg fatigue/pain - recommend ABIs   2. Mitral valve prolapse and severe mitral regurgitation s/p robotic mitral valve repair at [place] in 2009 - mean gradient 3mmHg with mild MR on echo 7/2019  3. Coronary artery disease - s/p complex LAD/diagonal intervention with SHERRY x 2 1/2015. Stable no anginal symptoms.    - Stress echo 7/2018 with no ischemia   - Continue aspirin, atorvastatin, imdur and metoprolol XL  4. Hypertension  5. Hyperlipidemia - lipid panel 12/2021 showed total cholesterol 180, HDL 62, LDL 86 and triglycerides 138.    - Continue atorvastatin.     I saw patient today for urgent cardiology evaluation related to complaints of leg fatigue/pain, which is not new and has been going on for many months. He called yesterday requesting an urgent evaluation. Did present to the ED on 5/13, no provider note, but patient states he went for the leg heaviness. On exam he has no LE edema, no sores or venous stasis skin changes, pulses are palpable. Unclear etiology of his leg fatigue/sorenss. We explored consideration for additional testing and patient wished to proceed. Recommended ABIs.     Regarding his coronary artery disease and recent anginal complaints, he notes improvement in his chest pain since starting imdur.     Of note BMP today showed decline in renal function, creatinine 1.34, baseline 0.8-1, recommend repeating BMP in 1 week.     Will determine if sooner follow up is needed depending on test results, otherwise follow up with Dr. Alejandra as scheduled in September with echocardiogram prior.     Support given today, all questions answered.     Changes today:  "none    Follow up plan:     Repeat BMP next week     ABIs    Reviewed follow up options, patient preferred to determine if additional follow up is needed after reviewing repeat BMP results and MARGRET results.     Echocardiogram as scheduled in August    Follow up with Dr. Alejandra in September as scheduled         History of Presenting Illness:    Jose Moreno is a very pleasant 72 year old male with a history of mitral valve prolapse and severe mitral regurgitation s/p robotic mitral valve repair at [place] in 2009, CAD, hypertension, dyslipidemia and brief runs of SVT.     Primary cardiologist Dr. Alejandra. Underwent coronary angiogram in 2009 demonstrated 50% mid LAD stenosis. In January 2015 had an NSTEMI, underwent coronary angiogram which showed  of RCA, subsequently transferred to [facility] underwent complex LAD/diagonal intervention with SHERRY x 2.     Most recent echocardiogram 7/31/2019 which showed LVEF 55%, RV normal size/function, s/p posterior MV leaflet repair. Mean 3mmHg. There is mild (1+) mitral regurgitation, s/p mild aortic regurgitation.     Most recent ischemic evaluation stress echo 7/2018 which showed average exercise capacity, target HR achieved, no evidence of stress-induced ischemia.     He called with concerns of mild chest discomfort 4/28/22, he was started on Imdur 15mg daily.     He had an MOHSEN OV with [employer] on 5/10/22 at which time he reported no recurrent chest pain since starting imdur. No medications were changed.     He called yesterday with complaints of fatigue, legs feeling heavy and LE edema for ~ the past month or more. Provider recommended follow up today.     Did present to the ED on 5/13, no provider note, but patient states he went for the leg heaviness.     Patient is here today for urgent cardiology evaluation.     Patient reports feeling good. Monitoring weights daily a home. Shares that for months his legs have felt \"heavy\" and \"sore\". Notes legs will " "get \"painful\" \"sore\" which can occur right away with activity, which is not present with all activities, states after walking 200 yards to 1/2 mile pain can improve. States symptoms will resolve with rest. No symptoms with bicycling. States since starting imdur his LE edema has resolved. Denies any sores or open wounds. Denies chest pain or chest tightness, states he hasn't needed to take a nitroglycerin since last cardiology OV. Notes that imdur resulted in improvement in chest pain. Denies shortness of breath at rest. Denies exertional dyspnea. Denies dizziness, lightheadedness or other presyncopal symptoms. Denies tachycardia or palpitations.     Labs today show decline in renal function, creatinine 1.34 (which is up from 1.01). Blood pressure 96/58 and HR 58 in clinic today. Reports him and his wife do intermittent fasting.     Appetite good. Eating most meals at home. Walking or riding his bicycle most days for activity. Drinking 1 beer nightly.  Denies tobacco use. .        Social History      Social History     Socioeconomic History     Marital status:      Spouse name: Chastity     Number of children: 4     Years of education: 14     Highest education level: Not on file   Occupational History     Occupation:      Employer: [employer]     Comment: RETIRED   Tobacco Use     Smoking status: Never Smoker     Smokeless tobacco: Never Used   Substance and Sexual Activity     Alcohol use: Yes     Alcohol/week: 7.0 standard drinks     Types: 7 Standard drinks or equivalent per week     Comment: 1 beer daily     Drug use: No     Sexual activity: Yes     Partners: Female     Birth control/protection: Condom, Post-menopausal   Other Topics Concern      Service Not Asked     Blood Transfusions Not Asked     Caffeine Concern No     Comment: 2-3 daily     Occupational Exposure Not Asked     Hobby Hazards Not Asked     Sleep Concern No     Stress Concern Not Asked     Weight Concern Not Asked     " "Special Diet No     Comment: low sodium     Back Care Not Asked     Exercise Yes     Comment: walking daily     Bike Helmet Not Asked     Seat Belt Yes     Self-Exams No     Parent/sibling w/ CABG, MI or angioplasty before 65F 55M? Not Asked   Social History Narrative     Not on file     Social Determinants of Health     Financial Resource Strain: Not on file   Food Insecurity: Not on file   Transportation Needs: Not on file   Physical Activity: Not on file   Stress: Not on file   Social Connections: Not on file   Intimate Partner Violence: Not on file   Housing Stability: Not on file            Review of Systems:   Skin:  Negative     Eyes:  Positive for glasses  ENT:  Negative    Respiratory:  Negative for dyspnea on exertion;CPAP;sleep apnea;shortness of breath;cough;wheezing  Cardiovascular:  Negative for;palpitations Positive for;chest pain  Gastroenterology: Positive for reflux  Genitourinary:  Positive for    Musculoskeletal:  Positive for arthritis;back pain  Neurologic:  Positive for memory problems;headaches  Psychiatric:  Positive for depression  Heme/Lymph/Imm:  Negative    Endocrine:  Positive for thyroid disorder         Physical Exam:   Vitals: BP 96/58 (BP Location: Right arm, Cuff Size: Adult Regular)   Pulse 58   Ht 1.676 m (5' 6\")   Wt 69.7 kg (153 lb 9.6 oz)   SpO2 95%   BMI 24.79 kg/m     Wt Readings from Last 4 Encounters:   05/27/22 69.7 kg (153 lb 9.6 oz)   05/10/22 71 kg (156 lb 9.6 oz)   12/14/21 70.1 kg (154 lb 8 oz)   09/30/21 69 kg (152 lb 1.6 oz)     GEN: well nourished, in no acute distress.  HEENT:  Pupils equal, round. Sclerae nonicteric.   NECK: Supple, no masses appreciated. JVP appears normal.   C/V:  Regular rate and rhythm, no murmur, rub or gallop.    RESP: Respirations are unlabored. Clear to auscultation bilaterally without wheezing, rales, or rhonchi.  GI: Abdomen soft, nontender.  EXTREM: No LE edema. Bilateral LE warm, no venous stasis changes, no sores  NEURO: Alert " and oriented, cooperative.  SKIN: Warm and dry.        Data:     LIPID RESULTS:  Lab Results   Component Value Date    CHOL 180 12/14/2021    HDL 66 12/14/2021    LDL 86 12/14/2021    LDL 64 08/12/2020    TRIG 138 12/14/2021    CHOLHDLRATIO 3 12/14/2021     LIVER ENZYME RESULTS:  Lab Results   Component Value Date    AST 19 04/01/2019    ALT 41 08/12/2020     CBC RESULTS:  Lab Results   Component Value Date    WBC 6.8 05/13/2022    WBC 5.9 11/02/2020    RBC 4.26 (L) 05/13/2022    RBC 4.25 (A) 11/02/2020    HGB 14.3 05/13/2022    HGB 14.1 11/02/2020    HCT 41.8 05/13/2022    HCT 43.5 11/02/2020    MCV 98 05/13/2022    .4 (A) 11/02/2020    MCH 33.6 (H) 05/13/2022    MCH 33.2 (A) 11/02/2020    MCHC 34.2 05/13/2022    MCHC 32.4 11/02/2020    RDW 11.9 05/13/2022    RDW 12.2 12/30/2019     05/13/2022     11/02/2020     11/13/2018     BMP RESULTS:  Lab Results   Component Value Date     05/27/2022    .0 12/14/2021    POTASSIUM 4.0 05/27/2022    POTASSIUM 4.40 12/14/2021    CHLORIDE 102 05/27/2022    CHLORIDE 101.6 12/14/2021    CO2 27 05/27/2022    CO2 26.1 12/14/2021    ANIONGAP 7 05/27/2022    ANIONGAP 7 09/23/2020     (H) 05/27/2022    GLC 99 12/14/2021    BUN 24 05/27/2022    BUN 15 12/14/2021    BUN 18.3 12/14/2021    CR 1.34 (H) 05/27/2022    CR 0.82 12/14/2021    GFRESTIMATED 56 (L) 05/27/2022    GFRESTIMATED 80 09/23/2020    GFRESTBLACK >90 09/23/2020    MIRYAM 9.2 05/27/2022    MIRYAM 9.4 12/14/2021      INR RESULTS:  Lab Results   Component Value Date    INR 1.00 01/23/2015    INR 1.09 01/09/2013          Medications     Current Outpatient Medications   Medication Sig Dispense Refill     Acetaminophen (TYLENOL PO) Take 325-650 mg by mouth 2 times daily as needed for mild pain or fever       amLODIPine (NORVASC) 10 MG tablet Take 1 tablet (10 mg) by mouth daily 90 tablet 3     aspirin EC 81 MG EC tablet Take 1 tablet (81 mg) by mouth daily 60 tablet 0     atorvastatin  (LIPITOR) 80 MG tablet Take 1 tablet (80 mg) by mouth daily 90 tablet 3     cholecalciferol (VITAMIN D3) 1000 UNIT tablet Take 1 tablet (1,000 Units) by mouth daily       Cyanocobalamin (B-12) 1000 MCG TBCR Take 1,000 mcg by mouth daily       FLUoxetine (PROZAC) 20 MG capsule Take 1 capsule (20 mg) by mouth daily 90 capsule 3     hydrochlorothiazide (HYDRODIURIL) 25 MG tablet Take 1 tablet (25 mg) by mouth daily 90 tablet 3     HYDROcodone-acetaminophen (NORCO) 5-325 MG tablet Take 1 tablet by mouth every 6 hours as needed for moderate to severe pain or severe pain (max 2/day.) OK to fill and start 01/31/22 60 tablet 0     irbesartan (AVAPRO) 300 MG tablet Take 1 tablet (300 mg) by mouth daily 90 tablet 3     isosorbide mononitrate (IMDUR) 30 MG 24 hr tablet Take 0.5 tablets (15 mg) by mouth daily 15 tablet 1     ketoconazole (NIZORAL) 2 % external cream Apply topically daily 60 g 0     levothyroxine (SYNTHROID/LEVOTHROID) 50 MCG tablet Take 1 tablet (50 mcg) by mouth daily 90 tablet 3     methocarbamol (ROBAXIN) 500 MG tablet Take 1 tablet (500 mg) by mouth 3 times daily as needed for muscle spasms Okay to dispense 12/28/2020 (Patient taking differently: Take 500 mg by mouth 3 times daily as needed for muscle spasms Okay to dispense 12/28/2020-  STATES he takes 1 tab QAM and occ at noon) 90 tablet 3     metoprolol succinate ER (TOPROL-XL) 25 MG 24 hr tablet Take 0.5 tablets (12.5 mg) by mouth daily 45 tablet 3     multivitamin, therapeutic with minerals (MULTI-VITAMIN) TABS tablet Take 1 tablet by mouth daily       nitroGLYcerin (NITROSTAT) 0.4 MG sublingual tablet TAKE 1 TABLET BY MOUTH EVERY 5 MIN AS NEEDED FOR CHEST PAIN max of three tablets and if doesn't relive pain go to the emergency departemnt. 25 tablet 0     Omega-3 Fatty Acids (FISH OIL PO) Take 1 capsule by mouth daily       omeprazole (PRILOSEC) 20 MG DR capsule Take 1 capsule (20 mg) by mouth daily 90 capsule 3     pregabalin (LYRICA) 150 MG capsule  Take 1 capsule (150 mg) by mouth 2 times daily 60 capsule 3     traZODone (DESYREL) 100 MG tablet Take 1 tablet (100 mg) by mouth At Bedtime 90 tablet 3     triamcinolone (KENALOG) 0.1 % external cream APPLY TO THE AFFECTED RASH TWICE DAILY AS NEEDED          Past Medical History     Past Medical History:   Diagnosis Date     ADHD (attention deficit hyperactivity disorder)      Arthritis     lower back     CAD (coronary artery disease)     cardiac cath 1/23/15: SHERRY to 1st diagonal, SHERRY x2 to LAD, cath 2009: no intervention     Esophageal reflux      Heart murmur     mitral valve repair     History of hyperthyroidism 4/9/2014     Hyperlipidemia      Hypertension      Mitral regurgitation 2009    moderately severe MVP, s/p robotic repair at [place]     Prostate cancer      Pulmonary nodules 2009    CT-recommend repeat in 6-12 months     Rotator cuff rupture 11/3/2011     Thyroid disease      Past Surgical History:   Procedure Laterality Date     COLONOSCOPY  7/00    GI     DAVINCI PROSTATECTOMY, LYMPHADENECTOMY N/A 11/1/2018    Procedure: ROBOTIC ASSISTED RADICAL PROSTATECTOMY WITH BILATERAL PELVIC LYMPH NODE DISSECTION;  Surgeon: Clint Blankenship MD;  Location: [facility] OR     DECOMPRESSION LUMBAR MINIMALLY INVASIVE ONE LEVEL  1/11/2012    Procedure:DECOMPRESSION LUMBAR MINIMALLY INVASIVE ONE LEVEL; L4-5 Decompression Minimally Invasive; Surgeon:MESSI HORAN; Location:[facility] OR     HEART CATH RIGHT AND LEFT HEART CATH  01-23-15    See report, pt transfered to [facility] for complex bifurcation PCI of LAD diagonal vessel.      HEART CATH RIGHT AND LEFT HEART CATH  01-26-15    PTCA and implantation of SHERRY to 1st diagonal, SHERRY to mid LAD, SHERRY to proximal LAD     Hx of rotator cuff rupture and repair       IMPLANT PROSTHESIS SPHINCTER URINARY N/A 1/3/2020    Procedure: Placement of  artificial urinary sphincter;  Surgeon: Clint Blankenship MD;  Location: [facility] OR     LAPAROSCOPIC PROSTATECTOMY       REPAIR VALVE  MITRAL  2009    [facility] robotic repair      Reversal of vasectomy       VASECTOMY       XR LUMBAR RADIOFREQ ABLATION UNILATERAL  01/11/2018    lumbar area/ ? level     Family History   Problem Relation Age of Onset     Breast Cancer Mother      Lung Cancer Mother         small cell carcinoma- radon?     Depression Father         alcohlism     Macular Degeneration Father      Colon Cancer Father         age 75     Crohn's Disease Sister      Pleurisy Brother      Depression Son      Diabetes No family hx of      Cardiovascular No family hx of      Prostate Cancer No family hx of             Allergies   Ace inhibitors and No clinical screening - see comments    40 minutes spent on the date of the encounter doing chart review, history and exam, documentation and further activities as noted above      TACOS Cho CNP  [facility] HEART CARE  Pager: [phone]

## 2022-05-27 NOTE — LETTER
5/27/2022    Raysa Carolina PA-C  [address], [address]  [facility] [address]    RE: Jose Moreno       Dear Colleague,     I had the pleasure of seeing Jose Moreno in the [facility].  Cardiology Clinic Progress Note  Jose Moreno MRN# [id number]   YOB: 1950 Age: 72 year old   Primary Cardiologist: Dr. Alejandra  Reason for visit: Cardiology follow up            Assessment and Plan:   Jose Moreno is a very pleasant 72 year old male whom I am seeing for urgent cardiology follow up due to leg fatigue/pain.    1. Leg fatigue/pain - recommend ABIs   2. Mitral valve prolapse and severe mitral regurgitation s/p robotic mitral valve repair at [place] in 2009 - mean gradient 3mmHg with mild MR on echo 7/2019  3. Coronary artery disease - s/p complex LAD/diagonal intervention with SHERRY x 2 1/2015. Stable no anginal symptoms.    - Stress echo 7/2018 with no ischemia   - Continue aspirin, atorvastatin, imdur and metoprolol XL  4. Hypertension  5. Hyperlipidemia - lipid panel 12/2021 showed total cholesterol 180, HDL 62, LDL 86 and triglycerides 138.    - Continue atorvastatin.     I saw patient today for urgent cardiology evaluation related to complaints of leg fatigue/pain, which is not new and has been going on for many months. He called yesterday requesting an urgent evaluation. Did present to the ED on 5/13, no provider note, but patient states he went for the leg heaviness. On exam he has no LE edema, no sores or venous stasis skin changes, pulses are palpable. Unclear etiology of his leg fatigue/sorenss. We explored consideration for additional testing and patient wished to proceed. Recommended ABIs.     Regarding his coronary artery disease and recent anginal complaints, he notes improvement in his chest pain since starting imdur.     Of note BMP today showed decline in renal function, creatinine 1.34, baseline 0.8-1, recommend repeating BMP in 1 week.     Will determine  if sooner follow up is needed depending on test results, otherwise follow up with Dr. Alejandra as scheduled in September with echocardiogram prior.     Support given today, all questions answered.     Changes today: none    Follow up plan:     Repeat BMP next week     ABIs    Reviewed follow up options, patient preferred to determine if additional follow up is needed after reviewing repeat BMP results and MARGRET results.     Echocardiogram as scheduled in August    Follow up with Dr. Alejandra in September as scheduled         History of Presenting Illness:    Jose Moreno is a very pleasant 72 year old male with a history of mitral valve prolapse and severe mitral regurgitation s/p robotic mitral valve repair at [place] in 2009, CAD, hypertension, dyslipidemia and brief runs of SVT.     Primary cardiologist Dr. Alejandra. Underwent coronary angiogram in 2009 demonstrated 50% mid LAD stenosis. In January 2015 had an NSTEMI, underwent coronary angiogram which showed  of RCA, subsequently transferred to [facility] underwent complex LAD/diagonal intervention with SHERRY x 2.     Most recent echocardiogram 7/31/2019 which showed LVEF 55%, RV normal size/function, s/p posterior MV leaflet repair. Mean 3mmHg. There is mild (1+) mitral regurgitation, s/p mild aortic regurgitation.     Most recent ischemic evaluation stress echo 7/2018 which showed average exercise capacity, target HR achieved, no evidence of stress-induced ischemia.     He called with concerns of mild chest discomfort 4/28/22, he was started on Imdur 15mg daily.     He had an MOHSEN OV with [facility] on 5/10/22 at which time he reported no recurrent chest pain since starting imdur. No medications were changed.     He called yesterday with complaints of fatigue, legs feeling heavy and LE edema for ~ the past month or more. Provider recommended follow up today.     Did present to the ED on 5/13, no provider note, but patient states he went for the leg  "heaviness.     Patient is here today for urgent cardiology evaluation.     Patient reports feeling good. Monitoring weights daily a home. Shares that for months his legs have felt \"heavy\" and \"sore\". Notes legs will get \"painful\" \"sore\" which can occur right away with activity, which is not present with all activities, states after walking 200 yards to 1/2 mile pain can improve. States symptoms will resolve with rest. No symptoms with bicycling. States since starting imdur his LE edema has resolved. Denies any sores or open wounds. Denies chest pain or chest tightness, states he hasn't needed to take a nitroglycerin since last cardiology OV. Notes that imdur resulted in improvement in chest pain. Denies shortness of breath at rest. Denies exertional dyspnea. Denies dizziness, lightheadedness or other presyncopal symptoms. Denies tachycardia or palpitations.     Labs today show decline in renal function, creatinine 1.34 (which is up from 1.01). Blood pressure 96/58 and HR 58 in clinic today. Reports him and his wife do intermittent fasting.     Appetite good. Eating most meals at home. Walking or riding his bicycle most days for activity. Drinking 1 beer nightly.  Denies tobacco use. .        Social History      Social History     Socioeconomic History     Marital status:      Spouse name: Chastity     Number of children: 4     Years of education: 14     Highest education level: Not on file   Occupational History     Occupation:      Employer: [employer]     Comment: RETIRED   Tobacco Use     Smoking status: Never Smoker     Smokeless tobacco: Never Used   Substance and Sexual Activity     Alcohol use: Yes     Alcohol/week: 7.0 standard drinks     Types: 7 Standard drinks or equivalent per week     Comment: 1 beer daily     Drug use: No     Sexual activity: Yes     Partners: Female     Birth control/protection: Condom, Post-menopausal   Other Topics Concern      Service Not Asked     Blood " "Transfusions Not Asked     Caffeine Concern No     Comment: 2-3 daily     Occupational Exposure Not Asked     Hobby Hazards Not Asked     Sleep Concern No     Stress Concern Not Asked     Weight Concern Not Asked     Special Diet No     Comment: low sodium     Back Care Not Asked     Exercise Yes     Comment: walking daily     Bike Helmet Not Asked     Seat Belt Yes     Self-Exams No     Parent/sibling w/ CABG, MI or angioplasty before 65F 55M? Not Asked   Social History Narrative     Not on file     Social Determinants of Health     Financial Resource Strain: Not on file   Food Insecurity: Not on file   Transportation Needs: Not on file   Physical Activity: Not on file   Stress: Not on file   Social Connections: Not on file   Intimate Partner Violence: Not on file   Housing Stability: Not on file            Review of Systems:   Skin:  Negative     Eyes:  Positive for glasses  ENT:  Negative    Respiratory:  Negative for dyspnea on exertion;CPAP;sleep apnea;shortness of breath;cough;wheezing  Cardiovascular:  Negative for;palpitations Positive for;chest pain  Gastroenterology: Positive for reflux  Genitourinary:  Positive for    Musculoskeletal:  Positive for arthritis;back pain  Neurologic:  Positive for memory problems;headaches  Psychiatric:  Positive for depression  Heme/Lymph/Imm:  Negative    Endocrine:  Positive for thyroid disorder         Physical Exam:   Vitals: BP 96/58 (BP Location: Right arm, Cuff Size: Adult Regular)   Pulse 58   Ht 1.676 m (5' 6\")   Wt 69.7 kg (153 lb 9.6 oz)   SpO2 95%   BMI 24.79 kg/m     Wt Readings from Last 4 Encounters:   05/27/22 69.7 kg (153 lb 9.6 oz)   05/10/22 71 kg (156 lb 9.6 oz)   12/14/21 70.1 kg (154 lb 8 oz)   09/30/21 69 kg (152 lb 1.6 oz)     GEN: well nourished, in no acute distress.  HEENT:  Pupils equal, round. Sclerae nonicteric.   NECK: Supple, no masses appreciated. JVP appears normal.   C/V:  Regular rate and rhythm, no murmur, rub or gallop.    RESP: " Respirations are unlabored. Clear to auscultation bilaterally without wheezing, rales, or rhonchi.  GI: Abdomen soft, nontender.  EXTREM: No LE edema. Bilateral LE warm, no venous stasis changes, no sores  NEURO: Alert and oriented, cooperative.  SKIN: Warm and dry.        Data:     LIPID RESULTS:  Lab Results   Component Value Date    CHOL 180 12/14/2021    HDL 66 12/14/2021    LDL 86 12/14/2021    LDL 64 08/12/2020    TRIG 138 12/14/2021    CHOLHDLRATIO 3 12/14/2021     LIVER ENZYME RESULTS:  Lab Results   Component Value Date    AST 19 04/01/2019    ALT 41 08/12/2020     CBC RESULTS:  Lab Results   Component Value Date    WBC 6.8 05/13/2022    WBC 5.9 11/02/2020    RBC 4.26 (L) 05/13/2022    RBC 4.25 (A) 11/02/2020    HGB 14.3 05/13/2022    HGB 14.1 11/02/2020    HCT 41.8 05/13/2022    HCT 43.5 11/02/2020    MCV 98 05/13/2022    .4 (A) 11/02/2020    MCH 33.6 (H) 05/13/2022    MCH 33.2 (A) 11/02/2020    MCHC 34.2 05/13/2022    MCHC 32.4 11/02/2020    RDW 11.9 05/13/2022    RDW 12.2 12/30/2019     05/13/2022     11/02/2020     11/13/2018     BMP RESULTS:  Lab Results   Component Value Date     05/27/2022    .0 12/14/2021    POTASSIUM 4.0 05/27/2022    POTASSIUM 4.40 12/14/2021    CHLORIDE 102 05/27/2022    CHLORIDE 101.6 12/14/2021    CO2 27 05/27/2022    CO2 26.1 12/14/2021    ANIONGAP 7 05/27/2022    ANIONGAP 7 09/23/2020     (H) 05/27/2022    GLC 99 12/14/2021    BUN 24 05/27/2022    BUN 15 12/14/2021    BUN 18.3 12/14/2021    CR 1.34 (H) 05/27/2022    CR 0.82 12/14/2021    GFRESTIMATED 56 (L) 05/27/2022    GFRESTIMATED 80 09/23/2020    GFRESTBLACK >90 09/23/2020    MIRYAM 9.2 05/27/2022    MIRYAM 9.4 12/14/2021      INR RESULTS:  Lab Results   Component Value Date    INR 1.00 01/23/2015    INR 1.09 01/09/2013          Medications     Current Outpatient Medications   Medication Sig Dispense Refill     Acetaminophen (TYLENOL PO) Take 325-650 mg by mouth 2 times daily as  needed for mild pain or fever       amLODIPine (NORVASC) 10 MG tablet Take 1 tablet (10 mg) by mouth daily 90 tablet 3     aspirin EC 81 MG EC tablet Take 1 tablet (81 mg) by mouth daily 60 tablet 0     atorvastatin (LIPITOR) 80 MG tablet Take 1 tablet (80 mg) by mouth daily 90 tablet 3     cholecalciferol (VITAMIN D3) 1000 UNIT tablet Take 1 tablet (1,000 Units) by mouth daily       Cyanocobalamin (B-12) 1000 MCG TBCR Take 1,000 mcg by mouth daily       FLUoxetine (PROZAC) 20 MG capsule Take 1 capsule (20 mg) by mouth daily 90 capsule 3     hydrochlorothiazide (HYDRODIURIL) 25 MG tablet Take 1 tablet (25 mg) by mouth daily 90 tablet 3     HYDROcodone-acetaminophen (NORCO) 5-325 MG tablet Take 1 tablet by mouth every 6 hours as needed for moderate to severe pain or severe pain (max 2/day.) OK to fill and start 01/31/22 60 tablet 0     irbesartan (AVAPRO) 300 MG tablet Take 1 tablet (300 mg) by mouth daily 90 tablet 3     isosorbide mononitrate (IMDUR) 30 MG 24 hr tablet Take 0.5 tablets (15 mg) by mouth daily 15 tablet 1     ketoconazole (NIZORAL) 2 % external cream Apply topically daily 60 g 0     levothyroxine (SYNTHROID/LEVOTHROID) 50 MCG tablet Take 1 tablet (50 mcg) by mouth daily 90 tablet 3     methocarbamol (ROBAXIN) 500 MG tablet Take 1 tablet (500 mg) by mouth 3 times daily as needed for muscle spasms Okay to dispense 12/28/2020 (Patient taking differently: Take 500 mg by mouth 3 times daily as needed for muscle spasms Okay to dispense 12/28/2020-  STATES he takes 1 tab QAM and occ at noon) 90 tablet 3     metoprolol succinate ER (TOPROL-XL) 25 MG 24 hr tablet Take 0.5 tablets (12.5 mg) by mouth daily 45 tablet 3     multivitamin, therapeutic with minerals (MULTI-VITAMIN) TABS tablet Take 1 tablet by mouth daily       nitroGLYcerin (NITROSTAT) 0.4 MG sublingual tablet TAKE 1 TABLET BY MOUTH EVERY 5 MIN AS NEEDED FOR CHEST PAIN max of three tablets and if doesn't relive pain go to the emergency departemnt.  25 tablet 0     Omega-3 Fatty Acids (FISH OIL PO) Take 1 capsule by mouth daily       omeprazole (PRILOSEC) 20 MG DR capsule Take 1 capsule (20 mg) by mouth daily 90 capsule 3     pregabalin (LYRICA) 150 MG capsule Take 1 capsule (150 mg) by mouth 2 times daily 60 capsule 3     traZODone (DESYREL) 100 MG tablet Take 1 tablet (100 mg) by mouth At Bedtime 90 tablet 3     triamcinolone (KENALOG) 0.1 % external cream APPLY TO THE AFFECTED RASH TWICE DAILY AS NEEDED          Past Medical History     Past Medical History:   Diagnosis Date     ADHD (attention deficit hyperactivity disorder)      Arthritis     lower back     CAD (coronary artery disease)     cardiac cath 1/23/15: SHERRY to 1st diagonal, SHERRY x2 to LAD, cath 2009: no intervention     Esophageal reflux      Heart murmur     mitral valve repair     History of hyperthyroidism 4/9/2014     Hyperlipidemia      Hypertension      Mitral regurgitation 2009    moderately severe MVP, s/p robotic repair at [facility]     Prostate cancer      Pulmonary nodules 2009    CT-recommend repeat in 6-12 months     Rotator cuff rupture 11/3/2011     Thyroid disease      Past Surgical History:   Procedure Laterality Date     COLONOSCOPY  7/00    GI     DAVINCI PROSTATECTOMY, LYMPHADENECTOMY N/A 11/1/2018    Procedure: ROBOTIC ASSISTED RADICAL PROSTATECTOMY WITH BILATERAL PELVIC LYMPH NODE DISSECTION;  Surgeon: Clint Blankenship MD;  Location: [facility] OR     DECOMPRESSION LUMBAR MINIMALLY INVASIVE ONE LEVEL  1/11/2012    Procedure:DECOMPRESSION LUMBAR MINIMALLY INVASIVE ONE LEVEL; L4-5 Decompression Minimally Invasive; Surgeon:MESSI HORAN; Location: OR     HEART CATH RIGHT AND LEFT HEART CATH  01-23-15    See report, pt transfered to [facility] for complex bifurcation PCI of LAD diagonal vessel.      HEART CATH RIGHT AND LEFT HEART CATH  01-26-15    PTCA and implantation of SHERRY to 1st diagonal, SHERRY to mid LAD, SHERRY to proximal LAD     Hx of rotator cuff rupture and repair        IMPLANT PROSTHESIS SPHINCTER URINARY N/A 1/3/2020    Procedure: Placement of  artificial urinary sphincter;  Surgeon: Clint Blankenship MD;  Location: [facility] OR     LAPAROSCOPIC PROSTATECTOMY       REPAIR VALVE MITRAL  2009    [facility] robotic repair      Reversal of vasectomy       VASECTOMY       XR LUMBAR RADIOFREQ ABLATION UNILATERAL  01/11/2018    lumbar area/ ? level     Family History   Problem Relation Age of Onset     Breast Cancer Mother      Lung Cancer Mother         small cell carcinoma- radon?     Depression Father         alcohlism     Macular Degeneration Father      Colon Cancer Father         age 75     Crohn's Disease Sister      Pleurisy Brother      Depression Son      Diabetes No family hx of      Cardiovascular No family hx of      Prostate Cancer No family hx of             Allergies   Ace inhibitors and No clinical screening - see comments    40 minutes spent on the date of the encounter doing chart review, history and exam, documentation and further activities as noted above      TACOS Cho CNP  [facility]  Pager: [phone]    Thank you for allowing me to participate in the care of your patient.    Sincerely,     TACOS Cho CNP     [facility]  cc:   Cornell De Anda PA-C  [address] [place] [address]

## 2022-05-27 NOTE — PATIENT INSTRUCTIONS
Repeat labs in 1 week to check kidney function  Schedule ABIs - to check blood flow in legs  Keep echocardiogram as scheduled in August  Please call with any questions/concerns [phone]      Component      Latest Ref Rng & Units 5/13/2022 5/27/2022   Sodium      133 - 144 mmol/L 137 136   Potassium      3.4 - 5.3 mmol/L 3.8 4.0   Chloride      94 - 109 mmol/L 104 102   Carbon Dioxide      20 - 32 mmol/L 26 27   Anion Gap      3 - 14 mmol/L 7 7   Urea Nitrogen      7 - 30 mg/dL 24 24   Creatinine      0.66 - 1.25 mg/dL 1.01 1.34 (H)   Calcium      8.5 - 10.1 mg/dL 9.2 9.2   Glucose      70 - 99 mg/dL 128 (H) 107 (H)   GFR Estimate      >60 mL/min/1.73m2 78 56 (L)

## 2022-06-03 ENCOUNTER — TELEPHONE (OUTPATIENT)
Dept: CARDIOLOGY | Facility: CLINIC | Age: 72
End: 2022-06-03

## 2022-06-03 ENCOUNTER — LAB (OUTPATIENT)
Dept: LAB | Facility: CLINIC | Age: 72
End: 2022-06-03
Payer: COMMERCIAL

## 2022-06-03 ENCOUNTER — HOSPITAL ENCOUNTER (OUTPATIENT)
Dept: CARDIOLOGY | Facility: CLINIC | Age: 72
Discharge: HOME | End: 2022-06-03
Attending: NURSE PRACTITIONER | Admitting: NURSE PRACTITIONER
Payer: COMMERCIAL

## 2022-06-03 DIAGNOSIS — I73.9 CLAUDICATION (H): ICD-10-CM

## 2022-06-03 DIAGNOSIS — N17.9 ACUTE RENAL FAILURE, UNSPECIFIED ACUTE RENAL FAILURE TYPE (H): ICD-10-CM

## 2022-06-03 LAB
ANION GAP SERPL CALCULATED.3IONS-SCNC: 6 MMOL/L (ref 3–14)
BUN SERPL-MCNC: 17 MG/DL (ref 7–30)
CALCIUM SERPL-MCNC: 9.2 MG/DL (ref 8.5–10.1)
CHLORIDE BLD-SCNC: 103 MMOL/L (ref 94–109)
CO2 SERPL-SCNC: 28 MMOL/L (ref 20–32)
CREAT SERPL-MCNC: 0.92 MG/DL (ref 0.66–1.25)
GFR SERPL CREATININE-BSD FRML MDRD: 87 ML/MIN/1.73M2
GLUCOSE BLD-MCNC: 96 MG/DL (ref 70–99)
POTASSIUM BLD-SCNC: 4 MMOL/L (ref 3.4–5.3)
SODIUM SERPL-SCNC: 137 MMOL/L (ref 133–144)

## 2022-06-03 PROCEDURE — 36415 COLL VENOUS BLD VENIPUNCTURE: CPT | Performed by: NURSE PRACTITIONER

## 2022-06-03 PROCEDURE — 93924 LWR XTR VASC STDY BILAT: CPT

## 2022-06-03 PROCEDURE — 93924 LWR XTR VASC STDY BILAT: CPT | Mod: 26 | Performed by: INTERNAL MEDICINE

## 2022-06-03 PROCEDURE — 80048 BASIC METABOLIC PNL TOTAL CA: CPT | Performed by: NURSE PRACTITIONER

## 2022-06-03 NOTE — TELEPHONE ENCOUNTER
[facility]    Phone Message    May a detailed message be left on voicemail: yes     Reason for Call: Requesting Results   Name/type of test: Recent lab results  Date of test: 6/3/22  Was test done at a location other than [facility] (Please fill in the location if not [facility])?: No      Action Taken: Other: Cardiology    Travel Screening: Not Applicable

## 2022-06-09 ENCOUNTER — TELEPHONE (OUTPATIENT)
Dept: PALLIATIVE MEDICINE | Facility: CLINIC | Age: 72
End: 2022-06-09
Payer: COMMERCIAL

## 2022-06-09 NOTE — TELEPHONE ENCOUNTER
Transfer note: Pt to transfer to Dr Claudia ARCOSN, RN Care Coordinator  [facility]  Pain [facility]

## 2022-06-20 DIAGNOSIS — I25.10 CORONARY ARTERY DISEASE INVOLVING NATIVE CORONARY ARTERY OF NATIVE HEART WITHOUT ANGINA PECTORIS: ICD-10-CM

## 2022-06-20 DIAGNOSIS — R07.9 CHEST PAIN: ICD-10-CM

## 2022-06-20 RX ORDER — ISOSORBIDE MONONITRATE 30 MG/1
15 TABLET, EXTENDED RELEASE ORAL DAILY
Qty: 45 TABLET | Refills: 0 | Status: SHIPPED | OUTPATIENT
Start: 2022-06-20 | End: 2022-07-21 | Stop reason: ALTCHOICE

## 2022-07-05 ENCOUNTER — NURSE TRIAGE (OUTPATIENT)
Dept: NURSING | Facility: CLINIC | Age: 72
End: 2022-07-05

## 2022-07-05 ENCOUNTER — TELEPHONE (OUTPATIENT)
Dept: CARDIOLOGY | Facility: CLINIC | Age: 72
End: 2022-07-05

## 2022-07-05 DIAGNOSIS — I10 ESSENTIAL HYPERTENSION, BENIGN: ICD-10-CM

## 2022-07-05 NOTE — TELEPHONE ENCOUNTER
Attempted to contact patient to discuss BP and heart rate. Left message for patient to call back.      7/6/2022 1045  Spoke with patient, he states he has noticed for several months that his BP check are lower than they used to be. He does random BP readings and does not keep a diary.  BP recently: 90/65, 85/60 and today 121/79.   Heart rates run 60-70 BPM.  Patient states when he bends over to stretch his back he feels lightheaded when he stands up.  He is able to move from sitting to standing without any symptoms.  Patient states he has a little aching in his chest almost every day. He manages it as angina and notes that since increasing his exercise plan he has less of the sensation.    Patient to see Dr. Alejandra on 9/29/2022 with lipids and echo in August.   He wonders if he needs a medication adjustment before his September visit.   Patient asks that we reach out to his MOHSEN iSpecimen.    Message to [facility] to review as Dr. Alejandra is out of the clinic.

## 2022-07-05 NOTE — TELEPHONE ENCOUNTER
[facility]    Phone Message    May a detailed message be left on voicemail: yes     Reason for Call: Other: Pt primary wanted him to touch base with his cardio team regarding his low BP and heart rate incase they wanted him seen, he said other then that he feels fine and would like a call back to discuss     Action Taken: Message routed to:  [facility] Cardio    Travel Screening: Not Applicable

## 2022-07-05 NOTE — CONFIDENTIAL NOTE
S-(situation): Call from patient who reports low BP and pulse for the past couple months. Patient says it has been in the 90/50-60s with pulse in the 50s. Patient reports some dizziness after bending over.    Patient says usual BP is 120/80    Current Bps during call.  /81 p 72  /72 p 68      B-(background):   Heart attack 4-5 yrs agos  Has angina sometimes; have used nitroglycerin a couple times but not every time he has chest pain  Is followed by cardiology      A-(assessment): may need adjustment in his medications      R-(recommendations): Advised to give cardiology a call and see if they think he should be seen sooner than current appointment in Sept. Caller verbalized understanding and was transferred.        Alfredo Riddle RN/[facility]

## 2022-07-06 RX ORDER — IRBESARTAN 300 MG/1
150 TABLET ORAL DAILY
Qty: 1 TABLET | Refills: 0 | COMMUNITY
Start: 2022-07-06 | End: 2022-10-12

## 2022-07-06 NOTE — TELEPHONE ENCOUNTER
He can trial decreasing irbesartan to 150 mg daily (he can split tablet in half for now).   Keep track of BPs.  Please let us know if they are still too low or if they become too high.   Cornell De Anda PA-C 7/6/2022 2:09 PM

## 2022-07-06 NOTE — TELEPHONE ENCOUNTER
Spoke with patient, he will start cutting his irbesartan 300mg tabs in 1/2 and take 150mg daily. Patient will monitor BP at home for two week to see if his BP improves and/or if his BP starts to raise above his goal of <130/80.  Patient will call with an update in 2 weeks. He states he has enough pills to last through the 2 week trial.

## 2022-07-11 ENCOUNTER — TELEPHONE (OUTPATIENT)
Dept: CARDIOLOGY | Facility: CLINIC | Age: 72
End: 2022-07-11

## 2022-07-11 NOTE — TELEPHONE ENCOUNTER
[facility]    Phone Message    May a detailed message be left on voicemail: yes     Reason for Call: Other: Pt would like a back to discuss his irbesartan as he has a few questions regarding the smaller dose     Action Taken: Message routed to:  [facility] Cardio    Travel Screening: Not Applicable

## 2022-07-11 NOTE — TELEPHONE ENCOUNTER
Spoke with patient who states he decreased the irbesartan to 150 mg on 7-7-22 and still noticed BP's anywhere from the 90's-140/70's and sometime a HR in the upper 40's and never above 60. Bpm.      Patient still has some symptoms of heavy legs, tiredness, and dizziness when bending over and then standing up.    Patient takes multiple BP's anytime per day.    Patient will check his BP about 90 minutes after taking the am medications and with symptoms  and call back in a few days [facility] update.     Patient agrees with plan . 8-29-22.     Next Dr. Alejandra OV     Last MOHSEN OV 5-27-22 -     Last Dr. Alejandra OV 7-.

## 2022-07-15 NOTE — TELEPHONE ENCOUNTER
Spoke with patient for an update. He states he is feeling better after deceasing his irbesartan to 150mg BID.  He has still been checking his BP at random times: /60 HR 66, /65 HR 67, BP 80/54 HR 62, /71 HR 55, BP 95/61 HR 54.     Reviewed with patient to check BP about 90 min after morning meds. He will try to do that for the next 5-7 days and call with an update. He states again that he is feeling better.

## 2022-07-19 ENCOUNTER — NURSE TRIAGE (OUTPATIENT)
Dept: CARDIOLOGY | Facility: CLINIC | Age: 72
End: 2022-07-19

## 2022-07-19 NOTE — TELEPHONE ENCOUNTER
Spoke with patient who states his BP has been low for at least 1 month +. Today Early afternoon BP was 85/56 and HR 66. Average BP's may run 95/61 HR 65.  Sometimes with positional changes Patient notes occasional  dizziness .     Patient wondering if his BP medications should be adjusted?     Medication as listed in Epic verified by Patient.   Currently on amlodipine, hydrochlorothiazide,  Imdur, Irbesartan, and metoprolol succinate.     Next Dr. Alejandra OV with labs and echo 9-29-22.     Last MOHSEN OV 5-27-22 last Dr. Alejandra OV 7-

## 2022-07-19 NOTE — TELEPHONE ENCOUNTER
"Patient called with update on his BP. His BP 10 minutes prior to the call was 85/54 pulse 66. He says he is on four BP medications and wonders if he is taking too much. He has been taking irbesartan 150mg BID. He feels tired and has a little bit of headaches. RN will route this to the team with Dr. Alejandra for update.     1. BLOOD PRESSURE: \"What is the blood pressure?\" \"Did you take at least two measurements 5 minutes apart?\"  2. ONSET: \"When did you take your blood pressure?\"  3. HOW: \"How did you obtain the blood pressure?\" (e.g., visiting nurse, automatic home BP monitor)  4. HISTORY: \"Do you have a history of low blood pressure?\" \"What is your blood pressure normally?\"  5. MEDICATIONS: \"Are you taking any medications for blood pressure?\" If yes: \"Have they been changed recently?\"  6. PULSE RATE: \"Do you know what your pulse rate is?\"   7. OTHER SYMPTOMS: \"Have you been sick recently?\" \"Have you had a recent injury?\"      "

## 2022-07-20 ENCOUNTER — TELEPHONE (OUTPATIENT)
Dept: CARDIOLOGY | Facility: CLINIC | Age: 72
End: 2022-07-20

## 2022-07-20 ENCOUNTER — OFFICE VISIT (OUTPATIENT)
Dept: FAMILY MEDICINE | Facility: CLINIC | Age: 72
End: 2022-07-20

## 2022-07-20 VITALS
HEIGHT: 66 IN | TEMPERATURE: 97.7 F | OXYGEN SATURATION: 96 % | BODY MASS INDEX: 24.43 KG/M2 | HEART RATE: 58 BPM | DIASTOLIC BLOOD PRESSURE: 64 MMHG | SYSTOLIC BLOOD PRESSURE: 102 MMHG | WEIGHT: 152 LBS

## 2022-07-20 DIAGNOSIS — E78.2 MIXED HYPERLIPIDEMIA: ICD-10-CM

## 2022-07-20 DIAGNOSIS — I34.1 MITRAL VALVE PROLAPSE: ICD-10-CM

## 2022-07-20 DIAGNOSIS — E04.9 THYROID ENLARGEMENT: ICD-10-CM

## 2022-07-20 DIAGNOSIS — I10 ESSENTIAL HYPERTENSION, BENIGN: ICD-10-CM

## 2022-07-20 DIAGNOSIS — E03.4 HYPOTHYROIDISM DUE TO ACQUIRED ATROPHY OF THYROID: Primary | ICD-10-CM

## 2022-07-20 DIAGNOSIS — I25.2 HISTORY OF NON-ST ELEVATION MYOCARDIAL INFARCTION (NSTEMI): ICD-10-CM

## 2022-07-20 DIAGNOSIS — I25.10 CORONARY ARTERY DISEASE INVOLVING NATIVE CORONARY ARTERY OF NATIVE HEART WITHOUT ANGINA PECTORIS: ICD-10-CM

## 2022-07-20 DIAGNOSIS — I34.0 MITRAL VALVE INSUFFICIENCY, UNSPECIFIED ETIOLOGY: ICD-10-CM

## 2022-07-20 PROCEDURE — 36415 COLL VENOUS BLD VENIPUNCTURE: CPT | Performed by: PHYSICIAN ASSISTANT

## 2022-07-20 PROCEDURE — 99214 OFFICE O/P EST MOD 30 MIN: CPT | Performed by: PHYSICIAN ASSISTANT

## 2022-07-20 NOTE — PROGRESS NOTES
"CC: Low BP/HR, lightheadedness    History:  Lightheadedness with standing, Low HR:  Has been having lightheaded feeling especially standing after sitting for the past several months. Has also noticed low HR usually 50s-60s. Has been working with cardiologist mostly via phones calls who had decreased irbesartan several weeks and based on communications happening today working on lower Imdur doses as well. This lightheadedness really only happens with relatively rapid position changes, worse it standing up after bending over. Does not happen at rest or with activity. No chest pain, palpitations.      PMH, MEDICATIONS, ALLERGIES, SOCIAL AND FAMILY HISTORY in [facility] and reviewed by me personally.      ROS negative other than the symptoms noted above in the HPI.        Examination   /64 (BP Location: Left arm, Patient Position: Sitting, Cuff Size: Adult Regular)   Pulse 58   Temp 97.7  F (36.5  C) (Temporal)   Ht 1.676 m (5' 6\")   Wt 68.9 kg (152 lb)   SpO2 96%   BMI 24.53 kg/m         Constitutional: Sitting comfortably, in no acute distress. Vital signs noted  Neck:  no adenopathy, trachea midline and normal to palpation, thyroid with right sided enlargement.  Cardiovascular:  regular rate and rhythm, no murmurs, clicks, or gallops  Respiratory:  normal respiratory rate and rhythm, lungs clear to auscultation  SKIN: No jaundice/pallor/rash.   Psychiatric: mentation appears normal and affect normal/bright    Orthostatic Vitals from 07/18/22 2349 to 07/20/22 2349    Date and Time Orthostatic BP Orthostatic Pulse Patient Position BP   Location Cuff Size   07/20/22 1540 122/74 63 Standing Left arm Adult Regular   07/20/22 1539 113/69 60 Sitting Left arm Adult Regular   07/20/22 1538 117/60 61 Supine Left arm Adult Regular   07/20/22 1443 -- -- Sitting Left arm Adult Regular          A/P    ICD-10-CM    1. Hypothyroidism due to acquired atrophy of thyroid  E03.4 VENOUS COLLECTION     TSH WITH FREE T4 REFLEX " ([facility])     US Head Neck Soft Tissue   2. Thyroid enlargement  E04.9 US Head Neck Soft Tissue   3. Essential hypertension, benign  I10    4. History of non-ST elevation myocardial infarction (NSTEMI)  I25.2    5. Mixed hyperlipidemia  E78.2    6. Coronary artery disease involving native coronary artery of native heart without angina pectoris  I25.10    7. Mitral valve prolapse  I34.1    8. Mitral valve insufficiency, unspecified etiology  I34.0        DISCUSSION:  Orthostatics completed today without any concerning changes. Pt denies any symptoms recreated with position changes to complete orthostatics. Overall vitals are reassuring. Should continue to follow cardiology advice on medication management to optimize BP/HR. However, I did agree to recheck TSH today to ensure not contributing. Also did palpate enlargement on right neck on border of right thyroid, right anterior cervical chain. Will order US on that side, and coordinate follow-up based on results. Result will likely need to be managed by a colleague as I will be on leave. He may need to have in person follow-up if provider covering for me feels necessary to discuss results/plan.     follow up visit: As needed    Time of visit: 35 minutes    Raysa Carolina PA-C  [facility]

## 2022-07-20 NOTE — TELEPHONE ENCOUNTER
Reply from Dr. Alejandra:  Let s try stopping imdur first. Cut down to 1/2 pill daily for 2 weeks then stop altogether. Make sure he is well hydrated. If after another week that doesn t do it I will cut amlodipine in 1/2       Update to Dr. Alejandra:  The imdur is already 1/2 of a 30mg = 15mg daily. Since we can't break that - try every other day?    2496 reply from Dr. Alejandra - stop imdur.      7444 spoke with patient to discuss stopping the imdur. He will continue to monitor BP and call next week if he is not at a better baseline for readings.

## 2022-07-20 NOTE — NURSING NOTE
Chief Complaint   Patient presents with     Bradycardia     Low heart rate and low bp lately. Takes these readings at home but forgot to bring those readings with him. Light headed when he stands up.         Pre-visit Screening:  Immunizations:  up to date  Colonoscopy:  is up to date   Mammogram: NA  Asthma Action Test/Plan:  NA  PHQ9:  NA  GAD7:  NA  Questioned patient about current smoking habits Pt. has never smoked.  Ok to leave detailed message on voice mail for today's visit only Yes, phone # [phone]

## 2022-07-20 NOTE — TELEPHONE ENCOUNTER
Triage call updated with DAVID Vargas:  Spoke with patient who states his BP has been low for at least 1 month +. Today Early afternoon BP was 85/56 and HR 66. Average BP's may run 95/61 HR 65.  Sometimes with positional changes Patient notes occasional  dizziness .      Patient wondering if his BP medications should be adjusted?      Medication as listed in Epic verified by Patient.   Currently on amlodipine, hydrochlorothiazide,  Imdur, Irbesartan, and metoprolol succinate.      Next Dr. Alejandra OV with labs and echo 9-29-22.      Last MOHSEN OV 5-27-22 last Dr. Alejandra OV 7-     Will message Dr. Alejandra to review

## 2022-07-21 ENCOUNTER — TELEPHONE (OUTPATIENT)
Dept: FAMILY MEDICINE | Facility: CLINIC | Age: 72
End: 2022-07-21

## 2022-07-21 LAB — TSH SERPL-ACNC: 1.81 MIU/L (ref 0.4–4.5)

## 2022-07-21 NOTE — CONFIDENTIAL NOTE
Called and spoke to pt. Informed of normal TSH. No dose adjustment indicated. He does mention that he started taking his irbesartan altogether 2-3 days ago. Ideally would continue follow-up with cardiology given complex cardiac history.

## 2022-07-25 ENCOUNTER — TELEPHONE (OUTPATIENT)
Dept: CARDIOLOGY | Facility: CLINIC | Age: 72
End: 2022-07-25

## 2022-07-25 NOTE — TELEPHONE ENCOUNTER
Per Dr. Alejandra's reply Patient called. Patient will continue to monitor and record BP's and will call with update in a few days.     Medications reviewed with patient.

## 2022-07-25 NOTE — TELEPHONE ENCOUNTER
Dr. Alejandra's reply -   Decrease amlodipine to 5 mgs daily    Previous call on 7-20-22 Dr. Wallace decreased and stopped Imdur and patient stopped abruptly on 7-21-22. Was to wean for 2 weeks but didn't.     Patient states he is feeling better Less positional dizziness and slightly  less fatigue. BP after taking medications have been running 109- 121/70's .     Call from Patient -   7-22-22  Spoke with patient who states his BP has been low for at least 1 month +. Today Early afternoon BP was 85/56 and HR 66. Average BP's may run 95/61 HR 65.  Sometimes with positional changes Patient notes occasional  dizziness .     Patient wondering if his BP medications should be adjusted?     Medication as listed in Epic verified by Patient.   Currently on amlodipine, hydrochlorothiazide,  Imdur, Irbesartan, and metoprolol succinate.     Next Dr. Alejandra OV with labs and echo 9-29-22.     Last MOHSEN OV 5-27-22 last Dr. Alejandra OV 7-

## 2022-07-27 ENCOUNTER — HOSPITAL ENCOUNTER (OUTPATIENT)
Dept: ULTRASOUND IMAGING | Facility: CLINIC | Age: 72
Discharge: HOME | End: 2022-07-27
Attending: PHYSICIAN ASSISTANT | Admitting: PHYSICIAN ASSISTANT
Payer: COMMERCIAL

## 2022-07-27 DIAGNOSIS — E03.4 HYPOTHYROIDISM DUE TO ACQUIRED ATROPHY OF THYROID: ICD-10-CM

## 2022-07-27 DIAGNOSIS — E04.9 THYROID ENLARGEMENT: ICD-10-CM

## 2022-07-27 PROCEDURE — 76536 US EXAM OF HEAD AND NECK: CPT

## 2022-07-29 ENCOUNTER — MYC MEDICAL ADVICE (OUTPATIENT)
Dept: FAMILY MEDICINE | Facility: CLINIC | Age: 72
End: 2022-07-29

## 2022-08-03 NOTE — TELEPHONE ENCOUNTER
Patient unable to get into [place] [place], read message from Dr. Briscoe about results. Patient expressed understanding and has no further questions or concerns at this time.

## 2022-08-17 DIAGNOSIS — N39.45 CONTINUOUS LEAKAGE OF URINE: ICD-10-CM

## 2022-08-17 DIAGNOSIS — F51.02 ADJUSTMENT INSOMNIA: ICD-10-CM

## 2022-08-17 DIAGNOSIS — E03.4 HYPOTHYROIDISM DUE TO ACQUIRED ATROPHY OF THYROID: ICD-10-CM

## 2022-08-17 DIAGNOSIS — K21.9 GASTROESOPHAGEAL REFLUX DISEASE WITHOUT ESOPHAGITIS: ICD-10-CM

## 2022-08-17 RX ORDER — LEVOTHYROXINE SODIUM 50 UG/1
50 TABLET ORAL DAILY
Qty: 90 TABLET | Refills: 3 | Status: SHIPPED | OUTPATIENT
Start: 2022-08-17 | End: 2023-05-08

## 2022-08-17 RX ORDER — TRAZODONE HYDROCHLORIDE 100 MG/1
100 TABLET ORAL AT BEDTIME
Qty: 90 TABLET | Refills: 3 | Status: SHIPPED | OUTPATIENT
Start: 2022-08-17 | End: 2023-05-08

## 2022-08-22 ENCOUNTER — TRANSFERRED RECORDS (OUTPATIENT)
Dept: FAMILY MEDICINE | Facility: CLINIC | Age: 72
End: 2022-08-22

## 2022-08-31 ENCOUNTER — TRANSFERRED RECORDS (OUTPATIENT)
Dept: FAMILY MEDICINE | Facility: CLINIC | Age: 72
End: 2022-08-31

## 2022-08-31 ENCOUNTER — MYC MEDICAL ADVICE (OUTPATIENT)
Dept: PALLIATIVE MEDICINE | Facility: CLINIC | Age: 72
End: 2022-08-31

## 2022-08-31 NOTE — TELEPHONE ENCOUNTER
[facility] Call Center    Phone Message    May a detailed message be left on voicemail: yes     Reason for Call: Other: Pt is returning a call from [place]. Please call pt back when available at [phone]     Action Taken: Message routed to:  [facility]    Travel Screening: Not Applicable

## 2022-09-01 NOTE — TELEPHONE ENCOUNTER
This concern is open and being addressed in another encounter    Closing    Maria Del Carmen LOVE RN Care Coordinator  [facility]

## 2022-09-20 ENCOUNTER — TELEPHONE (OUTPATIENT)
Dept: PALLIATIVE MEDICINE | Facility: CLINIC | Age: 72
End: 2022-09-20

## 2022-09-20 NOTE — TELEPHONE ENCOUNTER
[facility] Call Center    Phone Message    May a detailed message be left on voicemail: yes     Reason for Call: Other: The patient is returning a call regarding his next steps to get a new provider after Dr Richter left. There is a note in the chart from 8/29 asking the patient to call the office back to discuss.     He is also concerned because his pain medications are being denied at this time.     Action Taken: Message routed to:  Other: [facility]    Travel Screening: Not Applicable

## 2022-09-21 ENCOUNTER — TRANSFERRED RECORDS (OUTPATIENT)
Dept: FAMILY MEDICINE | Facility: CLINIC | Age: 72
End: 2022-09-21

## 2022-09-22 NOTE — TELEPHONE ENCOUNTER
[facility] Call Center    Phone Message    May a detailed message be left on voicemail: yes     Reason for Call: Medication Refill Request    Has the patient contacted the pharmacy for the refill? Yes   Name of medication being requested:   pregabalin (LYRICA) 150 MG capsule  AND  methocarbamol (ROBAXIN) 500 MG tablet    Provider who prescribed the medication: Dr. Richter  Pharmacy: [facility]/PHARMACY #[id number] [address] [facility]  Date medication is needed: ASAP   Pt still needs to be scheduled with Dr. Taylor for re-establishing care from Dr. Richter. This is not something Call center can do as we cannot change the duration of an appt from 30 - 60 minutes as is done for such appts. Pt also still waiting to hear about getting his medication renewed.     Please call Pt back at verified best phone number [phone] to discuss and schedule.      Action Taken: Message routed to:  Other: [facility]    Travel Screening: Not Applicable

## 2022-09-26 DIAGNOSIS — M47.816 SPONDYLOSIS OF LUMBAR REGION WITHOUT MYELOPATHY OR RADICULOPATHY: ICD-10-CM

## 2022-09-26 DIAGNOSIS — G89.29 CHRONIC LEFT-SIDED LOW BACK PAIN WITHOUT SCIATICA: ICD-10-CM

## 2022-09-26 DIAGNOSIS — M54.50 CHRONIC LEFT-SIDED LOW BACK PAIN WITHOUT SCIATICA: ICD-10-CM

## 2022-09-26 NOTE — TELEPHONE ENCOUNTER
Chart review. There are 2 open encounters related to same issue. Closing to avoid duplicate work. PT is scheduled for one hour follow up with Dr Claudia ARCOSN, RN Care Coordinator  [facility]  [facility]

## 2022-09-26 NOTE — TELEPHONE ENCOUNTER
[facility] Call Center    Phone Message    May a detailed message be left on voicemail: yes     Reason for Call: Medication Refill Request    Has the patient contacted the pharmacy for the refill? Yes   Name of medication being requested: methocarbamol (ROBAXIN) 500 MG tablet, pregabalin (LYRICA) 150 MG capsule  Provider who prescribed the medication: Jersey Richter MD  Pharmacy: [facility]/PHARMACY #[id number] [place], [place] - [address] [facility]  Date medication is needed: ASAP     Patient is former [facility] patient, scheduled with [facility] on 12/28/2022       Action Taken: Other: [facility]     Travel Screening: Not Applicable

## 2022-09-26 NOTE — TELEPHONE ENCOUNTER
Received call from patient  requesting refill(s) for    methocarbamol (ROBAXIN) 500 MG tablet,   Last refilled on 2/10/22    pregabalin (LYRICA) 150 MG capsule  Last refilled on 5/8/22    Pt last seen on 8/24/2021  Next appt scheduled for 12/28/22    E-prescribe to:    [facility]/PHARMACY #[id number] - [place], [place] - [address] [address]     Will facilitate refill.

## 2022-09-26 NOTE — TELEPHONE ENCOUNTER
Routed to MA pool to gather required information for refill.    Katia MOSQUERA, RN Care Coordinator  [facility]  Pain [facility]

## 2022-09-27 ENCOUNTER — HOSPITAL ENCOUNTER (OUTPATIENT)
Dept: CARDIOLOGY | Facility: CLINIC | Age: 72
Discharge: HOME | End: 2022-09-27
Attending: PHYSICIAN ASSISTANT | Admitting: PHYSICIAN ASSISTANT
Payer: COMMERCIAL

## 2022-09-27 ENCOUNTER — LAB (OUTPATIENT)
Dept: LAB | Facility: CLINIC | Age: 72
End: 2022-09-27
Payer: COMMERCIAL

## 2022-09-27 DIAGNOSIS — I25.10 CORONARY ARTERY DISEASE INVOLVING NATIVE CORONARY ARTERY OF NATIVE HEART WITHOUT ANGINA PECTORIS: ICD-10-CM

## 2022-09-27 DIAGNOSIS — E78.2 MIXED HYPERLIPIDEMIA: ICD-10-CM

## 2022-09-27 DIAGNOSIS — I10 ESSENTIAL HYPERTENSION, BENIGN: ICD-10-CM

## 2022-09-27 DIAGNOSIS — I34.0 NON-RHEUMATIC MITRAL REGURGITATION: ICD-10-CM

## 2022-09-27 LAB
ALT SERPL W P-5'-P-CCNC: 23 U/L (ref 10–50)
ANION GAP SERPL CALCULATED.3IONS-SCNC: 12 MMOL/L (ref 7–15)
BUN SERPL-MCNC: 16.4 MG/DL (ref 8–23)
CALCIUM SERPL-MCNC: 9.4 MG/DL (ref 8.8–10.2)
CHLORIDE SERPL-SCNC: 98 MMOL/L (ref 98–107)
CHOLEST SERPL-MCNC: 159 MG/DL
CREAT SERPL-MCNC: 0.88 MG/DL (ref 0.67–1.17)
DEPRECATED HCO3 PLAS-SCNC: 23 MMOL/L (ref 22–29)
GFR SERPL CREATININE-BSD FRML MDRD: 90 ML/MIN/1.73M2
GLUCOSE SERPL-MCNC: 100 MG/DL (ref 70–99)
HDLC SERPL-MCNC: 54 MG/DL
LDLC SERPL CALC-MCNC: 90 MG/DL
LVEF ECHO: NORMAL
NONHDLC SERPL-MCNC: 105 MG/DL
POTASSIUM SERPL-SCNC: 4.3 MMOL/L (ref 3.4–5.3)
SODIUM SERPL-SCNC: 133 MMOL/L (ref 136–145)
TRIGL SERPL-MCNC: 76 MG/DL

## 2022-09-27 PROCEDURE — 93306 TTE W/DOPPLER COMPLETE: CPT | Mod: 26 | Performed by: INTERNAL MEDICINE

## 2022-09-27 PROCEDURE — 93306 TTE W/DOPPLER COMPLETE: CPT

## 2022-09-27 PROCEDURE — 84460 ALANINE AMINO (ALT) (SGPT): CPT | Performed by: PHYSICIAN ASSISTANT

## 2022-09-27 PROCEDURE — 80048 BASIC METABOLIC PNL TOTAL CA: CPT | Performed by: PHYSICIAN ASSISTANT

## 2022-09-27 PROCEDURE — 36415 COLL VENOUS BLD VENIPUNCTURE: CPT | Performed by: PHYSICIAN ASSISTANT

## 2022-09-27 PROCEDURE — 80061 LIPID PANEL: CPT | Performed by: PHYSICIAN ASSISTANT

## 2022-09-27 NOTE — TELEPHONE ENCOUNTER
Patient has not been seen in clinic since August 2021 by Dr. Richter. Medication last filled in February 2022. He is now requesting refills. Please call and get details. How is he using the medications right now?    Arabella Taylor MD  [facility] Pain Management

## 2022-09-27 NOTE — TELEPHONE ENCOUNTER
Called pt and LM to call back or MyChart about medication dosing for methocarbamol and Lyrica.    Katia ARCOSN, RN Care Coordinator  [facility]  Pain Management

## 2022-09-29 NOTE — TELEPHONE ENCOUNTER
[facility] Call Center    Phone Message    May a detailed message be left on voicemail: yes     Reason for Call: Other: .  Metocarbamol patient is taking 500 mg 3X daily. Lyrica he is taking 150mg 2X daily. He also needs a refill on the Norco which he only takes in moderate to severe pain 1 tablet at max 2 tablets a day.     Action Taken: Other: Patient    Travel Screening: Not Applicable

## 2022-09-30 RX ORDER — METHOCARBAMOL 500 MG/1
500 TABLET, FILM COATED ORAL 3 TIMES DAILY PRN
Qty: 90 TABLET | Refills: 1 | Status: SHIPPED | OUTPATIENT
Start: 2022-09-30 | End: 2022-12-20

## 2022-09-30 RX ORDER — PREGABALIN 150 MG/1
150 CAPSULE ORAL 2 TIMES DAILY
Qty: 60 CAPSULE | Refills: 1 | Status: CANCELLED | OUTPATIENT
Start: 2022-09-30

## 2022-09-30 NOTE — TELEPHONE ENCOUNTER
Signed Prescriptions:                        Disp   Refills    methocarbamol (ROBAXIN) 500 MG tablet      90 tab*1        Sig: Take 1 tablet (500 mg) by mouth 3 times daily as           needed for muscle spasms  Authorizing Provider: ABA TAYLOR    Methocarbamol refilled. Will need to see patient in clinic to discuss other medications.    Aba Taylor MD  [facility] Pain Management

## 2022-09-30 NOTE — TELEPHONE ENCOUNTER
"Routing to provider to review prepped prescriptions.   Called pt-mobile no answer and mailbox full. Called home#.  Inquired how he has been taking medication as his message indicated he is taking as directed but he last filled in May. Pt reports that he has been out of Lyrica for a couple months now. Discussed importance of provided accurate info as we likely would not be restarting  This at that same dose. We will contact his after review. Ok to leave detailed messages at home#.   He reports is nearly out of methocarbamol. He really needs the muscle relaxants- he reports he does take this regularly and states that somehow the pharmacy \"doubled up\" his quantity so it lasted longer.     Advised unsure if he would be getting refill of Norco, last seen one year ago clinically and this may need to wait until appt in Dec. Pt takes this sparingly and #60 lasted nearly one year.     Lyrica 150mg, #60, Refill: 1  Last sold 05/19/22    Methocarbamol 500mg, #90, Refill:1    Did not prep Norco- last sold 11/2021 per .     Routing to provider to review      Katia MOSQUERA, RN Care Coordinator  [facility]  Pain Management        "

## 2022-10-09 ENCOUNTER — HEALTH MAINTENANCE LETTER (OUTPATIENT)
Age: 72
End: 2022-10-09

## 2022-10-12 ENCOUNTER — VIRTUAL VISIT (OUTPATIENT)
Dept: CARDIOLOGY | Facility: CLINIC | Age: 72
End: 2022-10-12
Payer: COMMERCIAL

## 2022-10-12 DIAGNOSIS — I82.4Z2 ACUTE DEEP VEIN THROMBOSIS (DVT) OF DISTAL VEIN OF LEFT LOWER EXTREMITY (H): ICD-10-CM

## 2022-10-12 DIAGNOSIS — E78.2 MIXED HYPERLIPIDEMIA: ICD-10-CM

## 2022-10-12 DIAGNOSIS — Z98.890 STATUS POST MITRAL VALVE REPAIR: ICD-10-CM

## 2022-10-12 DIAGNOSIS — I25.10 CORONARY ARTERY DISEASE INVOLVING NATIVE CORONARY ARTERY OF NATIVE HEART WITHOUT ANGINA PECTORIS: Primary | Chronic | ICD-10-CM

## 2022-10-12 DIAGNOSIS — I10 ESSENTIAL HYPERTENSION, BENIGN: ICD-10-CM

## 2022-10-12 PROCEDURE — 99213 OFFICE O/P EST LOW 20 MIN: CPT | Mod: 95 | Performed by: INTERNAL MEDICINE

## 2022-10-12 RX ORDER — NITROGLYCERIN 0.4 MG/1
TABLET SUBLINGUAL
Qty: 25 TABLET | Refills: 3 | Status: SHIPPED | OUTPATIENT
Start: 2022-10-12 | End: 2023-11-28

## 2022-10-12 RX ORDER — IRBESARTAN 150 MG/1
150 TABLET ORAL DAILY
Qty: 90 TABLET | Refills: 4 | Status: SHIPPED | OUTPATIENT
Start: 2022-10-12 | End: 2023-09-15

## 2022-10-12 NOTE — PROGRESS NOTES
Morgan is a 72 year old who is being evaluated via a billable video visit.      How would you like to obtain your AVS? MyChart  If the video visit is dropped, the invitation should be resent by: Text to cell phone: [phone]  Will anyone else be joining your video visit? No        Video-Visit Details    Video Start Time: 12:05    Type of service:  Video Visit    Video End Time:12:35 PM    Originating Location (pt. Location): Home    Distant Location (provider location):  [facility] HEART [facility]     Platform used for Video Visit: adhoclabs     Review Of Systems  Skin: negative  Eyes: positive for glasses  Ears/Nose/Throat: negative  Respiratory: No shortness of breath, dyspnea on exertion, cough, or hemoptysis  Cardiovascular: negative  Gastrointestinal: negative  Genitourinary: negative  Musculoskeletal: positive for back pain  Neurologic: positive for headaches  Psychiatric: negative  Hematologic/Lymphatic/Immunologic: negative  Endocrine: positive for thyroid disorder    Blood Pressure: 123/88 mm/Hg and 130/89 mm/Hg  Pulse: 85 bpm and 87 bpm  Weight: 145 lbs  Height: 5 ft 6 in    Morgan took BP twice as the first reading he felt was elevated.   General:  no apparent distress, normal body habitus, sitting upright.  ENT/Mouth:  membranes moist, no nasal discharge.  Normal head shape, no apparent injury or laceration.  Eyes:  no scleral icterus, normal conjunctivae.  No observed jaundice.  Neck:  no apparent neck swelling.   Chest/Lungs:  No breathing difficulty while speaking.  No audible wheezing.  No cough during conversation.  Cardiovascular:  No obviously elevated jugular venous pressure.    Extremities:  no apparent cyanosis.  Skin:  no xanthelasma.  No facial lacerations.  Neurologic:  Normal arm motion bilateral, no tremors.    Psychiatric:  Alert and oriented x3, calm demeanor    The rest of the comprehensive physical examination is deferred due to public health emergency video visit  restrictions.        Joyce Mckeon CMA ([facility])
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
## 2022-10-17 DIAGNOSIS — F33.42 MAJOR DEPRESSIVE DISORDER, RECURRENT EPISODE, IN FULL REMISSION (H): ICD-10-CM

## 2022-10-17 DIAGNOSIS — I10 ESSENTIAL HYPERTENSION, BENIGN: ICD-10-CM

## 2022-10-17 NOTE — TELEPHONE ENCOUNTER
Pt is requesting a refill for hydrochlorothiazide and fluoxetine.  Pt was last seen on 12/14/2021 for a cpx.  Is patient not due for an appt until December?  I can reach out to let him know that he is due.      Pending Prescriptions:                       Disp   Refills    FLUoxetine (PROZAC) 20 MG capsule         90 cap*0            Sig: Take 1 capsule (20 mg) by mouth daily    hydrochlorothiazide (HYDRODIURIL) 25 MG t*90 tab*0            Sig: Take 1 tablet (25 mg) by mouth daily

## 2022-10-18 DIAGNOSIS — I10 BENIGN ESSENTIAL HYPERTENSION: ICD-10-CM

## 2022-10-18 RX ORDER — HYDROCHLOROTHIAZIDE 25 MG/1
25 TABLET ORAL DAILY
Qty: 90 TABLET | Refills: 0 | Status: SHIPPED | OUTPATIENT
Start: 2022-10-18 | End: 2023-02-17

## 2022-10-18 RX ORDER — METOPROLOL SUCCINATE 25 MG/1
12.5 TABLET, EXTENDED RELEASE ORAL DAILY
Qty: 45 TABLET | Refills: 3 | Status: SHIPPED | OUTPATIENT
Start: 2022-10-18 | End: 2023-07-25

## 2022-10-18 NOTE — TELEPHONE ENCOUNTER
Refilled 90 days of both. Will be due for appt in Dec, and should plan on non-fasting lab to recheck sodium that was low with cardiology 9/2022.

## 2022-10-19 ENCOUNTER — TRANSFERRED RECORDS (OUTPATIENT)
Dept: FAMILY MEDICINE | Facility: CLINIC | Age: 72
End: 2022-10-19

## 2022-10-26 ENCOUNTER — OFFICE VISIT (OUTPATIENT)
Dept: FAMILY MEDICINE | Facility: CLINIC | Age: 72
End: 2022-10-26

## 2022-10-26 VITALS
WEIGHT: 153 LBS | HEART RATE: 72 BPM | BODY MASS INDEX: 24.69 KG/M2 | OXYGEN SATURATION: 97 % | SYSTOLIC BLOOD PRESSURE: 110 MMHG | DIASTOLIC BLOOD PRESSURE: 60 MMHG | TEMPERATURE: 97.4 F

## 2022-10-26 DIAGNOSIS — H81.11 BENIGN PAROXYSMAL POSITIONAL VERTIGO OF RIGHT EAR: Primary | ICD-10-CM

## 2022-10-26 PROCEDURE — 99213 OFFICE O/P EST LOW 20 MIN: CPT | Mod: 25 | Performed by: PHYSICIAN ASSISTANT

## 2022-10-26 PROCEDURE — G0008 ADMIN INFLUENZA VIRUS VAC: HCPCS | Performed by: PHYSICIAN ASSISTANT

## 2022-10-26 PROCEDURE — 90662 IIV NO PRSV INCREASED AG IM: CPT | Performed by: PHYSICIAN ASSISTANT
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
## 2022-10-26 NOTE — NURSING NOTE
Chief Complaint   Patient presents with     Dizziness     Pt has been experiencing constant dizziness for two weeks, not urgent or debilitating, worse at night when he stands up.         Pre-visit Screening:  Immunizations:  up to date  Colonoscopy:  is up to date  Mammogram: NA  Asthma Action Test/Plan:  NA  PHQ9:  NA  GAD7:  NA  Questioned patient about current smoking habits Pt. has never smoked.  Ok to leave detailed message on voice mail for today's visit only Yes, phone # [phone]

## 2022-10-28 DIAGNOSIS — I10 ESSENTIAL HYPERTENSION, BENIGN: ICD-10-CM

## 2022-10-28 DIAGNOSIS — E78.2 MIXED HYPERLIPIDEMIA: ICD-10-CM

## 2022-10-28 RX ORDER — AMLODIPINE BESYLATE 10 MG/1
10 TABLET ORAL DAILY
Qty: 90 TABLET | Refills: 3 | Status: SHIPPED | OUTPATIENT
Start: 2022-10-28 | End: 2023-10-16

## 2022-10-28 RX ORDER — ATORVASTATIN CALCIUM 80 MG/1
80 TABLET, FILM COATED ORAL DAILY
Qty: 90 TABLET | Refills: 3 | Status: SHIPPED | OUTPATIENT
Start: 2022-10-28 | End: 2023-09-20

## 2022-10-28 NOTE — TELEPHONE ENCOUNTER
Received refill request for:  Atorvastatin, Amlodipine     [facility] Cardiology Refill Guideline reviewed.  Medication meets criteria for refill.    Farideh Armendariz RN, BSN  10/28/22 at 12:52 PM

## 2022-12-14 ENCOUNTER — TRANSFERRED RECORDS (OUTPATIENT)
Dept: FAMILY MEDICINE | Facility: CLINIC | Age: 72
End: 2022-12-14

## 2022-12-20 ENCOUNTER — OFFICE VISIT (OUTPATIENT)
Dept: PALLIATIVE MEDICINE | Facility: CLINIC | Age: 72
End: 2022-12-20
Payer: COMMERCIAL

## 2022-12-20 VITALS — OXYGEN SATURATION: 97 % | HEART RATE: 67 BPM | SYSTOLIC BLOOD PRESSURE: 140 MMHG | DIASTOLIC BLOOD PRESSURE: 86 MMHG

## 2022-12-20 DIAGNOSIS — M47.816 SPONDYLOSIS OF LUMBAR REGION WITHOUT MYELOPATHY OR RADICULOPATHY: ICD-10-CM

## 2022-12-20 DIAGNOSIS — M48.061 SPINAL STENOSIS OF LUMBAR REGION WITHOUT NEUROGENIC CLAUDICATION: Primary | ICD-10-CM

## 2022-12-20 PROCEDURE — 99214 OFFICE O/P EST MOD 30 MIN: CPT | Performed by: PHYSICAL MEDICINE & REHABILITATION

## 2022-12-20 RX ORDER — METHOCARBAMOL 500 MG/1
500 TABLET, FILM COATED ORAL 3 TIMES DAILY PRN
Qty: 90 TABLET | Refills: 1 | Status: SHIPPED | OUTPATIENT
Start: 2022-12-20

## 2022-12-20 RX ORDER — PREGABALIN 50 MG/1
100 CAPSULE ORAL 2 TIMES DAILY
Qty: 120 CAPSULE | Refills: 2 | Status: SHIPPED | OUTPATIENT
Start: 2022-12-20 | End: 2022-12-20

## 2022-12-20 RX ORDER — PREGABALIN 100 MG/1
100 CAPSULE ORAL 2 TIMES DAILY
Qty: 60 CAPSULE | Refills: 2 | Status: SHIPPED | OUTPATIENT
Start: 2022-12-20 | End: 2023-02-09

## 2022-12-20 RX ORDER — PREGABALIN 50 MG/1
100 CAPSULE ORAL 2 TIMES DAILY
Qty: 120 CAPSULE | Refills: 0 | Status: SHIPPED | OUTPATIENT
Start: 2022-12-20 | End: 2022-12-20

## 2022-12-20 RX ORDER — PREGABALIN 50 MG/1
CAPSULE ORAL
Qty: 18 CAPSULE | Refills: 0 | Status: SHIPPED | OUTPATIENT
Start: 2022-12-20 | End: 2023-02-09

## 2022-12-20 ASSESSMENT — PAIN SCALES - GENERAL: PAINLEVEL: MILD PAIN (2)

## 2022-12-20 NOTE — PATIENT INSTRUCTIONS
Restart Lyrica since it was helpful before.   Lyrica 1 tab= 50mg   The prescription will not give the full details as outlined below.    AM    PM                     0    50mg (1 tab)  If tolerating, after 2-3 days, increase as tolerated to next line   50mg (1 tab)           50mg (1 tab)  If tolerating, after 2-3 days, increase as tolerated to next line   50mg (1 tab)     100mg (2 tabs)  If tolerating, after 2-3 days, increase as tolerated to next line  100mg (2 tabs)     100mg (2 tabs)  Call us when you are at this dose.   Don't drive on this medication until you know how it effects you.  Common side effects are drowsiness and dizziness  You can go slower if any side effects     2. Refill sent for methocarbamol.     3. Follow up with me at least one more time after returning from [place].     4. You can follow up with your PCP if you are continuing to do well after returning from [place].    Arabella Taylor MD  [facility] Pain Management   ----------------------------------------------------------------  Clinic Number:  [phone]   Call with any questions about your care and for scheduling assistance.   Calls are returned Monday through Friday between 8 AM and 4:30 PM. We usually get back to you within 2 business days depending on the issue/request.    If we are prescribing your medications:  For opioid medication refills, call the clinic or send a [employer] message 7 days in advance.  Please include:  Name of requested medication  Name of the pharmacy.  For non-opioid medications, call your pharmacy directly to request a refill. Please allow 3-4 days to be processed.   Per [place] State Law:  All controlled substance prescriptions must be filled within 30 days of being written.    For those controlled substances allowing refills, pickup must occur within 30 days of last fill.      We believe regular attendance is key to your success in our program!    Any time you are unable to keep your appointment we ask that  you call us at least 24 hours in advance to cancel.This will allow us to offer the appointment time to another patient.   Multiple missed appointments may lead to dismissal from the clinic.

## 2022-12-20 NOTE — PROGRESS NOTES
[facility]    Date of visit: 2022    Patient was last seen by Dr. Richter on 2022.    Assessment:  Mr. Moreno is a 72 year old with past medical history including: CAD (nstemi, s/p PTCA), HTN, mitral valve repair, mitral regurgitation, HLD, Hypothyroidism, GERD, Prostate cancer (s/p prostatectomy in 2018, ongoing urinary incontinence) who presents for follow up for the followin. Chronic low back pain: Symptoms are multifactorial including: Lumbar DDD, spinal stenosis and scoliosis: He has had gradual worsening in pain over the past year and symptoms have not improved with repeat RFA and facet injections which previously were helpful. Repeat MRI shows worsening lumbar ddd and spinal stenosis. Patient has had symptoms consistent with neurological claudication in the past year as well. On exam he is neurologically intact with no limitations with ROM. Reports RAMSES less effective as well.      Plan:  The following recommendations were given to the patient. Diagnosis, treatment options, risks, benefits, and alternatives were discussed, and all questions were answered. The patient expressed understanding of the plan for management.     1. Physical Therapy: continue exercises recommended by PT in addition to yoga exercises and biking.  2. Clinical Health Pain Psychologist: Coping well, defer at this time  3. Diagnostic Studies: None  4. Medication Management:    1. He was getting good benefit from Lyrica. Would like to restart. Start 50 mg every evening and titrate up to 100 mg BID. Instructions discussed and written in AVS.  2. Continue methocarbamol 500mg TID prn  3. Continue tylenol 650mg four times daily as needed.  4. Continue Ibuprofen 400mg twice daily as needed, avoid taking this more than 3 days/week. .  5. Further procedures recommended: none at this time. Getting less and less benefit with facet injections and RAMSES.   6. Follow up: He can follow up with me  "one more time after returning from [place]. If everything remains stable can transfer back to PCP.       Chief complaint:   Chief Complaint   Patient presents with     Pain       Interval history:  - He has low back pain which is dull and aching in quality. Located in bilateral low back.   - he has had lumbar facet injections which stopped working over time, rfa didn't help second time.   - Epidural steroid injection progressively less effective  - currently painful to walk or stand too long. Tries to \"power through\" it.   - rides a bike for PT. He is going to be in [place] through May.  He will be able to rigde there. Also has a stationary bike at home now. Does some yoga stretches. - not helpful.   - lyrica was helpful. Ran out quite a long time ago.   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     Pain scores:  Pain intensity on currenlty is 2 a scale of 0-10 without medications.     Current pain treatments:   -Ibuprofen prn  -Tylenol 500 mg tabs - 1,000 mg BID prn - [facility]  -Methocarbamol 500mg TID prn - [facility]    Past pain treatments:  -none  -Lumbar RFA with 6 months relief, repeated on 9/9/19 without significant pain relief  -Lumbar facet injections with 2-3 months relief  -PT  -Lyrica 150mg BID  CBD oil    Side Effects: none    Medications:  Current Outpatient Medications   Medication Sig Dispense Refill     Acetaminophen (TYLENOL PO) Take 325-650 mg by mouth 2 times daily as needed for mild pain or fever       amLODIPine (NORVASC) 10 MG tablet Take 1 tablet (10 mg) by mouth daily 90 tablet 3     aspirin EC 81 MG EC tablet Take 1 tablet (81 mg) by mouth daily 60 tablet 0     atorvastatin (LIPITOR) 80 MG tablet Take 1 tablet (80 mg) by mouth daily 90 tablet 3     cholecalciferol (VITAMIN D3) 1000 UNIT tablet Take 1 tablet (1,000 Units) by mouth daily       Cyanocobalamin (B-12) 1000 MCG TBCR Take 1,000 mcg by mouth daily       FLUoxetine (PROZAC) 20 MG capsule Take 1 capsule (20 mg) by mouth daily 90 capsule 0     " hydrochlorothiazide (HYDRODIURIL) 25 MG tablet Take 1 tablet (25 mg) by mouth daily 90 tablet 0     HYDROcodone-acetaminophen (NORCO) 5-325 MG tablet Take 1 tablet by mouth every 6 hours as needed for moderate to severe pain or severe pain (max 2/day.) OK to fill and start 01/31/22 60 tablet 0     irbesartan (AVAPRO) 150 MG tablet Take 1 tablet (150 mg) by mouth daily 90 tablet 4     ketoconazole (NIZORAL) 2 % external cream Apply topically daily 60 g 0     levothyroxine (SYNTHROID/LEVOTHROID) 50 MCG tablet Take 1 tablet (50 mcg) by mouth daily 90 tablet 3     methocarbamol (ROBAXIN) 500 MG tablet Take 1 tablet (500 mg) by mouth 3 times daily as needed for muscle spasms 90 tablet 1     metoprolol succinate ER (TOPROL XL) 25 MG 24 hr tablet Take 0.5 tablets (12.5 mg) by mouth daily 45 tablet 3     multivitamin, therapeutic with minerals (MULTI-VITAMIN) TABS tablet Take 1 tablet by mouth daily       nitroGLYcerin (NITROSTAT) 0.4 MG sublingual tablet TAKE 1 TABLET BY MOUTH EVERY 5 MIN AS NEEDED FOR CHEST PAIN max of three tablets and if doesn't relive pain go to the emergency departemnt. 25 tablet 3     Omega-3 Fatty Acids (FISH OIL PO) Take 1 capsule by mouth daily       omeprazole (PRILOSEC) 20 MG DR capsule Take 1 capsule (20 mg) by mouth daily 90 capsule 3     pregabalin (LYRICA) 150 MG capsule Take 1 capsule (150 mg) by mouth 2 times daily 60 capsule 3     rivaroxaban ANTICOAGULANT (XARELTO) 20 MG TABS tablet Take 20 mg by mouth daily       traZODone (DESYREL) 100 MG tablet Take 1 tablet (100 mg) by mouth At Bedtime 90 tablet 3     triamcinolone (KENALOG) 0.1 % external cream APPLY TO THE AFFECTED RASH TWICE DAILY AS NEEDED       Physical Exam:  BP (!) 140/86   Pulse 67   SpO2 97%   Gen: NAD, pleasant  Resp: breathing unlabored on room air  MSK: Lumbar ROM is wnl. Some mild tenderness in lumbar paraspinals.   Neuro: Strength is 5/5 and symmetric in the lower extremities.     Arabella Taylor MD  [facility]  Pain Management        BILLING TIME DOCUMENTATION:   The total TIME spent on this patient on the date of the encounter/appointment was 35 minutes.      TOTAL TIME includes:   Time spent preparing to see the patient (reviewing records and tests)  Time spent face to face (or over the phone) with the patient   Time spent ordering tests, medications, procedures and referrals   Time spent documenting clinical information in Epic

## 2022-12-23 ENCOUNTER — TRANSFERRED RECORDS (OUTPATIENT)
Dept: FAMILY MEDICINE | Facility: CLINIC | Age: 72
End: 2022-12-23

## 2022-12-30 ENCOUNTER — TRANSFERRED RECORDS (OUTPATIENT)
Dept: FAMILY MEDICINE | Facility: CLINIC | Age: 72
End: 2022-12-30

## 2023-01-30 NOTE — TELEPHONE ENCOUNTER
"Denied refill request for hydrochlorothiazide and fluoxetine. Pt overdue for appt.     Per Raysa's note on 10/17/22 TE: \"Refilled 90 days of both. Will be due for appt in Dec, and should plan on non-fasting lab to recheck sodium that was low with cardiology 9/2022. \"  "
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
## 2023-02-09 ENCOUNTER — OFFICE VISIT (OUTPATIENT)
Dept: FAMILY MEDICINE | Facility: CLINIC | Age: 73
End: 2023-02-09

## 2023-02-09 VITALS
SYSTOLIC BLOOD PRESSURE: 108 MMHG | TEMPERATURE: 97.1 F | HEART RATE: 72 BPM | HEIGHT: 66 IN | RESPIRATION RATE: 20 BRPM | WEIGHT: 153.2 LBS | BODY MASS INDEX: 24.62 KG/M2 | DIASTOLIC BLOOD PRESSURE: 60 MMHG

## 2023-02-09 DIAGNOSIS — Z01.818 PRE-OP EXAM: ICD-10-CM

## 2023-02-09 DIAGNOSIS — M54.50 CHRONIC LEFT-SIDED LOW BACK PAIN WITHOUT SCIATICA: Primary | ICD-10-CM

## 2023-02-09 DIAGNOSIS — G89.29 CHRONIC LEFT-SIDED LOW BACK PAIN WITHOUT SCIATICA: Primary | ICD-10-CM

## 2023-02-09 LAB
% GRANULOCYTES: 64.3 %
BUN SERPL-MCNC: 18 MG/DL (ref 7–25)
BUN/CREATININE RATIO: 18.4 (ref 6–22)
CALCIUM SERPL-MCNC: 9.5 MG/DL (ref 8.6–10.3)
CHLORIDE SERPLBLD-SCNC: 105.1 MMOL/L (ref 98–110)
CO2 SERPL-SCNC: 26.2 MMOL/L (ref 20–32)
CREAT SERPL-MCNC: 0.98 MG/DL (ref 0.6–1.3)
GLUCOSE SERPL-MCNC: 116 MG/DL (ref 60–99)
HCT VFR BLD AUTO: 45.2 % (ref 40–53)
HEMOGLOBIN: 15.2 G/DL (ref 13.3–17.7)
LYMPHOCYTES NFR BLD AUTO: 26.9 %
MCH RBC QN AUTO: 34.7 PG (ref 26–33)
MCHC RBC AUTO-ENTMCNC: 33.6 G/DL (ref 31–36)
MCV RBC AUTO: 103.3 FL (ref 78–100)
MONOCYTES NFR BLD AUTO: 8.8 %
PLATELET COUNT - QUEST: 309 10^9/L (ref 150–375)
POTASSIUM SERPL-SCNC: 4.29 MMOL/L (ref 3.5–5.3)
RBC # BLD AUTO: 4.38 10*12/L (ref 4.4–5.9)
SODIUM SERPL-SCNC: 140 MMOL/L (ref 135–146)
WBC # BLD AUTO: 6 10*9/L (ref 4–11)

## 2023-02-09 PROCEDURE — 36415 COLL VENOUS BLD VENIPUNCTURE: CPT | Performed by: PHYSICIAN ASSISTANT

## 2023-02-09 PROCEDURE — 99214 OFFICE O/P EST MOD 30 MIN: CPT | Mod: 25 | Performed by: PHYSICIAN ASSISTANT

## 2023-02-09 PROCEDURE — 80048 BASIC METABOLIC PNL TOTAL CA: CPT | Performed by: PHYSICIAN ASSISTANT

## 2023-02-09 PROCEDURE — 93000 ELECTROCARDIOGRAM COMPLETE: CPT | Performed by: PHYSICIAN ASSISTANT

## 2023-02-09 PROCEDURE — 85025 COMPLETE CBC W/AUTO DIFF WBC: CPT | Performed by: PHYSICIAN ASSISTANT
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
## 2023-02-09 NOTE — NURSING NOTE
Jose Moreno is here for a pre-op exam.    Questioned patient about current smoking habits.  Pt. has never smoked.  PULSE regular  My Chart: active  CLASSIFICATION OF OVERWEIGHT AND OBESITY BY BMI                        Obesity Class           BMI(kg/m2)  Underweight                                    < 18.5  Normal                                         18.5-24.9  Overweight                                     25.0-29.9  OBESITY                     I                  30.0-34.9                             II                 35.0-39.9  EXTREME OBESITY             III                >40                            Patient's  BMI Body mass index is 24.73 kg/m .  http://hin.nhlbi.nih.gov/menuplanner/menu.cgi  Pre-visit planning  Immunizations - up to date  Colonoscopy -   Mammogram -   Asthma -   PHQ9 -    CARL-7 -

## 2023-02-10 ENCOUNTER — MYC MEDICAL ADVICE (OUTPATIENT)
Dept: FAMILY MEDICINE | Facility: CLINIC | Age: 73
End: 2023-02-10

## 2023-02-12 ENCOUNTER — HEALTH MAINTENANCE LETTER (OUTPATIENT)
Age: 73
End: 2023-02-12

## 2023-02-16 NOTE — PROGRESS NOTES
Jose Moreno is a 72 year old male who presents for Medicare Annual Wellness Visit.    Current providers caring for this patient include:  Patient Care Team:  Sunny Blake PA-C as PCP - General (Family Medicine)  Armani Franz MD as MD (Urology)  Clint Blankenship MD as MD (Urology)  Raysa Carolina PA-C as Assigned PCP  Clint Alejandra MD as Assigned Heart and Vascular Provider    Complete Medical and Social history reviewed with patient, outlined below.    Patient Active Problem List   Diagnosis     Essential hypertension, benign     Nonspecific (abnormal) findings on radiological and other examination of lung field     Status post mitral valve repair     Pulmonary nodules     Mixed hyperlipidemia     Spinal stenosis, lumbar     Health Care Home     Coronary artery disease involving native coronary artery of native heart without angina pectoris     Mitral valve insufficiency, unspecified etiology     ACP (advance care planning)     Hypothyroidism due to acquired atrophy of thyroid     Gastroesophageal reflux disease without esophagitis     Chronic left-sided low back pain without sciatica     Hiatal hernia     History of non-ST elevation myocardial infarction (NSTEMI)     Lumbosacral spondylosis without myelopathy     Facet arthropathy, lumbosacral     Major depressive disorder, recurrent episode, in full remission (H)     FAUSTIN (dyspnea on exertion)     Chronic fatigue     Prostate cancer (H)     Stress incontinence of urine     Urinary incontinence     Supraventricular tachycardia (H)     Mitral valve prolapse     Acute deep vein thrombosis (DVT) of distal vein of left lower extremity (H)       Past Medical History:   Diagnosis Date     ADHD (attention deficit hyperactivity disorder)      Arthritis     lower back     CAD (coronary artery disease)     cardiac cath 1/23/15: SHERRY to 1st diagonal, SHERRY x2 to LAD, cath 2009: no intervention     Esophageal reflux      Heart murmur     mitral  valve repair     History of hyperthyroidism 4/9/2014     Hyperlipidemia      Hypertension      Mitral regurgitation 2009    moderately severe MVP, s/p robotic repair at [facility]     Prostate cancer      Pulmonary nodules 2009    CT-recommend repeat in 6-12 months     Rotator cuff rupture 11/3/2011     Thyroid disease        Past Surgical History:   Procedure Laterality Date     COLONOSCOPY  7/00    GI     DAVINCI PROSTATECTOMY, LYMPHADENECTOMY N/A 11/1/2018    Procedure: ROBOTIC ASSISTED RADICAL PROSTATECTOMY WITH BILATERAL PELVIC LYMPH NODE DISSECTION;  Surgeon: Clint Blankenship MD;  Location: [facility] OR     DECOMPRESSION LUMBAR MINIMALLY INVASIVE ONE LEVEL  1/11/2012    Procedure:DECOMPRESSION LUMBAR MINIMALLY INVASIVE ONE LEVEL; L4-5 Decompression Minimally Invasive; Surgeon:MESSI HORAN; Location:[facility] OR     HEART CATH RIGHT AND LEFT HEART CATH  01-23-15    See report, pt transfered to [facility] for complex bifurcation PCI of LAD diagonal vessel.      HEART CATH RIGHT AND LEFT HEART CATH  01-26-15    PTCA and implantation of SHERRY to 1st diagonal, SHERRY to mid LAD, SHERRY to proximal LAD     Hx of rotator cuff rupture and repair       IMPLANT PROSTHESIS SPHINCTER URINARY N/A 1/3/2020    Procedure: Placement of  artificial urinary sphincter;  Surgeon: Clint Blankenship MD;  Location: [facility] OR     LAPAROSCOPIC PROSTATECTOMY       REPAIR VALVE MITRAL  2009    [facility] robotic repair      Reversal of vasectomy       VASECTOMY       XR LUMBAR RADIOFREQ ABLATION UNILATERAL  01/11/2018    lumbar area/ ? level       Family History   Problem Relation Age of Onset     Breast Cancer Mother      Lung Cancer Mother         small cell carcinoma- radon?     Depression Father         alcohlism     Macular Degeneration Father      Colon Cancer Father         age 75     Crohn's Disease Sister      Pleurisy Brother      Depression Son      Diabetes No family hx of      Cardiovascular No family hx of      Prostate Cancer No  "family hx of      Social History     Tobacco Use     Smoking status: Never     Smokeless tobacco: Never   Substance Use Topics     Alcohol use: Yes     Alcohol/week: 7.0 standard drinks     Types: 7 Standard drinks or equivalent per week     Comment: 1 beer daily       Diet: regular, low salt/low fat  Physical Activity: active without specific exercise program, struggling with back pain currently  Depression Screen:    Over the past 2 weeks, patient has felt down, depressed, or hopeless:  No    Over the past 2 weeks, patient has felt little interest or pleasure in doing things: No    Functional ability/Safety screen:  Up and go test (able to get up and walk longer than 30 seconds): Passed  Patient needs assistance with: nothing  Patient's home has the following possible safety concerns: none identified  Patient has concerns about his hearing:  No  Cognitive Screen  Patient repeats three objects (ball, flag, tree)      Clock drawing test:   NORMAL  Recalls three objects after 3 minutes (ball,flag,tree):                             recalls 3 objects (3 points)    Physical Exam:  /70 (BP Location: Right arm, Patient Position: Sitting, Cuff Size: Adult Large)   Pulse 68   Temp 97.9  F (36.6  C) (Temporal)   Ht 1.676 m (5' 6\")   Wt 69.4 kg (153 lb)   SpO2 98%   BMI 24.69 kg/m     Body mass index is 24.69 kg/m .   End of Life Planning:   Patient currently has an advanced directive: Yes.  Practitioner is supportive of decision.    Education/Counseling:   Based on review of the above information, the following items were addressed:   UTD, chart reviewed    Appropriate preventive services were discussed with this patient, including applicable screening as appropriate for cardiovascular disease, diabetes, osteopenia/osteoporosis, and glaucoma.  As appropriate for age/gender, discussed screening for colorectal cancer, prostate cancer, breast cancer, and cervical cancer.   Checklist reviewing preventive services " available has been given to the patient.      SUBJECTIVE:   CC: Morgan is an 72 year old who presents for preventative health visit.     Patient has been advised of split billing requirements and indicates understanding: Yes  HPI    Pt has dry skin-itching with this-anti itch creams help. Has used benadryl-helped a little. Itching is all over-also has associated dandruff.       Hypertension Follow-up      Do you check your blood pressure regularly outside of the clinic? Yes     Are you following a low salt diet? Yes    Are your blood pressures ever more than 140 on the top number (systolic) OR more   than 90 on the bottom number (diastolic), for example 140/90? No  hydrochlorothiazide is filled by [facility]. No concerns, taking med daily.       Depression and Anxiety Follow-Up    How are you doing with your depression since your last visit? No change    How are you doing with your anxiety since your last visit?  No change    Are you having other symptoms that might be associated with depression or anxiety? No    Have you had a significant life event? No     Do you have any concerns with your use of alcohol or other drugs? No  Denies SI/HI at this time. Main concern is back pain.     Social History     Tobacco Use     Smoking status: Never     Smokeless tobacco: Never   Substance Use Topics     Alcohol use: Yes     Alcohol/week: 7.0 standard drinks     Types: 7 Standard drinks or equivalent per week     Comment: 1 beer daily     Drug use: No     PHQ 2/27/2020 11/2/2020 9/15/2021   PHQ-9 Total Score 0 0 0   Q9: Thoughts of better off dead/self-harm past 2 weeks Not at all Not at all Not at all     CARL-7 SCORE 2/27/2020 11/2/2020 9/15/2021   Total Score - - -   Total Score 0 0 0           Today's PHQ-2 Score:   PHQ-2 ( 1999 [employer]) 2/9/2023   Q1: Little interest or pleasure in doing things 0   Q2: Feeling down, depressed or hopeless 0   PHQ-2 Score 0   PHQ-2 Total Score (12-17 Years)- Positive if 3 or more points; Administer  PHQ-A if positive -       Diet-generally health.   Exercise-hard to exercise with back pain-needs to increase this  Sleep-drinks small anatoliy each night. 8hrs/night, no concerns.  BM-daily, no concerns  Vitamin Use-multivitamin, B12 and D3  Dentist-2x a year  Eye Doctor-yearly  Colon: due this year  PSA: needs due to prostatectomy      Social History     Tobacco Use     Smoking status: Never     Smokeless tobacco: Never   Substance Use Topics     Alcohol use: Yes     Alcohol/week: 7.0 standard drinks     Types: 7 Standard drinks or equivalent per week     Comment: 1 beer daily         No flowsheet data found.    Last PSA:   [facility]ott PSA   Date Value Ref Range Status   04/01/2019 <0.1 < OR = 4.0 ng/mL Final     Comment:     The total PSA value from this assay system is   standardized against the WHO standard. The test   result will be approximately 20% lower when compared   to the equimolar-standardized total PSA (Ania   Verna). Comparison of serial PSA results should be   interpreted with this fact in mind.     This test was performed using the Siemens   chemiluminescent method. Values obtained from   different assay methods cannot be used  interchangeably. PSA levels, regardless of  value, should not be interpreted as absolute  evidence of the presence or absence of disease.       PSA   Date Value Ref Range Status   06/11/2021 <0.01 0 - 4 ug/L Final     Comment:     Assay Method:  Chemiluminescence using Siemens Vista analyzer       Reviewed orders with patient. Reviewed health maintenance and updated orders accordingly - Yes  Lab work is in process    Reviewed and updated as needed this visit by clinical staff   Tobacco    Problems             Reviewed and updated as needed this visit by Provider                 Past Medical History:   Diagnosis Date     Arthritis     lower back     CAD (coronary artery disease)     cardiac cath 1/23/15: SHERRY to 1st diagonal, SHERRY x2 to LAD, cath 2009: no intervention      Esophageal reflux      History of hyperthyroidism 04/09/2014     Hyperlipidemia      Hypertension      Mitral regurgitation 2009    moderately severe MVP, s/p robotic repair at [place]     Prostate cancer      Rotator cuff rupture 11/03/2011     Thyroid disease       Past Surgical History:   Procedure Laterality Date     DAVINCI PROSTATECTOMY, LYMPHADENECTOMY N/A 11/01/2018    Procedure: ROBOTIC ASSISTED RADICAL PROSTATECTOMY WITH BILATERAL PELVIC LYMPH NODE DISSECTION;  Surgeon: Clint Blankenship MD;  Location: [facility] OR     DECOMPRESSION LUMBAR MINIMALLY INVASIVE ONE LEVEL  01/11/2012    Procedure:DECOMPRESSION LUMBAR MINIMALLY INVASIVE ONE LEVEL; L4-5 Decompression Minimally Invasive; Surgeon:MESSI HORAN; Location:[facility]     HEART CATH RIGHT AND LEFT HEART CATH  01/23/2015    See report, pt transfered to [facility] for complex bifurcation PCI of LAD diagonal vessel.      HEART CATH RIGHT AND LEFT HEART CATH  01/26/2015    PTCA and implantation of SHERRY to 1st diagonal, SHERRY to mid LAD, SHERRY to proximal LAD     Hx of rotator cuff rupture and repair       IMPLANT PROSTHESIS SPHINCTER URINARY N/A 01/03/2020    Procedure: Placement of  artificial urinary sphincter;  Surgeon: Clint Blankenship MD;  Location: [facility] OR     REPAIR VALVE MITRAL  2009    [facility] robotic repair      Reversal of vasectomy       SPINAL CORD STIMULATOR IMPLANT  2023     VASECTOMY       XR LUMBAR RADIOFREQ ABLATION UNILATERAL  01/11/2018    lumbar area/ ? level       Review of Systems  CONSTITUTIONAL: NEGATIVE for fever, chills, change in weight  INTEGUMENTARY/SKIN: NEGATIVE for worrisome rashes, moles or lesions  EYES: NEGATIVE for vision changes or irritation  ENT: NEGATIVE for ear, mouth and throat problems  RESP: NEGATIVE for significant cough or SOB  CV: NEGATIVE for chest pain, palpitations or peripheral edema  GI: NEGATIVE for nausea, abdominal pain, heartburn, or change in bowel habits   male: negative for dysuria,  "hematuria, decreased urinary stream, erectile dysfunction, urethral discharge  MUSCULOSKELETAL: NEGATIVE for significant arthralgias or myalgia  NEURO: NEGATIVE for weakness, dizziness or paresthesias  PSYCHIATRIC: NEGATIVE for changes in mood or affect    OBJECTIVE:   /70 (BP Location: Right arm, Patient Position: Sitting, Cuff Size: Adult Large)   Pulse 68   Temp 97.9  F (36.6  C) (Temporal)   Ht 1.676 m (5' 6\")   Wt 69.4 kg (153 lb)   SpO2 98%   BMI 24.69 kg/m      Physical Exam  GENERAL: healthy, alert and no distress  HENT: ear canals and TM's normal, nose and mouth without ulcers or lesions  NECK: no adenopathy, no asymmetry, masses, or scars and thyroid normal to palpation  RESP: lungs clear to auscultation - no rales, rhonchi or wheezes  CV: regular rate and rhythm, normal S1 S2, no S3 or S4, no murmur, click or rub, no peripheral edema and peripheral pulses strong  ABDOMEN: soft, nontender, no hepatosplenomegaly, no masses and bowel sounds normal  MS: no gross musculoskeletal defects noted, no edema  NEURO: Normal strength and tone, mentation intact and speech normal  PSYCH: mentation appears normal, affect normal/bright  Skin: scratch marks noted across torso, no rash    Diagnostic Test Results:  Labs reviewed in Epic    ASSESSMENT/PLAN:       ICD-10-CM    1. Routine general medical examination at a health care facility  Z00.00       2. Major depressive disorder, recurrent episode, in full remission (H)  F33.42       3. Essential hypertension, benign  I10       4. Special screening for malignant neoplasms, colon  Z12.11       5. Mixed hyperlipidemia  E78.2       6. S/P prostatectomy  Z90.79       7. Screening for endocrine disorder  Z13.29       8. Prostate cancer (H)  C61       9. Medicare annual wellness visit, initial  Z00.00         BP: well controlled, refilled for 1 year. Pt will ask cardiology if all meds can be filled through [facility].    Depression: well controlled, stable, refilled for 1 " year    Itching: suspect dry skin, advised to lotion daily, free and clear laundry detergent/body wash, use whole home humidifier and add Allegra daily before further workup    Patient has been advised of split billing requirements and indicates understanding: Yes      COUNSELING:   Reviewed preventive health counseling, as reflected in patient instructions       Regular exercise       Healthy diet/nutrition       Vision screening       Colorectal cancer screening       Prostate cancer screening        He reports that he has never smoked. He has never used smokeless tobacco.            Sunny Blake PA-C  [facility]

## 2023-02-17 ENCOUNTER — OFFICE VISIT (OUTPATIENT)
Dept: FAMILY MEDICINE | Facility: CLINIC | Age: 73
End: 2023-02-17

## 2023-02-17 VITALS
OXYGEN SATURATION: 98 % | SYSTOLIC BLOOD PRESSURE: 118 MMHG | HEIGHT: 66 IN | BODY MASS INDEX: 24.59 KG/M2 | HEART RATE: 68 BPM | WEIGHT: 153 LBS | TEMPERATURE: 97.9 F | DIASTOLIC BLOOD PRESSURE: 70 MMHG

## 2023-02-17 DIAGNOSIS — Z12.11 SPECIAL SCREENING FOR MALIGNANT NEOPLASMS, COLON: ICD-10-CM

## 2023-02-17 DIAGNOSIS — N39.45 CONTINUOUS LEAKAGE OF URINE: ICD-10-CM

## 2023-02-17 DIAGNOSIS — Z90.79 S/P PROSTATECTOMY: ICD-10-CM

## 2023-02-17 DIAGNOSIS — E03.4 HYPOTHYROIDISM DUE TO ACQUIRED ATROPHY OF THYROID: ICD-10-CM

## 2023-02-17 DIAGNOSIS — Z00.00 ROUTINE GENERAL MEDICAL EXAMINATION AT A HEALTH CARE FACILITY: Primary | ICD-10-CM

## 2023-02-17 DIAGNOSIS — Z13.29 SCREENING FOR ENDOCRINE DISORDER: ICD-10-CM

## 2023-02-17 DIAGNOSIS — L29.9 ITCHING: ICD-10-CM

## 2023-02-17 DIAGNOSIS — C61 PROSTATE CANCER (H): ICD-10-CM

## 2023-02-17 DIAGNOSIS — E78.2 MIXED HYPERLIPIDEMIA: ICD-10-CM

## 2023-02-17 DIAGNOSIS — I10 ESSENTIAL HYPERTENSION, BENIGN: ICD-10-CM

## 2023-02-17 DIAGNOSIS — K21.9 GASTROESOPHAGEAL REFLUX DISEASE WITHOUT ESOPHAGITIS: ICD-10-CM

## 2023-02-17 DIAGNOSIS — F51.02 ADJUSTMENT INSOMNIA: ICD-10-CM

## 2023-02-17 DIAGNOSIS — F33.42 MAJOR DEPRESSIVE DISORDER, RECURRENT EPISODE, IN FULL REMISSION (H): ICD-10-CM

## 2023-02-17 DIAGNOSIS — Z00.00 MEDICARE ANNUAL WELLNESS VISIT, INITIAL: ICD-10-CM

## 2023-02-17 PROBLEM — R06.09 DOE (DYSPNEA ON EXERTION): Status: RESOLVED | Noted: 2018-07-10 | Resolved: 2023-02-17

## 2023-02-17 LAB
CHOLEST SERPL-MCNC: 136 MG/DL (ref 0–199)
CHOLEST/HDLC SERPL: 2 {RATIO} (ref 0–5)
GLUCOSE SERPL-MCNC: 94 MG/DL (ref 60–99)
HDLC SERPL-MCNC: 60 MG/DL (ref 40–150)
LDLC SERPL CALC-MCNC: 64 MG/DL (ref 0–130)
TRIGL SERPL-MCNC: 61 MG/DL (ref 0–149)

## 2023-02-17 PROCEDURE — 36415 COLL VENOUS BLD VENIPUNCTURE: CPT | Performed by: PHYSICIAN ASSISTANT

## 2023-02-17 PROCEDURE — 82947 ASSAY GLUCOSE BLOOD QUANT: CPT | Performed by: PHYSICIAN ASSISTANT

## 2023-02-17 PROCEDURE — 80061 LIPID PANEL: CPT | Performed by: PHYSICIAN ASSISTANT

## 2023-02-17 PROCEDURE — 84153 ASSAY OF PSA TOTAL: CPT | Mod: 90 | Performed by: PHYSICIAN ASSISTANT

## 2023-02-17 PROCEDURE — G0438 PPPS, INITIAL VISIT: HCPCS | Performed by: PHYSICIAN ASSISTANT

## 2023-02-17 PROCEDURE — 99397 PER PM REEVAL EST PAT 65+ YR: CPT | Mod: 25 | Performed by: PHYSICIAN ASSISTANT

## 2023-02-17 PROCEDURE — 99213 OFFICE O/P EST LOW 20 MIN: CPT | Mod: 25 | Performed by: PHYSICIAN ASSISTANT

## 2023-02-17 RX ORDER — TRAZODONE HYDROCHLORIDE 100 MG/1
TABLET ORAL
Qty: 90 TABLET | Refills: 1 | OUTPATIENT
Start: 2023-02-17

## 2023-02-17 RX ORDER — LEVOTHYROXINE SODIUM 50 UG/1
TABLET ORAL
Qty: 90 TABLET | Refills: 1 | OUTPATIENT
Start: 2023-02-17

## 2023-02-17 RX ORDER — HYDROCHLOROTHIAZIDE 25 MG/1
25 TABLET ORAL DAILY
Qty: 90 TABLET | Refills: 3 | Status: SHIPPED | OUTPATIENT
Start: 2023-02-17 | End: 2023-10-10

## 2023-02-17 NOTE — NURSING NOTE
Chief Complaint   Patient presents with     Physical     Fasting today     Recheck Medication     Refill medications     Derm Problem     Itching in different spots on his skin, can become painful when itching, mainly around ankles but can go all over, has tried OTC itch creams      Pre-visit Screening:  Immunizations:  up to date  Colonoscopy:  is up to date  Mammogram: NA  Asthma Action Test/Plan:  NA  PHQ9:  NA  GAD7:  NA  Questioned patient about current smoking habits Pt. has never smoked.  Ok to leave detailed message on voice mail for today's visit only Yes, phone # [phone]

## 2023-02-17 NOTE — TELEPHONE ENCOUNTER
Pt has refills through 2/23 at this pharmacy, refused  Sunny Blake PA-C  [facility]        Jose Moreno is requesting a refill of:    Pending Prescriptions:                       Disp   Refills    omeprazole (PRILOSEC) 20 MG DR capsule [P*90 cap*3            Sig: TAKE 1 CAPSULE BY MOUTH EVERY DAY    levothyroxine (SYNTHROID/LEVOTHROID) 50 M*90 tab*1            Sig: TAKE 1 TABLET BY MOUTH EVERY DAY    traZODone (DESYREL) 100 MG tablet [Pharma*90 tab*1            Sig: TAKE 1 TABLET BY MOUTH AT BEDTIME

## 2023-02-18 LAB — ABBOTT PSA - QUEST: <0.04 NG/ML

## 2023-02-20 ENCOUNTER — TELEPHONE (OUTPATIENT)
Dept: PALLIATIVE MEDICINE | Facility: CLINIC | Age: 73
End: 2023-02-20
Payer: COMMERCIAL

## 2023-02-20 DIAGNOSIS — M47.816 SPONDYLOSIS OF LUMBAR REGION WITHOUT MYELOPATHY OR RADICULOPATHY: ICD-10-CM

## 2023-02-20 RX ORDER — METHOCARBAMOL 500 MG/1
500 TABLET, FILM COATED ORAL 3 TIMES DAILY PRN
Qty: 90 TABLET | Refills: 1 | OUTPATIENT
Start: 2023-02-20

## 2023-02-20 NOTE — TELEPHONE ENCOUNTER
Refused Prescriptions:                       Disp   Refills    methocarbamol (ROBAXIN) 500 MG tablet      90 tab*1        Sig: Take 1 tablet (500 mg) by mouth 3 times daily as           needed for muscle spasms  Refused By: ABA TAYLOR  Reason for Refusal: Patient no longer under provider care    Patient is now being seen at [facility]. Please advise that he should contact PCP or [facility] for further refills.    Aba Taylor MD  [facility]

## 2023-02-20 NOTE — TELEPHONE ENCOUNTER
Received fax from pharmacy requesting refill(s) for     methocarbamol (ROBAXIN) 500 MG tablet     Last refilled on 12/20/22    Pt last seen on 12/20/22  Next appt scheduled for none    E-prescribe to:    [id number] - [place], [place] - [address] [address]     Will facilitate refill.

## 2023-02-22 ENCOUNTER — TRANSFERRED RECORDS (OUTPATIENT)
Dept: FAMILY MEDICINE | Facility: CLINIC | Age: 73
End: 2023-02-22

## 2023-02-23 DIAGNOSIS — M47.816 SPONDYLOSIS OF LUMBAR REGION WITHOUT MYELOPATHY OR RADICULOPATHY: ICD-10-CM

## 2023-02-23 NOTE — TELEPHONE ENCOUNTER
Fax with denial sent to [facility].    Casie Steele [facility]   Pain Management [place]

## 2023-02-24 ENCOUNTER — TRANSFERRED RECORDS (OUTPATIENT)
Dept: FAMILY MEDICINE | Facility: CLINIC | Age: 73
End: 2023-02-24

## 2023-02-24 RX ORDER — METHOCARBAMOL 500 MG/1
TABLET, FILM COATED ORAL
Qty: 90 TABLET | Refills: 1 | COMMUNITY
Start: 2023-02-24

## 2023-02-24 NOTE — TELEPHONE ENCOUNTER
Jose Moreno is requesting a refill of:    Refused Prescriptions:                       Disp   Refills    methocarbamol (ROBAXIN) 500 MG tablet [Pha*90 tab*1        Sig: TAKE 1 TABLET BY MOUTH THREE TIMES A DAY AS NEEDED           FOR MUSCLE SPASM  Refused By: OUSMANE CALVERT  Reason for Refusal: Originating/Specialty Provider to approve

## 2023-03-09 DIAGNOSIS — N39.45 CONTINUOUS LEAKAGE OF URINE: ICD-10-CM

## 2023-03-09 DIAGNOSIS — E03.4 HYPOTHYROIDISM DUE TO ACQUIRED ATROPHY OF THYROID: ICD-10-CM

## 2023-03-09 DIAGNOSIS — F51.02 ADJUSTMENT INSOMNIA: ICD-10-CM

## 2023-03-09 RX ORDER — TRAZODONE HYDROCHLORIDE 100 MG/1
TABLET ORAL
Qty: 90 TABLET | Refills: 1 | COMMUNITY
Start: 2023-03-09

## 2023-03-09 RX ORDER — LEVOTHYROXINE SODIUM 50 UG/1
TABLET ORAL
Qty: 90 TABLET | Refills: 1 | COMMUNITY
Start: 2023-03-09

## 2023-03-09 NOTE — TELEPHONE ENCOUNTER
Jose Moreno is requesting a refill of:    Refused Prescriptions:                       Disp   Refills    levothyroxine (SYNTHROID/LEVOTHROID) 50 MC*90 tab*1        Sig: TAKE 1 TABLET BY MOUTH EVERY DAY  Refused By: AASHISH GR  Reason for Refusal: Patient has requested refill too soon    traZODone (DESYREL) 100 MG tablet [Pharmac*90 tab*1        Sig: TAKE 1 TABLET BY MOUTH AT BEDTIME  Refused By: AASHISH GR  Reason for Refusal: Patient has requested refill too soon    Refills sent on 08/17/22 for 90 with 3 refills, that is enough medication until august

## 2023-03-13 ENCOUNTER — OFFICE VISIT (OUTPATIENT)
Dept: FAMILY MEDICINE | Facility: CLINIC | Age: 73
End: 2023-03-13

## 2023-03-13 VITALS
HEIGHT: 66 IN | BODY MASS INDEX: 24.75 KG/M2 | SYSTOLIC BLOOD PRESSURE: 130 MMHG | HEART RATE: 72 BPM | TEMPERATURE: 97.2 F | RESPIRATION RATE: 20 BRPM | DIASTOLIC BLOOD PRESSURE: 78 MMHG | WEIGHT: 154 LBS

## 2023-03-13 DIAGNOSIS — M48.062 SPINAL STENOSIS OF LUMBAR REGION WITH NEUROGENIC CLAUDICATION: ICD-10-CM

## 2023-03-13 DIAGNOSIS — E03.4 HYPOTHYROIDISM DUE TO ACQUIRED ATROPHY OF THYROID: ICD-10-CM

## 2023-03-13 DIAGNOSIS — Z01.818 PREOPERATIVE EXAMINATION: Primary | ICD-10-CM

## 2023-03-13 DIAGNOSIS — I25.10 CORONARY ARTERY DISEASE INVOLVING NATIVE CORONARY ARTERY OF NATIVE HEART WITHOUT ANGINA PECTORIS: ICD-10-CM

## 2023-03-13 DIAGNOSIS — Z86.718 PERSONAL HISTORY OF DVT (DEEP VEIN THROMBOSIS): ICD-10-CM

## 2023-03-13 DIAGNOSIS — I10 ESSENTIAL HYPERTENSION, BENIGN: ICD-10-CM

## 2023-03-13 LAB
% GRANULOCYTES: 61.1 %
BUN SERPL-MCNC: 11 MG/DL (ref 7–25)
BUN/CREATININE RATIO: 11.1 (ref 6–22)
CALCIUM SERPL-MCNC: 10 MG/DL (ref 8.6–10.3)
CHLORIDE SERPLBLD-SCNC: 98.6 MMOL/L (ref 98–110)
CO2 SERPL-SCNC: 30.3 MMOL/L (ref 20–32)
CREAT SERPL-MCNC: 0.99 MG/DL (ref 0.6–1.3)
GLUCOSE SERPL-MCNC: 97 MG/DL (ref 60–99)
HCT VFR BLD AUTO: 41.4 % (ref 40–53)
HEMOGLOBIN: 14 G/DL (ref 13.3–17.7)
LYMPHOCYTES NFR BLD AUTO: 29.3 %
MCH RBC QN AUTO: 34.6 PG (ref 26–33)
MCHC RBC AUTO-ENTMCNC: 33.8 G/DL (ref 31–36)
MCV RBC AUTO: 102.2 FL (ref 78–100)
MONOCYTES NFR BLD AUTO: 9.6 %
PLATELET COUNT - QUEST: 245 10^9/L (ref 150–375)
POTASSIUM SERPL-SCNC: 4.47 MMOL/L (ref 3.5–5.3)
RBC # BLD AUTO: 4.05 10*12/L (ref 4.4–5.9)
SODIUM SERPL-SCNC: 136.3 MMOL/L (ref 135–146)
WBC # BLD AUTO: 6.2 10*9/L (ref 4–11)

## 2023-03-13 PROCEDURE — 85025 COMPLETE CBC W/AUTO DIFF WBC: CPT | Performed by: FAMILY MEDICINE

## 2023-03-13 PROCEDURE — 80048 BASIC METABOLIC PNL TOTAL CA: CPT | Performed by: FAMILY MEDICINE

## 2023-03-13 PROCEDURE — 99214 OFFICE O/P EST MOD 30 MIN: CPT | Performed by: FAMILY MEDICINE

## 2023-03-13 PROCEDURE — 36415 COLL VENOUS BLD VENIPUNCTURE: CPT | Performed by: FAMILY MEDICINE

## 2023-03-13 RX ORDER — SULFAMETHOXAZOLE/TRIMETHOPRIM 800-160 MG
1 TABLET ORAL 2 TIMES DAILY
COMMUNITY
Start: 2023-03-13 | End: 2023-10-16
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
## 2023-03-14 ENCOUNTER — TRANSFERRED RECORDS (OUTPATIENT)
Dept: FAMILY MEDICINE | Facility: CLINIC | Age: 73
End: 2023-03-14

## 2023-03-21 ENCOUNTER — TRANSFERRED RECORDS (OUTPATIENT)
Dept: FAMILY MEDICINE | Facility: CLINIC | Age: 73
End: 2023-03-21

## 2023-04-05 ENCOUNTER — TRANSFERRED RECORDS (OUTPATIENT)
Dept: FAMILY MEDICINE | Facility: CLINIC | Age: 73
End: 2023-04-05

## 2023-05-05 DIAGNOSIS — N39.45 CONTINUOUS LEAKAGE OF URINE: ICD-10-CM

## 2023-05-05 DIAGNOSIS — F51.02 ADJUSTMENT INSOMNIA: ICD-10-CM

## 2023-05-05 DIAGNOSIS — E03.4 HYPOTHYROIDISM DUE TO ACQUIRED ATROPHY OF THYROID: ICD-10-CM

## 2023-05-06 DIAGNOSIS — N39.45 CONTINUOUS LEAKAGE OF URINE: ICD-10-CM

## 2023-05-06 DIAGNOSIS — F51.02 ADJUSTMENT INSOMNIA: ICD-10-CM

## 2023-05-08 RX ORDER — TRAZODONE HYDROCHLORIDE 100 MG/1
TABLET ORAL
Qty: 90 TABLET | Refills: 0 | Status: SHIPPED | OUTPATIENT
Start: 2023-05-08 | End: 2023-10-10

## 2023-05-08 RX ORDER — LEVOTHYROXINE SODIUM 50 UG/1
TABLET ORAL
Qty: 90 TABLET | Refills: 0 | Status: SHIPPED | OUTPATIENT
Start: 2023-05-08 | End: 2023-10-10

## 2023-05-08 RX ORDER — TRAZODONE HYDROCHLORIDE 100 MG/1
TABLET ORAL
Qty: 90 TABLET | Refills: 3 | COMMUNITY
Start: 2023-05-08

## 2023-05-08 NOTE — TELEPHONE ENCOUNTER
Jose Moreno is requesting a refill of:    Refused Prescriptions:                       Disp   Refills    traZODone (DESYREL) 100 MG tablet [Pharmac*90 tab*3        Sig: TAKE 1 TABLET BY MOUTH AT BEDTIME  Refused By: OSUMANE CALVERT  Reason for Refusal: Duplicate    Already sent to provider

## 2023-05-08 NOTE — TELEPHONE ENCOUNTER
Approved, OV in 3 months  Sunny Blake PA-C  [facility]        Will need OV for refills    Jose Moreno is requesting a refill of:    Pending Prescriptions:                       Disp   Refills    levothyroxine (SYNTHROID/LEVOTHROID) 50 M*90 tab*0            Sig: TAKE 1 TABLET BY MOUTH EVERY DAY    traZODone (DESYREL) 100 MG tablet [Pharma*90 tab*0            Sig: TAKE 1 TABLET BY MOUTH AT BEDTIME    TSH   Date Value Ref Range Status   07/20/2022 1.81 0.40 - 4.50 mIU/L Final

## 2023-05-08 NOTE — TELEPHONE ENCOUNTER
Received fax from pharmacy requesting refill(s) for     methocarbamol (ROBAXIN) 500 MG tablet     Last refilled on 1/24/23    Pt last seen on 12/20/22  Next appt scheduled for none    Spoke to patient. He is no longer being followed by pain management. Patient states he will request the medications from his PCP.    Casie Steele, [facility]   [facility] Center

## 2023-06-01 ENCOUNTER — OFFICE VISIT (OUTPATIENT)
Dept: FAMILY MEDICINE | Facility: CLINIC | Age: 73
End: 2023-06-01

## 2023-06-01 VITALS
HEIGHT: 66 IN | HEART RATE: 60 BPM | OXYGEN SATURATION: 96 % | WEIGHT: 148 LBS | DIASTOLIC BLOOD PRESSURE: 58 MMHG | SYSTOLIC BLOOD PRESSURE: 118 MMHG | TEMPERATURE: 97.4 F | BODY MASS INDEX: 23.78 KG/M2

## 2023-06-01 DIAGNOSIS — L98.9 SKIN LESION: Primary | ICD-10-CM

## 2023-06-01 PROCEDURE — 99213 OFFICE O/P EST LOW 20 MIN: CPT | Performed by: PHYSICIAN ASSISTANT

## 2023-06-01 ASSESSMENT — PATIENT HEALTH QUESTIONNAIRE - PHQ9
SUM OF ALL RESPONSES TO PHQ QUESTIONS 1-9: 0
5. POOR APPETITE OR OVEREATING: NOT AT ALL

## 2023-06-01 ASSESSMENT — ANXIETY QUESTIONNAIRES
IF YOU CHECKED OFF ANY PROBLEMS ON THIS QUESTIONNAIRE, HOW DIFFICULT HAVE THESE PROBLEMS MADE IT FOR YOU TO DO YOUR WORK, TAKE CARE OF THINGS AT HOME, OR GET ALONG WITH OTHER PEOPLE: NOT DIFFICULT AT ALL
5. BEING SO RESTLESS THAT IT IS HARD TO SIT STILL: NOT AT ALL
3. WORRYING TOO MUCH ABOUT DIFFERENT THINGS: NOT AT ALL
GAD7 TOTAL SCORE: 0
GAD7 TOTAL SCORE: 0
2. NOT BEING ABLE TO STOP OR CONTROL WORRYING: NOT AT ALL
6. BECOMING EASILY ANNOYED OR IRRITABLE: NOT AT ALL
1. FEELING NERVOUS, ANXIOUS, OR ON EDGE: NOT AT ALL
7. FEELING AFRAID AS IF SOMETHING AWFUL MIGHT HAPPEN: NOT AT ALL

## 2023-06-01 NOTE — PROGRESS NOTES
"  Assessment & Plan     Skin lesion-multiple skin changes, suspicious for spot on nose. Pt is established with dermatologist, will make appt (has appt for next week)  Call with any new symptoms         Follow up with dermatology    No follow-ups on file.    Sunny Blake PA-C  [facility]    Subjective   Morgan is a 73 year old, presenting for the following health issues:  Derm Problem (Spot on nose and by ear making sure not cancerous /Not causing pain /Noticed spots 2 months go) and Consult (Pain management )    HPI     Pt presents for skin check. Has a spot on bridge of his nose-flakes over and continuously scabs. No bleeding. Present for 2 months. Has another spot above R ear, raised. Multiple other lesions-hasn't done a skin check in a few years. Has seen [facility] in the past. Will make appt.     Questions on getting pain meds-sees pain clinic-will follow up with them.       Review of Systems   Constitutional, HEENT, cardiovascular, pulmonary, gi and gu systems are negative, except as otherwise noted.      Objective    /58 (BP Location: Right arm, Patient Position: Sitting, Cuff Size: Adult Large)   Pulse 60   Temp 97.4  F (36.3  C) (Temporal)   Ht 1.676 m (5' 6\")   Wt 67.1 kg (148 lb)   SpO2 96%   BMI 23.89 kg/m    Body mass index is 23.89 kg/m .  Physical Exam   GENERAL: healthy, alert and no distress  NECK: no adenopathy, no asymmetry, masses, or scars and thyroid normal to palpation  RESP: lungs clear to auscultation - no rales, rhonchi or wheezes  CV: regular rate and rhythm, normal S1 S2, no S3 or S4, no murmur, click or rub, no peripheral edema and peripheral pulses strong  ABDOMEN: soft, nontender, no hepatosplenomegaly, no masses and bowel sounds normal  MS: no gross musculoskeletal defects noted, no edema  SKIN: Skin: raised likely above R ear, small. Red flaking lesion, 2mm, on bridge of nose. Multiple other skin changes                      "

## 2023-06-01 NOTE — NURSING NOTE
Chief Complaint   Patient presents with     Derm Problem     Spot on nose and by ear making sure not cancerous   Not causing pain   Noticed spots 2 months go     Consult     Pain management        Pre-visit Screening:  Immunizations:  up to date  Colonoscopy:  is up to date  Mammogram: na  Asthma Action Test/Plan:  na  PHQ9:  Done today  GAD7:  Done today   Questioned patient about current smoking habits Pt. has never smoked.  Ok to leave detailed message on voice mail for today's visit only yes, phone # [phone]

## 2023-06-05 DIAGNOSIS — E03.4 HYPOTHYROIDISM DUE TO ACQUIRED ATROPHY OF THYROID: ICD-10-CM

## 2023-06-06 RX ORDER — LEVOTHYROXINE SODIUM 50 UG/1
TABLET ORAL
Qty: 90 TABLET | Refills: 0 | COMMUNITY
Start: 2023-06-06

## 2023-06-06 NOTE — TELEPHONE ENCOUNTER
Jose Moreno is requesting a refill of:    Refused Prescriptions:                       Disp   Refills    levothyroxine (SYNTHROID/LEVOTHROID) 50 MC*90 tab*0        Sig: TAKE 1 TABLET BY MOUTH EVERY DAY  Refused By: AASHISH GR  Reason for Refusal: Patient has requested refill too soon  Reason for Refusal Comment: Refills sent on 05/08/23    Refills sent on 05/08/23 for 90 tablets

## 2023-06-09 DIAGNOSIS — M47.816 SPONDYLOSIS OF LUMBAR REGION WITHOUT MYELOPATHY OR RADICULOPATHY: ICD-10-CM

## 2023-06-09 RX ORDER — METHOCARBAMOL 500 MG/1
TABLET, FILM COATED ORAL
Qty: 90 TABLET | Refills: 1 | COMMUNITY
Start: 2023-06-09

## 2023-06-09 NOTE — TELEPHONE ENCOUNTER
Jose Moreno is requesting a refill of:    Refused Prescriptions:                       Disp   Refills    methocarbamol (ROBAXIN) 500 MG tablet [Pha*90 tab*1        Sig: TAKE 1 TABLET BY MOUTH THREE TIMES A DAY AS NEEDED           FOR MUSCLE SPASM  Refused By: AASHISH GR  Reason for Refusal: Originating/Specialty Provider to approve  Reason for Refusal Comment: Refilled by pain clinic

## 2023-06-27 ENCOUNTER — TRANSFERRED RECORDS (OUTPATIENT)
Dept: FAMILY MEDICINE | Facility: CLINIC | Age: 73
End: 2023-06-27

## 2023-07-16 NOTE — PROGRESS NOTES
Subjective:  HPI                    Objective:  System    Physical Exam    General     ROS    Assessment/Plan:    SUBJECTIVE  Subjective changes as noted by pt: Patient reports being sore for 2 days following previous visit.  Pain is intermittent at this time patient notes increased strength     Current pain level: 1/10 Current Pain level: 1/10   Changes in function: Patient was able to walk 1/2 mile     Adverse reaction to treatment or activity:  None    OBJECTIVE  Changes in objective findings: Patient demonstrates improved strength lower abdominal muscles.        ASSESSMENT  Jose continues to require intervention to meet STG and LTG's: PT  Patient's symptoms are resolving.  Response to therapy has shown an improvement in  strength  Progress made towards STG/LTG?  Yes,     PLAN  Current treatment program is being advanced to more complex exercises.    PTA/ATC plan:  N/A    Please refer to the daily flowsheet for treatment today, total treatment time and time spent performing 1:1 timed codes.        
History/Exam/Medical Decision Making

## 2023-07-25 DIAGNOSIS — I10 BENIGN ESSENTIAL HYPERTENSION: ICD-10-CM

## 2023-07-25 RX ORDER — METOPROLOL SUCCINATE 25 MG/1
12.5 TABLET, EXTENDED RELEASE ORAL DAILY
Qty: 45 TABLET | Refills: 3 | Status: SHIPPED | OUTPATIENT
Start: 2023-07-25 | End: 2023-10-11

## 2023-07-25 NOTE — TELEPHONE ENCOUNTER
Received refill request from [facility] pharmacy for Metoprolol Succinate ER 25mg tab, take 1\2 tab daily    Last OV   10\12\22   Dr. Alejandra    [facility] Refill Guideline reviewed.  Medication meets criteria for refill.    Liane Balnco RN on 7/25/2023 at 2:15 PM

## 2023-07-30 DIAGNOSIS — E03.4 HYPOTHYROIDISM DUE TO ACQUIRED ATROPHY OF THYROID: ICD-10-CM

## 2023-07-30 DIAGNOSIS — F51.02 ADJUSTMENT INSOMNIA: ICD-10-CM

## 2023-07-30 DIAGNOSIS — K21.9 GASTROESOPHAGEAL REFLUX DISEASE WITHOUT ESOPHAGITIS: ICD-10-CM

## 2023-07-30 DIAGNOSIS — N39.45 CONTINUOUS LEAKAGE OF URINE: ICD-10-CM

## 2023-07-31 RX ORDER — TRAZODONE HYDROCHLORIDE 100 MG/1
TABLET ORAL
Qty: 90 TABLET | Refills: 0 | COMMUNITY
Start: 2023-07-31

## 2023-07-31 RX ORDER — LEVOTHYROXINE SODIUM 50 UG/1
TABLET ORAL
Qty: 90 TABLET | Refills: 0 | COMMUNITY
Start: 2023-07-31

## 2023-07-31 NOTE — TELEPHONE ENCOUNTER
Jose Moreno is requesting a refill of:    Pending Prescriptions:                       Disp   Refills    omeprazole (PRILOSEC) 20 MG DR capsule [P*90 cap*1            Sig: TAKE 1 CAPSULE BY MOUTH EVERY DAY    Please close encounter if RX was sent. Thanks, Paty

## 2023-07-31 NOTE — TELEPHONE ENCOUNTER
Jose Moreno is requesting a refill of:    Refused Prescriptions:                       Disp   Refills    levothyroxine (SYNTHROID/LEVOTHROID) 50 MC*90 tab*0        Sig: TAKE 1 TABLET BY MOUTH EVERY DAY  Refused By: OUSMANE CALVERT  Reason for Refusal: Patient needs appointment    traZODone (DESYREL) 100 MG tablet [Pharmac*90 tab*0        Sig: TAKE 1 TABLET BY MOUTH EVERYDAY AT BEDTIME  Refused By: OUSMANE CALVERT  Reason for Refusal: Patient needs appointment    Needs OV for refills    TSH   Date Value Ref Range Status   07/20/2022 1.81 0.40 - 4.50 mIU/L Final

## 2023-08-14 NOTE — PROGRESS NOTES
[facility]  [address]  [address]  [facility] [address]  [phone]  Dept: [phone]    PRE-OP EVALUATION:  Today's date: 2018    Jose Moreno (: 1950) presents for pre-operative evaluation assessment as requested by Dr. Scanlon.  He requires evaluation and anesthesia risk assessment prior to undergoing surgery/procedure for treatment of torn rotator cuff .  Proposed procedure: Rotator Cuff Repair (Left)    Date of Surgery/ Procedure: 2018  Time of Surgery/ Procedure: 1:00  Hospital/Surgical Facility: [facility]  Fax number for surgical facility: [phone]  Primary Physician: Jerri Tavera  Type of Anesthesia Anticipated: General    Patient has a Health Care Directive or Living Will:  NO (Gave patient Health Care Directive Form to complete)    1. YES - DO YOU HAVE A HISTORY OF HEART ATTACK, STROKE, STENT, BYPASS OR SURGERY ON AN ARTERY IN THE HEAD, NECK, HEART OR LEG? 2015- non-ST segment elevation myocardial infarction.  Drug-eluting stents placed in his left anterior descending artery as well as diagonal.  Last saw cardiology 2017. Most recent echo 2016, with no concerns at that time. Supposed to f/u with cardiology annually, which would be 2018.  2. NO - Do you ever have any pain or discomfort in your chest?  3. NO - Do you have a history of  Heart Failure?  4. NO - Are you troubled by shortness of breath when: walking on the level, up a slight hill or at night?  5. NO - Do you currently have a cold, bronchitis or other respiratory infection?  6. NO - Do you have a cough, shortness of breath or wheezing?  7. NO - Do you sometimes get pains in the calves of your legs when you walk?  8. NO - Do you or anyone in your family have previous history of blood clots?  9. NO - Do you or does anyone in your family have a serious bleeding problem such as prolonged bleeding following surgeries or cuts?  10. NO - Have you ever had  problems with anemia or been told to take iron pills?  11. NO - Have you had any abnormal blood loss such as black, tarry or bloody stools, or abnormal vaginal bleeding?  12. NO - Have you ever had a blood transfusion?  13. NO - Have you or any of your relatives ever had problems with anesthesia?  14. NO - Do you have sleep apnea, excessive snoring or daytime drowsiness?  15. YES - DO YOU HAVE ANY PROSTHETIC HEART VALVES? robotic mitral valve repair done at the [facility] in 2009 for mitral valve prolapse and severe mitral regurgitation. Has stainless steel ring, but no artificial valve  16. NO - Do you have prosthetic joints?  17. NO - Is there any chance that you may be pregnant?        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 Plan is to repair the tear, and remove calcium deposit in joint.       See problem list for active medical problems.  Problems all longstanding and stable, except as noted/documented.  See ROS for pertinent symptoms related to these conditions.    MEDICAL HISTORY:                                                    Patient Active Problem List    Diagnosis Date Noted     History of non-ST elevation myocardial infarction (NSTEMI) 01/08/2018     Priority: Medium     Hiatal hernia 11/23/2016     Priority: Medium     Chronic left-sided low back pain without sciatica 11/05/2016     Priority: Medium     Gastroesophageal reflux disease without esophagitis 11/02/2015     Priority: Medium     ACP (advance care planning) 10/20/2015     Priority: Medium     Advance Care Planning 10/20/2015: ACP Review and Resources Provided:  Reviewed chart for advance care plan.  Jose Moreno has no plan or code status on file. Discussed available resources and provided with information. Confirmed code status reflects current choices pending further ACP  discussions.  Confirmed/documented legally designated decision maker(s). Added by Paty Downing    Advance Care Planning 1/5/2018: ACP Review of Chart / Resources Provided:  Reviewed chart for advance care plan.  Jose Moreno has been provided information and resources to begin or update their advance care plan.  Added by Sanjana Padilla             Hypothyroidism due to acquired atrophy of thyroid 10/20/2015     Priority: Medium     Was hyperthyroid first, then got better on his own, was not radiated, and eventually gland pooped out        CAD (coronary artery disease)      Priority: Medium     cardiac cath 1/23/15: SHERRY to 1st diagonal, SHERRY x2 to LAD, cath 2009: no intervention       Spinal stenosis, lumbar 12/29/2011     Priority: Medium     Mixed hyperlipidemia 10/27/2009     Priority: Medium     Pulmonary nodules      Priority: Medium     CT-recommend repeat in 6-12 months       Status post mitral valve repair 05/27/2009     Priority: Medium     Heart: hx of mitral valve repair, rather sudden chordae tendonae tear, fixed at [place], not symptomatic,        Nonspecific (abnormal) findings on radiological and other examination of lung field 12/04/2006     Priority: Medium     Problem list name updated by automated process. Provider to review and confirm       Essential hypertension, benign 07/01/2004     Priority: Medium     Major depressive disorder, single episode, moderate (H) 09/07/2001     Priority: Medium     See yearly       Health Care Home 12/11/2012     Priority: Low     State Tier Level:  Tier 2  Status: na  Care Coordinator:     See Letters for [facility] Care Plan            Past Medical History:   Diagnosis Date     ACS (acute coronary syndrome) (H) 01-23-15     ADHD (attention deficit hyperactivity disorder)      CAD (coronary artery disease)     cardiac cath 1/23/15: SHERRY to 1st diagonal, SHERRY x2 to LAD, cath 2009: no intervention     Esophageal reflux      History of hyperthyroidism 4/9/2014      Hyperlipidemia      Hypertension      Mitral regurgitation 2009    moderately severe MVP, s/p robotic repair at [place]     NSTEMI (non-ST elevated myocardial infarction) (H) 01-23-15     Pulmonary nodules 2009    CT-recommend repeat in 6-12 months     Rotator cuff rupture 11/3/2011     Past Surgical History:   Procedure Laterality Date     DECOMPRESSION LUMBAR MINIMALLY INVASIVE ONE LEVEL  1/11/2012    Procedure:DECOMPRESSION LUMBAR MINIMALLY INVASIVE ONE LEVEL; L4-5 Decompression Minimally Invasive; Surgeon:MESSI HORAN; Location:[facility]     EYE EXAM ESTABLISHED PT  1/14/06     HC COLONOSCOPY THRU STOMA, DIAGNOSTIC  7/00    GI     HCL PSA, DIAGNOSTIC (TUMOR MARKER)  2003     HEART CATH RIGHT AND LEFT HEART CATH  01-23-15    See report, pt transfered to [facility] for complex bifurcation PCI of LAD diagonal vessel.      HEART CATH RIGHT AND LEFT HEART CATH  01-26-15    PTCA and implantation of SHERRY to 1st diagonal, SHERRY to mid LAD, SHERRY to proximal LAD     HERNIA REPAIR       ORTHOPEDIC SURGERY      Hx of rotator cuff rupture and repair     REMOVAL OF SPERM DUCT(S)      Vasectomy     REMOVAL OF SPERM DUCT(S)      reversal of vasectomy     REPAIR VALVE MITRAL  2009    [facility] robotic repair      TESTICLE SURGERY       VASECTOMY       VASOVASOSTOMY       Current Outpatient Prescriptions   Medication Sig Dispense Refill     losartan (COZAAR) 25 MG tablet Take 1 tablet (25 mg) by mouth daily 90 tablet 1     atorvastatin (LIPITOR) 40 MG tablet Take 1 tablet (40 mg) by mouth daily 90 tablet 1     omeprazole (PRILOSEC) 20 MG CR capsule TAKE ONE CAPSULE BY MOUTH ONCE DAILY 90 capsule 3     FLUoxetine (PROZAC) 40 MG capsule Take 1 capsule (40 mg) by mouth daily 90 capsule 3     traZODone (DESYREL) 100 MG tablet Take 1.5 tablets (150 mg) by mouth At Bedtime 135 tablet 3     levothyroxine (SYNTHROID/LEVOTHROID) 50 MCG tablet Take 1 tablet (50 mcg) by mouth daily 90 tablet 3     aspirin EC 81 MG EC tablet Take 1 tablet (81  "mg) by mouth daily 60 tablet 0     nitroglycerin (NITROSTAT) 0.4 MG sublingual tablet Place 1 tablet (0.4 mg) under the tongue every 5 minutes as needed for chest pain 25 tablet 3     OTC products: None, except as noted above    Allergies   Allergen Reactions     Ace Inhibitors Cough     Dry cough      Latex Allergy: NO    Social History   Substance Use Topics     Smoking status: Never Smoker     Smokeless tobacco: Never Used     Alcohol use 4.2 oz/week     7 Standard drinks or equivalent per week      Comment: 1 beer daily     History   Drug Use No       REVIEW OF SYSTEMS:                                                    Constitutional, neuro, ENT, endocrine, pulmonary, cardiac, gastrointestinal, genitourinary, musculoskeletal, integument and psychiatric systems are negative, except as otherwise noted.      EXAM:                                                    /80 (BP Location: Left arm, Patient Position: Chair, Cuff Size: Adult Large)  Pulse 77  Temp 97.5  F (36.4  C) (Oral)  Ht 1.664 m (5' 5.5\")  Wt 68.1 kg (150 lb 3.2 oz)  SpO2 97%  BMI 24.61 kg/m2    GENERAL APPEARANCE: healthy, alert and no distress     EYES: EOMI,  PERRL     HENT: ear canals and TM's normal and nose and mouth without ulcers or lesions     NECK: no adenopathy, no asymmetry, masses, or scars and thyroid normal to palpation     RESP: lungs clear to auscultation - no rales, rhonchi or wheezes     CV: regular rates and rhythm, normal S1 S2, no S3 or S4 and no murmur, click or rub     ABDOMEN:  soft, nontender, no HSM or masses and bowel sounds normal     MS: extremities normal- no gross deformities noted, no evidence of inflammation in joints, FROM in all extremities.     SKIN: no suspicious lesions or rashes     NEURO: Normal strength and tone, sensory exam grossly normal, mentation intact and speech normal     PSYCH: mentation appears normal. and affect normal/bright     LYMPHATICS: No axillary, cervical, or supraclavicular " nodes    DIAGNOSTICS:                                                    EKG: Normal Sinus Rhythm, normal axis, normal intervals, no acute ST/T changes c/w ischemia, no LVH by voltage criteria, unchanged from previous tracings. Left atrial enlargement stable since last EKG 1/2016.  Hemoglobin (indicated for history of anemia or procedure with significant blood loss such as tonsillectomy, major intraperitoneal surgery, vascular surgery, major spine surgery, total joint replacement)  Serum Potassium  Serum Creatinine  Office Visit on 01/05/2018   Component Date Value Ref Range Status     Glucose 01/05/2018 91  65 - 99 mg/dL Final    Comment:               Fasting reference interval          Urea Nitrogen 01/05/2018 17  7 - 25 mg/dL Final     Creatinine 01/05/2018 0.83  0.70 - 1.25 mg/dL Final    Comment: For patients >49 years of age, the reference limit  for Creatinine is approximately 13% higher for people  identified as -American.          GFR Estimate 01/05/2018 90  > OR = 60 mL/min/1.73m2 Final     EGFR African American 01/05/2018 104  > OR = 60 mL/min/1.73m2 Final     BUN/Creatinine Ratio 01/05/2018 NOT APPLICABLE  6 - 22 (calc) Final     Sodium 01/05/2018 137  135 - 146 mmol/L Final     Potassium 01/05/2018 4.2  3.5 - 5.3 mmol/L Final     Chloride 01/05/2018 101  98 - 110 mmol/L Final     Carbon Dioxide 01/05/2018 25  20 - 31 mmol/L Final     Calcium 01/05/2018 9.5  8.6 - 10.3 mg/dL Final     Protein Total 01/05/2018 7.0  6.1 - 8.1 g/dL Final     Albumin 01/05/2018 4.2  3.6 - 5.1 g/dL Final     Globulin Calculated 01/05/2018 2.8  1.9 - 3.7 g/dL (calc) Final     A/G Ratio 01/05/2018 1.5  1.0 - 2.5 (calc) Final     Bilirubin Total 01/05/2018 0.5  0.2 - 1.2 mg/dL Final     Alkaline Phosphatase 01/05/2018 66  40 - 115 U/L Final     AST 01/05/2018 24  10 - 35 U/L Final     ALT 01/05/2018 26  9 - 46 U/L Final     WBC 01/05/2018 5.4  4.0 - 11 10*9/L Final     RBC Count 01/05/2018 4.59  4.4 - 5.9 10*12/L Final      Hemoglobin 01/05/2018 15.2  13.3 - 17.7 g/dL Final     Hematocrit 01/05/2018 47.2  40.0 - 53.0 % Final     MCV 01/05/2018 102.9* 78 - 100 fL Final     MCH 01/05/2018 33.1* 26 - 33 pg Final     MCHC 01/05/2018 32.2  31 - 36 g/dL Final     RDW 01/05/2018 11.4  % Final     Platelet Count 01/05/2018 282  150 - 375 10^9/L Final     Mildly elevated MCV, but no concerns with lab findings today.    IMPRESSION:                                                    Reason for surgery/procedure: Left rotator cuff repair.  Diagnosis/reason for consult: Pre-operative examination.    The proposed surgical procedure is considered INTERMEDIATE risk.    REVISED CARDIAC RISK INDEX  The patient has the following serious cardiovascular risks for perioperative complications such as (MI, PE, VFib and 3  AV Block):  Coronary Artery Disease (MI, positive stress test, angina, Qs on EKG)  INTERPRETATION: 1 risks: Class II (low risk - 0.9% complication rate)    The patient has the following additional risks for perioperative complications:  No identified additional risks      ICD-10-CM    1. Pre-op exam Z01.818 VENOUS COLLECTION     Comprehensive metabolic panel     HEMOGRAM/PLATELET (BFP)     EKG 12-lead complete w/read - Clinics     CANCELED: EKG 12-LEAD CLINIC READ   2. History of non-ST elevation myocardial infarction (NSTEMI) I25.2    3. Mixed hyperlipidemia E78.2    4. ACP (advance care planning) Z71.89        RECOMMENDATIONS:                                                        --Patient is to take all scheduled medications on the day of surgery EXCEPT for modifications listed below.    APPROVAL GIVEN to proceed with proposed procedure, without further diagnostic evaluation     1. Seven days before surgery do not take Aspirin or any over-the-counter pain medications other than Tylenol.  TYLENOL is the safest pain pill to use before surgery because it does not affect your bleeding time. If tylenol is not sufficient for pain control  talk to me or the surgeon and we will decide what is safe to use.    2. Do not eat anything after midnight  (eduardo of the surgery) and nothing the morning of the surgery.    3. Medications: Hold medications on day of surgery until after surgery.     4. Follow all instructions given by the surgery team. They usually give out a packet. Read it and please follow it precisely. This helps surgical experience and outcomes.    5. If you have any questions do not hesitate to call me or the surgeon/surgical team.      Raysa Singh PA-C  [facility]    Signed Electronically by: Raysa Singh PA-C    Copy of this evaluation report is provided to requesting physician.    [place] Preop Guidelines   14-Aug-2023 18:30

## 2023-09-07 ENCOUNTER — TRANSFERRED RECORDS (OUTPATIENT)
Dept: FAMILY MEDICINE | Facility: CLINIC | Age: 73
End: 2023-09-07

## 2023-09-08 ENCOUNTER — TELEPHONE (OUTPATIENT)
Dept: PALLIATIVE MEDICINE | Facility: CLINIC | Age: 73
End: 2023-09-08
Payer: COMMERCIAL

## 2023-09-08 NOTE — TELEPHONE ENCOUNTER
Transfer note: Pt last seen 12/2022. Self dismissed as now seeing [facility]. New referral needed for return    Katia MOSQUERA, RN Care Coordinator  [facility]  Pain Management

## 2023-09-12 ENCOUNTER — TELEPHONE (OUTPATIENT)
Dept: CARDIOLOGY | Facility: CLINIC | Age: 73
End: 2023-09-12
Payer: COMMERCIAL

## 2023-09-12 NOTE — TELEPHONE ENCOUNTER
[facility] Call Center    Phone Message    May a detailed message be left on voicemail: yes     Reason for Call: Other: Pt would like a call back to discuss his legs as they feel heavy and he is wondering if he needs any testing done before his appt     Action Taken: Other: Cardio    Travel Screening: Not Applicable

## 2023-09-12 NOTE — TELEPHONE ENCOUNTER
Patient is seeing Dr Alejandra for his annual on 9/15/23. Spoke to patient, says he has leg pain when he walks and his legs feel heavy bilaterally. He said his symptoms started 2-3mos ago. It is primarily with walking that his symptoms occur, not as severe when riding bike. Pain does not usually occur with rest. Describes it as aching. No edema. No discoloration/temperature changes. He has a spinal cord stimulator for back pain but it does also effect the nerves in his legs and he thinks maybe it helps a little. He is wondering what should be to investigate this ie stress testing. Dr Alejandra messaged.

## 2023-09-13 NOTE — TELEPHONE ENCOUNTER
Clint Alejandra MD  You15 hours ago (5:38 PM)     We did an ankle-brachial index stress test in June 2022 and it was normal I do not believe this is vascular in origin.  Unless something is dramatically changed.     Detailed message left for patient with Dr Alejandra's message. Recommended discussing any further questions at appointment this Fri 9/15. Team 2 phone provided if needed.

## 2023-09-14 ENCOUNTER — LAB (OUTPATIENT)
Dept: LAB | Facility: CLINIC | Age: 73
End: 2023-09-14
Attending: INTERNAL MEDICINE
Payer: COMMERCIAL

## 2023-09-14 DIAGNOSIS — I25.10 CORONARY ARTERY DISEASE INVOLVING NATIVE CORONARY ARTERY OF NATIVE HEART WITHOUT ANGINA PECTORIS: Chronic | ICD-10-CM

## 2023-09-14 LAB
ALT SERPL W P-5'-P-CCNC: 24 U/L (ref 0–70)
ANION GAP SERPL CALCULATED.3IONS-SCNC: 11 MMOL/L (ref 7–15)
BUN SERPL-MCNC: 17.4 MG/DL (ref 8–23)
CALCIUM SERPL-MCNC: 9.2 MG/DL (ref 8.8–10.2)
CHLORIDE SERPL-SCNC: 102 MMOL/L (ref 98–107)
CHOLEST SERPL-MCNC: 177 MG/DL
CREAT SERPL-MCNC: 0.88 MG/DL (ref 0.67–1.17)
DEPRECATED HCO3 PLAS-SCNC: 23 MMOL/L (ref 22–29)
EGFRCR SERPLBLD CKD-EPI 2021: 90 ML/MIN/1.73M2
GLUCOSE SERPL-MCNC: 93 MG/DL (ref 70–99)
HDLC SERPL-MCNC: 67 MG/DL
LDLC SERPL CALC-MCNC: 91 MG/DL
NONHDLC SERPL-MCNC: 110 MG/DL
POTASSIUM SERPL-SCNC: 4 MMOL/L (ref 3.4–5.3)
SODIUM SERPL-SCNC: 136 MMOL/L (ref 136–145)
TRIGL SERPL-MCNC: 93 MG/DL

## 2023-09-14 PROCEDURE — 84460 ALANINE AMINO (ALT) (SGPT): CPT | Performed by: INTERNAL MEDICINE

## 2023-09-14 PROCEDURE — 80061 LIPID PANEL: CPT | Performed by: INTERNAL MEDICINE

## 2023-09-14 PROCEDURE — 36415 COLL VENOUS BLD VENIPUNCTURE: CPT | Performed by: INTERNAL MEDICINE

## 2023-09-14 PROCEDURE — 80048 BASIC METABOLIC PNL TOTAL CA: CPT | Performed by: INTERNAL MEDICINE

## 2023-09-15 ENCOUNTER — TELEPHONE (OUTPATIENT)
Dept: CARDIOLOGY | Facility: CLINIC | Age: 73
End: 2023-09-15

## 2023-09-15 ENCOUNTER — OFFICE VISIT (OUTPATIENT)
Dept: CARDIOLOGY | Facility: CLINIC | Age: 73
End: 2023-09-15
Attending: INTERNAL MEDICINE
Payer: COMMERCIAL

## 2023-09-15 VITALS
BODY MASS INDEX: 24.65 KG/M2 | WEIGHT: 153.4 LBS | DIASTOLIC BLOOD PRESSURE: 60 MMHG | OXYGEN SATURATION: 98 % | HEART RATE: 68 BPM | HEIGHT: 66 IN | SYSTOLIC BLOOD PRESSURE: 118 MMHG

## 2023-09-15 DIAGNOSIS — E78.2 MIXED HYPERLIPIDEMIA: ICD-10-CM

## 2023-09-15 DIAGNOSIS — Z98.890 STATUS POST MITRAL VALVE REPAIR: ICD-10-CM

## 2023-09-15 DIAGNOSIS — I25.10 CORONARY ARTERY DISEASE INVOLVING NATIVE CORONARY ARTERY OF NATIVE HEART WITHOUT ANGINA PECTORIS: Primary | Chronic | ICD-10-CM

## 2023-09-15 DIAGNOSIS — I05.9 MITRAL VALVE DISORDER: ICD-10-CM

## 2023-09-15 DIAGNOSIS — I10 ESSENTIAL HYPERTENSION, BENIGN: ICD-10-CM

## 2023-09-15 PROCEDURE — 99214 OFFICE O/P EST MOD 30 MIN: CPT | Performed by: INTERNAL MEDICINE

## 2023-09-15 RX ORDER — ASPIRIN 81 MG/1
81 TABLET ORAL DAILY
COMMUNITY
Start: 2023-09-15

## 2023-09-15 RX ORDER — IRBESARTAN 150 MG/1
150 TABLET ORAL DAILY
Qty: 90 TABLET | Refills: 4 | Status: SHIPPED | OUTPATIENT
Start: 2023-09-15

## 2023-09-15 RX ORDER — EZETIMIBE 10 MG/1
10 TABLET ORAL DAILY
Qty: 90 TABLET | Refills: 4 | Status: SHIPPED | OUTPATIENT
Start: 2023-09-15 | End: 2024-09-11
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
## 2023-09-15 NOTE — TELEPHONE ENCOUNTER
Message from Dr. Alejandra:  Clint Alejandra MD  P [facility] Team 2  When I saw Salvatore last year he was on both aspirin and Eliquis.  He was on Eliquis for DVT which has been stopped.  I note aspirin is not on his list.  He should be on aspirin if he is not on Eliquis.  81 mg daily.  Thanks    0181 spoke with patient, he states he did restart the aspirin 81mg daily after finishing with the eliquis.
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
## 2023-09-16 DIAGNOSIS — N39.45 CONTINUOUS LEAKAGE OF URINE: ICD-10-CM

## 2023-09-16 DIAGNOSIS — E03.4 HYPOTHYROIDISM DUE TO ACQUIRED ATROPHY OF THYROID: ICD-10-CM

## 2023-09-16 DIAGNOSIS — F51.02 ADJUSTMENT INSOMNIA: ICD-10-CM

## 2023-09-18 RX ORDER — TRAZODONE HYDROCHLORIDE 100 MG/1
100 TABLET ORAL AT BEDTIME
Qty: 90 TABLET | Refills: 0 | COMMUNITY
Start: 2023-09-18

## 2023-09-18 RX ORDER — LEVOTHYROXINE SODIUM 50 UG/1
TABLET ORAL
Qty: 90 TABLET | Refills: 0 | COMMUNITY
Start: 2023-09-18

## 2023-09-18 NOTE — TELEPHONE ENCOUNTER
Received incoming refill request for  Pending Prescriptions:                       Disp   Refills    traZODone (DESYREL) 100 MG tablet [Pharma*90 tab*0            Sig: TAKE 1 TABLET BY MOUTH EVERYDAY AT BEDTIME    levothyroxine (SYNTHROID/LEVOTHROID) 50 M*90 tab*0            Sig: TAKE 1 TABLET BY MOUTH EVERY DAY    Patient is due for an OV and [employer] message was already sent to pt informing to call and schedule.

## 2023-09-20 DIAGNOSIS — E78.2 MIXED HYPERLIPIDEMIA: ICD-10-CM

## 2023-09-20 RX ORDER — ATORVASTATIN CALCIUM 80 MG/1
80 TABLET, FILM COATED ORAL DAILY
Qty: 90 TABLET | Refills: 0 | Status: SHIPPED | OUTPATIENT
Start: 2023-09-20 | End: 2023-10-11

## 2023-10-10 DIAGNOSIS — N39.45 CONTINUOUS LEAKAGE OF URINE: ICD-10-CM

## 2023-10-10 DIAGNOSIS — F33.42 MAJOR DEPRESSIVE DISORDER, RECURRENT EPISODE, IN FULL REMISSION (H): ICD-10-CM

## 2023-10-10 DIAGNOSIS — E03.4 HYPOTHYROIDISM DUE TO ACQUIRED ATROPHY OF THYROID: ICD-10-CM

## 2023-10-10 DIAGNOSIS — I10 ESSENTIAL HYPERTENSION, BENIGN: ICD-10-CM

## 2023-10-10 DIAGNOSIS — F51.02 ADJUSTMENT INSOMNIA: ICD-10-CM

## 2023-10-10 RX ORDER — LEVOTHYROXINE SODIUM 50 UG/1
TABLET ORAL
Qty: 10 TABLET | Refills: 0 | Status: SHIPPED | OUTPATIENT
Start: 2023-10-10 | End: 2023-10-16

## 2023-10-10 RX ORDER — HYDROCHLOROTHIAZIDE 25 MG/1
25 TABLET ORAL DAILY
Qty: 10 TABLET | Refills: 0 | Status: SHIPPED | OUTPATIENT
Start: 2023-10-10 | End: 2023-10-16

## 2023-10-10 RX ORDER — TRAZODONE HYDROCHLORIDE 100 MG/1
100 TABLET ORAL AT BEDTIME
Qty: 10 TABLET | Refills: 0 | Status: SHIPPED | OUTPATIENT
Start: 2023-10-10 | End: 2023-10-16

## 2023-10-11 DIAGNOSIS — I10 BENIGN ESSENTIAL HYPERTENSION: ICD-10-CM

## 2023-10-11 DIAGNOSIS — E78.2 MIXED HYPERLIPIDEMIA: ICD-10-CM

## 2023-10-11 RX ORDER — METOPROLOL SUCCINATE 25 MG/1
12.5 TABLET, EXTENDED RELEASE ORAL DAILY
Qty: 45 TABLET | Refills: 3 | Status: SHIPPED | OUTPATIENT
Start: 2023-10-11 | End: 2024-06-10

## 2023-10-11 RX ORDER — ATORVASTATIN CALCIUM 80 MG/1
80 TABLET, FILM COATED ORAL DAILY
Qty: 90 TABLET | Refills: 3 | Status: SHIPPED | OUTPATIENT
Start: 2023-10-11

## 2023-10-12 ENCOUNTER — TELEPHONE (OUTPATIENT)
Dept: CARDIOLOGY | Facility: CLINIC | Age: 73
End: 2023-10-12
Payer: COMMERCIAL

## 2023-10-12 NOTE — TELEPHONE ENCOUNTER
[facility] Call Center    Phone Message    May a detailed message be left on voicemail: yes     Reason for Call: Other: Patient has appt. With his PCP at [facility] 10/16/23 and is wondering if we can fax over lab orders from Dr. Alejandra so he can have these drawn at this facility.      Action Taken: Other: cardiology    Travel Screening: Not Applicable  Thank you!  [facility]

## 2023-10-12 NOTE — TELEPHONE ENCOUNTER
[facility] is in [facility], can pull orders from patient's chart. Called patient and reviewed this.

## 2023-10-16 ENCOUNTER — OFFICE VISIT (OUTPATIENT)
Dept: FAMILY MEDICINE | Facility: CLINIC | Age: 73
End: 2023-10-16

## 2023-10-16 VITALS
OXYGEN SATURATION: 97 % | HEART RATE: 67 BPM | DIASTOLIC BLOOD PRESSURE: 64 MMHG | SYSTOLIC BLOOD PRESSURE: 118 MMHG | TEMPERATURE: 98 F | BODY MASS INDEX: 24.53 KG/M2 | WEIGHT: 152 LBS

## 2023-10-16 DIAGNOSIS — M48.062 SPINAL STENOSIS OF LUMBAR REGION WITH NEUROGENIC CLAUDICATION: ICD-10-CM

## 2023-10-16 DIAGNOSIS — F33.42 MAJOR DEPRESSIVE DISORDER, RECURRENT EPISODE, IN FULL REMISSION (H): Primary | ICD-10-CM

## 2023-10-16 DIAGNOSIS — N39.45 CONTINUOUS LEAKAGE OF URINE: ICD-10-CM

## 2023-10-16 DIAGNOSIS — I10 ESSENTIAL HYPERTENSION, BENIGN: ICD-10-CM

## 2023-10-16 DIAGNOSIS — E03.4 HYPOTHYROIDISM DUE TO ACQUIRED ATROPHY OF THYROID: ICD-10-CM

## 2023-10-16 DIAGNOSIS — F51.02 ADJUSTMENT INSOMNIA: ICD-10-CM

## 2023-10-16 DIAGNOSIS — R19.8 ANAL DISCHARGE: ICD-10-CM

## 2023-10-16 LAB
ALBUMIN SERPL-MCNC: 4.3 G/DL (ref 3.6–5.1)
ALBUMIN/GLOB SERPL: 1.4 {RATIO} (ref 1–2.5)
ALP SERPL-CCNC: 53 U/L (ref 33–130)
ALT 1742-6: 27 U/L (ref 0–32)
AST 1920-8: 21 U/L (ref 0–35)
BILIRUB SERPL-MCNC: 0.6 MG/DL (ref 0.2–1.2)
BUN SERPL-MCNC: 17 MG/DL (ref 7–25)
BUN/CREATININE RATIO: 16.8 (ref 6–32)
CALCIUM SERPL-MCNC: 9.4 MG/DL (ref 8.6–10.3)
CHLORIDE SERPLBLD-SCNC: 104.3 MMOL/L (ref 98–110)
CO2 SERPL-SCNC: 26 MMOL/L (ref 20–32)
CREAT SERPL-MCNC: 1.01 MG/DL (ref 0.6–1.3)
GLOBULIN, CALCULATED - QUEST: 3.1 (ref 1.9–3.7)
GLUCOSE SERPL-MCNC: 95 MG/DL (ref 60–99)
POTASSIUM SERPL-SCNC: 4.03 MMOL/L (ref 3.5–5.3)
PROT SERPL-MCNC: 7.4 G/DL (ref 6.1–8.1)
SODIUM SERPL-SCNC: 138.3 MMOL/L (ref 135–146)

## 2023-10-16 PROCEDURE — 80053 COMPREHEN METABOLIC PANEL: CPT | Performed by: PHYSICIAN ASSISTANT

## 2023-10-16 PROCEDURE — 99215 OFFICE O/P EST HI 40 MIN: CPT | Performed by: PHYSICIAN ASSISTANT

## 2023-10-16 PROCEDURE — 36415 COLL VENOUS BLD VENIPUNCTURE: CPT | Performed by: PHYSICIAN ASSISTANT

## 2023-10-16 RX ORDER — SULFAMETHOXAZOLE/TRIMETHOPRIM 800-160 MG
1 TABLET ORAL 2 TIMES DAILY
Status: CANCELLED | OUTPATIENT
Start: 2023-10-16

## 2023-10-16 RX ORDER — PREGABALIN 50 MG/1
CAPSULE ORAL
COMMUNITY
Start: 2023-06-26

## 2023-10-16 RX ORDER — TRAZODONE HYDROCHLORIDE 100 MG/1
100 TABLET ORAL AT BEDTIME
Qty: 90 TABLET | Refills: 3 | Status: SHIPPED | OUTPATIENT
Start: 2023-10-16 | End: 2024-09-13

## 2023-10-16 RX ORDER — LEVOTHYROXINE SODIUM 50 UG/1
50 TABLET ORAL DAILY
Qty: 90 TABLET | Refills: 3 | Status: SHIPPED | OUTPATIENT
Start: 2023-10-16

## 2023-10-16 RX ORDER — AMLODIPINE BESYLATE 10 MG/1
10 TABLET ORAL DAILY
Qty: 90 TABLET | Refills: 3 | Status: SHIPPED | OUTPATIENT
Start: 2023-10-16 | End: 2023-10-25

## 2023-10-16 RX ORDER — HYDROCHLOROTHIAZIDE 25 MG/1
25 TABLET ORAL DAILY
Qty: 90 TABLET | Refills: 3 | Status: SHIPPED | OUTPATIENT
Start: 2023-10-16

## 2023-10-16 RX ORDER — HYDROCODONE BITARTRATE AND ACETAMINOPHEN 5; 325 MG/1; MG/1
1 TABLET ORAL EVERY 6 HOURS PRN
Qty: 20 TABLET | Refills: 0 | Status: SHIPPED | OUTPATIENT
Start: 2023-10-16 | End: 2023-12-11

## 2023-10-16 ASSESSMENT — ANXIETY QUESTIONNAIRES
GAD7 TOTAL SCORE: 0
2. NOT BEING ABLE TO STOP OR CONTROL WORRYING: NOT AT ALL
3. WORRYING TOO MUCH ABOUT DIFFERENT THINGS: NOT AT ALL
6. BECOMING EASILY ANNOYED OR IRRITABLE: NOT AT ALL
5. BEING SO RESTLESS THAT IT IS HARD TO SIT STILL: NOT AT ALL
IF YOU CHECKED OFF ANY PROBLEMS ON THIS QUESTIONNAIRE, HOW DIFFICULT HAVE THESE PROBLEMS MADE IT FOR YOU TO DO YOUR WORK, TAKE CARE OF THINGS AT HOME, OR GET ALONG WITH OTHER PEOPLE: NOT DIFFICULT AT ALL
1. FEELING NERVOUS, ANXIOUS, OR ON EDGE: NOT AT ALL
GAD7 TOTAL SCORE: 0
7. FEELING AFRAID AS IF SOMETHING AWFUL MIGHT HAPPEN: NOT AT ALL

## 2023-10-16 ASSESSMENT — PATIENT HEALTH QUESTIONNAIRE - PHQ9
5. POOR APPETITE OR OVEREATING: NOT AT ALL
SUM OF ALL RESPONSES TO PHQ QUESTIONS 1-9: 0

## 2023-10-16 NOTE — PROGRESS NOTES
Assessment & Plan     Major depressive disorder, recurrent episode, in full remission (H24)-stable, refilled without change  - FLUoxetine (PROZAC) 20 MG capsule  Dispense: 90 capsule; Refill: 3  - VENOUS COLLECTION  - Comprehensive Metobolic Panel ([facility])    Essential hypertension, benign-excellent control continues, cardiologist filled other BP meds for 1 year, will fill for this time as well  Continue to check BP at home  - hydrochlorothiazide (HYDRODIURIL) 25 MG tablet  Dispense: 90 tablet; Refill: 3  - amLODIPine (NORVASC) 10 MG tablet  Dispense: 90 tablet; Refill: 3  - VENOUS COLLECTION  - Comprehensive Metobolic Panel (BFP)    Hypothyroidism due to acquired atrophy of thyroid-refilled pending labs  - levothyroxine (SYNTHROID/LEVOTHROID) 50 MCG tablet  Dispense: 90 tablet; Refill: 3  - VENOUS COLLECTION  - TSH ([facility])  - T4 FREE ([facility])  - Comprehensive Metobolic Panel ([facility]FP)    Continuous leakage of urine    - traZODone (DESYREL) 100 MG tablet  Dispense: 90 tablet; Refill: 3    Adjustment insomnia-stable, well controlled    - traZODone (DESYREL) 100 MG tablet  Dispense: 90 tablet; Refill: 3    Spinal stenosis of lumbar region with neurogenic claudication-see HPI, will fill short course, if needing any more pain control than this on a regular basis pt will need to go back to pain clinic which he understands  Expect this Rx should last at least 3 months, no refills before then  - HYDROcodone-acetaminophen (NORCO) 5-325 MG tablet  Dispense: 20 tablet; Refill: 0    Anal discharge  Referral placed  - Adult Colorectal Surgery  Referral - To [facility] Location           Follow up 1 year OV pending labs, sooner if needing another short fill of Norco    No follow-ups on file.    Sunny Blake PA-C  [facility]    Patrick Mora is a 73 year old, presenting for the following health issues:  Refill Request (Pt here for medication refill. Is fasting.)    HPI       Hypertension  Follow-up    Do you check your blood pressure regularly outside of the clinic? Yes   Are you following a low salt diet? Yes  Are your blood pressures ever more than 140 on the top number (systolic) OR more   than 90 on the bottom number (diastolic), for example 140/90? No  BP outside of clinic: 120/60. NO concerns. Cardiologist refilled meds for 1 year.     Depression and Anxiety Follow-Up  How are you doing with your depression since your last visit? No change  How are you doing with your anxiety since your last visit?  No change  Are you having other symptoms that might be associated with depression or anxiety? No  Have you had a significant life event? No   Do you have any concerns with your use of alcohol or other drugs? No  -Going well, no concerns. Taking trazodone and Prozac. Sleep is going well.     Social History     Tobacco Use    Smoking status: Never    Smokeless tobacco: Never   Substance Use Topics    Alcohol use: Yes     Alcohol/week: 7.0 standard drinks of alcohol     Types: 7 Standard drinks or equivalent per week     Comment: 1 beer daily    Drug use: No         9/15/2021    11:21 AM 6/1/2023     2:19 PM 10/16/2023    11:15 AM   PHQ   PHQ-9 Total Score 0 0 0   Q9: Thoughts of better off dead/self-harm past 2 weeks Not at all Not at all Not at all         9/15/2021    11:21 AM 6/1/2023     2:19 PM 10/16/2023    11:15 AM   CARL-7 SCORE   Total Score 0 0 0         10/16/2023    11:15 AM   Last PHQ-9   1.  Little interest or pleasure in doing things 0   2.  Feeling down, depressed, or hopeless 0   3.  Trouble falling or staying asleep, or sleeping too much 0   4.  Feeling tired or having little energy 0   5.  Poor appetite or overeating 0   6.  Feeling bad about yourself 0   7.  Trouble concentrating 0   8.  Moving slowly or restless 0   Q9: Thoughts of better off dead/self-harm past 2 weeks 0   PHQ-9 Total Score 0   Difficulty at work, home, or with people Not difficult at all         10/16/2023     11:15 AM   CARL-7    1. Feeling nervous, anxious, or on edge 0   2. Not being able to stop or control worrying 0   3. Worrying too much about different things 0   4. Trouble relaxing 0   5. Being so restless that it is hard to sit still 0   6. Becoming easily annoyed or irritable 0   7. Feeling afraid, as if something awful might happen 0   CARL-7 Total Score 0   If you checked any problems, how difficult have they made it for you to do your work, take care of things at home, or get along with other people? Not difficult at all       Suicide Assessment Five-step Evaluation and Treatment (SAFE-T)    Hypothyroidism Follow-up    Since last visit, patient describes the following symptoms: Weight stable, no hair loss, no skin changes, no constipation, no loose stools      Pain clinic: not seeing [facility] due to cost. Uses rare Norco on PRN basis-also has spinal stimulator. PDMP reviewed, no concerns. Uses approx 2x a week. I expect 20 tabs to last at least 3 months. Was previously getting this amount from pain clinic.    Pt will call if needing audiology referral. Wants to see [facility].     Some issues with leaking from anus after wiping-wants to see colorectal surgeon for consult.    Review of Systems   Constitutional, HEENT, cardiovascular, pulmonary, gi and gu systems are negative, except as otherwise noted.      Objective    /64 (BP Location: Right arm, Patient Position: Sitting, Cuff Size: Adult Large)   Pulse 67   Temp 98  F (36.7  C) (Temporal)   Wt 68.9 kg (152 lb)   SpO2 97%   BMI 24.53 kg/m    Body mass index is 24.53 kg/m .  Physical Exam   GENERAL: healthy, alert and no distress  NECK: no adenopathy, no asymmetry, masses, or scars and thyroid normal to palpation  RESP: lungs clear to auscultation - no rales, rhonchi or wheezes  CV: regular rate and rhythm, normal S1 S2, no S3 or S4, no murmur, click or rub, no peripheral edema and peripheral pulses strong  ABDOMEN: soft, nontender, no  hepatosplenomegaly, no masses and bowel sounds normal  MS: no gross musculoskeletal defects noted, no edema  NEURO: Normal strength and tone, mentation intact and speech normal  PSYCH: mentation appears normal, affect normal/bright

## 2023-10-16 NOTE — NURSING NOTE
Chief Complaint   Patient presents with    Refill Request     Pt here for medication refill. Is fasting.    Pre-visit Screening:  Immunizations:  up to date  Colonoscopy:  is up to date  Mammogram: na  Asthma Action Test/Plan:  na  PHQ9:  updated at appointment  GAD7:  updated at appointment  Questioned patient about current smoking habits Pt. has never smoked.  Ok to leave detailed message on voice mail for today's visit only yes, phone # [phone]

## 2023-10-17 LAB
T4, FREE, NON-DIALYSIS - QUEST: 1.4 NG/DL (ref 0.8–1.8)
TSH SERPL-ACNC: 1.82 MIU/L (ref 0.4–4.5)

## 2023-10-25 DIAGNOSIS — I10 ESSENTIAL HYPERTENSION, BENIGN: ICD-10-CM

## 2023-10-25 RX ORDER — AMLODIPINE BESYLATE 10 MG/1
10 TABLET ORAL DAILY
Qty: 90 TABLET | Refills: 3 | Status: SHIPPED | OUTPATIENT
Start: 2023-10-25 | End: 2023-12-27

## 2023-11-28 DIAGNOSIS — I25.10 CAD (CORONARY ARTERY DISEASE): Primary | ICD-10-CM

## 2023-11-28 RX ORDER — NITROGLYCERIN 0.4 MG/1
TABLET SUBLINGUAL
Qty: 25 TABLET | Refills: 2 | Status: SHIPPED | OUTPATIENT
Start: 2023-11-28

## 2023-12-07 ENCOUNTER — TELEPHONE (OUTPATIENT)
Dept: FAMILY MEDICINE | Facility: CLINIC | Age: 73
End: 2023-12-07

## 2023-12-07 NOTE — TELEPHONE ENCOUNTER
Pt called asking for refill of isosorbide. Left pt detailed message that this RX was discontinued off his chart 07/2022. Advised he reach out to cardiology to go over what medications he should be taking. Also advised he bring his pill bottles to his upcoming appt with [facility].

## 2023-12-26 DIAGNOSIS — K21.9 GASTROESOPHAGEAL REFLUX DISEASE WITHOUT ESOPHAGITIS: ICD-10-CM

## 2023-12-26 NOTE — LETTER
1/23/2018       RE: Jose Moreno  [id number] [facility] [place] [address]     Dear Colleague,    Thank you for referring your patient, Jose Moreno, to the [facility] [place] at [facility]. Please see a copy of my visit note below.    Jose Moreno is here for a transrectal altrasound guided needle biopsy of the prostate for a significant risk of potentially lethal prostate cancer.    The risks, benefits, of the procudure were discussed.  All questions were answered.  A written informed consent was obtained.      Ron PSA   Date Value Ref Range Status   06/13/2013 4.0 < OR = 4.0 ng/mL Final     Comment:        This test was performed using the Siemens  chemiluminescent method. Values obtained from  different assay methods cannot be used  interchangeably. PSA levels, regardless of  value, should not be interpreted as absolute  evidence of the presence or absence of disease.      06/16/2011 2.9 < OR = 4.0 ng/mL Final     Comment:        This test was performed using the Siemens  chemiluminescent method. Values obtained from  different assay methods cannot be used  interchangeably. PSA levels, regardless of  value, should not be interpreted as absolute  evidence of the presence or absence of disease.     NO COLLECTION DATE RECEIVED. WE HAVE USED  THE DATE THE SPECIMEN WAS RECEIVED BY THIS  LABORATORY AS THE COLLECTION DATE. IF THIS  IS INCORRECT, PLEASE CONTACT CLIENT SERVICES.  PHONE NUMBER: [phone]  Test Performed at:  [facility] [address]  [address]  TIFFANI CROWE MD   05/27/2010 2.5 < OR = 4.0 ng/mL Final     Comment:        This test was performed using the Siemens  chemiluminescent method. Values obtained from  different assay methods cannot be used  interchangeably. PSA levels, regardless of  value, should not be interpreted as absolute  evidence of the presence or absence of  disease.     Test Performed at:  "[facility] [address]  [address]  TIFFANI CROWE MD   02/17/2009 2.6 < OR = 4.0 ng/mL Final     Comment:     THIS TEST WAS PERFORMED USING THE SIEMENS ([employer])  CHEMILUMINESCENT METHOD. VALUES OBTAINED FROM  DIFFERENT ASSAY METHODS CANNOT BE USED  INTERCHANGEABLY. PSA LEVELS, REGARDLESS OF  VALUE, SHOULD NOT BE INTERPRETED AS ABSOLUTE  EVIDENCE OF THE PRESENCE OR ABSENCE OF DISEASE.     NO COLLECTION DATE RECEIVED. WE HAVE USED  THE DATE THE SPECIMEN WAS RECEIVED BY THIS  LABORATORY AS THE COLLECTION DATE. IF THIS  IS INCORRECT, PLEASE CONTACT CLIENT SERVICES.  PHONE NUMBER: [phone]     Test performed at "[facility] [address]  [address]  Director: TIFFANI CROWE M.D.     PSA   Date Value Ref Range Status   01/10/2018 12.20 (H) 0 - 4 ug/L Final     Comment:     Assay Method:  Chemiluminescence using Siemens Vista analyzer       An enema was completed and 500 mg of Cipro twice daily was started prior to the biopsy.  After obtaining informed consent patient was placed in lateral decubitus position.  The ultrasound probe was placed in the rectum.  The prostate was numbed using ultrasound guidance with 1% lidocaine 5 mls along each nerve bundle.      The volume was measured and estimated to be 22 cubic centimeters.      US images were used to guide the biopsies of the prostate.  12 cores were taken with 6 on each side, 2 at the base,  2 at the midgland and  2 at the apex.  The patient tolerated the procedure well.      We will follow up with the results in 7-10 days and contact patient with these results.      Clint Blankenship MD       show

## 2023-12-26 NOTE — TELEPHONE ENCOUNTER
Pt last seen 10/16/23.    Jose Moreno is requesting a refill of:    Pending Prescriptions:                       Disp   Refills    omeprazole (PRILOSEC) 20 MG DR capsule [P*90 cap*2            Sig: TAKE 1 CAPSULE BY MOUTH EVERY DAY

## 2023-12-27 DIAGNOSIS — I10 ESSENTIAL HYPERTENSION, BENIGN: ICD-10-CM

## 2023-12-27 RX ORDER — AMLODIPINE BESYLATE 10 MG/1
10 TABLET ORAL DAILY
Qty: 90 TABLET | Refills: 3 | Status: SHIPPED | OUTPATIENT
Start: 2023-12-27

## 2024-05-25 ENCOUNTER — HEALTH MAINTENANCE LETTER (OUTPATIENT)
Age: 74
End: 2024-05-25

## 2024-06-10 DIAGNOSIS — I10 BENIGN ESSENTIAL HYPERTENSION: ICD-10-CM

## 2024-06-10 RX ORDER — METOPROLOL SUCCINATE 25 MG/1
12.5 TABLET, EXTENDED RELEASE ORAL DAILY
Qty: 45 TABLET | Refills: 1 | Status: SHIPPED | OUTPATIENT
Start: 2024-06-10

## 2024-06-22 DIAGNOSIS — E03.4 HYPOTHYROIDISM DUE TO ACQUIRED ATROPHY OF THYROID: ICD-10-CM

## 2024-06-24 RX ORDER — LEVOTHYROXINE SODIUM 50 UG/1
50 TABLET ORAL DAILY
COMMUNITY
Start: 2024-06-24

## 2024-06-24 NOTE — TELEPHONE ENCOUNTER
Jose Moreno is requesting a refill of:    Refused Prescriptions:                       Disp   Refills    levothyroxine (SYNTHROID/LEVOTHROID) 50 MC*                Sig: TAKE 1 TABLET BY MOUTH EVERY DAY  Refused By: AASHISH GR  Reason for Refusal: Should already have refills on file    Refills sent on 10/16/23 for 90 tab with 3 refills

## 2024-07-09 DIAGNOSIS — N39.45 CONTINUOUS LEAKAGE OF URINE: ICD-10-CM

## 2024-07-09 DIAGNOSIS — F51.02 ADJUSTMENT INSOMNIA: ICD-10-CM

## 2024-07-10 DIAGNOSIS — N39.45 CONTINUOUS LEAKAGE OF URINE: ICD-10-CM

## 2024-07-10 DIAGNOSIS — F51.02 ADJUSTMENT INSOMNIA: ICD-10-CM

## 2024-07-10 RX ORDER — TRAZODONE HYDROCHLORIDE 100 MG/1
100 TABLET ORAL AT BEDTIME
COMMUNITY
Start: 2024-07-10

## 2024-07-10 NOTE — TELEPHONE ENCOUNTER
Patient is requesting refill of:  Pending Prescriptions:                       Disp   Refills    traZODone (DESYREL) 100 MG tablet [Pharma*90 tab*3            Sig: TAKE 1 TABLET BY MOUTH AT BEDTIME    Patient was last seen 10/16/23 for a med check and was told to follow up in 1 year. Refill request denied as patient should have one refill remaining.

## 2024-07-10 NOTE — TELEPHONE ENCOUNTER
Jose Moreno is requesting a refill of:    Refused Prescriptions:                       Disp   Refills    traZODone (DESYREL) 100 MG tablet [Pharmac*                Sig: TAKE 1 TABLET BY MOUTH AT BEDTIME  Refused By: OUSMANE CALVERT  Reason for Refusal: Duplicate    Sent 10/16/23 for 1 year

## 2024-07-22 ENCOUNTER — OFFICE VISIT (OUTPATIENT)
Dept: FAMILY MEDICINE | Facility: CLINIC | Age: 74
End: 2024-07-22

## 2024-07-22 VITALS
SYSTOLIC BLOOD PRESSURE: 108 MMHG | HEIGHT: 66 IN | TEMPERATURE: 97.4 F | DIASTOLIC BLOOD PRESSURE: 70 MMHG | HEART RATE: 52 BPM | RESPIRATION RATE: 20 BRPM | BODY MASS INDEX: 24.43 KG/M2 | WEIGHT: 152 LBS

## 2024-07-22 DIAGNOSIS — Z13.228 SCREENING FOR ENDOCRINE, METABOLIC AND IMMUNITY DISORDER: ICD-10-CM

## 2024-07-22 DIAGNOSIS — Z13.220 SCREENING FOR LIPOID DISORDERS: ICD-10-CM

## 2024-07-22 DIAGNOSIS — E03.4 HYPOTHYROIDISM DUE TO ACQUIRED ATROPHY OF THYROID: ICD-10-CM

## 2024-07-22 DIAGNOSIS — Z13.29 SCREENING FOR ENDOCRINE, METABOLIC AND IMMUNITY DISORDER: ICD-10-CM

## 2024-07-22 DIAGNOSIS — Z00.01 ENCOUNTER FOR GENERAL ADULT MEDICAL EXAMINATION WITH ABNORMAL FINDINGS: ICD-10-CM

## 2024-07-22 DIAGNOSIS — K21.9 GASTROESOPHAGEAL REFLUX DISEASE WITHOUT ESOPHAGITIS: ICD-10-CM

## 2024-07-22 DIAGNOSIS — M48.062 SPINAL STENOSIS OF LUMBAR REGION WITH NEUROGENIC CLAUDICATION: ICD-10-CM

## 2024-07-22 DIAGNOSIS — Z12.11 SCREENING FOR MALIGNANT NEOPLASM OF COLON: ICD-10-CM

## 2024-07-22 DIAGNOSIS — Z13.0 SCREENING FOR ENDOCRINE, METABOLIC AND IMMUNITY DISORDER: ICD-10-CM

## 2024-07-22 DIAGNOSIS — Z85.46 HISTORY OF PROSTATE CANCER: ICD-10-CM

## 2024-07-22 DIAGNOSIS — Z00.00 ENCOUNTER FOR SUBSEQUENT ANNUAL WELLNESS VISIT (AWV) IN MEDICARE PATIENT: Primary | ICD-10-CM

## 2024-07-22 PROBLEM — I05.9 MITRAL VALVE DISORDER: Status: RESOLVED | Noted: 2023-09-15 | Resolved: 2024-07-22

## 2024-07-22 PROBLEM — I34.1 MITRAL VALVE PROLAPSE: Status: RESOLVED | Noted: 2022-07-20 | Resolved: 2024-07-22

## 2024-07-22 LAB
ALBUMIN SERPL-MCNC: 4.2 G/DL (ref 3.6–5.1)
ALP SERPL-CCNC: 57 U/L (ref 33–130)
ALT 1742-6: 30 U/L (ref 0–32)
AST 1920-8: 30 U/L (ref 0–35)
BILIRUB SERPL-MCNC: 0.5 MG/DL (ref 0.2–1.2)
BUN SERPL-MCNC: 18 MG/DL (ref 7–25)
BUN/CREATININE RATIO: 17 (ref 6–32)
CALCIUM SERPL-MCNC: 9.6 MG/DL (ref 8.6–10.3)
CHLORIDE SERPLBLD-SCNC: 100.9 MMOL/L (ref 98–110)
CHOLEST SERPL-MCNC: 136 MG/DL (ref 0–199)
CHOLEST/HDLC SERPL: 2 {RATIO} (ref 0–5)
CO2 SERPL-SCNC: 26 MMOL/L (ref 20–32)
CREAT SERPL-MCNC: 1.04 MG/DL (ref 0.6–1.3)
GLUCOSE SERPL-MCNC: 98 MG/DL (ref 60–99)
HDLC SERPL-MCNC: 68 MG/DL (ref 40–150)
LDLC SERPL CALC-MCNC: 58 MG/DL
POTASSIUM SERPL-SCNC: 4.33 MMOL/L (ref 3.5–5.3)
PROT SERPL-MCNC: 6.8 G/DL (ref 6.1–8.1)
SODIUM SERPL-SCNC: 135.7 MMOL/L (ref 135–146)
TRIGL SERPL-MCNC: 48 MG/DL (ref 0–149)

## 2024-07-22 PROCEDURE — 80053 COMPREHEN METABOLIC PANEL: CPT | Performed by: PHYSICIAN ASSISTANT

## 2024-07-22 PROCEDURE — G0439 PPPS, SUBSEQ VISIT: HCPCS | Mod: GA | Performed by: PHYSICIAN ASSISTANT

## 2024-07-22 PROCEDURE — 36415 COLL VENOUS BLD VENIPUNCTURE: CPT | Performed by: PHYSICIAN ASSISTANT

## 2024-07-22 PROCEDURE — 99397 PER PM REEVAL EST PAT 65+ YR: CPT | Mod: 25 | Performed by: PHYSICIAN ASSISTANT

## 2024-07-22 PROCEDURE — 80061 LIPID PANEL: CPT | Performed by: PHYSICIAN ASSISTANT

## 2024-07-22 PROCEDURE — 99213 OFFICE O/P EST LOW 20 MIN: CPT | Mod: 25 | Performed by: PHYSICIAN ASSISTANT

## 2024-07-22 RX ORDER — HYDROCODONE BITARTRATE AND ACETAMINOPHEN 5; 325 MG/1; MG/1
1 TABLET ORAL EVERY 6 HOURS PRN
Qty: 20 TABLET | Refills: 0 | Status: SHIPPED | OUTPATIENT
Start: 2024-07-22 | End: 2024-07-25

## 2024-07-22 NOTE — PROGRESS NOTES
Jose Moreno is a 74 year old male who presents for Medicare Annual Wellness Visit.    Current providers caring for this patient include:  Patient Care Team:  Sunny Blake PA-C as PCP - General (Family Medicine)  Armani Franz MD as MD (Urology)  Clint Blankenship MD as MD (Urology)  Clint Alejandra MD as Assigned Heart and Vascular Provider  Maurice Briscoe MD as Assigned PCP    Complete Medical and Social history reviewed with patient, outlined below.    Patient Active Problem List   Diagnosis    Essential hypertension, benign    Status post mitral valve repair    Mixed hyperlipidemia    Spinal stenosis, lumbar    Coronary artery disease involving native coronary artery of native heart without angina pectoris    Mitral valve insufficiency, unspecified etiology    ACP (advance care planning)    Hypothyroidism due to acquired atrophy of thyroid    Gastroesophageal reflux disease without esophagitis    Chronic left-sided low back pain without sciatica    Hiatal hernia    History of non-ST elevation myocardial infarction (NSTEMI)    Lumbosacral spondylosis without myelopathy    Facet arthropathy, lumbosacral    Major depressive disorder, recurrent episode, in full remission (H24)    Chronic fatigue    Prostate cancer (H)    Stress incontinence of urine    Urinary incontinence    Supraventricular tachycardia (H24)    Mitral valve prolapse    Acute deep vein thrombosis (DVT) of distal vein of left lower extremity (H)    Mitral valve disorder       Past Medical History:   Diagnosis Date    Arthritis     lower back    CAD (coronary artery disease)     cardiac cath 1/23/15: SHERRY to 1st diagonal, SHERRY x2 to LAD, cath 2009: no intervention    Esophageal reflux     History of hyperthyroidism 04/09/2014    Hyperlipidemia     Hypertension     Mitral regurgitation 2009    moderately severe MVP, s/p robotic repair at [place]    Prostate cancer     Rotator cuff rupture 11/03/2011    Thyroid disease         Past Surgical History:   Procedure Laterality Date    DAVINCI PROSTATECTOMY, LYMPHADENECTOMY N/A 11/01/2018    Procedure: ROBOTIC ASSISTED RADICAL PROSTATECTOMY WITH BILATERAL PELVIC LYMPH NODE DISSECTION;  Surgeon: Clint Blankenship MD;  Location: [facility] OR    DECOMPRESSION LUMBAR MINIMALLY INVASIVE ONE LEVEL  01/11/2012    Procedure:DECOMPRESSION LUMBAR MINIMALLY INVASIVE ONE LEVEL; L4-5 Decompression Minimally Invasive; Surgeon:MESSI HORAN; Location:[facility] OR    HEART CATH RIGHT AND LEFT HEART CATH  01/23/2015    See report, pt transfered to [facility] for complex bifurcation PCI of LAD diagonal vessel.     HEART CATH RIGHT AND LEFT HEART CATH  01/26/2015    PTCA and implantation of SHERRY to 1st diagonal, SHERRY to mid LAD, SHERRY to proximal LAD    Hx of rotator cuff rupture and repair      IMPLANT PROSTHESIS SPHINCTER URINARY N/A 01/03/2020    Procedure: Placement of  artificial urinary sphincter;  Surgeon: Clint Blankenship MD;  Location: [facility] OR    REPAIR VALVE MITRAL  2009    [facility] robotic repair     Reversal of vasectomy      SPINAL CORD STIMULATOR IMPLANT  2023    VASECTOMY      XR LUMBAR RADIOFREQ ABLATION UNILATERAL  01/11/2018    lumbar area/ ? level       Family History   Problem Relation Age of Onset    Breast Cancer Mother     Lung Cancer Mother         small cell carcinoma- radon?    Depression Father         alcohlism    Macular Degeneration Father     Colon Cancer Father         age 75    Crohn's Disease Sister     Pleurisy Brother     Depression Son     Diabetes No family hx of     Cardiovascular No family hx of     Prostate Cancer No family hx of        Social History     Tobacco Use    Smoking status: Former     Types: Cigars     Passive exposure: Never    Smokeless tobacco: Never   Substance Use Topics    Alcohol use: Yes     Alcohol/week: 7.0 standard drinks of alcohol     Types: 7 Standard drinks or equivalent per week     Comment: 1 beer daily       Diet: regular, low  "salt/low fat  Physical Activity: active without specific exercise program  Depression Screen:    Over the past 2 weeks, patient has felt down, depressed, or hopeless:  No    Over the past 2 weeks, patient has felt little interest or pleasure in doing things: No    Functional ability/Safety screen:  Up and go test (able to get up and walk longer than 30 seconds): Passed  Patient needs assistance with: nothing  Patient's home has the following possible safety concerns: none identified  Patient has concerns about his hearing:  Yes-needs to see audiologist    Cognitive Screen  Patient repeats three objects (ball, flag, tree)      Clock drawing test:   NORMAL  Recalls three objects after 3 minutes (ball,flag,tree):                                                                                               recalls 3 objects (3 points)    Physical Exam:  /70 (BP Location: Left arm, Patient Position: Chair, Cuff Size: Adult Regular)   Pulse 52   Temp 97.4  F (36.3  C) (Temporal)   Resp 20   Ht 1.676 m (5' 6\")   Wt 68.9 kg (152 lb)   BMI 24.53 kg/m     Body mass index is 24.53 kg/m .        End of Life Planning:   Patient currently has an advanced directive: Yes.  Practitioner is supportive of decision.    Education/Counseling:   Based on review of the above information, the following items were addressed:      HM reviewed, RSV vaccine needed this year    Appropriate preventive services were discussed with this patient, including applicable screening as appropriate for cardiovascular disease, diabetes, osteopenia/osteoporosis, and glaucoma.  As appropriate for age/gender, discussed screening for colorectal cancer, prostate cancer, breast cancer, and cervical cancer.   Checklist reviewing preventive services available has been given to the patient.       "

## 2024-07-22 NOTE — NURSING NOTE
Jose Moreno is here for a CPX and Wellness    Pre-visit planning  Immunizations -up to date  Colonoscopy -is up to date  Mammogram -  Asthma test --  PHQ9 -  CARL 7 -      Questioned patient about current smoking habits.  Pt. quit smoking some time ago.  Body mass index is 24.53 kg/m .  PULSE regular  My Chart: active  CLASSIFICATION OF OVERWEIGHT AND OBESITY BY BMI                        Obesity Class           BMI(kg/m2)  Underweight                                    < 18.5  Normal                                         18.5-24.9  Overweight                                     25.0-29.9  OBESITY                     I                  30.0-34.9                             II                 35.0-39.9  EXTREME OBESITY             III                >40                            Patient's  BMI Body mass index is 24.53 kg/m .      The patient has verbalized that it is ok to leave a detailed voice message on the patient's cell phone with results/recommendations from this visit.

## 2024-07-22 NOTE — PROGRESS NOTES
Preventive Care Visit  [facility], PCHANTELLE Blake PA-C, Family Medicine  Jul 22, 2024      SUBJECTIVE:   Morgan is a 74 year old, presenting for the following:  Physical      HPI    Diet-good, tries to lead healthy lifestyle.  Exercise-tries to stay active. Biking, back pain limits him  Sleep-good, no concerns. Trazodone helps  BM-some constipation, rare  Vitamin Use-eye vitamins, multi, D3  Dentist-2x a year  Eye Doctor-beginning macular degeneration, UTD  Colon-due for screening, FH colon cancer in father.   PSA-due for recheck, prostatectomy in the past      Worsening back pain-chronic worsening pain. Seeing pain clinic. Previously filled Norco 20 tabs to last 3 months. Willing to fill again until seen by Pain Management.     Gerd/Hiatal hernia-stable on omeprazole, no concerns.     Considering PT for back-will call for orders.       Social History     Tobacco Use    Smoking status: Never     Passive exposure: Never    Smokeless tobacco: Never   Substance Use Topics    Alcohol use: Yes     Alcohol/week: 7.0 standard drinks of alcohol     Types: 7 Standard drinks or equivalent per week     Comment: 1 beer daily              No data to display                Last PSA:   [facility] PSA   Date Value Ref Range Status   02/17/2023 <0.04 < OR = 4.00 ng/mL Final     Comment:     The total PSA value from this assay system is   standardized against the WHO standard. The test   result will be approximately 20% lower when compared   to the equimolar-standardized total PSA ([place]   [place]). Comparison of serial PSA results should be   interpreted with this fact in mind.     This test was performed using the Siemens   chemiluminescent method. Values obtained from   different assay methods cannot be used  interchangeably. PSA levels, regardless of  value, should not be interpreted as absolute  evidence of the presence or absence of disease.         Reviewed orders with patient. Reviewed health maintenance  and updated orders accordingly - Yes  Lab work is in process    Reviewed and updated as needed this visit by clinical staff   Tobacco  Allergies  Meds   Med Hx   Fam Hx          Reviewed and updated as needed this visit by Provider   Tobacco     Med Hx   Fam Hx          Past Medical History:   Diagnosis Date    Arthritis     lower back    CAD (coronary artery disease)     cardiac cath 1/23/15: SHERRY to 1st diagonal, SHERRY x2 to LAD, cath 2009: no intervention    Esophageal reflux     History of hyperthyroidism 04/09/2014    Hyperlipidemia     Hypertension     Mitral regurgitation 2009    moderately severe MVP, s/p robotic repair at [place]    Prostate cancer     Rotator cuff rupture 11/03/2011    Thyroid disease       Past Surgical History:   Procedure Laterality Date    AS KNEE SCOPE,MED/LAT MENISCUS REPAIR Bilateral 2000    2x    DAVINCI PROSTATECTOMY, LYMPHADENECTOMY N/A 11/01/2018    Procedure: ROBOTIC ASSISTED RADICAL PROSTATECTOMY WITH BILATERAL PELVIC LYMPH NODE DISSECTION;  Surgeon: Clint Blankenship MD;  Location: [facility] OR    DECOMPRESSION LUMBAR MINIMALLY INVASIVE ONE LEVEL  01/11/2012    Procedure:DECOMPRESSION LUMBAR MINIMALLY INVASIVE ONE LEVEL; L4-5 Decompression Minimally Invasive; Surgeon:MESSI HORAN; Location:[facility] OR    HEART CATH RIGHT AND LEFT HEART CATH  01/23/2015    See report, pt transfered to [facility] for complex bifurcation PCI of LAD diagonal vessel.     HEART CATH RIGHT AND LEFT HEART CATH  01/26/2015    PTCA and implantation of SHERRY to 1st diagonal, SHERRY to mid LAD, SHERRY to proximal LAD    Hx of rotator cuff rupture and repair      IMPLANT PROSTHESIS SPHINCTER URINARY N/A 01/03/2020    Procedure: Placement of  artificial urinary sphincter;  Surgeon: Clint Blankenship MD;  Location: [facility] OR    REPAIR VALVE MITRAL  2009    [facility] robotic repair     Reversal of vasectomy      SPINAL CORD STIMULATOR IMPLANT  2023    VASECTOMY      XR LUMBAR RADIOFREQ ABLATION UNILATERAL  " 01/11/2018    lumbar area/ ? level         Review of Systems  Constitutional, neuro, ENT, endocrine, pulmonary, cardiac, gastrointestinal, genitourinary, musculoskeletal, integument and psychiatric systems are negative, except as otherwise noted.    OBJECTIVE:   /70 (BP Location: Left arm, Patient Position: Chair, Cuff Size: Adult Regular)   Pulse 52   Temp 97.4  F (36.3  C) (Temporal)   Resp 20   Ht 1.676 m (5' 6\")   Wt 68.9 kg (152 lb)   BMI 24.53 kg/m     Estimated body mass index is 24.53 kg/m  as calculated from the following:    Height as of this encounter: 1.676 m (5' 6\").    Weight as of this encounter: 68.9 kg (152 lb).  Physical Exam  GENERAL: alert and no distress  EYES: Eyes grossly normal to inspection, PERRL and conjunctivae and sclerae normal  HENT: ear canals and TM's normal, nose and mouth without ulcers or lesions  NECK: no adenopathy, no asymmetry, masses, or scars  RESP: lungs clear to auscultation - no rales, rhonchi or wheezes  CV: regular rate and rhythm, normal S1 S2, no S3 or S4, no murmur, click or rub, no peripheral edema  ABDOMEN: soft, nontender, no hepatosplenomegaly, no masses and bowel sounds normal  MS: no gross musculoskeletal defects noted, no edema  NEURO: Normal strength and tone, mentation intact and speech normal  PSYCH: mentation appears normal, affect normal/bright    Diagnostic Test Results:  Labs reviewed in Epic    ASSESSMENT/PLAN:   Gastroesophageal reflux disease without esophagitis  Stable, refilled without change  - omeprazole (PRILOSEC) 20 MG DR capsule  Dispense: 90 capsule; Refill: 3    Spinal stenosis of lumbar region with neurogenic claudication  Pt understands this should last 3 months, no fills until then  Encouraged to see pain clinic for better help with pain control- HYDROcodone-acetaminophen (NORCO) 5-325 MG tablet  Dispense: 20 tablet; Refill: 0    Encounter for general adult medical examination with abnormal findings  Due for full med check 3 " months  - Comprehensive Metobolic Panel ([facility]FP)  - VENOUS COLLECTION    Screening for endocrine, metabolic and immunity disorder    - Comprehensive Metobolic Panel (BFP)  - VENOUS COLLECTION    Screening for lipoid disorders    - Lipid Panel ([facility]FP)  - VENOUS COLLECTION    History of prostate cancer    - PSA Total ([facility])    Encounter for subsequent annual wellness visit (AWV) in Medicare patient  HM UTD, labs today  RSV vaccine at outside pharmacy    Screening for malignant neoplasm of colon    - Colonoscopy Screening  Referral      Patient has been advised of split billing requirements and indicates understanding: Yes      Counseling  Reviewed preventive health counseling, as reflected in patient instructions       Regular exercise       Healthy diet/nutrition       Vision screening       Colorectal cancer screening       Prostate cancer screening        He reports that he has never smoked. He has never been exposed to tobacco smoke. He has never used smokeless tobacco.            Signed Electronically by: Sunny Blake PA-C

## 2024-07-23 LAB — ABBOTT PSA - QUEST: <0.04 NG/ML

## 2024-07-23 RX ORDER — LEVOTHYROXINE SODIUM 50 UG/1
50 TABLET ORAL DAILY
COMMUNITY
Start: 2024-07-23

## 2024-07-23 NOTE — TELEPHONE ENCOUNTER
Jose Moreno is requesting a refill of:    Refused Prescriptions:                       Disp   Refills    levothyroxine (SYNTHROID/LEVOTHROID) 50 MC*                Sig: TAKE 1 TABLET BY MOUTH EVERY DAY  Refused By: AASHISH GR  Reason for Refusal: Should already have refills on file  Reason for Refusal Comment: Refills sent on 10/16/23 for 90 tab with 3 refills

## 2024-07-24 ENCOUNTER — TRANSFERRED RECORDS (OUTPATIENT)
Dept: FAMILY MEDICINE | Facility: CLINIC | Age: 74
End: 2024-07-24

## 2024-08-15 NOTE — NURSING NOTE
Pt in clinic for nurse visit per Cornell De Anda PA-C. See care coordination encounter for details.     Ordering provider: Cornell De Anda PA-C   Primary cardiologist: Dr. Alejandra  In-clinic cardiologist: Dr. Doug Kemp RN, BSN  10/30/19 10:12 AM     104

## 2024-09-03 ENCOUNTER — TRANSFERRED RECORDS (OUTPATIENT)
Dept: FAMILY MEDICINE | Facility: CLINIC | Age: 74
End: 2024-09-03

## 2024-09-06 DIAGNOSIS — E03.4 HYPOTHYROIDISM DUE TO ACQUIRED ATROPHY OF THYROID: ICD-10-CM

## 2024-09-06 DIAGNOSIS — N39.45 CONTINUOUS LEAKAGE OF URINE: ICD-10-CM

## 2024-09-06 DIAGNOSIS — F51.02 ADJUSTMENT INSOMNIA: ICD-10-CM

## 2024-09-06 RX ORDER — TRAZODONE HYDROCHLORIDE 100 MG/1
100 TABLET ORAL AT BEDTIME
COMMUNITY
Start: 2024-09-06

## 2024-09-06 RX ORDER — LEVOTHYROXINE SODIUM 50 UG/1
50 TABLET ORAL DAILY
COMMUNITY
Start: 2024-09-06

## 2024-09-06 NOTE — TELEPHONE ENCOUNTER
Jose Moreno is requesting a refill of:    Refused Prescriptions:                       Disp   Refills    traZODone (DESYREL) 100 MG tablet [Pharmac*                Sig: TAKE 1 TABLET BY MOUTH AT BEDTIME  Refused By: AASHISH GR  Reason for Refusal: Patient has requested refill too soon    levothyroxine (SYNTHROID/LEVOTHROID) 50 MC*                Sig: TAKE 1 TABLET BY MOUTH EVERY DAY  Refused By: AASHISH GR  Reason for Refusal: Patient has requested refill too soon    Refills sent on 10/16/23 for 90 tab with 3 refills, enough medication until 10/2024

## 2024-09-11 ENCOUNTER — TELEPHONE (OUTPATIENT)
Dept: CARDIOLOGY | Facility: CLINIC | Age: 74
End: 2024-09-11
Payer: COMMERCIAL

## 2024-09-11 ENCOUNTER — TRANSFERRED RECORDS (OUTPATIENT)
Dept: FAMILY MEDICINE | Facility: CLINIC | Age: 74
End: 2024-09-11

## 2024-09-11 DIAGNOSIS — E78.2 MIXED HYPERLIPIDEMIA: ICD-10-CM

## 2024-09-11 RX ORDER — EZETIMIBE 10 MG/1
10 TABLET ORAL DAILY
Qty: 90 TABLET | Refills: 0 | Status: SHIPPED | OUTPATIENT
Start: 2024-09-11

## 2024-09-13 DIAGNOSIS — N39.45 CONTINUOUS LEAKAGE OF URINE: ICD-10-CM

## 2024-09-13 DIAGNOSIS — F51.02 ADJUSTMENT INSOMNIA: ICD-10-CM

## 2024-09-13 RX ORDER — TRAZODONE HYDROCHLORIDE 100 MG/1
100 TABLET ORAL AT BEDTIME
Qty: 90 TABLET | Refills: 0 | Status: SHIPPED | OUTPATIENT
Start: 2024-09-13

## 2024-09-13 NOTE — TELEPHONE ENCOUNTER
Patient called into the CS line asking for a refill of    Pending Prescriptions:                       Disp   Refills    traZODone (DESYREL) 100 MG tablet         90 tab*3            Sig: Take 1 tablet (100 mg) by mouth at bedtime.    He was last seen on 7/22/24 for med check visit-routing to [facility] for approval.

## 2024-10-09 DIAGNOSIS — E03.4 HYPOTHYROIDISM DUE TO ACQUIRED ATROPHY OF THYROID: ICD-10-CM

## 2024-10-10 RX ORDER — LEVOTHYROXINE SODIUM 50 UG/1
50 TABLET ORAL DAILY
Qty: 90 TABLET | Refills: 3 | OUTPATIENT
Start: 2024-10-10

## 2024-10-10 NOTE — TELEPHONE ENCOUNTER
Refused Prescriptions:                       Disp   Refills    levothyroxine (SYNTHROID/LEVOTHROID) 50 MC*90 tab*3        Sig: TAKE 1 TABLET BY MOUTH EVERY DAY  Refused By: SHAE MACK  Reason for Refusal: Patient needs appointment    See RE for another med / due for ov   Sent pt a my chart message  Shae

## 2024-10-16 DIAGNOSIS — E03.4 HYPOTHYROIDISM DUE TO ACQUIRED ATROPHY OF THYROID: ICD-10-CM

## 2024-10-16 DIAGNOSIS — F33.42 MAJOR DEPRESSIVE DISORDER, RECURRENT EPISODE, IN FULL REMISSION (H): ICD-10-CM

## 2024-10-16 DIAGNOSIS — F51.02 ADJUSTMENT INSOMNIA: ICD-10-CM

## 2024-10-16 DIAGNOSIS — N39.45 CONTINUOUS LEAKAGE OF URINE: ICD-10-CM

## 2024-10-16 DIAGNOSIS — I10 ESSENTIAL HYPERTENSION, BENIGN: ICD-10-CM

## 2024-10-17 DIAGNOSIS — I10 ESSENTIAL HYPERTENSION, BENIGN: ICD-10-CM

## 2024-10-17 DIAGNOSIS — E78.2 MIXED HYPERLIPIDEMIA: ICD-10-CM

## 2024-10-17 RX ORDER — TRAZODONE HYDROCHLORIDE 100 MG/1
100 TABLET ORAL AT BEDTIME
COMMUNITY
Start: 2024-10-17

## 2024-10-17 RX ORDER — ATORVASTATIN CALCIUM 80 MG/1
80 TABLET, FILM COATED ORAL DAILY
Qty: 90 TABLET | Refills: 0 | Status: SHIPPED | OUTPATIENT
Start: 2024-10-17

## 2024-10-17 RX ORDER — LEVOTHYROXINE SODIUM 50 UG/1
50 TABLET ORAL DAILY
COMMUNITY
Start: 2024-10-17

## 2024-10-17 RX ORDER — AMLODIPINE BESYLATE 10 MG/1
10 TABLET ORAL DAILY
Qty: 90 TABLET | Refills: 0 | Status: SHIPPED | OUTPATIENT
Start: 2024-10-17

## 2024-10-17 RX ORDER — HYDROCHLOROTHIAZIDE 25 MG/1
25 TABLET ORAL DAILY
COMMUNITY
Start: 2024-10-17

## 2024-10-17 NOTE — TELEPHONE ENCOUNTER
Jose Moreno is requesting a refill of:    Refused Prescriptions:                       Disp   Refills    levothyroxine (SYNTHROID/LEVOTHROID) 50 MC*                Sig: TAKE 1 TABLET BY MOUTH EVERY DAY  Refused By: AASHISH GR  Reason for Refusal: Patient needs appointment    FLUoxetine (PROZAC) 20 MG capsule [Pharmac*                Sig: TAKE 1 CAPSULE BY MOUTH EVERY DAY  Refused By: AASHISH GR  Reason for Refusal: Patient needs appointment    hydrochlorothiazide (HYDRODIURIL) 25 MG ta*                Sig: TAKE 1 TABLET BY MOUTH EVERY DAY  Refused By: AASHISH GR  Reason for Refusal: Patient needs appointment    traZODone (DESYREL) 100 MG tablet [Pharmac*                Sig: TAKE 1 TABLET BY MOUTH AT BEDTIME  Refused By: AASHISH GR  Reason for Refusal: Patient needs appointment    Pt due for OV

## 2024-10-22 DIAGNOSIS — E03.4 HYPOTHYROIDISM DUE TO ACQUIRED ATROPHY OF THYROID: ICD-10-CM

## 2024-10-22 DIAGNOSIS — N39.45 CONTINUOUS LEAKAGE OF URINE: ICD-10-CM

## 2024-10-22 DIAGNOSIS — F51.02 ADJUSTMENT INSOMNIA: ICD-10-CM

## 2024-10-22 DIAGNOSIS — I10 ESSENTIAL HYPERTENSION, BENIGN: ICD-10-CM

## 2024-10-22 DIAGNOSIS — F33.42 MAJOR DEPRESSIVE DISORDER, RECURRENT EPISODE, IN FULL REMISSION (H): ICD-10-CM

## 2024-10-23 DIAGNOSIS — N39.45 CONTINUOUS LEAKAGE OF URINE: ICD-10-CM

## 2024-10-23 DIAGNOSIS — F51.02 ADJUSTMENT INSOMNIA: ICD-10-CM

## 2024-10-23 RX ORDER — TRAZODONE HYDROCHLORIDE 100 MG/1
100 TABLET ORAL AT BEDTIME
COMMUNITY
Start: 2024-10-23

## 2024-10-23 RX ORDER — HYDROCHLOROTHIAZIDE 25 MG/1
25 TABLET ORAL DAILY
COMMUNITY
Start: 2024-10-23

## 2024-10-23 RX ORDER — LEVOTHYROXINE SODIUM 50 UG/1
50 TABLET ORAL DAILY
COMMUNITY
Start: 2024-10-23

## 2024-10-23 NOTE — TELEPHONE ENCOUNTER
Refused Prescriptions:                       Disp   Refills    traZODone (DESYREL) 100 MG tablet [Pharmac*                Sig: TAKE 1 TABLET BY MOUTH AT BEDTIME  Refused By: SHAE MACK  Reason for Refusal: Patient needs appointment    Pt scheduled an appt for tomorrow   Shae  [phone] (home)

## 2024-10-23 NOTE — TELEPHONE ENCOUNTER
Refused Prescriptions:                       Disp   Refills    levothyroxine (SYNTHROID/LEVOTHROID) 50 MC*                Sig: TAKE 1 TABLET BY MOUTH EVERY DAY  Refused By: SHAE MACK  Reason for Refusal: Patient needs appointment    hydrochlorothiazide (HYDRODIURIL) 25 MG ta*                Sig: TAKE 1 TABLET BY MOUTH EVERY DAY  Refused By: SHAE MACK  Reason for Refusal: Patient needs appointment    FLUoxetine (PROZAC) 20 MG capsule [Pharmac*                Sig: TAKE 1 CAPSULE BY MOUTH EVERY DAY  Refused By: SHAE MACK  Reason for Refusal: Patient needs appointment    traZODone (DESYREL) 100 MG tablet [Pharmac*                Sig: TAKE 1 TABLET BY MOUTH AT BEDTIME  Refused By: SHAE MACK  Reason for Refusal: Patient needs appointment    Pt is due for a fasting ov  No future appts scheduled as of now  My chart was sent on 10-  Shae

## 2024-10-24 ENCOUNTER — OFFICE VISIT (OUTPATIENT)
Dept: FAMILY MEDICINE | Facility: CLINIC | Age: 74
End: 2024-10-24

## 2024-10-24 VITALS
BODY MASS INDEX: 23.73 KG/M2 | RESPIRATION RATE: 18 BRPM | OXYGEN SATURATION: 98 % | SYSTOLIC BLOOD PRESSURE: 112 MMHG | DIASTOLIC BLOOD PRESSURE: 64 MMHG | HEART RATE: 64 BPM | WEIGHT: 147 LBS

## 2024-10-24 DIAGNOSIS — I10 ESSENTIAL HYPERTENSION, BENIGN: ICD-10-CM

## 2024-10-24 DIAGNOSIS — F51.02 ADJUSTMENT INSOMNIA: ICD-10-CM

## 2024-10-24 DIAGNOSIS — E03.4 HYPOTHYROIDISM DUE TO ACQUIRED ATROPHY OF THYROID: ICD-10-CM

## 2024-10-24 DIAGNOSIS — F33.42 MAJOR DEPRESSIVE DISORDER, RECURRENT EPISODE, IN FULL REMISSION (H): ICD-10-CM

## 2024-10-24 DIAGNOSIS — M47.816 SPONDYLOSIS OF LUMBAR REGION WITHOUT MYELOPATHY OR RADICULOPATHY: ICD-10-CM

## 2024-10-24 PROCEDURE — 99214 OFFICE O/P EST MOD 30 MIN: CPT | Performed by: PHYSICIAN ASSISTANT

## 2024-10-24 PROCEDURE — G2211 COMPLEX E/M VISIT ADD ON: HCPCS | Performed by: PHYSICIAN ASSISTANT

## 2024-10-24 RX ORDER — HYDROCHLOROTHIAZIDE 25 MG/1
25 TABLET ORAL DAILY
Qty: 90 TABLET | Refills: 3 | Status: CANCELLED | OUTPATIENT
Start: 2024-10-24

## 2024-10-24 RX ORDER — TRAZODONE HYDROCHLORIDE 100 MG/1
100 TABLET ORAL AT BEDTIME
Qty: 90 TABLET | Refills: 3 | Status: SHIPPED | OUTPATIENT
Start: 2024-10-24

## 2024-10-24 RX ORDER — METHOCARBAMOL 500 MG/1
500 TABLET, FILM COATED ORAL 3 TIMES DAILY PRN
Qty: 90 TABLET | Refills: 1 | Status: SHIPPED | OUTPATIENT
Start: 2024-10-24

## 2024-10-24 RX ORDER — LEVOTHYROXINE SODIUM 50 UG/1
50 TABLET ORAL DAILY
Qty: 90 TABLET | Refills: 3 | Status: SHIPPED | OUTPATIENT
Start: 2024-10-24

## 2024-10-24 ASSESSMENT — PATIENT HEALTH QUESTIONNAIRE - PHQ9
5. POOR APPETITE OR OVEREATING: NOT AT ALL
SUM OF ALL RESPONSES TO PHQ QUESTIONS 1-9: 0

## 2024-10-24 ASSESSMENT — ANXIETY QUESTIONNAIRES
IF YOU CHECKED OFF ANY PROBLEMS ON THIS QUESTIONNAIRE, HOW DIFFICULT HAVE THESE PROBLEMS MADE IT FOR YOU TO DO YOUR WORK, TAKE CARE OF THINGS AT HOME, OR GET ALONG WITH OTHER PEOPLE: NOT DIFFICULT AT ALL
GAD7 TOTAL SCORE: 0
6. BECOMING EASILY ANNOYED OR IRRITABLE: NOT AT ALL
3. WORRYING TOO MUCH ABOUT DIFFERENT THINGS: NOT AT ALL
1. FEELING NERVOUS, ANXIOUS, OR ON EDGE: NOT AT ALL
5. BEING SO RESTLESS THAT IT IS HARD TO SIT STILL: NOT AT ALL
GAD7 TOTAL SCORE: 0
2. NOT BEING ABLE TO STOP OR CONTROL WORRYING: NOT AT ALL
7. FEELING AFRAID AS IF SOMETHING AWFUL MIGHT HAPPEN: NOT AT ALL

## 2024-10-24 NOTE — PROGRESS NOTES
Assessment & Plan     Major depressive disorder, recurrent episode, in full remission (H)-stable, pt wants to stay on this med, refilled without change. Excellent control    - FLUoxetine (PROZAC) 20 MG capsule  Dispense: 90 capsule; Refill: 3    Essential hypertension, benign-managed by cardiology. BP borderline too low, will stop hydrochlorothiazide   Plan to take over BP meds if cardiology consents-pt has appt 4/25-will report back  Check BP at home-call if too low/high-consider adding back in agent    Hypothyroidism due to acquired atrophy of thyroid-will refill until pt gets labs in 1 month-inaccurate today due to being off meds for 2 days  Future labs in 1 month  - levothyroxine (SYNTHROID/LEVOTHROID) 50 MCG tablet  Dispense: 90 tablet; Refill: 3  - VENOUS COLLECTION  - TSH ([facility])  - T4 FREE ([facility])    Adjustment insomnia-continues to work well, refilled without change    - traZODone (DESYREL) 100 MG tablet  Dispense: 90 tablet; Refill: 3    Spondylosis of lumbar region without myelopathy or radiculopathy-stable, meds continue to help pt, using appropriately    - methocarbamol (ROBAXIN) 500 MG tablet  Dispense: 90 tablet; Refill: 1              Follow up 1 year OV, 6 months if needing more muscle relaxants    No follow-ups on file.    Patrick Mora is a 74 year old, presenting for the following health issues:  No chief complaint on file.    HPI       Hypertension Follow-up    Do you check your blood pressure regularly outside of the clinic? Yes   Are you following a low salt diet? Yes  Are your blood pressures ever more than 140 on the top number (systolic) OR more   than 90 on the bottom number (diastolic), for example 140/90? No  BP at home: 110s/60s generally. See cardiology for BP meds outside of hydrochlorothiazide. No low BP signs or symptoms. Discussed and interested in dropping hydrochlorothiazide.     Hypothyroidism Follow-up    Since last visit, patient describes the following symptoms: Weight  "stable, no hair loss, no skin changes, no constipation, no loose stools  -Off meds for last 2 days. Previously taking daily. Will get future lab orders.     Insomnia: trazodone 100mg. Works well, no concerns. Sleeps 8+ hours on trazodone.     MDD: Prozac 20mg. Feels continues to get a benefit from Prozac, but struggles to remember life without this med. Went off in the past and mood changed \"dramatically\".     Back pain: using Robaxin daily. Notes seeing [facility] for other pain drugs, previously given by Dr. Lerner (pain medicine).          Review of Systems  Constitutional, neuro, ENT, endocrine, pulmonary, cardiac, gastrointestinal, genitourinary, musculoskeletal, integument and psychiatric systems are negative, except as otherwise noted.      Objective    There were no vitals taken for this visit.  There is no height or weight on file to calculate BMI.  Physical Exam   GENERAL: alert and no distress  EYES: Eyes grossly normal to inspection, PERRL and conjunctivae and sclerae normal  HENT: ear canals and TM's normal, nose and mouth without ulcers or lesions  NECK: no adenopathy, no asymmetry, masses, or scars  RESP: lungs clear to auscultation - no rales, rhonchi or wheezes  CV: regular rate and rhythm, normal S1 S2, no S3 or S4, no murmur, click or rub, no peripheral edema  ABDOMEN: soft, nontender, no hepatosplenomegaly, no masses and bowel sounds normal  MS: no gross musculoskeletal defects noted, no edema  NEURO: Normal strength and tone, mentation intact and speech normal  PSYCH: mentation appears normal, affect normal/bright            Signed Electronically by: Sunny Blake PA-C      "

## 2024-10-24 NOTE — NURSING NOTE
Chief Complaint   Patient presents with    Recheck Medication     Fasting medication check and review, needs refills of some medications today     Pre-visit Screening:  Immunizations:  up to date  Colonoscopy:  is up to date  Mammogram: na  Asthma Action Test/Plan:  na  PHQ9:  given today   GAD7:  given today   Questioned patient about current smoking habits Pt. has never smoked.  Ok to leave detailed message on voice mail for today's visit only yes, phone # [phone] (home)

## 2024-10-25 RX ORDER — HYDROCHLOROTHIAZIDE 25 MG/1
25 TABLET ORAL DAILY
COMMUNITY
Start: 2024-10-25

## 2024-10-25 NOTE — TELEPHONE ENCOUNTER
Jose Moreno is requesting a refill of:    Refused Prescriptions:                       Disp   Refills    hydrochlorothiazide (HYDRODIURIL) 25 MG ta*                Sig: TAKE 1 TABLET BY MOUTH EVERY DAY  Refused By: OUSMANE CALVERT  Reason for Refusal: Adjustment in Therapy    No longer taking

## 2024-11-21 DIAGNOSIS — E03.4 HYPOTHYROIDISM DUE TO ACQUIRED ATROPHY OF THYROID: ICD-10-CM

## 2024-12-05 RX ORDER — HYDROCHLOROTHIAZIDE 25 MG/1
25 TABLET ORAL DAILY
COMMUNITY
Start: 2024-12-05

## 2024-12-05 NOTE — TELEPHONE ENCOUNTER
Refused Prescriptions:                       Disp   Refills    hydrochlorothiazide (HYDRODIURIL) 25 MG ta*                Sig: TAKE 1 TABLET BY MOUTH EVERY DAY  Refused By: SHAE MACK  Reason for Refusal: Adjustment in Therapy    Discontinued on 10-24-24  Shae

## 2024-12-09 RX ORDER — HYDROCHLOROTHIAZIDE 25 MG/1
25 TABLET ORAL DAILY
COMMUNITY
Start: 2024-12-09

## 2024-12-18 ENCOUNTER — HOSPITAL ENCOUNTER (OUTPATIENT)
Dept: CARDIOLOGY | Facility: CLINIC | Age: 74
Discharge: HOME | End: 2024-12-18
Attending: INTERNAL MEDICINE
Payer: COMMERCIAL

## 2024-12-18 DIAGNOSIS — I05.9 MITRAL VALVE DISORDER: ICD-10-CM

## 2024-12-18 LAB — LVEF ECHO: NORMAL

## 2024-12-18 PROCEDURE — 93306 TTE W/DOPPLER COMPLETE: CPT

## 2025-01-02 ENCOUNTER — OFFICE VISIT (OUTPATIENT)
Dept: FAMILY MEDICINE | Facility: CLINIC | Age: 75
End: 2025-01-02

## 2025-01-02 VITALS
OXYGEN SATURATION: 97 % | HEART RATE: 60 BPM | BODY MASS INDEX: 24.69 KG/M2 | SYSTOLIC BLOOD PRESSURE: 116 MMHG | WEIGHT: 153 LBS | TEMPERATURE: 98.1 F | DIASTOLIC BLOOD PRESSURE: 72 MMHG

## 2025-01-02 DIAGNOSIS — K59.09 OTHER CONSTIPATION: Primary | ICD-10-CM

## 2025-01-02 NOTE — NURSING NOTE
Chief Complaint   Patient presents with    Constipation     Constipation for the last few months, he has not had a bowel movement for the last week, he took laxatives the last few days with no relief, lower abdomen pain, no appetite, he used to have regular bowel movements      Pre-visit Screening:  Immunizations:  up to date  Colonoscopy:  is up to date  Mammogram: NA  Asthma Action Test/Plan:  NA  PHQ9:  NA  GAD7:  NA  Questioned patient about current smoking habits Pt. has never smoked.  Ok to leave detailed message on voice mail for today's visit only Yes, phone # [phone]

## 2025-01-02 NOTE — PROGRESS NOTES
Assessment & Plan     Other constipation-new constipation is concerning given severity. Will get CT scan to start, bowel cleanout with Miralax/ExLax, consider dedicated colonoscopy   Call with worsening  - CT Abdomen Pelvis w Contrast  - Radiology Referral (Affiliate Use Only)              Follow up pending imaging    No follow-ups on file.    Patrick Mora is a 74 year old, presenting for the following health issues:  Constipation (Constipation for the last few months, he has not had a bowel movement for the last week, he took laxatives the last few days with no relief, lower abdomen pain, no appetite, he used to have regular bowel movements )    HPI     Pt presents with ongoing constipation concerns. Over the last 4-6 months, struggling with constipation when he was normally very regular over his whole life. Has tried laxatives, not helping much, and now having lower abd tenderness. Last BM 5 days ago. No diet changes, does use hydrocodone but uses rarely (only every few months). Last colonoscopy 9/24, inconclusive as he ate and had liquid stool. Good high fiber diet, fruit vegetable intake. Drinks good amount of water daily. No weight loss. No fevers, chills, night sweats.        Review of Systems  Constitutional, neuro, ENT, endocrine, pulmonary, cardiac, gastrointestinal, genitourinary, musculoskeletal, integument and psychiatric systems are negative, except as otherwise noted.      Objective    /72 (BP Location: Right arm, Patient Position: Sitting, Cuff Size: Adult Large)   Pulse 60   Temp 98.1  F (36.7  C) (Temporal)   Wt 69.4 kg (153 lb)   SpO2 97%   BMI 24.69 kg/m    Body mass index is 24.69 kg/m .  Physical Exam   GENERAL: alert and no distress  NECK: no adenopathy, no asymmetry, masses, or scars  RESP: lungs clear to auscultation - no rales, rhonchi or wheezes  CV: regular rate and rhythm, normal S1 S2, no S3 or S4, no murmur, click or rub, no peripheral edema  ABDOMEN: soft, mild lower  abd tenderness, no hepatosplenomegaly, no masses and bowel sounds normal  MS: no gross musculoskeletal defects noted, no edema  NEURO: Normal strength and tone, mentation intact and speech normal  PSYCH: mentation appears normal, affect normal/bright            Signed Electronically by: Sunny Blake PA-C

## 2025-01-06 RX ORDER — HYDROCHLOROTHIAZIDE 25 MG/1
25 TABLET ORAL DAILY
COMMUNITY
Start: 2025-01-06

## 2025-01-06 NOTE — TELEPHONE ENCOUNTER
Refused Prescriptions:                       Disp   Refills    hydrochlorothiazide (HYDRODIURIL) 25 MG ta*                Sig: TAKE 1 TABLET BY MOUTH EVERY DAY  Refused By: SHAE MACK  Reason for Refusal: Adjustment in Therapy    Shae

## 2025-01-09 ENCOUNTER — TELEPHONE (OUTPATIENT)
Dept: FAMILY MEDICINE | Facility: CLINIC | Age: 75
End: 2025-01-09

## 2025-01-09 ENCOUNTER — OFFICE VISIT (OUTPATIENT)
Dept: FAMILY MEDICINE | Facility: CLINIC | Age: 75
End: 2025-01-09

## 2025-01-09 VITALS
HEIGHT: 66 IN | DIASTOLIC BLOOD PRESSURE: 72 MMHG | HEART RATE: 64 BPM | WEIGHT: 153 LBS | BODY MASS INDEX: 24.59 KG/M2 | SYSTOLIC BLOOD PRESSURE: 118 MMHG | RESPIRATION RATE: 20 BRPM | TEMPERATURE: 97.3 F

## 2025-01-09 DIAGNOSIS — K57.32 DIVERTICULITIS OF COLON: Primary | ICD-10-CM

## 2025-01-09 DIAGNOSIS — K59.09 OTHER CONSTIPATION: ICD-10-CM

## 2025-01-09 NOTE — NURSING NOTE
Jose Moreno is here for a consult for his CT results.    Questioned patient about current smoking habits.  Pt. has never smoked.  PULSE regular  My Chart: active  CLASSIFICATION OF OVERWEIGHT AND OBESITY BY BMI                        Obesity Class           BMI(kg/m2)  Underweight                                    < 18.5  Normal                                         18.5-24.9  Overweight                                     25.0-29.9  OBESITY                     I                  30.0-34.9                             II                 35.0-39.9  EXTREME OBESITY             III                >40                            Patient's  BMI Body mass index is 24.69 kg/m .  http://hin.nhlbi.nih.gov/menuplanner/menu.cgi  Pre-visit planning  Immunizations - up to date  Colonoscopy -   Mammogram -   Asthma -   PHQ9 -    CARL-7 -      The patient has verbalized that it is ok to leave a detailed voice message on the patient's cell phone with results/recommendations from this visit.

## 2025-01-09 NOTE — TELEPHONE ENCOUNTER
Patient called into the CS line and is asking for a call back from Sunny to review his CT scan results and the recommendations that were listed on the report. Routing to Sunny for review, can you call patient back at [phone] when you have the chance?

## 2025-01-09 NOTE — PROGRESS NOTES
Assessment & Plan     Diverticulitis of colon-based on CT scan, low suspicion of anything catastrophic in colon, but given poor prep in colonoscopy, will get colonoscopy after discussion of risk vs benefit with patient. Acute on chronic diverticulitis likely causing symptoms, will try clear liquid diet for a few days to see what happens  Clears for 3 days, advance to fulls for 3 days, then high fiber indefinitely  - Adult GI  Referral - Procedure Only    Other constipation  Seems to be better after bowel cleanout and Metamucil, should continue high fiber diet indefinitely  - Adult GI  Referral - Procedure Only              Follow up pending colonoscopy orders    No follow-ups on file.    Patrick Mora is a 74 year old, presenting for the following health issues:  No chief complaint on file.    HPI     Pt presents to recheck constipation. Completed bowel cleanout regimen, got some stool out. Now using Metamucil which has been helpful. Feels better vs last visit. Lower abd feels better.    CT showed acute on chronic diverticulitis, went through results in detail with patient.             Review of Systems  Constitutional, neuro, ENT, endocrine, pulmonary, cardiac, gastrointestinal, genitourinary, musculoskeletal, integument and psychiatric systems are negative, except as otherwise noted.      Objective    There were no vitals taken for this visit.  There is no height or weight on file to calculate BMI.  Physical Exam   GENERAL: alert and no distress  NECK: no adenopathy, no asymmetry, masses, or scars  RESP: lungs clear to auscultation - no rales, rhonchi or wheezes  CV: regular rate and rhythm, normal S1 S2, no S3 or S4, no murmur, click or rub, no peripheral edema  ABDOMEN: soft, nontender, no hepatosplenomegaly, no masses and bowel sounds normal  MS: no gross musculoskeletal defects noted, no edema  NEURO: Normal strength and tone, mentation intact and speech normal  PSYCH: mentation  appears normal, affect normal/bright    CT Abdomen Pelvis w Contrast    Result Date: 2025  [address], [address] [place] [address] Phone: [phone] [place] : 1950 Req Phys: Sunny Blake PA-C PA-C Patient name: DANIELLA OVALLE Clinic: [facility] Dept No: [id number] *CT ABDOMEN PELVIS w CONTRAST Exam Date: 2025 Accession: [id number] EXAM: CT ABDOMEN PELVIS w CONTRAST LOCATION: [facility] DATE: 2025 INDICATION: New onset worsening constipation over the last six months. COMPARISON: 2018 TECHNIQUE: CT scan of the abdomen and pelvis was performed following injection of IV contrast. Multiplanar reformats were obtained. Dose reduction techniques were used. CONTRAST: 100 ml Omnipaque 350 Discarded: 0 ml Omnipaque 350 FINDINGS: LOWER CHEST: Mitral valvular prosthesis. Small calcified mediastinal lymph node, the sequela of prior granulomatous disease. The visualized lung bases are clear. HEPATOBILIARY: No focal lesions in the liver. No calcified gallstones or biliary ductal dilatation. PANCREAS: Normal. SPLEEN: Punctate calcified granuloma in the spleen. ADRENAL GLANDS: Normal. KIDNEYS/BLADDER: Small bilateral renal cysts requiring no specific follow-up. No calculi, hydronephrosis or perinephric stranding. BOWEL: Sigmoid diverticulosis with marked circumferential wall thickening and luminal narrowing in the sigmoid colon and mild surrounding inflammatory changes. Findings may be due to acute or subacute, on chronic sigmoid diverticulitis. No abscess or free air. No small bowel or colonic obstruction. Nothing for appendicitis. Small amount of formed stool in the colon. LYMPH NODES: No lymphadenopathy. Small calcified upper abdominal lymph nodes, the sequela of prior granulomatous disease. VASCULATURE: No abdominal aortic aneurysm. PELVIC ORGANS: Prostatectomy. Artificial urethral sphincter. No pelvic masses. No free  fluid. MUSCULOSKELETAL: Spinal stimulator. No suspicious lesions in the bones. Degenerative changes in the spine. IMPRESSION: 1. Mild uncomplicated acute/subacute on chronic sigmoid diverticulitis. The underlying sigmoid colon demonstrates wall thickening and luminal narrowing which is likely the sequela of chronic diverticulitis. Recommend follow-up Page 1 of 2 DANIELLA OVALLE : 1950 Exam: *CT ABDOMEN PELVIS w CONTRAST colonoscopy to exclude underlying malignancy. Dictated By: SARAH WANG M.D. Password protected electronic signature by: VI Trans: SI Date Of Trans: 2025 2:12:00PM Date report approved and signed by interpreting physician: 2025 2:10:00PM Page 2 of 2         Signed Electronically by: Sunny Blake PA-C

## 2025-02-03 RX ORDER — HYDROCHLOROTHIAZIDE 25 MG/1
25 TABLET ORAL DAILY
COMMUNITY
Start: 2025-02-03

## 2025-02-03 NOTE — TELEPHONE ENCOUNTER
Pt is requesting refills of:  Refused Prescriptions:                       Disp   Refills    hydrochlorothiazide (HYDRODIURIL) 25 MG ta*90 tab*2        Sig: TAKE 1 TABLET BY MOUTH EVERY DAY    Pt stopped taking per 10/24/24 visit with Sunny.

## 2025-02-05 RX ORDER — HYDROCHLOROTHIAZIDE 25 MG/1
25 TABLET ORAL DAILY
COMMUNITY
Start: 2025-02-05

## 2025-02-05 NOTE — TELEPHONE ENCOUNTER
Refused Prescriptions:                       Disp   Refills    hydrochlorothiazide (HYDRODIURIL) 25 MG ta*                Sig: TAKE 1 TABLET BY MOUTH EVERY DAY  Refused By: ERIKA MACK  Reason for Refusal: Adjustment in Therapy

## 2025-02-06 ENCOUNTER — TRANSFERRED RECORDS (OUTPATIENT)
Dept: FAMILY MEDICINE | Facility: CLINIC | Age: 75
End: 2025-02-06

## 2025-02-24 RX ORDER — HYDROCHLOROTHIAZIDE 25 MG/1
25 TABLET ORAL DAILY
COMMUNITY
Start: 2025-02-24

## 2025-02-24 NOTE — TELEPHONE ENCOUNTER
Refused Prescriptions:                       Disp   Refills    hydrochlorothiazide (HYDRODIURIL) 25 MG ta*                Sig: TAKE 1 TABLET BY MOUTH EVERY DAY  Refused By: LUIS MACK  Reason for Refusal: Adjustment in Therapy    luis

## 2025-03-03 DIAGNOSIS — I25.10 CAD (CORONARY ARTERY DISEASE): ICD-10-CM

## 2025-03-03 DIAGNOSIS — I10 ESSENTIAL HYPERTENSION, BENIGN: ICD-10-CM

## 2025-03-03 DIAGNOSIS — E78.2 MIXED HYPERLIPIDEMIA: Primary | ICD-10-CM

## 2025-03-03 DIAGNOSIS — I05.9 MITRAL VALVE DISORDER: ICD-10-CM

## 2025-03-03 RX ORDER — HYDROCHLOROTHIAZIDE 25 MG/1
25 TABLET ORAL DAILY
Qty: 90 TABLET | Refills: 2 | OUTPATIENT
Start: 2025-03-03

## 2025-03-03 RX ORDER — IRBESARTAN 150 MG/1
150 TABLET ORAL DAILY
Qty: 90 TABLET | Refills: 0 | Status: SHIPPED | OUTPATIENT
Start: 2025-03-03

## 2025-03-03 NOTE — TELEPHONE ENCOUNTER
Refill request received for patient's irbesartan, last seen 9/2023. Spoke to patient, discussed need for follow up. He is currently in [place] but will call and set something up for when he returns in April. Coty message sent with scheduling number. New orders placed for labs/follow up.

## 2025-03-03 NOTE — TELEPHONE ENCOUNTER
Refused Prescriptions:                       Disp   Refills    hydrochlorothiazide (HYDRODIURIL) 25 MG ta*90 tab*2        Sig: TAKE 1 TABLET BY MOUTH EVERY DAY  Refused By: SHAE MACK  Reason for Refusal: Adjustment in Therapy    Shae

## 2025-04-23 ENCOUNTER — TELEPHONE (OUTPATIENT)
Dept: CARDIOLOGY | Facility: CLINIC | Age: 75
End: 2025-04-23
Payer: COMMERCIAL

## 2025-04-23 NOTE — TELEPHONE ENCOUNTER
1st attempt- Left voicemail for the patient to call back and schedule the following:    Appointment type:  Return Provider Change  Provider:  Dr. Roque  Return date:  Needs appt on 7/11/25 r/s from 11a to 1030a same day  Additional appointment(s) needed:  no  Additional Notes:  no  Specialty phone number: [phone]

## 2025-05-06 RX ORDER — HYDROCHLOROTHIAZIDE 25 MG/1
25 TABLET ORAL
COMMUNITY
Start: 2025-05-06

## 2025-05-06 NOTE — TELEPHONE ENCOUNTER
Refused Prescriptions:                       Disp   Refills    hydrochlorothiazide (HYDRODIURIL) 25 MG ta*                Sig: TAKE 1 TABLET BY MOUTH EVERY DAY  Refused By: ERIKA MACK  Reason for Refusal: Adjustment in Therapy    Med was d/c'd  Last med check was 10-24-24 rtc in one year

## 2025-05-28 DIAGNOSIS — I10 ESSENTIAL HYPERTENSION, BENIGN: ICD-10-CM

## 2025-05-28 RX ORDER — AMLODIPINE BESYLATE 10 MG/1
10 TABLET ORAL DAILY
Qty: 30 TABLET | Refills: 0 | Status: SHIPPED | OUTPATIENT
Start: 2025-05-28

## 2025-05-28 RX ORDER — HYDROCHLOROTHIAZIDE 25 MG/1
25 TABLET ORAL
COMMUNITY
Start: 2025-05-28

## 2025-06-03 ENCOUNTER — TRANSFERRED RECORDS (OUTPATIENT)
Dept: FAMILY MEDICINE | Facility: CLINIC | Age: 75
End: 2025-06-03

## 2025-07-03 ENCOUNTER — TELEPHONE (OUTPATIENT)
Dept: CARDIOLOGY | Facility: CLINIC | Age: 75
End: 2025-07-03
Payer: COMMERCIAL

## 2025-07-03 DIAGNOSIS — E78.2 MIXED HYPERLIPIDEMIA: ICD-10-CM

## 2025-07-03 RX ORDER — ATORVASTATIN CALCIUM 80 MG/1
80 TABLET, FILM COATED ORAL DAILY
Qty: 90 TABLET | Refills: 0 | Status: SHIPPED | OUTPATIENT
Start: 2025-07-03

## 2025-07-03 NOTE — TELEPHONE ENCOUNTER
[facility] Cardiology Refill Guideline reviewed.  Medication meets criteria for refill, only as he is scheduled with Dr. Marcano on 7/11/25.

## 2025-07-08 RX ORDER — HYDROCHLOROTHIAZIDE 25 MG/1
25 TABLET ORAL DAILY
COMMUNITY
Start: 2025-07-08

## 2025-07-10 ENCOUNTER — LAB (OUTPATIENT)
Dept: LAB | Facility: CLINIC | Age: 75
End: 2025-07-10
Payer: COMMERCIAL

## 2025-07-10 DIAGNOSIS — E78.2 MIXED HYPERLIPIDEMIA: ICD-10-CM

## 2025-07-10 DIAGNOSIS — I25.10 CAD (CORONARY ARTERY DISEASE): ICD-10-CM

## 2025-07-10 DIAGNOSIS — I10 ESSENTIAL HYPERTENSION, BENIGN: ICD-10-CM

## 2025-07-10 LAB
ALT SERPL W P-5'-P-CCNC: 33 U/L (ref 0–70)
ANION GAP SERPL CALCULATED.3IONS-SCNC: 12 MMOL/L (ref 7–15)
BUN SERPL-MCNC: 15.3 MG/DL (ref 8–23)
CALCIUM SERPL-MCNC: 9.3 MG/DL (ref 8.8–10.4)
CHLORIDE SERPL-SCNC: 100 MMOL/L (ref 98–107)
CHOLEST SERPL-MCNC: 173 MG/DL
CREAT SERPL-MCNC: 0.88 MG/DL (ref 0.67–1.17)
EGFRCR SERPLBLD CKD-EPI 2021: 89 ML/MIN/1.73M2
FASTING STATUS PATIENT QL REPORTED: YES
GLUCOSE SERPL-MCNC: 95 MG/DL (ref 70–99)
HCO3 SERPL-SCNC: 22 MMOL/L (ref 22–29)
HDLC SERPL-MCNC: 79 MG/DL
LDLC SERPL CALC-MCNC: 81 MG/DL
NONHDLC SERPL-MCNC: 94 MG/DL
POTASSIUM SERPL-SCNC: 4.5 MMOL/L (ref 3.4–5.3)
SODIUM SERPL-SCNC: 134 MMOL/L (ref 135–145)
TRIGL SERPL-MCNC: 67 MG/DL

## 2025-07-14 NOTE — TELEPHONE ENCOUNTER
Patient called into the CS line stating that he was able to get in with Sunny on Monday 10/16 but needs extension of  Pending Prescriptions:                       Disp   Refills    FLUoxetine (PROZAC) 20 MG capsule [[employer]arma*10 cap*0            Sig: TAKE 1 CAPSULE BY MOUTH EVERY DAY    traZODone (DESYREL) 100 MG tablet [Pharma*10 tab*0            Sig: TAKE 1 TABLET BY MOUTH EVERYDAY AT BEDTIME    levothyroxine (SYNTHROID/LEVOTHROID) 50 M*10 tab*0            Sig: TAKE 1 TABLET BY MOUTH EVERY DAY    hydrochlorothiazide (HYDRODIURIL) 25 MG t*10 tab*0            Sig: TAKE 1 TABLET BY MOUTH EVERY DAY    Routing to Raysa due to Sunny being out of clinic to send in.    
never

## 2025-07-22 ENCOUNTER — TELEPHONE (OUTPATIENT)
Dept: FAMILY MEDICINE | Facility: CLINIC | Age: 75
End: 2025-07-22

## 2025-07-22 NOTE — TELEPHONE ENCOUNTER
Records printed, invoice in the amount of $53.56 faxed to [facility],    waiting on payment to release records.

## 2025-07-23 DIAGNOSIS — E03.4 HYPOTHYROIDISM DUE TO ACQUIRED ATROPHY OF THYROID: ICD-10-CM

## 2025-07-23 DIAGNOSIS — K21.9 GASTROESOPHAGEAL REFLUX DISEASE WITHOUT ESOPHAGITIS: ICD-10-CM

## 2025-07-23 RX ORDER — OMEPRAZOLE 20 MG/1
20 CAPSULE, DELAYED RELEASE ORAL
Qty: 90 CAPSULE | Refills: 0 | Status: SHIPPED | OUTPATIENT
Start: 2025-07-23

## 2025-07-23 RX ORDER — LEVOTHYROXINE SODIUM 50 UG/1
50 TABLET ORAL DAILY
COMMUNITY
Start: 2025-07-23

## 2025-07-23 RX ORDER — HYDROCHLOROTHIAZIDE 25 MG/1
25 TABLET ORAL DAILY
COMMUNITY
Start: 2025-07-23

## 2025-07-23 NOTE — TELEPHONE ENCOUNTER
Sunny please review:    Last med check was 10- rtc one year  Last omeprazole last 7-2024  Do you want to refill till ov due in October?  I denied the other three  Please fax deny or advise  Shae

## 2025-07-27 DIAGNOSIS — E03.4 HYPOTHYROIDISM DUE TO ACQUIRED ATROPHY OF THYROID: ICD-10-CM

## 2025-07-28 RX ORDER — LEVOTHYROXINE SODIUM 50 UG/1
50 TABLET ORAL DAILY
COMMUNITY
Start: 2025-07-28

## 2025-07-28 RX ORDER — HYDROCHLOROTHIAZIDE 25 MG/1
25 TABLET ORAL DAILY
COMMUNITY
Start: 2025-07-28

## 2025-07-28 NOTE — TELEPHONE ENCOUNTER
Refused Prescriptions:                       Disp   Refills    hydrochlorothiazide (HYDRODIURIL) 25 MG ta*                Sig: TAKE 1 TABLET BY MOUTH EVERY DAY  Refused By: SHAE MACK  Reason for Refusal: Should already have refills on file    levothyroxine (SYNTHROID/LEVOTHROID) 50 MC*                Sig: TAKE 1 TABLET BY MOUTH EVERY DAY  Refused By: SHAE MACK  Reason for Refusal: Should already have refills on file    Refilled on 1- for one year  Per notes on RE on 7-23-25 rtc in 3 months Due for fasting in October  Shae

## 2025-08-05 DIAGNOSIS — F33.42 MAJOR DEPRESSIVE DISORDER, RECURRENT EPISODE, IN FULL REMISSION: ICD-10-CM

## 2025-08-06 DIAGNOSIS — I25.10 CAD (CORONARY ARTERY DISEASE): ICD-10-CM

## 2025-08-06 RX ORDER — NITROGLYCERIN 0.4 MG/1
TABLET SUBLINGUAL
Qty: 25 TABLET | Refills: 0 | Status: SHIPPED | OUTPATIENT
Start: 2025-08-06

## 2025-08-11 ENCOUNTER — OFFICE VISIT (OUTPATIENT)
Dept: FAMILY MEDICINE | Facility: CLINIC | Age: 75
End: 2025-08-11

## 2025-08-11 VITALS
DIASTOLIC BLOOD PRESSURE: 64 MMHG | HEART RATE: 67 BPM | WEIGHT: 148 LBS | SYSTOLIC BLOOD PRESSURE: 124 MMHG | HEIGHT: 65 IN | OXYGEN SATURATION: 96 % | BODY MASS INDEX: 24.66 KG/M2 | TEMPERATURE: 98.6 F

## 2025-08-11 DIAGNOSIS — R80.8 OTHER PROTEINURIA: Primary | ICD-10-CM

## 2025-08-11 PROCEDURE — 99213 OFFICE O/P EST LOW 20 MIN: CPT | Performed by: PHYSICIAN ASSISTANT

## 2025-08-11 PROCEDURE — G2211 COMPLEX E/M VISIT ADD ON: HCPCS | Performed by: PHYSICIAN ASSISTANT

## 2025-08-11 ASSESSMENT — PATIENT HEALTH QUESTIONNAIRE - PHQ9
SUM OF ALL RESPONSES TO PHQ QUESTIONS 1-9: 0
5. POOR APPETITE OR OVEREATING: NOT AT ALL

## 2025-08-11 ASSESSMENT — ANXIETY QUESTIONNAIRES
GAD7 TOTAL SCORE: 0
2. NOT BEING ABLE TO STOP OR CONTROL WORRYING: NOT AT ALL
1. FEELING NERVOUS, ANXIOUS, OR ON EDGE: NOT AT ALL
7. FEELING AFRAID AS IF SOMETHING AWFUL MIGHT HAPPEN: NOT AT ALL
GAD7 TOTAL SCORE: 0
IF YOU CHECKED OFF ANY PROBLEMS ON THIS QUESTIONNAIRE, HOW DIFFICULT HAVE THESE PROBLEMS MADE IT FOR YOU TO DO YOUR WORK, TAKE CARE OF THINGS AT HOME, OR GET ALONG WITH OTHER PEOPLE: NOT DIFFICULT AT ALL
6. BECOMING EASILY ANNOYED OR IRRITABLE: NOT AT ALL
5. BEING SO RESTLESS THAT IT IS HARD TO SIT STILL: NOT AT ALL
3. WORRYING TOO MUCH ABOUT DIFFERENT THINGS: NOT AT ALL

## 2025-08-12 ENCOUNTER — TRANSFERRED RECORDS (OUTPATIENT)
Dept: FAMILY MEDICINE | Facility: CLINIC | Age: 75
End: 2025-08-12

## 2025-08-12 LAB
PROTEIN, TOTAL, RANDOM URINE - QUEST: 97 MG/DL (ref 5–25)
PROTEIN/CREATININE RATIO - QUEST: 858 MG/G CREAT (ref 25–148)
PROTEIN/CREATININE RATIO MG/MG CREAT: 0.86 MG/MG CREAT (ref 0.03–0.15)
URINE CREATININE MG/DL - QUEST: 113 MG/DL (ref 20–320)

## 2025-08-21 DIAGNOSIS — F33.42 MAJOR DEPRESSIVE DISORDER, RECURRENT EPISODE, IN FULL REMISSION: ICD-10-CM

## 2025-08-21 DIAGNOSIS — E03.4 HYPOTHYROIDISM DUE TO ACQUIRED ATROPHY OF THYROID: ICD-10-CM

## 2025-08-21 RX ORDER — LEVOTHYROXINE SODIUM 50 UG/1
50 TABLET ORAL DAILY
COMMUNITY
Start: 2025-08-21

## 2025-08-21 RX ORDER — HYDROCHLOROTHIAZIDE 25 MG/1
25 TABLET ORAL DAILY
COMMUNITY
Start: 2025-08-21

## (undated) DEVICE — ESU GROUND PAD UNIVERSAL W/O CORD

## (undated) DEVICE — SU WND CLOSURE VLOC 90 ABS 3-0 VIOLET 6" CV-23 VLOCM0804

## (undated) DEVICE — SYR 10ML LL W/O NDL 302995

## (undated) DEVICE — SU VICRYL 2-0 SH 27" J317H

## (undated) DEVICE — SU SILK 2-0 FSL 18" 677G

## (undated) DEVICE — DAVINCI HOT SHEARS TIP COVER  400180

## (undated) DEVICE — SU WND CLOSURE VLOC 180 ABS 2-0 6" GS-21 VLOCL0305

## (undated) DEVICE — DAVINCI XI DRAPE COLUMN 470341

## (undated) DEVICE — SOL NACL 0.9% IRRIG 1000ML BOTTLE 2F7124

## (undated) DEVICE — GLOVE PROTEXIS W/NEU-THERA 7.5  2D73TE75

## (undated) DEVICE — KIT PATIENT POSITIONING PIGAZZI LATEX FREE 40580

## (undated) DEVICE — ESU GROUND PAD ADULT W/CORD E7507

## (undated) DEVICE — LINEN HALF SHEET 5512

## (undated) DEVICE — SOL WATER IRRIG 1000ML BOTTLE 2F7114

## (undated) DEVICE — DAVINCI XI OBTURATOR BLADELESS 8MM 470359

## (undated) DEVICE — SU PDS II 1 CT MONOFIL Z353H

## (undated) DEVICE — DRAPE IOBAN INCISE 23X17" 6650EZ

## (undated) DEVICE — TAPE CLOTH ADHESIVE 3" LATEX FREE

## (undated) DEVICE — ENDO SHEARS EXTRA LONG 5MM 174601

## (undated) DEVICE — SUPPORTER ATHLETIC LG LATEX 202636

## (undated) DEVICE — DRAPE LAVH/LAPAROSCOPY W/POUCH 29474

## (undated) DEVICE — DRSG TELFA 2X3"

## (undated) DEVICE — SUCTION TIP YANKAUER W/O VENT K86

## (undated) DEVICE — DAVINCI XI GRASPER ENDOWRIST PROGRASP 470093

## (undated) DEVICE — BLADE KNIFE SURG 15 371115

## (undated) DEVICE — CATH FOLEY COUDE TIEMAN 14FR 5ML LATEX

## (undated) DEVICE — LINEN TOWEL PACK X5 5464

## (undated) DEVICE — DECANTER BAG 2002S

## (undated) DEVICE — SU MONOCRYL 4-0 PS-2 27" UND Y426H

## (undated) DEVICE — PITCHER STERILE 1000ML  SSK9004A

## (undated) DEVICE — Device

## (undated) DEVICE — CATH FOLEY 20FR 5ML SILVER COAT LATEX 0165SI20

## (undated) DEVICE — SU VICRYL 2-0 CT-1 36" UND J945H

## (undated) DEVICE — DAVINCI XI ESU FCP BIPOLAR MARYLAND 470172

## (undated) DEVICE — ENDO POUCH UNIV RETRIEVAL SYSTEM INZII 10MM CD001

## (undated) DEVICE — SKW DEEP SCROTAL RETRACTION SYSTEM

## (undated) DEVICE — PREP SKIN SCRUB TRAY 4461A

## (undated) DEVICE — PREP POVIDONE IODINE SCRUB 7.5% 120ML

## (undated) DEVICE — BLADE CLIPPER 4406

## (undated) DEVICE — TUBING SUCTION 12"X1/4" N612

## (undated) DEVICE — SOL NACL 0.9% INJ 1000ML BAG 2B1324X

## (undated) DEVICE — SPONGE RAY-TEC 4X8" 7318

## (undated) DEVICE — NDL INSUFFLATION 13GA 150MM C2202

## (undated) DEVICE — SPONGE LAP 18X18" X8435

## (undated) DEVICE — SYR BULB IRRIG 50ML LATEX FREE 0035280

## (undated) DEVICE — SUCTION IRR STRYKERFLOW II W/TIP 250-070-520

## (undated) DEVICE — DRAIN JACKSON PRATT RESERVOIR 100ML SU130-1305

## (undated) DEVICE — TUBING CONMED AIRSEAL SMOKE EVAC INSUFFLATION ASM-EVAC

## (undated) DEVICE — DAVINCI XI SEAL UNIVERSAL 5-8MM 470361

## (undated) DEVICE — TROCAR AIRSEAL BLADELESS 12X120MM IAS12-120

## (undated) DEVICE — BAG CLEAR TRASH 1.3M 39X33" P4040C

## (undated) DEVICE — PACK MINOR LITHOTOMY RIDGES

## (undated) DEVICE — NDL COUNTER 20CT 31142493

## (undated) DEVICE — ESU PENCIL W/HOLSTER E2350H

## (undated) DEVICE — CLIP ENDO HEMO-LOC PURPLE LG 544240

## (undated) DEVICE — SU MONOCRYL 4-0 PS-2 18" UND Y496G

## (undated) DEVICE — SYR 30ML LL W/O NDL 302832

## (undated) DEVICE — CATH HOLDER STRAP 36600

## (undated) DEVICE — DRAIN JACKSON PRATT 15FR ROUND SU130-1323

## (undated) DEVICE — PACK DAVINCI UROLOGY SBA15UDFSG

## (undated) DEVICE — DRAPE MAYO STAND 23X54 8337

## (undated) DEVICE — ADH SKIN CLOSURE PREMIERPRO EXOFIN 1.0ML 3470

## (undated) DEVICE — GLOVE PROTEXIS BLUE W/NEU-THERA 6.5  2D73EB65

## (undated) DEVICE — LINEN FULL SHEET 5511

## (undated) DEVICE — DAVINCI XI MONOPOLAR SCISSORS HOT SHEARS 8MM 470179

## (undated) DEVICE — SUCTION CANISTER MEDIVAC LINER 3000ML W/LID 65651-530

## (undated) DEVICE — WIPES FOLEY CARE SURESTEP PROVON DFC100

## (undated) DEVICE — SU WND CLOSURE V-LOC 3-0 CV-23 6" VLOCM1904

## (undated) DEVICE — DAVINCI XI DRAPE ARM 470015

## (undated) RX ORDER — HYDROMORPHONE HYDROCHLORIDE 1 MG/ML
INJECTION, SOLUTION INTRAMUSCULAR; INTRAVENOUS; SUBCUTANEOUS
Status: DISPENSED
Start: 2018-11-01

## (undated) RX ORDER — ONDANSETRON 2 MG/ML
INJECTION INTRAMUSCULAR; INTRAVENOUS
Status: DISPENSED
Start: 2018-11-01

## (undated) RX ORDER — FENTANYL CITRATE 50 UG/ML
INJECTION, SOLUTION INTRAMUSCULAR; INTRAVENOUS
Status: DISPENSED
Start: 2018-11-01

## (undated) RX ORDER — DEXAMETHASONE SODIUM PHOSPHATE 4 MG/ML
INJECTION, SOLUTION INTRA-ARTICULAR; INTRALESIONAL; INTRAMUSCULAR; INTRAVENOUS; SOFT TISSUE
Status: DISPENSED
Start: 2020-01-03

## (undated) RX ORDER — FENTANYL CITRATE 50 UG/ML
INJECTION, SOLUTION INTRAMUSCULAR; INTRAVENOUS
Status: DISPENSED
Start: 2020-01-03

## (undated) RX ORDER — BUPIVACAINE HYDROCHLORIDE 2.5 MG/ML
INJECTION, SOLUTION EPIDURAL; INFILTRATION; INTRACAUDAL
Status: DISPENSED
Start: 2018-11-01

## (undated) RX ORDER — VECURONIUM BROMIDE 1 MG/ML
INJECTION, POWDER, LYOPHILIZED, FOR SOLUTION INTRAVENOUS
Status: DISPENSED
Start: 2018-11-01

## (undated) RX ORDER — CEFAZOLIN SODIUM 1 G/3ML
INJECTION, POWDER, FOR SOLUTION INTRAMUSCULAR; INTRAVENOUS
Status: DISPENSED
Start: 2018-11-01

## (undated) RX ORDER — GLYCOPYRROLATE 0.2 MG/ML
INJECTION INTRAMUSCULAR; INTRAVENOUS
Status: DISPENSED
Start: 2020-01-03

## (undated) RX ORDER — EPHEDRINE SULFATE 50 MG/ML
INJECTION, SOLUTION INTRAMUSCULAR; INTRAVENOUS; SUBCUTANEOUS
Status: DISPENSED
Start: 2020-01-03

## (undated) RX ORDER — LIDOCAINE HYDROCHLORIDE 10 MG/ML
INJECTION, SOLUTION EPIDURAL; INFILTRATION; INTRACAUDAL; PERINEURAL
Status: DISPENSED
Start: 2020-01-03

## (undated) RX ORDER — CEFAZOLIN SODIUM 2 G/100ML
INJECTION, SOLUTION INTRAVENOUS
Status: DISPENSED
Start: 2018-11-01

## (undated) RX ORDER — NEOSTIGMINE METHYLSULFATE 1 MG/ML
VIAL (ML) INJECTION
Status: DISPENSED
Start: 2018-11-01

## (undated) RX ORDER — LIDOCAINE HYDROCHLORIDE 20 MG/ML
INJECTION, SOLUTION EPIDURAL; INFILTRATION; INTRACAUDAL; PERINEURAL
Status: DISPENSED
Start: 2018-11-01

## (undated) RX ORDER — DEXAMETHASONE SODIUM PHOSPHATE 4 MG/ML
INJECTION, SOLUTION INTRA-ARTICULAR; INTRALESIONAL; INTRAMUSCULAR; INTRAVENOUS; SOFT TISSUE
Status: DISPENSED
Start: 2018-11-01

## (undated) RX ORDER — PROPOFOL 10 MG/ML
INJECTION, EMULSION INTRAVENOUS
Status: DISPENSED
Start: 2018-11-01

## (undated) RX ORDER — LIDOCAINE HYDROCHLORIDE 10 MG/ML
INJECTION, SOLUTION EPIDURAL; INFILTRATION; INTRACAUDAL; PERINEURAL
Status: DISPENSED
Start: 2018-11-01

## (undated) RX ORDER — GLYCOPYRROLATE 0.2 MG/ML
INJECTION, SOLUTION INTRAMUSCULAR; INTRAVENOUS
Status: DISPENSED
Start: 2018-11-01

## (undated) RX ORDER — VANCOMYCIN HYDROCHLORIDE 1 G/200ML
INJECTION, SOLUTION INTRAVENOUS
Status: DISPENSED
Start: 2020-01-03